# Patient Record
Sex: MALE | Race: WHITE | NOT HISPANIC OR LATINO | Employment: OTHER | URBAN - METROPOLITAN AREA
[De-identification: names, ages, dates, MRNs, and addresses within clinical notes are randomized per-mention and may not be internally consistent; named-entity substitution may affect disease eponyms.]

---

## 2018-03-14 RX ORDER — PANTOPRAZOLE SODIUM 40 MG/1
40 TABLET, DELAYED RELEASE ORAL DAILY
Qty: 90 TABLET | Refills: 3 | Status: SHIPPED | OUTPATIENT
Start: 2018-03-14 | End: 2019-05-22 | Stop reason: SDUPTHER

## 2018-03-14 RX ORDER — PANTOPRAZOLE SODIUM 40 MG/1
1 TABLET, DELAYED RELEASE ORAL DAILY
COMMUNITY
Start: 2017-12-08 | End: 2018-03-14 | Stop reason: SDUPTHER

## 2018-04-09 ENCOUNTER — TELEPHONE (OUTPATIENT)
Dept: INTERNAL MEDICINE | Facility: CLINIC | Age: 73
End: 2018-04-09

## 2018-04-09 NOTE — TELEPHONE ENCOUNTER
Pt called asked for a call back from Dr. PETERSON is having flu like symptoms also experiencing some dizziness lives pretty far out in [place] and would like some medical assistance Over the phone specially from Dr. PETERSON

## 2018-04-10 NOTE — TELEPHONE ENCOUNTER
Estela I left a detailed message with him at home and asked him to call us back.  I alternatively asked him to call me on my cell [employer].  If he calls in the morning try to get a better understanding of his allergies, and symptoms and maybe we can just prescribe him but if he has worsening symptoms he probably should see someone locally.  Thanks a lot for your help.  Raj Arreola

## 2018-04-11 NOTE — TELEPHONE ENCOUNTER
Matt called in this am. States symptoms started with cough and cold symptoms about 1 week ago Monday. He was treating symptoms with robitussin cold and flu. Experienced diarrhea Friday,saturday, and Sunday, states he may have been dehydrated. As of Monday he woke up out of a sound sleep with vertigo, moving head side to side is horrible, cannot move around to well without vertigo worsening. Went to Urgent care in [place], they gave him fluids via IV , negative flu swab, negative CXR.  prescribed zofran and meclizine for vertigo, did not prescribe cough medication or abx. Patient is still experiencing some tightness in his chest due to congestion and states the he took one dose of the meclizine last night (2 tablets) which did not help. Denies fever, body aches, and states chills only occur when the vertigo gets really bad. Patient would like to speak with you to discuss this further as he already went to Urgent care for eval and they're tx is not helping at this time.    # [phone]

## 2018-04-12 NOTE — TELEPHONE ENCOUNTER
I spoke with him  rina garcia  Getting stress test  Will follow up in office next week  Calling for an appointment  ed

## 2018-04-17 ENCOUNTER — TRANSCRIBE ORDERS (OUTPATIENT)
Dept: SCHEDULING | Age: 73
End: 2018-04-17

## 2018-04-17 DIAGNOSIS — I25.10 DISEASE OF CARDIOVASCULAR SYSTEM: Primary | ICD-10-CM

## 2018-04-23 ENCOUNTER — TELEPHONE (OUTPATIENT)
Dept: INTERNAL MEDICINE | Facility: CLINIC | Age: 73
End: 2018-04-23

## 2018-04-23 NOTE — TELEPHONE ENCOUNTER
Patient was prescribed Meclizine 12.5 mg at an urgent care in [place], [place]. Dr. Arreola is aware.  Patient wants Dr. arreola to tall in this prescription since he's out of it now.  Has a FU scheduled for April 30th.

## 2018-04-24 RX ORDER — MECLIZINE HCL 25MG 25 MG/1
25 TABLET, CHEWABLE ORAL 3 TIMES DAILY PRN
Qty: 30 TABLET | Refills: 0 | Status: SHIPPED | OUTPATIENT
Start: 2018-04-24 | End: 2018-04-24 | Stop reason: ALTCHOICE

## 2018-04-24 RX ORDER — MECLIZINE HCL 12.5 MG 12.5 MG/1
12.5 TABLET ORAL 3 TIMES DAILY PRN
Qty: 30 TABLET | Refills: 0 | Status: SHIPPED | OUTPATIENT
Start: 2018-04-24 | End: 2019-02-06

## 2018-04-24 NOTE — TELEPHONE ENCOUNTER
Dr. Arreola please see previous message. Pt would like Meclizine ordered. Can you please place an order for the medication and send it electronically to the pt's preferred pharm on file.

## 2018-05-02 ENCOUNTER — HOSPITAL ENCOUNTER (OUTPATIENT)
Dept: CARDIOLOGY | Facility: HOSPITAL | Age: 73
Setting detail: NUCLEAR MEDICINE
Discharge: HOME | End: 2018-05-02
Attending: INTERNAL MEDICINE
Payer: MEDICARE

## 2018-05-02 ENCOUNTER — OFFICE VISIT (OUTPATIENT)
Dept: INTERNAL MEDICINE | Facility: CLINIC | Age: 73
End: 2018-05-02
Payer: MEDICARE

## 2018-05-02 ENCOUNTER — HOSPITAL ENCOUNTER (OUTPATIENT)
Dept: RADIOLOGY | Facility: HOSPITAL | Age: 73
Setting detail: NUCLEAR MEDICINE
Discharge: HOME | End: 2018-05-02
Attending: INTERNAL MEDICINE
Payer: MEDICARE

## 2018-05-02 VITALS
BODY MASS INDEX: 30.22 KG/M2 | DIASTOLIC BLOOD PRESSURE: 70 MMHG | RESPIRATION RATE: 16 BRPM | HEART RATE: 97 BPM | SYSTOLIC BLOOD PRESSURE: 120 MMHG | HEIGHT: 66 IN | OXYGEN SATURATION: 97 % | TEMPERATURE: 98.9 F | WEIGHT: 188 LBS

## 2018-05-02 VITALS
SYSTOLIC BLOOD PRESSURE: 174 MMHG | DIASTOLIC BLOOD PRESSURE: 89 MMHG | HEART RATE: 96 BPM | BODY MASS INDEX: 28.93 KG/M2 | OXYGEN SATURATION: 100 % | HEIGHT: 66 IN | WEIGHT: 180 LBS

## 2018-05-02 DIAGNOSIS — E11.9 TYPE 2 DIABETES MELLITUS WITHOUT COMPLICATION, UNSPECIFIED LONG TERM INSULIN USE STATUS: Primary | ICD-10-CM

## 2018-05-02 DIAGNOSIS — N40.0 BENIGN PROSTATIC HYPERPLASIA WITHOUT LOWER URINARY TRACT SYMPTOMS: ICD-10-CM

## 2018-05-02 DIAGNOSIS — I25.10 DISEASE OF CARDIOVASCULAR SYSTEM: ICD-10-CM

## 2018-05-02 DIAGNOSIS — R42 VERTIGO: ICD-10-CM

## 2018-05-02 DIAGNOSIS — E78.2 MIXED HYPERLIPIDEMIA: ICD-10-CM

## 2018-05-02 DIAGNOSIS — C73 MALIGNANT NEOPLASM OF THYROID GLAND (CMS/HCC): ICD-10-CM

## 2018-05-02 PROBLEM — E78.5 DYSLIPIDEMIA: Status: ACTIVE | Noted: 2018-05-02

## 2018-05-02 PROBLEM — Z87.891 FORMER TOBACCO USE: Status: ACTIVE | Noted: 2018-05-02

## 2018-05-02 LAB
ALBUMIN SERPL-MCNC: 4.3 G/DL (ref 3.4–5)
ALP SERPL-CCNC: 51 IU/L (ref 35–126)
ALT SERPL-CCNC: 27 IU/L (ref 16–63)
ANION GAP SERPL CALC-SCNC: 12 MEQ/L (ref 3–15)
AST SERPL-CCNC: 24 IU/L (ref 15–41)
BASOPHILS # BLD: 0.05 K/UL (ref 0.01–0.1)
BASOPHILS NFR BLD: 0.5 %
BILIRUB SERPL-MCNC: 0.5 MG/DL (ref 0.3–1.2)
BUN SERPL-MCNC: 14 MG/DL (ref 8–20)
CALCIUM SERPL-MCNC: 9.6 MG/DL (ref 8.9–10.3)
CHLORIDE SERPL-SCNC: 98 MMOL/L (ref 98–109)
CHOLEST SERPL-MCNC: 149 MG/DL
CO2 SERPL-SCNC: 27 MMOL/L (ref 22–32)
CREAT SERPL-MCNC: 1.2 MG/DL (ref 0.8–1.3)
DIFFERENTIAL METHOD BLD: NORMAL
EOSINOPHIL # BLD: 0.19 K/UL (ref 0.04–0.54)
EOSINOPHIL NFR BLD: 1.9 %
ERYTHROCYTE [DISTWIDTH] IN BLOOD BY AUTOMATED COUNT: 13.3 % (ref 11.6–14.4)
EST. AVERAGE GLUCOSE BLD GHB EST-MCNC: 197 MG/DL
GFR SERPL CREATININE-BSD FRML MDRD: 59.2 ML/MIN/1.73M*2
GLUCOSE SERPL-MCNC: 225 MG/DL (ref 70–99)
HBA1C MFR BLD HPLC: 8.5 %
HCT VFR BLDCO AUTO: 42.7 % (ref 40–51)
HDLC SERPL-MCNC: 46 MG/DL
HDLC SERPL: 3.2 {RATIO}
HGB BLD-MCNC: 13.9 G/DL (ref 13.7–17.5)
IMM GRANULOCYTES # BLD AUTO: 0.04 K/UL (ref 0–0.08)
IMM GRANULOCYTES NFR BLD AUTO: 0.4 %
LDLC SERPL CALC-MCNC: 77 MG/DL
LYMPHOCYTES # BLD: 2.28 K/UL (ref 1.2–3.5)
LYMPHOCYTES NFR BLD: 23 %
MCH RBC QN AUTO: 28.2 PG (ref 28–33.2)
MCHC RBC AUTO-ENTMCNC: 32.6 G/DL (ref 32.2–36.5)
MCV RBC AUTO: 86.6 FL (ref 83–98)
MONOCYTES # BLD: 0.94 K/UL (ref 0.3–1)
MONOCYTES NFR BLD: 9.5 %
NEUTROPHILS # BLD: 6.4 K/UL (ref 1.7–7)
NEUTS SEG NFR BLD: 64.7 %
NONHDLC SERPL-MCNC: 103 MG/DL
NRBC BLD-RTO: 0 %
PDW BLD AUTO: 10.3 FL (ref 9.4–12.4)
PLATELET # BLD AUTO: 257 K/UL (ref 150–350)
POTASSIUM SERPL-SCNC: 4.6 MMOL/L (ref 3.6–5.1)
PROT SERPL-MCNC: 6.7 G/DL (ref 6–8.2)
RBC # BLD AUTO: 4.93 M/UL (ref 4.5–5.8)
SODIUM SERPL-SCNC: 137 MMOL/L (ref 136–144)
STRESS BASELINE BP: NORMAL MMHG
STRESS BASELINE HR: 79 BPM
STRESS O2 SAT REST: 100 %
STRESS PERCENT HR: 84 %
STRESS POST O2 SAT PEAK: 100 %
STRESS POST PEAK BP: NORMAL MMHG
STRESS POST PEAK HR: 122 BPM
STRESS TARGET HR: 124 BPM
STRESS TARGET HR: 124 BPM
T4 FREE SERPL-MCNC: 1.36 NG/DL (ref 0.58–1.64)
THYROGLOB SERPL-MCNC: <0.1 NG/ML (ref 1.15–130.77)
TRIGL SERPL-MCNC: 128 MG/DL (ref 30–149)
TSH SERPL DL<=0.05 MIU/L-ACNC: 0.64 MIU/ML (ref 0.34–5.6)
WBC # BLD AUTO: 9.9 K/UL (ref 3.8–10.5)

## 2018-05-02 PROCEDURE — 63600000 HC DRUGS/DETAIL CODE: Performed by: INTERNAL MEDICINE

## 2018-05-02 PROCEDURE — 84432 ASSAY OF THYROGLOBULIN: CPT | Performed by: INTERNAL MEDICINE

## 2018-05-02 PROCEDURE — A9500 TC99M SESTAMIBI: HCPCS | Performed by: INTERNAL MEDICINE

## 2018-05-02 PROCEDURE — 93018 CV STRESS TEST I&R ONLY: CPT | Performed by: INTERNAL MEDICINE

## 2018-05-02 PROCEDURE — 93017 CV STRESS TEST TRACING ONLY: CPT

## 2018-05-02 PROCEDURE — 84443 ASSAY THYROID STIM HORMONE: CPT | Performed by: INTERNAL MEDICINE

## 2018-05-02 PROCEDURE — 85025 COMPLETE CBC W/AUTO DIFF WBC: CPT | Performed by: INTERNAL MEDICINE

## 2018-05-02 PROCEDURE — 83036 HEMOGLOBIN GLYCOSYLATED A1C: CPT | Performed by: INTERNAL MEDICINE

## 2018-05-02 PROCEDURE — 93016 CV STRESS TEST SUPVJ ONLY: CPT | Performed by: NURSE PRACTITIONER

## 2018-05-02 PROCEDURE — 78452 HT MUSCLE IMAGE SPECT MULT: CPT

## 2018-05-02 PROCEDURE — 84439 ASSAY OF FREE THYROXINE: CPT | Performed by: INTERNAL MEDICINE

## 2018-05-02 PROCEDURE — 80061 LIPID PANEL: CPT | Performed by: INTERNAL MEDICINE

## 2018-05-02 PROCEDURE — 99214 OFFICE O/P EST MOD 30 MIN: CPT | Performed by: INTERNAL MEDICINE

## 2018-05-02 PROCEDURE — 80053 COMPREHEN METABOLIC PANEL: CPT | Performed by: INTERNAL MEDICINE

## 2018-05-02 RX ORDER — AMINOPHYLLINE 25 MG/ML
75 INJECTION, SOLUTION INTRAVENOUS ONCE
Status: COMPLETED | OUTPATIENT
Start: 2018-05-02 | End: 2018-05-02

## 2018-05-02 RX ORDER — REGADENOSON 0.08 MG/ML
0.4 INJECTION, SOLUTION INTRAVENOUS ONCE
Status: COMPLETED | OUTPATIENT
Start: 2018-05-02 | End: 2018-05-02

## 2018-05-02 RX ADMIN — KIT FOR THE PREPARATION OF TECHNETIUM TC99M SESTAMIBI 32.2 MILLICURIE: 1 INJECTION, POWDER, LYOPHILIZED, FOR SOLUTION PARENTERAL at 17:00

## 2018-05-02 RX ADMIN — REGADENOSON 0.4 MG: 0.08 INJECTION, SOLUTION INTRAVENOUS at 11:24

## 2018-05-02 RX ADMIN — AMINOPHYLLINE 75 MG: 25 INJECTION, SOLUTION INTRAVENOUS at 11:26

## 2018-05-02 RX ADMIN — KIT FOR THE PREPARATION OF TECHNETIUM TC99M SESTAMIBI 10.7 MILLICURIE: 1 INJECTION, POWDER, LYOPHILIZED, FOR SOLUTION PARENTERAL at 10:00

## 2018-05-03 DIAGNOSIS — E11.65 UNCONTROLLED TYPE 2 DIABETES MELLITUS WITH HYPERGLYCEMIA, WITHOUT LONG-TERM CURRENT USE OF INSULIN (CMS/HCC): Primary | ICD-10-CM

## 2018-05-03 LAB
EXTRA TUBES HOLD SPECIMEN: NORMAL
EXTRA TUBES HOLD SPECIMEN: NORMAL

## 2018-05-06 ENCOUNTER — TELEPHONE (OUTPATIENT)
Dept: INTERNAL MEDICINE | Facility: CLINIC | Age: 73
End: 2018-05-06

## 2018-05-06 ASSESSMENT — ENCOUNTER SYMPTOMS
CONSTITUTIONAL NEGATIVE: 1
EYES NEGATIVE: 1
NEUROLOGICAL NEGATIVE: 1
CARDIOVASCULAR NEGATIVE: 1
ENDOCRINE NEGATIVE: 1
RESPIRATORY NEGATIVE: 1
PSYCHIATRIC NEGATIVE: 1
GASTROINTESTINAL NEGATIVE: 1
MUSCULOSKELETAL NEGATIVE: 1
ALLERGIC/IMMUNOLOGIC NEGATIVE: 1

## 2018-05-06 NOTE — PROGRESS NOTES
Patient ID: Matt Williamson is a 72 y.o. male.      Problem List Items Addressed This Visit        Other    BPH (benign prostatic hyperplasia)     Follow pmg for BPH  Doing urine test for risk stratification  Follow closely with him         Relevant Orders    Extra Tubes (Completed)    Extra Tube Lav (Completed)    Extra Tube Gold (Completed)    Mixed hyperlipidemia     Patient with continue with the same plan of care at this time.         Relevant Orders    Lipid panel (Completed)    Extra Tubes (Completed)    Extra Tube Lav (Completed)    Extra Tube Gold (Completed)    Thyroid cancer (CMS/HCC) (HCC)     checl us and labs  See pati olivarez    Call me once the ultrasound is done to review it         Type 2 diabetes mellitus without complication (CMS/HCC) (HCC) - Primary     Patient with continue with the same plan of care at this time.         Relevant Orders    Hemoglobin A1c (Completed)    Extra Tubes (Completed)    Extra Tube Lav (Completed)    Extra Tube Gold (Completed)    Vertigo     ? Viral  Check studies  MRI         Relevant Orders    MRI BRAIN WITH AND WITHOUT CONTRAST    Extra Tubes (Completed)    Extra Tube Lav (Completed)    Extra Tube Gold (Completed)          Patient Active Problem List   Diagnosis   • BPH (benign prostatic hyperplasia)   • Atherosclerosis of coronary artery   • Cataract   • Diabetes (CMS/HCC) (HCC)   • Diabetes mellitus (CMS/HCC) (HCC)   • Dyslipidemia   • Epigastric abdominal pain   • Essential hypertension   • Former tobacco use   • Mixed hyperlipidemia   • Thyroid cancer (CMS/HCC) (HCC)   • Type 2 diabetes mellitus without complication (CMS/HCC) (HCC)   • Vertigo       Patient's Medications   New Prescriptions    No medications on file   Previous Medications    ASPIRIN 81 MG ENTERIC COATED TABLET    Take 81 mg by mouth daily.    LEVOTHYROXINE (SYNTHROID) 150 MCG TABLET    Take 150 mcg by mouth daily.    MECLIZINE (ANTIVERT) 12.5 MG TABLET    Take 1 tablet (12.5 mg total) by mouth 3  "(three) times a day as needed for dizziness.    METFORMIN (GLUCOPHAGE) 500 MG TABLET    Take 500 mg by mouth 3 (three) times a day with meals.    METOPROLOL SUCCINATE XL (TOPROL-XL) 50 MG 24 HR TABLET    Take 50 mg by mouth daily.    PANTOPRAZOLE (PROTONIX) 40 MG EC TABLET    Take 1 tablet (40 mg total) by mouth daily.    PRAVASTATIN (PRAVACHOL) 20 MG TABLET    Take 40 mg by mouth daily.   Modified Medications    No medications on file   Discontinued Medications    No medications on file     New Prescriptions    No medications on file       Allergies   Allergen Reactions   • Penicillins Other (see comments)     Childhood allergy       Social History   Substance Use Topics   • Smoking status: Not on file   • Smokeless tobacco: Not on file   • Alcohol use Not on file       No family history on file.    Subjective   The patient presents the office today for follow-up regarding his medical issues.    72-year-old here for follow-up for vertigo and follow-up after his stress test.  He is still symptomatic with vertigo, but no stroke symptoms, chest pain, pressure, fever or chills.    HPI  I reviewed his  medications, recent labs, and correspondence.     Review of Systems   Constitutional: Negative.    HENT: Negative.    Eyes: Negative.    Respiratory: Negative.    Cardiovascular: Negative.    Gastrointestinal: Negative.    Endocrine: Negative.    Genitourinary: Negative.    Musculoskeletal: Negative.    Allergic/Immunologic: Negative.    Neurological: Negative.    Psychiatric/Behavioral: Negative.        /70 (BP Location: Left upper arm, Patient Position: Sitting)   Pulse 97   Temp 37.2 °C (98.9 °F)   Resp 16   Ht 1.676 m (5' 6\")   Wt 85.3 kg (188 lb)   SpO2 97%   BMI 30.34 kg/m²       Physical Exam   Constitutional: He is oriented to person, place, and time. He appears well-developed and well-nourished.   HENT:   Head: Normocephalic and atraumatic.   Right Ear: External ear normal.   Left Ear: External ear " normal.   Nose: Nose normal.   Mouth/Throat: Oropharynx is clear and moist.   Eyes: Conjunctivae and EOM are normal. Pupils are equal, round, and reactive to light.   Neck: Normal range of motion. No thyromegaly present.   Cardiovascular: Normal rate, regular rhythm, normal heart sounds and intact distal pulses.    Pulmonary/Chest: Effort normal and breath sounds normal.   Abdominal: Soft. Bowel sounds are normal.   Musculoskeletal: Normal range of motion.   Lymphadenopathy:     He has no cervical adenopathy.   Neurological: He is alert and oriented to person, place, and time.   Skin: Skin is warm and dry.   Psychiatric: He has a normal mood and affect.

## 2018-05-06 NOTE — TELEPHONE ENCOUNTER
Notes Recorded by Raj Arreola DO on 5/3/2018 at 12:15 AM EDT  Please let him know that his thyroid cancer level or thyroglobulin was normal.    His blood sugars were not controlled.  Have him take the Metformin 500 mg 2 in the morning and also 2 at night.  He needs to start walking again, and eating better.  He should check his sugars at least a couple times a week and if they are not coming down I would add Stella    I put him in for repeat A1c in about 3 months  Let him know to call me after he has his ultrasound  No change in plan    3 month follow-up

## 2018-05-07 NOTE — TELEPHONE ENCOUNTER
----- Message from Raj Arreola DO sent at 5/3/2018 12:15 AM EDT -----  Please let him know that his thyroid cancer level or thyroglobulin was normal.    His blood sugars were not controlled.  Have him take the Metformin 500 mg 2 in the morning and also 2 at night.  He needs to start walking again, and eating better.  He should check his sugars at least a couple times a week and if they are not coming down I would add Stella    I put him in for repeat A1c in about 3 months  Let him know to call me after he has his ultrasound  No change in plan    3 month follow-up

## 2018-05-07 NOTE — TELEPHONE ENCOUNTER
shantel pt wanted me to let you know he was not fasting for the lab results that you just gave him today , does that make a difference in the results you gave him & would want to have new fasting labs done .

## 2018-06-08 RX ORDER — PRAVASTATIN SODIUM 40 MG/1
TABLET ORAL
Qty: 90 TABLET | Refills: 0 | Status: SHIPPED | OUTPATIENT
Start: 2018-06-08 | End: 2018-09-07 | Stop reason: SDUPTHER

## 2018-07-05 ENCOUNTER — TELEPHONE (OUTPATIENT)
Dept: INTERNAL MEDICINE | Facility: CLINIC | Age: 73
End: 2018-07-05

## 2018-09-10 RX ORDER — PRAVASTATIN SODIUM 40 MG/1
TABLET ORAL
Qty: 90 TABLET | Refills: 0 | Status: SHIPPED | OUTPATIENT
Start: 2018-09-10 | End: 2018-09-19 | Stop reason: ALTCHOICE

## 2018-09-17 ENCOUNTER — TELEPHONE (OUTPATIENT)
Dept: INTERNAL MEDICINE | Facility: CLINIC | Age: 73
End: 2018-09-17

## 2018-09-17 DIAGNOSIS — R35.0 URINE FREQUENCY: ICD-10-CM

## 2018-09-17 DIAGNOSIS — T83.518A PURPLE URINE BAG SYNDROME (CMS/HCC)  (CMS/HCC): Primary | ICD-10-CM

## 2018-09-17 DIAGNOSIS — N39.0 PURPLE URINE BAG SYNDROME (CMS/HCC)  (CMS/HCC): Primary | ICD-10-CM

## 2018-09-17 NOTE — TELEPHONE ENCOUNTER
Incoming call from Matt. He has been experiencing lower back pain x2-3 weeks, sciatica, issues with lifting his leg into the car. Now experiencing a fever 101-102 which he is able to control with aspirin[place] go to urgent care or hospital. Perfers to have Dr. Arreola order labs and urine testing and an appt was made for Wednesday with Dr. Arreola for evaluation. He would like the lab slip faxed to [facility] in [place],  Fax # [phone].

## 2018-09-17 NOTE — TELEPHONE ENCOUNTER
Please help to schedule patient for Wednesday at 12 pm with Dr. Arreola. He is currently in [place] and unable to come any other day this week. Patient is aware of time and location.

## 2018-09-19 ENCOUNTER — HOSPITAL ENCOUNTER (OUTPATIENT)
Dept: RADIOLOGY | Facility: HOSPITAL | Age: 73
Discharge: HOME | End: 2018-09-19
Attending: INTERNAL MEDICINE
Payer: MEDICARE

## 2018-09-19 ENCOUNTER — OFFICE VISIT (OUTPATIENT)
Dept: INTERNAL MEDICINE | Facility: CLINIC | Age: 73
End: 2018-09-19
Payer: MEDICARE

## 2018-09-19 ENCOUNTER — TELEPHONE (OUTPATIENT)
Dept: INTERNAL MEDICINE | Facility: CLINIC | Age: 73
End: 2018-09-19

## 2018-09-19 VITALS
DIASTOLIC BLOOD PRESSURE: 70 MMHG | HEIGHT: 66 IN | RESPIRATION RATE: 18 BRPM | TEMPERATURE: 98.5 F | SYSTOLIC BLOOD PRESSURE: 120 MMHG | WEIGHT: 183 LBS | HEART RATE: 104 BPM | BODY MASS INDEX: 29.41 KG/M2 | OXYGEN SATURATION: 97 %

## 2018-09-19 DIAGNOSIS — R10.9 ABDOMINAL PAIN, UNSPECIFIED ABDOMINAL LOCATION: Primary | ICD-10-CM

## 2018-09-19 DIAGNOSIS — C73 THYROID CANCER (CMS/HCC): ICD-10-CM

## 2018-09-19 DIAGNOSIS — E11.9 TYPE 2 DIABETES MELLITUS WITHOUT COMPLICATION, UNSPECIFIED LONG TERM INSULIN USE STATUS: ICD-10-CM

## 2018-09-19 DIAGNOSIS — R10.9 ABDOMINAL PAIN, UNSPECIFIED ABDOMINAL LOCATION: ICD-10-CM

## 2018-09-19 PROCEDURE — 99214 OFFICE O/P EST MOD 30 MIN: CPT | Performed by: INTERNAL MEDICINE

## 2018-09-19 PROCEDURE — 74176 CT ABD & PELVIS W/O CONTRAST: CPT

## 2018-09-19 RX ORDER — CIPROFLOXACIN 500 MG/1
500 TABLET ORAL 2 TIMES DAILY
Qty: 6 TABLET | Refills: 0 | Status: SHIPPED | OUTPATIENT
Start: 2018-09-19 | End: 2018-09-22

## 2018-09-19 RX ORDER — PANTOPRAZOLE SODIUM 40 MG/1
40 TABLET, DELAYED RELEASE ORAL DAILY
COMMUNITY
End: 2019-09-09 | Stop reason: SDUPTHER

## 2018-09-19 ASSESSMENT — ENCOUNTER SYMPTOMS
CONSTITUTIONAL NEGATIVE: 1
ALLERGIC/IMMUNOLOGIC NEGATIVE: 1
RESPIRATORY NEGATIVE: 1
ENDOCRINE NEGATIVE: 1
CARDIOVASCULAR NEGATIVE: 1
PSYCHIATRIC NEGATIVE: 1
GASTROINTESTINAL NEGATIVE: 1
EYES NEGATIVE: 1
NEUROLOGICAL NEGATIVE: 1
MUSCULOSKELETAL NEGATIVE: 1

## 2018-09-19 NOTE — PROGRESS NOTES
Patient ID: Matt Williamson is a 73 y.o. male.      Problem List Items Addressed This Visit        Other    Thyroid cancer (CMS/HCC) (HCC)     Needs to see Dr. Bruno Lerma he needs to get his ultrasound as before.         Type 2 diabetes mellitus without complication (CMS/HCC) (HCC)     Patient with continue with the same plan of care at this time.           Other Visit Diagnoses     Abdominal pain, unspecified abdominal location    -  Primary    Relevant Orders    Ambulatory referral to Urology    CT ABDOMEN PELVIS WITHOUT IV CONTRAST (Completed)    CBC and Differential    Lipase    Hepatic function panel    CBC    Diff Count          Patient Active Problem List   Diagnosis   • BPH (benign prostatic hyperplasia)   • Atherosclerosis of coronary artery   • Cataract   • Diabetes (CMS/HCC) (HCC)   • Diabetes mellitus (CMS/HCC) (HCC)   • Dyslipidemia   • Epigastric abdominal pain   • Essential hypertension   • Former tobacco use   • Mixed hyperlipidemia   • Thyroid cancer (CMS/HCC) (HCC)   • Type 2 diabetes mellitus without complication (CMS/HCC) (HCC)   • Vertigo   • Cyst of thyroid   • Esophageal reflux       Patient's Medications   New Prescriptions    No medications on file   Previous Medications    ASPIRIN 81 MG ENTERIC COATED TABLET    Take 81 mg by mouth daily.    LEVOTHYROXINE (SYNTHROID) 150 MCG TABLET    Take 150 mcg by mouth daily.    METFORMIN (GLUCOPHAGE) 500 MG TABLET    Take 500 mg by mouth 3 (three) times a day with meals.    METOPROLOL SUCCINATE XL (TOPROL-XL) 50 MG 24 HR TABLET    Take 50 mg by mouth daily.    PANTOPRAZOLE (PROTONIX) 40 MG EC TABLET    Take 40 mg by mouth daily.    PRAVASTATIN (PRAVACHOL) 20 MG TABLET    Take 40 mg by mouth daily.   Modified Medications    No medications on file   Discontinued Medications    PRAVASTATIN (PRAVACHOL) 40 MG TABLET    TAKE 1 TABLET BY MOUTH EVERY DAY     New Prescriptions    No medications on file       Allergies   Allergen Reactions   • Penicillins Other  "(see comments)     Childhood allergy       Social History   Substance Use Topics   • Smoking status: Former Smoker   • Smokeless tobacco: Never Used   • Alcohol use Not on file       No family history on file.    Subjective   The patient presents the office today for follow-up regarding his medical issues.    He was seen because he is been having leg pain, fevers and pelvic pain.  He has low-grade temperatures.    The patient needs to follow-up with Dr. Pop Lerma regarding his thyroid disease.    He has no recent vertigo.    Blood sugars have been under better control.  He needs to see Dr. Pride for his  issues.  I am concerned that he has a UTI or prostatitis.  He has a history of a firm prostate.    He had a colonoscopy in 2016 and had diverticulosis.  He sees Dr. DARSHAN Agrawal needs a follow-up in 2026 or sooner if he has any problems      HPI  I reviewed his  medications, recent labs, and correspondence.     Review of Systems   Constitutional: Negative.    HENT: Negative.    Eyes: Negative.    Respiratory: Negative.    Cardiovascular: Negative.    Gastrointestinal: Negative.    Endocrine: Negative.    Genitourinary: Negative.    Musculoskeletal: Negative.    Allergic/Immunologic: Negative.    Neurological: Negative.    Psychiatric/Behavioral: Negative.        /70 (BP Location: Right upper arm, Patient Position: Sitting)   Pulse (!) 104   Temp 36.9 °C (98.5 °F)   Resp 18   Ht 1.676 m (5' 6\")   Wt 83 kg (183 lb)   SpO2 97%   BMI 29.54 kg/m²       Physical Exam   Constitutional: He is oriented to person, place, and time. He appears well-developed and well-nourished.   HENT:   Head: Normocephalic and atraumatic.   Right Ear: External ear normal.   Left Ear: External ear normal.   Nose: Nose normal.   Mouth/Throat: Oropharynx is clear and moist.   Eyes: Conjunctivae and EOM are normal. Pupils are equal, round, and reactive to light.   Neck: Normal range of motion. No thyromegaly present.   Cardiovascular: " Normal rate, regular rhythm, normal heart sounds and intact distal pulses.    Pulmonary/Chest: Effort normal and breath sounds normal.   Abdominal: Soft. Bowel sounds are normal.   Musculoskeletal: Normal range of motion.   Lymphadenopathy:     He has no cervical adenopathy.   Neurological: He is alert and oriented to person, place, and time.   Skin: Skin is warm and dry.   Psychiatric: He has a normal mood and affect.

## 2018-09-19 NOTE — TELEPHONE ENCOUNTER
Moraima from radiology called in regarding findings on the CT.     Any questions  has he can contact radiology at P:[phone]    Printed report to review with  verbally and tasked for completeness.

## 2018-09-23 ENCOUNTER — TELEPHONE (OUTPATIENT)
Dept: INTERNAL MEDICINE | Facility: CLINIC | Age: 73
End: 2018-09-23

## 2018-09-23 NOTE — TELEPHONE ENCOUNTER
Ladies someone was going to scan his labs.  I looked in the computer and cannot find them.  Please scan them as soon as possible.  If he cannot find and then will have to get them again and scanned them as soon as she could.  Raj Arreola

## 2018-09-27 NOTE — PROGRESS NOTES
I spoke with him again this week and he will be sure to see Dr. Pride as soon as possible.    Please make sure his CT scan, and labs are sent to Dr. Pride.    Thank you for your help.  Raj Arreola

## 2018-10-07 ENCOUNTER — TELEPHONE (OUTPATIENT)
Dept: INTERNAL MEDICINE | Facility: CLINIC | Age: 73
End: 2018-10-07

## 2018-10-07 DIAGNOSIS — N41.0 ACUTE PROSTATITIS: Primary | ICD-10-CM

## 2018-10-07 DIAGNOSIS — N42.9 DISORDER OF PROSTATE: ICD-10-CM

## 2018-10-07 NOTE — TELEPHONE ENCOUNTER
Please send a copy of his most recent thyroid testing.  It was done in May include the TSH, free T4 and thyroglobulin.  Please send it to Dr. Bruno Lerma who is an endocrinologist over to [facility].      I also spoke with him he has a biopsy pending with Dr. Pride    Thank you for your help.  Raj Arreola

## 2018-10-10 ENCOUNTER — TELEPHONE (OUTPATIENT)
Dept: INTERNAL MEDICINE | Facility: CLINIC | Age: 73
End: 2018-10-10

## 2018-10-10 NOTE — TELEPHONE ENCOUNTER
Dr. Arreola generated a lab script for this patient on 10/7/18 for [employer]. Pt [facility] where they stated they did not have the script . I faxed the script over and left it near the MA fax if possible patient may call back asking for a call to be made to the lab.

## 2018-10-11 LAB — PSA SERPL-MCNC: 7.5 NG/ML

## 2018-10-31 ENCOUNTER — OFFICE VISIT (OUTPATIENT)
Dept: INTERNAL MEDICINE | Facility: CLINIC | Age: 73
End: 2018-10-31
Payer: MEDICARE

## 2018-10-31 VITALS
RESPIRATION RATE: 18 BRPM | WEIGHT: 186 LBS | HEIGHT: 66 IN | TEMPERATURE: 98.9 F | SYSTOLIC BLOOD PRESSURE: 122 MMHG | BODY MASS INDEX: 29.89 KG/M2 | HEART RATE: 75 BPM | OXYGEN SATURATION: 98 % | DIASTOLIC BLOOD PRESSURE: 80 MMHG

## 2018-10-31 DIAGNOSIS — C73 THYROID CANCER (CMS/HCC): ICD-10-CM

## 2018-10-31 DIAGNOSIS — E11.9 TYPE 2 DIABETES MELLITUS WITHOUT COMPLICATION, UNSPECIFIED WHETHER LONG TERM INSULIN USE (CMS/HCC): ICD-10-CM

## 2018-10-31 DIAGNOSIS — I10 ESSENTIAL HYPERTENSION: ICD-10-CM

## 2018-10-31 DIAGNOSIS — E04.1 CYST OF THYROID: ICD-10-CM

## 2018-10-31 DIAGNOSIS — R42 VERTIGO: ICD-10-CM

## 2018-10-31 DIAGNOSIS — Z23 NEED FOR IMMUNIZATION AGAINST INFLUENZA: Primary | ICD-10-CM

## 2018-10-31 DIAGNOSIS — R97.20 PSA ELEVATION: ICD-10-CM

## 2018-10-31 DIAGNOSIS — E78.2 MIXED HYPERLIPIDEMIA: ICD-10-CM

## 2018-10-31 DIAGNOSIS — K21.9 GASTROESOPHAGEAL REFLUX DISEASE WITHOUT ESOPHAGITIS: ICD-10-CM

## 2018-10-31 PROCEDURE — G0008 ADMIN INFLUENZA VIRUS VAC: HCPCS | Performed by: INTERNAL MEDICINE

## 2018-10-31 PROCEDURE — 90653 IIV ADJUVANT VACCINE IM: CPT | Performed by: INTERNAL MEDICINE

## 2018-10-31 PROCEDURE — 99214 OFFICE O/P EST MOD 30 MIN: CPT | Mod: 25 | Performed by: INTERNAL MEDICINE

## 2018-10-31 NOTE — PROGRESS NOTES
Patient ID: Matt Williamson is a 73 y.o. male.      Problem List Items Addressed This Visit        Other    Essential hypertension     We discussed the benefits of controlling blood pressure to reduce the risk of strokes and heart attacks.  We discussed lifestyle changes including a healthy low-sodium diet and increasing healthy foods like fresh fruit and vegetables.  The importance of daily exercise for at least 10 minutes 3 times a day 5 days a week.  The importance of avoidance of smoking was discussed.    The patient is stable and would continue with the same plan of care.           Mixed hyperlipidemia     For hyperlipidemia the patient will continue with dietary and lifestyle modifications.  We discussed that medications for statins are important measure for controlling hyperlipidemia when inital measure are inadequate.             Thyroid cancer (CMS/[facility]) ([facility])     Dr. Bruno Lerma  stable         Type 2 diabetes mellitus without complication (CMS/[facility]) ([facility])     The patient is stable and would continue with the same plan of care.    Today we discussed the benefits of controlling diabetes.  Recent studies have clearly shown that good blood sugar control can prevent or delay the complications of diabetes.  The healthy lifestyle that can control diabetes also helps to prevent other physical problems.           Vertigo     The patient is stable and would continue with the same plan of care.    Resolved         Cyst of thyroid     The patient is stable and would continue with the same plan of care.           Esophageal reflux     Patient's GERD is stable with medications, and lifestyle changes.    No worrisome symptoms at this time.           PSA elevation     Keep fu with PMG  Discussed with patient and Dr. Pride  Will be seeing him next week    Was high as 15  Repeat from to 7.5  Component      Latest Ref Rng & Units 12/9/2016 10/10/2018   Psa, Total      < OR = 4.0 ng/mL 3.5 7.5 (H)              Other Visit  Diagnoses     Need for immunization against influenza    -  Primary    Relevant Orders    Influenza vaccine 65 and older IM preservative free (Completed)          Patient Active Problem List   Diagnosis   • BPH (benign prostatic hyperplasia)   • Atherosclerosis of coronary artery   • Cataract   • Diabetes (CMS/HCC) (HCC)   • Diabetes mellitus (CMS/HCC) (HCC)   • Dyslipidemia   • Epigastric abdominal pain   • Essential hypertension   • Former tobacco use   • Mixed hyperlipidemia   • Thyroid cancer (CMS/HCC) (HCC)   • Type 2 diabetes mellitus without complication (CMS/HCC) (HCC)   • Vertigo   • Cyst of thyroid   • Esophageal reflux   • PSA elevation       Patient's Medications   New Prescriptions    No medications on file   Previous Medications    ASPIRIN 81 MG ENTERIC COATED TABLET    Take 81 mg by mouth daily.    LEVOTHYROXINE (SYNTHROID) 150 MCG TABLET    Take 150 mcg by mouth daily.    METFORMIN (GLUCOPHAGE) 500 MG TABLET    Take 500 mg by mouth 4 (four) times a day.      METOPROLOL SUCCINATE XL (TOPROL-XL) 50 MG 24 HR TABLET    Take 50 mg by mouth daily.    PANTOPRAZOLE (PROTONIX) 40 MG EC TABLET    Take 40 mg by mouth daily.    PRAVASTATIN (PRAVACHOL) 20 MG TABLET    Take 40 mg by mouth daily.   Modified Medications    No medications on file   Discontinued Medications    No medications on file     New Prescriptions    No medications on file       Allergies   Allergen Reactions   • Penicillins Other (see comments)     Childhood allergy       Social History   Substance Use Topics   • Smoking status: Former Smoker   • Smokeless tobacco: Never Used   • Alcohol use Not on file       No family history on file.    Subjective   The patient presents the office today for follow-up regarding his medical issues.    Discussed with patient recent studies  Has a fu with PMG for inc prostate and possible bx    BP controlled    Patient was just seen endo for thyroid dx and DM    HPI  I reviewed his  medications, recent labs, and  "correspondence.     Review of Systems   Constitutional: Negative.    HENT: Negative.    Eyes: Negative.    Respiratory: Negative.    Cardiovascular: Negative.    Gastrointestinal: Negative.    Endocrine: Negative.    Genitourinary: Negative.    Musculoskeletal: Negative.    Allergic/Immunologic: Negative.    Neurological: Negative.    Psychiatric/Behavioral: Negative.        /80 (BP Location: Right upper arm, Patient Position: Sitting)   Pulse 75   Temp 37.2 °C (98.9 °F)   Resp 18   Ht 1.676 m (5' 6\")   Wt 84.4 kg (186 lb)   SpO2 98%   BMI 30.02 kg/m²       Physical Exam   Constitutional: He is oriented to person, place, and time. He appears well-developed and well-nourished.   HENT:   Head: Normocephalic and atraumatic.   Right Ear: External ear normal.   Left Ear: External ear normal.   Nose: Nose normal.   Mouth/Throat: Oropharynx is clear and moist.   Eyes: Conjunctivae and EOM are normal. Pupils are equal, round, and reactive to light.   Neck: Normal range of motion. No thyromegaly present.   Cardiovascular: Normal rate, regular rhythm, normal heart sounds and intact distal pulses.    Pulmonary/Chest: Effort normal and breath sounds normal.   Abdominal: Soft. Bowel sounds are normal.   Musculoskeletal: Normal range of motion.   Lymphadenopathy:     He has no cervical adenopathy.   Neurological: He is alert and oriented to person, place, and time.   Skin: Skin is warm and dry.   Psychiatric: He has a normal mood and affect.                        "

## 2018-10-31 NOTE — PATIENT INSTRUCTIONS
"  Influenza (Flu) Vaccine (Inactivated or Recombinant): What You Need to Know  1. Why get vaccinated?  Influenza (\"flu\") is a contagious disease that spreads around the [place] every year, usually between October and May.  Flu is caused by influenza viruses, and is spread mainly by coughing, sneezing, and close contact.  Anyone can get flu. Flu strikes suddenly and can last several days. Symptoms vary by age, but can include:  · fever/chills  · sore throat  · muscle aches  · fatigue  · cough  · headache  · runny or stuffy nose  Flu can also lead to pneumonia and blood infections, and cause diarrhea and seizures in children. If you have a medical condition, such as heart or lung disease, flu can make it worse.  Flu is more dangerous for some people. Infants and young children, people 65 years of age and older, pregnant women, and people with certain health conditions or a weakened immune system are at greatest risk.  Each year thousands of people in the [place] die from flu, and many more are hospitalized.  Flu vaccine can:  · keep you from getting flu,  · make flu less severe if you do get it, and  · keep you from spreading flu to your family and other people.  2. Inactivated and recombinant flu vaccines  A dose of flu vaccine is recommended every flu season. Children 6 months through 8 years of age may need two doses during the same flu season. Everyone else needs only one dose each flu season.  Some inactivated flu vaccines contain a very small amount of a mercury-based preservative called thimerosal. Studies have not shown thimerosal in vaccines to be harmful, but flu vaccines that do not contain thimerosal are available.  There is no live flu virus in flu shots. They cannot cause the flu.  There are many flu viruses, and they are always changing. Each year a new flu vaccine is made to protect against three or four viruses that are likely to cause disease in the upcoming flu season. But even when the " vaccine doesn't exactly match these viruses, it may still provide some protection.  Flu vaccine cannot prevent:  · flu that is caused by a virus not covered by the vaccine, or  · illnesses that look like flu but are not.  It takes about 2 weeks for protection to develop after vaccination, and protection lasts through the flu season.  3. Some people should not get this vaccine  Tell the person who is giving you the vaccine:  · If you have any severe, life-threatening allergies. If you ever had a life-threatening allergic reaction after a dose of flu vaccine, or have a severe allergy to any part of this vaccine, you may be advised not to get vaccinated. Most, but not all, types of flu vaccine contain a small amount of egg protein.  · If you ever had Guillain-Barré Syndrome (also called GBS). Some people with a history of GBS should not get this vaccine. This should be discussed with your doctor.  · If you are not feeling well. It is usually okay to get flu vaccine when you have a mild illness, but you might be asked to come back when you feel better.  4. Risks of a vaccine reaction  With any medicine, including vaccines, there is a chance of reactions. These are usually mild and go away on their own, but serious reactions are also possible.  Most people who get a flu shot do not have any problems with it.  Minor problems following a flu shot include:  · soreness, redness, or swelling where the shot was given  · hoarseness  · sore, red or itchy eyes  · cough  · fever  · aches  · headache  · itching  · fatigue  If these problems occur, they usually begin soon after the shot and last 1 or 2 days.  More serious problems following a flu shot can include the following:  · There may be a small increased risk of Guillain-[place] Syndrome (GBS) after inactivated flu vaccine. This risk has been estimated at 1 or 2 additional cases per million people vaccinated. This is much lower than the risk of severe complications from flu,  which can be prevented by flu vaccine.  · Young children who get the flu shot along with pneumococcal vaccine (PCV13) and/or DTaP vaccine at the same time might be slightly more likely to have a seizure caused by fever. Ask your doctor for more information. Tell your doctor if a child who is getting flu vaccine has ever had a seizure.  Problems that could happen after any injected vaccine:  · People sometimes faint after a medical procedure, including vaccination. Sitting or lying down for about 15 minutes can help prevent fainting, and injuries caused by a fall. Tell your doctor if you feel dizzy, or have vision changes or ringing in the ears.  · Some people get severe pain in the shoulder and have difficulty moving the arm where a shot was given. This happens very rarely.  · Any medication can cause a severe allergic reaction. Such reactions from a vaccine are very rare, estimated at about 1 in a million doses, and would happen within a few minutes to a few hours after the vaccination.  As with any medicine, there is a very remote chance of a vaccine causing a serious injury or death.  The safety of vaccines is always being monitored. For more information, visit: www.cdc.gov/vaccinesafety/  5. What if there is a serious reaction?  What should I look for?  Look for anything that concerns you, such as signs of a severe allergic reaction, very high fever, or unusual behavior.  Signs of a severe allergic reaction can include hives, swelling of the face and throat, difficulty breathing, a fast heartbeat, dizziness, and weakness. These would start a few minutes to a few hours after the vaccination.  What should I do?  · If you think it is a severe allergic reaction or other emergency that can't wait, call 9-1-1 and get the person to the nearest hospital. Otherwise, call your doctor.  · Reactions should be reported to the Vaccine Adverse Event Reporting System (VAERS). Your doctor should file this report, or you can do  it yourself through the VAERS web site at www.vaers.[facility].gov, or by calling 1-858.346.7848.  ¨ VAERS does not give medical advice.  6. The National Vaccine Injury Compensation Program  The National Vaccine Injury Compensation Program (VICP) is a federal program that was created to compensate people who may have been injured by certain vaccines.  Persons who believe they may have been injured by a vaccine can learn about the program and about filing a claim by calling 1-777.924.1269 or visiting the VICP website at www.[facility].gov/vaccinecompensation. There is a time limit to file a claim for compensation.  7. How can I learn more?  · Ask your healthcare provider. He or she can give you the vaccine package insert or suggest other sources of information.  · Call your local or state health department.  · Contact the Centers for Disease Control and Prevention (CDC):  ¨ Call 1-382.791.5712 (0-774-NMW-INFO) or  ¨ Visit CDC's website at www.cdc.gov/flu  Vaccine Information Statement, Inactivated Influenza Vaccine (08/07/2015)  This information is not intended to replace advice given to you by your health care provider. Make sure you discuss any questions you have with your health care provider.  Document Released: 10/12/2007 Document Revised: 09/07/2017 Document Reviewed: 09/07/2017  Elsevier Interactive Patient Education © 2017 [employer].

## 2018-11-04 ENCOUNTER — TELEPHONE (OUTPATIENT)
Dept: INTERNAL MEDICINE | Facility: CLINIC | Age: 73
End: 2018-11-04

## 2018-11-04 PROBLEM — R97.20 PSA ELEVATION: Status: ACTIVE | Noted: 2018-11-04

## 2018-11-04 ASSESSMENT — ENCOUNTER SYMPTOMS
MUSCULOSKELETAL NEGATIVE: 1
ENDOCRINE NEGATIVE: 1
NEUROLOGICAL NEGATIVE: 1
RESPIRATORY NEGATIVE: 1
ALLERGIC/IMMUNOLOGIC NEGATIVE: 1
PSYCHIATRIC NEGATIVE: 1
EYES NEGATIVE: 1
CONSTITUTIONAL NEGATIVE: 1
GASTROINTESTINAL NEGATIVE: 1
CARDIOVASCULAR NEGATIVE: 1

## 2018-11-04 NOTE — ASSESSMENT & PLAN NOTE
Patient's GERD is stable with medications, and lifestyle changes.    No worrisome symptoms at this time.

## 2018-11-04 NOTE — ASSESSMENT & PLAN NOTE
Keep fu with PMG  Discussed with patient and Dr. Pride  Will be seeing him next week    Was high as 15  Repeat from to 7.5  Component      Latest Ref Rng & Units 12/9/2016 10/10/2018   Psa, Total      < OR = 4.0 ng/mL 3.5 7.5 (H)

## 2018-11-04 NOTE — ASSESSMENT & PLAN NOTE
Dr. Bruno Lerma  Stable    The neck ultrasound revealed no mass or lymphadenopathy.  There was no residual thyroid tissue as expected.

## 2018-11-04 NOTE — ASSESSMENT & PLAN NOTE
The patient is stable and would continue with the same plan of care.    Today we discussed the benefits of controlling diabetes.  Recent studies have clearly shown that good blood sugar control can prevent or delay the complications of diabetes.  The healthy lifestyle that can control diabetes also helps to prevent other physical problems.    Component      Latest Ref Rng & Units 5/2/2018   Hemoglobin A1C      <5.7 % 8.5 (H)   Estimated Ave Glucose      mg/dL 197

## 2018-11-04 NOTE — ASSESSMENT & PLAN NOTE
For hyperlipidemia the patient will continue with dietary and lifestyle modifications.  We discussed that medications for statins are important measure for controlling hyperlipidemia when inital measure are inadequate.

## 2018-11-04 NOTE — ASSESSMENT & PLAN NOTE
We discussed the benefits of controlling blood pressure to reduce the risk of strokes and heart attacks.  We discussed lifestyle changes including a healthy low-sodium diet and increasing healthy foods like fresh fruit and vegetables.  The importance of daily exercise for at least 10 minutes 3 times a day 5 days a week.  The importance of avoidance of smoking was discussed.    The patient is stable and would continue with the same plan of care.

## 2018-12-05 RX ORDER — PRAVASTATIN SODIUM 20 MG/1
40 TABLET ORAL DAILY
Qty: 30 TABLET | Refills: 5 | Status: SHIPPED | OUTPATIENT
Start: 2018-12-05 | End: 2018-12-05 | Stop reason: SDUPTHER

## 2018-12-05 RX ORDER — METOPROLOL SUCCINATE 50 MG/1
50 TABLET, EXTENDED RELEASE ORAL DAILY
Qty: 30 TABLET | Refills: 5 | Status: SHIPPED | OUTPATIENT
Start: 2018-12-05 | End: 2019-01-07

## 2018-12-05 RX ORDER — PRAVASTATIN SODIUM 40 MG/1
40 TABLET ORAL DAILY
Qty: 30 TABLET | Refills: 5 | Status: SHIPPED | OUTPATIENT
Start: 2018-12-05 | End: 2018-12-11 | Stop reason: SDUPTHER

## 2018-12-11 RX ORDER — PRAVASTATIN SODIUM 40 MG/1
40 TABLET ORAL DAILY
Qty: 90 TABLET | Refills: 3 | Status: SHIPPED | OUTPATIENT
Start: 2018-12-11 | End: 2019-01-07 | Stop reason: SDUPTHER

## 2019-01-07 ENCOUNTER — OFFICE VISIT (OUTPATIENT)
Dept: CARDIOLOGY | Facility: CLINIC | Age: 74
End: 2019-01-07
Payer: MEDICARE

## 2019-01-07 VITALS
HEIGHT: 66 IN | HEART RATE: 93 BPM | BODY MASS INDEX: 30.7 KG/M2 | WEIGHT: 191 LBS | OXYGEN SATURATION: 99 % | DIASTOLIC BLOOD PRESSURE: 94 MMHG | SYSTOLIC BLOOD PRESSURE: 158 MMHG

## 2019-01-07 DIAGNOSIS — E11.9 TYPE 2 DIABETES MELLITUS WITHOUT COMPLICATION, WITHOUT LONG-TERM CURRENT USE OF INSULIN (CMS/HCC): ICD-10-CM

## 2019-01-07 DIAGNOSIS — I10 ESSENTIAL HYPERTENSION: ICD-10-CM

## 2019-01-07 DIAGNOSIS — I25.10 ATHEROSCLEROSIS OF NATIVE CORONARY ARTERY OF NATIVE HEART WITHOUT ANGINA PECTORIS: ICD-10-CM

## 2019-01-07 DIAGNOSIS — E78.2 MIXED HYPERLIPIDEMIA: Primary | ICD-10-CM

## 2019-01-07 PROCEDURE — 99214 OFFICE O/P EST MOD 30 MIN: CPT | Performed by: INTERNAL MEDICINE

## 2019-01-07 PROCEDURE — 93000 ELECTROCARDIOGRAM COMPLETE: CPT | Performed by: INTERNAL MEDICINE

## 2019-01-07 RX ORDER — PRAVASTATIN SODIUM 40 MG/1
40 TABLET ORAL DAILY
Qty: 90 TABLET | Refills: 3 | Status: SHIPPED | OUTPATIENT
Start: 2019-01-07 | End: 2019-09-09 | Stop reason: SDUPTHER

## 2019-01-07 RX ORDER — METOPROLOL SUCCINATE 50 MG/1
50 TABLET, EXTENDED RELEASE ORAL 2 TIMES DAILY WITH MEALS
Qty: 180 TABLET | Refills: 3 | COMMUNITY
Start: 2019-01-07 | End: 2019-01-22 | Stop reason: SDUPTHER

## 2019-01-07 RX ORDER — EXENATIDE 2 MG/.85ML
INJECTION, SUSPENSION, EXTENDED RELEASE SUBCUTANEOUS
Refills: 10 | COMMUNITY
Start: 2018-12-24 | End: 2019-09-09 | Stop reason: SDUPTHER

## 2019-01-07 ASSESSMENT — ENCOUNTER SYMPTOMS
RESPIRATORY NEGATIVE: 1
NEUROLOGICAL NEGATIVE: 1
CONSTITUTIONAL NEGATIVE: 1
EYES NEGATIVE: 1
GASTROINTESTINAL NEGATIVE: 1
MUSCULOSKELETAL NEGATIVE: 1
CARDIOVASCULAR NEGATIVE: 1

## 2019-01-07 NOTE — ASSESSMENT & PLAN NOTE
I have increased the patient's beta-blocker dose as noted above.  Hopefully, his blood pressure will remain well-controlled.

## 2019-01-07 NOTE — ASSESSMENT & PLAN NOTE
The patient's lipids are adequately controlled on current medication.  The patient is tolerating medication without side effects.  I will continue the current treatment.

## 2019-01-07 NOTE — ASSESSMENT & PLAN NOTE
The patient is now on Bydureon therapy.  His fasting blood sugars have improved significantly on this medication.  Is managed by his family physician peer

## 2019-01-07 NOTE — ASSESSMENT & PLAN NOTE
The patient has a history of stent placement.  He is doing well at this point and is asymptomatic.  He is tolerating his medical regimen without difficulty.  I did increase his beta-blocker dose today because he has noticed that his resting heart rate has tended to be higher recently.  In addition, his blood pressure is somewhat elevated today.  He will let me know if he has any difficulty tolerating his new beta-blocker dosage, which will be Toprol 50 mg twice daily.

## 2019-01-07 NOTE — PROGRESS NOTES
Cardiology  Office Progress Note         Reason for visit:   Chief Complaint   Patient presents with   • CV Follow Up       HPI     Matt Williamson is a 73 y.o. male who presents to the office for cardiovascular follow up and management of PAF,coronary artery disease s/p MARGOT, hypertension and dyslipidemia.      He was last seen in the office on 1/8/18. At that visit, he reported having episodes of chest and mid-scapular discomfort about once per week. He reported that it was not new, he did not have any associating symptoms. He reported an episode of A. fib that brought him to the ER in November 2017. He reported that he was given a prescription for xarelto and a BB. He did not start either. He reported that there had been no recurrence of the A. fib since.     He had a stress test on 4/17/18. It showed normal myocardial perfusion and normal left ventricular systolic function with post-stress ejection fraction of greater than 70%. The EKG portion was nondiagnostic, due to a failure to achieve target heart rate.     No changes were made to his medications at that time.  Since then, the patient has noted that his heart rate at rest has increased to approximately 90 bpm.  The reason for this is uncertain.  In addition, his blood pressure has crept up as well.  He occasionally gets fleeting episodes of atypical chest discomfort but has no other cardiovascular complaints.  He intermittently gets symptoms of vertigo.      Past Medical History:   Diagnosis Date   • Abdominal hernia    • Atherosclerosis of coronary artery 9/14/2012    Overview:    • BPH (benign prostatic hyperplasia) 9/14/2012    Overview:    • Cataract 9/11/2013    Overview:    • Cyst of thyroid 1/15/2007   • Epigastric abdominal pain 2/18/2016    Overview:    • Essential hypertension 6/20/2016    Overview:    • Former tobacco use 5/2/2018    Overview:    • Mixed hyperlipidemia 2/18/2016    Overview:    • Osteomyelitis of foot (CMS/HCC) ([facility]) 2009     Surgery.   • PSA elevation 11/4/2018   • Thyroid cancer (CMS/HCC) ([facility]) 9/14/2012    Overview:    • Type 2 diabetes mellitus without complication (CMS/HCC) ([facility]) 6/20/2016    Overview:    • Vertigo 5/2/2018       Past Surgical History:   Procedure Laterality Date   • ANAL FISSURECTOMY  1986   • CARDIAC CATHETERIZATION  11/21/2005    LM FOD. 70-80% proximal LAD.Mild disease CX, RCA and branches.    • CARDIAC CATHETERIZATION  11/28/2005    LM FOD. Pristine LAD stent. CX FOD. RCA mild disease.   • CORONARY ANGIOPLASTY WITH STENT PLACEMENT  11/22/2005    LM FOD. Moderate, diffuse disease LAD. Irregular CX w/ PLVB distal to PDA 99%. Mild disease RCA. Stent to distal LAD.   • FOOT SURGERY  2009    For osteomyelitis.   • THYROIDECTOMY  2012       Social History   Substance Use Topics   • Smoking status: Former Smoker   • Smokeless tobacco: Never Used   • Alcohol use Not on file       No family history on file.    Allergies:  Penicillins    Current Outpatient Prescriptions   Medication Sig Dispense Refill   • aspirin 81 mg enteric coated tablet Take 81 mg by mouth daily.     • BYDUREON BCISE 2 mg/0.85 mL auto-injector INJECT ONCE A WEEK UTD  10   • levothyroxine (SYNTHROID) 150 mcg tablet Take 150 mcg by mouth daily.     • meclizine (ANTIVERT) 12.5 mg tablet Take 1 tablet (12.5 mg total) by mouth 3 (three) times a day as needed for dizziness. 30 tablet 0   • metFORMIN (GLUCOPHAGE) 500 mg tablet Take 500 mg by mouth 4 (four) times a day.       • metoprolol succinate XL (TOPROL-XL) 50 mg 24 hr tablet Take 1 tablet (50 mg total) by mouth 2 (two) times a day with meals. 180 tablet 3   • pantoprazole (PROTONIX) 40 mg EC tablet Take 40 mg by mouth daily.     • pravastatin (PRAVACHOL) 40 mg tablet Take 1 tablet (40 mg total) by mouth daily. 90 tablet 3     No current facility-administered medications for this visit.        Review of Systems   Constitution: Negative.   HENT: Negative.    Eyes: Negative.    Cardiovascular: Negative.     Respiratory: Negative.    Skin: Negative.    Musculoskeletal: Negative.    Gastrointestinal: Negative.    Neurological: Negative.        Objective     Vitals:    01/07/19 1008   BP: (!) 158/94   Pulse: 93   SpO2: 99%       Wt Readings from Last 3 Encounters:   01/07/19 86.6 kg (191 lb)   10/31/18 84.4 kg (186 lb)   09/19/18 83 kg (183 lb)       Body mass index is 30.83 kg/m².    Physical Exam   Constitutional: He is oriented to person, place, and time. He appears well-developed and well-nourished.   HENT:   Head: Normocephalic and atraumatic.   Eyes: Conjunctivae and EOM are normal.   Neck: Normal range of motion. Neck supple.   Cardiovascular: Normal rate, regular rhythm, normal heart sounds and intact distal pulses.  Exam reveals no gallop and no friction rub.    No murmur heard.  Pulmonary/Chest: Effort normal and breath sounds normal. No respiratory distress. He has no wheezes. He has no rales. He exhibits no tenderness.   Abdominal: Soft. Bowel sounds are normal.   Musculoskeletal: Normal range of motion. He exhibits no edema or deformity.   Neurological: He is alert and oriented to person, place, and time. He has normal reflexes. No cranial nerve deficit. Coordination normal.   Skin: Skin is warm and dry.   Psychiatric: He has a normal mood and affect. His behavior is normal.       ECG; normal sinus rhythm, within normal limits    Hematology  Lab Results   Component Value Date    WBC 9.90 05/02/2018    HGB 13.9 05/02/2018    HCT 42.7 05/02/2018     05/02/2018    INR 1.0 01/14/2016       Chemistries  Lab Results   Component Value Date     05/02/2018    K 4.6 05/02/2018    CL 98 05/02/2018    CREATININE 1.2 05/02/2018    BUN 14 05/02/2018    CO2 27 05/02/2018    CALCIUM 9.6 05/02/2018    MG 2.0 01/14/2016    ALT 27 05/02/2018    AST 24 05/02/2018       Cholesterol  Lab Results   Component Value Date    CHOL 149 05/02/2018    TRIG 128 05/02/2018    HDL 46 05/02/2018    LDLCALC 77 05/02/2018     NONHDLCALC 103 05/02/2018       Endocrine  Lab Results   Component Value Date    TSH 0.64 05/02/2018    FREET4 1.36 05/02/2018    HGBA1C 8.5 (H) 05/02/2018           Assessment/Plan     Atherosclerosis of coronary artery  The patient has a history of stent placement.  He is doing well at this point and is asymptomatic.  He is tolerating his medical regimen without difficulty.  I did increase his beta-blocker dose today because he has noticed that his resting heart rate has tended to be higher recently.  In addition, his blood pressure is somewhat elevated today.  He will let me know if he has any difficulty tolerating his new beta-blocker dosage, which will be Toprol 50 mg twice daily.    Type 2 diabetes mellitus without complication (CMS/HCC) (HCC)  The patient is now on Bydureon therapy.  His fasting blood sugars have improved significantly on this medication.  Is managed by his family physician peer    Mixed hyperlipidemia  The patient's lipids are adequately controlled on current medication.  The patient is tolerating medication without side effects.  I will continue the current treatment.    Essential hypertension  I have increased the patient's beta-blocker dose as noted above.  Hopefully, his blood pressure will remain well-controlled.             Minor Ji MD  1/7/2019

## 2019-01-22 RX ORDER — METOPROLOL SUCCINATE 50 MG/1
50 TABLET, EXTENDED RELEASE ORAL 2 TIMES DAILY WITH MEALS
Qty: 90 TABLET | Refills: 0 | Status: SHIPPED | OUTPATIENT
Start: 2019-01-22 | End: 2019-01-22 | Stop reason: SDUPTHER

## 2019-01-22 RX ORDER — METOPROLOL SUCCINATE 50 MG/1
TABLET, EXTENDED RELEASE ORAL
Qty: 180 TABLET | Refills: 0 | Status: SHIPPED | OUTPATIENT
Start: 2019-01-22 | End: 2019-01-22 | Stop reason: SDUPTHER

## 2019-01-22 RX ORDER — METOPROLOL SUCCINATE 50 MG/1
50 TABLET, EXTENDED RELEASE ORAL 2 TIMES DAILY
Qty: 180 TABLET | Refills: 3 | Status: SHIPPED | OUTPATIENT
Start: 2019-01-22 | End: 2019-09-09 | Stop reason: SDUPTHER

## 2019-01-23 LAB
ALBUMIN SERPL-MCNC: 4.1 G/DL (ref 3.6–5.1)
ALBUMIN/CREAT UR: 4 MCG/MG CREAT
ALBUMIN/GLOB SERPL: 1.6 (CALC) (ref 1–2.5)
ALP SERPL-CCNC: 49 U/L (ref 40–115)
ALT SERPL-CCNC: 15 U/L (ref 9–46)
AST SERPL-CCNC: 12 U/L (ref 10–35)
BILIRUB SERPL-MCNC: 0.4 MG/DL (ref 0.2–1.2)
BUN SERPL-MCNC: 14 MG/DL (ref 7–25)
BUN/CREAT SERPL: ABNORMAL (CALC) (ref 6–22)
CALCIUM SERPL-MCNC: 8.8 MG/DL (ref 8.6–10.3)
CHLORIDE SERPL-SCNC: 100 MMOL/L (ref 98–110)
CHOLEST SERPL-MCNC: 136 MG/DL
CHOLEST/HDLC SERPL: 2.8 (CALC)
CO2 SERPL-SCNC: 27 MMOL/L (ref 20–32)
CREAT SERPL-MCNC: 1.03 MG/DL (ref 0.7–1.18)
CREAT UR-MCNC: 136 MG/DL (ref 20–320)
ERYTHROCYTE [DISTWIDTH] IN BLOOD BY AUTOMATED COUNT: 12.5 % (ref 11–15)
GLOBULIN SER CALC-MCNC: 2.6 G/DL (CALC) (ref 1.9–3.7)
GLUCOSE SERPL-MCNC: 150 MG/DL (ref 65–99)
HBA1C MFR BLD: 8 % OF TOTAL HGB
HCT VFR BLD AUTO: 41.6 % (ref 38.5–50)
HDLC SERPL-MCNC: 48 MG/DL
HGB BLD-MCNC: 13.8 G/DL (ref 13.2–17.1)
LDLC SERPL CALC-MCNC: 69 MG/DL (CALC)
MCH RBC QN AUTO: 28.7 PG (ref 27–33)
MCHC RBC AUTO-ENTMCNC: 33.2 G/DL (ref 32–36)
MCV RBC AUTO: 86.5 FL (ref 80–100)
MICROALBUMIN UR-MCNC: 0.6 MG/DL
NONHDLC SERPL-MCNC: 88 MG/DL (CALC)
PLATELET # BLD AUTO: 269 THOUSAND/UL (ref 140–400)
PMV BLD REES-ECKER: 10.4 FL (ref 7.5–12.5)
POTASSIUM SERPL-SCNC: 4.5 MMOL/L (ref 3.5–5.3)
PROT SERPL-MCNC: 6.7 G/DL (ref 6.1–8.1)
QUEST EGFR NON-AFR. AMERICAN: 71 ML/MIN/1.73M2
RBC # BLD AUTO: 4.81 MILLION/UL (ref 4.2–5.8)
SODIUM SERPL-SCNC: 136 MMOL/L (ref 135–146)
T4 FREE SERPL-MCNC: 1.6 NG/DL (ref 0.8–1.8)
THYROGLOB AB SERPL-ACNC: <1 IU/ML
THYROGLOB SERPL-MCNC: 0.4 NG/ML
TRIGL SERPL-MCNC: 102 MG/DL
TSH SERPL-ACNC: 0.24 MIU/L (ref 0.4–4.5)
WBC # BLD AUTO: 9.3 THOUSAND/UL (ref 3.8–10.8)

## 2019-02-06 ENCOUNTER — OFFICE VISIT (OUTPATIENT)
Dept: INTERNAL MEDICINE | Facility: CLINIC | Age: 74
End: 2019-02-06
Payer: MEDICARE

## 2019-02-06 ENCOUNTER — DOCUMENTATION (OUTPATIENT)
Dept: INTERNAL MEDICINE | Facility: CLINIC | Age: 74
End: 2019-02-06

## 2019-02-06 VITALS
RESPIRATION RATE: 16 BRPM | WEIGHT: 184 LBS | HEART RATE: 94 BPM | DIASTOLIC BLOOD PRESSURE: 80 MMHG | BODY MASS INDEX: 29.57 KG/M2 | HEIGHT: 66 IN | OXYGEN SATURATION: 99 % | SYSTOLIC BLOOD PRESSURE: 134 MMHG

## 2019-02-06 DIAGNOSIS — R97.20 PSA ELEVATION: ICD-10-CM

## 2019-02-06 DIAGNOSIS — I10 ESSENTIAL HYPERTENSION: ICD-10-CM

## 2019-02-06 DIAGNOSIS — C73 THYROID CANCER (CMS/HCC): ICD-10-CM

## 2019-02-06 DIAGNOSIS — E11.9 TYPE 2 DIABETES MELLITUS WITHOUT COMPLICATION, WITHOUT LONG-TERM CURRENT USE OF INSULIN (CMS/HCC): ICD-10-CM

## 2019-02-06 DIAGNOSIS — R06.83 SNORING: Primary | ICD-10-CM

## 2019-02-06 DIAGNOSIS — E04.1 CYST OF THYROID: ICD-10-CM

## 2019-02-06 PROCEDURE — 99214 OFFICE O/P EST MOD 30 MIN: CPT | Performed by: INTERNAL MEDICINE

## 2019-02-06 NOTE — PROGRESS NOTES
Patient was last seen in October 2019.  History of hypertension, hyperlipidemia, thyroid cancer, diabetes, vertigo, thyroid cyst, esophageal reflux.    He follows with Dr. Pride for his elevated PSA in October it was 7.5.  Seen in January and PSA was down to 6.5 and he decided not to do a bx.  He will see him again in April.  No  symptoms, burning, nocturia or frequency.    Diabetes not controlled January 2019 A1c was 8.  His cholesterol was 136 and LDL 69.  Endo switching from Bydoreon to Trulicity.    Last 2 to 3 months sleeping 10 to 11 hours per day and still tired.    Saw CV and bc of increased heart rate increased Toprol  mg day.  No chest pain or pressure    Eye doc eddie bertrand 2018 meño 8 yr

## 2019-02-10 ENCOUNTER — TELEPHONE (OUTPATIENT)
Dept: INTERNAL MEDICINE | Facility: CLINIC | Age: 74
End: 2019-02-10

## 2019-02-10 ASSESSMENT — ENCOUNTER SYMPTOMS
EYES NEGATIVE: 1
GASTROINTESTINAL NEGATIVE: 1
ENDOCRINE NEGATIVE: 1
MUSCULOSKELETAL NEGATIVE: 1
PSYCHIATRIC NEGATIVE: 1
ALLERGIC/IMMUNOLOGIC NEGATIVE: 1
CARDIOVASCULAR NEGATIVE: 1
NEUROLOGICAL NEGATIVE: 1
RESPIRATORY NEGATIVE: 1
CONSTITUTIONAL NEGATIVE: 1

## 2019-02-10 NOTE — PROGRESS NOTES
Patient ID: Matt Williamson is a 73 y.o. male.      Problem List Items Addressed This Visit        Other    Essential hypertension     Blood pressure today is controlled, continue with the same medications.  No symptoms of chest pain, pressure, palpitations or shortness of breath.  The patient will continue with low-salt, with healthy diet and exercise.         Thyroid cancer (CMS/HCC) (HCC)     No reoccurrence    We will call to get the copy of his ultrasound from endocrinology         Type 2 diabetes mellitus without complication (CMS/HCC) ([facility])     Follows with Dr. Bruno Lerma    I discussed with the patient and the initial treatment for diabetes including regular exercise, and weight loss.  It is important to treat diabetes to prevent  complications.  Diabetes could lead to heart disease, stroke, visual problems including blindness and kidney failure.  The patient was encouraged to see ophthalmology and podiatry at least on a yearly basis.             Relevant Medications    empagliflozin (JARDIANCE) 10 mg tablet    Cyst of thyroid     We will get a copy of recent ultrasound         PSA elevation     Keep follow-up with Dr. Pride  We will called to get his notes           Other Visit Diagnoses     Snoring    -  Primary    Relevant Orders    Ambulatory referral to Pulmonology          Patient Active Problem List   Diagnosis   • BPH (benign prostatic hyperplasia)   • Atherosclerosis of coronary artery   • Cataract   • Epigastric abdominal pain   • Essential hypertension   • Former tobacco use   • Mixed hyperlipidemia   • Thyroid cancer (CMS/HCC) (HCC)   • Type 2 diabetes mellitus without complication (CMS/HCC) ([facility])   • Vertigo   • Cyst of thyroid   • Esophageal reflux   • PSA elevation       Patient's Medications   New Prescriptions    No medications on file   Previous Medications    ASPIRIN 81 MG ENTERIC COATED TABLET    Take 81 mg by mouth daily.    BYDUREON BCISE 2 MG/0.85 ML AUTO-INJECTOR    INJECT ONCE A  WEEK UTD    EMPAGLIFLOZIN (JARDIANCE) 10 MG TABLET    Take 10 mg by mouth daily.    LEVOTHYROXINE (SYNTHROID) 150 MCG TABLET    Take 150 mcg by mouth daily.    METFORMIN (GLUCOPHAGE) 500 MG TABLET    Take 500 mg by mouth 4 (four) times a day.      METOPROLOL SUCCINATE XL (TOPROL-XL) 50 MG 24 HR TABLET    Take 1 tablet (50 mg total) by mouth 2 (two) times a day.    PANTOPRAZOLE (PROTONIX) 40 MG EC TABLET    Take 40 mg by mouth daily.    PRAVASTATIN (PRAVACHOL) 40 MG TABLET    Take 1 tablet (40 mg total) by mouth daily.   Modified Medications    No medications on file   Discontinued Medications    No medications on file     New Prescriptions    No medications on file       Allergies   Allergen Reactions   • Penicillins Other (see comments)     Childhood allergy       Social History   Substance Use Topics   • Smoking status: Former Smoker   • Smokeless tobacco: Never Used   • Alcohol use Not on file       History reviewed. No pertinent family history.    Subjective   The patient presents the office today for follow-up regarding his medical issues.    73-year-old with ASCVD, stent placement.  Stable no recurrent chest pain or pressure.  His diabetes has been reasonably controlled.  Lipids are stable and blood pressure controlled with no chest pain or pressure.    He has an elevated PSA was following carefully with Dr. Pride.  He plans on repeating the PSA with him and consideration for a biopsy.    cln 2016 meño 8 yr    HPI  I reviewed his  medications, recent labs, and correspondence.     Review of Systems   Constitutional: Negative.    HENT: Negative.    Eyes: Negative.    Respiratory: Negative.    Cardiovascular: Negative.    Gastrointestinal: Negative.    Endocrine: Negative.    Genitourinary: Negative.    Musculoskeletal: Negative.    Allergic/Immunologic: Negative.    Neurological: Negative.    Psychiatric/Behavioral: Negative.        /80 (BP Location: Left upper arm, Patient Position: Sitting)   Pulse  "94   Resp 16   Ht 1.676 m (5' 6\")   Wt 83.5 kg (184 lb)   SpO2 99%   BMI 29.70 kg/m²        Physical Exam   Constitutional: He is oriented to person, place, and time. He appears well-developed and well-nourished.   HENT:   Head: Normocephalic and atraumatic.   Right Ear: External ear normal.   Left Ear: External ear normal.   Nose: Nose normal.   Mouth/Throat: Oropharynx is clear and moist.   Eyes: Conjunctivae and EOM are normal. Pupils are equal, round, and reactive to light.   Neck: Normal range of motion. No thyromegaly present.   Cardiovascular: Normal rate, regular rhythm, normal heart sounds and intact distal pulses.    Pulmonary/Chest: Effort normal and breath sounds normal.   Abdominal: Soft. Bowel sounds are normal.   Musculoskeletal: Normal range of motion.   Lymphadenopathy:     He has no cervical adenopathy.   Neurological: He is alert and oriented to person, place, and time.   Skin: Skin is warm and dry.   Psychiatric: He has a normal mood and affect.                        "

## 2019-02-10 NOTE — ASSESSMENT & PLAN NOTE
Follows with Dr. Bruno Lerma    I discussed with the patient and the initial treatment for diabetes including regular exercise, and weight loss.  It is important to treat diabetes to prevent  complications.  Diabetes could lead to heart disease, stroke, visual problems including blindness and kidney failure.  The patient was encouraged to see ophthalmology and podiatry at least on a yearly basis.

## 2019-02-10 NOTE — ASSESSMENT & PLAN NOTE
Blood pressure today is controlled, continue with the same medications.  No symptoms of chest pain, pressure, palpitations or shortness of breath.  The patient will continue with low-salt, with healthy diet and exercise.

## 2019-05-23 RX ORDER — PANTOPRAZOLE SODIUM 40 MG/1
TABLET, DELAYED RELEASE ORAL
Qty: 90 TABLET | Refills: 0 | Status: SHIPPED | OUTPATIENT
Start: 2019-05-23 | End: 2019-08-30 | Stop reason: SDUPTHER

## 2019-08-02 ENCOUNTER — TELEPHONE (OUTPATIENT)
Dept: INTERNAL MEDICINE | Facility: CLINIC | Age: 74
End: 2019-08-02

## 2019-08-02 NOTE — TELEPHONE ENCOUNTER
[facility] has authorized our office to send a medical release w/o pt sig and they will release e.r visit information etc from 7/31  [facility] fax # [phone].

## 2019-08-05 ENCOUNTER — TELEPHONE (OUTPATIENT)
Dept: INTERNAL MEDICINE | Facility: CLINIC | Age: 74
End: 2019-08-05

## 2019-08-05 DIAGNOSIS — M54.50 LUMBAR PAIN: Primary | ICD-10-CM

## 2019-08-05 DIAGNOSIS — Z01.812 ENCOUNTER FOR PREPROCEDURAL LABORATORY EXAMINATION: ICD-10-CM

## 2019-08-05 DIAGNOSIS — R35.0 URINARY FREQUENCY: ICD-10-CM

## 2019-08-05 NOTE — TELEPHONE ENCOUNTER
Pt went to [facility] on Wednesday 07/31/19 due to severe back pain.  Pt was given muscle relaxers and Oxycontin for pain.  Pt was told he needed to contact his primary to get an order to have an MRI done.  Records from that encounter are in pt's chart.  Pt is asking for an order to get an MRI as the xrays didn't show anything but pt is still in a lot of pain.  Pt is going to have this done at [facility] near his home.  Pt will get a fax number for them.  Please call pt once the order is complete and he will provide the fax number at that time.

## 2019-08-05 NOTE — TELEPHONE ENCOUNTER
Pt called back with where to send the MRI order.  Please send to [facility] and their fax #[phone].  Please call pt once the order is created.

## 2019-08-06 ENCOUNTER — TELEPHONE (OUTPATIENT)
Dept: INTERNAL MEDICINE | Facility: CLINIC | Age: 74
End: 2019-08-06

## 2019-08-06 NOTE — TELEPHONE ENCOUNTER
Spoke with Matt and he is aware that the MRI was ordered. Script faxed to number provided below. Matt is also requesting that he have a UA and culture completed to r/o UTI. Last office visit with Dr. Pride was 3 months ago and he did a prostate biopsy which was normal.     Orders placed with BMP to complete at [facility] per Patient.     He also wanted to make Dr. Arreola aware that he is still not able to walk.

## 2019-08-06 NOTE — TELEPHONE ENCOUNTER
Estela,    I did review his recent visit in [place].  Just 1 of the ladies to see when the last time he saw Dr. Ravi Pride.  I have no problem ordering an MRI with contrast of his lumbar spine but he should be seen in the office in about a 1 to week time interval.  He will also need a basic metabolic profile before he has his MRI with contrast.  The orders are in the system.    He has not been good in getting into the office and I do need to follow-up with other medical issues as well.    Thank you for your help,  Raj Arreola

## 2019-08-07 NOTE — TELEPHONE ENCOUNTER
Estela sounds great  I will be back on Monday  Call him and let us get him in the office in a week or so    Thank you for your help  Raj Arreola

## 2019-08-07 NOTE — TELEPHONE ENCOUNTER
Please call patient to schedule an appointment to follow up with Dr. Arreola in the next week or 2. Per dr. jamil-- thank you

## 2019-08-08 LAB
APPEARANCE UR: CLEAR
BACTERIA #/AREA URNS HPF: ABNORMAL /HPF
BACTERIA UR CULT: ABNORMAL
BILIRUB UR QL STRIP: NEGATIVE
BUN SERPL-MCNC: 18 MG/DL (ref 7–25)
BUN/CREAT SERPL: 13 (CALC) (ref 6–22)
CALCIUM SERPL-MCNC: 9.5 MG/DL (ref 8.6–10.3)
CHLORIDE SERPL-SCNC: 101 MMOL/L (ref 98–110)
CO2 SERPL-SCNC: 28 MMOL/L (ref 20–32)
COLOR UR: YELLOW
CREAT SERPL-MCNC: 1.38 MG/DL (ref 0.7–1.18)
GLUCOSE SERPL-MCNC: 149 MG/DL (ref 65–139)
GLUCOSE UR QL STRIP: ABNORMAL
HGB UR QL STRIP: NEGATIVE
HYALINE CASTS #/AREA URNS LPF: ABNORMAL /LPF
KETONES UR QL STRIP: NEGATIVE
LEUKOCYTE ESTERASE UR QL STRIP: NEGATIVE
NITRITE UR QL STRIP: NEGATIVE
PH UR STRIP: ABNORMAL [PH] (ref 5–8)
POTASSIUM SERPL-SCNC: 4.8 MMOL/L (ref 3.5–5.3)
PROT UR QL STRIP: NEGATIVE
QUEST EGFR NON-AFR. AMERICAN: 49 ML/MIN/1.73M2
RBC #/AREA URNS HPF: ABNORMAL /HPF
SODIUM SERPL-SCNC: 139 MMOL/L (ref 135–146)
SP GR UR STRIP: 1.04 (ref 1–1.03)
SQUAMOUS #/AREA URNS HPF: ABNORMAL /HPF
WBC #/AREA URNS HPF: ABNORMAL /HPF

## 2019-08-08 NOTE — TELEPHONE ENCOUNTER
I reviewed your recent testing:      Matt I have looked at your labs  Your creatinine kidney function is mildly abnormal but your glomerular filtration rate is adequate  I think you should still have the study done with contrast  I think he should drink plenty of liquids before and after  I will talk to you about the results once they are finished    I would ask that the patient to call if any questions or concerns and keep office follow up.    Raj Arreola

## 2019-08-09 ENCOUNTER — TELEPHONE (OUTPATIENT)
Dept: INTERNAL MEDICINE | Facility: CLINIC | Age: 74
End: 2019-08-09

## 2019-08-09 NOTE — TELEPHONE ENCOUNTER
Pt has an MRI scheduled on Sunday but in the meantime pt is in a lot of pain.  Pt is asking for a pain medication to hold him over until the results come back.  Pt uses [facility] [place].

## 2019-08-12 ENCOUNTER — TELEPHONE (OUTPATIENT)
Dept: INTERNAL MEDICINE | Facility: CLINIC | Age: 74
End: 2019-08-12

## 2019-08-12 RX ORDER — OXYCODONE AND ACETAMINOPHEN 5; 325 MG/1; MG/1
1 TABLET ORAL EVERY 4 HOURS PRN
Qty: 20 TABLET | Refills: 0 | Status: SHIPPED | OUTPATIENT
Start: 2019-08-12 | End: 2019-09-09 | Stop reason: SDUPTHER

## 2019-08-12 NOTE — TELEPHONE ENCOUNTER
Please let him know that I called in his pain medication to the pharmacy.  I look forward to seeing him soon.  Raj Arreola

## 2019-08-12 NOTE — TELEPHONE ENCOUNTER
Please keep a look out pt just had MRI lumbar spine order by Dr. Arreola test will be coming in form [facility]

## 2019-08-15 ENCOUNTER — TELEPHONE (OUTPATIENT)
Dept: INTERNAL MEDICINE | Facility: CLINIC | Age: 74
End: 2019-08-15

## 2019-08-15 NOTE — TELEPHONE ENCOUNTER
Pt was asking if we received the results of the MRI Lumbar Spine he had at the [facility] in [facility].  I called the center at [phone] and asked if they would please send the results as soon as possible.

## 2019-08-18 ENCOUNTER — TELEPHONE (OUTPATIENT)
Dept: INTERNAL MEDICINE | Facility: CLINIC | Age: 74
End: 2019-08-18

## 2019-08-18 DIAGNOSIS — M89.9 LESION OF LUMBAR SPINE: Primary | ICD-10-CM

## 2019-08-18 NOTE — TELEPHONE ENCOUNTER
Estela please take care of this on Monday.    I reviewed his lab work, recent imaging  He saw physician in [place]:    He is retiring shortly so we need to help him with the study.          The bottom line he is been having back pain in his study showed:      Please call him and help him get the bone scan done  He could be done in [place]  Call her friends and neurosurgery and let us get him seen as soon as possible  He could see:      Thanks for your help  Raj Arreola

## 2019-08-19 ENCOUNTER — TELEPHONE (OUTPATIENT)
Dept: NEUROSURGERY | Facility: CLINIC | Age: 74
End: 2019-08-19

## 2019-08-19 NOTE — TELEPHONE ENCOUNTER
Spoke with Matt, he states due to his schedule he will call to schedule an appointment for his bone scan.     I will update him with appt for Dr. Heller.

## 2019-08-19 NOTE — TELEPHONE ENCOUNTER
Outgoing call to Dr. Heller's office:    Records need to be reviewed by the physicians prior to appointment. They are able to pull patient up in the system and will review records. Advised this appointment is needed ASAP and if PCP needs to speak with one of the physicians than that could be arranged. Our office will be contacted with updates.

## 2019-08-21 ENCOUNTER — TELEPHONE (OUTPATIENT)
Dept: INTERNAL MEDICINE | Facility: CLINIC | Age: 74
End: 2019-08-21

## 2019-08-22 NOTE — TELEPHONE ENCOUNTER
Pt contacted to notify apt made with  at [facility]  Appt. Details are reserved for 8/28/19 at 11:30am.  Medical office [address]. [facility] [address]  Pt was adv to arrive 15 mins early to complete NPV forms.  Also, bring MRI disc for completeness.    Pt was unavailable. LM asking for a call back to review pertinent details regarding apt with

## 2019-08-22 NOTE — TELEPHONE ENCOUNTER
Spoke to Serenity at Dr Arreola's office, gave her the appointment info and advised her to make sure the patient needs to bring his MRI disc to appointment. Serenity is going to call the patient and relay the information. Advised to come early to fill out paperwork. Bone scan is scanned in media by PCP office

## 2019-08-22 NOTE — TELEPHONE ENCOUNTER
Pt returned our call.  Information re: appointment with Dr. Heller relayed to pt.  Pt will bring MRI disc and arrive 15 minutes early for NPV paperwork.

## 2019-08-28 ENCOUNTER — OFFICE VISIT (OUTPATIENT)
Dept: NEUROSURGERY | Facility: CLINIC | Age: 74
End: 2019-08-28
Payer: MEDICARE

## 2019-08-28 VITALS
DIASTOLIC BLOOD PRESSURE: 83 MMHG | HEART RATE: 80 BPM | WEIGHT: 165 LBS | SYSTOLIC BLOOD PRESSURE: 138 MMHG | BODY MASS INDEX: 26.52 KG/M2 | OXYGEN SATURATION: 98 % | HEIGHT: 66 IN

## 2019-08-28 DIAGNOSIS — D49.2 LUMBAR SPINE TUMOR: ICD-10-CM

## 2019-08-28 DIAGNOSIS — M54.16 LUMBAR RADICULOPATHY: Primary | ICD-10-CM

## 2019-08-28 PROCEDURE — 99205 OFFICE O/P NEW HI 60 MIN: CPT | Performed by: NEUROLOGICAL SURGERY

## 2019-08-28 RX ORDER — METHYLPREDNISOLONE 4 MG/1
TABLET ORAL
Qty: 21 TABLET | Refills: 0 | Status: SHIPPED | OUTPATIENT
Start: 2019-08-28 | End: 2019-09-04

## 2019-08-28 NOTE — PROGRESS NOTES
[facility]  Medical Office Building [address], [address]  [place] [address]  Phone: [phone]  Fax: [phone]      Patient ID: Matt Williamson                              : 1945    Visit Date: 2019    Referring Provider: Dr. Raj Arreola    History of Present Illness:   Matt Williamson is a pleasant 74 y.o. male seen today by the neurosurgery service as a consult for right leg pain. Patient began having pain about 1 month ago without injury or inciting event. The pain begins in his right buttock, radiating down the lateral side of his right thigh, past his knee, and into his right foot. The pain is at its worst in the middle of the night, and wakes him from his sleep. Throughout the day, the pain improves. During the night, pain is 10/10 in severity. He occasionally takes Oxycodone for when the pain is unbearable. He did see an orthopedist in [place] for a steroid injection, which did not provide him any relief. He denies weakness, saddle anesthesia, paresthesias, and bowel/bladder changes.       Allergies:  Allergies   Allergen Reactions   • Penicillins Rash     Childhood allergy       Medications:     Current Outpatient Prescriptions:   •  aspirin 81 mg enteric coated tablet, Take 81 mg by mouth daily., Disp: , Rfl:   •  empagliflozin (JARDIANCE) 10 mg tablet, Take 10 mg by mouth daily., Disp: , Rfl:   •  levothyroxine (SYNTHROID) 150 mcg tablet, Take 150 mcg by mouth daily., Disp: , Rfl:   •  metFORMIN (GLUCOPHAGE) 500 mg tablet, Take 500 mg by mouth 4 (four) times a day.  , Disp: , Rfl:   •  metoprolol succinate XL (TOPROL-XL) 50 mg 24 hr tablet, Take 1 tablet (50 mg total) by mouth 2 (two) times a day., Disp: 180 tablet, Rfl: 3  •  pantoprazole (PROTONIX) 40 mg EC tablet, Take 40 mg by mouth daily., Disp: , Rfl:   •  pravastatin (PRAVACHOL) 40 mg tablet, Take 1 tablet (40 mg total) by mouth daily., Disp: 90 tablet, Rfl: 3  •  BYDUREON BCISE 2 mg/0.85 mL  auto-injector, INJECT ONCE A WEEK UTD, Disp: , Rfl: 10  •  pantoprazole (PROTONIX) 40 mg EC tablet, TAKE 1 TABLET(40 MG) BY MOUTH DAILY, Disp: 90 tablet, Rfl: 0     Past Medical History:   Past Medical History:   Diagnosis Date   • Abdominal hernia    • Atherosclerosis of coronary artery 9/14/2012    Overview:    • BPH (benign prostatic hyperplasia) 9/14/2012    Overview:    • Cataract 9/11/2013    Overview:    • Cyst of thyroid 1/15/2007   • Epigastric abdominal pain 2/18/2016    Overview:    • Essential hypertension 6/20/2016    Overview:    • Former tobacco use 5/2/2018    Overview:    • Mixed hyperlipidemia 2/18/2016    Overview:    • Osteomyelitis of foot (CMS/HCC) ([facility]) 2009    Surgery.   • PSA elevation 11/4/2018   • Thyroid cancer (CMS/HCC) ([facility]) 9/14/2012    Overview:    • Type 2 diabetes mellitus without complication (CMS/HCC) ([facility]) 6/20/2016    Overview:    • Vertigo 5/2/2018       Past Surgical History:   Past Surgical History:   Procedure Laterality Date   • ANAL FISSURECTOMY  1986   • CARDIAC CATHETERIZATION  11/21/2005    LM FOD. 70-80% proximal LAD.Mild disease CX, RCA and branches.    • CARDIAC CATHETERIZATION  11/28/2005    LM FOD. Pristine LAD stent. CX FOD. RCA mild disease.   • CORONARY ANGIOPLASTY WITH STENT PLACEMENT  11/22/2005    LM FOD. Moderate, diffuse disease LAD. Irregular CX w/ PLVB distal to PDA 99%. Mild disease RCA. Stent to distal LAD.   • FOOT SURGERY  2009    For osteomyelitis.   • THYROIDECTOMY  2012       Family History:  Family History   Problem Relation Age of Onset   • Cancer Mother    • Emphysema Father        Social History:  Social History     Social History   • Marital status:      Spouse name: N/A   • Number of children: N/A   • Years of education: N/A     Occupational History   • Not on file.     Social History Main Topics   • Smoking status: Former Smoker     Quit date: 1980   • Smokeless tobacco: Never Used   • Alcohol use Yes   • Drug use: Unknown   • Sexual  activity: Not on file     Other Topics Concern   • Not on file     Social History Narrative   • No narrative on file       Vitals:   Vitals:    08/28/19 1135   BP: 138/83   Pulse: 80   SpO2: 98%       Review of Systems:  A complete 14 point review of systems was conducted and was deemed negative except what was documented in the HPI.    Physical Examination:  Physical Exam   Constitutional: Oriented to person, place, and time. Appears well-developed and well-nourished.   Head: Normocephalic and atraumatic.   Right Ear: External ear normal.   Left Ear: External ear normal.   Nose: Nose normal.   Mouth/Throat: Oropharynx is clear and moist.   Eyes: Conjunctivae and EOM are normal. Pupils are equal, round, and reactive to light. No scleral icterus.   Neck: Normal range of motion. Neck supple. No JVD present. No tracheal deviation present.   Cardiovascular: Normal rate and regular rhythm.    Pulmonary/Chest: Effort normal and breath sounds normal. No stridor. No respiratory distress. No wheezes. No tenderness.   Abdominal: Soft. No distension. There is no tenderness. There is no rebound and no guarding.   Musculoskeletal: Normal range of motion. No edema or tenderness.   Neurological: Oriented to person, place, and time.   Skin: Skin is warm and dry. No rash noted. No erythema.   Psychiatric: Normal mood and affect. Speech is normal and behavior is normal. Thought content normal.   Vitals reviewed.      Neurological Examination:  Neurologic Exam     Mental Status   Oriented to person, place, and time.   Attention: normal.   Speech: speech is normal   Level of consciousness: alert    Cranial Nerves     CN II   Visual acuity: normal    CN III, IV, VI   Pupils are equal, round, and reactive to light.  Extraocular motions are normal.   Right pupil: Size: 3 mm. Shape: regular. Reactivity: brisk.   Left pupil: Size: 3 mm. Shape: regular. Reactivity: brisk.   CN III: no CN III palsy  CN VI: no CN VI palsy  Nystagmus: none      CN V   Facial sensation intact.     CN VIII   CN VIII normal.   Hearing: intact    CN IX, X   CN IX normal.   Palate: symmetric    CN XI   CN XI normal.   Right sternocleidomastoid strength: normal  Left sternocleidomastoid strength: normal    CN XII   CN XII normal.   Tongue: not atrophic    Sensation ( /2)    Right Left  Right Left   C5 2 2 L2 2 2   C6 2 2 L3 2 2   C7 2 2 L4 2 2   C8 2 2 L5 2 2   T1 2 2 S1 2 2     Motor:     Deltoid Biceps Triceps Wrist ext Finger ext Hand Intrinsics Hip flexion Knee ext Dorsi-  flexion EHL Plantar Flexion   R 5 5 5 5 5 5 5 5 5 5 5   L 5 5 5 5 5 5 5 5 5 5 5     There is no pronator drift. Muscle bulk and tone are normal.     Gait, Coordination, and Reflexes     Reflexes   Reflexes 2+ except as noted.   Right plantar: normal  Left plantar: normal  Right Maharaj: absent  Left Maharaj: absent  Right ankle clonus: absent  Left ankle clonus: absent      Data Review:    Lab Results:   Lab Results   Component Value Date    WBC 9.3 01/22/2019    WBC 9.90 05/02/2018    HGB 13.8 01/22/2019    HGB 13.9 05/02/2018    HCT 41.6 01/22/2019    HCT 42.7 05/02/2018    MCV 86.5 01/22/2019    MCV 86.6 05/02/2018     01/22/2019     05/02/2018    RDW 12.5 01/22/2019    RDW 13.3 05/02/2018      Lab Results   Component Value Date    GLUCOSE 149 (H) 08/07/2019    GLUCOSE 3+ (A) 08/07/2019    GLUCOSE 225 (H) 05/02/2018    BUN 18 08/07/2019    BUN 14 05/02/2018     08/07/2019     05/02/2018    K 4.8 08/07/2019    K 4.6 05/02/2018     08/07/2019    CL 98 05/02/2018    CO2 28 08/07/2019    CO2 27 05/02/2018    ANIONGAP 12 05/02/2018        Imaging:   Independent review of all imaging was done by myself as well as review of the radiologists readings and comparison to prior films.     MRI LUMBAR SPINE 8/12/19  Solitary vertebral lesion in L3 of uncertain histology. The lack of fat makes benign hemangioma less likely but still possible as are more concerning lesions including  myeloma and metastasis.   A rightward protrusion of the L4-5 disc has both an intraspinal and intraforaminal component causing both thecal sac and exiting right L4 nerve root compression.     Assessment / Plan: In summary, Matt Williamson is a 74 year old male who presents with a right L4 radiculopathy x 1 month. On exam, he is neurologically intact without weakness or hyperreflexia. Imaging does reveal an L4-5 disc herniation causing right L4 nerve root compression. Patient has not yet exhausted all conservative management options for his radiculopathy. We have referred him to physical therapy, as well as Dr. Feliz for a right L4 transforaminal epidural steroid injection.  MRI also shows a lesion in the vertebral body of L3, with uncertain histology. Bone scan was negative for bone metastatic disease, with no uptake in the L3 vertebral body. Given the benign nature of the lesion, patient will have a repeat MRI of his lumbar spine with and without contrast in 3 months to monitor for any changes of this lesion. He will follow up in 3 months with repeat lumbar spine, or sooner should his symptoms not improve.              Patient seen earlier today. Signature timestamp does not reflect patient encounter time.   More than 50% of the time is spent counseling the patient and family and coordinating care.

## 2019-09-03 RX ORDER — PANTOPRAZOLE SODIUM 40 MG/1
TABLET, DELAYED RELEASE ORAL
Qty: 90 TABLET | Refills: 0 | Status: SHIPPED | OUTPATIENT
Start: 2019-09-03 | End: 2020-01-27

## 2019-09-03 NOTE — TELEPHONE ENCOUNTER
Medication refill request of Pantoprazole 40 mg tablets Q # 90 electronically sent to [facility]

## 2019-09-06 NOTE — TELEPHONE ENCOUNTER
Patient is c/o right leg pain. Oxycodone 5-325 #20 was prescribed on 8/12/19. He is okay during the day but pain seems to flare at night and he is unable to sleep.    He is scheduled for L4 steroid injection on 9/13/19. Requests refill of oxycodone 5-325 mg to hold him over until that date. He has #2 tablets left from last prescription.     Patient also needs refills on the following medications:    bydureon 2 mg  empagliflozin 10 mg  Levothyroxine 150 mcg  metformin 500 mg  metoprolol succinate 50 mg  Pantoprazole 40 mg  Pravastatin 40 mg    Please send to Kayla in [place], [place].

## 2019-09-09 RX ORDER — PRAVASTATIN SODIUM 40 MG/1
40 TABLET ORAL DAILY
Qty: 90 TABLET | Refills: 3 | Status: SHIPPED | OUTPATIENT
Start: 2019-09-09 | End: 2020-10-26

## 2019-09-09 RX ORDER — METOPROLOL SUCCINATE 50 MG/1
50 TABLET, EXTENDED RELEASE ORAL 2 TIMES DAILY
Qty: 180 TABLET | Refills: 3 | Status: SHIPPED | OUTPATIENT
Start: 2019-09-09 | End: 2019-11-04

## 2019-09-09 RX ORDER — OXYCODONE AND ACETAMINOPHEN 5; 325 MG/1; MG/1
1 TABLET ORAL EVERY 4 HOURS PRN
Qty: 20 TABLET | Refills: 0 | Status: SHIPPED | OUTPATIENT
Start: 2019-09-09 | End: 2019-09-19

## 2019-09-09 RX ORDER — LEVOTHYROXINE SODIUM 150 UG/1
150 TABLET ORAL
Qty: 90 TABLET | Refills: 0 | Status: SHIPPED | OUTPATIENT
Start: 2019-09-09 | End: 2020-07-31

## 2019-09-09 RX ORDER — EXENATIDE 2 MG/.85ML
INJECTION, SUSPENSION, EXTENDED RELEASE SUBCUTANEOUS
Qty: 10 SYRINGE | Refills: 0 | Status: SHIPPED | OUTPATIENT
Start: 2019-09-09 | End: 2020-01-27 | Stop reason: ALTCHOICE

## 2019-09-09 RX ORDER — PANTOPRAZOLE SODIUM 40 MG/1
40 TABLET, DELAYED RELEASE ORAL DAILY
Qty: 90 TABLET | Refills: 0 | Status: SHIPPED | OUTPATIENT
Start: 2019-09-09 | End: 2020-02-24

## 2019-09-09 RX ORDER — METFORMIN HYDROCHLORIDE 500 MG/1
500 TABLET ORAL 4 TIMES DAILY
Qty: 360 TABLET | Refills: 0 | Status: SHIPPED | OUTPATIENT
Start: 2019-09-09 | End: 2020-03-09

## 2019-09-13 ENCOUNTER — TELEPHONE (OUTPATIENT)
Dept: NEUROSURGERY | Facility: CLINIC | Age: 74
End: 2019-09-13

## 2019-09-13 NOTE — TELEPHONE ENCOUNTER
CD disc dated 8/11/19, MRI Lumbar Spine, [facility], & CD disc dated 8/23/19, bone scan, [facility], mailed back to patient today.

## 2019-09-21 ENCOUNTER — TELEPHONE (OUTPATIENT)
Dept: INTERNAL MEDICINE | Facility: CLINIC | Age: 74
End: 2019-09-21

## 2019-09-21 NOTE — TELEPHONE ENCOUNTER
Please call him to schedule a follow-up in the next several months.  Thank you for your assistance.  Raj Arreola

## 2019-10-22 ENCOUNTER — CLINICAL SUPPORT (OUTPATIENT)
Dept: INTERNAL MEDICINE | Facility: CLINIC | Age: 74
End: 2019-10-22
Payer: MEDICARE

## 2019-10-22 DIAGNOSIS — Z23 NEED FOR IMMUNIZATION AGAINST INFLUENZA: Primary | ICD-10-CM

## 2019-10-22 PROCEDURE — 90653 IIV ADJUVANT VACCINE IM: CPT | Performed by: INTERNAL MEDICINE

## 2019-10-22 PROCEDURE — G0008 ADMIN INFLUENZA VIRUS VAC: HCPCS | Performed by: INTERNAL MEDICINE

## 2019-10-22 NOTE — PATIENT INSTRUCTIONS
VACCINE INFORMATION STATEMENT     Influenza (Flu) Vaccine (Inactivated or Recombinant): What you need to know     Many Vaccine Information Statements are available in [place] and other languages. See  www.immunize.org/vis    Hojas de información sobre vacunas están disponibles en español y en muchos otrosidiomas. Visite www.immunize.org/vis     1. Why Get Vaccinated?  Influenza vaccine can prevent influenza (flu).  Flu is a contagious disease that spreads around the [place] every year, usually between October and May. Anyone can get the flu, but it is more dangerous for some people. Infants and young children, people 65 years of age and older, pregnant women, and people with certain health conditions or a weakened immune system are at greatest risk of flu complications.  Pneumonia, bronchitis, sinus infections and ear infections are examples of flu-related complications. If you have a medical condition, such as heart disease, cancer or diabetes, flu can make it worse.  Flu can cause fever and chills, sore throat, muscle aches, fatigue, cough, headache, and runny or stuffy nose. Some people may have vomiting and diarrhea, though this is more common in children than adults.  Each year thousands of people in the [place] die from flu, and many more are hospitalized. Flu vaccine prevents millions of illnesses and flu-related visits to the doctor each year.    2. Influenza vaccine  CDC recommends everyone 6 months of age and older get vaccinated every flu season. Children  6 months through 8 years of age may need 2 doses during a single flu season. Everyone else needs only 1 dose each flu season.  It takes about 2 weeks for protection to develop after vaccination.  There are many flu viruses, and they are always changing. Each year a new flu vaccine is made to protect against three or four viruses that are likely to cause disease in the upcoming flu season. Even when the vaccine doesn't exactly match  these viruses, it may still provide some protection.  Influenza vaccine does not cause flu.  Influenza vaccine may be given at the same time as other vaccines.    3. Talk with your health care provider   Tell your vaccine provider if the person getting the vaccine:  Has had an allergic reaction after a previous dose of influenza vaccine, or has any severe, life- threatening allergies.  Has ever had Guillain-Barré Syndrome (also called GBS).  In some cases, your health care provider may decide to postpone influenza vaccination to a future visit.  People with minor illnesses, such as a cold, may be vaccinated. People who are moderately or severely ill should usually wait until they recover before getting influenza vaccine.  Your health care provider can give you more information.    4. Risks of vaccine reaction  Soreness, redness, and swelling where shot is given, fever, muscle aches, and headache can happen after influenza vaccine.  There may be a very small increased risk of Guillain-Barré Syndrome (GBS) after inactivated influenza vaccine (the flu shot).      Young children who get the flu shot along with pneumococcal vaccine (PCV13), and/or DTaP vaccine at the same time might be slightly more likely to have a seizure caused by fever. Tell your health care provider if a child who is getting flu vaccine has ever had a seizure.  People sometimes faint after medical procedures, including vaccination. Tell your provider if you feel dizzy or have vision changes or ringing in the ears.  As with any medicine, there is a very remote chance of a vaccine causing a severe allergic reaction, other serious injury, or death.     5. What if there is a serious problem?  An allergic reaction could occur after the vaccinated person leaves the clinic. If you see signs of a  severe allergic reaction (hives, swelling of the face and throat, difficulty breathing, a fast heartbeat, dizziness, or weakness), call 9-1-1 and get the person  to the nearest hospital.  For other signs that concern you, call your health care provider.  Adverse reactions should be reported to the Vaccine Adverse Event Reporting System (VAERS). Your health care provider will usually file this report, or you can do it yourself. Visit the VAERS website at www.vaers.[facility].gov or call 1-186.547.1157. VAERS is only for reporting reactions, and VAERS staff do not give medical advice.    6. The National Vaccine Injury Compensation Program  The National Vaccine Injury Compensation Program (VICP) is a federal program that was created to compensate people who may have been injured by certain vaccines. Visit the VICP website at www.[facility].gov/vaccinecompensation or call 1-750.608.3060 to learn about the program and about filing a claim. There is a time limit to file a claim for compensation.    7. How can I learn more?  Ask your healthcare provider.  Call your local or state health department.  Contact the Centers for Disease Control and Prevention (CDC):  - Call 1-878.183.5478 (4-675-BXL-INFO) or  - Visit CDC's www.cdc.gov/flu       U.S. Department of  Health and Human Services  Centers for Disease Control and Prevention  Vaccine Information Statement (Interim)  Inactivated Influenza Vaccine  8/15/2019  42 U.S.C. § 300aa-26

## 2019-10-28 ENCOUNTER — TELEPHONE (OUTPATIENT)
Dept: INTERNAL MEDICINE | Facility: CLINIC | Age: 74
End: 2019-10-28

## 2019-10-28 NOTE — TELEPHONE ENCOUNTER
Patient called to state that he was admitted to [facility] in [facility].  He was stabilized and prescribed Zarelto, 20 mg. two times per day. They also raised his Metropolol dosage to 100 mg.  He would like to speak with Dr. Arreola as follow up.

## 2019-11-03 ENCOUNTER — TELEPHONE (OUTPATIENT)
Dept: INTERNAL MEDICINE | Facility: CLINIC | Age: 74
End: 2019-11-03

## 2019-11-03 NOTE — TELEPHONE ENCOUNTER
He updated me regarding his atrial fibrillation.  He seen the cardiologist and neurosurgeon.    Let us try to get him in the office around December when he is around.  I have not seen him for a while.    Raj Arreola

## 2019-11-04 ENCOUNTER — OFFICE VISIT (OUTPATIENT)
Dept: CARDIOLOGY | Facility: CLINIC | Age: 74
End: 2019-11-04
Payer: MEDICARE

## 2019-11-04 VITALS
BODY MASS INDEX: 26.84 KG/M2 | SYSTOLIC BLOOD PRESSURE: 122 MMHG | DIASTOLIC BLOOD PRESSURE: 72 MMHG | OXYGEN SATURATION: 99 % | HEIGHT: 66 IN | HEART RATE: 83 BPM | WEIGHT: 167 LBS

## 2019-11-04 DIAGNOSIS — E78.2 MIXED HYPERLIPIDEMIA: ICD-10-CM

## 2019-11-04 DIAGNOSIS — E11.9 TYPE 2 DIABETES MELLITUS WITHOUT COMPLICATION, WITHOUT LONG-TERM CURRENT USE OF INSULIN (CMS/HCC): ICD-10-CM

## 2019-11-04 DIAGNOSIS — I25.10 ATHEROSCLEROSIS OF NATIVE CORONARY ARTERY OF NATIVE HEART WITHOUT ANGINA PECTORIS: ICD-10-CM

## 2019-11-04 DIAGNOSIS — I10 ESSENTIAL HYPERTENSION: Primary | ICD-10-CM

## 2019-11-04 DIAGNOSIS — I48.91 ATRIAL FIBRILLATION WITH RAPID VENTRICULAR RESPONSE (CMS/HCC): ICD-10-CM

## 2019-11-04 PROCEDURE — 93000 ELECTROCARDIOGRAM COMPLETE: CPT | Performed by: INTERNAL MEDICINE

## 2019-11-04 PROCEDURE — 99214 OFFICE O/P EST MOD 30 MIN: CPT | Performed by: INTERNAL MEDICINE

## 2019-11-04 RX ORDER — METOPROLOL SUCCINATE 100 MG/1
100 TABLET, EXTENDED RELEASE ORAL 2 TIMES DAILY
Qty: 180 TABLET | Refills: 3 | Status: SHIPPED | OUTPATIENT
Start: 2019-11-04 | End: 2020-11-10

## 2019-11-04 RX ORDER — PROPAFENONE HYDROCHLORIDE 150 MG/1
150 TABLET, COATED ORAL
Qty: 45 TABLET | Refills: 0 | Status: SHIPPED | OUTPATIENT
Start: 2019-11-04 | End: 2019-11-04 | Stop reason: SDUPTHER

## 2019-11-04 RX ORDER — PROPAFENONE HYDROCHLORIDE 150 MG/1
150 TABLET, COATED ORAL
Qty: 45 TABLET | Refills: 0 | Status: SHIPPED | OUTPATIENT
Start: 2019-11-04 | End: 2019-11-05 | Stop reason: SDUPTHER

## 2019-11-04 RX ORDER — METOPROLOL TARTRATE 100 MG/1
100 TABLET ORAL 2 TIMES DAILY
COMMUNITY
End: 2019-11-04 | Stop reason: ALTCHOICE

## 2019-11-04 ASSESSMENT — ENCOUNTER SYMPTOMS
BACK PAIN: 1
HEMATURIA: 0
MYALGIAS: 0
PALPITATIONS: 0
ORTHOPNEA: 0
IRREGULAR HEARTBEAT: 0
HEMATOCHEZIA: 0
DIAPHORESIS: 0
NEAR-SYNCOPE: 0
HEMATEMESIS: 0
ALTERED MENTAL STATUS: 0
DYSPNEA ON EXERTION: 0
BRUISES/BLEEDS EASILY: 0
DIZZINESS: 0
SYNCOPE: 0
PND: 0
SHORTNESS OF BREATH: 0
LIGHT-HEADEDNESS: 0

## 2019-11-04 NOTE — ASSESSMENT & PLAN NOTE
The patient will continue risk factor modification along with an increased beta-blocker dose which occurred at the time of his presentation to the hospital in [facility].  I have given him a prescription for metoprolol succinate 100 mg to be taken twice daily to replace the tartrate that he is currently taking it when his prescription runs out.  The patient had an echocardiogram performed during his recent hospitalization.  It showed normal left ventricular function with an ejection fraction measured at 62%, and normal pulmonary pressures.

## 2019-11-04 NOTE — PROGRESS NOTES
Cardiology  Office Progress Note         Reason for visit:   Chief Complaint   Patient presents with   • Follow-up       HPI     Matt Williamson is a 74 y.o. male who presents to the office for cardiovascular follow up and management of  PAF, coronary artery disease s/p MARGOT, hypertension and dyslipidemia    He was last seen in the office on 1/7/19. At that time, he reported that his heart rate had increased to the 90s at rest in recent times.  I did increase his beta-blocker dose to 50mg BID.  In addition, his blood pressure is somewhat elevated. He agreed to inform our office with any difficulty tolerating his medications.     On 10/25/19, he presented to [facility] with complaints of waking with palpitations. He denied associated symptoms. After evidence on afib on telemetry, HR controlled. After consult with cardiololgy there,  he was started on Xarelto. He is taking 100 mg of Metoprolol Tartrate BID.     Today he reports that he converted to SR prior to discharge, but he felt that his heart beat didn't feel quite normal until later. His EKG at this visit showed sinus rhythm. He denies any adverse effects or bleeding on his medication.  He'd like to not have to take Xarelto, because he likes to work with power tools and is afraid to cut himself. He plans to have an epidural injection for his lumbar spine, as advised by  Dr. Colt Heller, his neurologist, on Thursday 11/7/19.           Past Medical History:   Diagnosis Date   • Abdominal hernia    • Atherosclerosis of coronary artery 9/14/2012    Overview:    • Atrial fibrillation with rapid ventricular response (CMS/HCC) 10/25/2019    Hospitalized on 10/25/19 at [facility] (formerly [facility]    • BPH (benign prostatic hyperplasia) 9/14/2012    Overview:    • Cataract 9/11/2013    Overview:    • Cyst of thyroid 1/15/2007   • Epigastric abdominal pain 2/18/2016    Overview:    • Essential hypertension 6/20/2016    Overview:    • Former  tobacco use 2018    Overview:    • Mixed hyperlipidemia 2016    Overview:    • Osteomyelitis of foot (CMS/[facility]) 2009    Surgery.   • PSA elevation 2018   • Thyroid cancer (CMS/[facility]) 2012    Overview:    • Type 2 diabetes mellitus without complication (CMS/[facility]) 2016    Overview:    • Vertigo 2018       Past Surgical History:   Procedure Laterality Date   • ANAL FISSURECTOMY     • CARDIAC CATHETERIZATION  2005    LM FOD. 70-80% proximal LAD.Mild disease CX, RCA and branches.    • CARDIAC CATHETERIZATION  2005    LM FOD. Pristine LAD stent. CX FOD. RCA mild disease.   • CORONARY ANGIOPLASTY WITH STENT PLACEMENT  2005    LM FOD. Moderate, diffuse disease LAD. Irregular CX w/ PLVB distal to PDA 99%. Mild disease RCA. Stent to distal LAD.   • FOOT SURGERY      For osteomyelitis.   • THYROIDECTOMY         Social History     Tobacco Use   • Smoking status: Former Smoker     Last attempt to quit: 1980     Years since quittin.8   • Smokeless tobacco: Never Used   Substance Use Topics   • Alcohol use: Yes   • Drug use: Not on file       Family History   Problem Relation Age of Onset   • Cancer Biological Mother    • Emphysema Biological Father        Allergies:  Penicillins    Current Outpatient Medications   Medication Sig Dispense Refill   • aspirin 81 mg enteric coated tablet Take 81 mg by mouth daily.     • dulaglutide 0.75 mg/0.5 mL pen injector Inject 0.75 mg under the skin once a week.     • empagliflozin (JARDIANCE) 10 mg tablet Take 1 tablet (10 mg total) by mouth daily. 90 tablet 0   • levothyroxine (SYNTHROID) 150 mcg tablet Take 1 tablet (150 mcg total) by mouth daily. 90 tablet 0   • metFORMIN (GLUCOPHAGE) 500 mg tablet Take 1 tablet (500 mg total) by mouth 4 (four) times a day. 360 tablet 0   • pantoprazole (PROTONIX) 40 mg EC tablet Take 1 tablet (40 mg total) by mouth daily. 90 tablet 0   • pravastatin (PRAVACHOL) 40 mg tablet Take 1 tablet (40 mg  total) by mouth daily. 90 tablet 3   • rivaroxaban (XARELTO) 20 mg tablet Take 20 mg by mouth daily.     • BYDUREON BCISE 2 mg/0.85 mL auto-injector INJECT ONCE A WEEK UTD (Patient not taking: Reported on 11/4/2019 ) 10 Syringe 0   • metoprolol succinate XL (TOPROL-XL) 100 mg 24 hr tablet Take 1 tablet (100 mg total) by mouth 2 (two) times a day. 180 tablet 3   • pantoprazole (PROTONIX) 40 mg EC tablet TAKE 1 TABLET(40 MG) BY MOUTH DAILY (Patient not taking: TAKE 1 TABLET(40 MG) BY MOUTH DAILY) 90 tablet 0   • propafenone (RYTHMOL) 150 mg tablet Take 1 tablet (150 mg total) by mouth every 8 (eight) hours for 15 days. 45 tablet 0     No current facility-administered medications for this visit.        Review of Systems   Constitution: Negative for diaphoresis.   HENT: Negative for hearing loss.    Eyes: Negative for visual disturbance.   Cardiovascular: Negative for chest pain, dyspnea on exertion, irregular heartbeat, leg swelling, near-syncope, orthopnea, palpitations, paroxysmal nocturnal dyspnea and syncope.   Respiratory: Negative for shortness of breath.    Hematologic/Lymphatic: Does not bruise/bleed easily.   Skin: Negative for rash.   Musculoskeletal: Positive for back pain. Negative for myalgias.        RLE sciatica   Gastrointestinal: Negative for hematemesis, hematochezia and melena.   Genitourinary: Negative for hematuria.   Neurological: Negative for dizziness and light-headedness.   Psychiatric/Behavioral: Negative for altered mental status.       Objective     Vitals:    11/04/19 1337   BP: 122/72   Pulse: 83   SpO2: 99%       Wt Readings from Last 3 Encounters:   11/04/19 75.8 kg (167 lb)   08/28/19 74.8 kg (165 lb)   02/06/19 83.5 kg (184 lb)       Body mass index is 26.95 kg/m².    Physical Exam   Constitutional: He is oriented to person, place, and time. He appears well-developed and well-nourished.   HENT:   Head: Normocephalic and atraumatic.   Eyes: Conjunctivae and EOM are normal.   Neck: Neck  supple. No JVD present.   Cardiovascular: Normal rate, regular rhythm, normal heart sounds and intact distal pulses.   No murmur heard.  Pulses:       Dorsalis pedis pulses are 1+ on the right side, and 1+ on the left side.        Posterior tibial pulses are 1+ on the right side, and 1+ on the left side.   Pulmonary/Chest: Effort normal and breath sounds normal. No respiratory distress. He has no wheezes. He has no rales. He exhibits no tenderness.   Abdominal: Soft. Bowel sounds are normal. He exhibits no distension and no mass. There is no tenderness. There is no rebound and no guarding.   Musculoskeletal: He exhibits no edema.   Neurological: He is alert and oriented to person, place, and time.   Skin: Skin is warm and dry.   Psychiatric: He has a normal mood and affect. His behavior is normal. Judgment and thought content normal.   Vitals reviewed.       ECG: Normal sinus rhythm, within normal limits    Hematology  Lab Results   Component Value Date    WBC 9.3 01/22/2019    HGB 13.8 01/22/2019    HCT 41.6 01/22/2019     01/22/2019    INR 1.0 01/14/2016       Chemistries  Lab Results   Component Value Date     08/07/2019    K 4.8 08/07/2019     08/07/2019    CREATININE 1.38 (H) 08/07/2019    BUN 18 08/07/2019    CO2 28 08/07/2019    GLUCOSE 149 (H) 08/07/2019    GLUCOSE 3+ (A) 08/07/2019    CALCIUM 9.5 08/07/2019    MG 2.0 01/14/2016    ALT 15 01/22/2019    AST 12 01/22/2019       Cholesterol  Lab Results   Component Value Date    CHOL 136 01/22/2019    TRIG 102 01/22/2019    HDL 48 01/22/2019    LDLCALC 69 01/22/2019    NONHDLCALC 103 05/02/2018       Endocrine  Lab Results   Component Value Date    TSH 0.24 (L) 01/22/2019    FREET4 1.36 05/02/2018    HGBA1C 8.0 (H) 01/22/2019       Cardiac Imaging   ECHOCARDIOGRAM - EXTERNAL RESULT        Assessment/Plan     Atrial fibrillation with rapid ventricular response (CMS/HCC)  The patient is maintaining sinus rhythm.  He does develop symptomatology  with his atrial fibrillation and, by that measure, his atrial fibrillation only occurs once or twice a year at this time.  I have recommended that he continue his anticoagulant for now.  I have given him a prescription for pro-path unknown 150 mg to be taken as needed for recurrent atrial fibrillation and he will let me know if he takes a pro-path unknown and how he responds to it.  We may at some point decide that he does not require long-term anticoagulation, however, he will continue that medication for the time being.    Type 2 diabetes mellitus without complication (CMS/[facility])  The patient is being managed by his primary physician.     Atherosclerosis of coronary artery  The patient will continue risk factor modification along with an increased beta-blocker dose which occurred at the time of his presentation to the hospital in [facility].  I have given him a prescription for metoprolol succinate 100 mg to be taken twice daily to replace the tartrate that he is currently taking it when his prescription runs out.  The patient had an echocardiogram performed during his recent hospitalization.  It showed normal left ventricular function with an ejection fraction measured at 62%, and normal pulmonary pressures.    Mixed hyperlipidemia  The patient's lipids are adequately controlled on current medication.  The patient is tolerating medication without side effects.  I will continue the current treatment.           I, Josehpine Acevedo, am scribing for, and in the presence of, Minor Ji MD.    I, Minor Ji MD, personally performed the services described in this documentation as scribed by Josephine Acevedo in my presence, and it is both accurate and complete.       Minor Ji MD  11/4/2019

## 2019-11-04 NOTE — ASSESSMENT & PLAN NOTE
The patient is maintaining sinus rhythm.  He does develop symptomatology with his atrial fibrillation and, by that measure, his atrial fibrillation only occurs once or twice a year at this time.  I have recommended that he continue his anticoagulant for now.  I have given him a prescription for pro-path unknown 150 mg to be taken as needed for recurrent atrial fibrillation and he will let me know if he takes a pro-path unknown and how he responds to it.  We may at some point decide that he does not require long-term anticoagulation, however, he will continue that medication for the time being.

## 2019-11-05 RX ORDER — PROPAFENONE HYDROCHLORIDE 150 MG/1
150 TABLET, COATED ORAL
Qty: 270 TABLET | Refills: 3 | Status: ON HOLD | OUTPATIENT
Start: 2019-11-05 | End: 2020-01-17

## 2019-11-18 ENCOUNTER — TELEPHONE (OUTPATIENT)
Dept: CARDIOLOGY | Facility: CLINIC | Age: 74
End: 2019-11-18

## 2019-11-18 NOTE — TELEPHONE ENCOUNTER
Dr. Minor Ji pt:    Pt started Xarelto for about a month.  He wants to report that he notices a red tint when he blows his nose.  Never a full-blown bleed    Is thi a big problem?  Should he continue with the medication?    Pts # [phone]

## 2019-12-06 RX ORDER — EMPAGLIFLOZIN 10 MG/1
TABLET, FILM COATED ORAL
Qty: 90 TABLET | Refills: 0 | Status: SHIPPED | OUTPATIENT
Start: 2019-12-06 | End: 2020-03-09

## 2019-12-06 NOTE — TELEPHONE ENCOUNTER
Medication refill request of Jardiance 10 mg tablets Q # 90 electronically sent to [facility] pharmacy

## 2020-01-06 ENCOUNTER — OFFICE VISIT (OUTPATIENT)
Dept: NEUROSURGERY | Facility: CLINIC | Age: 75
End: 2020-01-06
Payer: MEDICARE

## 2020-01-06 VITALS
WEIGHT: 165 LBS | DIASTOLIC BLOOD PRESSURE: 80 MMHG | HEIGHT: 66 IN | HEART RATE: 84 BPM | SYSTOLIC BLOOD PRESSURE: 123 MMHG | BODY MASS INDEX: 26.52 KG/M2 | OXYGEN SATURATION: 98 %

## 2020-01-06 DIAGNOSIS — C90.00 MULTIPLE MYELOMA NOT HAVING ACHIEVED REMISSION (CMS/HCC): Primary | ICD-10-CM

## 2020-01-06 PROCEDURE — 99214 OFFICE O/P EST MOD 30 MIN: CPT | Performed by: NEUROLOGICAL SURGERY

## 2020-01-06 NOTE — LETTER
2020     Raj Arreola, DO  [address] [address]    Patient: Matt Williamson  YOB: 1945  Date of Visit: 2020      Dear Dr. Arreola:    Thank you for referring Matt Williamson to me for evaluation. Below are my notes for this consultation.    If you have questions, please do not hesitate to call me. I look forward to following your patient along with you.         Sincerely,        Colt Heller MD        CC: MD Pina Carbajal, Colt EVANS MD  2020 10:15 AM  Signed      [facility]  Medical Office [address], [address]  [place] [address]  Phone: [phone]  Fax: [phone]      Patient ID: Matt Williamson                              : 1945    Visit Date: 2020    Referring Provider: Dr. Raj Arreola    Chief Complaint:    He complains of back pain.      History of Present Illness:   Matt Williamson is a pleasant 74 y.o. male seen today by the neurosurgery service for follow up.  The patient initially presented for right leg pain which began without any injury or inciting event.  He underwent imaging which demonstrated an L4-5 disc herniation causing right L4 nerve root compression as well as a lesion in the vertebral body of L3.  Since last being seen the patient reports that the pain in his right leg has resolved.  He was seen by Dr. Feliz, pain management, and underwent 2 epidural injections.  Currently he notes that he continues with lower back pain which is constant in nature.  He describes it as a dull aching pain.  The pain is currently a 4 out of 10.  The pain is exacerbated with walking, lifting, and certain movements.  The pain is improved with rest.  He denies any weakness, numbness, bowel or bladder dysfunction.  Of note he also reports he has had some pain in his upper torso and chest which he felt was skeletal in nature.  This has improved.  He recently underwent an MRI of  his lumbar spine and presents today to discuss these findings.      Allergies:  Allergies   Allergen Reactions   • Penicillins Rash     Childhood allergy       Medications:     Current Outpatient Medications:   •  aspirin 81 mg enteric coated tablet, Take 81 mg by mouth daily., Disp: , Rfl:   •  BYDUREON BCISE 2 mg/0.85 mL auto-injector, INJECT ONCE A WEEK UTD (Patient not taking: Reported on 11/4/2019 ), Disp: 10 Syringe, Rfl: 0  •  dulaglutide 0.75 mg/0.5 mL pen injector, Inject 0.75 mg under the skin once a week., Disp: , Rfl:   •  JARDIANCE 10 mg tablet, TAKE 1 TABLET(10 MG) BY MOUTH DAILY, Disp: 90 tablet, Rfl: 0  •  levothyroxine (SYNTHROID) 150 mcg tablet, Take 1 tablet (150 mcg total) by mouth daily., Disp: 90 tablet, Rfl: 0  •  metFORMIN (GLUCOPHAGE) 500 mg tablet, Take 1 tablet (500 mg total) by mouth 4 (four) times a day., Disp: 360 tablet, Rfl: 0  •  metoprolol succinate XL (TOPROL-XL) 100 mg 24 hr tablet, Take 1 tablet (100 mg total) by mouth 2 (two) times a day., Disp: 180 tablet, Rfl: 3  •  pantoprazole (PROTONIX) 40 mg EC tablet, TAKE 1 TABLET(40 MG) BY MOUTH DAILY (Patient not taking: TAKE 1 TABLET(40 MG) BY MOUTH DAILY), Disp: 90 tablet, Rfl: 0  •  pantoprazole (PROTONIX) 40 mg EC tablet, Take 1 tablet (40 mg total) by mouth daily., Disp: 90 tablet, Rfl: 0  •  pravastatin (PRAVACHOL) 40 mg tablet, Take 1 tablet (40 mg total) by mouth daily., Disp: 90 tablet, Rfl: 3  •  propafenone (RYTHMOL) 150 mg tablet, Take 1 tablet (150 mg total) by mouth every 8 (eight) hours for 15 days., Disp: 270 tablet, Rfl: 3  •  rivaroxaban (XARELTO) 20 mg tablet, Take 1 tablet (20 mg total) by mouth daily., Disp: 90 tablet, Rfl: 1     Past Medical History:   Past Medical History:   Diagnosis Date   • Abdominal hernia    • Atherosclerosis of coronary artery 9/14/2012    Overview:    • Atrial fibrillation with rapid ventricular response (CMS/HCC) 10/25/2019    Hospitalized on 10/25/19 at [facility].    •  BPH (benign prostatic hyperplasia) 2012    Overview:    • Cataract 2013    Overview:    • Cyst of thyroid 1/15/2007   • Epigastric abdominal pain 2016    Overview:    • Essential hypertension 2016    Overview:    • Former tobacco use 2018    Overview:    • Mixed hyperlipidemia 2016    Overview:    • Osteomyelitis of foot (CMS/HCC) 2009    Surgery.   • PSA elevation 2018   • Thyroid cancer (CMS/HCC) 2012    Overview:    • Type 2 diabetes mellitus without complication (CMS/HCC) 2016    Overview:    • Vertigo 2018       Past Surgical History:   Past Surgical History:   Procedure Laterality Date   • ANAL FISSURECTOMY     • CARDIAC CATHETERIZATION  2005    LM FOD. 70-80% proximal LAD.Mild disease CX, RCA and branches.    • CARDIAC CATHETERIZATION  2005    LM FOD. Pristine LAD stent. CX FOD. RCA mild disease.   • CORONARY ANGIOPLASTY WITH STENT PLACEMENT  2005    LM FOD. Moderate, diffuse disease LAD. Irregular CX w/ PLVB distal to PDA 99%. Mild disease RCA. Stent to distal LAD.   • FOOT SURGERY      For osteomyelitis.   • THYROIDECTOMY         Family History:  Family History   Problem Relation Age of Onset   • Cancer Biological Mother    • Emphysema Biological Father        Social History:  Social History     Socioeconomic History   • Marital status:      Spouse name: Not on file   • Number of children: Not on file   • Years of education: Not on file   • Highest education level: Not on file   Occupational History   • Not on file   Social Needs   • Financial resource strain: Not on file   • Food insecurity:     Worry: Not on file     Inability: Not on file   • Transportation needs:     Medical: Not on file     Non-medical: Not on file   Tobacco Use   • Smoking status: Former Smoker     Last attempt to quit: 1980     Years since quittin.0   • Smokeless tobacco: Never Used   Substance and Sexual Activity   • Alcohol use: Yes   • Drug  use: Not on file   • Sexual activity: Not on file   Lifestyle   • Physical activity:     Days per week: Not on file     Minutes per session: Not on file   • Stress: Not on file   Relationships   • Social connections:     Talks on phone: Not on file     Gets together: Not on file     Attends Rastafarian service: Not on file     Active member of club or organization: Not on file     Attends meetings of clubs or organizations: Not on file     Relationship status: Not on file   • Intimate partner violence:     Fear of current or ex partner: Not on file     Emotionally abused: Not on file     Physically abused: Not on file     Forced sexual activity: Not on file   Other Topics Concern   • Not on file   Social History Narrative   • Not on file       Vitals:   Vitals:    01/06/20 1257   BP: 123/80   Pulse: 84   SpO2: 98%       Review of Systems:  A complete 14 point review of systems was conducted and was deemed negative except what was documented in the HPI.    Physical Examination:  Physical Exam   Constitutional: Oriented to person, place, and time. Appears well-developed and well-nourished.   Head: Normocephalic and atraumatic.   Right Ear: External ear normal.   Left Ear: External ear normal.   Nose: Nose normal.   Mouth/Throat: Oropharynx is clear and moist.   Eyes: Conjunctivae and EOM are normal. Pupils are equal, round, and reactive to light. No scleral icterus.   Neck: Normal range of motion. Neck supple. No JVD present. No tracheal deviation present.   Cardiovascular: Normal rate and regular rhythm.    Pulmonary/Chest: Effort normal and breath sounds normal. No stridor. No respiratory distress. No wheezes. No tenderness.   Abdominal: Soft. No distension. There is no tenderness. There is no rebound and no guarding.   Musculoskeletal: Normal range of motion. No edema or tenderness.   Neurological: Oriented to person, place, and time.   Skin: Skin is warm and dry. No rash noted. No erythema.   Psychiatric: Normal  mood and affect. Speech is normal and behavior is normal. Thought content normal.   Vitals reviewed.      Neurological Examination:  Neurologic Exam     Mental Status   Oriented to person, place, and time.   Attention: normal.   Speech: speech is normal   Level of consciousness: alert    Cranial Nerves     CN II   Visual acuity: normal    CN III, IV, VI   Pupils are equal, round, and reactive to light.  Extraocular motions are normal.   Right pupil: Size: 3 mm. Shape: regular. Reactivity: brisk.   Left pupil: Size: 3 mm. Shape: regular. Reactivity: brisk.   CN III: no CN III palsy  CN VI: no CN VI palsy  Nystagmus: none     CN V   Facial sensation intact.     CN VIII   CN VIII normal.   Hearing: intact    CN IX, X   CN IX normal.   Palate: symmetric    CN XI   CN XI normal.   Right sternocleidomastoid strength: normal  Left sternocleidomastoid strength: normal    CN XII   CN XII normal.   Tongue: not atrophic    Sensation ( /2)    Right Left  Right Left   C5 2 2 L2 2 2   C6 2 2 L3 2 2   C7 2 2 L4 2 2   C8 2 2 L5 2 2   T1 2 2 S1 2 2     Motor:     Deltoid Biceps Triceps Wrist ext Finger ext Hand Intrinsics Hip flexion Knee ext Dorsi-  flexion EHL Plantar Flexion   R 5 5 5 5 5 5 5 5 5 5 5   L 5 5 5 5 5 5 5 5 5 5 5     There is no pronator drift. Muscle bulk and tone are normal.     Gait, Coordination, and Reflexes     Reflexes   Reflexes 2+ except as noted.   Right plantar: normal  Left plantar: normal  Right Maharaj: absent  Left Maharaj: absent  Right ankle clonus: absent  Left ankle clonus: absent      Data Review:    Lab Results:   Lab Results   Component Value Date    WBC 9.3 01/22/2019    WBC 9.90 05/02/2018    HGB 13.8 01/22/2019    HGB 13.9 05/02/2018    HCT 41.6 01/22/2019    HCT 42.7 05/02/2018    MCV 86.5 01/22/2019    MCV 86.6 05/02/2018     01/22/2019     05/02/2018    RDW 12.5 01/22/2019    RDW 13.3 05/02/2018      Lab Results   Component Value Date    GLUCOSE 149 (H) 08/07/2019    GLUCOSE  3+ (A) 08/07/2019    GLUCOSE 225 (H) 05/02/2018    BUN 18 08/07/2019    BUN 14 05/02/2018     08/07/2019     05/02/2018    K 4.8 08/07/2019    K 4.6 05/02/2018     08/07/2019    CL 98 05/02/2018    CO2 28 08/07/2019    CO2 27 05/02/2018    ANIONGAP 12 05/02/2018        Imaging:   Independent review of all imaging was done by myself as well as review of the radiologists readings and comparison to prior films.      MRI lumbar spine with and without contrast 1/3/2020  Impression:  Increasing size of the patient's known L3 vertebral body lesion.  This is described above in detail.  Given its appearance increase in size this represents malignancy until proven otherwise.  Differential considerations include an osseous metastasis and myeloma.  Tissue correlation may be necessary.    Assessment / Plan: In summary, Matt Williamson is a 74 year old male who has a lesion in the vertebral body of L3 on MRI imaging which has enlarged since his last scan from August 2019.  This lesion my represent a plasmacytoma or multiple myeloma.  He has been referred to interventional radiology to obtain a biopsy.   Bone scan was negative for bone metastatic disease, with no uptake in the L3 vertebral body, which can be characteristic of myeloma or plasmacytoma.  I have also provided him with prescriptions for a repeat CT of the chest abdomen pelvis with IV contrast, laboratory work including UPEP, SPEP, CBC and BMP.  I spoke with , oncology, who will see him next week.  He will hold is ASA for 7 days until the biopsy is completed.          Colt Heller MD       25 minutes were spent with the patient evaluating films and coordinating follow up care. Patient seen earlier today. Signature timestamp does not reflect patient encounter time.   More than 50% of the time is spent counseling the patient and family and coordinating care.

## 2020-01-06 NOTE — PROGRESS NOTES
[facility]  Medical Office Building [address], [address]  [place] [address]  Phone: [phone]  Fax: [phone]      Patient ID: Matt Williamson                              : 1945    Visit Date: 2020    Referring Provider: Dr. Raj Arreola    Chief Complaint:    He complains of back pain.      History of Present Illness:   Matt Williamson is a pleasant 74 y.o. male seen today by the neurosurgery service for follow up.  The patient initially presented for right leg pain which began without any injury or inciting event.  He underwent imaging which demonstrated an L4-5 disc herniation causing right L4 nerve root compression as well as a lesion in the vertebral body of L3.  Since last being seen the patient reports that the pain in his right leg has resolved.  He was seen by Dr. Feliz, pain management, and underwent 2 epidural injections.  Currently he notes that he continues with lower back pain which is constant in nature.  He describes it as a dull aching pain.  The pain is currently a 4 out of 10.  The pain is exacerbated with walking, lifting, and certain movements.  The pain is improved with rest.  He denies any weakness, numbness, bowel or bladder dysfunction.  Of note he also reports he has had some pain in his upper torso and chest which he felt was skeletal in nature.  This has improved.  He recently underwent an MRI of his lumbar spine and presents today to discuss these findings.      Allergies:  Allergies   Allergen Reactions   • Penicillins Rash     Childhood allergy       Medications:     Current Outpatient Medications:   •  aspirin 81 mg enteric coated tablet, Take 81 mg by mouth daily., Disp: , Rfl:   •  BYDUREON BCISE 2 mg/0.85 mL auto-injector, INJECT ONCE A WEEK UTD (Patient not taking: Reported on 2019 ), Disp: 10 Syringe, Rfl: 0  •  dulaglutide 0.75 mg/0.5 mL pen injector, Inject 0.75 mg under the skin once a week., Disp: , Rfl:   •   JARDIANCE 10 mg tablet, TAKE 1 TABLET(10 MG) BY MOUTH DAILY, Disp: 90 tablet, Rfl: 0  •  levothyroxine (SYNTHROID) 150 mcg tablet, Take 1 tablet (150 mcg total) by mouth daily., Disp: 90 tablet, Rfl: 0  •  metFORMIN (GLUCOPHAGE) 500 mg tablet, Take 1 tablet (500 mg total) by mouth 4 (four) times a day., Disp: 360 tablet, Rfl: 0  •  metoprolol succinate XL (TOPROL-XL) 100 mg 24 hr tablet, Take 1 tablet (100 mg total) by mouth 2 (two) times a day., Disp: 180 tablet, Rfl: 3  •  pantoprazole (PROTONIX) 40 mg EC tablet, TAKE 1 TABLET(40 MG) BY MOUTH DAILY (Patient not taking: TAKE 1 TABLET(40 MG) BY MOUTH DAILY), Disp: 90 tablet, Rfl: 0  •  pantoprazole (PROTONIX) 40 mg EC tablet, Take 1 tablet (40 mg total) by mouth daily., Disp: 90 tablet, Rfl: 0  •  pravastatin (PRAVACHOL) 40 mg tablet, Take 1 tablet (40 mg total) by mouth daily., Disp: 90 tablet, Rfl: 3  •  propafenone (RYTHMOL) 150 mg tablet, Take 1 tablet (150 mg total) by mouth every 8 (eight) hours for 15 days., Disp: 270 tablet, Rfl: 3  •  rivaroxaban (XARELTO) 20 mg tablet, Take 1 tablet (20 mg total) by mouth daily., Disp: 90 tablet, Rfl: 1     Past Medical History:   Past Medical History:   Diagnosis Date   • Abdominal hernia    • Atherosclerosis of coronary artery 9/14/2012    Overview:    • Atrial fibrillation with rapid ventricular response (CMS/HCC) 10/25/2019    Hospitalized on 10/25/19 at [facility].    • BPH (benign prostatic hyperplasia) 9/14/2012    Overview:    • Cataract 9/11/2013    Overview:    • Cyst of thyroid 1/15/2007   • Epigastric abdominal pain 2/18/2016    Overview:    • Essential hypertension 6/20/2016    Overview:    • Former tobacco use 5/2/2018    Overview:    • Mixed hyperlipidemia 2/18/2016    Overview:    • Osteomyelitis of foot (CMS/HCC) 2009    Surgery.   • PSA elevation 11/4/2018   • Thyroid cancer (CMS/HCC) 9/14/2012    Overview:    • Type 2 diabetes mellitus without complication (CMS/HCC) 6/20/2016     Overview:    • Vertigo 2018       Past Surgical History:   Past Surgical History:   Procedure Laterality Date   • ANAL FISSURECTOMY     • CARDIAC CATHETERIZATION  2005    LM FOD. 70-80% proximal LAD.Mild disease CX, RCA and branches.    • CARDIAC CATHETERIZATION  2005    LM FOD. Pristine LAD stent. CX FOD. RCA mild disease.   • CORONARY ANGIOPLASTY WITH STENT PLACEMENT  2005    LM FOD. Moderate, diffuse disease LAD. Irregular CX w/ PLVB distal to PDA 99%. Mild disease RCA. Stent to distal LAD.   • FOOT SURGERY      For osteomyelitis.   • THYROIDECTOMY         Family History:  Family History   Problem Relation Age of Onset   • Cancer Biological Mother    • Emphysema Biological Father        Social History:  Social History     Socioeconomic History   • Marital status:      Spouse name: Not on file   • Number of children: Not on file   • Years of education: Not on file   • Highest education level: Not on file   Occupational History   • Not on file   Social Needs   • Financial resource strain: Not on file   • Food insecurity:     Worry: Not on file     Inability: Not on file   • Transportation needs:     Medical: Not on file     Non-medical: Not on file   Tobacco Use   • Smoking status: Former Smoker     Last attempt to quit: 1980     Years since quittin.0   • Smokeless tobacco: Never Used   Substance and Sexual Activity   • Alcohol use: Yes   • Drug use: Not on file   • Sexual activity: Not on file   Lifestyle   • Physical activity:     Days per week: Not on file     Minutes per session: Not on file   • Stress: Not on file   Relationships   • Social connections:     Talks on phone: Not on file     Gets together: Not on file     Attends [employer] service: Not on file     Active member of club or organization: Not on file     Attends meetings of clubs or organizations: Not on file     Relationship status: Not on file   • Intimate partner violence:     Fear of current or ex  partner: Not on file     Emotionally abused: Not on file     Physically abused: Not on file     Forced sexual activity: Not on file   Other Topics Concern   • Not on file   Social History Narrative   • Not on file       Vitals:   Vitals:    01/06/20 1257   BP: 123/80   Pulse: 84   SpO2: 98%       Review of Systems:  A complete 14 point review of systems was conducted and was deemed negative except what was documented in the HPI.    Physical Examination:  Physical Exam   Constitutional: Oriented to person, place, and time. Appears well-developed and well-nourished.   Head: Normocephalic and atraumatic.   Right Ear: External ear normal.   Left Ear: External ear normal.   Nose: Nose normal.   Mouth/Throat: Oropharynx is clear and moist.   Eyes: Conjunctivae and EOM are normal. Pupils are equal, round, and reactive to light. No scleral icterus.   Neck: Normal range of motion. Neck supple. No JVD present. No tracheal deviation present.   Cardiovascular: Normal rate and regular rhythm.    Pulmonary/Chest: Effort normal and breath sounds normal. No stridor. No respiratory distress. No wheezes. No tenderness.   Abdominal: Soft. No distension. There is no tenderness. There is no rebound and no guarding.   Musculoskeletal: Normal range of motion. No edema or tenderness.   Neurological: Oriented to person, place, and time.   Skin: Skin is warm and dry. No rash noted. No erythema.   Psychiatric: Normal mood and affect. Speech is normal and behavior is normal. Thought content normal.   Vitals reviewed.      Neurological Examination:  Neurologic Exam     Mental Status   Oriented to person, place, and time.   Attention: normal.   Speech: speech is normal   Level of consciousness: alert    Cranial Nerves     CN II   Visual acuity: normal    CN III, IV, VI   Pupils are equal, round, and reactive to light.  Extraocular motions are normal.   Right pupil: Size: 3 mm. Shape: regular. Reactivity: brisk.   Left pupil: Size: 3 mm. Shape:  regular. Reactivity: brisk.   CN III: no CN III palsy  CN VI: no CN VI palsy  Nystagmus: none     CN V   Facial sensation intact.     CN VIII   CN VIII normal.   Hearing: intact    CN IX, X   CN IX normal.   Palate: symmetric    CN XI   CN XI normal.   Right sternocleidomastoid strength: normal  Left sternocleidomastoid strength: normal    CN XII   CN XII normal.   Tongue: not atrophic    Sensation ( /2)    Right Left  Right Left   C5 2 2 L2 2 2   C6 2 2 L3 2 2   C7 2 2 L4 2 2   C8 2 2 L5 2 2   T1 2 2 S1 2 2     Motor:     Deltoid Biceps Triceps Wrist ext Finger ext Hand Intrinsics Hip flexion Knee ext Dorsi-  flexion EHL Plantar Flexion   R 5 5 5 5 5 5 5 5 5 5 5   L 5 5 5 5 5 5 5 5 5 5 5     There is no pronator drift. Muscle bulk and tone are normal.     Gait, Coordination, and Reflexes     Reflexes   Reflexes 2+ except as noted.   Right plantar: normal  Left plantar: normal  Right Maharaj: absent  Left Maharaj: absent  Right ankle clonus: absent  Left ankle clonus: absent      Data Review:    Lab Results:   Lab Results   Component Value Date    WBC 9.3 01/22/2019    WBC 9.90 05/02/2018    HGB 13.8 01/22/2019    HGB 13.9 05/02/2018    HCT 41.6 01/22/2019    HCT 42.7 05/02/2018    MCV 86.5 01/22/2019    MCV 86.6 05/02/2018     01/22/2019     05/02/2018    RDW 12.5 01/22/2019    RDW 13.3 05/02/2018      Lab Results   Component Value Date    GLUCOSE 149 (H) 08/07/2019    GLUCOSE 3+ (A) 08/07/2019    GLUCOSE 225 (H) 05/02/2018    BUN 18 08/07/2019    BUN 14 05/02/2018     08/07/2019     05/02/2018    K 4.8 08/07/2019    K 4.6 05/02/2018     08/07/2019    CL 98 05/02/2018    CO2 28 08/07/2019    CO2 27 05/02/2018    ANIONGAP 12 05/02/2018        Imaging:   Independent review of all imaging was done by myself as well as review of the radiologists readings and comparison to prior films.      MRI lumbar spine with and without contrast 1/3/2020  Impression:  Increasing size of the patient's  known L3 vertebral body lesion.  This is described above in detail.  Given its appearance increase in size this represents malignancy until proven otherwise.  Differential considerations include an osseous metastasis and myeloma.  Tissue correlation may be necessary.    Assessment / Plan: In summary, Matt Williamson is a 74 year old male who has a lesion in the vertebral body of L3 on MRI imaging which has enlarged since his last scan from August 2019.  This lesion my represent a plasmacytoma or multiple myeloma.  He has been referred to interventional radiology to obtain a biopsy.   Bone scan was negative for bone metastatic disease, with no uptake in the L3 vertebral body, which can be characteristic of myeloma or plasmacytoma.  I have also provided him with prescriptions for a repeat CT of the chest abdomen pelvis with IV contrast, laboratory work including UPEP, SPEP, CBC and BMP.  I spoke with , oncology, who will see him next week.  He will hold is ASA for 7 days until the biopsy is completed.          Colt Heller MD       25 minutes were spent with the patient evaluating films and coordinating follow up care. Patient seen earlier today. Signature timestamp does not reflect patient encounter time.   More than 50% of the time is spent counseling the patient and family and coordinating care.     No pertinent past medical history

## 2020-01-06 NOTE — H&P (VIEW-ONLY)
[facility]  Medical Office Building [address], [address]  [place] [address]  Phone: [phone]  Fax: [phone]      Patient ID: Matt Williamson                              : 1945    Visit Date: 2020    Referring Provider: Dr. Raj Arreola    Chief Complaint:    He complains of back pain.      History of Present Illness:   Matt Williamson is a pleasant 74 y.o. male seen today by the neurosurgery service for follow up.  The patient initially presented for right leg pain which began without any injury or inciting event.  He underwent imaging which demonstrated an L4-5 disc herniation causing right L4 nerve root compression as well as a lesion in the vertebral body of L3.  Since last being seen the patient reports that the pain in his right leg has resolved.  He was seen by Dr. Feliz, pain management, and underwent 2 epidural injections.  Currently he notes that he continues with lower back pain which is constant in nature.  He describes it as a dull aching pain.  The pain is currently a 4 out of 10.  The pain is exacerbated with walking, lifting, and certain movements.  The pain is improved with rest.  He denies any weakness, numbness, bowel or bladder dysfunction.  Of note he also reports he has had some pain in his upper torso and chest which he felt was skeletal in nature.  This has improved.  He recently underwent an MRI of his lumbar spine and presents today to discuss these findings.      Allergies:  Allergies   Allergen Reactions   • Penicillins Rash     Childhood allergy       Medications:     Current Outpatient Medications:   •  aspirin 81 mg enteric coated tablet, Take 81 mg by mouth daily., Disp: , Rfl:   •  BYDUREON BCISE 2 mg/0.85 mL auto-injector, INJECT ONCE A WEEK UTD (Patient not taking: Reported on 2019 ), Disp: 10 Syringe, Rfl: 0  •  dulaglutide 0.75 mg/0.5 mL pen injector, Inject 0.75 mg under the skin once a week., Disp: , Rfl:   •   JARDIANCE 10 mg tablet, TAKE 1 TABLET(10 MG) BY MOUTH DAILY, Disp: 90 tablet, Rfl: 0  •  levothyroxine (SYNTHROID) 150 mcg tablet, Take 1 tablet (150 mcg total) by mouth daily., Disp: 90 tablet, Rfl: 0  •  metFORMIN (GLUCOPHAGE) 500 mg tablet, Take 1 tablet (500 mg total) by mouth 4 (four) times a day., Disp: 360 tablet, Rfl: 0  •  metoprolol succinate XL (TOPROL-XL) 100 mg 24 hr tablet, Take 1 tablet (100 mg total) by mouth 2 (two) times a day., Disp: 180 tablet, Rfl: 3  •  pantoprazole (PROTONIX) 40 mg EC tablet, TAKE 1 TABLET(40 MG) BY MOUTH DAILY (Patient not taking: TAKE 1 TABLET(40 MG) BY MOUTH DAILY), Disp: 90 tablet, Rfl: 0  •  pantoprazole (PROTONIX) 40 mg EC tablet, Take 1 tablet (40 mg total) by mouth daily., Disp: 90 tablet, Rfl: 0  •  pravastatin (PRAVACHOL) 40 mg tablet, Take 1 tablet (40 mg total) by mouth daily., Disp: 90 tablet, Rfl: 3  •  propafenone (RYTHMOL) 150 mg tablet, Take 1 tablet (150 mg total) by mouth every 8 (eight) hours for 15 days., Disp: 270 tablet, Rfl: 3  •  rivaroxaban (XARELTO) 20 mg tablet, Take 1 tablet (20 mg total) by mouth daily., Disp: 90 tablet, Rfl: 1     Past Medical History:   Past Medical History:   Diagnosis Date   • Abdominal hernia    • Atherosclerosis of coronary artery 9/14/2012    Overview:    • Atrial fibrillation with rapid ventricular response (CMS/HCC) 10/25/2019    Hospitalized on 10/25/19 at [facility].    • BPH (benign prostatic hyperplasia) 9/14/2012    Overview:    • Cataract 9/11/2013    Overview:    • Cyst of thyroid 1/15/2007   • Epigastric abdominal pain 2/18/2016    Overview:    • Essential hypertension 6/20/2016    Overview:    • Former tobacco use 5/2/2018    Overview:    • Mixed hyperlipidemia 2/18/2016    Overview:    • Osteomyelitis of foot (CMS/HCC) 2009    Surgery.   • PSA elevation 11/4/2018   • Thyroid cancer (CMS/HCC) 9/14/2012    Overview:    • Type 2 diabetes mellitus without complication (CMS/HCC) 6/20/2016     Overview:    • Vertigo 2018       Past Surgical History:   Past Surgical History:   Procedure Laterality Date   • ANAL FISSURECTOMY     • CARDIAC CATHETERIZATION  2005    LM FOD. 70-80% proximal LAD.Mild disease CX, RCA and branches.    • CARDIAC CATHETERIZATION  2005    LM FOD. Pristine LAD stent. CX FOD. RCA mild disease.   • CORONARY ANGIOPLASTY WITH STENT PLACEMENT  2005    LM FOD. Moderate, diffuse disease LAD. Irregular CX w/ PLVB distal to PDA 99%. Mild disease RCA. Stent to distal LAD.   • FOOT SURGERY      For osteomyelitis.   • THYROIDECTOMY         Family History:  Family History   Problem Relation Age of Onset   • Cancer Biological Mother    • Emphysema Biological Father        Social History:  Social History     Socioeconomic History   • Marital status:      Spouse name: Not on file   • Number of children: Not on file   • Years of education: Not on file   • Highest education level: Not on file   Occupational History   • Not on file   Social Needs   • Financial resource strain: Not on file   • Food insecurity:     Worry: Not on file     Inability: Not on file   • Transportation needs:     Medical: Not on file     Non-medical: Not on file   Tobacco Use   • Smoking status: Former Smoker     Last attempt to quit: 1980     Years since quittin.0   • Smokeless tobacco: Never Used   Substance and Sexual Activity   • Alcohol use: Yes   • Drug use: Not on file   • Sexual activity: Not on file   Lifestyle   • Physical activity:     Days per week: Not on file     Minutes per session: Not on file   • Stress: Not on file   Relationships   • Social connections:     Talks on phone: Not on file     Gets together: Not on file     Attends [employer] service: Not on file     Active member of club or organization: Not on file     Attends meetings of clubs or organizations: Not on file     Relationship status: Not on file   • Intimate partner violence:     Fear of current or ex  partner: Not on file     Emotionally abused: Not on file     Physically abused: Not on file     Forced sexual activity: Not on file   Other Topics Concern   • Not on file   Social History Narrative   • Not on file       Vitals:   Vitals:    01/06/20 1257   BP: 123/80   Pulse: 84   SpO2: 98%       Review of Systems:  A complete 14 point review of systems was conducted and was deemed negative except what was documented in the HPI.    Physical Examination:  Physical Exam   Constitutional: Oriented to person, place, and time. Appears well-developed and well-nourished.   Head: Normocephalic and atraumatic.   Right Ear: External ear normal.   Left Ear: External ear normal.   Nose: Nose normal.   Mouth/Throat: Oropharynx is clear and moist.   Eyes: Conjunctivae and EOM are normal. Pupils are equal, round, and reactive to light. No scleral icterus.   Neck: Normal range of motion. Neck supple. No JVD present. No tracheal deviation present.   Cardiovascular: Normal rate and regular rhythm.    Pulmonary/Chest: Effort normal and breath sounds normal. No stridor. No respiratory distress. No wheezes. No tenderness.   Abdominal: Soft. No distension. There is no tenderness. There is no rebound and no guarding.   Musculoskeletal: Normal range of motion. No edema or tenderness.   Neurological: Oriented to person, place, and time.   Skin: Skin is warm and dry. No rash noted. No erythema.   Psychiatric: Normal mood and affect. Speech is normal and behavior is normal. Thought content normal.   Vitals reviewed.      Neurological Examination:  Neurologic Exam     Mental Status   Oriented to person, place, and time.   Attention: normal.   Speech: speech is normal   Level of consciousness: alert    Cranial Nerves     CN II   Visual acuity: normal    CN III, IV, VI   Pupils are equal, round, and reactive to light.  Extraocular motions are normal.   Right pupil: Size: 3 mm. Shape: regular. Reactivity: brisk.   Left pupil: Size: 3 mm. Shape:  regular. Reactivity: brisk.   CN III: no CN III palsy  CN VI: no CN VI palsy  Nystagmus: none     CN V   Facial sensation intact.     CN VIII   CN VIII normal.   Hearing: intact    CN IX, X   CN IX normal.   Palate: symmetric    CN XI   CN XI normal.   Right sternocleidomastoid strength: normal  Left sternocleidomastoid strength: normal    CN XII   CN XII normal.   Tongue: not atrophic    Sensation ( /2)    Right Left  Right Left   C5 2 2 L2 2 2   C6 2 2 L3 2 2   C7 2 2 L4 2 2   C8 2 2 L5 2 2   T1 2 2 S1 2 2     Motor:     Deltoid Biceps Triceps Wrist ext Finger ext Hand Intrinsics Hip flexion Knee ext Dorsi-  flexion EHL Plantar Flexion   R 5 5 5 5 5 5 5 5 5 5 5   L 5 5 5 5 5 5 5 5 5 5 5     There is no pronator drift. Muscle bulk and tone are normal.     Gait, Coordination, and Reflexes     Reflexes   Reflexes 2+ except as noted.   Right plantar: normal  Left plantar: normal  Right Maharaj: absent  Left Maharaj: absent  Right ankle clonus: absent  Left ankle clonus: absent      Data Review:    Lab Results:   Lab Results   Component Value Date    WBC 9.3 01/22/2019    WBC 9.90 05/02/2018    HGB 13.8 01/22/2019    HGB 13.9 05/02/2018    HCT 41.6 01/22/2019    HCT 42.7 05/02/2018    MCV 86.5 01/22/2019    MCV 86.6 05/02/2018     01/22/2019     05/02/2018    RDW 12.5 01/22/2019    RDW 13.3 05/02/2018      Lab Results   Component Value Date    GLUCOSE 149 (H) 08/07/2019    GLUCOSE 3+ (A) 08/07/2019    GLUCOSE 225 (H) 05/02/2018    BUN 18 08/07/2019    BUN 14 05/02/2018     08/07/2019     05/02/2018    K 4.8 08/07/2019    K 4.6 05/02/2018     08/07/2019    CL 98 05/02/2018    CO2 28 08/07/2019    CO2 27 05/02/2018    ANIONGAP 12 05/02/2018        Imaging:   Independent review of all imaging was done by myself as well as review of the radiologists readings and comparison to prior films.      MRI lumbar spine with and without contrast 1/3/2020  Impression:  Increasing size of the patient's  known L3 vertebral body lesion.  This is described above in detail.  Given its appearance increase in size this represents malignancy until proven otherwise.  Differential considerations include an osseous metastasis and myeloma.  Tissue correlation may be necessary.    Assessment / Plan: In summary, Matt Williamson is a 74 year old male who has a lesion in the vertebral body of L3 on MRI imaging which has enlarged since his last scan from August 2019.  This lesion my represent a plasmacytoma or multiple myeloma.  He has been referred to interventional radiology to obtain a biopsy.   Bone scan was negative for bone metastatic disease, with no uptake in the L3 vertebral body, which can be characteristic of myeloma or plasmacytoma.  I have also provided him with prescriptions for a repeat CT of the chest abdomen pelvis with IV contrast, laboratory work including UPEP, SPEP, CBC and BMP.  I spoke with , oncology, who will see him next week.  He will hold is ASA for 7 days until the biopsy is completed.          Colt Heller MD       25 minutes were spent with the patient evaluating films and coordinating follow up care. Patient seen earlier today. Signature timestamp does not reflect patient encounter time.   More than 50% of the time is spent counseling the patient and family and coordinating care.

## 2020-01-08 RX ORDER — SODIUM CHLORIDE 9 MG/ML
40 INJECTION, SOLUTION INTRAVENOUS CONTINUOUS
Status: CANCELLED | OUTPATIENT
Start: 2020-01-17 | End: 2020-01-24

## 2020-01-10 LAB
ALBUMIN ?TM MFR UR ELPH: 100 %
ALBUMIN SERPL ELPH-MCNC: 3.5 G/DL (ref 3.8–4.8)
ALPHA1 GLOB ?TM MFR UR ELPH: 0 %
ALPHA1 GLOB SERPL ELPH-MCNC: 0.3 G/DL (ref 0.2–0.3)
ALPHA2 GLOB ?TM MFR UR ELPH: 0 %
ALPHA2 GLOB SERPL ELPH-MCNC: 1 G/DL (ref 0.5–0.9)
B-GLOBULIN ?TM MFR UR ELPH: 0 %
BASOPHILS # BLD AUTO: 64 CELLS/UL (ref 0–200)
BASOPHILS NFR BLD AUTO: 0.7 %
BETA1 GLOB SERPL ELPH-MCNC: 0.4 G/DL (ref 0.4–0.6)
BETA2 GLOB SERPL ELPH-MCNC: 0.5 G/DL (ref 0.2–0.5)
BUN SERPL-MCNC: 22 MG/DL (ref 7–25)
BUN/CREAT SERPL: 18 (CALC) (ref 6–22)
CALCIUM SERPL-MCNC: 9.3 MG/DL (ref 8.6–10.3)
CHLORIDE SERPL-SCNC: 100 MMOL/L (ref 98–110)
CO2 SERPL-SCNC: 29 MMOL/L (ref 20–32)
CREAT SERPL-MCNC: 1.19 MG/DL (ref 0.7–1.18)
CREAT UR-MCNC: 80 MG/DL (ref 20–320)
EOSINOPHIL # BLD AUTO: 64 CELLS/UL (ref 15–500)
EOSINOPHIL NFR BLD AUTO: 0.7 %
ERYTHROCYTE [DISTWIDTH] IN BLOOD BY AUTOMATED COUNT: 13.1 % (ref 11–15)
GAMMA GLOB ?TM MFR UR ELPH: 0 %
GAMMA GLOB SERPL ELPH-MCNC: 0.6 G/DL (ref 0.8–1.7)
GLUCOSE SERPL-MCNC: 136 MG/DL (ref 65–139)
HCT VFR BLD AUTO: 42.3 % (ref 38.5–50)
HGB BLD-MCNC: 13.6 G/DL (ref 13.2–17.1)
LYMPHOCYTES # BLD AUTO: 1601 CELLS/UL (ref 850–3900)
LYMPHOCYTES NFR BLD AUTO: 17.4 %
MCH RBC QN AUTO: 28.9 PG (ref 27–33)
MCHC RBC AUTO-ENTMCNC: 32.2 G/DL (ref 32–36)
MCV RBC AUTO: 90 FL (ref 80–100)
MONOCYTES # BLD AUTO: 782 CELLS/UL (ref 200–950)
MONOCYTES NFR BLD AUTO: 8.5 %
NEUTROPHILS # BLD AUTO: 6688 CELLS/UL (ref 1500–7800)
NEUTROPHILS NFR BLD AUTO: 72.7 %
PLATELET # BLD AUTO: 304 THOUSAND/UL (ref 140–400)
PMV BLD REES-ECKER: 9.8 FL (ref 7.5–12.5)
POTASSIUM SERPL-SCNC: 4.6 MMOL/L (ref 3.5–5.3)
PROT PATTERN SERPL ELPH-IMP: ABNORMAL
PROT PATTERN UR ELPH-IMP: ABNORMAL
PROT SERPL-MCNC: 6.3 G/DL (ref 6.1–8.1)
PROT UR-MCNC: 11 MG/DL (ref 5–25)
PROT/CREAT UR: 0.14 MG/MG CREAT (ref 0.02–0.13)
PROT/CREAT UR: 138 MG/G CREAT (ref 22–128)
QUEST EGFR NON-AFR. AMERICAN: 59 ML/MIN/1.73M2
RBC # BLD AUTO: 4.7 MILLION/UL (ref 4.2–5.8)
SODIUM SERPL-SCNC: 136 MMOL/L (ref 135–146)
WBC # BLD AUTO: 9.2 THOUSAND/UL (ref 3.8–10.8)

## 2020-01-15 ENCOUNTER — TELEPHONE (OUTPATIENT)
Dept: RADIOLOGY | Facility: HOSPITAL | Age: 75
End: 2020-01-15

## 2020-01-17 ENCOUNTER — HOSPITAL ENCOUNTER (OUTPATIENT)
Dept: RADIOLOGY | Facility: HOSPITAL | Age: 75
Discharge: HOME | End: 2020-01-17
Attending: PHYSICIAN ASSISTANT | Admitting: RADIOLOGY
Payer: MEDICARE

## 2020-01-17 ENCOUNTER — APPOINTMENT (OUTPATIENT)
Dept: RADIOLOGY | Facility: HOSPITAL | Age: 75
End: 2020-01-17
Attending: PHYSICIAN ASSISTANT
Payer: MEDICARE

## 2020-01-17 VITALS
TEMPERATURE: 97.8 F | BODY MASS INDEX: 26.52 KG/M2 | RESPIRATION RATE: 20 BRPM | HEART RATE: 82 BPM | DIASTOLIC BLOOD PRESSURE: 74 MMHG | OXYGEN SATURATION: 97 % | WEIGHT: 165 LBS | HEIGHT: 66 IN | SYSTOLIC BLOOD PRESSURE: 138 MMHG

## 2020-01-17 DIAGNOSIS — E85.9 MYELOMA ASSOCIATED AMYLOIDOSIS (CMS/HCC): ICD-10-CM

## 2020-01-17 DIAGNOSIS — C90.00 MYELOMA ASSOCIATED AMYLOIDOSIS (CMS/HCC): ICD-10-CM

## 2020-01-17 LAB
BASOPHILS # BLD: 0.07 K/UL (ref 0.01–0.1)
BASOPHILS NFR BLD: 0.7 %
DIFFERENTIAL METHOD BLD: ABNORMAL
EOSINOPHIL # BLD: 0.12 K/UL (ref 0.04–0.54)
EOSINOPHIL NFR BLD: 1.2 %
ERYTHROCYTE [DISTWIDTH] IN BLOOD BY AUTOMATED COUNT: 13.7 % (ref 11.6–14.4)
HCT VFR BLDCO AUTO: 43 % (ref 40.1–51)
HGB BLD-MCNC: 14.1 G/DL
IMM GRANULOCYTES # BLD AUTO: 0.08 K/UL (ref 0–0.08)
IMM GRANULOCYTES NFR BLD AUTO: 0.8 %
INR PPP: 1 INR
LYMPHOCYTES # BLD: 1.84 K/UL (ref 1.2–3.5)
LYMPHOCYTES NFR BLD: 18.2 %
MCH RBC QN AUTO: 29 PG (ref 28–33.2)
MCHC RBC AUTO-ENTMCNC: 32.8 G/DL (ref 32.2–36.5)
MCV RBC AUTO: 88.5 FL (ref 83–98)
MONOCYTES # BLD: 1.03 K/UL (ref 0.3–1)
MONOCYTES NFR BLD: 10.2 %
NEUTROPHILS # BLD: 6.99 K/UL (ref 1.7–7)
NEUTS SEG NFR BLD: 68.9 %
NRBC BLD-RTO: 0 %
PDW BLD AUTO: 9 FL (ref 9.4–12.4)
PLATELET # BLD AUTO: 279 K/UL
PROTHROMBIN TIME: 12.8 SEC (ref 12.2–14.5)
RBC # BLD AUTO: 4.86 M/UL (ref 4.5–5.8)
WBC # BLD AUTO: 10.13 K/UL

## 2020-01-17 PROCEDURE — 27200000 HC STERILE SUPPLY

## 2020-01-17 PROCEDURE — 36100360 IR TECHNICAL ASSISTANCE

## 2020-01-17 PROCEDURE — 85025 COMPLETE CBC W/AUTO DIFF WBC: CPT | Performed by: NURSE PRACTITIONER

## 2020-01-17 PROCEDURE — 85610 PROTHROMBIN TIME: CPT | Performed by: NURSE PRACTITIONER

## 2020-01-17 PROCEDURE — 63600000 HC DRUGS/DETAIL CODE: Performed by: RADIOLOGY

## 2020-01-17 PROCEDURE — 0QB03ZX EXCISION OF LUMBAR VERTEBRA, PERCUTANEOUS APPROACH, DIAGNOSTIC: ICD-10-PCS | Performed by: RADIOLOGY

## 2020-01-17 PROCEDURE — 25800000 HC PHARMACY IV SOLUTIONS: Performed by: NURSE PRACTITIONER

## 2020-01-17 PROCEDURE — 87205 SMEAR GRAM STAIN: CPT | Performed by: PHYSICIAN ASSISTANT

## 2020-01-17 PROCEDURE — 88342 IMHCHEM/IMCYTCHM 1ST ANTB: CPT | Mod: 59 | Performed by: PHYSICIAN ASSISTANT

## 2020-01-17 PROCEDURE — 77003 FLUOROGUIDE FOR SPINE INJECT: CPT

## 2020-01-17 PROCEDURE — 36415 COLL VENOUS BLD VENIPUNCTURE: CPT | Performed by: NURSE PRACTITIONER

## 2020-01-17 RX ORDER — IBUPROFEN 200 MG
16-32 TABLET ORAL AS NEEDED
Status: DISCONTINUED | OUTPATIENT
Start: 2020-01-17 | End: 2020-01-17 | Stop reason: HOSPADM

## 2020-01-17 RX ORDER — SODIUM CHLORIDE 9 MG/ML
40 INJECTION, SOLUTION INTRAVENOUS CONTINUOUS
Status: DISCONTINUED | OUTPATIENT
Start: 2020-01-17 | End: 2020-01-17 | Stop reason: HOSPADM

## 2020-01-17 RX ORDER — DEXTROSE 50 % IN WATER (D50W) INTRAVENOUS SYRINGE
25 AS NEEDED
Status: DISCONTINUED | OUTPATIENT
Start: 2020-01-17 | End: 2020-01-17 | Stop reason: HOSPADM

## 2020-01-17 RX ORDER — MIDAZOLAM HYDROCHLORIDE 2 MG/2ML
INJECTION, SOLUTION INTRAMUSCULAR; INTRAVENOUS
Status: COMPLETED | OUTPATIENT
Start: 2020-01-17 | End: 2020-01-17

## 2020-01-17 RX ORDER — FENTANYL CITRATE 50 UG/ML
INJECTION, SOLUTION INTRAMUSCULAR; INTRAVENOUS
Status: COMPLETED | OUTPATIENT
Start: 2020-01-17 | End: 2020-01-17

## 2020-01-17 RX ORDER — DEXTROSE 40 %
15-30 GEL (GRAM) ORAL AS NEEDED
Status: DISCONTINUED | OUTPATIENT
Start: 2020-01-17 | End: 2020-01-17 | Stop reason: HOSPADM

## 2020-01-17 RX ADMIN — MIDAZOLAM HYDROCHLORIDE 1 MG: 1 INJECTION, SOLUTION INTRAMUSCULAR; INTRAVENOUS at 08:31

## 2020-01-17 RX ADMIN — MIDAZOLAM HYDROCHLORIDE 1 MG: 1 INJECTION, SOLUTION INTRAMUSCULAR; INTRAVENOUS at 08:22

## 2020-01-17 RX ADMIN — MIDAZOLAM HYDROCHLORIDE 1 MG: 1 INJECTION, SOLUTION INTRAMUSCULAR; INTRAVENOUS at 08:36

## 2020-01-17 RX ADMIN — FENTANYL CITRATE 50 MCG: 50 INJECTION INTRAMUSCULAR; INTRAVENOUS at 08:31

## 2020-01-17 RX ADMIN — FENTANYL CITRATE 50 MCG: 50 INJECTION INTRAMUSCULAR; INTRAVENOUS at 08:36

## 2020-01-17 RX ADMIN — FENTANYL CITRATE 50 MCG: 50 INJECTION INTRAMUSCULAR; INTRAVENOUS at 08:22

## 2020-01-17 RX ADMIN — SODIUM CHLORIDE 40 ML/HR: 9 INJECTION, SOLUTION INTRAVENOUS at 07:47

## 2020-01-17 NOTE — INTERVAL H&P NOTE
H&P reviewed. The patient was examined and there are no changes to the H&P.  Pt with large eccentric osseous lesion arising from the right aspect of the L3 vertebral body.  Plan for fluoro guided biopsy today.

## 2020-01-17 NOTE — POST-PROCEDURE NOTE
Interventional Radiology Brief Postprocedure Note    Matt Williamson     Attending: Travis Will DO    Assistant: stone    Diagnosis: L3 vertebral body lesion    Description of procedure: Fluoro guided biopsy L3 vertebral body    Contrast: none     Anesthesia:  Local, Conscious Sedation    Medications: 1% lidocaine; 3 mg versed; 150 mcg fentanyl     Complications: None      Estimated Blood Loss: Estimated Blood Loss: 0-10 ml    Anticoagulation: Can resume as needed.    Specimens: 5 x 18 G cores L 3 vertebral body lesion in RPMI, formalin and saline for culture.     Findings: Fluoro guided L3 vertebral body biopsy via R unipedicular approach.   18 G core specimens obtained using a spring assisted biopsy device. Samples in formalin, RPMI and saline for culture.     1/17/2020 9:00 AM

## 2020-01-17 NOTE — DISCHARGE INSTRUCTIONS
Monitored Anesthesia Care, Care After  These instructions provide you with information about caring for yourself after your procedure. Your health care provider may also give you more specific instructions. Your treatment has been planned according to current medical practices, but problems sometimes occur. Call your health care provider if you have any problems or questions after your procedure.  What can I expect after the procedure?  After your procedure, you may:  · Feel sleepy for several hours.  · Feel clumsy and have poor balance for several hours.  · Feel forgetful about what happened after the procedure.  · Have poor judgment for several hours.  · Feel nauseous or vomit.  · Have a sore throat if you had a breathing tube during the procedure.  Follow these instructions at home:  For at least 24 hours after the procedure:    · Have a responsible adult stay with you. It is important to have someone help care for you until you are awake and alert.  · Rest as needed.  · Do not:  ? Participate in activities in which you could fall or become injured.  ? Drive.  ? Use heavy machinery.  ? Drink alcohol.  ? Take sleeping pills or medicines that cause drowsiness.  ? Make important decisions or sign legal documents.  ? Take care of children on your own.  Eating and drinking  · Follow the diet that is recommended by your health care provider.  · If you vomit, drink water, juice, or soup when you can drink without vomiting.  · Make sure you have little or no nausea before eating solid foods.  General instructions  · Take over-the-counter and prescription medicines only as told by your health care provider.  · If you have sleep apnea, surgery and certain medicines can increase your risk for breathing problems. Follow instructions from your health care provider about wearing your sleep device:  ? Anytime you are sleeping, including during daytime naps.  ? While taking prescription pain medicines, sleeping medicines, or  medicines that make you drowsy.  · If you smoke, do not smoke without supervision.  · Keep all follow-up visits as told by your health care provider. This is important.  Contact a health care provider if:  · You keep feeling nauseous or you keep vomiting.  · You feel light-headed.  · You develop a rash.  · You have a fever.  Get help right away if:  · You have trouble breathing.  Summary  · For several hours after your procedure, you may feel sleepy and have poor judgment.  · Have a responsible adult stay with you for at least 24 hours or until you are awake and alert.  This information is not intended to replace advice given to you by your health care provider. Make sure you discuss any questions you have with your health care provider.  Document Released: 04/09/2017 Document Revised: 08/03/2018 Document Reviewed: 04/09/2017  [email or web address] Interactive Patient Education © 2019 [email or web address]  Vertebral body biopsy    DISCHARGE INSTRUCTIONS  You have had a biopsy of your L3 vertebral body today.   You may resume your normal diet.  You may resume your normal medications.   Do not drive a car, operate any heavy machinery, or make any important decisions for 24 hours after the procedure.  Do not drink any alcoholic beverages for the next 24 hours.  Do not engage in any excessive physical activity for 24 hours after the procedure. You may then gradually return to normal activities as tolerated.  Notify your primary physician and/or Interventional Radiologist IMMEDIATELY if any of the following occur:  · Fever of 101° F (38 ° C) or chills  · Worsening back pain  · Difficulty walking/numbness or tingling in legs/feet  · Bladder or bowel changes  Physician Contact Information  If you have a problem that you believe requires immediate attention, you should go to the Emergency Room, either at [facility] or the closest hospital. If you believe that your problem can safely wait until you speak to a physician, you should contact  your doctor or Interventional Radiologist.   It is usually easier to contact your own physician by calling the office, or after hours through the answering service. If you do not know the number, the hospital  can connect you by calling [phone].   If you have concerns that need to be answered by the Interventional Radiologist, you can reach him/ her as follows:   During regular weekday hours (8AM to 5PM), call [phone] and ask the technologist to connect you with the Interventional Radiologist.   During off hours for emergencies call [phone] and ask the hospital  to contact the Interventional Radiologist on-call.  o     [facility] strongly recommends that you visit a Primary Care Provider (PCP) regularly. Your PCP can help you implement the recommendations we gave you today, coordinate care among your specialists, as well as make sure you are up to date with wellness exams, immunizations and preventive screenings. Your PCP can also help when you are feeling sick, potentially avoiding the need for urgent care or emergency department visits. For these reasons, it is important that you follow up with your PCP at least annually or more often based upon your medical conditions. If you do not have a PCP, please call [phone] ([phone]) or go to Find a Doctor for help with finding one.

## 2020-01-21 ENCOUNTER — TELEPHONE (OUTPATIENT)
Dept: INTERNAL MEDICINE | Facility: CLINIC | Age: 75
End: 2020-01-21

## 2020-01-22 LAB
GRAM STN SPEC: NORMAL
GRAM STN SPEC: NORMAL
MICROORGANISM SPEC CULT: NORMAL

## 2020-01-22 NOTE — TELEPHONE ENCOUNTER
I spoke to Marco A gutiérrez and he will see Dr. Casey and Dr. Ramos.    Estela will call to help him with his follow up in the am      Ed Maxwell

## 2020-01-22 NOTE — TELEPHONE ENCOUNTER
Estela I need your help with Marco A was recently diagnosed with recurrent thyroid cancer with a bony focus.  He will be seeing an oncologist Dr. Stacie Casey.  I was wondering if you could set him up for an appointment soon to see Luis Antonio Maldonado radiation oncologist.    I need his notes from Dr. Ravi Pride.    Try to coordinate a visit with me in the office.    Raj Arreola

## 2020-01-27 ENCOUNTER — OFFICE VISIT (OUTPATIENT)
Dept: HEMATOLOGY/ONCOLOGY | Facility: CLINIC | Age: 75
End: 2020-01-27
Attending: INTERNAL MEDICINE
Payer: MEDICARE

## 2020-01-27 VITALS
SYSTOLIC BLOOD PRESSURE: 155 MMHG | TEMPERATURE: 97.9 F | HEART RATE: 86 BPM | WEIGHT: 163.5 LBS | DIASTOLIC BLOOD PRESSURE: 81 MMHG | OXYGEN SATURATION: 98 % | BODY MASS INDEX: 27.24 KG/M2 | HEIGHT: 65 IN

## 2020-01-27 DIAGNOSIS — C79.51 BONE METASTASES: ICD-10-CM

## 2020-01-27 DIAGNOSIS — C73 THYROID CANCER (CMS/HCC): Primary | ICD-10-CM

## 2020-01-27 PROCEDURE — 99204 OFFICE O/P NEW MOD 45 MIN: CPT | Performed by: INTERNAL MEDICINE

## 2020-01-27 ASSESSMENT — ENCOUNTER SYMPTOMS
NECK STIFFNESS: 1
DYSURIA: 0
FATIGUE: 0
DEPRESSION: 0
MYALGIAS: 0
PALPITATIONS: 1
FEVER: 0
EYES NEGATIVE: 1
DIAPHORESIS: 1
NEUROLOGICAL NEGATIVE: 1
DIZZINESS: 0
CHILLS: 0
SLEEP DISTURBANCE: 1
FLANK PAIN: 0
VOMITING: 0
NECK PAIN: 0
LIGHT-HEADEDNESS: 0
BACK PAIN: 1
FREQUENCY: 1
UNEXPECTED WEIGHT CHANGE: 0
ARTHRALGIAS: 0
SORE THROAT: 0
NAUSEA: 1
RESPIRATORY NEGATIVE: 1
LEG SWELLING: 0
NERVOUS/ANXIOUS: 1
NUMBNESS: 0
HEMATOLOGIC/LYMPHATIC NEGATIVE: 1
APPETITE CHANGE: 0
HEMATURIA: 0
TROUBLE SWALLOWING: 0
DIFFICULTY URINATING: 1

## 2020-01-27 ASSESSMENT — PAIN SCALES - GENERAL: PAINLEVEL: 0-NO PAIN

## 2020-01-27 NOTE — ASSESSMENT & PLAN NOTE
I had a long discussion with the patient and his wife today.  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  I discussed recommendations for additional work-up.  I would recommend a whole-body PET scan at this time.  If he has significant uptake on PET scan this may not be amenable to I-131 treatment.  PET scan should also help further clarify the small lesion in the left femoral head.  I have also suggested additional blood work at that time including a TSH, thyroglobulin and antithyroglobulin antibodies.  We will make arrangements for the patient's to see Dr. Ochsner in consultation to discuss options for radiation.  We discussed that depending on the PET scan results recommendations may be for either I-131 treatment or for external beam radiation treatment.  Many questions regarding the diagnosis, prognosis and treatment options were reviewed with the patient and his wife.

## 2020-01-27 NOTE — LETTER
January 27, 2020    Patient: Matt Williamson   YOB: 1945   Date of Visit: 1/27/2020       Dear Dr. Arreola:    The patient is seen back at the [facility] [place] today in follow up with regard to his   1. Thyroid cancer (CMS/HCC)    2. Bone metastases (CMS/HCC)    . Attached is my assessment and plan of care.         Sincerely,        Stacie Casey MD    CC: Geoffrey P Tremblay, MD Ajit S Jada, MD Gregory J Ochsner, MD Matthew S Kane, MD Douglas L Fraker, MD

## 2020-01-27 NOTE — PROGRESS NOTES
Matt Williamson is a 74 y.o. male,   :  1945  REFERRING PHYSICIAN:   Colt Heller  [address]  MOB [address], [facility] [address]  [place] [address]  PRIMARY CARE PROVIDER:  Raj Arreola, DO    Encounter Diagnoses   Name Primary?   • Thyroid cancer (CMS/HCC) Yes   • Bone metastases (CMS/HCC)      Patient Active Problem List   Diagnosis   • BPH (benign prostatic hyperplasia)   • Atherosclerosis of coronary artery   • Cataract   • Epigastric abdominal pain   • Essential hypertension   • Former tobacco use   • Mixed hyperlipidemia   • Thyroid cancer (CMS/HCC)   • Type 2 diabetes mellitus without complication (CMS/HCC)   • Vertigo   • Cyst of thyroid   • Esophageal reflux   • PSA elevation   • Atrial fibrillation with rapid ventricular response (CMS/HCC)   • Bone metastases (CMS/HCC)     Cancer Staging  Thyroid cancer (CMS/HCC)  Staging form: Thyroid - Differentiated, AJCC 8th Edition  - Pathologic stage from 2012: Stage II (pT3a, pN0a, cM0, Age at diagnosis: >= 55 years) - Signed by Stacie Casey MD on 2020    Treatment Plans     No treatment plans exist           Thyroid cancer (CMS/HCC)    2012 Initial Diagnosis     Thyroid cancer (CMS/HCC)  Papillary thyroid carcinoma, follicular variant      2012 Cancer Staged     Staging form: Thyroid - Differentiated, AJCC 8th Edition  - Pathologic stage from 2012: Stage II (pT3a, pN0a, cM0, Age at diagnosis: >= 55 years)      2012 Surgery     Total thyroidectomy and lymph node biopsy performed by Dr. Lester at [facility]      3/28/2012 -  Radiation Therapy     Patient received postoperative I-131 treatment.  Treatment details are not available currently.      2020 Recurrence Distant     Biopsy of an enlarging L3 lesion showed metastatic carcinoma consistent with a thyroid primary.         History of Present Illness  HPI  Matt Williamson is seen today at the request of Colt Heller  [address]  MOB [address],  [place] [id number] for   Encounter Diagnoses   Name Primary?   • Thyroid cancer (CMS/HCC) Yes   • Bone metastases (CMS/HCC)      Matt Williamson is a 74-year-old gentleman with a history of thyroid cancer first diagnosed in January 2012 after biopsy of a thyroid nodule.  The patient in February 2012 underwent a total thyroidectomy and lymph node biopsy.  Final pathology showed papillary thyroid carcinoma, follicular variant 6.4 cm,  Confined to the thyroid with lymphovascular invasion.  The patient reports that he received postoperative I-131 treatment but is unsure of the details of this.  Patient has been followed by his endocrinologist Dr. Lerma.  He continues on levothyroxine at a current dose of 150 mcg daily.  Patient reports he was doing well until the summer when he developed low back pain with radiation down his legs.  Initial MRI was performed on August 15 that showed disc herniation at the L4 level which was felt to be the cause of the patient's pain.  In addition a lesion was seen in the L3 vertebrae.  Follow-up bone scan was performed which did not show any increased uptake.  Patient received injections for his radicular pain with resolution of the symptoms.  In January of this year he had a repeat MRI performed to follow-up on the L3 lesion which showed that the lesion had enlarged.  CT of the chest abdomen pelvis were performed on January 9 which showed enlarging L3 lesion and a 0.5 cm osteolytic lesion in the left femoral head.  There was some small pulmonary nodules which have been stable since at least 2016.  The patient underwent biopsy of the L3 lesion on January 17.  Final pathology revealed metastatic carcinoma consistent with a thyroid primary.  Reports that other than some occasional mild back pain he has been feeling well.  His last TSH level was from June 2019 was 0.07.      Review of Systems - Oncology  Review of Systems:  Nursing assessment reviewed. Pertinent positive and negative  "symptoms noted in HPI, all others negative.    Temp:  [36.6 °C (97.9 °F)] 36.6 °C (97.9 °F)  Heart Rate:  [86] 86  BP: (155)/(81) 155/81  Visit Vitals  BP (!) 155/81   Pulse 86   Temp 36.6 °C (97.9 °F) (Oral)   Ht 1.651 m (5' 5\")   Wt 74.2 kg (163 lb 8 oz)   SpO2 98% Comment: RA   BMI 27.21 kg/m²     Physical Exam   Constitutional: He is oriented to person, place, and time. He appears well-developed. No distress.   HENT:   Head: Normocephalic.   Mouth/Throat: Oropharynx is clear and moist. No oropharyngeal exudate.   Eyes: Pupils are equal, round, and reactive to light. No scleral icterus.   Neck: Neck supple.   Cardiovascular: Normal rate and regular rhythm.   Pulmonary/Chest: Effort normal and breath sounds normal.   Abdominal: Soft. There is no tenderness.   Musculoskeletal: He exhibits no edema or tenderness.   Lymphadenopathy:     He has no cervical adenopathy.   Neurological: He is alert and oriented to person, place, and time.   Skin: No rash noted.   Psychiatric: He has a normal mood and affect.       Past Medical History:   Diagnosis Date   • Abdominal hernia    • Abnormal ECG 10/19   • Atherosclerosis of coronary artery 9/14/2012    Overview:    • Atrial fibrillation (CMS/HCC)    • Atrial fibrillation with rapid ventricular response (CMS/HCC) 10/25/2019    Hospitalized on 10/25/19 at [facility].    • BPH (benign prostatic hyperplasia) 9/14/2012    Overview:    • Cancer (CMS/HCC) Thyroid   • Cataract 9/11/2013    Overview:    • Cyst of thyroid 1/15/2007   • Epigastric abdominal pain 2/18/2016    Overview:    • Essential hypertension 6/20/2016    Overview:    • Former tobacco use 5/2/2018    Overview:    • Mixed hyperlipidemia 2/18/2016    Overview:    • Osteomyelitis of foot (CMS/HCC) 2009    Surgery.   • PSA elevation 11/4/2018   • Thyroid cancer (CMS/HCC) 9/14/2012    Overview:    • Type 2 diabetes mellitus without complication (CMS/HCC) 6/20/2016    Overview:    • Vertigo 5/2/2018 "       Past Surgical History:   Procedure Laterality Date   • ANAL FISSURECTOMY     • CARDIAC CATHETERIZATION  2005    LM FOD. 70-80% proximal LAD.Mild disease CX, RCA and branches.    • CARDIAC CATHETERIZATION  2005    LM FOD. Pristine LAD stent. CX FOD. RCA mild disease.   • CARDIAC SURGERY  Stent in lad   • CORONARY ANGIOPLASTY WITH STENT PLACEMENT  2005    LM FOD. Moderate, diffuse disease LAD. Irregular CX w/ PLVB distal to PDA 99%. Mild disease RCA. Stent to distal LAD.   • EYE SURGERY  Cataract   • FOOT SURGERY      For osteomyelitis.   • GASTROCUTANEOUS FISTULA CLOSURE     • THYROIDECTOMY     • TONSILLECTOMY  No       Social History     Tobacco Use   • Smoking status: Former Smoker     Packs/day: 2.00     Years: 20.00     Pack years: 40.00     Types: Cigarettes     Last attempt to quit: 1980     Years since quittin.0   • Smokeless tobacco: Never Used   Substance Use Topics   • Alcohol use: Yes     Comment: Social   • Drug use: Never       Family History   Problem Relation Age of Onset   • Cancer Biological Mother    • Breast cancer Biological Mother    • Emphysema Biological Father    • Lung cancer Biological Father          Allergies  Penicillins    Medications  Current Outpatient Medications   Medication Sig Dispense Refill   • aspirin 81 mg enteric coated tablet Take 81 mg by mouth daily.     • dulaglutide 0.75 mg/0.5 mL pen injector Inject 0.75 mg under the skin once a week.     • JARDIANCE 10 mg tablet TAKE 1 TABLET(10 MG) BY MOUTH DAILY 90 tablet 0   • metoprolol succinate XL (TOPROL-XL) 100 mg 24 hr tablet Take 1 tablet (100 mg total) by mouth 2 (two) times a day. 180 tablet 3   • pravastatin (PRAVACHOL) 40 mg tablet Take 1 tablet (40 mg total) by mouth daily. 90 tablet 3   • rivaroxaban (XARELTO) 20 mg tablet Take 1 tablet (20 mg total) by mouth daily. 90 tablet 1   • levothyroxine (SYNTHROID) 150 mcg tablet Take 1 tablet (150 mcg total) by mouth daily. 90 tablet 0    • metFORMIN (GLUCOPHAGE) 500 mg tablet Take 1 tablet (500 mg total) by mouth 4 (four) times a day. 360 tablet 0   • pantoprazole (PROTONIX) 40 mg EC tablet Take 1 tablet (40 mg total) by mouth daily. 90 tablet 0     No current facility-administered medications for this visit.           Laboratory    Recent Results (from the past 672 hour(s))   Protein Elect, Urine w/Interp    Collection Time: 01/07/20 12:57 PM   Result Value Ref Range    Creatinine Rnd Ur 80 20 - 320 mg/dL    Protein/Creatinine Ratio 138 (H) 22 - 128 mg/g creat    Protein/Creatinine Ratio 0.138 (H) 0.022 - 0.128 mg/mg creat    Protein Total, Random Urine 11 5 - 25 mg/dL    Albumin 100 %    Alpha-1-Globulins 0 %    Alpha-2-Globulins 0 %    Beta Globulins 0 %    Gamma Globulins 0 %    Interpretation SEE COMMENT    Protein Elect, Serum w/Interp    Collection Time: 01/07/20 12:57 PM   Result Value Ref Range    Protein, Total 6.3 6.1 - 8.1 g/dL    Albumin 3.5 (L) 3.8 - 4.8 g/dL    Alpha 1 Globulin 0.3 0.2 - 0.3 g/dL    Alpha 2 Globulin 1.0 (H) 0.5 - 0.9 g/dL    Beta 1 Globulin 0.4 0.4 - 0.6 g/dL    Beta 2 Globulin 0.5 0.2 - 0.5 g/dL    Gamma Globulin 0.6 (L) 0.8 - 1.7 g/dL    Interpretation SEE COMMENT    Basic metabolic panel    Collection Time: 01/07/20 12:57 PM   Result Value Ref Range    Glucose 136 65 - 139 mg/dL    BUN 22 7 - 25 mg/dL    Creatinine 1.19 (H) 0.70 - 1.18 mg/dL    Egfr Non-Afr. American 59 (L) > OR = 60 mL/min/1.73m2    Egfr  68 > OR = 60 mL/min/1.73m2    Bun/Creatinine Ratio 18 6 - 22 (calc)    Sodium 136 135 - 146 mmol/L    Potassium 4.6 3.5 - 5.3 mmol/L    Chloride 100 98 - 110 mmol/L    Carbon Dioxide 29 20 - 32 mmol/L    Calcium 9.3 8.6 - 10.3 mg/dL   CBC and Differential    Collection Time: 01/07/20 12:57 PM   Result Value Ref Range    White Blood Cell Count 9.2 3.8 - 10.8 Thousand/uL    Red Blood Cell Count 4.70 4.20 - 5.80 Million/uL    Hemoglobin 13.6 13.2 - 17.1 g/dL    HEMATOCRIT 42.3 38.5 - 50.0 %    Mcv  90.0 80.0 - 100.0 fL    Mch 28.9 27.0 - 33.0 pg    Mchc 32.2 32.0 - 36.0 g/dL    Rdw 13.1 11.0 - 15.0 %    Platelet Count 304 140 - 400 Thousand/uL    Mpv 9.8 7.5 - 12.5 fL    Absolute Neutrophils 6,688 1,500 - 7,800 cells/uL    Absolute Lymphocytes 1,601 850 - 3,900 cells/uL    Absolute Monocytes 782 200 - 950 cells/uL    Absolute Eosinophils 64 15 - 500 cells/uL    Absolute Basophils 64 0 - 200 cells/uL    Neutrophils 72.7 %    Lymphocytes 17.4 %    Monocytes 8.5 %    Eosinophils 0.7 %    Basophils 0.7 %   CBC and differential    Collection Time: 01/17/20  7:51 AM   Result Value Ref Range    WBC 10.13 3.80 - 10.50 K/uL    RBC 4.86 4.50 - 5.80 M/uL    Hemoglobin 14.1 13.7 - 17.5 g/dL    Hematocrit 43.0 40.1 - 51.0 %    MCV 88.5 83.0 - 98.0 fL    MCH 29.0 28.0 - 33.2 pg    MCHC 32.8 32.2 - 36.5 g/dL    RDW 13.7 11.6 - 14.4 %    Platelets 279 150 - 350 K/uL    MPV 9.0 (L) 9.4 - 12.4 fL    Differential Type Auto     nRBC 0.0 <=0.0 %    Immature Granulocytes 0.8 %    Neutrophils 68.9 %    Lymphocytes 18.2 %    Monocytes 10.2 %    Eosinophils 1.2 %    Basophils 0.7 %    Immature Granulocytes, Absolute 0.08 0.00 - 0.08 K/uL    Neutrophils, Absolute 6.99 1.70 - 7.00 K/uL    Lymphocytes, Absolute 1.84 1.20 - 3.50 K/uL    Monocytes, Absolute 1.03 (H) 0.30 - 1.00 K/uL    Eosinophils, Absolute 0.12 0.04 - 0.54 K/uL    Basophils, Absolute 0.07 0.01 - 0.10 K/uL   Protime-INR    Collection Time: 01/17/20  7:52 AM   Result Value Ref Range    PT 12.8 12.2 - 14.5 sec    INR 1.0   INR   Pathology Tissue Exam    Collection Time: 01/17/20  8:51 AM   Result Value Ref Range    Case Report       Surgical Pathology                                Case: [id number]                                  Authorizing Provider:  Mary Shin PA C       Collected:           01/17/2020 0851              Ordering Location:     [facility]             Received:            01/17/2020 0919                                     Interventional Radiology   "                                                   Pathologist:           Briana Colvin MD                                                               Specimen:    Other (add Src to Desc/Comment), L3 Lesion                                                 Final Diagnosis       A.  L3 Lesion, biopsy:  Metastatic carcinoma, consistent with thyroid primary.  See comment.           Clinical Information L3 Lesion     Comment       Immunohistochemical stains were performed with adequate positive and negative controls on block A1. The tumor cells are positive for CK7, PAX8, TTF-1, cam5.2, Adalberto EP4, and negative for CK20, thyroglobulin, EMA, S100 and GFAP.     Findings are consistent with above diagnosis.  Patient's history of papillary thyroid carcinoma is noted.    Dr. Hellre was notified on 1/21/2020.      Immunohistochemistry       See comment.     Some immunohistochemical tests used in this lab were developed and their performance characteristics determined by [employer].  They have not been cleared or approved by the U.S. Food and Drug Administration.  The FDA has determined that such clearance or approval is not necessary.  These tests are used for clinical purposes.  Control slide(s) are reviewed and are adequate.  Immunostains are performed in the Immunohistochemistry Lab at [facility], [address], [place] [address], Corey Romero MD, Medical Director.      Gross Description       The specimen is received in formalin labeled with the patient's name and \"L3 lesion\".  It consists of a 0.2 cc aggregate of pale-tan to dark red filamentous soft tissues.  The specimen is stained with hematoxylin and submitted in toto in cassettes A1 and A2.  Also received is a small amount of similar appearing tissue in RPMI, which is sent entirely as received for flow cytometry.      GRAZYNA [place] (ASCP)cm      Anaerobic Culture / Smear (includes Aerobic Culture) Other (add Src to Desc/Comment) "    Collection Time: 01/17/20  8:51 AM   Result Value Ref Range    Culture No growth at 120 hours     Gram Stain Result No WBC Seen     Gram Stain Result No organisms seen          Pathology  Pathology Results     ** No results found for the last 720 hours. **          Radiology  I independently reviewed the images, tracings or specimen. Significant abnormals are lesion in L3.  Ir Technical Assistance, Ir Fluoro Guided Needle Loc    Result Date: 1/17/2020  Narrative: PROCEDURE: Image-guided bone biopsy Procedural Personnel Attending physician(s): Travis Will D.O. Pre-procedure diagnosis: L3 vertebral body lesion Post-procedure diagnosis: Same Indication: Histopathologic diagnosis and molecular characterization Previous biopsy of same target (QCDR): No Additional clinical history: None Complications: No immediate complications.     Impression: IMPRESSION: Image-guided bone biopsy of the right L3 vertebral body lesion. Plan: Specimen(s) sent for evaluation. _______________________________________________________________ PROCEDURE SUMMARY: - Percutaneous fluoro-guided core needle biopsy - Additional procedure(s): None PROCEDURE DETAILS: Pre-procedure Reference imaging for biopsy target: Outside CT abdomen and pelvis and MRI of the lumbar spine Consent: Informed consent for the procedure including risks, benefits and alternatives was obtained and time-out was performed prior to the procedure. Preparation: The patient was placed prone on the angiography table. A fluoroscopic examination of the lumbar spine was performed to identify the target bone and define the planned skin entry site and route for needle passage during the biopsy.  The L3 vertebral body was marked radiographically and confirmed in orthogonal planes by Dr. Travis Will and Dr. Zoey eRed.  The skin overlying the lumbar spine was then prepared and draped using maximal sterile barrier technique including cutaneous antisepsis. Anesthesia/sedation Level of  anesthesia/sedation: Moderate sedation (conscious sedation) Anesthesia/sedation administered by: Independent trained observer under attending supervision with continuous monitoring of the patients level of consciousness and physiologic status Total intra-service sedation time (minutes): 35 Medications: Versed 3 milligrams; Fentanyl 150 micrograms Biopsy Local anesthesia was administered. Under imaging guidance as stated in the procedure summary, the Cerulean kyphoplasty needle was advanced into the posterior aspect of the L3 vertebral body via a right interpedicular approach under careful fluoroscopic control.  Once the kyphoplasty guiding needle was positioned within the posterior vertebral body, the inner stylette was removed and a 18-gauge Splendid Labno coaxial biopsy needle was advanced under fluoroscopic control and several specimens were obtained. Coaxial needle: 10 gauge Core needle biopsy device: 10 mm Core needle size: 18 Number of core specimens: Five Fixative: 1 specimen in RPMI, 1 in sterile saline for culture, 3 in formalin. Fine needle aspiration device: N/A Fine needle size: N/A Number of FNA specimens: N/A On-site biopsy touch preparation: No Additional sampling recommendations: None Preliminary assessment of sample adequacy: Adequacy not confirmed Needle removal The biopsy needle was removed and a sterile dressing was applied. The patient tolerated the procedure well without apparent complication. Tract embolization: None Contrast Contrast agent: None Contrast volume (mL): 0 Radiation Dose CT dose length product (mGy-cm): N/A Fluoroscopy time (minutes): 1.6 Reference air kerma (mGy): 42 Kerma area product (Gy-cm2): Not provided Additional Details Additional description of procedure: None Equipment details: None Specimens removed: Biopsy samples as detailed above Estimated blood loss (mL): Less than 10 Standardized report: [email or web address]       Assessment and Plan  Thyroid cancer (CMS/HCC) (HCC)  I had a long  discussion with the patient and his wife today.  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  I discussed recommendations for additional work-up.  I would recommend a whole-body PET scan at this time.  If he has significant uptake on PET scan this may not be amenable to I-131 treatment.  PET scan should also help further clarify the small lesion in the left femoral head.  I have also suggested additional blood work at that time including a TSH, thyroglobulin and antithyroglobulin antibodies.  We will make arrangements for the patient's to see Dr. O[facility]ner in consultation to discuss options for radiation.  We discussed that depending on the PET scan results recommendations may be for either I-131 treatment or for external beam radiation treatment.  Many questions regarding the diagnosis, prognosis and treatment options were reviewed with the patient and his wife.      Stacie Casey MD

## 2020-01-27 NOTE — PROGRESS NOTES
Review of Systems   Constitutional: Positive for diaphoresis (night sweats, intermitent chronic). Negative for appetite change, chills, fatigue, fever and unexpected weight change.        Started on trulicity approx 4 months ago with loss of apprx 10lbs over the past year   HENT:   Negative for mouth sores, nosebleeds, sore throat and trouble swallowing.         Taste changes   Eyes: Negative.    Respiratory: Negative.    Cardiovascular: Positive for palpitations (intermittent, h/o AF now on xarelto). Negative for chest pain and leg swelling.   Gastrointestinal: Positive for nausea. Negative for vomiting.   Genitourinary: Positive for difficulty urinating (h/o BPH, benign prostate biopsy approx 4 months ago ) and frequency. Negative for bladder incontinence, discharge, dysuria, hematuria and pelvic pain.    Musculoskeletal: Positive for back pain (worse in AM, resolves with lying/sitting, constant dull with intermittent increase in pain pending activity level ) and neck stiffness. Negative for arthralgias, flank pain, gait problem, myalgias and neck pain.   Skin: Negative.    Neurological: Negative.  Negative for dizziness, gait problem, light-headedness and numbness.   Hematological: Negative.    Psychiatric/Behavioral: Positive for sleep disturbance (r/t BPH). Negative for depression. The patient is nervous/anxious (r/t diagnosis/tx planning ).       Patient reports following with Dr Bruno Lerma, endocrinology, since approx 2005. He reports receiving f/u and biopsy for thyroid cyst and ultimately positive for papillary thyroid cancer on biopsy in 2012. Thyroidectomy was done 2/2012 with Dr Lester at [place] and patient reports receiving I-131 near [place], uncertain of provider.     He saw Dr Heller in 8/2019 with increasing lower back pain and right leg pain. He had MRI lumbar spine done locally near [place] on 8/11/19 showing solitary vertebral lesion to L3, possible hemangioma, myeloma or met with recommended  follow up with Bone Scan. Bone Scan done on 8/21/19 was negative for metastasis/update in L3. He received 2 steroid epidural injections with complete resolution to right leg pain. He continued to have persistent low back dull, constant pain that relieves with lying and increases with movement. He reviewed sxs with Dr Heller and repeat MRI lumbar spine was done showing enlarging L3 lesion, characteristic of plasmacytoma/myeloma. Dr Heller ordered labs including SPEP/UPEP, CT C/A/P with IV contrast, and referred to IR for biopsy and medical oncology for evaluation.     CT C/A/P done 1/9/20 showed enlarging 3.6cm expansile ostolytic lesion to right aspect L3 vertebral body and small 0.5cm osteolytic lesion left humeral head with few stable pulmonary nodules since 12/2016.    On 1/17/20 an IR biopsy of L3 vertebral body lesion was done at [place] with final pathology showing metastatic carcinoma, c/w thyroid primary    Most recent thyroid labs done approx 6 months ago with Dr Lerma, outreach made to office with request for labs/office notes/pathology.

## 2020-01-28 ENCOUNTER — TELEPHONE (OUTPATIENT)
Dept: INTERNAL MEDICINE | Facility: CLINIC | Age: 75
End: 2020-01-28

## 2020-01-28 NOTE — TELEPHONE ENCOUNTER
Left message with patient to see how his appointment with Dr. Casey went and if he needed anything else at this time. Needs to schedule a follow up with Dr. Arreola as well.    Called for records unable to speak to dr. bansal's staff, phone kept ringing. Samara can you try to obtain his last office note? Thank you.

## 2020-01-28 NOTE — TELEPHONE ENCOUNTER
Send a refill request of Dulaglutide 0.75 mg to pharmacy. Pharmacy has to get script and send back to us pt needs a prior auth before it can be done. Prior auth usually can take a few days as well

## 2020-01-28 NOTE — TELEPHONE ENCOUNTER
Pt takes Dulaglutide 0.75mg and is due to take the medication today.  Pt needs a prior auth for this today if possible.  Pt cannot take the preferred medication Bidorian as he is allergic to this medication.  Please also send a new prescription to [facility]

## 2020-01-29 ENCOUNTER — DOCUMENTATION (OUTPATIENT)
Dept: RADIATION ONCOLOGY | Facility: HOSPITAL | Age: 75
End: 2020-01-29

## 2020-01-29 NOTE — PROGRESS NOTES
Pt of Dr. Lerma since 2005.    Dx with Papillary thyroid Ca in 2012 - Dr. Lester at [place] performed Total thyroidectomy 2/2012.  Rec'd I-131 on 5/10/12:    NUCLEAR MEDICINE             THYROID THERAPY I131   05/18/2012          COMMENTS:  History: Thyroid carcinoma I-131 ablation therapy.        The individual received at 50.2 mCi of I-131 by mouth  May 10, 2012.        Total body imagining and imaging of the neck was performed 8 days post     therapy. There is fairly intense activity in the thyroid bed area.     Activity is also identified in the bladder.        IMPRESSION:  : I-131 ablation therapy for thyroid carcinoma.        CPT CODE: 29151        Dictated By:  ARGELIA RYAN Electronically Signed: ARGELIA RYAN     at: May 18 2012 10:58AM    Transcribed By:  MAME    D:  May 18 2012 10:58AM     T:  May 18 2012 10:58AM    NUCLEAR MEDICINE          [id number]               NM THYROID THERAPY WITH ORAL ADMINISTRATION (05/18/2012 9:29 AM)   Procedure Note   [facility], Radiology Conversion - 09/18/2016 8:12 AM EDT    FINAL REPORT      NUCLEAR MEDICINE   THYROID THERAPY I131 05/18/2012     COMMENTS: History: Thyroid carcinoma I-131 ablation therapy.    The individual received at 50.2 mCi of I-131 by mouth May 10, 2012.    Total body imagining and imaging of the neck was performed 8 days post   therapy. There is fairly intense activity in the thyroid bed area.   Activity is also identified in the bladder.    IMPRESSION: : I-131 ablation therapy for thyroid carcinoma.    CPT CODE: [id number]    Dictated By: ARGELIA RYAN Electronically Signed: ARGELIA RYAN   at: May 18 2012 10:58AM  Transcribed By: MAME D: May 18 2012 10:58AM T: May 18 2012 10:58AM  NUCLEAR MEDICINE [id number]     Dr. Heller 8/2019:  Back and rt leg pain  MRI 8/2019 ([place]) = L3 lesion.  Bone scan 8/21/19 negative  CT C/A/P 1/9/20 3.6 cm osteolytic lesion to Rt aspect L 3 and 0.5 cm lesion to Lt humeral head.  IR bx 1/17/20 = Mets Ca-Thyroid primary    [facility] 1/27/20 - Rx given for PET/CT and thyroid panel.

## 2020-01-30 ENCOUNTER — HOSPITAL ENCOUNTER (OUTPATIENT)
Dept: RADIATION ONCOLOGY | Facility: HOSPITAL | Age: 75
Setting detail: RADIATION/ONCOLOGY SERIES
Discharge: HOME | End: 2020-01-30
Attending: RADIOLOGY
Payer: MEDICARE

## 2020-01-30 VITALS
WEIGHT: 163.8 LBS | SYSTOLIC BLOOD PRESSURE: 154 MMHG | HEART RATE: 83 BPM | BODY MASS INDEX: 27.26 KG/M2 | DIASTOLIC BLOOD PRESSURE: 86 MMHG

## 2020-01-30 DIAGNOSIS — C79.51 BONE METASTASES: ICD-10-CM

## 2020-01-30 DIAGNOSIS — C73 THYROID CANCER (CMS/HCC): Primary | ICD-10-CM

## 2020-01-30 LAB
ALBUMIN SERPL-MCNC: 3.6 G/DL (ref 3.6–5.1)
ALBUMIN/CREAT UR: 2 MCG/MG CREAT
ALBUMIN/GLOB SERPL: 1.6 (CALC) (ref 1–2.5)
ALP SERPL-CCNC: 49 U/L (ref 40–115)
ALT SERPL-CCNC: 18 U/L (ref 9–46)
AST SERPL-CCNC: 14 U/L (ref 10–35)
BILIRUB SERPL-MCNC: 0.4 MG/DL (ref 0.2–1.2)
BUN SERPL-MCNC: 20 MG/DL (ref 7–25)
BUN/CREAT SERPL: ABNORMAL (CALC) (ref 6–22)
CALCIUM SERPL-MCNC: 8.6 MG/DL (ref 8.6–10.3)
CHLORIDE SERPL-SCNC: 100 MMOL/L (ref 98–110)
CHOLEST SERPL-MCNC: 129 MG/DL
CHOLEST/HDLC SERPL: 2.7 (CALC)
CO2 SERPL-SCNC: 27 MMOL/L (ref 20–32)
CREAT SERPL-MCNC: 1.15 MG/DL (ref 0.7–1.18)
CREAT UR-MCNC: 89 MG/DL (ref 20–320)
GLOBULIN SER CALC-MCNC: 2.2 G/DL (CALC) (ref 1.9–3.7)
GLUCOSE SERPL-MCNC: 121 MG/DL (ref 65–99)
HBA1C MFR BLD: 7.1 % OF TOTAL HGB
HDLC SERPL-MCNC: 47 MG/DL
LDLC SERPL CALC-MCNC: 67 MG/DL (CALC)
MICROALBUMIN UR-MCNC: 0.2 MG/DL
NONHDLC SERPL-MCNC: 82 MG/DL (CALC)
POTASSIUM SERPL-SCNC: 4.6 MMOL/L (ref 3.5–5.3)
PROT SERPL-MCNC: 5.8 G/DL (ref 6.1–8.1)
QUEST EGFR NON-AFR. AMERICAN: 61 ML/MIN/1.73M2
SODIUM SERPL-SCNC: 136 MMOL/L (ref 135–146)
T4 FREE SERPL-MCNC: 1.9 NG/DL (ref 0.8–1.8)
THYROGLOB AB SERPL-ACNC: <1 IU/ML
THYROGLOB SERPL-MCNC: 1.8 NG/ML
TRIGL SERPL-MCNC: 69 MG/DL
TSH SERPL-ACNC: 0.32 MIU/L (ref 0.4–4.5)

## 2020-01-30 ASSESSMENT — ENCOUNTER SYMPTOMS
NEUROLOGICAL NEGATIVE: 1
FREQUENCY: 1
BRUISES/BLEEDS EASILY: 1
CHEST TIGHTNESS: 1
ENDOCRINE NEGATIVE: 1
PSYCHIATRIC NEGATIVE: 1
FATIGUE: 1
EYES NEGATIVE: 1
UNEXPECTED WEIGHT CHANGE: 1
DIARRHEA: 1
PALPITATIONS: 1
BACK PAIN: 1

## 2020-01-30 ASSESSMENT — PAIN SCALES - GENERAL: PAINLEVEL: 4

## 2020-01-30 NOTE — PROGRESS NOTES
Review of Systems   Constitutional: Positive for fatigue and unexpected weight change (pt lost aprox.  15 or more lbs after starting trulicity).   HENT:   Negative for nosebleeds (runny nose).    Eyes: Negative.    Respiratory: Positive for chest tightness.    Cardiovascular: Positive for palpitations (Afib on eliquis/ Dr. Alvarez/ no pacemaker/ pt does have an LAD stent). Negative for chest pain.   Gastrointestinal: Positive for diarrhea.   Endocrine: Negative.    Genitourinary: Positive for frequency (bx 3 months ago negative/ dx BPH) and nocturia.    Musculoskeletal: Positive for back pain (pt has hx of disc pressing on sciatic nerve).   Skin: Negative.    Neurological: Negative.    Hematological: Bruises/bleeds easily (on eliquis).   Psychiatric/Behavioral: Negative.    Pt of Dr. Lerma since 2005.    Dx with Papillary thyroid Ca in 2012 - Dr. Lester at [place] performed Total thyroidectomy 2/2012.  Rec'd I-131 on 5/10/12:    NUCLEAR MEDICINE             THYROID THERAPY I131   05/18/2012          COMMENTS:  History: Thyroid carcinoma I-131 ablation therapy.        The individual received at 50.2 mCi of I-131 by mouth  May 10, 2012.        Total body imagining and imaging of the neck was performed 8 days post     therapy. There is fairly intense activity in the thyroid bed area.     Activity is also identified in the bladder.        IMPRESSION:  : I-131 ablation therapy for thyroid carcinoma.        CPT CODE: 96118        Dictated By:  ARGELIA RYAN Electronically Signed: ARGELIA RYAN     at: May 18 2012 10:58AM    Transcribed By:  MAME    D:  May 18 2012 10:58AM     T:  May 18 2012 10:58AM    NUCLEAR MEDICINE          [id number]                NM THYROID THERAPY WITH ORAL ADMINISTRATION (05/18/2012 9:29 AM)   Procedure Note   [facility], Radiology Conversion - 09/18/2016 8:12 AM EDT    FINAL REPORT      NUCLEAR MEDICINE   THYROID THERAPY I131 05/18/2012     COMMENTS: History: Thyroid carcinoma I-131 ablation  therapy.    The individual received at 50.2 mCi of I-131 by mouth May 10, 2012.    Total body imagining and imaging of the neck was performed 8 days post   therapy. There is fairly intense activity in the thyroid bed area.   Activity is also identified in the bladder.    IMPRESSION: : I-131 ablation therapy for thyroid carcinoma.    CPT CODE: [id number]    Dictated By: ARGELIA RYAN Electronically Signed: ARGELIA RYAN   at: May 18 2012 10:58AM  Transcribed By: MAME D: May 18 2012 10:58AM T: May 18 2012 10:58AM  NUCLEAR MEDICINE 7108      Dr. Heller 8/2019:  Back and rt leg pain  MRI 8/2019 ([place]) = L3 lesion.  Bone scan 8/21/19 negative  CT C/A/P 1/9/20 3.6 cm osteolytic lesion to Rt aspect L 3 and 0.5 cm lesion to Lt humeral head.  IR bx 1/17/20 = Mets Ca-Thyroid primary  Dr. Casey 1/27/20 - Rx given for PET/CT - Pt to have in [place] tomorrow and thyroid panel.  Pt lives in [facility].

## 2020-01-30 NOTE — PROGRESS NOTES
Patient ID:  Matt Williamson is a 74 y.o. male.    Referring Physician:   No referring provider defined for this encounter.    Primary Care Provider:  Raj Arreola DO     Cancer Staging Information:  Cancer Staging  Thyroid cancer (CMS/[facility])  Staging form: Thyroid - Differentiated, AJCC 8th Edition  - Pathologic stage from 2/28/2012: Stage II (pT3a, pN0a, cM0, Age at diagnosis: >= 55 years) - Signed by Stacie Casey MD on 1/27/2020      Problem List:  Patient Active Problem List    Diagnosis Date Noted   • Bone metastases (CMS/HCC) 01/27/2020   • Atrial fibrillation with rapid ventricular response (CMS/[facility]) 10/25/2019   • PSA elevation 11/04/2018   • Former tobacco use 05/02/2018   • Vertigo 05/02/2018   • Essential hypertension 06/20/2016   • Type 2 diabetes mellitus without complication (CMS/[facility]) 06/20/2016   • Epigastric abdominal pain 02/18/2016   • Mixed hyperlipidemia 02/18/2016   • Cataract 09/11/2013   • BPH (benign prostatic hyperplasia) 09/14/2012   • Atherosclerosis of coronary artery 09/14/2012   • Thyroid cancer (CMS/HCC) 09/14/2012   • Cyst of thyroid 01/15/2007   • Esophageal reflux 01/15/2007       Chief Complaint  Chief Complaint   Patient presents with   • Other     Mets thyroid       Subjective      History of Present Illness:  The following portions of the patient's history were reviewed and updated as appropriate: allergies, current medications, past family history, past medical history, past social history and past surgical history.   HPI patient is a 74-year-old white male in 2012 underwent total thyroidectomy by Dr. Lester at [facility].  Final pathology revealed a 6.4 cm follicular variant papillary thyroid carcinoma with minimal extracapsular extension.  Patient received 50 mCi I-131 for treatment purposes following resection.  Whole-body scan following showed uptake in the neck otherwise was negative.  Patient had a repeat low-dose 5 mCi scan approximately a year  later which showed no activity seen.  Patient did well until several months ago when he developed low back pain.  An outside MRI revealed a suspicious lesion at L3 region and a repeat MRI done more recently showed increased size of this lesion now measuring 3.5 cm.  CAT scan of the abdomen pelvis also showed a 0.5 cm osteolytic lesion in the left femoral head region.  Patient was seen by Dr. Heller of neurosurgery who performed biopsy of L3 lesion and pathology is consistent with metastatic carcinoma most consistent with thyroid primary.  Patient presents complaining of lumbosacral pain on the left side with associated sciatica.    Review of Systems:  Review of Systems   Constitutional: Positive for fatigue and unexpected weight change.   Respiratory: Positive for chest tightness.    Cardiovascular: Positive for palpitations.   Gastrointestinal: Positive for diarrhea.   Genitourinary: Positive for frequency and nocturia.    Musculoskeletal: Positive for back pain.   Hematological: Bruises/bleeds easily.        Past Medical/Surgical History  Past Medical History:   Diagnosis Date   • Abdominal hernia    • Abnormal ECG 10/19   • Atherosclerosis of coronary artery 9/14/2012    Overview:    • Atrial fibrillation (CMS/HCC)    • Atrial fibrillation with rapid ventricular response (CMS/HCC) 10/25/2019    Hospitalized on 10/25/19 at [facility].    • BPH (benign prostatic hyperplasia) 9/14/2012    Overview:    • Cancer (CMS/HCC) Thyroid   • Cataract 9/11/2013    Overview:    • Cyst of thyroid 1/15/2007   • Epigastric abdominal pain 2/18/2016    Overview:    • Essential hypertension 6/20/2016    Overview:    • Former tobacco use 5/2/2018    Overview:    • Mixed hyperlipidemia 2/18/2016    Overview:    • Osteomyelitis of foot (CMS/HCC) 2009    Surgery.   • PSA elevation 11/4/2018   • Thyroid cancer (CMS/HCC) 9/14/2012    Overview:    • Type 2 diabetes mellitus without complication (CMS/HCC) 6/20/2016     Overview:    • Vertigo 5/2/2018     Past Surgical History:   Procedure Laterality Date   • ANAL FISSURECTOMY  1986   • CARDIAC CATHETERIZATION  11/21/2005    LM FOD. 70-80% proximal LAD.Mild disease CX, RCA and branches.    • CARDIAC CATHETERIZATION  11/28/2005    LM FOD. Pristine LAD stent. CX FOD. RCA mild disease.   • CARDIAC SURGERY  Stent in lad   • CORONARY ANGIOPLASTY WITH STENT PLACEMENT  11/22/2005    LM FOD. Moderate, diffuse disease LAD. Irregular CX w/ PLVB distal to PDA 99%. Mild disease RCA. Stent to distal LAD.   • EYE SURGERY  Cataract   • FOOT SURGERY  2009    For osteomyelitis.   • GASTROCUTANEOUS FISTULA CLOSURE     • THYROIDECTOMY  2012   • TONSILLECTOMY  No        Current Medications  Current Outpatient Medications   Medication Sig Dispense Refill   • aspirin 81 mg enteric coated tablet Take 81 mg by mouth daily.     • dulaglutide 0.75 mg/0.5 mL pen injector Inject 0.5 mL (0.75 mg total) under the skin once a week. 4 pen 0   • JARDIANCE 10 mg tablet TAKE 1 TABLET(10 MG) BY MOUTH DAILY 90 tablet 0   • levothyroxine (SYNTHROID) 150 mcg tablet Take 1 tablet (150 mcg total) by mouth daily. 90 tablet 0   • metFORMIN (GLUCOPHAGE) 500 mg tablet Take 1 tablet (500 mg total) by mouth 4 (four) times a day. 360 tablet 0   • metoprolol succinate XL (TOPROL-XL) 100 mg 24 hr tablet Take 1 tablet (100 mg total) by mouth 2 (two) times a day. 180 tablet 3   • pantoprazole (PROTONIX) 40 mg EC tablet Take 1 tablet (40 mg total) by mouth daily. 90 tablet 0   • pravastatin (PRAVACHOL) 40 mg tablet Take 1 tablet (40 mg total) by mouth daily. 90 tablet 3   • rivaroxaban (XARELTO) 20 mg tablet Take 1 tablet (20 mg total) by mouth daily. 90 tablet 1     No current facility-administered medications for this encounter.        Allergies  Allergies   Allergen Reactions   • Penicillins Rash     Childhood allergy       Social History  Social History     Socioeconomic History   • Marital status:      Spouse name: None    • Number of children: None   • Years of education: None   • Highest education level: None   Occupational History   • Occupation: Retired   Social Needs   • Financial resource strain: None   • Food insecurity:     Worry: None     Inability: None   • Transportation needs:     Medical: None     Non-medical: None   Tobacco Use   • Smoking status: Former Smoker     Packs/day: 2.00     Years: 20.00     Pack years: 40.00     Types: Cigarettes     Last attempt to quit: 1980     Years since quittin.1   • Smokeless tobacco: Never Used   Substance and Sexual Activity   • Alcohol use: Yes     Comment: Social   • Drug use: Never   • Sexual activity: Not Currently     Partners: Female   Lifestyle   • Physical activity:     Days per week: None     Minutes per session: None   • Stress: None   Relationships   • Social connections:     Talks on phone: None     Gets together: None     Attends Hoahaoism service: None     Active member of club or organization: None     Attends meetings of clubs or organizations: None     Relationship status: None   • Intimate partner violence:     Fear of current or ex partner: None     Emotionally abused: None     Physically abused: None     Forced sexual activity: None   Other Topics Concern   • None   Social History Narrative   • None       Family History  Family History   Problem Relation Age of Onset   • Cancer Biological Mother    • Breast cancer Biological Mother    • Emphysema Biological Father    • Lung cancer Biological Father       Family Status   Relation Name Status   • Bio Mother Shikha    • Bio Father Ronnie    • Bio Brother  Alive               Objective      Physical Exam:  Vital Signs for this encounter:  BSA: 1.85 meters squared  Visit Vitals  BP (!) 154/86 (Patient Position: Sitting)   Pulse 83   Wt 74.3 kg (163 lb 12.8 oz)   BMI 27.26 kg/m²         Physical Exam healthy-appearing white male.  Healed low anterior neck incision no neck adenopathy or suspicious  lesions palpated.  Lungs clear.  Pain to palpation lumbosacral region left SI region.  Neurologically grossly intact.  Performance Status:80     PAIN ASSESSMENT:  Score Four    Location BACK    Pain Intervention      LABS:  Recent Results (from the past 4368 hour(s))   Basic metabolic panel    Collection Time: 08/07/19  1:31 PM   Result Value Ref Range Status    Glucose 149 (H) 65 - 139 mg/dL Final    BUN 18 7 - 25 mg/dL Final    Creatinine 1.38 (H) 0.70 - 1.18 mg/dL Final    Egfr Non-Afr. American 49 (L) > OR = 60 mL/min/1.73m2 Final    Egfr  57 (L) > OR = 60 mL/min/1.73m2 Final    Bun/Creatinine Ratio 13 6 - 22 (calc) Final    Sodium 139 135 - 146 mmol/L Final    Potassium 4.8 3.5 - 5.3 mmol/L Final    Chloride 101 98 - 110 mmol/L Final    Carbon Dioxide 28 20 - 32 mmol/L Final    Calcium 9.5 8.6 - 10.3 mg/dL Final   UA Reflex to Culture (Macroscopic)    Collection Time: 08/07/19  1:31 PM   Result Value Ref Range Status    Color (UA) YELLOW YELLOW Final    Appearance (UA) CLEAR CLEAR Final    Specific Gravity (UA) 1.038 (H) 1.001 - 1.035 Final    pH (UA) < OR = 5.0 5.0 - 8.0 Final    Glucose (UA) 3+ (A) NEGATIVE Final    Bilirubin (UA) NEGATIVE NEGATIVE Final    Ketones (UA) NEGATIVE NEGATIVE Final    Occult Blood (UA) NEGATIVE NEGATIVE Final    Protein (UA) NEGATIVE NEGATIVE Final    Nitrite NEGATIVE NEGATIVE Final    Leukocyte Esterase NEGATIVE NEGATIVE Final    WBC (UA) NONE SEEN < OR = 5 /HPF Final    RBC (UA) NONE SEEN < OR = 2 /HPF Final    SQUAMOUS EPITHELIAL CELLS (UA) NONE SEEN < OR = 5 /HPF Final    Bacteria (UA) NONE SEEN NONE SEEN /HPF Final    HYALINE CAST (UA) NONE SEEN NONE SEEN /LPF Final    Reflexive Urine Culture NO CULTURE INDICATED No results found Final   Protein Elect, Urine w/Interp    Collection Time: 01/07/20 12:57 PM   Result Value Ref Range Status    Creatinine Rnd Ur 80 20 - 320 mg/dL Final    Protein/Creatinine Ratio 138 (H) 22 - 128 mg/g creat Final     Protein/Creatinine Ratio 0.138 (H) 0.022 - 0.128 mg/mg creat Final    Protein Total, Random Urine 11 5 - 25 mg/dL Final    Albumin 100 % Final    Alpha-1-Globulins 0 % Final    Alpha-2-Globulins 0 % Final    Beta Globulins 0 % Final    Gamma Globulins 0 % Final    Interpretation SEE COMMENT No results found Final   Protein Elect, Serum w/Interp    Collection Time: 01/07/20 12:57 PM   Result Value Ref Range Status    Protein, Total 6.3 6.1 - 8.1 g/dL Final    Albumin 3.5 (L) 3.8 - 4.8 g/dL Final    Alpha 1 Globulin 0.3 0.2 - 0.3 g/dL Final    Alpha 2 Globulin 1.0 (H) 0.5 - 0.9 g/dL Final    Beta 1 Globulin 0.4 0.4 - 0.6 g/dL Final    Beta 2 Globulin 0.5 0.2 - 0.5 g/dL Final    Gamma Globulin 0.6 (L) 0.8 - 1.7 g/dL Final    Interpretation SEE COMMENT No results found Final   Basic metabolic panel    Collection Time: 01/07/20 12:57 PM   Result Value Ref Range Status    Glucose 136 65 - 139 mg/dL Final    BUN 22 7 - 25 mg/dL Final    Creatinine 1.19 (H) 0.70 - 1.18 mg/dL Final    Egfr Non-Afr. American 59 (L) > OR = 60 mL/min/1.73m2 Final    Egfr  68 > OR = 60 mL/min/1.73m2 Final    Bun/Creatinine Ratio 18 6 - 22 (calc) Final    Sodium 136 135 - 146 mmol/L Final    Potassium 4.6 3.5 - 5.3 mmol/L Final    Chloride 100 98 - 110 mmol/L Final    Carbon Dioxide 29 20 - 32 mmol/L Final    Calcium 9.3 8.6 - 10.3 mg/dL Final   CBC and Differential    Collection Time: 01/07/20 12:57 PM   Result Value Ref Range Status    White Blood Cell Count 9.2 3.8 - 10.8 Thousand/uL Final    Red Blood Cell Count 4.70 4.20 - 5.80 Million/uL Final    Hemoglobin 13.6 13.2 - 17.1 g/dL Final    HEMATOCRIT 42.3 38.5 - 50.0 % Final    Mcv 90.0 80.0 - 100.0 fL Final    Mch 28.9 27.0 - 33.0 pg Final    Mchc 32.2 32.0 - 36.0 g/dL Final    Rdw 13.1 11.0 - 15.0 % Final    Platelet Count 304 140 - 400 Thousand/uL Final    Mpv 9.8 7.5 - 12.5 fL Final    Absolute Neutrophils 6,688 1,500 - 7,800 cells/uL Final    Absolute Lymphocytes 1,601  850 - 3,900 cells/uL Final    Absolute Monocytes 782 200 - 950 cells/uL Final    Absolute Eosinophils 64 15 - 500 cells/uL Final    Absolute Basophils 64 0 - 200 cells/uL Final    Neutrophils 72.7 % Final    Lymphocytes 17.4 % Final    Monocytes 8.5 % Final    Eosinophils 0.7 % Final    Basophils 0.7 % Final   CBC and differential    Collection Time: 01/17/20  7:51 AM   Result Value Ref Range Status    WBC 10.13 3.80 - 10.50 K/uL Final    RBC 4.86 4.50 - 5.80 M/uL Final    Hemoglobin 14.1 13.7 - 17.5 g/dL Final    Hematocrit 43.0 40.1 - 51.0 % Final    MCV 88.5 83.0 - 98.0 fL Final    MCH 29.0 28.0 - 33.2 pg Final    MCHC 32.8 32.2 - 36.5 g/dL Final    RDW 13.7 11.6 - 14.4 % Final    Platelets 279 150 - 350 K/uL Final    MPV 9.0 (L) 9.4 - 12.4 fL Final    Differential Type Auto No results found Final    nRBC 0.0 <=0.0 % Final    Immature Granulocytes 0.8 % Final    Neutrophils 68.9 % Final    Lymphocytes 18.2 % Final    Monocytes 10.2 % Final    Eosinophils 1.2 % Final    Basophils 0.7 % Final    Immature Granulocytes, Absolute 0.08 0.00 - 0.08 K/uL Final    Neutrophils, Absolute 6.99 1.70 - 7.00 K/uL Final    Lymphocytes, Absolute 1.84 1.20 - 3.50 K/uL Final    Monocytes, Absolute 1.03 (H) 0.30 - 1.00 K/uL Final    Eosinophils, Absolute 0.12 0.04 - 0.54 K/uL Final    Basophils, Absolute 0.07 0.01 - 0.10 K/uL Final   Protime-INR    Collection Time: 01/17/20  7:52 AM   Result Value Ref Range Status    PT 12.8 12.2 - 14.5 sec Final    INR 1.0   INR Final   Pathology Tissue Exam    Collection Time: 01/17/20  8:51 AM   Result Value Ref Range Status    Case Report  No results found Final     Surgical Pathology                                Case: [id number]                                  Authorizing Provider:  Mary Shin PA C       Collected:           01/17/2020 0851              Ordering Location:     [facility]             Received:            01/17/2020 0919                                      "Interventional Radiology                                                     Pathologist:           Briana Colvin MD                                                               Specimen:    Other (add Src to Desc/Comment), L3 Lesion                                                 Final Diagnosis  No results found Final     A.  L3 Lesion, biopsy:  Metastatic carcinoma, consistent with thyroid primary.  See comment.           Clinical Information L3 Lesion No results found Final    Comment  No results found Final     Immunohistochemical stains were performed with adequate positive and negative controls on block A1. The tumor cells are positive for CK7, PAX8, TTF-1, cam5.2, Adalberto EP4, and negative for CK20, thyroglobulin, EMA, S100 and GFAP.     Findings are consistent with above diagnosis.  Patient's history of papillary thyroid carcinoma is noted.    Dr. Heller was notified on 1/21/2020.      Immunohistochemistry  No results found Final     See comment.     Some immunohistochemical tests used in this lab were developed and their performance characteristics determined by [employer].  They have not been cleared or approved by the U.S. Food and Drug Administration.  The FDA has determined that such clearance or approval is not necessary.  These tests are used for clinical purposes.  Control slide(s) are reviewed and are adequate.  Immunostains are performed in the Immunohistochemistry Lab at , [address], [place] [address], Corey Romero MD, Medical Director.      Gross Description  No results found Final     The specimen is received in formalin labeled with the patient's name and \"L3 lesion\".  It consists of a 0.2 cc aggregate of pale-tan to dark red filamentous soft tissues.  The specimen is stained with hematoxylin and submitted in toto in cassettes A1 and A2.  Also received is a small amount of similar appearing tissue in RPMI, which is sent entirely as received for " flow cytometry.      GRAZYNA Bangura (ASCP)cm      Anaerobic Culture / Smear (includes Aerobic Culture) Other (add Src to Desc/Comment)    Collection Time: 01/17/20  8:51 AM   Result Value Ref Range Status    Culture No growth at 120 hours No results found Final    Gram Stain Result No WBC Seen No results found Final    Gram Stain Result No organisms seen No results found Final   TSH    Collection Time: 01/29/20 10:34 AM   Result Value Ref Range Status    TSH 0.32 (L) 0.40 - 4.50 mIU/L Final   Thyroglobulin    Collection Time: 01/29/20 10:34 AM   Result Value Ref Range Status    Thyroglobulin Antibodies SEE COMMENT No results found Incomplete    Thyroglobulin SEE COMMENT No results found Incomplete   Lipid panel    Collection Time: 01/29/20 10:34 AM   Result Value Ref Range Status    Cholesterol, Total 129 <200 mg/dL Final    Hdl Cholesterol 47 >40 mg/dL Final    Triglycerides 69 <150 mg/dL Final    Ldl-Cholesterol 67 mg/dL (calc) Final    Chol/Hdlc Ratio 2.7 <5.0 (calc) Final    Non Hdl Cholesterol 82 <130 mg/dL (calc) Final   Microalbumin/Creatinine Ur Random    Collection Time: 01/29/20 10:34 AM   Result Value Ref Range Status    Creatinine Rnd Ur 89 20 - 320 mg/dL Final    Microalbumin 0.2 See Note: mg/dL Final    Microalbumin/Creatinine Ratio, Random Urine 2 <30 mcg/mg creat Final   Comprehensive metabolic panel    Collection Time: 01/29/20 10:34 AM   Result Value Ref Range Status    Glucose 121 (H) 65 - 99 mg/dL Final    BUN 20 7 - 25 mg/dL Final    Creatinine 1.15 0.70 - 1.18 mg/dL Final    Egfr Non-Afr. American 61 > OR = 60 mL/min/1.73m2 Final    Egfr  71 > OR = 60 mL/min/1.73m2 Final    Bun/Creatinine Ratio NOT APPLICABLE 6 - 22 (calc) Final    Sodium 136 135 - 146 mmol/L Final    Potassium 4.6 3.5 - 5.3 mmol/L Final    Chloride 100 98 - 110 mmol/L Final    Carbon Dioxide 27 20 - 32 mmol/L Final    Calcium 8.6 8.6 - 10.3 mg/dL Final    Protein, Total 5.8 (L) 6.1 - 8.1 g/dL Final     Albumin 3.6 3.6 - 5.1 g/dL Final    Globulin 2.2 1.9 - 3.7 g/dL (calc) Final    Albumin/Globulin Ratio 1.6 1.0 - 2.5 (calc) Final    Bilirubin, Total 0.4 0.2 - 1.2 mg/dL Final    Alkaline Phosphatase 49 40 - 115 U/L Final    Ast 14 10 - 35 U/L Final    Alt 18 9 - 46 U/L Final   T4, free    Collection Time: 01/29/20 10:34 AM   Result Value Ref Range Status    T4, FREE 1.9 (H) 0.8 - 1.8 ng/dL Final   Hemoglobin A1c    Collection Time: 01/29/20 10:34 AM   Result Value Ref Range Status    Hemoglobin A1C 7.1 (H) <5.7 % of total Hgb Final          Assessment/Plan Patient with history of pathologic T3a follicular variant papillary thyroid cancer now with evidence of metastatic disease involving L3 vertebral body and possibly left femoral head.  Discussed case with  of medical oncology and we both felt that a PET CT scan would help with the evaluation.  Pending this I told patient I would likely treat with high-dose I-131 likely levering 150 mCi for treatment and diagnostic purposes.  I may also consider use of external radiation therapy for consolidation to bony sites.  Patient to return to clinic next week with PET scan CD so we can review and make further plans.    Diagnoses and Orders Associated With This Visit:  Thyroid cancer (CMS/HCC) (Primary)    Bone metastases (CMS/HCC)      TREATMENT PLAN SUMMARY:         IV CHEMOTHERAPY:  [No treatment plan]

## 2020-02-05 ENCOUNTER — DOCUMENTATION (OUTPATIENT)
Dept: RADIATION ONCOLOGY | Facility: HOSPITAL | Age: 75
End: 2020-02-05

## 2020-02-06 ENCOUNTER — HOSPITAL ENCOUNTER (OUTPATIENT)
Dept: RADIATION ONCOLOGY | Facility: HOSPITAL | Age: 75
Setting detail: RADIATION/ONCOLOGY SERIES
Discharge: HOME | End: 2020-02-06
Attending: RADIOLOGY
Payer: MEDICARE

## 2020-02-06 VITALS
HEART RATE: 81 BPM | WEIGHT: 164 LBS | DIASTOLIC BLOOD PRESSURE: 80 MMHG | TEMPERATURE: 97.4 F | BODY MASS INDEX: 27.29 KG/M2 | SYSTOLIC BLOOD PRESSURE: 122 MMHG | OXYGEN SATURATION: 98 %

## 2020-02-06 DIAGNOSIS — C73 THYROID CANCER (CMS/HCC): Primary | ICD-10-CM

## 2020-02-06 ASSESSMENT — ENCOUNTER SYMPTOMS
HEMATOLOGIC/LYMPHATIC NEGATIVE: 1
NEUROLOGICAL NEGATIVE: 1
RESPIRATORY NEGATIVE: 1
ALLERGIC/IMMUNOLOGIC NEGATIVE: 1
PSYCHIATRIC NEGATIVE: 1
CONSTITUTIONAL NEGATIVE: 1
MUSCULOSKELETAL NEGATIVE: 1
GASTROINTESTINAL NEGATIVE: 1
ENDOCRINE NEGATIVE: 1
EYES NEGATIVE: 1
CARDIOVASCULAR NEGATIVE: 1

## 2020-02-06 ASSESSMENT — PAIN SCALES - GENERAL: PAINLEVEL: 0-NO PAIN

## 2020-02-06 NOTE — PROGRESS NOTES
Patient ID:  Matt Williamson is a 74 y.o. male.  Referring Physician:   No referring provider defined for this encounter.    Primary Care Provider:  Raj Arreola DO    Problem List:  Patient Active Problem List    Diagnosis Date Noted   • Bone metastases (CMS/HCC) 01/27/2020   • Atrial fibrillation with rapid ventricular response (CMS/HCC) 10/25/2019   • PSA elevation 11/04/2018   • Former tobacco use 05/02/2018   • Vertigo 05/02/2018   • Essential hypertension 06/20/2016   • Type 2 diabetes mellitus without complication (CMS/HCC) 06/20/2016   • Epigastric abdominal pain 02/18/2016   • Mixed hyperlipidemia 02/18/2016   • Cataract 09/11/2013   • BPH (benign prostatic hyperplasia) 09/14/2012   • Atherosclerosis of coronary artery 09/14/2012   • Thyroid cancer (CMS/HCC) 09/14/2012   • Cyst of thyroid 01/15/2007   • Esophageal reflux 01/15/2007        Cancer Staging Information:  Cancer Staging  Thyroid cancer (CMS/HCC)  Staging form: Thyroid - Differentiated, AJCC 8th Edition  - Pathologic stage from 2/28/2012: Stage II (pT3a, pN0a, cM0, Age at diagnosis: >= 55 years) - Signed by Stacie Casey MD on 1/27/2020      Subjective      Radiation Delivered       History of Present Illness:  Patient presents complaining of continued low back pain now radiating to the right instead of left.  Patient had a recent PET CT scan which shows lytic lesion with hypermetabolic tumor at L3 region.  In addition there is subtle lytic changes in the scapula on the inferior margin worrisome for possible metastasis.  No mention of uptake in the proximal hip region.  Patient denies shoulder or scapular pain.    Physical Exam:  Vital Signs for this encounter:  BP: 122/80  Temp: 36.3 °C (97.4 °F)  Heart Rate: 81  SpO2: 98 %  Weight: 74.4 kg (164 lb) (02/06 1100)  Patient nontender palpation bilateral scapular regions.  Tenderness palpation L3 region.  Neurologically grossly intact.  Performance Status:70       PAIN ASSESSMENT:  Score  Zero    Location    Pain Intervention      LABS:  Recent Results (from the past 336 hour(s))   TSH    Collection Time: 01/29/20 10:34 AM   Result Value Ref Range Status    TSH 0.32 (L) 0.40 - 4.50 mIU/L Final   Thyroglobulin    Collection Time: 01/29/20 10:34 AM   Result Value Ref Range Status    Thyroglobulin Antibodies <1 < or = 1 IU/mL Final    Thyroglobulin 1.8 (L) ng/mL Final   Lipid panel    Collection Time: 01/29/20 10:34 AM   Result Value Ref Range Status    Cholesterol, Total 129 <200 mg/dL Final    Hdl Cholesterol 47 >40 mg/dL Final    Triglycerides 69 <150 mg/dL Final    Ldl-Cholesterol 67 mg/dL (calc) Final    Chol/Hdlc Ratio 2.7 <5.0 (calc) Final    Non Hdl Cholesterol 82 <130 mg/dL (calc) Final   Microalbumin/Creatinine Ur Random    Collection Time: 01/29/20 10:34 AM   Result Value Ref Range Status    Creatinine Rnd Ur 89 20 - 320 mg/dL Final    Microalbumin 0.2 See Note: mg/dL Final    Microalbumin/Creatinine Ratio, Random Urine 2 <30 mcg/mg creat Final   Comprehensive metabolic panel    Collection Time: 01/29/20 10:34 AM   Result Value Ref Range Status    Glucose 121 (H) 65 - 99 mg/dL Final    BUN 20 7 - 25 mg/dL Final    Creatinine 1.15 0.70 - 1.18 mg/dL Final    Egfr Non-Afr. American 61 > OR = 60 mL/min/1.73m2 Final    Egfr  71 > OR = 60 mL/min/1.73m2 Final    Bun/Creatinine Ratio NOT APPLICABLE 6 - 22 (calc) Final    Sodium 136 135 - 146 mmol/L Final    Potassium 4.6 3.5 - 5.3 mmol/L Final    Chloride 100 98 - 110 mmol/L Final    Carbon Dioxide 27 20 - 32 mmol/L Final    Calcium 8.6 8.6 - 10.3 mg/dL Final    Protein, Total 5.8 (L) 6.1 - 8.1 g/dL Final    Albumin 3.6 3.6 - 5.1 g/dL Final    Globulin 2.2 1.9 - 3.7 g/dL (calc) Final    Albumin/Globulin Ratio 1.6 1.0 - 2.5 (calc) Final    Bilirubin, Total 0.4 0.2 - 1.2 mg/dL Final    Alkaline Phosphatase 49 40 - 115 U/L Final    Ast 14 10 - 35 U/L Final    Alt 18 9 - 46 U/L Final   T4, free    Collection Time: 01/29/20 10:34 AM    Result Value Ref Range Status    T4, FREE 1.9 (H) 0.8 - 1.8 ng/dL Final   Hemoglobin A1c    Collection Time: 01/29/20 10:34 AM   Result Value Ref Range Status    Hemoglobin A1C 7.1 (H) <5.7 % of total Hgb Final          Assessment/Plan Patient has history of papillary thyroid carcinoma now with biopsy-proven metastasis to L3 region.  I am arranging patient to have high dose I-131 treatment with 150 mCi.  Patient to contact Dr. Bruno Lerma regarding Thyrogen injections at the earliest at the end of this month.  Patient had a contrast CAT scan approximately a month ago.  Patient was given radiation precautions and low iodine diet knows to start diet 10 days prior to treatment.  Patient to contact me with dates of Thyrogen injection so I can arrange treatment dates.  Following treatment patient will have a whole-body scan a week following and likely receive additional external beam radiation therapy treatment to L3 region.    Diagnoses and Orders Associated With This Visit:  Thyroid cancer (CMS/HCC) (Primary)        TREATMENT PLAN SUMMARY:  [No treatment plan]

## 2020-02-06 NOTE — PROGRESS NOTES
Subjective      Patient ID: Matt Williamson is a 74 y.o. male.  1945      HPI    The following have been reviewed and updated as appropriate in this visit:       Review of Systems   Constitutional: Negative.    HENT: Negative.    Eyes: Negative.    Respiratory: Negative.    Cardiovascular: Negative.    Gastrointestinal: Negative.    Endocrine: Negative.    Genitourinary: Negative.    Musculoskeletal: Negative.    Skin: Negative.    Allergic/Immunologic: Negative.    Neurological: Negative.    Hematological: Negative.    Psychiatric/Behavioral: Negative.        Objective     Vitals:    02/06/20 1100   BP: 122/80   BP Location: Left upper arm   Patient Position: Sitting   Pulse: 81   Temp: 36.3 °C (97.4 °F)   SpO2: 98%   Weight: 74.4 kg (164 lb)     Body mass index is 27.29 kg/m².    Physical Exam    Assessment/Plan

## 2020-02-07 ENCOUNTER — TELEPHONE (OUTPATIENT)
Dept: INTERNAL MEDICINE | Facility: CLINIC | Age: 75
End: 2020-02-07

## 2020-02-07 NOTE — TELEPHONE ENCOUNTER
Incoming call from Marco A, states biopsy confirmed thyroid cancer metastasized to his spine. He has questions regarding his thyroglobulin level and wants to understand this better.     Please call at your convenience.    Thank you.

## 2020-02-11 ENCOUNTER — TELEPHONE (OUTPATIENT)
Dept: INTERNAL MEDICINE | Facility: CLINIC | Age: 75
End: 2020-02-11

## 2020-02-11 NOTE — TELEPHONE ENCOUNTER
Spoke with him today, about his metastatic thyroid cancer.  I referred him back to Bruno Thorpe his endocrinologist regarding the Trulicity.  He denies any anginal type symptoms, has a follow-up with Dr. Ravi Pride.  I asked him to follow-up with me regarding his medical issues and calcifications of his coronaries.  He has follow-up for definitive treatment for metastatic thyroid cancer.  Raj Arreola

## 2020-02-24 RX ORDER — PANTOPRAZOLE SODIUM 40 MG/1
TABLET, DELAYED RELEASE ORAL
Qty: 90 TABLET | Refills: 0 | Status: SHIPPED | OUTPATIENT
Start: 2020-02-24 | End: 2020-06-11

## 2020-03-02 ENCOUNTER — TELEPHONE (OUTPATIENT)
Dept: RADIATION ONCOLOGY | Facility: HOSPITAL | Age: 75
End: 2020-03-02

## 2020-03-02 NOTE — TELEPHONE ENCOUNTER
Shi from [facility] left a vm in regards with pt's treatment. Shi states  spoke with pt about his treatment plan and will like to speak with Dr.[facility] about what they have come up with and when. Shi call back number is [phone] when calling press option 6 to speak directly to the nurse.

## 2020-03-03 ENCOUNTER — DOCUMENTATION (OUTPATIENT)
Dept: RADIATION ONCOLOGY | Facility: HOSPITAL | Age: 75
End: 2020-03-03

## 2020-03-03 DIAGNOSIS — C73 THYROID CA (CMS/HCC): Primary | ICD-10-CM

## 2020-03-03 RX ORDER — ONDANSETRON HYDROCHLORIDE 8 MG/1
8 TABLET, FILM COATED ORAL EVERY 8 HOURS PRN
Qty: 20 TABLET | Refills: 1 | Status: SHIPPED | OUTPATIENT
Start: 2020-03-03 | End: 2020-03-31 | Stop reason: ALTCHOICE

## 2020-03-03 NOTE — PROGRESS NOTES
MA called and spoke with Adwoa from [facility] about scheduling pt for his I 1:31 treatment. Pt is schedule on Thursday March 26 for 9am. All require paper work was faxed over to [facility] on 3/3/20.       MA spoke with pt to confirm his appt for I 1:31. Pt will be in on March 26 for his 9 am appt.

## 2020-03-03 NOTE — PROGRESS NOTES
We were contacted by Dr. Lerma's office informing us that they were unable to obtain Thyrogen for Mr. Williamson and therefore they are assisting him with getting off his thyroid medication and getting blood work to assure that patient is ready for treatment.  A tentative date has been arranged of March 26 Thursday for treatment.  Patient knows to start low iodine diet 10 days prior and was given a prescription for Zofran in case he develops nausea and vomiting from treatment.  Given his metastatic disease I plan on delivering 150 mCi I-131 for treatment purposes.

## 2020-03-04 ENCOUNTER — TRANSCRIBE ORDERS (OUTPATIENT)
Dept: SCHEDULING | Age: 75
End: 2020-03-04

## 2020-03-04 DIAGNOSIS — C73 MALIGNANT NEOPLASM OF THYROID GLAND (CMS/HCC): Primary | ICD-10-CM

## 2020-03-06 ENCOUNTER — TELEPHONE (OUTPATIENT)
Dept: OPERATING ROOM | Facility: HOSPITAL | Age: 75
End: 2020-03-06

## 2020-03-06 NOTE — TELEPHONE ENCOUNTER
Mailed back to patient the CD of the MRI of the lumbar spine with and without contrast performed on 1/3/2020 at [facility].

## 2020-03-09 RX ORDER — METFORMIN HYDROCHLORIDE 500 MG/1
TABLET ORAL
Qty: 360 TABLET | Refills: 0 | Status: SHIPPED | OUTPATIENT
Start: 2020-03-09 | End: 2021-08-10 | Stop reason: SDUPTHER

## 2020-03-09 RX ORDER — EMPAGLIFLOZIN 10 MG/1
TABLET, FILM COATED ORAL
Qty: 90 TABLET | Refills: 0 | Status: SHIPPED | OUTPATIENT
Start: 2020-03-09 | End: 2020-04-15

## 2020-03-09 NOTE — TELEPHONE ENCOUNTER
Medication refill request of Metformin 500 mg tablets Q # 360 and Jardiance 10 mg tablets Q # 90 - [facility]

## 2020-03-18 ENCOUNTER — TELEPHONE (OUTPATIENT)
Dept: RADIATION ONCOLOGY | Facility: HOSPITAL | Age: 75
End: 2020-03-18

## 2020-03-18 NOTE — TELEPHONE ENCOUNTER
Pt called concerned that his I-131 dosing would be cancelled.  Called [facility] (#[phone]) and Dr. [facility] regarding.  As for now pt is to stay on his diet as recommended.  Pt states he is NOT receiving thyrogen.  Nuc Med to call patient if any changes.  Pt will have his labs done 3/23/20.

## 2020-03-24 ENCOUNTER — TELEPHONE (OUTPATIENT)
Dept: RADIATION ONCOLOGY | Facility: HOSPITAL | Age: 75
End: 2020-03-24

## 2020-03-24 NOTE — TELEPHONE ENCOUNTER
Spoke with Pt about his I 131 treatment on Thursday March 26 for 1:00. Pt understands to come in an half hour early to see Dr. [facility] and also to register.  Screening questions were done. All were negative. Pt was notified by MA to call if anything changes or if any new symptoms and fever to not come in and call. Pt will be in on Thursday for 1:00 I 131 treatment.

## 2020-03-26 ENCOUNTER — DOCUMENTATION (OUTPATIENT)
Dept: RADIATION ONCOLOGY | Facility: HOSPITAL | Age: 75
End: 2020-03-26

## 2020-03-26 ENCOUNTER — HOSPITAL ENCOUNTER (OUTPATIENT)
Dept: RADIOLOGY | Facility: HOSPITAL | Age: 75
Setting detail: NUCLEAR MEDICINE
Discharge: HOME | End: 2020-03-26
Attending: RADIOLOGY
Payer: MEDICARE

## 2020-03-26 ENCOUNTER — HOSPITAL ENCOUNTER (OUTPATIENT)
Dept: RADIATION ONCOLOGY | Facility: HOSPITAL | Age: 75
Setting detail: RADIATION/ONCOLOGY SERIES
Discharge: HOME | End: 2020-03-26
Attending: RADIOLOGY
Payer: MEDICARE

## 2020-03-26 DIAGNOSIS — C73 THYROID CA (CMS/HCC): ICD-10-CM

## 2020-03-26 DIAGNOSIS — C79.51 BONE METASTASES: Primary | ICD-10-CM

## 2020-03-26 PROCEDURE — 79005 NUCLEAR RX ORAL ADMIN: CPT

## 2020-03-26 PROCEDURE — A9517 I131 IODIDE CAP, RX: HCPCS | Performed by: RADIOLOGY

## 2020-03-26 RX ADMIN — Medication 152.3 MILLICURIE: at 13:51

## 2020-03-26 NOTE — PROGRESS NOTES
"Patient presents for consent for I-131 treatment.  Patient is off his Synthroid and on low iodine diet.  I was contacted by Dr. Lerma's office informing me that patient's TSH level was above 50 and he is ready for treatment.  Patient reports a stimulated thyroglobulin level above 30 with prior antibodies to thyroglobulin was undetectable.  Patient notes mild fatigue some twitchiness associated  with \"being off his Synthroid\".  I sat down with the patient and discussed with him regarding risks and benefits of I-131 treatment.  Patient has been previously treated 8 years ago at outside institution.  Patient continues to complain of back pain and recent PET scan shows uptake at L3 as well as scapula on my review showed right scapula.  Biopsy of L3 was consistent with metastatic thyroid cancer.  Patient understands possible side effects of I-131 planning 150 mCi.  Patient specifically understands possible development of nausea and vomiting for which he has medication as well as dry mouth and neck and mouth swelling and pain.  Patient understands he can take over-the-counter medications but knows to drink lots of fluids.  Discussed with patient regarding radiation precautions including isolation for 4 days.  Patient is in agreement.  Patient knows to stay on his low iodine diet for the next 4 days and since stay off Synthroid until next week.  Patient to have whole-body scan in a week and return to my clinic in 2 weeks for reassessment.  I plan on mapping out L3 tumor and possibly right scapular tumor for palliative course of radiation therapy following I-131 treatment.  Tentatively plan to deliver 2000 cGy in 5 fractions [place]  On visit patient was sent to nuclear medicine after seeing registration and is scheduled to receive 150 mCi I-131 for treatment purposes.  "

## 2020-03-26 NOTE — PROGRESS NOTES
Pt received his I 131 treatment today. All sign paper work from both Dr.[facility] and pt was faxed over to nuclear [employer] on 3/26/20 with complete confirmation.

## 2020-03-30 ENCOUNTER — DOCUMENTATION (OUTPATIENT)
Dept: RADIATION ONCOLOGY | Facility: HOSPITAL | Age: 75
End: 2020-03-30

## 2020-03-30 RX ORDER — DEXAMETHASONE 4 MG/1
4 TABLET ORAL 2 TIMES DAILY WITH MEALS
Qty: 30 TABLET | Refills: 0 | Status: SHIPPED | OUTPATIENT
Start: 2020-03-30 | End: 2020-04-16

## 2020-03-30 NOTE — PROGRESS NOTES
"Patient stated he had back pain this weekend \"It was so bad, I felt my back was going to collapse\".  This nurse assessed pt via phone to r/o cord compression - as pt has a lesion to L3.  Pt denies feeling paralysis to lower extremities and denies incontinence of bowel and bladder.  Marco A noted he had no side effects post I-131 - until 4 days later - He could feel the pressure across his hips and pelvis and was unable to walk due to pain.  He has been in bed ever since.  Pt is on his last day of isolation and pending Body scan on Friday.  Pt kept stating it was sciatic nerve pain from his back and he was trying to contact Dr. Chapa (neurology) regarding.  Marco A took toradol and tylenol without effect, then tried 5 mg of Oxycodone with some relief, the additional dose prior to bed was effective - \"I feel better today\".  Requested pt attempt to stand up - pt was able to do so and walked to the bathroom to urinate while this nurse was on the phone.  Concerned for patient - a call was made to Dr. [facility] on his day off.  Doctor is concerned about inflammation post I-131 to L3 lesion.  Pt lives in [place], [place] - so Dr. [facility] requested this nurse to call an RX in for Decadron 4 mg BID - take 2 doses today.  Pt is to stop his low iodine diet and begin synthroid as directed.  Also, pt can be off isolation as / Dr. [facility].  Education provided on side effects of Decadron - he states taking Protonix, and verbalizes understanding .  Called [facility] in [place] ([phone]) and spoke with pharmacist regarding.  Pt states he has someone to  the medication.  Reinforced with patient that he does have a lesion on his spine and educated on cord compression.  Pt stated, \"I only want to come to [place]\".  Stressed to patient if he cannot walk/weight bear, and is incontinent he needs to call 911 - Pt comprehends directions well.  Dr. [facility] to follow upon his return tomorrow.  Pt is aware to call this nurse with any further " problems.

## 2020-03-31 ENCOUNTER — TELEMEDICINE (OUTPATIENT)
Dept: INTERNAL MEDICINE | Facility: CLINIC | Age: 75
End: 2020-03-31
Payer: MEDICARE

## 2020-03-31 ENCOUNTER — TELEPHONE (OUTPATIENT)
Dept: INTERNAL MEDICINE | Facility: CLINIC | Age: 75
End: 2020-03-31

## 2020-03-31 DIAGNOSIS — C73 METASTASIS FROM THYROID CANCER (CMS/HCC): ICD-10-CM

## 2020-03-31 DIAGNOSIS — E11.9 TYPE 2 DIABETES MELLITUS WITHOUT COMPLICATION, WITHOUT LONG-TERM CURRENT USE OF INSULIN (CMS/HCC): Primary | ICD-10-CM

## 2020-03-31 DIAGNOSIS — C79.9 METASTASIS FROM THYROID CANCER (CMS/HCC): ICD-10-CM

## 2020-03-31 DIAGNOSIS — N40.0 BENIGN PROSTATIC HYPERPLASIA WITHOUT LOWER URINARY TRACT SYMPTOMS: ICD-10-CM

## 2020-03-31 DIAGNOSIS — I10 ESSENTIAL HYPERTENSION: ICD-10-CM

## 2020-03-31 PROCEDURE — G2012 BRIEF CHECK IN BY MD/QHP: HCPCS | Performed by: INTERNAL MEDICINE

## 2020-03-31 RX ORDER — LIOTHYRONINE SODIUM 25 UG/1
25 TABLET ORAL DAILY
COMMUNITY
Start: 2020-02-21 | End: 2020-04-17

## 2020-03-31 NOTE — ASSESSMENT & PLAN NOTE
Follows with Dr. Ravi Pride  Status post recent biopsy, without cancer  We will follow-up with him for elevated PSA

## 2020-03-31 NOTE — ASSESSMENT & PLAN NOTE
I discussed with the patient and the initial treatment for diabetes including regular exercise, and weight loss.  It is important to treat diabetes to prevent  complications.  Diabetes could lead to heart disease, stroke, visual problems including blindness and kidney failure.  The patient was encouraged to see ophthalmology and podiatry at least on a yearly basis.

## 2020-03-31 NOTE — PROGRESS NOTES
Request for Consent:  You and I are about to have a telemedicine check-in or visit. This is allowed because you are already my patient, and you have requested it.  This telemedicine visit will be billed to your health insurance or you, if you are self-insured.  You understand you will be responsible for any copayments or coinsurances that apply to your telemedicine visit.  Before starting our telemedicine visit, I am required to get your consent for this virtual check-in or visit by telemedicine. Do you consent?      Patient Response to Request for Consent: Yes    The following have been reviewed and updated as appropriate in this visit:  Tobacco  Allergies  Meds  Med Hx  Surg Hx  Fam Hx  Soc Hx        Visit Documentation:    74-year-old, with BPH, metastatic thyroid cancer, diabetes and hyperlipidemia.  He is receiving I 131, and had pain afterwards and was given steroids which helped a great deal.  No focal neurologic symptoms.  We reviewed her recent blood sugars, and compliance with her medications.  I discussed the goals of treatment.  The patient should have annual retina screening, and podiatry care.  Treatment of diabetes would prevent complications like heart disease, peripheral arterial disease and diabetic complications.  Hyperlipidemia reviewed today and patient is compliant with healthy diet and exercise.  Discussed goals of treatment for hyperlipidemia to prevent complications.  The patient has no muscle pain, abdominal pain or other intolerances to medication.    Type 2 diabetes mellitus without complication (CMS/HCC)  I discussed with the patient and the initial treatment for diabetes including regular exercise, and weight loss.  It is important to treat diabetes to prevent  complications.  Diabetes could lead to heart disease, stroke, visual problems including blindness and kidney failure.  The patient was encouraged to see ophthalmology and podiatry at least on a yearly  basis.            Metastasis from thyroid cancer (CMS/HCC)  Keep follow-up with oncology and also neurosurgery  Received iodine 131    Essential hypertension  Blood pressure today is controlled, continue with the same medications.  No symptoms of chest pain, pressure, palpitations or shortness of breath.  The patient will continue with low-salt, with healthy diet and exercise.    BPH (benign prostatic hyperplasia)  Follows with Dr. Ravi Pride  Status post recent biopsy, without cancer  We will follow-up with him for elevated PSA            Time Spent in Medical Discussion During This Encounter:  15 minutes     Needs a follow-up in 6 months please call him

## 2020-03-31 NOTE — TELEPHONE ENCOUNTER
PT calling to give an update on his health.  Pt did not want to speak with me.     Please check portal and/or call pt.

## 2020-03-31 NOTE — TELEPHONE ENCOUNTER
Spoke with Marco A states he went through with the I31 treatment. He states he had a large dose and there were no side effects x 4 days. 4th day he developed severe pain like a sledge hammer was being slammed into her side. Pain lasted about 10 hours, outgoing call to Dr. Ramos's office and spoke to his RN. Rachel rx'd dexamethasone which patient has taken 2 tablets yesterday and 1 tablet today.     He would like to discuss with PCP further and has additional questions.     Upcoming appointment with Dr. Heller and Dr. Ramos this week.     Patient verbalized understanding and consent [facility] telemedicine, refuses facetime at this time.

## 2020-04-01 ENCOUNTER — TELEPHONE (OUTPATIENT)
Dept: RADIATION ONCOLOGY | Facility: HOSPITAL | Age: 75
End: 2020-04-01

## 2020-04-01 NOTE — TELEPHONE ENCOUNTER
Pt has several questions that he needs to discuss with Dr. [facility].  Will have Dr. [facility] notify him tomorrow upon his return.

## 2020-04-02 NOTE — TELEPHONE ENCOUNTER
Insert patient's questions.  Patient scheduled for I-131 whole-body scan tomorrow.  Plan telemedicine visit next week as per patient's wishes to discuss results and plan.

## 2020-04-03 ENCOUNTER — TELEPHONE (OUTPATIENT)
Dept: RADIATION ONCOLOGY | Facility: HOSPITAL | Age: 75
End: 2020-04-03

## 2020-04-03 ENCOUNTER — HOSPITAL ENCOUNTER (OUTPATIENT)
Dept: RADIOLOGY | Facility: HOSPITAL | Age: 75
Setting detail: NUCLEAR MEDICINE
Discharge: HOME | End: 2020-04-03
Attending: RADIOLOGY
Payer: MEDICARE

## 2020-04-03 DIAGNOSIS — C73 MALIGNANT NEOPLASM OF THYROID GLAND (CMS/HCC): ICD-10-CM

## 2020-04-03 PROCEDURE — 78018 THYROID MET IMAGING BODY: CPT

## 2020-04-03 NOTE — TELEPHONE ENCOUNTER
Patient called in stating that his total body scan was unable to be completed this morning as they told him there was uptake in his stomach. Patient reports confusion as to why. Upon discussion, patient took laxative and then had meal in evening night prior to test.     RN called Nuc Med and was told that patient had uptake in the stomach due to food.     RN called patient back and discussed that he may eat in early evening with small meal THEN take laxative and no more PO foods prior to test which has been rescheduled for 4/6/20 at 9:00 a.m. Patient verbalized understanding and states he knows it is important to get test done and done at facility that Dr. 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 RN assured that we will cross that bridge later if needed, follow recommendations and test should be able to be completed as scheduled.

## 2020-04-07 ENCOUNTER — DOCUMENTATION (OUTPATIENT)
Dept: RADIATION ONCOLOGY | Facility: HOSPITAL | Age: 75
End: 2020-04-07

## 2020-04-07 ENCOUNTER — HOSPITAL ENCOUNTER (OUTPATIENT)
Dept: RADIATION ONCOLOGY | Facility: HOSPITAL | Age: 75
Setting detail: RADIATION/ONCOLOGY SERIES
Discharge: HOME | End: 2020-04-07
Attending: RADIOLOGY
Payer: MEDICARE

## 2020-04-07 DIAGNOSIS — C79.51 BONE METASTASES: Primary | ICD-10-CM

## 2020-04-07 NOTE — PROGRESS NOTES
Request for Consent:  You and I are about to have a telemedicine check-in or visit. This is allowed because you are already my patient, and you have requested it.  This telemedicine visit will be billed to your health insurance or you, if you are self-insured.  You understand you will be responsible for any copayments or coinsurances that apply to your telemedicine visit.  Before starting our telemedicine visit, I am required to get your consent for this virtual check-in or visit by telemedicine. Do you consent?      Patient Response to Request for Consent: Yes    The following have been reviewed and updated as appropriate in this visit:         Visit Documentation:  Contacted the patient regarding tele-medicine visit.  Patient consented.  Patient claims back pain has improved.  Patient feeling well.  Body I-131 scan following treatment of 150 mCi shows minimal uptake in the thyroid bed otherwise no evidence of metastatic disease.  In reviewing the scan with radiology we saw some vague uptake in the area of the lumbar spine possibly representing known thyroid metastasis at L3 region.  No obvious shoulder uptake was noted.  Discussed also with patient.  At this point I am unsure whether patient's disease is amenable to I-131.  Certainly after treatment with I-131 150 mCi recently patient had severe back pain indicating likely effectiveness however scan results are lackluster.  Regardless I feel patient would benefit from external beam radiation therapy to symptomatic L3 metastases.  Patient return to clinic in a few weeks after COVID-19 slows down for CT simulation planning.  I plan on delivering external beam radiation therapy possibly using stereotactic body radiation therapy to L3 symptomatic metastasis.        Time Spent in Medical Discussion During This Encounter:  15 minutes

## 2020-04-08 ENCOUNTER — TELEMEDICINE (OUTPATIENT)
Dept: NEUROSURGERY | Facility: CLINIC | Age: 75
End: 2020-04-08
Payer: MEDICARE

## 2020-04-08 DIAGNOSIS — M54.16 LUMBAR RADICULOPATHY: Primary | ICD-10-CM

## 2020-04-08 PROCEDURE — 99999 PR OFFICE/OUTPT VISIT,PROCEDURE ONLY: CPT | Mod: 95 | Performed by: PHYSICIAN ASSISTANT

## 2020-04-08 NOTE — PROGRESS NOTES
Request for Consent:  You and I are about to have a telemedicine check-in or visit. This is allowed because you are already my patient, and you have requested it.  This telemedicine visit will be billed to your health insurance or you, if you are self-insured.  You understand you will be responsible for any copayments or coinsurances that apply to your telemedicine visit.  Before starting our telemedicine visit, I am required to get your consent for this virtual check-in or visit by telemedicine. Do you consent?      Patient Response to Request for Consent: Yes    The following have been reviewed and updated as appropriate in this visit:  Tobacco  Allergies  Meds  Med Hx  Surg Hx  Fam Hx  Soc Hx        Visit Documentation:  Matt Williamson is a pleasant 74 y.o. male called today by the neurosurgery service for follow up.  The patient initially presented for right leg pain which began without any injury or inciting event.  He underwent imaging which demonstrated an L4-5 disc herniation causing right L4 nerve root compression as well as a lesion in the vertebral body of L3. Testing revealed the lesion was thyroid cancer, and the patient has been undergoing radiation treatment. He is being followed by Dr. [facility] of radiation oncology. He reports that his right L4 radiculopathy has improved with injections. It only bothers him when he has an overactive day. He denies lower extremity weakness, paresthesias, gait instability, and bowel/bladder changes.    Mr. Williamson is managing his right L4 radiculopathy conservatively with injections, rest, and physical therapy. At this time the pain has improved and he is feeling well. He will continue with his thyroid cancer treatments, and call our office should his radiculopathy worsen or if he has new symptoms.    Time Spent in Medical Discussion During This Encounter:  10 minutes

## 2020-04-15 RX ORDER — EMPAGLIFLOZIN 10 MG/1
TABLET, FILM COATED ORAL
Qty: 90 TABLET | Refills: 0 | Status: SHIPPED | OUTPATIENT
Start: 2020-04-15 | End: 2020-09-09

## 2020-04-16 ENCOUNTER — TELEPHONE (OUTPATIENT)
Dept: RADIATION ONCOLOGY | Facility: HOSPITAL | Age: 75
End: 2020-04-16

## 2020-04-16 RX ORDER — DEXAMETHASONE 4 MG/1
4 TABLET ORAL 2 TIMES DAILY WITH MEALS
Qty: 60 TABLET | Refills: 1 | Status: SHIPPED | OUTPATIENT
Start: 2020-04-16 | End: 2020-06-08 | Stop reason: ALTCHOICE

## 2020-04-16 NOTE — PROGRESS NOTES
Patient called to say he is having increased pain and some weakness in his right leg.  Patient denies numbness or problems with urination.  Patient has a known metastasis to L3 status post I-131 treatment.  Was planning external beam radiation therapy in near future after COVID-19 worries less than for patient.  Patient said he recently ran out of Decadron a day or 2 ago and since has noted the symptoms.  Patient is currently [facility].  I emphasized to the patient he needs to restart Decadron which was reordered 4 mg twice a day and if no improvement or worsening he immediately had to go to the hospital.  I told patient I was worried about permanent paralysis problems if symptoms do not resolve.  I told patient we cannot wait for external beam radiation therapy and therefore have arranged CT simulation planning to take place shortly.  I emphasized to the patient the need to go the hospital however if no improvement in the next day.  I did offer him the opportunity of going to the hospital at this time but I told him I felt it was possible to delay until tomorrow pending response to steroids especially given the location of the tumor at L3 which should be below the spinal cord.  Previously I reviewed the whole-body I-131 scan with radiologist and there did appear to be some vague uptake at L3 region although not impressive.  Therefore I am afraid that I-131 treatment is not a very effective tool for this patient.  Patient has agreed to come tomorrow for CT simulation planning with Dr. Diggs.

## 2020-04-16 NOTE — TELEPHONE ENCOUNTER
"Pt called stating he is having right sided hip down the leg pain.  Marco A explained \"It may have been from a heavy fence that fell down\".  Asked pt if he was still on decadron - Marco A responded he completed yesterday.  Pt was not tapered down.  Last Decadron dose 4mg BID.  Will make Dr. [facility] aware to call as soon as possible.  "

## 2020-04-16 NOTE — TELEPHONE ENCOUNTER
Called pt back as per Dr. [facility].  Doctor would prefer doing treatment planning to L3 .  Pt given day/ time options.  He would prefer to come in tomorrow with Dr. Diggs covering - at 10:15 am.  Dr. [facility] and team made aware.  Pt verbalizes understanding of decadron use , and need to start today.  Dr. [facility] will communicate with Dr. Diggs regarding.

## 2020-04-17 ENCOUNTER — HOSPITAL ENCOUNTER (OUTPATIENT)
Dept: RADIATION ONCOLOGY | Facility: HOSPITAL | Age: 75
Setting detail: RADIATION/ONCOLOGY SERIES
Discharge: HOME | End: 2020-04-17
Attending: RADIOLOGY
Payer: MEDICARE

## 2020-04-17 ENCOUNTER — APPOINTMENT (OUTPATIENT)
Dept: LAB | Facility: HOSPITAL | Age: 75
End: 2020-04-17
Attending: RADIOLOGY
Payer: MEDICARE

## 2020-04-17 VITALS
SYSTOLIC BLOOD PRESSURE: 137 MMHG | TEMPERATURE: 98.4 F | BODY MASS INDEX: 25.49 KG/M2 | DIASTOLIC BLOOD PRESSURE: 91 MMHG | WEIGHT: 153.2 LBS | HEART RATE: 88 BPM

## 2020-04-17 DIAGNOSIS — C73 METASTASIS FROM THYROID CANCER (CMS/HCC): Primary | ICD-10-CM

## 2020-04-17 DIAGNOSIS — C80.1 CANCER (CMS/HCC): ICD-10-CM

## 2020-04-17 DIAGNOSIS — C79.9 METASTASIS FROM THYROID CANCER (CMS/HCC): Primary | ICD-10-CM

## 2020-04-17 LAB
BUN SERPL-MCNC: 24 MG/DL (ref 8–20)
CREAT SERPL-MCNC: 1.1 MG/DL (ref 0.8–1.3)
GFR SERPL CREATININE-BSD FRML MDRD: >60 ML/MIN/1.73M*2

## 2020-04-17 PROCEDURE — 82565 ASSAY OF CREATININE: CPT | Performed by: RADIOLOGY

## 2020-04-17 PROCEDURE — 36415 COLL VENOUS BLD VENIPUNCTURE: CPT | Performed by: RADIOLOGY

## 2020-04-17 PROCEDURE — 84520 ASSAY OF UREA NITROGEN: CPT | Performed by: RADIOLOGY

## 2020-04-17 PROCEDURE — 77332 RADIATION TREATMENT AID(S): CPT | Performed by: RADIOLOGY

## 2020-04-17 RX ORDER — RIVAROXABAN 20 MG/1
TABLET, FILM COATED ORAL
COMMUNITY
Start: 2020-04-15 | End: 2020-06-08 | Stop reason: ALTCHOICE

## 2020-04-17 SDOH — ECONOMIC STABILITY: FOOD INSECURITY: WITHIN THE PAST 12 MONTHS, THE FOOD YOU BOUGHT JUST DIDN'T LAST AND YOU DIDN'T HAVE MONEY TO GET MORE.: PATIENT DECLINED

## 2020-04-17 SDOH — ECONOMIC STABILITY: FOOD INSECURITY
WITHIN THE PAST 12 MONTHS, YOU WORRIED THAT YOUR FOOD WOULD RUN OUT BEFORE YOU GOT THE MONEY TO BUY MORE.: PATIENT DECLINED

## 2020-04-17 SDOH — ECONOMIC STABILITY: TRANSPORTATION INSECURITY
IN THE PAST 12 MONTHS, HAS LACK OF TRANSPORTATION KEPT YOU FROM MEDICAL APPOINTMENTS OR FROM GETTING MEDICATIONS?: PATIENT DECLINED

## 2020-04-17 ASSESSMENT — PAIN SCALES - GENERAL: PAINLEVEL: 0-NO PAIN

## 2020-04-17 ASSESSMENT — ENCOUNTER SYMPTOMS
FEVER: 0
DIFFICULTY URINATING: 1
BACK PAIN: 1
MYALGIAS: 1
UNEXPECTED WEIGHT CHANGE: 1
ARTHRALGIAS: 1
GASTROINTESTINAL NEGATIVE: 1
EYES NEGATIVE: 1
NEUROLOGICAL NEGATIVE: 1
FATIGUE: 0
RESPIRATORY NEGATIVE: 1
APPETITE CHANGE: 0
PSYCHIATRIC NEGATIVE: 1
CARDIOVASCULAR NEGATIVE: 1

## 2020-04-17 ASSESSMENT — ACTIVITIES OF DAILY LIVING (ADL): LACK_OF_TRANSPORTATION: PATIENT DECLINED

## 2020-04-17 NOTE — PROGRESS NOTES
"Review of Systems   Constitutional: Positive for unexpected weight change. Negative for appetite change, fatigue and fever.   HENT:   Positive for nosebleeds (on xarelto and asa).    Eyes: Negative.    Respiratory: Negative.    Cardiovascular: Negative.  Palpitations: cardiac stent/ afib.   Gastrointestinal: Negative.    Genitourinary: Positive for difficulty urinating (Pt states \" I was barely going, but now I am\").    Musculoskeletal: Positive for arthralgias ( right LE/  states Left LE but from compressed disc), back pain ( pt states from compressed disc) and myalgias.   Skin: Negative.    Neurological: Negative.    Psychiatric/Behavioral: Negative.    Pt will continue on Decadron.  He is aware not to stop dose without Doctor's approval.  Await MRI results on Tuesday.  "

## 2020-04-17 NOTE — PROGRESS NOTES
Patient ID:  Matt Williamson is a 74 y.o. male.  1945    Referring Physician:   No referring provider defined for this encounter.    Primary Care Provider:  Raj Arreola DO    Problem List:  Patient Active Problem List    Diagnosis Date Noted   • Bone metastases (CMS/HCC) 01/27/2020   • Atrial fibrillation with rapid ventricular response (CMS/HCC) 10/25/2019   • PSA elevation 11/04/2018   • Former tobacco use 05/02/2018   • Vertigo 05/02/2018   • Essential hypertension 06/20/2016   • Type 2 diabetes mellitus without complication (CMS/HCC) 06/20/2016   • Epigastric abdominal pain 02/18/2016   • Mixed hyperlipidemia 02/18/2016   • Cataract 09/11/2013   • BPH (benign prostatic hyperplasia) 09/14/2012   • Atherosclerosis of coronary artery 09/14/2012   • Metastasis from thyroid cancer (CMS/HCC) 09/14/2012   • Cyst of thyroid 01/15/2007   • Esophageal reflux 01/15/2007        Cancer Staging  Metastasis from thyroid cancer (CMS/[facility])  Staging form: Thyroid - Differentiated, AJCC 8th Edition  - Pathologic stage from 2/28/2012: Stage II (pT3a, pN0a, cM0, Age at diagnosis: >= 55 years) - Signed by Stacie Casey MD on 1/27/2020      Subjective    History of Present Illness:    I was asked to evaluate this patient by my partner Dr. [facility].   He is diagnosed with metastatic thyroid carcinoma with involvement of L3 (biopsy proven).  3 weeks ago, he received 150 mCi of I-131 and tolerated it well without complications.  He was on Decadron 4 mg BID x 20 days and ran out 4 days ago.  He immediately felt weakness in his legs.  During a telemedicine visit yesterday with Dr. [facility], he reported weakness in his legs.  Patient was instructed to come in to office today to rule out acute neurologic deficit.  In the meantime, he was given another prescription of steroids and resumed taking them last night.  He felt immediate resolution of leg weakness.  He denies any low back pain.     Review of Systems - Oncology       "Constitutional: Positive for unexpected weight change. Negative for appetite change, fatigue and fever.   HENT:   Positive for nosebleeds (on xarelto and asa).    Eyes: Negative.    Respiratory: Negative.    Cardiovascular: Negative.  Palpitations: cardiac stent/ afib.   Gastrointestinal: Negative.    Genitourinary: Positive for difficulty urinating (Pt states \" I was barely going, but now I am\").    Musculoskeletal: Positive for arthralgias ( right LE/  states Left LE but from compressed disc), back pain ( pt states from compressed disc) and myalgias.   Skin: Negative.    Neurological: Negative.    Psychiatric/Behavioral: Negative.     Objective      Physical Exam:  Vital Signs for this encounter:  BP: 137/91  Temp: 36.9 °C (98.4 °F)  Heart Rate: 88  Weight: 69.5 kg (153 lb 3.2 oz) (04/17 1142)    Physical Exam   Constitutional: He is oriented to person, place, and time. He appears well-developed and well-nourished.   Neck: Normal range of motion. Neck supple.   Cardiovascular: Normal rate, regular rhythm and normal heart sounds.   Pulmonary/Chest: Effort normal and breath sounds normal.   Abdominal: Soft. Bowel sounds are normal.   Neurological: He is alert and oriented to person, place, and time. No cranial nerve deficit or sensory deficit. He exhibits normal muscle tone.   Was able to do 2 body weight squats without difficulty.     Performance Status: KPS 90     PAIN ASSESSMENT:  Score Zero    Location    Pain Intervention      Assessment/Plan   Metastatic thyroid cancer with biopsy proven L3 lesion s/p 150 mCi I-131  1) As instructed by Dr. [facility], proceed with CT simulation of lumbar spine in anticipation of palliative RT.  2) MRI lumbar spine to assess extent of disease at L3.  Will use MRI for target delineation.  3) benefits and risks reviewed with patient and written informed consent obtained.  4) Dr. [facility] to follow up on MRI and RT planning.  Diagnoses and Orders Associated With This " Visit:  Metastasis from thyroid cancer (CMS/HCC) (Primary)  -     MRI LUMBAR SPINE WITH AND WITHOUT CONTRAST  -     BUN  -     Creatinine, serum    Cancer (CMS/HCC)  -     Rad Onc Treatment Planning CT Simulation

## 2020-04-17 NOTE — LETTER
February 26, 2021                                                          Patient: Matt Williamson   YOB: 1945   Date of Visit: 4/17/2020       Dear Dr. Arreola:    The patient is seen back at the [facility] today for a follow up with regard to his No diagnosis found.. Attached is my assessment and plan of care.       Sincerely,        Gregory J. O[facility]ner, MD      CC: No Recipients

## 2020-04-20 ENCOUNTER — TELEPHONE (OUTPATIENT)
Dept: CARDIOLOGY | Facility: CLINIC | Age: 75
End: 2020-04-20

## 2020-04-20 NOTE — TELEPHONE ENCOUNTER
8 years after being diagnosed with thyroid cancer, they found a lesion on his L5. They say its nothing but they have been giving him iodine treatments and they will start radiation this week or next.     They recently but him back on synthroid 150 mcg. All other meds in chart are correct. Also recently put on Decadron due to flare up which effected his walking.     Reason he called - He has been having nose bleeds since last week. Seems to be after he eats, but at first it was red and now its brown. Seems to dried up blood but its very bothersome. Wants to know if he has to continue on Xarelto or if we can lower the dose?  Please advise.

## 2020-04-20 NOTE — TELEPHONE ENCOUNTER
Spoke with Mr. Williamson. He verbalzied understanding. Said he has lost a lot of weight (went from 175 - 152lbs) He really feels good. He will give us a call on Thursday

## 2020-04-20 NOTE — TELEPHONE ENCOUNTER
Instruct the patient to hold his Xarelto for 3 days and to call the office at that point.  We will likely restart Xarelto if his nosebleeds stop.

## 2020-04-21 ENCOUNTER — HOSPITAL ENCOUNTER (OUTPATIENT)
Dept: RADIOLOGY | Facility: HOSPITAL | Age: 75
Discharge: HOME | End: 2020-04-21
Attending: RADIOLOGY
Payer: MEDICARE

## 2020-04-21 DIAGNOSIS — C73 METASTASIS FROM THYROID CANCER (CMS/HCC): ICD-10-CM

## 2020-04-21 DIAGNOSIS — C79.9 METASTASIS FROM THYROID CANCER (CMS/HCC): ICD-10-CM

## 2020-04-21 RX ORDER — GADOBUTROL 604.72 MG/ML
7 INJECTION INTRAVENOUS ONCE
Status: COMPLETED | OUTPATIENT
Start: 2020-04-21 | End: 2020-04-21

## 2020-04-21 RX ADMIN — GADOBUTROL 7 ML: 604.72 INJECTION INTRAVENOUS at 11:30

## 2020-04-22 PROCEDURE — 77300 RADIATION THERAPY DOSE PLAN: CPT | Performed by: RADIOLOGY

## 2020-04-22 PROCEDURE — 77338 DESIGN MLC DEVICE FOR IMRT: CPT | Performed by: RADIOLOGY

## 2020-04-22 PROCEDURE — 77301 RADIOTHERAPY DOSE PLAN IMRT: CPT | Performed by: RADIOLOGY

## 2020-04-23 ENCOUNTER — HOSPITAL ENCOUNTER (OUTPATIENT)
Dept: RADIATION ONCOLOGY | Facility: HOSPITAL | Age: 75
Setting detail: RADIATION/ONCOLOGY SERIES
Discharge: HOME | End: 2020-04-23
Attending: RADIOLOGY
Payer: MEDICARE

## 2020-04-23 DIAGNOSIS — C79.51 BONE METASTASES: Primary | ICD-10-CM

## 2020-04-23 LAB
ARIA ZRC COURSE ID: NORMAL
ARIA ZRC COURSE INTENT: NORMAL
ARIA ZRC COURSE START DATE: NORMAL
ARIA ZRC TREATMENT ELAPSED DAYS: NORMAL
ARIA ZRP FRACTIONS TREATED TO DATE: NORMAL
ARIA ZRP PLAN ID: NORMAL
ARIA ZRP PRESCRIBED DOSE CGY: 3500
ARIA ZRP PRESCRIBED DOSE PER FRACTION: 7
ARIA ZRR REFERENCE POINT ID: NORMAL
ARIA ZRR REFERENCE POINT ID: NORMAL
MLH ARIA ZRC TREATMENT DATES: NORMAL

## 2020-04-23 PROCEDURE — 77373 STRTCTC BDY RAD THER TX DLVR: CPT | Performed by: RADIOLOGY

## 2020-04-23 NOTE — PROGRESS NOTES
Patient presents for his first of 5 stereotactic body radiation therapy treatments to his symptomatic growing metastasis at L3 region.  I presented the patient to today's tumor conference and there is general agreement with treatment plan.  They did recommend tissue get sent for metabolic testing and therefore pathology will discuss with Dr. Casey.  Patient was set up in treatment position using foot block.  Using an onboard imager initially using KV bony match followed by cone beam and port films.  Cone beam was done for localization purposes.  Patient then received his first treatment of 700 centigrade without incident.  Patient scheduled received total dose of 3500 cGy in 5 fractions over 2-1/2 weeks to symptomatic L3 tumor.  During review of case mild uptake in the area of the right scapula was noted on PET scan but CT slices were not available.  Patient again does not have pain to palpation of bilateral scapulas.  No other evidence of disease is noted.

## 2020-04-24 NOTE — TELEPHONE ENCOUNTER
Spoke with  Clay. He said he stop the Xarelto Tuesday Weds and Thursday. He said he feels himself drying up a little bit but knows if he started taking the Xarelto full strength, he is going to start bleeding again.     He asked if he can stay off of it or just take a half a dose if he really needs to?    Also wanted me to mention his Bps have been perfect. This morning it was 123/69     Please advise.

## 2020-04-28 ENCOUNTER — HOSPITAL ENCOUNTER (OUTPATIENT)
Dept: RADIATION ONCOLOGY | Facility: HOSPITAL | Age: 75
Setting detail: RADIATION/ONCOLOGY SERIES
Discharge: HOME | End: 2020-04-28
Attending: RADIOLOGY
Payer: MEDICARE

## 2020-04-28 ENCOUNTER — RAD ONC OTV (OUTPATIENT)
Dept: RADIATION ONCOLOGY | Facility: HOSPITAL | Age: 75
End: 2020-04-28

## 2020-04-28 VITALS
WEIGHT: 150 LBS | DIASTOLIC BLOOD PRESSURE: 84 MMHG | HEART RATE: 74 BPM | BODY MASS INDEX: 24.21 KG/M2 | SYSTOLIC BLOOD PRESSURE: 125 MMHG | OXYGEN SATURATION: 98 % | TEMPERATURE: 98.3 F

## 2020-04-28 PROCEDURE — 77373 STRTCTC BDY RAD THER TX DLVR: CPT | Performed by: RADIOLOGY

## 2020-04-28 ASSESSMENT — PAIN SCALES - GENERAL: PAINLEVEL: 0-NO PAIN

## 2020-04-28 ASSESSMENT — ENCOUNTER SYMPTOMS
RESPIRATORY NEGATIVE: 1
PSYCHIATRIC NEGATIVE: 1
GASTROINTESTINAL NEGATIVE: 1
FATIGUE: 1
EYES NEGATIVE: 1
CARDIOVASCULAR NEGATIVE: 1
ALLERGIC/IMMUNOLOGIC NEGATIVE: 1
ENDOCRINE NEGATIVE: 1
WEAKNESS: 1
HEMATOLOGIC/LYMPHATIC NEGATIVE: 1

## 2020-04-28 NOTE — PROGRESS NOTES
Subjective      Patient ID: Matt Williamson is a 74 y.o. male.  1945      HPI patient presents noting some proximal leg weakness which is new bilaterally but is otherwise without complaints.  Patient says his back pain is controlled with right-sided back pain lumbar region not really bothering him and sciatic pain on left side unchanged.  Patient equates 2 separate pains sciatic pain came first and I told patient it may be from the same cause.  Patient denies any side effects from treatment thus far.    The following have been reviewed and updated as appropriate in this visit:       Review of Systems   Constitutional: Positive for fatigue.   HENT: Negative.    Eyes: Negative.    Respiratory: Negative.    Cardiovascular: Negative.    Gastrointestinal: Negative.    Endocrine: Negative.    Genitourinary: Negative.    Musculoskeletal:        Leg weakness    Skin: Negative.    Allergic/Immunologic: Negative.    Neurological: Positive for weakness.   Hematological: Negative.    Psychiatric/Behavioral: Negative.        Objective     Vitals:    04/28/20 1128   BP: 125/84   BP Location: Left upper arm   Patient Position: Sitting   Pulse: 98   Temp: 36.8 °C (98.3 °F)   SpO2: (!) 74%   Weight: 68 kg (150 lb)     Body mass index is 24.21 kg/m².    Physical Exam vital signs stable.  Exam limited secondary to infectious precautions.  Motor and sensory grossly intact.  Leg strength 4/5 no focal deficits noted.    Assessment/Plan Patient was set up in treatment position using foot block.  Using an onboard imager initially using KV bony match followed by cone beam for localization purposes.  Port films were done for confirmation purposes.  Patient was then treated using 6 fff photon beam using rapid arc technique.  Patient received another 700 centigrade without incident.  Thus far he has received 1400 cGy of his planned 3500 cGy course.  Treatment was without incident.  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  I told patient I am hoping both his right-sided low back pain as well as sciatica on the left side improve after treatments.  Patient's case was discussed in tumor conference there was general approval with plan treatment.

## 2020-04-30 ENCOUNTER — HOSPITAL ENCOUNTER (OUTPATIENT)
Dept: RADIATION ONCOLOGY | Facility: HOSPITAL | Age: 75
Setting detail: RADIATION/ONCOLOGY SERIES
Discharge: HOME | End: 2020-04-30
Attending: RADIOLOGY
Payer: MEDICARE

## 2020-04-30 DIAGNOSIS — C73 METASTASIS FROM THYROID CANCER (CMS/HCC): ICD-10-CM

## 2020-04-30 DIAGNOSIS — C79.51 BONE METASTASES: Primary | ICD-10-CM

## 2020-04-30 DIAGNOSIS — C79.9 METASTASIS FROM THYROID CANCER (CMS/HCC): ICD-10-CM

## 2020-04-30 PROCEDURE — 77373 STRTCTC BDY RAD THER TX DLVR: CPT | Performed by: RADIOLOGY

## 2020-04-30 NOTE — PROGRESS NOTES
Patient presents for his third of planned 5 stereotactic body radiation therapy treatments directed to his L3 region.  Patient was set up in treatment position using foot block.  Using an onboard imager initially using KV bony match followed by cone beam for localization.  Following this port film was taken for confirmation purposes and patient received his third treatment of 700 cGy using 6FFF photon beam using rapid arc technique.  Treatment without incident.  Thus far the patient has received 2100 cGy of his planned 3500 cGy course.  Patient to return next week for his final 2 treatments.  Addendum: Patient incidentally complains of increasing right shoulder pain.  Prior PET CT scan done outside indicated sclerotic lesion right scapula however recent nuclear scans failed to show uptake.  I have gone ahead and ordered an MRI of the right shoulder without gadolinium for further assessment purposes.

## 2020-05-01 ENCOUNTER — HOSPITAL ENCOUNTER (OUTPATIENT)
Dept: RADIATION ONCOLOGY | Facility: HOSPITAL | Age: 75
Setting detail: RADIATION/ONCOLOGY SERIES
Discharge: HOME | End: 2020-05-01
Attending: RADIOLOGY
Payer: MEDICARE

## 2020-05-01 PROCEDURE — 77336 RADIATION PHYSICS CONSULT: CPT | Performed by: RADIOLOGY

## 2020-05-05 ENCOUNTER — RAD ONC OTV (OUTPATIENT)
Dept: RADIATION ONCOLOGY | Facility: HOSPITAL | Age: 75
End: 2020-05-05

## 2020-05-05 ENCOUNTER — HOSPITAL ENCOUNTER (OUTPATIENT)
Dept: RADIATION ONCOLOGY | Facility: HOSPITAL | Age: 75
Setting detail: RADIATION/ONCOLOGY SERIES
Discharge: HOME | End: 2020-05-05
Attending: RADIOLOGY
Payer: MEDICARE

## 2020-05-05 ENCOUNTER — HOSPITAL ENCOUNTER (OUTPATIENT)
Dept: RADIOLOGY | Facility: HOSPITAL | Age: 75
Discharge: HOME | End: 2020-05-05
Attending: RADIOLOGY
Payer: MEDICARE

## 2020-05-05 VITALS
SYSTOLIC BLOOD PRESSURE: 139 MMHG | WEIGHT: 146 LBS | TEMPERATURE: 97.5 F | BODY MASS INDEX: 23.57 KG/M2 | HEART RATE: 74 BPM | DIASTOLIC BLOOD PRESSURE: 80 MMHG

## 2020-05-05 DIAGNOSIS — C73 METASTASIS FROM THYROID CANCER (CMS/HCC): Primary | ICD-10-CM

## 2020-05-05 DIAGNOSIS — C79.9 METASTASIS FROM THYROID CANCER (CMS/HCC): Primary | ICD-10-CM

## 2020-05-05 DIAGNOSIS — C80.1 CANCER (CMS/HCC): ICD-10-CM

## 2020-05-05 DIAGNOSIS — C79.51 BONE METASTASES: ICD-10-CM

## 2020-05-05 PROCEDURE — 77373 STRTCTC BDY RAD THER TX DLVR: CPT | Performed by: RADIOLOGY

## 2020-05-05 ASSESSMENT — ENCOUNTER SYMPTOMS
LIGHT-HEADEDNESS: 1
BACK PAIN: 1
ACTIVITY CHANGE: 1
OCCASIONAL FEELINGS OF UNSTEADINESS: 1
WEAKNESS: 1
DIFFICULTY URINATING: 0
APPETITE CHANGE: 1
DIZZINESS: 1
HEADACHES: 0
PSYCHIATRIC NEGATIVE: 1
FATIGUE: 1
FREQUENCY: 1
NUMBNESS: 0
UNEXPECTED WEIGHT CHANGE: 1
FEVER: 0

## 2020-05-05 ASSESSMENT — PAIN SCALES - GENERAL: PAINLEVEL: 5

## 2020-05-05 NOTE — PROGRESS NOTES
Patient was set up in treatment position using foot block.  Patient lying position.  Using an onboard imager initially using KV bony match followed by cone beam for localization purposes.  Following this port films were taken for confirmation purposes.  Cone beam showed no evidence of progression of tumor size actually showed slight decrease to my eye.  Patient was then treated using 6FFF time beam using rapid arc technique.  Patient received another 700 centigrade without incident.  Thus far he has received 2800 centigrade of his planned 3500 cGy course.  Patient return Thursday for final treatment.  Will reassess patient at that time.  The note from today.

## 2020-05-05 NOTE — PROGRESS NOTES
"Subjective      Patient ID: Matt Williamson is a 74 y.o. male.  1945      HPI patient complains of increasing proximal leg weakness with some balance issues.  Slight visual disturbance and urine frequency.  Right-sided back pains gone continues to have left-sided sciatica which is longstanding.  Patient denies numbness.    The following have been reviewed and updated as appropriate in this visit:       Review of Systems   Constitutional: Positive for activity change (balance has changed), appetite change (\"eating alot\"), fatigue and unexpected weight change ( 4 lbs). Negative for fever.   Eyes: Positive for visual disturbance (blurry at times).   Genitourinary: Positive for frequency (every one hour  with incontinence at times). Negative for difficulty urinating.   Musculoskeletal: Positive for back pain (left lower 5/10/  Right 0/10 pain) and gait problem.   Skin: Negative.    Neurological: Positive for dizziness, weakness (LE/ especially going up steps) and light-headedness. Negative for numbness and headaches.   Psychiatric/Behavioral: Negative.    Pt had MRI today with to Right shoulder.  Remains on decadron 4 mg BID, however lower ext weakness and difficulty with balance.  Pt states his eyes have been blurry. Please note pt is a diabetic.    Objective     Vitals:    05/05/20 1318   BP: 139/80   Patient Position: Sitting   Pulse: 74   Temp: 36.4 °C (97.5 °F)   Weight: 66.2 kg (146 lb)     Body mass index is 23.57 kg/m².    Physical Exam proximal leg weakness 3/5 bilateral.  Otherwise no focal deficits noted.  Cranial nerves grossly intact.  Exam limited secondary infectious precautions.    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  Patient also with other symptoms possibly secondary to aggravation or worsening of his diabetes from steroids.  Patient had MRI right shoulder done today results pending.  Patient continues to complain of right shoulder pain and exam " reveals pain at the humeral head region.  Nontender to palpation of the scapula.  Patient instructed initiation of steroid taper today and discussed with patient regarding using isometrics and being very careful and watching his diet.  Patient is down about 4 pounds from last week but he says he is eating a lot.  Continue radiation therapy to L3 region to total dose 3500 cGy we reevaluate on Thursday.

## 2020-05-05 NOTE — LETTER
February 26, 2021                                                          Patient: Matt Williamson   YOB: 1945   Date of Visit: 5/5/2020       Dear Dr. Arreola:    The patient is seen back at the [facility] today for a follow up with regard to his No diagnosis found.. Attached is my assessment and plan of care.       Sincerely,        Gregory J. [facility]ner, MD      CC: No Recipients

## 2020-05-06 NOTE — PROGRESS NOTES
Cardiology  TeleMedicine/Virtual Office Visit   Progress Note         The patient verbally confirmed their full name and date of birth.  In addition, they confirmed that there were no additional people present in the room with them during this virtual office visit.    You and I are about to have an audio telemedicine, or virtual office visit, encounter. This is allowed because you have requested it. This telemedicine visit will be billed to your health insurance or you, if you are self-insured. You understand you will be responsible for any copayments or coinsurances that apply to your telemedicine visit. Before starting our telemedicine visit, I am required to get your consent for this virtual visit by telemedicine. Do you consent?     Patient's response: Yes    Reason for visit:   Chief Complaint   Patient presents with   • Follow-up       HPI     Matt Williamson is a 74 y.o. male who has been contacted for a telemedicine, or virtual office visit, encounter for cardiovascular follow up and management of PAF, coronary artery disease s/p MARGOT, hypertension and dyslipidemia.    He was last seen in the office on 11/4/19. At that visit, we discussed his recent hospitalization at [facility] related to Afib. He converted to NSR prior to discharge but stated that his HR didn’t feel back to his baseline until later. His echocardiogram during that visit showed normal left ventricular function with an EF = 62% and normal pulmonary pressures.     He was maintaining sinus rhythm during his visit. 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 I recommended that he continue his anticoagulation and gave him a prescription for pro-path unknown 150 mg to be taken as needed for recurrent atrial fibrillation. I made no additional changes to his current regimen.     He called the office on 4/20 reporting persistent nose bleeds. He was instructed to hold his  Xarelto over the weekend and resume his usual dose on Monday 4/27/20.     Today he reports feeling well from a cardiac standpoint. His nose bleeds have continued to occur intermittently. He has been taking his Xarelto on a regular basis. He has been tolerating his additional medications without any adverse reactions. He denies any chest pain, shortness of breath, palpitations, dizziness or syncopal events.     He is currently receiving radiation therapy related to his thyroid cancer which has metastasized to his spine. He is also taking Decadron 4 mg BID related to this issue and says that his exercise capacity has been pretty limited since starting treatments. His blood sugars have been considerably high since starting Decadron.       Past Medical History:   Diagnosis Date   • Abdominal hernia    • Abnormal ECG 10/19   • Atherosclerosis of coronary artery 9/14/2012    Overview:    • Atrial fibrillation (CMS/HCC)    • Atrial fibrillation with rapid ventricular response (CMS/HCC) 10/25/2019    Hospitalized on 10/25/19 at [facility].    • BPH (benign prostatic hyperplasia) 9/14/2012    Overview:    • Cancer (CMS/HCC) Thyroid   • Cataract 9/11/2013    Overview:    • Cyst of thyroid 1/15/2007   • Epigastric abdominal pain 2/18/2016    Overview:    • Essential hypertension 6/20/2016    Overview:    • Former tobacco use 5/2/2018    Overview:    • History of radiation therapy     I-131   • Mixed hyperlipidemia 2/18/2016    Overview:    • Osteomyelitis of foot (CMS/HCC) 2009    Surgery.   • PSA elevation 11/4/2018   • Thyroid cancer (CMS/HCC) 9/14/2012    Overview:    • Type 2 diabetes mellitus without complication (CMS/HCC) 6/20/2016    Overview:    • Vertigo 5/2/2018       Past Surgical History:   Procedure Laterality Date   • ANAL FISSURECTOMY  1986   • CARDIAC CATHETERIZATION  11/21/2005    LM FOD. 70-80% proximal LAD.Mild disease CX, RCA and branches.    • CARDIAC CATHETERIZATION  11/28/2005    LM  FOD. Pristine LAD stent. CX FOD. RCA mild disease.   • CARDIAC SURGERY  Stent in lad   • CORONARY ANGIOPLASTY WITH STENT PLACEMENT  2005    LM FOD. Moderate, diffuse disease LAD. Irregular CX w/ PLVB distal to PDA 99%. Mild disease RCA. Stent to distal LAD.   • EYE SURGERY  Cataract   • FOOT SURGERY      For osteomyelitis.   • GASTROCUTANEOUS FISTULA CLOSURE     • THYROIDECTOMY     • TONSILLECTOMY  No       Social History     Tobacco Use   • Smoking status: Former Smoker     Packs/day: 2.00     Years: 20.00     Pack years: 40.00     Types: Cigarettes     Last attempt to quit: 1980     Years since quittin.3   • Smokeless tobacco: Never Used   Substance Use Topics   • Alcohol use: Yes     Comment: Social   • Drug use: Never       Family History   Problem Relation Age of Onset   • Cancer Biological Mother    • Breast cancer Biological Mother    • Emphysema Biological Father    • Lung cancer Biological Father        Allergies:  Penicillins    Current Outpatient Medications   Medication Sig Dispense Refill   • aspirin 81 mg enteric coated tablet Take 81 mg by mouth daily.     • dexAMETHasone (DECADRON) 4 mg tablet Take 1 tablet (4 mg total) by mouth 2 (two) times a day with meals. (Patient taking differently: Take 4 mg by mouth daily with breakfast.  ) 60 tablet 1   • dulaglutide 0.75 mg/0.5 mL pen injector Inject 0.5 mL (0.75 mg total) under the skin once a week. 4 pen 0   • JARDIANCE 10 mg tablet TAKE 1 TABLET(10 MG) BY MOUTH DAILY 90 tablet 0   • metFORMIN (GLUCOPHAGE) 500 mg tablet TAKE 1 TABLET(500 MG) BY MOUTH FOUR TIMES DAILY 360 tablet 0   • metoprolol succinate XL (TOPROL-XL) 100 mg 24 hr tablet Take 1 tablet (100 mg total) by mouth 2 (two) times a day. 180 tablet 3   • pantoprazole (PROTONIX) 40 mg EC tablet TAKE 1 TABLET(40 MG) BY MOUTH DAILY 90 tablet 0   • pravastatin (PRAVACHOL) 40 mg tablet Take 1 tablet (40 mg total) by mouth daily. 90 tablet 3   • XARELTO 20 mg tablet      •  levothyroxine (SYNTHROID) 150 mcg tablet Take 1 tablet (150 mcg total) by mouth daily. 90 tablet 0     No current facility-administered medications for this visit.        Review of Systems   Constitution: Positive for weight loss. Negative for decreased appetite, diaphoresis, fever and weight gain.        Pt reports 20 lb weight loss since 2/20   HENT: Positive for nosebleeds.    Eyes: Negative for visual disturbance.   Cardiovascular: Negative for chest pain, dyspnea on exertion, irregular heartbeat, leg swelling, near-syncope, orthopnea, palpitations, paroxysmal nocturnal dyspnea and syncope.   Respiratory: Negative for cough, shortness of breath and snoring.    Hematologic/Lymphatic: Bruises/bleeds easily.   Skin: Negative for rash.   Musculoskeletal: Positive for back pain, muscle weakness and stiffness. Negative for myalgias.        RLE sciatica   Gastrointestinal: Negative for heartburn, hematemesis, hematochezia and melena.   Genitourinary: Negative for hematuria and nocturia.   Neurological: Negative for dizziness and light-headedness.   Psychiatric/Behavioral: Negative for altered mental status. The patient does not have insomnia.        Hematology  Lab Results   Component Value Date    WBC 10.13 01/17/2020    HGB 14.1 01/17/2020    HCT 43.0 01/17/2020     01/17/2020    INR 1.0 01/17/2020       Chemistries  Lab Results   Component Value Date     01/29/2020    K 4.6 01/29/2020     01/29/2020    CREATININE 1.1 04/17/2020    BUN 24 (H) 04/17/2020    CO2 27 01/29/2020    GLUCOSE 121 (H) 01/29/2020    CALCIUM 8.6 01/29/2020    MG 2.0 01/14/2016    ALT 18 01/29/2020    AST 14 01/29/2020       Cholesterol  Lab Results   Component Value Date    CHOL 129 01/29/2020    TRIG 69 01/29/2020    HDL 47 01/29/2020    LDLCALC 67 01/29/2020    NONHDLCALC 103 05/02/2018       Endocrine  Lab Results   Component Value Date    TSH 0.32 (L) 01/29/2020    FREET4 1.36 05/02/2018    HGBA1C 7.1 (H) 01/29/2020        Cardiac Imaging   ECHOCARDIOGRAM - EXTERNAL RESULT        Assessment/Plan     Atrial fibrillation with rapid ventricular response (CMS/HCC)  - No recent palps  - Recurrent nosebleeds  - Plan is to hold Xarelto and to have Pt wear a monitor for 2 weeks. Can decide about restarting  Xarelto then    Atherosclerosis of coronary artery  - No CP  - same Tx for now    Essential hypertension  - Tolerating meds  - Same Tx for now    Mixed hyperlipidemia  - No change in Tx today    Metastasis from thyroid cancer (CMS/HCC)  - Undergoing Tx  - Feels weak from Tx    Type 2 diabetes mellitus without complication (CMS/HCC)  - Per PCP      No orders of the defined types were placed in this encounter.      There are no discontinued medications.    Orders Placed This Encounter   Procedures   • Mobile Cardiovascular Monitor     Order Specific Question:   The face to face evaluation was performed on     Answer:   5/7/2020            Total duration of this telemedicine encounter: 22 minutes    ICesar, am scribing for, and in the presence of, Minor Ji MD.    Minor BELTRE MD, personally performed the services described in this documentation as scribed by Cesar Gee in my presence, and it is both accurate and complete.       Minor Ji MD  5/7/2020   None

## 2020-05-07 ENCOUNTER — RAD ONC OTV (OUTPATIENT)
Dept: RADIATION ONCOLOGY | Facility: HOSPITAL | Age: 75
End: 2020-05-07

## 2020-05-07 ENCOUNTER — HOSPITAL ENCOUNTER (OUTPATIENT)
Dept: RADIATION ONCOLOGY | Facility: HOSPITAL | Age: 75
Setting detail: RADIATION/ONCOLOGY SERIES
Discharge: HOME | End: 2020-05-07
Attending: RADIOLOGY
Payer: MEDICARE

## 2020-05-07 ENCOUNTER — TELEMEDICINE (OUTPATIENT)
Dept: CARDIOLOGY | Facility: CLINIC | Age: 75
End: 2020-05-07
Payer: MEDICARE

## 2020-05-07 VITALS
HEART RATE: 80 BPM | TEMPERATURE: 97.8 F | DIASTOLIC BLOOD PRESSURE: 79 MMHG | BODY MASS INDEX: 23.53 KG/M2 | WEIGHT: 145.8 LBS | SYSTOLIC BLOOD PRESSURE: 137 MMHG

## 2020-05-07 DIAGNOSIS — C73 METASTASIS FROM THYROID CANCER (CMS/HCC): Primary | ICD-10-CM

## 2020-05-07 DIAGNOSIS — I10 ESSENTIAL HYPERTENSION: ICD-10-CM

## 2020-05-07 DIAGNOSIS — C79.9 METASTASIS FROM THYROID CANCER (CMS/HCC): Primary | ICD-10-CM

## 2020-05-07 DIAGNOSIS — I48.0 PAROXYSMAL ATRIAL FIBRILLATION (CMS/HCC): Primary | ICD-10-CM

## 2020-05-07 DIAGNOSIS — E78.2 MIXED HYPERLIPIDEMIA: ICD-10-CM

## 2020-05-07 PROCEDURE — 77373 STRTCTC BDY RAD THER TX DLVR: CPT | Performed by: RADIOLOGY

## 2020-05-07 PROCEDURE — 99214 OFFICE O/P EST MOD 30 MIN: CPT | Mod: 95 | Performed by: INTERNAL MEDICINE

## 2020-05-07 ASSESSMENT — ENCOUNTER SYMPTOMS
PND: 0
BACK PAIN: 1
COUGH: 0
HEMATEMESIS: 0
IRREGULAR HEARTBEAT: 0
UNEXPECTED WEIGHT CHANGE: 1
WEIGHT GAIN: 0
COUGH: 0
SYNCOPE: 0
CARDIOVASCULAR NEGATIVE: 1
COLOR CHANGE: 0
FATIGUE: 1
FEVER: 0
DECREASED APPETITE: 0
APPETITE CHANGE: 1
BRUISES/BLEEDS EASILY: 1
NEAR-SYNCOPE: 0
FEVER: 0
ALTERED MENTAL STATUS: 0
MYALGIAS: 0
HEARTBURN: 0
SHORTNESS OF BREATH: 0
DIAPHORESIS: 0
STIFFNESS: 1
BACK PAIN: 1
LIGHT-HEADEDNESS: 0
HEMATOCHEZIA: 0
INSOMNIA: 0
WEIGHT LOSS: 1
PALPITATIONS: 0
DYSPNEA ON EXERTION: 0
DIZZINESS: 0
HEMATURIA: 0
SHORTNESS OF BREATH: 0
SNORING: 0
ORTHOPNEA: 0

## 2020-05-07 ASSESSMENT — PAIN SCALES - GENERAL: PAINLEVEL: 3

## 2020-05-07 NOTE — RAD ONC COMPLETION NOTE
[facility] RADIATION THERAPY  [address] [place] [address]  Dept: [phone]  Loc: [phone]    Objective   Matt Williamson, 1945, has completed a course of radiation.  Matt Williamson was seen and treated in Radiation Oncology at [facility] RADIATION THERAPY.     Matt Williamson, MRN: [id number]   Patient Active Problem List   Diagnosis   • BPH (benign prostatic hyperplasia)   • Atherosclerosis of coronary artery   • Cataract   • Epigastric abdominal pain   • Essential hypertension   • Former tobacco use   • Mixed hyperlipidemia   • Metastasis from thyroid cancer (CMS/HCC)   • Type 2 diabetes mellitus without complication (CMS/HCC)   • Vertigo   • Cyst of thyroid   • Esophageal reflux   • PSA elevation   • Atrial fibrillation with rapid ventricular response (CMS/HCC)   • Bone metastases (CMS/HCC)       Below you will find the details of the course of radiation therapy.  He tolerated the treatment well.      Treatment Intent: primary and curative.  Cancer Staging  Metastasis from thyroid cancer (CMS/HCC)  Staging form: Thyroid - Differentiated, AJCC 8th Edition  - Pathologic stage from 2/28/2012: Stage II (pT3a, pN0a, cM0, Age at diagnosis: >= 55 years) - Signed by Stacie Casey MD on 1/27/2020       Performance Status at the End of Treatment: 80, Normal activity with effort; some signs or symptoms of disease..    LABS:  CMP  Lab Results   Component Value Date    ALBUMIN 3.6 01/29/2020    CO2 27 01/29/2020    BUN 24 (H) 04/17/2020    CREATININE 1.1 04/17/2020    GLUCOSE 121 (H) 01/29/2020    AST 14 01/29/2020    ALT 18 01/29/2020    EGFR >60.0 04/17/2020    EGFR 61 01/29/2020    EGFR 71 01/29/2020    EGFR 59 (L) 01/07/2020    EGFR 68 01/07/2020    EGFR 49 (L) 08/07/2019    EGFR 57 (L) 08/07/2019     CBC  Lab Results   Component Value Date    WBC 10.13 01/17/2020    HGB 14.1 01/17/2020    HCT 43.0 01/17/2020    MCV 88.5 01/17/2020     01/17/2020      01/07/2020     01/22/2019     05/02/2018     12/09/2016     06/20/2016     Tumor Marker  Lab Results   Component Value Date    PSA 7.5 (H) 10/10/2018        Treatment Plan Summary  Radiation Therapy  Radiation Treatments     Active   Plans   1_L3 SBRT   Most recent treatment: Dose planned: 700 cGy (fraction 5 of 5 on 5/7/2020)   Total: Dose planned: 3,500 cGy   Elapsed Course Treatment Days: 14 days as of 202005071115      Reference Points   1_L3 SBRT   Most recent treatment: Dose given: - (on 5/7/2020)   Total: Dose given: -   Elapsed Course Treatment Days: 14 days as of [id number]      CP L3 SBRT   Most recent treatment: Dose given: - (on 5/7/2020)   Total: Dose given: -   Elapsed Course Treatment Days: 14 days as of [id number]         Historical   No historical radiation treatments to show.                  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  Patient is being tapered off steroids and follow-up in 2 weeks.  MRI of shoulder showed tear and damage but no malignant process.    Plan for Follow-up: We plan to see the patient back in 2 weeks to monitor immediate post-therapy progress, which will be further documented.  The patient is also informed of the need for continuing follow-up through your office.         5/7/2020 11:27 AM

## 2020-05-07 NOTE — PROGRESS NOTES
Subjective      Patient ID: Matt Williamson is a 74 y.o. male.  1945      HPI The patient presentsfor 5th and final SBRT tx to L3 tumor.  Cont to c/o L sided back pain and LE weakness.  Pt has increased blood glucose and not taking all his Metformin secondary to increased urination.  MRI R shoulder shows tendon problem and tear but no tumor.  Cont. to note chronic R shoulder pain.    The following have been reviewed and updated as appropriate in this visit:       Review of Systems   Constitutional: Positive for appetite change (eating good), fatigue and unexpected weight change (pt states stable weight at 145, however he has lost 20 lbs since Feb.). Negative for fever.   Respiratory: Negative for cough and shortness of breath.    Cardiovascular: Negative.    Musculoskeletal: Positive for back pain (left side).   Skin: Negative for color change.   Blood sugars at 270 - pt in on Trulicity injections weekly, Metformin QID and Jardiance.  He is also on Decadron - taper from 4 mg BID to 2 mg BID this past Tuesday.  Pt has a call pending from Dr. Bruno Lerma - endocronologist.    Objective     Vitals:    05/07/20 1124   BP: 137/79   Patient Position: Sitting   Pulse: 80   Temp: 36.6 °C (97.8 °F)   Weight: 66.1 kg (145 lb 12.8 oz)     Body mass index is 23.53 kg/m².    Physical Exam  Wt stable from earlier this week.  Neuro unchanged with no focal deficit.    Assessment/Plan Pt received last SBRT tx without issue.  He was set up with foot block and an onboard imager was used to confirm position and localization with port films.  Conebeam showed slight decrease in tumor size.   He then received last SBRT tx of 700cGy using 6FFF photon beam and rapid arc tech. For a total dose of 3500cGy without incident.  Pt instructed to cont. Steroid taper and follow up with endocrin re diabetes and to resume his full dose Metformin.  Pt to sched f/u with me next week using telemedicine.

## 2020-05-07 NOTE — ASSESSMENT & PLAN NOTE
- No recent palps  - Recurrent nosebleeds  - Plan is to hold Xarelto and to have Pt wear a monitor for 2 weeks. Can decide about restarting  Xarelto then

## 2020-05-14 ENCOUNTER — TELEPHONE (OUTPATIENT)
Dept: RADIATION ONCOLOGY | Facility: HOSPITAL | Age: 75
End: 2020-05-14

## 2020-05-14 NOTE — TELEPHONE ENCOUNTER
Pt left a vm wanting to touch base with Dr.[facility] about his progress. Pt states at this time he is still experiencing weakness in his  lower extremities. Pt can be reach on [phone].

## 2020-05-19 DIAGNOSIS — C79.51 BONE METASTASES: Primary | ICD-10-CM

## 2020-05-19 NOTE — PROGRESS NOTES
Contacted patient about continued lower extremity with leg weakness.  Patient tapered off steroids last Thursday but continues to complain of some proximal leg weakness making it difficult to climb stairs.  Patient denies any leg or foot numbness and does note urine urgency but denies problems urinating.  Patient is concerned regarding continued proximal leg weakness.  Patient continues to have left-sided back pain right-sided back pain has resolved.  Patient recently completed a palliative course of stereotactic body radiation therapy to L3 bony metastatic disease from primary thyroid cancer.  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  I told patient that it is weakness worsens or if he cannot walk or stand or he gets numbness in his legs or feet or difficulty urinating to go immediately to the emergency room for evaluation.  I told patient because thyroid cancer is typically a slow growing tumor it is also slowly shrinking tumor.  I also explained that recovery from steroid myopathy can take weeks after discontinuation of steroids.  I initially suggested patient come this Thursday for reevaluation and scans however he is complaining of low-grade fever sore throat and headache and although he is quarantined given COVID-19 we elected to delay evaluation until next week pending above.  Therefore I have ordered a repeat MRI of the lumbar spine and patient to see me follow-up afterwards.  Patient is still living at the [facility].  Patient knows to contact his primary doctor if he develops shortness of breath or pulmonary symptoms consistent with COVID-19.

## 2020-05-19 NOTE — TELEPHONE ENCOUNTER
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  However if symptoms progress patient knows to go to emergency room immediately.

## 2020-05-20 ENCOUNTER — TELEPHONE (OUTPATIENT)
Dept: INTERNAL MEDICINE | Facility: CLINIC | Age: 75
End: 2020-05-20

## 2020-05-20 ENCOUNTER — TELEMEDICINE (OUTPATIENT)
Dept: INTERNAL MEDICINE | Facility: CLINIC | Age: 75
End: 2020-05-20
Payer: MEDICARE

## 2020-05-20 DIAGNOSIS — I10 ESSENTIAL HYPERTENSION: ICD-10-CM

## 2020-05-20 DIAGNOSIS — E78.2 MIXED HYPERLIPIDEMIA: ICD-10-CM

## 2020-05-20 DIAGNOSIS — C73 METASTASIS FROM THYROID CANCER (CMS/HCC): ICD-10-CM

## 2020-05-20 DIAGNOSIS — E11.9 TYPE 2 DIABETES MELLITUS WITHOUT COMPLICATION, WITHOUT LONG-TERM CURRENT USE OF INSULIN (CMS/HCC): ICD-10-CM

## 2020-05-20 DIAGNOSIS — R50.9 FEVER, UNSPECIFIED FEVER CAUSE: Primary | ICD-10-CM

## 2020-05-20 DIAGNOSIS — C79.9 METASTASIS FROM THYROID CANCER (CMS/HCC): ICD-10-CM

## 2020-05-20 DIAGNOSIS — R50.9 FEVER, UNSPECIFIED: ICD-10-CM

## 2020-05-20 PROCEDURE — 99214 OFFICE O/P EST MOD 30 MIN: CPT | Mod: 95 | Performed by: INTERNAL MEDICINE

## 2020-05-24 ENCOUNTER — TELEPHONE (OUTPATIENT)
Dept: INTERNAL MEDICINE | Facility: CLINIC | Age: 75
End: 2020-05-24

## 2020-05-24 DIAGNOSIS — M89.9 LESION OF LUMBAR SPINE: Primary | ICD-10-CM

## 2020-05-24 DIAGNOSIS — M54.50 LUMBAR PAIN: ICD-10-CM

## 2020-05-25 NOTE — TELEPHONE ENCOUNTER
Patients son called  on call concerning that father c/o weakness, fatigue, sever LBP.   Father has appt with PCP next week.   I recommended PT, back icing, tylenol prn ortho consult.  PT ordered.  H/o thyroid cancer  MRI l/s 4/21/2020: 1.  Interval increase in size of the metastasis at L3 compared to 1/3/2020.  New  mild edema involving the right psoas and paraspinal muscles.  2.  Multilevel degenerative changes of the lumbar spine as described, fairly  similar compared to 1/3/2020.

## 2020-05-27 ENCOUNTER — TELEPHONE (OUTPATIENT)
Dept: RADIATION ONCOLOGY | Facility: HOSPITAL | Age: 75
End: 2020-05-27

## 2020-05-27 ENCOUNTER — HOSPITAL ENCOUNTER (OUTPATIENT)
Dept: RADIATION ONCOLOGY | Facility: HOSPITAL | Age: 75
Setting detail: RADIATION/ONCOLOGY SERIES
End: 2020-05-27
Attending: RADIOLOGY
Payer: MEDICARE

## 2020-05-27 NOTE — TELEPHONE ENCOUNTER
Patient called upset that no one has scheduled him an MRI or follow up. Pt had been informed by Dr [facility] that he would be scheduled  Pt called and scheduled mri for tomorrow and wanted an appt to discuss right after.  Patient was put on the schedule at 12:45.

## 2020-05-28 ENCOUNTER — HOSPITAL ENCOUNTER (OUTPATIENT)
Dept: RADIOLOGY | Facility: HOSPITAL | Age: 75
Discharge: HOME | End: 2020-05-28
Attending: RADIOLOGY
Payer: MEDICARE

## 2020-05-28 ENCOUNTER — TELEPHONE (OUTPATIENT)
Dept: INTERNAL MEDICINE | Facility: CLINIC | Age: 75
End: 2020-05-28

## 2020-05-28 ENCOUNTER — HOSPITAL ENCOUNTER (OUTPATIENT)
Dept: RADIATION ONCOLOGY | Facility: HOSPITAL | Age: 75
Setting detail: RADIATION/ONCOLOGY SERIES
Discharge: HOME | End: 2020-05-28
Attending: RADIOLOGY
Payer: MEDICARE

## 2020-05-28 VITALS
TEMPERATURE: 97.3 F | HEART RATE: 99 BPM | SYSTOLIC BLOOD PRESSURE: 114 MMHG | DIASTOLIC BLOOD PRESSURE: 67 MMHG | BODY MASS INDEX: 25.34 KG/M2 | WEIGHT: 157 LBS

## 2020-05-28 DIAGNOSIS — C79.9 METASTASIS FROM THYROID CANCER (CMS/HCC): Primary | ICD-10-CM

## 2020-05-28 DIAGNOSIS — C79.51 BONE METASTASES: ICD-10-CM

## 2020-05-28 DIAGNOSIS — C73 METASTASIS FROM THYROID CANCER (CMS/HCC): Primary | ICD-10-CM

## 2020-05-28 PROCEDURE — 93228 REMOTE 30 DAY ECG REV/REPORT: CPT | Performed by: INTERNAL MEDICINE

## 2020-05-28 RX ORDER — GADOBUTROL 604.72 MG/ML
7 INJECTION INTRAVENOUS ONCE
Status: COMPLETED | OUTPATIENT
Start: 2020-05-28 | End: 2020-05-28

## 2020-05-28 RX ADMIN — GADOBUTROL 7 ML: 604.72 INJECTION INTRAVENOUS at 12:00

## 2020-05-28 ASSESSMENT — ENCOUNTER SYMPTOMS
GASTROINTESTINAL NEGATIVE: 1
LIGHT-HEADEDNESS: 1
SLEEP DISTURBANCE: 1
RESPIRATORY NEGATIVE: 1
FEVER: 1
CARDIOVASCULAR NEGATIVE: 1
ALLERGIC/IMMUNOLOGIC NEGATIVE: 1
NUMBNESS: 1
ENDOCRINE NEGATIVE: 1
WEAKNESS: 1

## 2020-05-28 ASSESSMENT — PAIN SCALES - GENERAL: PAINLEVEL: 2

## 2020-05-28 NOTE — PROGRESS NOTES
Subjective      Patient ID: Matt Williamson is a 74 y.o. male.  1945      HPI    The following have been reviewed and updated as appropriate in this visit:       Review of Systems   Constitutional: Positive for fever (intermittant 100.4 was the highest ).   HENT: Negative.    Eyes: Positive for visual disturbance (slight visual change not as sharp).   Respiratory: Negative.    Cardiovascular: Negative.    Gastrointestinal: Negative.    Endocrine: Negative.    Genitourinary:        Pt recently was treated for uti with antibiotic. Patient went to an urgent care where a urine culture and covid testing were performed    Musculoskeletal: Back pain: discomfort  lumbar  2/10 on pain scale    Allergic/Immunologic: Negative.    Neurological: Positive for weakness, light-headedness (patient feels off balance ) and numbness (right side fingers  ).   Psychiatric/Behavioral: Positive for sleep disturbance (related to nocturia patient up 3-4 times a night).   pt tested for covid and was negative     Objective     There were no vitals filed for this visit.  There is no height or weight on file to calculate BMI.    Physical Exam    Assessment/Plan

## 2020-05-28 NOTE — PROGRESS NOTES
Patient ID:  Matt Williamson is a 74 y.o. male.  Referring Physician:   No referring provider defined for this encounter.    Primary Care Provider:  Raj Arreola DO    Problem List:  Patient Active Problem List    Diagnosis Date Noted   • Bone metastases (CMS/HCC) 01/27/2020   • Atrial fibrillation with rapid ventricular response (CMS/HCC) 10/25/2019   • PSA elevation 11/04/2018   • Former tobacco use 05/02/2018   • Vertigo 05/02/2018   • Essential hypertension 06/20/2016   • Type 2 diabetes mellitus without complication (CMS/HCC) 06/20/2016   • Epigastric abdominal pain 02/18/2016   • Mixed hyperlipidemia 02/18/2016   • Cataract 09/11/2013   • BPH (benign prostatic hyperplasia) 09/14/2012   • Atherosclerosis of coronary artery 09/14/2012   • Metastasis from thyroid cancer (CMS/HCC) 09/14/2012   • Cyst of thyroid 01/15/2007   • Esophageal reflux 01/15/2007        Cancer Staging Information:  Cancer Staging  Metastasis from thyroid cancer (CMS/HCC)  Staging form: Thyroid - Differentiated, AJCC 8th Edition  - Pathologic stage from 2/28/2012: Stage II (pT3a, pN0a, cM0, Age at diagnosis: >= 55 years) - Signed by Stacie Casey MD on 1/27/2020      Subjective      Radiation Delivered  Radiation Therapy   Component Value Date    COURSEID C1 05/08/2020    PLANID 1_L3 SBRT 05/08/2020    PRESCRIBEDDO 7 05/08/2020    FRACTIONSTRE 5 of 5 05/08/2020       History of Present Illness:  Since last seen patient has tapered off steroids and now presents for reevaluation.  Patient continues to complain of the right proximal leg weakness although he claims is recently gotten a little better.  He has noted this since prior to his radiation therapy.  Denies left sciatica at this time.  Patient denies numbness in his legs or problems with urination.  Patient was recently diagnosed having urinary tract infection and treated with antibiotics with resolution of dysuria.  Patient also said he had a cough and fever and COVID-19 was  "ruled out.  Denies fever currently.  Patient also denies lower extremity numbness or problems with urination currently.    Physical Exam:  Vital Signs for this encounter:  BP: 114/67  Temp: 36.3 °C (97.3 °F)  Heart Rate: 99  Height: 167.6 cm (5' 6\")  Weight: 71.2 kg (157 lb) (05/28 1118-05/28 1248)  Mild proximal right leg weakness 4/5 otherwise no focal deficits noted.  Performance Status:70       PAIN ASSESSMENT:  Score Two    Location    Pain Intervention      LABS:  No results found for this or any previous visit (from the past 336 hour(s)).       Assessment/Plan Patient with recurrent thyroid cancer with oligo metastatic disease involving L3 vertebral body status post stereotactic body radiation therapy completed approximately 3 weeks ago.  Patient now with continued right leg weakness despite tapering off steroids but claims it is gradually improving.  A repeat MRI of the lumbar spine was done today for reassessment purposes final report pending.  My review shows possible slight decrease in size although no significant change from my perspective.  The tumor is right-sided however.  I suggested that patient meet with neurosurgery and discuss options with them.  He had previously seen Dr. Heller I provided patient with phone number and will reach out to him to discuss case.  My current feeling is is lungs the patient feels he is improving I would just hold off on additional treatments including neurosurgery.  I told the patient however symptoms worsen or improvement stops neurosurgical approach may make sense.  Patient to make appointment and discuss options.  Otherwise patient to make telemedicine appointment with me in 4 to 6 weeks to discuss and then will likely repeat scans and follow-up in 3 months.    Diagnoses and Orders Associated With This Visit:  Metastasis from thyroid cancer (CMS/HCC) (Primary)        TREATMENT PLAN SUMMARY:  [No treatment plan]    "

## 2020-06-02 ENCOUNTER — TELEPHONE (OUTPATIENT)
Dept: INTERNAL MEDICINE | Facility: CLINIC | Age: 75
End: 2020-06-02

## 2020-06-02 NOTE — TELEPHONE ENCOUNTER
Spoke with Marco A and he is wondering if physical therapy would help with the current left sided back pain he has been experiencing.    Neuro consult needed as well, dr. Sierra @ [place]?

## 2020-06-02 NOTE — TELEPHONE ENCOUNTER
Per PCP referral given to patient for Dr. Tammy Holloway at [place], oncology. [facility]    (T) [phone]  (F) [phone]  [address] [facility]      [address]    [place], [place]

## 2020-06-04 ENCOUNTER — TELEPHONE (OUTPATIENT)
Dept: INTERNAL MEDICINE | Facility: CLINIC | Age: 75
End: 2020-06-04

## 2020-06-04 DIAGNOSIS — M89.9 LESION OF LUMBAR SPINE: Primary | ICD-10-CM

## 2020-06-04 DIAGNOSIS — M54.50 LUMBAR PAIN: ICD-10-CM

## 2020-06-04 NOTE — TELEPHONE ENCOUNTER
----- Message from Matt Williamson sent at 6/4/2020 10:31 AM EDT -----  Regarding: RE:consult, PT  Contact: [phone]  Patel Palmer, Has Dr Arreola reached out to Dr Sierra to familiarize him with my case or am I just calling him cold for an appointment. As for the PT script, just send it to me. I’m not sure which therapist i’ll be using.   Daniel Strickland  ----- Message -----  From: Nurse Estela PAIGE  Sent: 6/4/2020  6:40 AM EDT  To: Matt Williamson  Subject: consult, PT  Good morning Marco A,    I spoke with Dr. Arreola, he thinks physical therapy would be a great idea to see if it helps relieve some pain and discomfort. Dr. Arreola would like for you to schedule an appointment with Dr. Sierra at [facility]. His office number is [phone].     As for the physical therapy script did you want me to mail it to you or fax it to a specific location you had in mind for physical therapy?    I will be reaching out to you today if I see that you did not access this information through [email or web address] (formerly [employer] I just wanted to make sure you got the information you needed. Sorry for messaging so early!       Thank you!    Estela

## 2020-06-04 NOTE — TELEPHONE ENCOUNTER
Please fax 2 most recent MRI of lumbar spine to Dr. Sierra at [place] [facility].     (f) [phone]    Dr. Sierra will need to review and will contact the patient to schedule. Intake call needed for information from patient.

## 2020-06-08 ENCOUNTER — TELEMEDICINE (OUTPATIENT)
Dept: INTERNAL MEDICINE | Facility: CLINIC | Age: 75
End: 2020-06-08
Payer: MEDICARE

## 2020-06-08 DIAGNOSIS — C79.9 METASTASIS FROM THYROID CANCER (CMS/HCC): ICD-10-CM

## 2020-06-08 DIAGNOSIS — R97.20 PSA ELEVATION: ICD-10-CM

## 2020-06-08 DIAGNOSIS — E11.9 TYPE 2 DIABETES MELLITUS WITHOUT COMPLICATION, WITHOUT LONG-TERM CURRENT USE OF INSULIN (CMS/HCC): ICD-10-CM

## 2020-06-08 DIAGNOSIS — C73 METASTASIS FROM THYROID CANCER (CMS/HCC): ICD-10-CM

## 2020-06-08 DIAGNOSIS — R35.0 URINE FREQUENCY: Primary | ICD-10-CM

## 2020-06-08 DIAGNOSIS — K21.9 GASTROESOPHAGEAL REFLUX DISEASE WITHOUT ESOPHAGITIS: ICD-10-CM

## 2020-06-08 DIAGNOSIS — E04.1 CYST OF THYROID: ICD-10-CM

## 2020-06-08 DIAGNOSIS — R42 VERTIGO: ICD-10-CM

## 2020-06-08 DIAGNOSIS — C79.51 BONE METASTASES: ICD-10-CM

## 2020-06-08 DIAGNOSIS — I48.91 ATRIAL FIBRILLATION WITH RAPID VENTRICULAR RESPONSE (CMS/HCC): ICD-10-CM

## 2020-06-08 PROCEDURE — 99443 PR PHYS/QHP TELEPHONE EVALUATION 21-30 MIN: CPT | Mod: 95 | Performed by: INTERNAL MEDICINE

## 2020-06-08 RX ORDER — GABAPENTIN 300 MG/1
300 CAPSULE ORAL NIGHTLY
Qty: 90 CAPSULE | Refills: 3 | Status: SHIPPED | OUTPATIENT
Start: 2020-06-08 | End: 2020-06-15 | Stop reason: SDUPTHER

## 2020-06-08 RX ORDER — OXYCODONE AND ACETAMINOPHEN 5; 325 MG/1; MG/1
1 TABLET ORAL EVERY 4 HOURS PRN
Qty: 20 TABLET | Refills: 0 | Status: SHIPPED | OUTPATIENT
Start: 2020-06-08 | End: 2020-06-18

## 2020-06-08 NOTE — TELEPHONE ENCOUNTER
----- Message from Raj Arreola DO sent at 6/8/2020 12:14 PM EDT -----  Needs telemed in 1 mo, send urine slip

## 2020-06-08 NOTE — PROGRESS NOTES
Verification of Patient Location:  The patient affirms they are currently located in the following state:[place]     Request for Consent:  You and I are about to have a telemedicine check-in or visit. This is allowed because you are already my patient, and you have requested it.  This telemedicine visit will be billed to your health insurance or you, if you are self-insured.  You understand you will be responsible for any copayments or coinsurances that apply to your telemedicine visit.  Before starting our telemedicine visit, I am required to get your consent for this virtual check-in or visit by telemedicine. Do you consent?      Patient Response to Request for Consent: Yes    The following have been reviewed and updated as appropriate in this visit:         Visit Documentation:    Continues with back and right leg pain, scheduled to see Dr. Guallpa for second opinion was seeing Dr. Kim    Blood pressure currently controlled with metoprolol.  Lipids are stable on Pravachol.    Hyperlipidemia reviewed today and patient is compliant with healthy diet and exercise.  Discussed goals of treatment for hyperlipidemia to prevent complications.  The patient has no muscle pain, abdominal pain or other intolerances to medication.    The patient will continue on treatment for hypertension including medications, limiting salt, healthy diet and exercise.  The patient denied any shortness of breath, chest pain or palpitations.    Atrial fibrillation, controlled with no bleeding, focal neurologic deficit or shortness of breath.  The patient will continue on the same medications to control rate.  The patient has no bleeding complications.    He has bony metastasis from his thyroid cancer and has been receiving radiation.  He was seen by Dr. Merritt Lugo, suggested conservative treatment    Lab Results   Component Value Date    WBC 10.13 01/17/2020    HGB 14.1 01/17/2020    HCT 43.0 01/17/2020     01/17/2020     CHOL 129 01/29/2020    TRIG 69 01/29/2020    HDL 47 01/29/2020    ALT 18 01/29/2020    AST 14 01/29/2020     01/29/2020    K 4.6 01/29/2020     01/29/2020    CREATININE 1.1 04/17/2020    BUN 24 (H) 04/17/2020    CO2 27 01/29/2020    TSH 0.32 (L) 01/29/2020    PSA 7.5 (H) 10/10/2018    INR 1.0 01/17/2020    HGBA1C 7.1 (H) 01/29/2020    MICROALBUR 0.2 01/29/2020     New Medications Ordered This Visit   Medications   • oxyCODONE-acetaminophen (PERCOCET) 5-325 mg per tablet     Sig: Take 1 tablet by mouth every 4 (four) hours as needed for moderate pain for up to 10 days.     Dispense:  20 tablet     Refill:  0     Current Outpatient Medications on File Prior to Visit   Medication Sig Dispense Refill   • aspirin 81 mg enteric coated tablet Take 81 mg by mouth daily.     • dulaglutide 0.75 mg/0.5 mL pen injector Inject 0.5 mL (0.75 mg total) under the skin once a week. (Patient not taking: Reported on 5/28/2020 ) 4 pen 0   • JARDIANCE 10 mg tablet TAKE 1 TABLET(10 MG) BY MOUTH DAILY 90 tablet 0   • levothyroxine (SYNTHROID) 150 mcg tablet Take 1 tablet (150 mcg total) by mouth daily. 90 tablet 0   • metFORMIN (GLUCOPHAGE) 500 mg tablet TAKE 1 TABLET(500 MG) BY MOUTH FOUR TIMES DAILY 360 tablet 0   • metoprolol succinate XL (TOPROL-XL) 100 mg 24 hr tablet Take 1 tablet (100 mg total) by mouth 2 (two) times a day. 180 tablet 3   • pravastatin (PRAVACHOL) 40 mg tablet Take 1 tablet (40 mg total) by mouth daily. 90 tablet 3     No current facility-administered medications on file prior to visit.      PSA elevation  He sees Dr. Ravi Pride, last PSA was down to 6.4, benign prostate on biopsy continue with 6-month follow-up    Bone metastases (CMS/HCC)  Keep follow-up with his cancer team  Appreciate notes from Dr. Guallpa  Getting radiation and improving    Atrial fibrillation with rapid ventricular response (CMS/HCC)  Patient's atrial fibrillation is currently controlled, and patient has no symptoms of shortness  of breath or palpitations.  I would continue with the same medications at this time.    Esophageal reflux  The patient symptoms are controlled and denies any worrisome symptoms like trouble swallowing or bleeding. We discussed avoidance of spicy or hot foods or foods that make the reflux worse.  The patient would avoid alcohol and would continue with the same plan of care at this time.      Cyst of thyroid  History of thyroid cancer, follows with Dr. Lerma  Has metastasis  Currently getting radiation of his thyroid    Type 2 diabetes mellitus without complication (CMS/[facility])  I discussed with the patient and the initial treatment for diabetes including regular exercise, and weight loss.  It is important to treat diabetes to prevent  complications.  Diabetes could lead to heart disease, stroke, visual problems including blindness and kidney failure.  The patient was encouraged to see ophthalmology and podiatry at least on a yearly basis.               Orders Placed This Encounter   Procedures   • Urinalysis with Reflex Culture (ED and Outpatient only)     Standing Status:   Future     Number of Occurrences:   1     Standing Expiration Date:   6/8/2021     New Medications Ordered This Visit   Medications   • oxyCODONE-acetaminophen (PERCOCET) 5-325 mg per tablet     Sig: Take 1 tablet by mouth every 4 (four) hours as needed for moderate pain for up to 10 days.     Dispense:  20 tablet     Refill:  0                     Time Spent in Medical Discussion During This Encounter:  22 minutes

## 2020-06-11 RX ORDER — PANTOPRAZOLE SODIUM 40 MG/1
TABLET, DELAYED RELEASE ORAL
Qty: 90 TABLET | Refills: 0 | Status: SHIPPED | OUTPATIENT
Start: 2020-06-11 | End: 2020-09-08

## 2020-06-12 ENCOUNTER — TELEPHONE (OUTPATIENT)
Dept: INTERNAL MEDICINE | Facility: CLINIC | Age: 75
End: 2020-06-12

## 2020-06-12 NOTE — TELEPHONE ENCOUNTER
Everything has been sent to Dr. Sierra he needs to review prior to scheduling patient. I can see in Marco A's chart they are working on this currently. I will notify Dr. Arreola as well to see if there is anything else we can do to move this forward.    Please let patient know.

## 2020-06-12 NOTE — TELEPHONE ENCOUNTER
Estela, pt was advised by  last week to call you @ the end of this week (6/12/20) if patient  did not hear from 's office .   Per pt no call all week from 's practice .

## 2020-06-12 NOTE — TELEPHONE ENCOUNTER
Disregard message, I spoke with Marco A. Patient will need to contact us if not contacted next week.

## 2020-06-15 ENCOUNTER — OFFICE VISIT (OUTPATIENT)
Dept: NEUROSURGERY | Facility: CLINIC | Age: 75
End: 2020-06-15
Payer: MEDICARE

## 2020-06-15 VITALS
DIASTOLIC BLOOD PRESSURE: 82 MMHG | BODY MASS INDEX: 25.39 KG/M2 | TEMPERATURE: 97.9 F | WEIGHT: 158 LBS | HEIGHT: 66 IN | OXYGEN SATURATION: 96 % | HEART RATE: 77 BPM | SYSTOLIC BLOOD PRESSURE: 128 MMHG

## 2020-06-15 DIAGNOSIS — M54.16 LUMBAR RADICULAR PAIN: Primary | ICD-10-CM

## 2020-06-15 PROCEDURE — 99214 OFFICE O/P EST MOD 30 MIN: CPT | Performed by: NEUROLOGICAL SURGERY

## 2020-06-15 RX ORDER — GABAPENTIN 300 MG/1
300 CAPSULE ORAL 3 TIMES DAILY
Qty: 270 CAPSULE | Refills: 3 | Status: SHIPPED | OUTPATIENT
Start: 2020-06-15 | End: 2021-09-22

## 2020-06-15 RX ORDER — DULAGLUTIDE 0.75 MG/.5ML
INJECTION, SOLUTION SUBCUTANEOUS WEEKLY
COMMUNITY
Start: 2020-06-01 | End: 2022-10-11 | Stop reason: ALTCHOICE

## 2020-06-15 NOTE — LETTER
2020     Raj Arreola DO  [address] [address]    Patient: Matt Williamson  YOB: 1945  Date of Visit: 6/15/2020      Dear Dr. Arreola:    Thank you for referring Matt Williamson to me for evaluation. Below are my notes for this consultation.    If you have questions, please do not hesitate to call me. I look forward to following your patient along with you.         Sincerely,        Colt Heller MD        CC: Gregory J Ochsner, MD Joseph M Richards, DO Jada, Ajit S, MD  2020  9:53 AM  Signed      [facility]  Medical Office [address], [address]  [place] [address]  Phone: [phone]  Fax: [phone]      Patient ID: Matt Williamson                              : 1945    Visit Date: 6/15/2020    Referring Provider: No ref. provider found        History of Present Illness:   Matt Williamson is a pleasant 74 y.o. male with thyroid cancer metastatic to L3 vertebral body seen today by the neurosurgery service for follow-up to right L4 radicular pain. In the interm he has been complaining of more pain on the R side of his low back radiating down the outside of the leg to to his ankle. Pain is 2/10 but can be 10/10 at night which is keeping him up. He has been using half of a percocet for relief. He has not undergone any physical therapy.     The patient initially presented for right leg pain which began without any injury or inciting event.  He underwent imaging which demonstrated an L4-5 disc herniation causing right L4 nerve root compression as well as a lesion in the vertebral body of L3. Testing revealed the lesion was thyroid cancer. He is being followed by Dr. Ochsner of radiation oncology and recently finished radiation therapy. He reports that his right L4 radiculopathy has initially improved with injections. It only bothers him when he has an overactive day. He denies lower extremity weakness,  paresthesias, gait instability, and bowel/bladder changes        Allergies:  Allergies   Allergen Reactions   • Penicillins Rash     Childhood allergy       Medications:     Current Outpatient Medications:   •  aspirin 81 mg enteric coated tablet, Take 81 mg by mouth daily., Disp: , Rfl:   •  dulaglutide 0.75 mg/0.5 mL pen injector, Inject 0.5 mL (0.75 mg total) under the skin once a week. (Patient not taking: Reported on 5/28/2020 ), Disp: 4 pen, Rfl: 0  •  gabapentin (NEURONTIN) 300 mg capsule, Take 1 capsule (300 mg total) by mouth nightly., Disp: 90 capsule, Rfl: 3  •  JARDIANCE 10 mg tablet, TAKE 1 TABLET(10 MG) BY MOUTH DAILY, Disp: 90 tablet, Rfl: 0  •  levothyroxine (SYNTHROID) 150 mcg tablet, Take 1 tablet (150 mcg total) by mouth daily., Disp: 90 tablet, Rfl: 0  •  metFORMIN (GLUCOPHAGE) 500 mg tablet, TAKE 1 TABLET(500 MG) BY MOUTH FOUR TIMES DAILY, Disp: 360 tablet, Rfl: 0  •  metoprolol succinate XL (TOPROL-XL) 100 mg 24 hr tablet, Take 1 tablet (100 mg total) by mouth 2 (two) times a day., Disp: 180 tablet, Rfl: 3  •  oxyCODONE-acetaminophen (PERCOCET) 5-325 mg per tablet, Take 1 tablet by mouth every 4 (four) hours as needed for moderate pain for up to 10 days., Disp: 20 tablet, Rfl: 0  •  pantoprazole (PROTONIX) 40 mg EC tablet, TAKE 1 TABLET(40 MG) BY MOUTH DAILY, Disp: 90 tablet, Rfl: 0  •  pravastatin (PRAVACHOL) 40 mg tablet, Take 1 tablet (40 mg total) by mouth daily., Disp: 90 tablet, Rfl: 3     Past Medical History:   Past Medical History:   Diagnosis Date   • Abdominal hernia    • Abnormal ECG 10/19   • Atherosclerosis of coronary artery 9/14/2012    Overview:    • Atrial fibrillation (CMS/HCC)    • Atrial fibrillation with rapid ventricular response (CMS/HCC) 10/25/2019    Hospitalized on 10/25/19 at [facility].    • BPH (benign prostatic hyperplasia) 9/14/2012    Overview:    • Cancer (CMS/HCC) Thyroid   • Cataract 9/11/2013    Overview:    • Cyst of thyroid 1/15/2007   •  Epigastric abdominal pain 2/18/2016    Overview:    • Essential hypertension 6/20/2016    Overview:    • Former tobacco use 5/2/2018    Overview:    • History of radiation therapy     [id number]   • Mixed hyperlipidemia 2/18/2016    Overview:    • Osteomyelitis of foot (CMS/HCC) 2009    Surgery.   • PSA elevation 11/4/2018   • Thyroid cancer (CMS/HCC) 9/14/2012    Overview:    • Type 2 diabetes mellitus without complication (CMS/HCC) 6/20/2016    Overview:    • Vertigo 5/2/2018       Past Surgical History:   Past Surgical History:   Procedure Laterality Date   • ANAL FISSURECTOMY  1986   • CARDIAC CATHETERIZATION  11/21/2005    LM FOD. 70-80% proximal LAD.Mild disease CX, RCA and branches.    • CARDIAC CATHETERIZATION  11/28/2005    LM FOD. Pristine LAD stent. CX FOD. RCA mild disease.   • CARDIAC SURGERY  Stent in lad   • CORONARY ANGIOPLASTY WITH STENT PLACEMENT  11/22/2005    LM FOD. Moderate, diffuse disease LAD. Irregular CX w/ PLVB distal to PDA 99%. Mild disease RCA. Stent to distal LAD.   • EYE SURGERY  Cataract   • FOOT SURGERY  2009    For osteomyelitis.   • GASTROCUTANEOUS FISTULA CLOSURE     • THYROIDECTOMY  2012   • TONSILLECTOMY  No       Family History:  Family History   Problem Relation Age of Onset   • Cancer Biological Mother    • Breast cancer Biological Mother    • Emphysema Biological Father    • Lung cancer Biological Father        Social History:  Social History     Socioeconomic History   • Marital status:      Spouse name: Not on file   • Number of children: Not on file   • Years of education: Not on file   • Highest education level: Not on file   Occupational History   • Occupation: Retired   Social Needs   • Financial resource strain: Not on file   • Food insecurity:     Worry: Patient refused     Inability: Patient refused   • Transportation needs:     Medical: Patient refused     Non-medical: Patient refused   Tobacco Use   • Smoking status: Former Smoker     Packs/day: 2.00     Years:  20.00     Pack years: 40.00     Types: Cigarettes     Last attempt to quit:      Years since quittin.4   • Smokeless tobacco: Never Used   Substance and Sexual Activity   • Alcohol use: Yes     Comment: Social   • Drug use: Never   • Sexual activity: Not Currently     Partners: Female   Lifestyle   • Physical activity:     Days per week: Not on file     Minutes per session: Not on file   • Stress: Not on file   Relationships   • Social connections:     Talks on phone: Not on file     Gets together: Not on file     Attends [employer] service: Not on file     Active member of club or organization: Not on file     Attends meetings of clubs or organizations: Not on file     Relationship status: Not on file   • Intimate partner violence:     Fear of current or ex partner: Not on file     Emotionally abused: Not on file     Physically abused: Not on file     Forced sexual activity: Not on file   Other Topics Concern   • Not on file   Social History Narrative   • Not on file       Vitals: There were no vitals filed for this visit.    Review of Systems:  A complete 14 point review of systems was conducted and was deemed negative except what was documented in the HPI.    Physical Examination:  Physical Exam   Constitutional: Oriented to person, place, and time. Appears well-developed and well-nourished.   Head: Normocephalic and atraumatic.   Right Ear: External ear normal.   Left Ear: External ear normal.   Nose: Nose normal.   Mouth/Throat: Oropharynx is clear and moist.   Eyes: Conjunctivae and EOM are normal. Pupils are equal, round, and reactive to light. No scleral icterus.   Neck: Normal range of motion. Neck supple. No JVD present. No tracheal deviation present.   Cardiovascular: Normal rate and regular rhythm.    Pulmonary/Chest: Effort normal and breath sounds normal. No stridor. No respiratory distress. No wheezes. No tenderness.   Abdominal: Soft. No distension. There is no tenderness. There is no rebound  and no guarding.   Musculoskeletal: Normal range of motion. No edema or tenderness.   Neurological: Oriented to person, place, and time.   Skin: Skin is warm and dry. No rash noted. No erythema.   Psychiatric: Normal mood and affect. Speech is normal and behavior is normal. Thought content normal.   Vitals reviewed.      Neurological Examination:  Neurologic Exam     Mental Status   Oriented to person, place, and time.   Attention: normal.   Speech: speech is normal   Level of consciousness: alert    Cranial Nerves     CN II   Visual acuity: normal    CN III, IV, VI   Pupils are equal, round, and reactive to light.  Extraocular motions are normal.   Right pupil: Size: 3 mm. Shape: regular. Reactivity: brisk.   Left pupil: Size: 3 mm. Shape: regular. Reactivity: brisk.   CN III: no CN III palsy  CN VI: no CN VI palsy  Nystagmus: none     CN V   Facial sensation intact.     CN VIII   CN VIII normal.   Hearing: intact    CN IX, X   CN IX normal.   Palate: symmetric    CN XI   CN XI normal.   Right sternocleidomastoid strength: normal  Left sternocleidomastoid strength: normal    CN XII   CN XII normal.   Tongue: not atrophic    Sensation ( /2)    Right Left  Right Left   C5 2 2 L2 2 2   C6 2 2 L3 2 2   C7 2 2 L4 2 2   C8 2 2 L5 2 2   T1 2 2 S1 2 2     Motor:     Deltoid Biceps Triceps Wrist ext Finger ext Hand Intrinsics Hip flexion Knee ext Dorsi-  flexion EHL Plantar Flexion   R 5 5 5 5 5 5 4 5 5 5 5   L 5 5 5 5 5 5 5 5 5 5 5     There is no pronator drift. Muscle bulk and tone are normal.     Gait, Coordination, and Reflexes     Reflexes   Reflexes 2+ except as noted.   Right plantar: normal  Left plantar: normal  Right Maharaj: absent  Left Maharaj: absent  Right ankle clonus: absent  Left ankle clonus: absent      Data Review:    Lab Results:   Lab Results   Component Value Date    WBC 10.13 01/17/2020    HGB 14.1 01/17/2020    HCT 43.0 01/17/2020    MCV 88.5 01/17/2020     01/17/2020    RDW 13.7 01/17/2020       Lab Results   Component Value Date    GLUCOSE 121 (H) 01/29/2020    GLUCOSE 225 (H) 05/02/2018    BUN 24 (H) 04/17/2020     01/29/2020     05/02/2018    K 4.6 01/29/2020    K 4.6 05/02/2018     01/29/2020    CL 98 05/02/2018    CO2 27 01/29/2020    CO2 27 05/02/2018    ANIONGAP 12 05/02/2018        Imaging:   Independent review of all imaging was done by myself as well as review of the radiologists readings and comparison to prior films.     MRI lumbar spine 5/28/2020  1.  Metastatic disease involving L3 vertebral body mildly improving.  2.  No new metastatic disease        Assessment / Plan:     In summary, Matt Williamson is a pleasant 74 y.o. male with thyroid cancer metastatic to L3 vertebral body seen today by the neurosurgery service for follow-up to right L4 radicular pain. It seems his pain has progressed in the interval.  Pain is 2/10 but can be 10/10 at night which is keeping him up. On exam, he has some chronic R hip flexion weakness but otherwise full strength throughout. He has completed his radiation therapy with regression of the L3 vertebral body tumor.    His MRI reveals that he has a foraminal disc herniation at L4-5 on the right, which I believe is causing his symptoms.  At this time, he can return to PT and he has a script already. We will also refer him back to Dr. Feliz for Right L4 TFESI. In speaking with Dr. Patel (radiation oncology), the concern for him being immunocompromised is low and it is reasonable to proceed with the KIKO. He is not on chemotherapy. We are also going to increase his gabapentin to 300mg TID. We are limiting PO steroid use given his diabetes.         MD PATT Desir, Bruno Tierney PA-C, am scribing for, and in the presence of, Dr. Colt Heller.         25 minutes were spent with the patient, examination, reviewing films, and coordinating care.  Patient seen earlier today. Signature timestamp does not reflect patient encounter time.    More than 50% of the time is spent counseling the patient and family and coordinating care.

## 2020-06-15 NOTE — PROGRESS NOTES
[facility]  Medical Office [address], [address]  [place] [address]  Phone: [phone]  Fax: [phone]      Patient ID: Matt Williamson                              : 1945    Visit Date: 6/15/2020    Referring Provider: No ref. provider found        History of Present Illness:   Matt Williamson is a pleasant 74 y.o. male with thyroid cancer metastatic to L3 vertebral body seen today by the neurosurgery service for follow-up to right L4 radicular pain. In the interm he has been complaining of more pain on the R side of his low back radiating down the outside of the leg to to his ankle. Pain is 2/10 but can be 10/10 at night which is keeping him up. He has been using half of a percocet for relief. He has not undergone any physical therapy.     The patient initially presented for right leg pain which began without any injury or inciting event.  He underwent imaging which demonstrated an L4-5 disc herniation causing right L4 nerve root compression as well as a lesion in the vertebral body of L3. Testing revealed the lesion was thyroid cancer. He is being followed by Dr. [facility] of radiation oncology and recently finished radiation therapy. He reports that his right L4 radiculopathy has initially improved with injections. It only bothers him when he has an overactive day. He denies lower extremity weakness, paresthesias, gait instability, and bowel/bladder changes        Allergies:  Allergies   Allergen Reactions   • Penicillins Rash     Childhood allergy       Medications:     Current Outpatient Medications:   •  aspirin 81 mg enteric coated tablet, Take 81 mg by mouth daily., Disp: , Rfl:   •  dulaglutide 0.75 mg/0.5 mL pen injector, Inject 0.5 mL (0.75 mg total) under the skin once a week. (Patient not taking: Reported on 2020 ), Disp: 4 pen, Rfl: 0  •  gabapentin (NEURONTIN) 300 mg capsule, Take 1 capsule (300 mg total) by mouth nightly., Disp: 90  capsule, Rfl: 3  •  JARDIANCE 10 mg tablet, TAKE 1 TABLET(10 MG) BY MOUTH DAILY, Disp: 90 tablet, Rfl: 0  •  levothyroxine (SYNTHROID) 150 mcg tablet, Take 1 tablet (150 mcg total) by mouth daily., Disp: 90 tablet, Rfl: 0  •  metFORMIN (GLUCOPHAGE) 500 mg tablet, TAKE 1 TABLET(500 MG) BY MOUTH FOUR TIMES DAILY, Disp: 360 tablet, Rfl: 0  •  metoprolol succinate XL (TOPROL-XL) 100 mg 24 hr tablet, Take 1 tablet (100 mg total) by mouth 2 (two) times a day., Disp: 180 tablet, Rfl: 3  •  oxyCODONE-acetaminophen (PERCOCET) 5-325 mg per tablet, Take 1 tablet by mouth every 4 (four) hours as needed for moderate pain for up to 10 days., Disp: 20 tablet, Rfl: 0  •  pantoprazole (PROTONIX) 40 mg EC tablet, TAKE 1 TABLET(40 MG) BY MOUTH DAILY, Disp: 90 tablet, Rfl: 0  •  pravastatin (PRAVACHOL) 40 mg tablet, Take 1 tablet (40 mg total) by mouth daily., Disp: 90 tablet, Rfl: 3     Past Medical History:   Past Medical History:   Diagnosis Date   • Abdominal hernia    • Abnormal ECG 10/19   • Atherosclerosis of coronary artery 9/14/2012    Overview:    • Atrial fibrillation (CMS/HCC)    • Atrial fibrillation with rapid ventricular response (CMS/HCC) 10/25/2019    Hospitalized on 10/25/19 at [facility]    • BPH (benign prostatic hyperplasia) 9/14/2012    Overview:    • Cancer (CMS/HCC) Thyroid   • Cataract 9/11/2013    Overview:    • Cyst of thyroid 1/15/2007   • Epigastric abdominal pain 2/18/2016    Overview:    • Essential hypertension 6/20/2016    Overview:    • Former tobacco use 5/2/2018    Overview:    • History of radiation therapy     I-131   • Mixed hyperlipidemia 2/18/2016    Overview:    • Osteomyelitis of foot (CMS/HCC) 2009    Surgery.   • PSA elevation 11/4/2018   • Thyroid cancer (CMS/HCC) 9/14/2012    Overview:    • Type 2 diabetes mellitus without complication (CMS/HCC) 6/20/2016    Overview:    • Vertigo 5/2/2018       Past Surgical History:   Past Surgical History:   Procedure Laterality Date    • ANAL FISSURECTOMY     • CARDIAC CATHETERIZATION  2005    LM FOD. 70-80% proximal LAD.Mild disease CX, RCA and branches.    • CARDIAC CATHETERIZATION  2005    LM FOD. Pristine LAD stent. CX FOD. RCA mild disease.   • CARDIAC SURGERY  Stent in lad   • CORONARY ANGIOPLASTY WITH STENT PLACEMENT  2005    LM FOD. Moderate, diffuse disease LAD. Irregular CX w/ PLVB distal to PDA 99%. Mild disease RCA. Stent to distal LAD.   • EYE SURGERY  Cataract   • FOOT SURGERY      For osteomyelitis.   • GASTROCUTANEOUS FISTULA CLOSURE     • THYROIDECTOMY     • TONSILLECTOMY  No       Family History:  Family History   Problem Relation Age of Onset   • Cancer Biological Mother    • Breast cancer Biological Mother    • Emphysema Biological Father    • Lung cancer Biological Father        Social History:  Social History     Socioeconomic History   • Marital status:      Spouse name: Not on file   • Number of children: Not on file   • Years of education: Not on file   • Highest education level: Not on file   Occupational History   • Occupation: Retired   Social Needs   • Financial resource strain: Not on file   • Food insecurity:     Worry: Patient refused     Inability: Patient refused   • Transportation needs:     Medical: Patient refused     Non-medical: Patient refused   Tobacco Use   • Smoking status: Former Smoker     Packs/day: 2.00     Years: 20.00     Pack years: 40.00     Types: Cigarettes     Last attempt to quit: 1980     Years since quittin.4   • Smokeless tobacco: Never Used   Substance and Sexual Activity   • Alcohol use: Yes     Comment: Social   • Drug use: Never   • Sexual activity: Not Currently     Partners: Female   Lifestyle   • Physical activity:     Days per week: Not on file     Minutes per session: Not on file   • Stress: Not on file   Relationships   • Social connections:     Talks on phone: Not on file     Gets together: Not on file     Attends Yazidism service: Not  on file     Active member of club or organization: Not on file     Attends meetings of clubs or organizations: Not on file     Relationship status: Not on file   • Intimate partner violence:     Fear of current or ex partner: Not on file     Emotionally abused: Not on file     Physically abused: Not on file     Forced sexual activity: Not on file   Other Topics Concern   • Not on file   Social History Narrative   • Not on file       Vitals: There were no vitals filed for this visit.    Review of Systems:  A complete 14 point review of systems was conducted and was deemed negative except what was documented in the HPI.    Physical Examination:  Physical Exam   Constitutional: Oriented to person, place, and time. Appears well-developed and well-nourished.   Head: Normocephalic and atraumatic.   Right Ear: External ear normal.   Left Ear: External ear normal.   Nose: Nose normal.   Mouth/Throat: Oropharynx is clear and moist.   Eyes: Conjunctivae and EOM are normal. Pupils are equal, round, and reactive to light. No scleral icterus.   Neck: Normal range of motion. Neck supple. No JVD present. No tracheal deviation present.   Cardiovascular: Normal rate and regular rhythm.    Pulmonary/Chest: Effort normal and breath sounds normal. No stridor. No respiratory distress. No wheezes. No tenderness.   Abdominal: Soft. No distension. There is no tenderness. There is no rebound and no guarding.   Musculoskeletal: Normal range of motion. No edema or tenderness.   Neurological: Oriented to person, place, and time.   Skin: Skin is warm and dry. No rash noted. No erythema.   Psychiatric: Normal mood and affect. Speech is normal and behavior is normal. Thought content normal.   Vitals reviewed.      Neurological Examination:  Neurologic Exam     Mental Status   Oriented to person, place, and time.   Attention: normal.   Speech: speech is normal   Level of consciousness: alert    Cranial Nerves     CN II   Visual acuity:  normal    CN III, IV, VI   Pupils are equal, round, and reactive to light.  Extraocular motions are normal.   Right pupil: Size: 3 mm. Shape: regular. Reactivity: brisk.   Left pupil: Size: 3 mm. Shape: regular. Reactivity: brisk.   CN III: no CN III palsy  CN VI: no CN VI palsy  Nystagmus: none     CN V   Facial sensation intact.     CN VIII   CN VIII normal.   Hearing: intact    CN IX, X   CN IX normal.   Palate: symmetric    CN XI   CN XI normal.   Right sternocleidomastoid strength: normal  Left sternocleidomastoid strength: normal    CN XII   CN XII normal.   Tongue: not atrophic    Sensation ( /2)    Right Left  Right Left   C5 2 2 L2 2 2   C6 2 2 L3 2 2   C7 2 2 L4 2 2   C8 2 2 L5 2 2   T1 2 2 S1 2 2     Motor:     Deltoid Biceps Triceps Wrist ext Finger ext Hand Intrinsics Hip flexion Knee ext Dorsi-  flexion EHL Plantar Flexion   R 5 5 5 5 5 5 4 5 5 5 5   L 5 5 5 5 5 5 5 5 5 5 5     There is no pronator drift. Muscle bulk and tone are normal.     Gait, Coordination, and Reflexes     Reflexes   Reflexes 2+ except as noted.   Right plantar: normal  Left plantar: normal  Right Maharaj: absent  Left Maharaj: absent  Right ankle clonus: absent  Left ankle clonus: absent      Data Review:    Lab Results:   Lab Results   Component Value Date    WBC 10.13 01/17/2020    HGB 14.1 01/17/2020    HCT 43.0 01/17/2020    MCV 88.5 01/17/2020     01/17/2020    RDW 13.7 01/17/2020      Lab Results   Component Value Date    GLUCOSE 121 (H) 01/29/2020    GLUCOSE 225 (H) 05/02/2018    BUN 24 (H) 04/17/2020     01/29/2020     05/02/2018    K 4.6 01/29/2020    K 4.6 05/02/2018     01/29/2020    CL 98 05/02/2018    CO2 27 01/29/2020    CO2 27 05/02/2018    ANIONGAP 12 05/02/2018        Imaging:   Independent review of all imaging was done by myself as well as review of the radiologists readings and comparison to prior films.     MRI lumbar spine 5/28/2020  1.  Metastatic disease involving L3 vertebral body  mildly improving.  2.  No new metastatic disease        Assessment / Plan:     In summary, Matt Williamson is a pleasant 74 y.o. male with thyroid cancer metastatic to L3 vertebral body seen today by the neurosurgery service for follow-up to right L4 radicular pain. It seems his pain has progressed in the interval.  Pain is 2/10 but can be 10/10 at night which is keeping him up. On exam, he has some chronic R hip flexion weakness but otherwise full strength throughout. He has completed his radiation therapy with regression of the L3 vertebral body tumor.    His MRI reveals that he has a foraminal disc herniation at L4-5 on the right, which I believe is causing his symptoms.  At this time, he can return to PT and he has a script already. We will also refer him back to Dr. Feliz for Right L4 TFESI. In speaking with Dr. Patel (radiation oncology), the concern for him being immunocompromised is low and it is reasonable to proceed with the KIKO. He is not on chemotherapy. We are also going to increase his gabapentin to 300mg TID. We are limiting PO steroid use given his diabetes.         Colt Heller MD    I, Bruno Tierney PA-C, am scribing for, and in the presence of, Dr. Colt Heller.         25 minutes were spent with the patient, examination, reviewing films, and coordinating care.  Patient seen earlier today. Signature timestamp does not reflect patient encounter time.   More than 50% of the time is spent counseling the patient and family and coordinating care.

## 2020-06-26 PROBLEM — M54.50 LOW BACK PAIN: Status: ACTIVE | Noted: 2019-10-24

## 2020-06-26 RX ORDER — GLIPIZIDE 5 MG/1
1 TABLET ORAL AS NEEDED
COMMUNITY
Start: 2020-05-07 | End: 2022-02-08 | Stop reason: HOSPADM

## 2020-06-28 PROBLEM — R42 VERTIGO: Status: RESOLVED | Noted: 2018-05-02 | Resolved: 2020-06-28

## 2020-06-28 NOTE — ASSESSMENT & PLAN NOTE
Patient's atrial fibrillation is currently controlled, and patient has no symptoms of shortness of breath or palpitations.  I would continue with the same medications at this time.

## 2020-06-28 NOTE — ASSESSMENT & PLAN NOTE
He sees Dr. Ravi Pride, last PSA was down to 6.4, benign prostate on biopsy continue with 6-month follow-up

## 2020-06-28 NOTE — ASSESSMENT & PLAN NOTE
History of thyroid cancer, follows with Dr. Lerma  Has metastasis  Currently getting radiation of his thyroid

## 2020-06-28 NOTE — ASSESSMENT & PLAN NOTE
Keep follow-up with his cancer team  Appreciate notes from Dr. Guallpa  Getting radiation and improving

## 2020-06-28 NOTE — ASSESSMENT & PLAN NOTE
The patient symptoms are controlled and denies any worrisome symptoms like trouble swallowing or bleeding. We discussed avoidance of spicy or hot foods or foods that make the reflux worse.  The patient would avoid alcohol and would continue with the same plan of care at this time.

## 2020-06-29 PROBLEM — R50.9 FEVER, UNSPECIFIED: Status: ACTIVE | Noted: 2020-06-29

## 2020-06-29 ASSESSMENT — ENCOUNTER SYMPTOMS
RESPIRATORY NEGATIVE: 1
CONSTITUTIONAL NEGATIVE: 1
EYES NEGATIVE: 1
GASTROINTESTINAL NEGATIVE: 1
CARDIOVASCULAR NEGATIVE: 1
ALLERGIC/IMMUNOLOGIC NEGATIVE: 1
MUSCULOSKELETAL NEGATIVE: 1
NEUROLOGICAL NEGATIVE: 1
ENDOCRINE NEGATIVE: 1
PSYCHIATRIC NEGATIVE: 1

## 2020-06-29 NOTE — ASSESSMENT & PLAN NOTE
The patient's hyperlipidemia is currently controlled and no changes were made in terms of the plan.  Would continue with healthy diet and exercise and medications as prescribed.  The patient denies any muscle pain, and understands the importance of compliance.

## 2020-06-29 NOTE — PROGRESS NOTES
Verification of Patient Location:  The patient affirms they are currently located in the following state: [place]    Request for Consent:   Video Encounter   Hello, my name is Raj Arreola DO.  Before we proceed, can you please verify your identification by telling me your full name and date of birth?  Can you tell me who is in the room with you?    You and I are about to have a telemedicine check-in or visit because you have requested it.  This is a live video-conference.  I am a real person, speaking to you in real time.  There is no one else with me on the video-conference.  However, when we use ([employer], [employer], etc) it is important for you to know that the video-conference may not be secure or private.  I am not recording this conversation and I am asking you not to record it.  This telemedicine visit will be billed to your health insurance or you, if you are self-insured.  You understand you will be responsible for any copayments or coinsurances that apply to your telemedicine visit.  Before starting our telemedicine visit, I am required to get your consent for this virtual check-in or visit by telemedicine. Do you consent?    Patient Response to Request for Consent:  Yes      Visit Documentation:  Subjective     Patient ID: Matt Williamson is a 74 y.o. male.  1945      HPI    The following have been reviewed and updated as appropriate in this visit:    Patient with thyroid cancer, having back pain with metastatic disease.  Continue with radiation and close follow-up with his team    We reviewed her recent blood sugars, and compliance with her medications.  I discussed the goals of treatment.  The patient should have annual retina screening, and podiatry care.  Treatment of diabetes would prevent complications like heart disease, peripheral arterial disease and diabetic complications.    Hyperlipidemia reviewed today and patient is compliant with healthy diet and exercise.  Discussed  goals of treatment for hyperlipidemia to prevent complications.  The patient has no muscle pain, abdominal pain or other intolerances to medication.        Review of Systems   Constitutional: Negative.    HENT: Negative.    Eyes: Negative.    Respiratory: Negative.    Cardiovascular: Negative.    Gastrointestinal: Negative.    Endocrine: Negative.    Genitourinary: Negative.    Musculoskeletal: Negative.    Allergic/Immunologic: Negative.    Neurological: Negative.    Psychiatric/Behavioral: Negative.          Assessment/Plan   Diagnoses and all orders for this visit:    Fever, unspecified fever cause (Primary)    Metastasis from thyroid cancer (CMS/[facility])  Assessment & Plan:  Keep follow-up with radiation oncology, hematology      Type 2 diabetes mellitus without complication, without long-term current use of insulin (CMS/[facility])  Assessment & Plan:  I discussed with the patient and the initial treatment for diabetes including regular exercise, and weight loss.  It is important to treat diabetes to prevent  complications.  Diabetes could lead to heart disease, stroke, visual problems including blindness and kidney failure.  The patient was encouraged to see ophthalmology and podiatry at least on a yearly basis.              Mixed hyperlipidemia  Assessment & Plan:  The patient's hyperlipidemia is currently controlled and no changes were made in terms of the plan.  Would continue with healthy diet and exercise and medications as prescribed.  The patient denies any muscle pain, and understands the importance of compliance.      Essential hypertension  Assessment & Plan:  Blood pressure today is controlled, continue with the same medications.  No symptoms of chest pain, pressure, palpitations or shortness of breath.  The patient will continue with low-salt, with healthy diet and exercise.      Fever, unspecified  Assessment & Plan:  Patient directed to urgent care for further evaluation  No localizing  symptoms          Time Spent in Medical Discussion During This Encounter:      23 minutes

## 2020-07-09 ENCOUNTER — TELEMEDICINE (OUTPATIENT)
Dept: INTERNAL MEDICINE | Facility: CLINIC | Age: 75
End: 2020-07-09
Payer: MEDICARE

## 2020-07-09 DIAGNOSIS — N40.0 BENIGN PROSTATIC HYPERPLASIA WITHOUT LOWER URINARY TRACT SYMPTOMS: ICD-10-CM

## 2020-07-09 DIAGNOSIS — R97.20 PSA ELEVATION: Primary | ICD-10-CM

## 2020-07-09 DIAGNOSIS — E11.9 TYPE 2 DIABETES MELLITUS WITHOUT COMPLICATION, WITHOUT LONG-TERM CURRENT USE OF INSULIN (CMS/HCC): ICD-10-CM

## 2020-07-09 DIAGNOSIS — C79.9 METASTASIS FROM THYROID CANCER (CMS/HCC): ICD-10-CM

## 2020-07-09 DIAGNOSIS — E78.2 MIXED HYPERLIPIDEMIA: ICD-10-CM

## 2020-07-09 DIAGNOSIS — C73 METASTASIS FROM THYROID CANCER (CMS/HCC): ICD-10-CM

## 2020-07-09 DIAGNOSIS — I10 ESSENTIAL HYPERTENSION: ICD-10-CM

## 2020-07-09 PROCEDURE — 99214 OFFICE O/P EST MOD 30 MIN: CPT | Mod: 95 | Performed by: INTERNAL MEDICINE

## 2020-07-09 NOTE — PROGRESS NOTES
Patient ID: Matt Williamson is a 75 y.o. male.      Problem List Items Addressed This Visit     BPH (benign prostatic hyperplasia)     Keep follow-up with urology PSA improved         Essential hypertension     Blood pressure today is controlled, continue with the same medications.  No symptoms of chest pain, pressure, palpitations or shortness of breath.  The patient will continue with low-salt, with healthy diet and exercise.         Metastasis from thyroid cancer (CMS/HCC)     Patient had radiation, slowly improving.  Continue with his follow-up with the specialist and with neurosurgery at [facility]         Mixed hyperlipidemia     The patient's hyperlipidemia is currently controlled and no changes were made in terms of the plan.  Would continue with healthy diet and exercise and medications as prescribed.  The patient denies any muscle pain, and understands the importance of compliance.         PSA elevation - Primary     Urologic follow-up with Dr. Ravi Pride's group  Lab Results   Component Value Date    PSA 3.8 07/27/2020    PSA 7.5 (H) 10/10/2018    PSA 3.5 12/09/2016     Continue with follow-up every 6-month biopsy was negative         Relevant Orders    PSA (Completed)    Type 2 diabetes mellitus without complication (CMS/HCC)     Lab Results   Component Value Date    HGBA1C 7.1 (H) 01/29/2020     I discussed with the patient and the initial treatment for diabetes including regular exercise, and weight loss.  It is important to treat diabetes to prevent  complications.  Diabetes could lead to heart disease, stroke, visual problems including blindness and kidney failure.  The patient was encouraged to see ophthalmology and podiatry at least on a yearly basis.                       Patient Active Problem List   Diagnosis   • BPH (benign prostatic hyperplasia)   • Atherosclerosis of coronary artery   • Cataract   • Epigastric abdominal pain   • Essential hypertension   • Former tobacco use   • Mixed  hyperlipidemia   • Metastasis from thyroid cancer (CMS/HCC)   • Type 2 diabetes mellitus without complication (CMS/HCC)   • Cyst of thyroid   • Esophageal reflux   • PSA elevation   • Atrial fibrillation with rapid ventricular response (CMS/HCC)   • Bone metastases (CMS/HCC)   • Low back pain   • Fever, unspecified       Patient's Medications   New Prescriptions    No medications on file   Previous Medications    ASPIRIN 81 MG ENTERIC COATED TABLET    Take 81 mg by mouth daily.    DULAGLUTIDE 0.75 MG/0.5 ML PEN INJECTOR    Inject 0.5 mL (0.75 mg total) under the skin once a week.    GABAPENTIN (NEURONTIN) 300 MG CAPSULE    Take 1 capsule (300 mg total) by mouth 3 (three) times a day.    GLIPIZIDE (GLUCOTROL) 5 MG TABLET    Take 1 tablet by mouth daily.    METFORMIN (GLUCOPHAGE) 500 MG TABLET    TAKE 1 TABLET(500 MG) BY MOUTH FOUR TIMES DAILY    METOPROLOL SUCCINATE XL (TOPROL-XL) 100 MG 24 HR TABLET    Take 1 tablet (100 mg total) by mouth 2 (two) times a day.    PRAVASTATIN (PRAVACHOL) 40 MG TABLET    Take 1 tablet (40 mg total) by mouth daily.    TRULICITY 0.75 MG/0.5 ML PEN INJECTOR       Modified Medications    Modified Medication Previous Medication    JARDIANCE 10 MG TABLET JARDIANCE 10 mg tablet       TAKE 1 TABLET(10 MG) BY MOUTH DAILY    TAKE 1 TABLET(10 MG) BY MOUTH DAILY    PANTOPRAZOLE (PROTONIX) 40 MG EC TABLET pantoprazole (PROTONIX) 40 mg EC tablet       TAKE 1 TABLET(40 MG) BY MOUTH DAILY    TAKE 1 TABLET(40 MG) BY MOUTH DAILY    SYNTHROID 150 MCG TABLET levothyroxine (SYNTHROID) 150 mcg tablet       TAKE 1 TABLET BY MOUTH EVERY DAY    Take 1 tablet (150 mcg total) by mouth daily.   Discontinued Medications    No medications on file     New Prescriptions    No medications on file       Allergies   Allergen Reactions   • Penicillins Rash     Childhood allergy       Social History     Tobacco Use   • Smoking status: Former Smoker     Packs/day: 2.00     Years: 20.00     Pack years: 40.00     Types:  Cigarettes     Quit date:      Years since quittin.7   • Smokeless tobacco: Never Used   Substance Use Topics   • Alcohol use: Yes     Comment: Social       Family History   Problem Relation Age of Onset   • Cancer Biological Mother    • Breast cancer Biological Mother    • Emphysema Biological Father    • Lung cancer Biological Father        Subjective   The patient presents the office today for follow-up regarding his medical issues.    75-year-old, history of coronary disease, atrial fibrillation, hypertension, metastatic thyroid cancer, hyperlipidemia, increased PSA and diabetes.  Patient is currently receiving radiation for his spine metastasis and still having some weakness and trouble getting around.  He was evaluated by his cancer team and felt that related to radiation and should continue with the same plan of care.    He continues with dulaglutide and Jardiance for his diabetes.  We will also continue with metformin.  We reviewed her recent blood sugars, and compliance with her medications.  I discussed the goals of treatment.  The patient should have annual retina screening, and podiatry care.  Treatment of diabetes would prevent complications like heart disease, peripheral arterial disease and diabetic complications.    Hyperlipidemia reviewed today and patient is compliant with healthy diet and exercise.  Discussed goals of treatment for hyperlipidemia to prevent complications.  The patient has no muscle pain, abdominal pain or other intolerances to medication.    Recent PSA was down to 3.8.    MRI 2020 L3 metastatic disease but no new lesions.    Follows with Dr. Haro for his coronary disease with the prior stent.            HPI  I reviewed his  medications, recent labs, and correspondence.     Review of Systems   Constitutional: Negative.    HENT: Negative.    Eyes: Negative.    Respiratory: Negative.    Cardiovascular: Negative.    Gastrointestinal: Negative.    Endocrine: Negative.     Genitourinary: Negative.    Musculoskeletal: Negative.    Allergic/Immunologic: Negative.    Neurological: Negative.    Psychiatric/Behavioral: Negative.        There were no vitals taken for this visit.      Physical Exam  Constitutional:       Appearance: He is well-developed.   HENT:      Head: Normocephalic and atraumatic.      Right Ear: External ear normal.      Left Ear: External ear normal.      Nose: Nose normal.   Eyes:      Conjunctiva/sclera: Conjunctivae normal.      Pupils: Pupils are equal, round, and reactive to light.   Neck:      Musculoskeletal: Normal range of motion.      Thyroid: No thyromegaly.   Cardiovascular:      Rate and Rhythm: Normal rate and regular rhythm.      Heart sounds: Normal heart sounds.   Pulmonary:      Effort: Pulmonary effort is normal.      Breath sounds: Normal breath sounds.   Abdominal:      General: Bowel sounds are normal.      Palpations: Abdomen is soft.   Musculoskeletal: Normal range of motion.   Lymphadenopathy:      Cervical: No cervical adenopathy.   Skin:     General: Skin is warm and dry.   Neurological:      Mental Status: He is alert and oriented to person, place, and time.

## 2020-07-28 LAB — PSA SERPL-MCNC: 3.8 NG/ML

## 2020-08-03 RX ORDER — LEVOTHYROXINE SODIUM 150 UG/1
TABLET ORAL
Qty: 90 TABLET | Refills: 1 | Status: SHIPPED | OUTPATIENT
Start: 2020-08-03 | End: 2021-09-07

## 2020-08-06 LAB
APPEARANCE UR: CLEAR
BILIRUB UR QL STRIP: NEGATIVE
COLOR UR: YELLOW
GLUCOSE UR QL STRIP: ABNORMAL
HGB UR QL STRIP: NEGATIVE
KETONES UR QL STRIP: NEGATIVE
LEUKOCYTE ESTERASE UR QL STRIP: NEGATIVE
NITRITE UR QL STRIP: NEGATIVE
PH UR STRIP: 7.5 [PH] (ref 5–8)
PROT UR QL STRIP: NEGATIVE
SP GR UR STRIP: 1.02 (ref 1–1.03)

## 2020-09-08 RX ORDER — PANTOPRAZOLE SODIUM 40 MG/1
TABLET, DELAYED RELEASE ORAL
Qty: 90 TABLET | Refills: 0 | Status: SHIPPED | OUTPATIENT
Start: 2020-09-08 | End: 2020-11-30

## 2020-09-09 RX ORDER — EMPAGLIFLOZIN 10 MG/1
TABLET, FILM COATED ORAL
Qty: 90 TABLET | Refills: 0 | Status: SHIPPED | OUTPATIENT
Start: 2020-09-09 | End: 2020-10-12

## 2020-09-21 ENCOUNTER — TELEMEDICINE (OUTPATIENT)
Dept: NEUROSURGERY | Facility: CLINIC | Age: 75
End: 2020-09-21
Payer: MEDICARE

## 2020-09-21 DIAGNOSIS — C80.1 MALIGNANT NEOPLASM METASTATIC TO LUMBAR SPINE WITH UNKNOWN PRIMARY SITE (CMS/HCC): Primary | ICD-10-CM

## 2020-09-21 DIAGNOSIS — C79.51 MALIGNANT NEOPLASM METASTATIC TO LUMBAR SPINE WITH UNKNOWN PRIMARY SITE (CMS/HCC): Primary | ICD-10-CM

## 2020-09-21 PROCEDURE — 99999 PR OFFICE/OUTPT VISIT,PROCEDURE ONLY: CPT | Mod: 95 | Performed by: NEUROLOGICAL SURGERY

## 2020-09-21 NOTE — PROGRESS NOTES
Verification of Patient Location:  The patient affirms they are currently located in the following state:[place]     Request for Consent:  You and I are about to have a telemedicine check-in or visit. This is allowed because you are already my patient, and you have requested it.  This telemedicine visit will be billed to your health insurance or you, if you are self-insured.  You understand you will be responsible for any copayments or coinsurances that apply to your telemedicine visit.  Before starting our telemedicine visit, I am required to get your consent for this virtual check-in or visit by telemedicine. Do you consent?      Patient Response to Request for Consent: Yes    The following have been reviewed and updated as appropriate in this visit:  Allergies  Meds  Problems         Visit Documentation:    Matt Williamson is a pleasant 74 y.o. male with thyroid cancer metastatic to L3 vertebral body for right L4 radicular pain. At his last visit on 6/22/20, his pain had progressed. Pain was 2/10 but he stated could be 10/10 at night which was keeping him up. On exam at that time he had chronic R hip flexion weakness but otherwise was full strength throughout. He has completed his radiation therapy with regression of the L3 vertebral body tumor.       Since last being seen, Mr. Williamson underwent a right L4 TFESI with Dr. Feliz.  He now states that his pain has nearly resolved.  He also states that his weakness has resolved as well and he is able to walk up stairs without issue.  I have ordered a repeat MRI lumbar spine to evaluate the L3 metastatic tumor.  He is now 6 months out from completion of his radiation therapy.  We would like Mr. Williamson to continue PT as well.  I am pleased with his progress to date.      Time Spent in Medical Discussion During This Encounter:  10 minutes

## 2020-09-25 ENCOUNTER — TELEPHONE (OUTPATIENT)
Dept: INTERNAL MEDICINE | Facility: CLINIC | Age: 75
End: 2020-09-25

## 2020-09-25 NOTE — TELEPHONE ENCOUNTER
Pt is scheduled for an OV with Dr. Arreola on 10/02/20 and is asking if it's necessary for him to come in the office for this appointment.  Pt will come in if Dr. Arreola wants to see him.  Pt last seen in office on 02/06/2019.

## 2020-10-02 ENCOUNTER — TELEMEDICINE (OUTPATIENT)
Dept: INTERNAL MEDICINE | Facility: CLINIC | Age: 75
End: 2020-10-02
Payer: MEDICARE

## 2020-10-02 DIAGNOSIS — I48.0 PAROXYSMAL ATRIAL FIBRILLATION (CMS/HCC): Primary | ICD-10-CM

## 2020-10-02 DIAGNOSIS — C73 METASTASIS FROM THYROID CANCER (CMS/HCC): ICD-10-CM

## 2020-10-02 DIAGNOSIS — E78.2 MIXED HYPERLIPIDEMIA: ICD-10-CM

## 2020-10-02 DIAGNOSIS — C79.9 METASTASIS FROM THYROID CANCER (CMS/HCC): ICD-10-CM

## 2020-10-02 DIAGNOSIS — I10 ESSENTIAL HYPERTENSION: ICD-10-CM

## 2020-10-02 DIAGNOSIS — E11.9 TYPE 2 DIABETES MELLITUS WITHOUT COMPLICATION, WITHOUT LONG-TERM CURRENT USE OF INSULIN (CMS/HCC): ICD-10-CM

## 2020-10-02 PROCEDURE — 99442 PR PHYS/QHP TELEPHONE EVALUATION 11-20 MIN: CPT | Mod: 95 | Performed by: INTERNAL MEDICINE

## 2020-10-02 NOTE — PROGRESS NOTES
Verification of Patient Location:  The patient affirms they are currently located in the following state:[place]     Request for Consent:  You and I are about to have a telemedicine check-in or visit. This is allowed because you are already my patient, and you have requested it.  This telemedicine visit will be billed to your health insurance or you, if you are self-insured.  You understand you will be responsible for any copayments or coinsurances that apply to your telemedicine visit.  Before starting our telemedicine visit, I am required to get your consent for this virtual check-in or visit by telemedicine. Do you consent?      Patient Response to Request for Consent: Yes    The following have been reviewed and updated as appropriate in this visit:         Visit Documentation:    BPH  He follows with [facility]   Keep follow-up with them  Last PSA 3.8    Metastatic thyroid cancer  Levothyroxine  Last MRI October, metastatic lesion at L3 diminished and DJD      Diabetes  Glucotrol, Jardiance, Trulicity  We reviewed her recent blood sugars, and compliance with her medications.  I discussed the goals of treatment.  The patient should have annual retina screening, and podiatry care.  Treatment of diabetes would prevent complications like heart disease, peripheral arterial disease and diabetic complications.    Hyperlipidemia  Pravachol  Hyperlipidemia reviewed today and patient is compliant with healthy diet and exercise.  Discussed goals of treatment for hyperlipidemia to prevent complications.  The patient has no muscle pain, abdominal pain or other intolerances to medication.        Atrial fibrillation  Atrial fibrillation, controlled with no bleeding, focal neurologic deficit or shortness of breath.  The patient will continue on the same medications to control rate.  The patient has no bleeding complications.      Paroxysmal atrial fibrillation (CMS/HCC)  Clinical sinus rhythm  Follows with   [facility]    Type 2 diabetes mellitus without complication (CMS/HCC)  I discussed with the patient and the initial treatment for diabetes including regular exercise, and weight loss.  It is important to treat diabetes to prevent  complications.  Diabetes could lead to heart disease, stroke, visual problems including blindness and kidney failure.  The patient was encouraged to see ophthalmology and podiatry at least on a yearly basis.            Metastasis from thyroid cancer (CMS/HCC)  Keep follow-up with oncology, radiation  Back pain improved  Keep follow-up with neurosurgery    Mixed hyperlipidemia  The patient's hyperlipidemia is currently controlled and no changes were made in terms of the plan.  Would continue with healthy diet and exercise and medications as prescribed.  The patient denies any muscle pain, and understands the importance of compliance.    Essential hypertension  Blood pressure today is controlled, continue with the same medications.  No symptoms of chest pain, pressure, palpitations or shortness of breath.  The patient will continue with low-salt, with healthy diet and exercise.              Time Spent in Medical Discussion During This Encounter:  15 minutes

## 2020-10-06 ASSESSMENT — ENCOUNTER SYMPTOMS
GASTROINTESTINAL NEGATIVE: 1
PSYCHIATRIC NEGATIVE: 1
ALLERGIC/IMMUNOLOGIC NEGATIVE: 1
NEUROLOGICAL NEGATIVE: 1
ENDOCRINE NEGATIVE: 1
CONSTITUTIONAL NEGATIVE: 1
CARDIOVASCULAR NEGATIVE: 1
MUSCULOSKELETAL NEGATIVE: 1
EYES NEGATIVE: 1
RESPIRATORY NEGATIVE: 1

## 2020-10-06 NOTE — ASSESSMENT & PLAN NOTE
Patient had radiation, slowly improving.  Continue with his follow-up with the specialist and with neurosurgery at [facility]   Problem: Nutritional:  Goal: Achieve adequate nutritional intake  Patient will consume 50% of meals   Outcome: NOT MET  Patient NPO with TF.  Goal: Nutrition support tolerated and meeting greater than 85% of estimated needs  Outcome: PROGRESSING AS EXPECTED  Advance fibersource HN to goal rate of 55 ml/hr

## 2020-10-06 NOTE — ASSESSMENT & PLAN NOTE
Urologic follow-up with Dr. Ravi Pride's [facility]  Lab Results   Component Value Date    PSA 3.8 07/27/2020    PSA 7.5 (H) 10/10/2018    PSA 3.5 12/09/2016     Continue with follow-up every 6-month biopsy was negative

## 2020-10-06 NOTE — ASSESSMENT & PLAN NOTE
Lab Results   Component Value Date    HGBA1C 7.1 (H) 01/29/2020     I discussed with the patient and the initial treatment for diabetes including regular exercise, and weight loss.  It is important to treat diabetes to prevent  complications.  Diabetes could lead to heart disease, stroke, visual problems including blindness and kidney failure.  The patient was encouraged to see ophthalmology and podiatry at least on a yearly basis.

## 2020-10-12 ENCOUNTER — CLINICAL SUPPORT (OUTPATIENT)
Dept: INTERNAL MEDICINE | Facility: CLINIC | Age: 75
End: 2020-10-12
Payer: MEDICARE

## 2020-10-12 ENCOUNTER — HOSPITAL ENCOUNTER (OUTPATIENT)
Dept: RADIOLOGY | Facility: HOSPITAL | Age: 75
Discharge: HOME | End: 2020-10-12
Attending: NEUROLOGICAL SURGERY
Payer: MEDICARE

## 2020-10-12 VITALS — TEMPERATURE: 98.8 F

## 2020-10-12 DIAGNOSIS — Z23 NEED FOR IMMUNIZATION AGAINST INFLUENZA: Primary | ICD-10-CM

## 2020-10-12 DIAGNOSIS — C79.51 MALIGNANT NEOPLASM METASTATIC TO LUMBAR SPINE WITH UNKNOWN PRIMARY SITE (CMS/HCC): ICD-10-CM

## 2020-10-12 DIAGNOSIS — C80.1 MALIGNANT NEOPLASM METASTATIC TO LUMBAR SPINE WITH UNKNOWN PRIMARY SITE (CMS/HCC): ICD-10-CM

## 2020-10-12 PROCEDURE — 90694 VACC AIIV4 NO PRSRV 0.5ML IM: CPT | Performed by: INTERNAL MEDICINE

## 2020-10-12 PROCEDURE — G0008 ADMIN INFLUENZA VIRUS VAC: HCPCS | Performed by: INTERNAL MEDICINE

## 2020-10-12 PROCEDURE — 96372 THER/PROPH/DIAG INJ SC/IM: CPT | Performed by: INTERNAL MEDICINE

## 2020-10-12 RX ORDER — GADOBUTROL 604.72 MG/ML
7.4 INJECTION INTRAVENOUS ONCE
Status: COMPLETED | OUTPATIENT
Start: 2020-10-12 | End: 2020-10-12

## 2020-10-12 RX ORDER — EMPAGLIFLOZIN 10 MG/1
TABLET, FILM COATED ORAL
Qty: 90 TABLET | Refills: 0 | Status: SHIPPED | OUTPATIENT
Start: 2020-10-12 | End: 2021-03-15

## 2020-10-12 RX ADMIN — GADOBUTROL 7.4 ML: 604.72 INJECTION INTRAVENOUS at 12:26

## 2020-10-12 NOTE — PATIENT INSTRUCTIONS
VACCINE INFORMATION STATEMENT     Influenza (Flu) Vaccine (Inactivated or Recombinant): What you need to know     Many Vaccine Information Statements are available in Maltese and other languages. See  www.immunize.org/vis    Hojas de información sobre vacunas están disponibles en español y en muchos otrosidiomas. Visite www.immunize.org/vis     1. Why Get Vaccinated?  Influenza vaccine can prevent influenza (flu).  Flu is a contagious disease that spreads around the [place] every year, usually between October and May. Anyone can get the flu, but it is more dangerous for some people. Infants and young children, people 65 years of age and older, pregnant women, and people with certain health conditions or a weakened immune system are at greatest risk of flu complications.  Pneumonia, bronchitis, sinus infections and ear infections are examples of flu-related complications. If you have a medical condition, such as heart disease, cancer or diabetes, flu can make it worse.  Flu can cause fever and chills, sore throat, muscle aches, fatigue, cough, headache, and runny or stuffy nose. Some people may have vomiting and diarrhea, though this is more common in children than adults.  Each year thousands of people in the [place] die from flu, and many more are hospitalized. Flu vaccine prevents millions of illnesses and flu-related visits to the doctor each year.    2. Influenza vaccine  CDC recommends everyone 6 months of age and older get vaccinated every flu season. Children  6 months through 8 years of age may need 2 doses during a single flu season. Everyone else needs only 1 dose each flu season.  It takes about 2 weeks for protection to develop after vaccination.  There are many flu viruses, and they are always changing. Each year a new flu vaccine is made to protect against three or four viruses that are likely to cause disease in the upcoming flu season. Even when the vaccine doesn't exactly match  these viruses, it may still provide some protection.  Influenza vaccine does not cause flu.  Influenza vaccine may be given at the same time as other vaccines.    3. Talk with your health care provider   Tell your vaccine provider if the person getting the vaccine:  Has had an allergic reaction after a previous dose of influenza vaccine, or has any severe, life- threatening allergies.  Has ever had Guillain-Barré Syndrome (also called GBS).  In some cases, your health care provider may decide to postpone influenza vaccination to a future visit.  People with minor illnesses, such as a cold, may be vaccinated. People who are moderately or severely ill should usually wait until they recover before getting influenza vaccine.  Your health care provider can give you more information.    4. Risks of vaccine reaction  Soreness, redness, and swelling where shot is given, fever, muscle aches, and headache can happen after influenza vaccine.  There may be a very small increased risk of Guillain-Barré Syndrome (GBS) after inactivated influenza vaccine (the flu shot).      Young children who get the flu shot along with pneumococcal vaccine (PCV13), and/or DTaP vaccine at the same time might be slightly more likely to have a seizure caused by fever. Tell your health care provider if a child who is getting flu vaccine has ever had a seizure.  People sometimes faint after medical procedures, including vaccination. Tell your provider if you feel dizzy or have vision changes or ringing in the ears.  As with any medicine, there is a very remote chance of a vaccine causing a severe allergic reaction, other serious injury, or death.     5. What if there is a serious problem?  An allergic reaction could occur after the vaccinated person leaves the clinic. If you see signs of a  severe allergic reaction (hives, swelling of the face and throat, difficulty breathing, a fast heartbeat, dizziness, or weakness), call 9-1-1 and get the person  to the nearest hospital.  For other signs that concern you, call your health care provider.  Adverse reactions should be reported to the Vaccine Adverse Event Reporting System (VAERS). Your health care provider will usually file this report, or you can do it yourself. Visit the VAERS website at www.vaers.[facility].gov or call 1-562.518.7665. VAERS is only for reporting reactions, and VAERS staff do not give medical advice.    6. The National Vaccine Injury Compensation Program  The National Vaccine Injury Compensation Program (VICP) is a federal program that was created to compensate people who may have been injured by certain vaccines. Visit the VICP website at www.[facility].gov/vaccinecompensation or call 1-795.320.8720 to learn about the program and about filing a claim. There is a time limit to file a claim for compensation.    7. How can I learn more?  Ask your healthcare provider.  Call your local or state health department.  Contact the Centers for Disease Control and Prevention (CDC):  - Call 1-762.107.6409 (4-115-CSE-INFO) or  - Visit CDC's www.cdc.gov/flu       U.S. Department of  Health and Human Services  Centers for Disease Control and Prevention  Vaccine Information Statement (Interim)  Inactivated Influenza Vaccine  8/15/2019  42 U.S.C. § 300aa-26

## 2020-10-20 DIAGNOSIS — C79.51 SECONDARY MALIGNANT NEOPLASM OF BONE AND BONE MARROW (CMS/HCC): Primary | ICD-10-CM

## 2020-10-20 DIAGNOSIS — C79.52 SECONDARY MALIGNANT NEOPLASM OF BONE AND BONE MARROW (CMS/HCC): Primary | ICD-10-CM

## 2020-10-20 PROBLEM — R97.20 PSA ELEVATION: Status: RESOLVED | Noted: 2018-11-04 | Resolved: 2020-10-20

## 2020-10-20 PROBLEM — I48.0 PAROXYSMAL ATRIAL FIBRILLATION (CMS/HCC): Status: ACTIVE | Noted: 2019-10-25

## 2020-10-20 PROBLEM — R50.9 FEVER, UNSPECIFIED: Status: RESOLVED | Noted: 2020-06-29 | Resolved: 2020-10-20

## 2020-10-20 PROBLEM — M54.50 LOW BACK PAIN: Status: RESOLVED | Noted: 2019-10-24 | Resolved: 2020-10-20

## 2020-10-26 RX ORDER — PRAVASTATIN SODIUM 40 MG/1
TABLET ORAL
Qty: 90 TABLET | Refills: 3 | Status: SHIPPED | OUTPATIENT
Start: 2020-10-26 | End: 2021-09-22

## 2020-11-10 RX ORDER — METOPROLOL SUCCINATE 100 MG/1
TABLET, EXTENDED RELEASE ORAL
Qty: 180 TABLET | Refills: 3 | Status: SHIPPED | OUTPATIENT
Start: 2020-11-10 | End: 2021-09-22

## 2020-11-10 NOTE — TELEPHONE ENCOUNTER
Medicine Refill Request    Last Office Visit: 11/4/2019  Last Telemedicine Visit: 5/7/2020 Minor Ji MD    Next Office Visit: Visit date not found  Next Telemedicine Visit: Visit date not found         Current Outpatient Medications:   •  aspirin 81 mg enteric coated tablet, Take 81 mg by mouth daily., Disp: , Rfl:   •  dulaglutide 0.75 mg/0.5 mL pen injector, Inject 0.5 mL (0.75 mg total) under the skin once a week. (Patient not taking: Reported on 5/28/2020 ), Disp: 4 pen, Rfl: 0  •  gabapentin (NEURONTIN) 300 mg capsule, Take 1 capsule (300 mg total) by mouth 3 (three) times a day. (Patient taking differently: Take 300 mg by mouth 2 (two) times a day.  ), Disp: 270 capsule, Rfl: 3  •  glipiZIDE (GLUCOTROL) 5 mg tablet, Take 1 tablet by mouth as needed. , Disp: , Rfl:   •  JARDIANCE 10 mg tablet, TAKE 1 TABLET(10 MG) BY MOUTH DAILY, Disp: 90 tablet, Rfl: 0  •  metFORMIN (GLUCOPHAGE) 500 mg tablet, TAKE 1 TABLET(500 MG) BY MOUTH FOUR TIMES DAILY, Disp: 360 tablet, Rfl: 0  •  metoprolol succinate XL (TOPROL-XL) 100 mg 24 hr tablet, Take 1 tablet (100 mg total) by mouth 2 (two) times a day., Disp: 180 tablet, Rfl: 3  •  pantoprazole (PROTONIX) 40 mg EC tablet, TAKE 1 TABLET(40 MG) BY MOUTH DAILY, Disp: 90 tablet, Rfl: 0  •  pravastatin (PRAVACHOL) 40 mg tablet, TAKE 1 TABLET(40 MG) BY MOUTH DAILY, Disp: 90 tablet, Rfl: 3  •  SYNTHROID 150 mcg tablet, TAKE 1 TABLET BY MOUTH EVERY DAY, Disp: 90 tablet, Rfl: 1  •  TRULICITY 0.75 mg/0.5 mL pen injector, , Disp: , Rfl:       BP Readings from Last 3 Encounters:   06/15/20 128/82   05/28/20 114/67   05/07/20 137/79       Recent Lab results:  Lab Results   Component Value Date    CHOL 129 01/29/2020   ,   Lab Results   Component Value Date    HDL 47 01/29/2020   ,   Lab Results   Component Value Date    LDLCALC 67 01/29/2020   ,   Lab Results   Component Value Date    TRIG 69 01/29/2020        Lab Results   Component Value Date    GLUCOSE 3+ (A) 08/05/2020   ,    Lab Results   Component Value Date    HGBA1C 7.1 (H) 01/29/2020         Lab Results   Component Value Date    CREATININE 1.1 04/17/2020       Lab Results   Component Value Date    TSH 0.32 (L) 01/29/2020

## 2020-11-20 ENCOUNTER — CONSULT (OUTPATIENT)
Dept: INTERNAL MEDICINE | Facility: CLINIC | Age: 75
End: 2020-11-20
Payer: MEDICARE

## 2020-11-20 VITALS
HEART RATE: 80 BPM | OXYGEN SATURATION: 99 % | TEMPERATURE: 97.5 F | SYSTOLIC BLOOD PRESSURE: 120 MMHG | RESPIRATION RATE: 18 BRPM | WEIGHT: 170.2 LBS | DIASTOLIC BLOOD PRESSURE: 80 MMHG | BODY MASS INDEX: 27.35 KG/M2 | HEIGHT: 66 IN

## 2020-11-20 DIAGNOSIS — C73 METASTASIS FROM THYROID CANCER (CMS/HCC): ICD-10-CM

## 2020-11-20 DIAGNOSIS — I25.10 ATHEROSCLEROSIS OF NATIVE CORONARY ARTERY OF NATIVE HEART WITHOUT ANGINA PECTORIS: ICD-10-CM

## 2020-11-20 DIAGNOSIS — E11.9 TYPE 2 DIABETES MELLITUS WITHOUT COMPLICATION, WITHOUT LONG-TERM CURRENT USE OF INSULIN (CMS/HCC): ICD-10-CM

## 2020-11-20 DIAGNOSIS — H25.9 SENILE CATARACT OF RIGHT EYE, UNSPECIFIED AGE-RELATED CATARACT TYPE: ICD-10-CM

## 2020-11-20 DIAGNOSIS — Z01.818 PRE-OP EVALUATION: Primary | ICD-10-CM

## 2020-11-20 DIAGNOSIS — I10 ESSENTIAL HYPERTENSION: ICD-10-CM

## 2020-11-20 DIAGNOSIS — C79.51 BONE METASTASES: ICD-10-CM

## 2020-11-20 DIAGNOSIS — K21.9 GASTROESOPHAGEAL REFLUX DISEASE WITHOUT ESOPHAGITIS: ICD-10-CM

## 2020-11-20 DIAGNOSIS — E78.2 MIXED HYPERLIPIDEMIA: ICD-10-CM

## 2020-11-20 DIAGNOSIS — C79.9 METASTASIS FROM THYROID CANCER (CMS/HCC): ICD-10-CM

## 2020-11-20 DIAGNOSIS — I48.0 PAROXYSMAL ATRIAL FIBRILLATION (CMS/HCC): ICD-10-CM

## 2020-11-20 DIAGNOSIS — N40.0 BENIGN PROSTATIC HYPERPLASIA WITHOUT LOWER URINARY TRACT SYMPTOMS: ICD-10-CM

## 2020-11-20 PROCEDURE — 93000 ELECTROCARDIOGRAM COMPLETE: CPT | Performed by: NURSE PRACTITIONER

## 2020-11-20 PROCEDURE — 36415 COLL VENOUS BLD VENIPUNCTURE: CPT | Performed by: NURSE PRACTITIONER

## 2020-11-20 PROCEDURE — 99214 OFFICE O/P EST MOD 30 MIN: CPT | Performed by: NURSE PRACTITIONER

## 2020-11-20 RX ORDER — GABAPENTIN 300 MG/1
CAPSULE ORAL
COMMUNITY
End: 2020-11-20 | Stop reason: SDUPTHER

## 2020-11-20 RX ORDER — GLIPIZIDE AND METFORMIN HCL 5; 500 MG/1; MG/1
TABLET, FILM COATED ORAL
COMMUNITY
End: 2020-11-20 | Stop reason: ALTCHOICE

## 2020-11-20 ASSESSMENT — ENCOUNTER SYMPTOMS
ABDOMINAL PAIN: 0
FATIGUE: 0
EYE PAIN: 0
NAUSEA: 0
TROUBLE SWALLOWING: 0
FEVER: 0
CHEST TIGHTNESS: 0
DIFFICULTY URINATING: 0
APPETITE CHANGE: 0
SPEECH DIFFICULTY: 0
WEAKNESS: 0
JOINT SWELLING: 0
BACK PAIN: 1
PALPITATIONS: 1
PHOTOPHOBIA: 0
VOMITING: 0
NERVOUS/ANXIOUS: 0
SHORTNESS OF BREATH: 0
HALLUCINATIONS: 0
COUGH: 0
SINUS PAIN: 0
SINUS PRESSURE: 0
SLEEP DISTURBANCE: 0
CHILLS: 0
WHEEZING: 0
SORE THROAT: 0

## 2020-11-20 NOTE — ASSESSMENT & PLAN NOTE
Plan for right cataract surgery on 12/8 with Dr. Hale.   EKG today sinus rhythm.  Lab result stable.  Acceptable risk. Pt is medically stable to proceed with this procedure.

## 2020-11-20 NOTE — ASSESSMENT & PLAN NOTE
Stable. Due for f/u with urologist    Lab Results   Component Value Date    PSA 3.8 07/27/2020    PSA 7.5 (H) 10/10/2018    PSA 3.5 12/09/2016

## 2020-11-20 NOTE — PROGRESS NOTES
Subjective      Patient ID: Matt Williamson is a 75 y.o. male.  1945      Patient is here for pre-op evaluation. Plan for right cataract surgery on 12/8 with Dr. Hale.   Denies significant hx of problem with anaesthesia or bleeding. No chest pain or shortness of breath. Intermittent palpitations. On metoprolol. Hx of afib. Pt declined anticoagulant. No dizziness. No change with appetite. No change with urinary or bowel function.       The following have been reviewed and updated as appropriate in this visit:  Tobacco  Allergies  Meds  Problems  Med Hx  Surg Hx  Fam Hx        Past Medical History:   Diagnosis Date   • Abdominal hernia    • Abnormal ECG 10/19   • Atherosclerosis of coronary artery 9/14/2012    Overview:    • Atrial fibrillation (CMS/HCC)    • Atrial fibrillation with rapid ventricular response (CMS/HCC) 10/25/2019    Hospitalized on 10/25/19 at [facility].    • BPH (benign prostatic hyperplasia) 9/14/2012    Overview:    • Cancer (CMS/HCC) Thyroid   • Cataract 9/11/2013    Overview:    • Cyst of thyroid 1/15/2007   • Epigastric abdominal pain 2/18/2016    Overview:    • Essential hypertension 6/20/2016    Overview:    • Former tobacco use 5/2/2018    Overview:    • History of radiation therapy     I-131   • Mixed hyperlipidemia 2/18/2016    Overview:    • Osteomyelitis of foot (CMS/HCC) 2009    Surgery.   • PSA elevation 11/4/2018   • Thyroid cancer (CMS/HCC) 9/14/2012    Overview:    • Type 2 diabetes mellitus without complication (CMS/HCC) 6/20/2016    Overview:    • Vertigo 5/2/2018   • Vertigo 5/2/2018       Past Surgical History:   Procedure Laterality Date   • ANAL FISSURECTOMY  1986   • CARDIAC CATHETERIZATION  11/21/2005    LM FOD. 70-80% proximal LAD.Mild disease CX, RCA and branches.    • CARDIAC CATHETERIZATION  11/28/2005    LM FOD. Pristine LAD stent. CX FOD. RCA mild disease.   • CARDIAC SURGERY  Stent in lad   • CORONARY ANGIOPLASTY WITH STENT  PLACEMENT  2005    LM FOD. Moderate, diffuse disease LAD. Irregular CX w/ PLVB distal to PDA 99%. Mild disease RCA. Stent to distal LAD.   • EYE SURGERY  Cataract   • FOOT SURGERY      For osteomyelitis.   • GASTROCUTANEOUS FISTULA CLOSURE     • THYROIDECTOMY     • TONSILLECTOMY  No       Family History   Problem Relation Age of Onset   • Cancer Biological Mother    • Breast cancer Biological Mother    • Emphysema Biological Father    • Lung cancer Biological Father        Social History     Socioeconomic History   • Marital status:      Spouse name: Not on file   • Number of children: Not on file   • Years of education: Not on file   • Highest education level: Not on file   Occupational History   • Occupation: Retired   Social Needs   • Financial resource strain: Not on file   • Food insecurity     Worry: Patient refused     Inability: Patient refused   • Transportation needs     Medical: Patient refused     Non-medical: Patient refused   Tobacco Use   • Smoking status: Former Smoker     Packs/day: 2.00     Years: 20.00     Pack years: 40.00     Types: Cigarettes     Quit date:      Years since quittin.9   • Smokeless tobacco: Never Used   Substance and Sexual Activity   • Alcohol use: Yes     Comment: Social   • Drug use: Never   • Sexual activity: Not Currently     Partners: Female   Lifestyle   • Physical activity     Days per week: Not on file     Minutes per session: Not on file   • Stress: Not on file   Relationships   • Social connections     Talks on phone: Not on file     Gets together: Not on file     Attends Hoahaoism service: Not on file     Active member of club or organization: Not on file     Attends meetings of clubs or organizations: Not on file     Relationship status: Not on file   • Intimate partner violence     Fear of current or ex partner: Not on file     Emotionally abused: Not on file     Physically abused: Not on file     Forced sexual activity: Not on file    Other Topics Concern   • Not on file   Social History Narrative   • Not on file       Patient's Medications   New Prescriptions    No medications on file   Previous Medications    ASPIRIN 81 MG ENTERIC COATED TABLET    Take 81 mg by mouth daily.    GABAPENTIN (NEURONTIN) 300 MG CAPSULE    Take 1 capsule (300 mg total) by mouth 3 (three) times a day.    GLIPIZIDE (GLUCOTROL) 5 MG TABLET    Take 1 tablet by mouth as needed.     JARDIANCE 10 MG TABLET    TAKE 1 TABLET(10 MG) BY MOUTH DAILY    METFORMIN (GLUCOPHAGE) 500 MG TABLET    TAKE 1 TABLET(500 MG) BY MOUTH FOUR TIMES DAILY    METOPROLOL SUCCINATE XL (TOPROL-XL) 100 MG 24 HR TABLET    TAKE 1 TABLET BY MOUTH TWICE DAILY    PANTOPRAZOLE (PROTONIX) 40 MG EC TABLET    TAKE 1 TABLET(40 MG) BY MOUTH DAILY    PRAVASTATIN (PRAVACHOL) 40 MG TABLET    TAKE 1 TABLET(40 MG) BY MOUTH DAILY    SYNTHROID 150 MCG TABLET    TAKE 1 TABLET BY MOUTH EVERY DAY    TRULICITY 0.75 MG/0.5 ML PEN INJECTOR       Modified Medications    No medications on file   Discontinued Medications    DULAGLUTIDE 0.75 MG/0.5 ML PEN INJECTOR    Inject 0.5 mL (0.75 mg total) under the skin once a week.    GABAPENTIN (NEURONTIN) 300 MG CAPSULE    gabapentin  300 mg caps    GLIPIZIDE-METFORMIN (METAGLIP) 5-500 MG PER TABLET    glipizide  5 mg tabs     New Prescriptions    No medications on file       Allergies   Allergen Reactions   • Penicillins Rash     Childhood allergy          Review of Systems   Constitutional: Negative for appetite change, chills, fatigue and fever.   HENT: Negative for congestion, ear pain, sinus pressure, sinus pain, sore throat and trouble swallowing.    Eyes: Positive for visual disturbance (right). Negative for photophobia and pain.   Respiratory: Negative for cough, chest tightness, shortness of breath and wheezing.    Cardiovascular: Positive for palpitations (intermittent). Negative for chest pain and leg swelling.   Gastrointestinal: Negative for abdominal pain, nausea and  "vomiting.   Genitourinary: Negative for difficulty urinating.   Musculoskeletal: Positive for back pain (chronic). Negative for gait problem and joint swelling.   Skin: Negative for rash.   Neurological: Negative for speech difficulty and weakness.   Psychiatric/Behavioral: Negative for behavioral problems, hallucinations, sleep disturbance and suicidal ideas. The patient is not nervous/anxious.        Objective     Vitals:    11/20/20 1257   BP: 120/80   Pulse: 80   Resp: 18   Temp: 36.4 °C (97.5 °F)   SpO2: 99%   Weight: 77.2 kg (170 lb 3.2 oz)   Height: 1.676 m (5' 6\")     Body mass index is 27.47 kg/m².    Physical Exam  Constitutional:       Appearance: He is well-developed.   HENT:      Head: Normocephalic and atraumatic.   Eyes:      Conjunctiva/sclera: Conjunctivae normal.      Pupils: Pupils are equal, round, and reactive to light.   Neck:      Musculoskeletal: Normal range of motion and neck supple.      Thyroid: No thyromegaly.   Cardiovascular:      Rate and Rhythm: Normal rate and regular rhythm.      Heart sounds: Normal heart sounds.   Pulmonary:      Effort: Pulmonary effort is normal. No respiratory distress.      Breath sounds: Normal breath sounds. No wheezing or rales.   Chest:      Chest wall: No tenderness.   Abdominal:      General: Bowel sounds are normal. There is no distension.      Palpations: Abdomen is soft. There is no mass.      Tenderness: There is no abdominal tenderness. There is no guarding or rebound.   Musculoskeletal: Normal range of motion.         General: No tenderness.   Skin:     General: Skin is warm and dry.   Neurological:      Mental Status: He is alert and oriented to person, place, and time.      Deep Tendon Reflexes: Reflexes are normal and symmetric.   Psychiatric:         Behavior: Behavior normal.         Thought Content: Thought content normal.         Judgment: Judgment normal.         Assessment/Plan   Diagnoses and all orders for this visit:    Pre-op " evaluation (Primary)  Assessment & Plan:  Plan for right cataract surgery on 12/8 with Dr. Hale.   EKG today sinus rhythm.  Lab result stable.  Acceptable risk. Pt is medically stable to proceed with this procedure.     Orders:  -     ECG 12 LEAD OFFICE PERFORMED    Senile cataract of right eye, unspecified age-related cataract type  Assessment & Plan:  Plan for right cataract surgery on 12/8 with Dr. Hale.       Paroxysmal atrial fibrillation (CMS/HCC)  Assessment & Plan:  On metoprolol.   EKG today sinus rhythm  Continue to f/u with Dr. Ji      Essential hypertension  Assessment & Plan:  BP stable. Will continue to monitor    Orders:  -     CBC and Differential  -     Comprehensive metabolic panel    Atherosclerosis of native coronary artery of native heart without angina pectoris  Assessment & Plan:  Stable. Continue the same meds. Continue to f/u with cardiologist.       Gastroesophageal reflux disease without esophagitis  Assessment & Plan:  Stable. Continue protonix      Benign prostatic hyperplasia without lower urinary tract symptoms  Assessment & Plan:  Stable. Due for f/u with urologist    Lab Results   Component Value Date    PSA 3.8 07/27/2020    PSA 7.5 (H) 10/10/2018    PSA 3.5 12/09/2016           Metastasis from thyroid cancer (CMS/HCC)  Assessment & Plan:  Had radiation. Back pain improved. Continue to f/u with oncologist.     Orders:  -     TSH w reflex FT4    Bone metastases (CMS/HCC)  Assessment & Plan:  Got radiation. Continue to f/u with oncologist.       Mixed hyperlipidemia  Assessment & Plan:  Continue pravastatin    Component      Latest Ref Rng & Units 1/29/2020   HDL      >40 mg/dL 47   Cholesterol      <200 mg/dL 129   Non-HDL, Calculated      <130 mg/dL (calc) 82   Triglycerides      <150 mg/dL 69   LDL Calculated      mg/dL (calc) 67   Chol/Hdlc Ratio      <5.0 (calc) 2.7         Type 2 diabetes mellitus without complication, without long-term current use of insulin  (CMS/[facility])  Assessment & Plan:  Due for A1C  Continue the same meds  Diabetic diet    Lab Results   Component Value Date    HGBA1C 7.1 (H) 01/29/2020         Orders:  -     Hemoglobin A1c

## 2020-11-20 NOTE — ASSESSMENT & PLAN NOTE
Continue pravastatin    Component      Latest Ref Rng & Units 1/29/2020   HDL      >40 mg/dL 47   Cholesterol      <200 mg/dL 129   Non-HDL, Calculated      <130 mg/dL (calc) 82   Triglycerides      <150 mg/dL 69   LDL Calculated      mg/dL (calc) 67   Chol/Hdlc Ratio      <5.0 (calc) 2.7

## 2020-11-20 NOTE — H&P (VIEW-ONLY)
Subjective      Patient ID: Matt Williamson is a 75 y.o. male.  1945      Patient is here for pre-op evaluation. Plan for right cataract surgery on 12/8 with Dr. Hale.   Denies significant hx of problem with anaesthesia or bleeding. No chest pain or shortness of breath. Intermittent palpitations. On metoprolol. Hx of afib. Pt declined anticoagulant. No dizziness. No change with appetite. No change with urinary or bowel function.       The following have been reviewed and updated as appropriate in this visit:  Tobacco  Allergies  Meds  Problems  Med Hx  Surg Hx  Fam Hx        Past Medical History:   Diagnosis Date   • Abdominal hernia    • Abnormal ECG 10/19   • Atherosclerosis of coronary artery 9/14/2012    Overview:    • Atrial fibrillation (CMS/HCC)    • Atrial fibrillation with rapid ventricular response (CMS/HCC) 10/25/2019    Hospitalized on 10/25/19 at [facility].    • BPH (benign prostatic hyperplasia) 9/14/2012    Overview:    • Cancer (CMS/HCC) Thyroid   • Cataract 9/11/2013    Overview:    • Cyst of thyroid 1/15/2007   • Epigastric abdominal pain 2/18/2016    Overview:    • Essential hypertension 6/20/2016    Overview:    • Former tobacco use 5/2/2018    Overview:    • History of radiation therapy     I-131   • Mixed hyperlipidemia 2/18/2016    Overview:    • Osteomyelitis of foot (CMS/HCC) 2009    Surgery.   • PSA elevation 11/4/2018   • Thyroid cancer (CMS/HCC) 9/14/2012    Overview:    • Type 2 diabetes mellitus without complication (CMS/HCC) 6/20/2016    Overview:    • Vertigo 5/2/2018   • Vertigo 5/2/2018       Past Surgical History:   Procedure Laterality Date   • ANAL FISSURECTOMY  1986   • CARDIAC CATHETERIZATION  11/21/2005    LM FOD. 70-80% proximal LAD.Mild disease CX, RCA and branches.    • CARDIAC CATHETERIZATION  11/28/2005    LM FOD. Pristine LAD stent. CX FOD. RCA mild disease.   • CARDIAC SURGERY  Stent in lad   • CORONARY ANGIOPLASTY WITH STENT  PLACEMENT  2005    LM FOD. Moderate, diffuse disease LAD. Irregular CX w/ PLVB distal to PDA 99%. Mild disease RCA. Stent to distal LAD.   • EYE SURGERY  Cataract   • FOOT SURGERY      For osteomyelitis.   • GASTROCUTANEOUS FISTULA CLOSURE     • THYROIDECTOMY     • TONSILLECTOMY  No       Family History   Problem Relation Age of Onset   • Cancer Biological Mother    • Breast cancer Biological Mother    • Emphysema Biological Father    • Lung cancer Biological Father        Social History     Socioeconomic History   • Marital status:      Spouse name: Not on file   • Number of children: Not on file   • Years of education: Not on file   • Highest education level: Not on file   Occupational History   • Occupation: Retired   Social Needs   • Financial resource strain: Not on file   • Food insecurity     Worry: Patient refused     Inability: Patient refused   • Transportation needs     Medical: Patient refused     Non-medical: Patient refused   Tobacco Use   • Smoking status: Former Smoker     Packs/day: 2.00     Years: 20.00     Pack years: 40.00     Types: Cigarettes     Quit date:      Years since quittin.9   • Smokeless tobacco: Never Used   Substance and Sexual Activity   • Alcohol use: Yes     Comment: Social   • Drug use: Never   • Sexual activity: Not Currently     Partners: Female   Lifestyle   • Physical activity     Days per week: Not on file     Minutes per session: Not on file   • Stress: Not on file   Relationships   • Social connections     Talks on phone: Not on file     Gets together: Not on file     Attends Denominational service: Not on file     Active member of club or organization: Not on file     Attends meetings of clubs or organizations: Not on file     Relationship status: Not on file   • Intimate partner violence     Fear of current or ex partner: Not on file     Emotionally abused: Not on file     Physically abused: Not on file     Forced sexual activity: Not on file    Other Topics Concern   • Not on file   Social History Narrative   • Not on file       Patient's Medications   New Prescriptions    No medications on file   Previous Medications    ASPIRIN 81 MG ENTERIC COATED TABLET    Take 81 mg by mouth daily.    GABAPENTIN (NEURONTIN) 300 MG CAPSULE    Take 1 capsule (300 mg total) by mouth 3 (three) times a day.    GLIPIZIDE (GLUCOTROL) 5 MG TABLET    Take 1 tablet by mouth as needed.     JARDIANCE 10 MG TABLET    TAKE 1 TABLET(10 MG) BY MOUTH DAILY    METFORMIN (GLUCOPHAGE) 500 MG TABLET    TAKE 1 TABLET(500 MG) BY MOUTH FOUR TIMES DAILY    METOPROLOL SUCCINATE XL (TOPROL-XL) 100 MG 24 HR TABLET    TAKE 1 TABLET BY MOUTH TWICE DAILY    PANTOPRAZOLE (PROTONIX) 40 MG EC TABLET    TAKE 1 TABLET(40 MG) BY MOUTH DAILY    PRAVASTATIN (PRAVACHOL) 40 MG TABLET    TAKE 1 TABLET(40 MG) BY MOUTH DAILY    SYNTHROID 150 MCG TABLET    TAKE 1 TABLET BY MOUTH EVERY DAY    TRULICITY 0.75 MG/0.5 ML PEN INJECTOR       Modified Medications    No medications on file   Discontinued Medications    DULAGLUTIDE 0.75 MG/0.5 ML PEN INJECTOR    Inject 0.5 mL (0.75 mg total) under the skin once a week.    GABAPENTIN (NEURONTIN) 300 MG CAPSULE    gabapentin  300 mg caps    GLIPIZIDE-METFORMIN (METAGLIP) 5-500 MG PER TABLET    glipizide  5 mg tabs     New Prescriptions    No medications on file       Allergies   Allergen Reactions   • Penicillins Rash     Childhood allergy          Review of Systems   Constitutional: Negative for appetite change, chills, fatigue and fever.   HENT: Negative for congestion, ear pain, sinus pressure, sinus pain, sore throat and trouble swallowing.    Eyes: Positive for visual disturbance (right). Negative for photophobia and pain.   Respiratory: Negative for cough, chest tightness, shortness of breath and wheezing.    Cardiovascular: Positive for palpitations (intermittent). Negative for chest pain and leg swelling.   Gastrointestinal: Negative for abdominal pain, nausea and  "vomiting.   Genitourinary: Negative for difficulty urinating.   Musculoskeletal: Positive for back pain (chronic). Negative for gait problem and joint swelling.   Skin: Negative for rash.   Neurological: Negative for speech difficulty and weakness.   Psychiatric/Behavioral: Negative for behavioral problems, hallucinations, sleep disturbance and suicidal ideas. The patient is not nervous/anxious.        Objective     Vitals:    11/20/20 1257   BP: 120/80   Pulse: 80   Resp: 18   Temp: 36.4 °C (97.5 °F)   SpO2: 99%   Weight: 77.2 kg (170 lb 3.2 oz)   Height: 1.676 m (5' 6\")     Body mass index is 27.47 kg/m².    Physical Exam  Constitutional:       Appearance: He is well-developed.   HENT:      Head: Normocephalic and atraumatic.   Eyes:      Conjunctiva/sclera: Conjunctivae normal.      Pupils: Pupils are equal, round, and reactive to light.   Neck:      Musculoskeletal: Normal range of motion and neck supple.      Thyroid: No thyromegaly.   Cardiovascular:      Rate and Rhythm: Normal rate and regular rhythm.      Heart sounds: Normal heart sounds.   Pulmonary:      Effort: Pulmonary effort is normal. No respiratory distress.      Breath sounds: Normal breath sounds. No wheezing or rales.   Chest:      Chest wall: No tenderness.   Abdominal:      General: Bowel sounds are normal. There is no distension.      Palpations: Abdomen is soft. There is no mass.      Tenderness: There is no abdominal tenderness. There is no guarding or rebound.   Musculoskeletal: Normal range of motion.         General: No tenderness.   Skin:     General: Skin is warm and dry.   Neurological:      Mental Status: He is alert and oriented to person, place, and time.      Deep Tendon Reflexes: Reflexes are normal and symmetric.   Psychiatric:         Behavior: Behavior normal.         Thought Content: Thought content normal.         Judgment: Judgment normal.         Assessment/Plan   Diagnoses and all orders for this visit:    Pre-op " evaluation (Primary)  Assessment & Plan:  Plan for right cataract surgery on 12/8 with Dr. Hale.   EKG today sinus rhythm.  Lab result stable.  Acceptable risk. Pt is medically stable to proceed with this procedure.     Orders:  -     ECG 12 LEAD OFFICE PERFORMED    Senile cataract of right eye, unspecified age-related cataract type  Assessment & Plan:  Plan for right cataract surgery on 12/8 with Dr. Hale.       Paroxysmal atrial fibrillation (CMS/HCC)  Assessment & Plan:  On metoprolol.   EKG today sinus rhythm  Continue to f/u with Dr. Ji      Essential hypertension  Assessment & Plan:  BP stable. Will continue to monitor    Orders:  -     CBC and Differential  -     Comprehensive metabolic panel    Atherosclerosis of native coronary artery of native heart without angina pectoris  Assessment & Plan:  Stable. Continue the same meds. Continue to f/u with cardiologist.       Gastroesophageal reflux disease without esophagitis  Assessment & Plan:  Stable. Continue protonix      Benign prostatic hyperplasia without lower urinary tract symptoms  Assessment & Plan:  Stable. Due for f/u with urologist    Lab Results   Component Value Date    PSA 3.8 07/27/2020    PSA 7.5 (H) 10/10/2018    PSA 3.5 12/09/2016           Metastasis from thyroid cancer (CMS/HCC)  Assessment & Plan:  Had radiation. Back pain improved. Continue to f/u with oncologist.     Orders:  -     TSH w reflex FT4    Bone metastases (CMS/HCC)  Assessment & Plan:  Got radiation. Continue to f/u with oncologist.       Mixed hyperlipidemia  Assessment & Plan:  Continue pravastatin    Component      Latest Ref Rng & Units 1/29/2020   HDL      >40 mg/dL 47   Cholesterol      <200 mg/dL 129   Non-HDL, Calculated      <130 mg/dL (calc) 82   Triglycerides      <150 mg/dL 69   LDL Calculated      mg/dL (calc) 67   Chol/Hdlc Ratio      <5.0 (calc) 2.7         Type 2 diabetes mellitus without complication, without long-term current use of insulin  (CMS/[facility])  Assessment & Plan:  Due for A1C  Continue the same meds  Diabetic diet    Lab Results   Component Value Date    HGBA1C 7.1 (H) 01/29/2020         Orders:  -     Hemoglobin A1c

## 2020-11-21 LAB
ALBUMIN SERPL-MCNC: 4 G/DL (ref 3.6–5.1)
ALBUMIN/GLOB SERPL: 1.9 (CALC) (ref 1–2.5)
ALP SERPL-CCNC: 40 U/L (ref 35–144)
ALT SERPL-CCNC: 18 U/L (ref 9–46)
AST SERPL-CCNC: 18 U/L (ref 10–35)
BASOPHILS # BLD AUTO: 49 CELLS/UL (ref 0–200)
BASOPHILS NFR BLD AUTO: 0.7 %
BILIRUB SERPL-MCNC: 0.6 MG/DL (ref 0.2–1.2)
BUN SERPL-MCNC: 18 MG/DL (ref 7–25)
BUN/CREAT SERPL: NORMAL (CALC) (ref 6–22)
CALCIUM SERPL-MCNC: 9.1 MG/DL (ref 8.6–10.3)
CHLORIDE SERPL-SCNC: 103 MMOL/L (ref 98–110)
CO2 SERPL-SCNC: 27 MMOL/L (ref 20–32)
CREAT SERPL-MCNC: 1.08 MG/DL (ref 0.7–1.18)
EOSINOPHIL # BLD AUTO: 147 CELLS/UL (ref 15–500)
EOSINOPHIL NFR BLD AUTO: 2.1 %
ERYTHROCYTE [DISTWIDTH] IN BLOOD BY AUTOMATED COUNT: 13.3 % (ref 11–15)
GLOBULIN SER CALC-MCNC: 2.1 G/DL (CALC) (ref 1.9–3.7)
GLUCOSE SERPL-MCNC: 98 MG/DL (ref 65–99)
HBA1C MFR BLD: 6.8 % OF TOTAL HGB
HCT VFR BLD AUTO: 40 % (ref 38.5–50)
HGB BLD-MCNC: 12.7 G/DL (ref 13.2–17.1)
LYMPHOCYTES # BLD AUTO: 1435 CELLS/UL (ref 850–3900)
LYMPHOCYTES NFR BLD AUTO: 20.5 %
MCH RBC QN AUTO: 28.2 PG (ref 27–33)
MCHC RBC AUTO-ENTMCNC: 31.8 G/DL (ref 32–36)
MCV RBC AUTO: 88.9 FL (ref 80–100)
MONOCYTES # BLD AUTO: 749 CELLS/UL (ref 200–950)
MONOCYTES NFR BLD AUTO: 10.7 %
NEUTROPHILS # BLD AUTO: 4620 CELLS/UL (ref 1500–7800)
NEUTROPHILS NFR BLD AUTO: 66 %
PLATELET # BLD AUTO: 241 THOUSAND/UL (ref 140–400)
PMV BLD REES-ECKER: 9.7 FL (ref 7.5–12.5)
POTASSIUM SERPL-SCNC: 4.5 MMOL/L (ref 3.5–5.3)
PROT SERPL-MCNC: 6.1 G/DL (ref 6.1–8.1)
QUEST EGFR NON-AFR. AMERICAN: 66 ML/MIN/1.73M2
RBC # BLD AUTO: 4.5 MILLION/UL (ref 4.2–5.8)
SODIUM SERPL-SCNC: 140 MMOL/L (ref 135–146)
TSH SERPL-ACNC: 0.76 MIU/L (ref 0.4–4.5)
WBC # BLD AUTO: 7 THOUSAND/UL (ref 3.8–10.8)

## 2020-11-23 ENCOUNTER — TELEPHONE (OUTPATIENT)
Dept: INTERNAL MEDICINE | Facility: CLINIC | Age: 75
End: 2020-11-23

## 2020-11-23 DIAGNOSIS — D53.9 NUTRITIONAL ANEMIA, UNSPECIFIED: ICD-10-CM

## 2020-11-23 DIAGNOSIS — D64.9 ANEMIA, UNSPECIFIED TYPE: Primary | ICD-10-CM

## 2020-11-23 NOTE — TELEPHONE ENCOUNTER
Spoke with pt about lab result hemoglobin 12.7. no signs of bleeding. Will recheck lab in 2-3 weeks.

## 2020-11-30 RX ORDER — PANTOPRAZOLE SODIUM 40 MG/1
TABLET, DELAYED RELEASE ORAL
Qty: 90 TABLET | Refills: 0 | Status: SHIPPED | OUTPATIENT
Start: 2020-11-30 | End: 2021-03-01

## 2020-12-03 ENCOUNTER — APPOINTMENT (OUTPATIENT)
Dept: PREADMISSION TESTING | Facility: HOSPITAL | Age: 75
End: 2020-12-03
Attending: OPHTHALMOLOGY
Payer: MEDICARE

## 2020-12-03 ENCOUNTER — TRANSCRIBE ORDERS (OUTPATIENT)
Dept: REGISTRATION | Facility: HOSPITAL | Age: 75
End: 2020-12-03

## 2020-12-03 DIAGNOSIS — R05.9 COUGH: ICD-10-CM

## 2020-12-03 DIAGNOSIS — Z11.59 ENCOUNTER FOR SCREENING FOR OTHER VIRAL DISEASES: ICD-10-CM

## 2020-12-03 DIAGNOSIS — Z11.59 ENCOUNTER FOR SCREENING FOR OTHER VIRAL DISEASES: Primary | ICD-10-CM

## 2020-12-03 PROCEDURE — U0003 INFECTIOUS AGENT DETECTION BY NUCLEIC ACID (DNA OR RNA); SEVERE ACUTE RESPIRATORY SYNDROME CORONAVIRUS 2 (SARS-COV-2) (CORONAVIRUS DISEASE [COVID-19]), AMPLIFIED PROBE TECHNIQUE, MAKING USE OF HIGH THROUGHPUT TECHNOLOGIES AS DESCRIBED BY CMS-2020-01-R: HCPCS

## 2020-12-04 ENCOUNTER — ANESTHESIA EVENT (OUTPATIENT)
Dept: OPERATING ROOM | Facility: HOSPITAL | Age: 75
Setting detail: HOSPITAL OUTPATIENT SURGERY
End: 2020-12-04
Payer: MEDICARE

## 2020-12-05 LAB — SARS-COV-2 RNA RESP QL NAA+PROBE: NOT DETECTED

## 2020-12-07 RX ORDER — ACETAMINOPHEN 325 MG/1
650 TABLET ORAL EVERY 4 HOURS PRN
Status: CANCELLED | OUTPATIENT
Start: 2020-12-08 | End: 2020-12-09

## 2020-12-07 RX ORDER — KETOROLAC TROMETHAMINE 5 MG/ML
1 SOLUTION OPHTHALMIC EVERY 10 MIN PRN
Status: CANCELLED | OUTPATIENT
Start: 2020-12-08 | End: 2020-12-09

## 2020-12-08 ENCOUNTER — ANESTHESIA (OUTPATIENT)
Dept: OPERATING ROOM | Facility: HOSPITAL | Age: 75
Setting detail: HOSPITAL OUTPATIENT SURGERY
End: 2020-12-08
Payer: MEDICARE

## 2020-12-08 ENCOUNTER — HOSPITAL ENCOUNTER (OUTPATIENT)
Facility: HOSPITAL | Age: 75
Setting detail: HOSPITAL OUTPATIENT SURGERY
Discharge: HOME | End: 2020-12-08
Attending: OPHTHALMOLOGY | Admitting: OPHTHALMOLOGY
Payer: MEDICARE

## 2020-12-08 VITALS
OXYGEN SATURATION: 98 % | HEART RATE: 81 BPM | RESPIRATION RATE: 16 BRPM | DIASTOLIC BLOOD PRESSURE: 76 MMHG | HEIGHT: 66 IN | SYSTOLIC BLOOD PRESSURE: 151 MMHG | BODY MASS INDEX: 26.52 KG/M2 | WEIGHT: 165 LBS | TEMPERATURE: 98 F

## 2020-12-08 LAB
GLUCOSE BLD-MCNC: 116 MG/DL (ref 70–99)
POCT TEST: ABNORMAL

## 2020-12-08 PROCEDURE — 08RJ3JZ REPLACEMENT OF RIGHT LENS WITH SYNTHETIC SUBSTITUTE, PERCUTANEOUS APPROACH: ICD-10-PCS | Performed by: OPHTHALMOLOGY

## 2020-12-08 PROCEDURE — 27200000 HC STERILE SUPPLY: Performed by: OPHTHALMOLOGY

## 2020-12-08 PROCEDURE — V2632 POST CHMBR INTRAOCULAR LENS: HCPCS | Performed by: OPHTHALMOLOGY

## 2020-12-08 PROCEDURE — 63700000 HC SELF-ADMINISTRABLE DRUG: Performed by: OPHTHALMOLOGY

## 2020-12-08 PROCEDURE — 63600000 HC DRUGS/DETAIL CODE: Performed by: NURSE ANESTHETIST, CERTIFIED REGISTERED

## 2020-12-08 PROCEDURE — 25000000 HC PHARMACY GENERAL: Performed by: OPHTHALMOLOGY

## 2020-12-08 PROCEDURE — 71000001 HC PACU PHASE 1 INITIAL 30MIN: Performed by: OPHTHALMOLOGY

## 2020-12-08 PROCEDURE — 63600000 HC DRUGS/DETAIL CODE: Performed by: OPHTHALMOLOGY

## 2020-12-08 PROCEDURE — 36000003 HC OR LEVEL 3 INITIAL 30MIN: Performed by: OPHTHALMOLOGY

## 2020-12-08 PROCEDURE — 71000002 HC PACU PHASE 2 INITIAL 30MIN: Performed by: OPHTHALMOLOGY

## 2020-12-08 PROCEDURE — 37000002 HC ANESTHESIA MAC: Performed by: OPHTHALMOLOGY

## 2020-12-08 DEVICE — LENS PRE-LOADED PCB00/20.5 DIOPTER: Type: IMPLANTABLE DEVICE | Site: EYE | Status: FUNCTIONAL

## 2020-12-08 RX ORDER — TETRACAINE HYDROCHLORIDE 5 MG/ML
SOLUTION OPHTHALMIC AS NEEDED
Status: DISCONTINUED | OUTPATIENT
Start: 2020-12-08 | End: 2020-12-08 | Stop reason: HOSPADM

## 2020-12-08 RX ORDER — GATIFLOXACIN 5 MG/ML
SOLUTION/ DROPS OPHTHALMIC AS NEEDED
Status: DISCONTINUED | OUTPATIENT
Start: 2020-12-08 | End: 2020-12-08 | Stop reason: HOSPADM

## 2020-12-08 RX ORDER — FENTANYL CITRATE 50 UG/ML
INJECTION, SOLUTION INTRAMUSCULAR; INTRAVENOUS AS NEEDED
Status: DISCONTINUED | OUTPATIENT
Start: 2020-12-08 | End: 2020-12-08 | Stop reason: SURG

## 2020-12-08 RX ORDER — MIDAZOLAM HYDROCHLORIDE 2 MG/2ML
INJECTION, SOLUTION INTRAMUSCULAR; INTRAVENOUS AS NEEDED
Status: DISCONTINUED | OUTPATIENT
Start: 2020-12-08 | End: 2020-12-08 | Stop reason: SURG

## 2020-12-08 RX ORDER — ONDANSETRON HYDROCHLORIDE 2 MG/ML
INJECTION, SOLUTION INTRAVENOUS AS NEEDED
Status: DISCONTINUED | OUTPATIENT
Start: 2020-12-08 | End: 2020-12-08 | Stop reason: SURG

## 2020-12-08 RX ORDER — KETOROLAC TROMETHAMINE 5 MG/ML
SOLUTION OPHTHALMIC AS NEEDED
Status: DISCONTINUED | OUTPATIENT
Start: 2020-12-08 | End: 2020-12-08 | Stop reason: HOSPADM

## 2020-12-08 RX ORDER — EPINEPHRINE 1 MG/ML
INJECTION, SOLUTION INTRAMUSCULAR; SUBCUTANEOUS AS NEEDED
Status: DISCONTINUED | OUTPATIENT
Start: 2020-12-08 | End: 2020-12-08 | Stop reason: HOSPADM

## 2020-12-08 RX ORDER — PROPARACAINE HYDROCHLORIDE 5 MG/ML
1 SOLUTION/ DROPS OPHTHALMIC ONCE
Status: COMPLETED | OUTPATIENT
Start: 2020-12-08 | End: 2020-12-08

## 2020-12-08 RX ORDER — SODIUM CHLORIDE, SODIUM LACTATE, POTASSIUM CHLORIDE, CALCIUM CHLORIDE 600; 310; 30; 20 MG/100ML; MG/100ML; MG/100ML; MG/100ML
40 INJECTION, SOLUTION INTRAVENOUS CONTINUOUS
Status: DISCONTINUED | OUTPATIENT
Start: 2020-12-08 | End: 2020-12-08 | Stop reason: HOSPADM

## 2020-12-08 RX ORDER — CYCLOPENTOLAT/TROPIC/PHENYLEPH 1%-1%-2.5%
1 DROPS (EA) OPHTHALMIC (EYE)
Status: DISPENSED | OUTPATIENT
Start: 2020-12-08 | End: 2020-12-08

## 2020-12-08 RX ORDER — PROPARACAINE HYDROCHLORIDE 5 MG/ML
SOLUTION/ DROPS OPHTHALMIC AS NEEDED
Status: DISCONTINUED | OUTPATIENT
Start: 2020-12-08 | End: 2020-12-08 | Stop reason: HOSPADM

## 2020-12-08 RX ORDER — SODIUM CHLORIDE, SODIUM LACTATE, POTASSIUM CHLORIDE, CALCIUM CHLORIDE 600; 310; 30; 20 MG/100ML; MG/100ML; MG/100ML; MG/100ML
INJECTION, SOLUTION INTRAVENOUS CONTINUOUS
Status: CANCELLED | OUTPATIENT
Start: 2020-12-08 | End: 2020-12-08

## 2020-12-08 RX ADMIN — PROPARACAINE HYDROCHLORIDE 1 DROP: 5 SOLUTION/ DROPS OPHTHALMIC at 07:11

## 2020-12-08 RX ADMIN — Medication 1 DROP: at 07:10

## 2020-12-08 RX ADMIN — MIDAZOLAM HYDROCHLORIDE 2 MG: 1 INJECTION, SOLUTION INTRAMUSCULAR; INTRAVENOUS at 07:36

## 2020-12-08 RX ADMIN — SODIUM CHLORIDE, POTASSIUM CHLORIDE, SODIUM LACTATE AND CALCIUM CHLORIDE 40 ML/HR: 600; 310; 30; 20 INJECTION, SOLUTION INTRAVENOUS at 07:11

## 2020-12-08 RX ADMIN — FENTANYL CITRATE 25 MCG: 50 INJECTION, SOLUTION INTRAMUSCULAR; INTRAVENOUS at 07:49

## 2020-12-08 RX ADMIN — ONDANSETRON HYDROCHLORIDE 4 MG: 2 INJECTION, SOLUTION INTRAMUSCULAR; INTRAVENOUS at 07:49

## 2020-12-08 NOTE — OR SURGEON
Pre-Procedure patient identification:  I am the primary operating surgeon/proceduralist and I have identified the patient on 12/08/20 at 7:08 AM Oscar Hale MD  Phone Number: [phone]

## 2020-12-08 NOTE — ANESTHESIA PREPROCEDURE EVALUATION
Relevant Problems   CARDIOVASCULAR   (+) Atherosclerosis of coronary artery   (+) Essential hypertension   (+) Paroxysmal atrial fibrillation (CMS/HCC)      GASTROINTESTINAL   (+) Esophageal reflux      URINARY SYSTEM   (+) BPH (benign prostatic hyperplasia)      Other   (+) Type 2 diabetes mellitus without complication (CMS/HCC)       ROS/Med Hx     Past Surgical History:   Procedure Laterality Date   • ANAL FISSURECTOMY  1986   • CARDIAC CATHETERIZATION  11/21/2005    LM FOD. 70-80% proximal LAD.Mild disease CX, RCA and branches.    • CARDIAC CATHETERIZATION  11/28/2005    LM FOD. Pristine LAD stent. CX FOD. RCA mild disease.   • CARDIAC SURGERY  Stent in lad   • CORONARY ANGIOPLASTY WITH STENT PLACEMENT  11/22/2005    LM FOD. Moderate, diffuse disease LAD. Irregular CX w/ PLVB distal to PDA 99%. Mild disease RCA. Stent to distal LAD.   • EYE SURGERY Left Cataract   • FOOT SURGERY  2009    For osteomyelitis.   • GASTROCUTANEOUS FISTULA CLOSURE     • THYROIDECTOMY  2012   • TONSILLECTOMY  No    denies   • UPPER GASTROINTESTINAL ENDOSCOPY         Physical Exam    Airway   Mallampati: II   TM distance: >3 FB   Neck ROM: full  Cardiovascular - normal   Rhythm: regular   Rate: normalPulmonary - normal   clear to auscultation  Dental - normal        Anesthesia Plan    Plan: MAC    Technique: MAC     Lines and Monitors: PIV     Airway: natural airway / supplemental oxygen   ASA 3  Anesthetic plan and risks discussed with: patient  Induction:    intravenous   Postop Plan:   Patient Disposition: phase II then home   Pain Management: IV analgesics

## 2020-12-08 NOTE — ANESTHESIA POSTPROCEDURE EVALUATION
Patient: Matt Williamson    Procedure Summary     Date: 12/08/20 Room / Location:  OR 8 /  OR    Anesthesia Start: 0738 Anesthesia Stop: 0811    Procedure: cataract surgery right eye (Right ) Diagnosis:       Cataract of right eye, unspecified cataract type      (H25.811  cataract)    Surgeon: Oscar Hale MD Responsible Provider: Deneen Varela DO    Anesthesia Type: MAC ASA Status: 3          Anesthesia Type: MAC  PACU Vitals  12/8/2020 0806 - 12/8/2020 0906      12/8/2020  0813             Temp:  36.7 °C (98 °F)    Pulse:  81    Resp:  16    SpO2:  98 %            Anesthesia Post Evaluation    Pain management: adequate  Patient location during evaluation: PACU  Patient participation: complete - patient participated  Level of consciousness: awake and alert  Cardiovascular status: acceptable  Airway Patency: adequate  Respiratory status: acceptable  Hydration status: acceptable  Anesthetic complications: no

## 2020-12-08 NOTE — ANESTHESIOLOGIST PRE-PROCEDURE ATTESTATION
Pre-Procedure Patient Identification:  I am the Primary Anesthesiologist and have identified the patient on 12/08/20 at 7:18 AM.   I have confirmed the following procedure(s) cataract surgery right eye (R) will be performed by the following surgeon/proceduralist Ocsar Hale MD.

## 2020-12-08 NOTE — OP NOTE
Matt Williamson     Preop diagnosis: Cataract of the right eye    Post-op Diagnosis     * Cataract of right eye, unspecified cataract type [H26.9]    Procedure: Phacoemulsification cataract extraction with posterior chamber intraocular lens of the right eye    [facility]    Oscar Hale MD      The patient was brought to the operating room and prepped and draped in the standard ophthalmic manner.  Lid speculum was placed to open the eyelids.  A superior conjunctival peritomy was performed and bleeding vessels were coagulated using wet-field cautery.  A scleral tunnel incision was performed at 12:00 followed by a small stab incision at 3:00.  Viscoelastic material was injected into the anterior chamber.  A circular capsulorrhexis was performed.  The lens nucleus was hydrodissected and then phacoemulsified and removed from the eye.  Residual cortical material was removed using the irrigation aspiration handpiece.  The posterior capsule was polished.  Viscoelastic material was injected into the anterior chamber and into the capsular bag.  Posterior chamber intraocular lens was inserted into the capsular bag and correctly positioned using a Sinskey hook.  The viscoelastic material was removed from the anterior chamber and from the capsular bag using the irrigation-aspiration handpiece.  The wound was checked and found to be secure.  The patient was given antibiotic drops and nonsteroidal anti-inflammatory inflammatory drops. A shield was placed on the patient's eye. The patient left the operating room in good condition.

## 2021-01-29 ENCOUNTER — TELEPHONE (OUTPATIENT)
Dept: INTERNAL MEDICINE | Facility: CLINIC | Age: 76
End: 2021-01-29

## 2021-01-31 ENCOUNTER — IMMUNIZATION (OUTPATIENT)
Dept: IMMUNIZATION | Facility: CLINIC | Age: 76
End: 2021-01-31

## 2021-03-01 RX ORDER — PANTOPRAZOLE SODIUM 40 MG/1
TABLET, DELAYED RELEASE ORAL
Qty: 90 TABLET | Refills: 0 | Status: SHIPPED | OUTPATIENT
Start: 2021-03-01 | End: 2021-05-21

## 2021-03-07 ENCOUNTER — IMMUNIZATION (OUTPATIENT)
Dept: IMMUNIZATION | Facility: CLINIC | Age: 76
End: 2021-03-07
Attending: FAMILY MEDICINE

## 2021-03-15 RX ORDER — EMPAGLIFLOZIN 10 MG/1
TABLET, FILM COATED ORAL
Qty: 90 TABLET | Refills: 0 | Status: SHIPPED | OUTPATIENT
Start: 2021-03-15 | End: 2021-08-18

## 2021-03-22 ENCOUNTER — OFFICE VISIT (OUTPATIENT)
Dept: CARDIOLOGY | Facility: CLINIC | Age: 76
End: 2021-03-22
Payer: MEDICARE

## 2021-03-22 VITALS
WEIGHT: 174 LBS | DIASTOLIC BLOOD PRESSURE: 80 MMHG | HEIGHT: 66 IN | SYSTOLIC BLOOD PRESSURE: 130 MMHG | OXYGEN SATURATION: 99 % | HEART RATE: 81 BPM | BODY MASS INDEX: 27.97 KG/M2

## 2021-03-22 DIAGNOSIS — E11.9 TYPE 2 DIABETES MELLITUS WITHOUT COMPLICATION, WITHOUT LONG-TERM CURRENT USE OF INSULIN (CMS/HCC): ICD-10-CM

## 2021-03-22 DIAGNOSIS — C73 METASTASIS FROM THYROID CANCER (CMS/HCC): ICD-10-CM

## 2021-03-22 DIAGNOSIS — I10 ESSENTIAL HYPERTENSION: Primary | ICD-10-CM

## 2021-03-22 DIAGNOSIS — I48.0 PAROXYSMAL ATRIAL FIBRILLATION (CMS/HCC): ICD-10-CM

## 2021-03-22 DIAGNOSIS — C79.9 METASTASIS FROM THYROID CANCER (CMS/HCC): ICD-10-CM

## 2021-03-22 DIAGNOSIS — E78.2 MIXED HYPERLIPIDEMIA: ICD-10-CM

## 2021-03-22 DIAGNOSIS — I25.10 ATHEROSCLEROSIS OF NATIVE CORONARY ARTERY OF NATIVE HEART WITHOUT ANGINA PECTORIS: ICD-10-CM

## 2021-03-22 PROCEDURE — G8754 DIAS BP LESS 90: HCPCS | Performed by: INTERNAL MEDICINE

## 2021-03-22 PROCEDURE — 93000 ELECTROCARDIOGRAM COMPLETE: CPT | Performed by: INTERNAL MEDICINE

## 2021-03-22 PROCEDURE — G8752 SYS BP LESS 140: HCPCS | Performed by: INTERNAL MEDICINE

## 2021-03-22 PROCEDURE — 99214 OFFICE O/P EST MOD 30 MIN: CPT | Performed by: INTERNAL MEDICINE

## 2021-03-22 RX ORDER — PROPAFENONE HYDROCHLORIDE 150 MG/1
1 TABLET, COATED ORAL AS NEEDED
COMMUNITY
End: 2022-02-08 | Stop reason: HOSPADM

## 2021-03-22 ASSESSMENT — ENCOUNTER SYMPTOMS
HEMATEMESIS: 0
SYNCOPE: 0
DIAPHORESIS: 0
LIGHT-HEADEDNESS: 0
PND: 0
IRREGULAR HEARTBEAT: 0
FEVER: 0
BRUISES/BLEEDS EASILY: 0
BACK PAIN: 1
HEMATOCHEZIA: 0
ALTERED MENTAL STATUS: 0
DYSPNEA ON EXERTION: 0
MYALGIAS: 0
INSOMNIA: 0
WEIGHT GAIN: 1
PALPITATIONS: 1
ORTHOPNEA: 0
HEMATURIA: 0
HEARTBURN: 0
STIFFNESS: 0
NEAR-SYNCOPE: 0
DECREASED APPETITE: 0
COUGH: 0
SNORING: 0
DIZZINESS: 0
SHORTNESS OF BREATH: 0

## 2021-03-22 NOTE — ASSESSMENT & PLAN NOTE
The patient follows with his primary care physician along with endocrinology for this problem.  He is adequately managed.

## 2021-03-22 NOTE — PROGRESS NOTES
Cardiology  Office Progress Note         Reason for visit:   Chief Complaint   Patient presents with   • Follow-up       HPI     Matt Williamson is a 75 y.o. male who presents to the office for cardiovascular follow up and management of PAF, coronary artery disease s/p MARGOT, hypertension and dyslipidemia.    He last had a telemedicine encounter 5/7/20. At that time he reported recurrent nosebleeds. The plan was to hold his Xarelto and have him wear a monitor for 2 weeks. He denied any recent palpitations or chest pain. He was tolerating his other medications. He was to return in three months for follow up.     MCOT 5/14/20 - 2 weeks. Predominantly sinus rhythm with occasional supraventricular beats up to 4 consecutive, no significant arrhythmias.    Today he states that occasionally he will feel that his heart rate is faster than normal. His heart rate ranges 80-98. He states that recently his blood pressure has been higher than usual. It can range 140-150/80 over the past year. He hasn't changed his diet much, but he is up about 10 pounds. He denies chest pain, shortness of breath, lightheadedness or dizziness.    Past Medical History:   Diagnosis Date   • Abdominal hernia    • Abnormal ECG 10/19    atrial fib   • Atherosclerosis of coronary artery 9/14/2012    Overview:    • Atrial fibrillation (CMS/HCC)    • Atrial fibrillation with rapid ventricular response (CMS/HCC) 10/25/2019    Hospitalized on 10/25/19 at [facility].    • BPH (benign prostatic hyperplasia) 9/14/2012    Overview:    • Cancer (CMS/HCC) Thyroid   • Cataract 9/11/2013    Overview:    • Cyst of thyroid 1/15/2007   • Epigastric abdominal pain 2/18/2016    Overview:    • Essential hypertension 6/20/2016    Overview:    • Former tobacco use 5/2/2018    Overview:    • History of radiation therapy     I-131   • Mixed hyperlipidemia 2/18/2016    Overview:    • Osteomyelitis of foot (CMS/HCC) 2009    Surgery.   • PSA elevation  2018   • Thyroid cancer (CMS/HCC) 2012    Overview:    • Type 2 diabetes mellitus without complication (CMS/[facility]) 2016    Overview:    • Vertigo 2018   • Vertigo 2018       Past Surgical History:   Procedure Laterality Date   • ANAL FISSURECTOMY     • CARDIAC CATHETERIZATION  2005    LM FOD. 70-80% proximal LAD.Mild disease CX, RCA and branches.    • CARDIAC CATHETERIZATION  2005    LM FOD. Pristine LAD stent. CX FOD. RCA mild disease.   • CARDIAC SURGERY  Stent in lad   • CORONARY ANGIOPLASTY WITH STENT PLACEMENT  2005    LM FOD. Moderate, diffuse disease LAD. Irregular CX w/ PLVB distal to PDA 99%. Mild disease RCA. Stent to distal LAD.   • EYE SURGERY Left Cataract   • FOOT SURGERY      For osteomyelitis.   • GASTROCUTANEOUS FISTULA CLOSURE     • THYROIDECTOMY     • TONSILLECTOMY  No    denies   • UPPER GASTROINTESTINAL ENDOSCOPY         Social History     Tobacco Use   • Smoking status: Former Smoker     Packs/day: 2.00     Years: 20.00     Pack years: 40.00     Types: Cigarettes     Quit date:      Years since quittin.2   • Smokeless tobacco: Never Used   Substance Use Topics   • Alcohol use: Yes     Frequency: Monthly or less     Drinks per session: 1 or 2     Comment: Social   • Drug use: Never       Family History   Problem Relation Age of Onset   • Cancer Biological Mother    • Breast cancer Biological Mother    • Emphysema Biological Father    • Lung cancer Biological Father        Allergies:  Penicillins    Current Outpatient Medications   Medication Sig Dispense Refill   • aspirin 81 mg enteric coated tablet Take 81 mg by mouth daily.     • gabapentin (NEURONTIN) 300 mg capsule Take 1 capsule (300 mg total) by mouth 3 (three) times a day. (Patient taking differently: Take 300 mg by mouth 2 (two) times a day.  ) 270 capsule 3   • JARDIANCE 10 mg tablet TAKE 1 TABLET(10 MG) BY MOUTH DAILY 90 tablet 0   • metFORMIN (GLUCOPHAGE) 500 mg tablet TAKE  1 TABLET(500 MG) BY MOUTH FOUR TIMES DAILY (Patient taking differently: 500 mg 4 (four) times a day (before meals and nightly).  ) 360 tablet 0   • metoprolol succinate XL (TOPROL-XL) 100 mg 24 hr tablet TAKE 1 TABLET BY MOUTH TWICE DAILY (Patient taking differently: 100 mg 2 (two) times a day.  ) 180 tablet 3   • pantoprazole (PROTONIX) 40 mg EC tablet TAKE 1 TABLET(40 MG) BY MOUTH DAILY 90 tablet 0   • pravastatin (PRAVACHOL) 40 mg tablet TAKE 1 TABLET(40 MG) BY MOUTH DAILY (Patient taking differently: 40 mg daily.  ) 90 tablet 3   • propafenone (RYTHMOL) 150 mg tablet Take 1 tablet by mouth as needed (Palpitations).       • SYNTHROID 150 mcg tablet TAKE 1 TABLET BY MOUTH EVERY DAY (Patient taking differently: 150 mcg daily.  ) 90 tablet 1   • TRULICITY 0.75 mg/0.5 mL pen injector once a week. Every Tuesday      • glipiZIDE (GLUCOTROL) 5 mg tablet Take 1 tablet by mouth as needed.        No current facility-administered medications for this visit.        Review of Systems   Constitution: Positive for weight gain. Negative for decreased appetite, diaphoresis and fever.        Pt reports 20 lb weight loss since 2/20   HENT: Negative for nosebleeds.    Eyes: Negative for visual disturbance.   Cardiovascular: Positive for palpitations. Negative for chest pain, dyspnea on exertion, irregular heartbeat, leg swelling, near-syncope, orthopnea, paroxysmal nocturnal dyspnea and syncope.   Respiratory: Negative for cough, shortness of breath and snoring.    Hematologic/Lymphatic: Does not bruise/bleed easily.   Skin: Negative for rash.   Musculoskeletal: Positive for back pain (sciatica). Negative for muscle weakness, myalgias and stiffness.        Achy bones   Gastrointestinal: Negative for heartburn, hematemesis, hematochezia and melena.   Genitourinary: Negative for hematuria and nocturia.   Neurological: Negative for dizziness and light-headedness.   Psychiatric/Behavioral: Negative for altered mental status. The patient  does not have insomnia.        Objective     Vitals:    03/22/21 1440   BP: 130/80   Pulse: 81   SpO2: 99%       Wt Readings from Last 3 Encounters:   03/22/21 78.9 kg (174 lb)   12/08/20 74.8 kg (165 lb)   11/30/20 74.8 kg (165 lb)       Body mass index is 28.08 kg/m².    Physical Exam   Constitutional: He is oriented to person, place, and time. He appears well-developed and well-nourished.   HENT:   Head: Normocephalic and atraumatic.   Eyes: Conjunctivae and EOM are normal.   Neck: Neck supple. No JVD present.   Cardiovascular: Normal rate, regular rhythm, normal heart sounds and intact distal pulses.   No murmur heard.  Pulses:       Dorsalis pedis pulses are 1+ on the right side and 1+ on the left side.        Posterior tibial pulses are 1+ on the right side and 1+ on the left side.   Pulmonary/Chest: Effort normal and breath sounds normal. No respiratory distress. He has no wheezes. He has no rales. He exhibits no tenderness.   Abdominal: Soft. Bowel sounds are normal. He exhibits no distension and no mass. There is no abdominal tenderness. There is no rebound and no guarding.   Musculoskeletal:         General: No edema.   Neurological: He is alert and oriented to person, place, and time.   Skin: Skin is warm and dry.   Psychiatric: He has a normal mood and affect. His behavior is normal. Judgment and thought content normal.   Vitals reviewed.       ECG: Sinus rhythm, slightly delayed R wave transition, otherwise normal    Hematology  Lab Results   Component Value Date    WBC 7.0 11/20/2020    HGB 12.7 (L) 11/20/2020    HCT 40.0 11/20/2020     11/20/2020    INR 1.0 01/17/2020       Chemistries  Lab Results   Component Value Date     11/20/2020    K 4.5 11/20/2020     11/20/2020    CREATININE 1.08 11/20/2020    BUN 18 11/20/2020    CO2 27 11/20/2020    GLUCOSE 98 11/20/2020    CALCIUM 9.1 11/20/2020    MG 2.0 01/14/2016    ALT 18 11/20/2020    AST 18 11/20/2020       Cholesterol  Lab Results    Component Value Date    CHOL 129 01/29/2020    TRIG 69 01/29/2020    HDL 47 01/29/2020    LDLCALC 67 01/29/2020    NONHDLCALC 103 05/02/2018       Endocrine  Lab Results   Component Value Date    TSH 0.76 11/20/2020    FREET4 1.36 05/02/2018    HGBA1C 6.8 (H) 11/20/2020       Cardiac Imaging   ECHOCARDIOGRAM - EXTERNAL RESULT        Assessment/Plan     Paroxysmal atrial fibrillation (CMS/[facility])  The patient has not had atrial fibrillation to his knowledge since his last visit with us.  He is currently off of Xarelto because of recurrent nosebleeds.  The Mcot that was performed last spring showed no evidence of atrial fibrillation.  He is tolerating his current medical regimen.  In the past, I had given him propafenone to use on a as needed basis if he felt palpitations.  He does use it on occasion but uses it for periods when he sees that his heart rate is above 90 beats a minute and not because of palpitations specifically.  Fortunately, he only rarely uses it.  He continues to tolerate high-dose beta-blocker.  For now, I have elected to make no changes in his regimen.  He will follow up again every 6 to 12 months and will contact us in the meantime with problems or questions.    Atherosclerosis of coronary artery  The patient has not had exertional chest pain or dyspnea.  His most recent stress test was in 2018.  We will continue to follow him clinically and will periodically reexamine his coronary circulation with testing.  I made no changes to his medical regimen today.    Type 2 diabetes mellitus without complication (CMS/[facility])  The patient follows with his primary care physician along with endocrinology for this problem.  He is adequately managed.    Mixed hyperlipidemia  The patient's lipids are adequately controlled on current medication.  The patient is tolerating medication without side effects.  I will continue the current treatment.    Essential hypertension  The patient's blood pressure is well  controlled on the current medical regimen.  There are no apparent side effects from medications.  We will continue the current therapy without change.    Metastasis from thyroid cancer (CMS/HCC)  The patient follows with oncology for this problem.        There are no discontinued medications.    Orders Placed This Encounter   Procedures   • ECG 12 lead     Scheduling Instructions:      PLEASE USE THIS ORDER FOR ECG'S PERFORMED IN PHYSICIAN OFFICES     Order Specific Question:   Release to patient     Answer:   Immediate            Keyana BELTRE, am scribing for, and in the presence of, Minor Ji MD.    Minor BELTRE MD, personally performed the services described in this documentation as scribed by Keyana Burns in my presence, and it is both accurate and complete.       Minor Ji MD  3/22/2021

## 2021-03-22 NOTE — ASSESSMENT & PLAN NOTE
The patient has not had atrial fibrillation to his knowledge since his last visit with us.  He is currently off of Xarelto because of recurrent nosebleeds.  The Mcot that was performed last spring showed no evidence of atrial fibrillation.  He is tolerating his current medical regimen.  In the past, I had given him propafenone to use on a as needed basis if he felt palpitations.  He does use it on occasion but uses it for periods when he sees that his heart rate is above 90 beats a minute and not because of palpitations specifically.  Fortunately, he only rarely uses it.  He continues to tolerate high-dose beta-blocker.  For now, I have elected to make no changes in his regimen.  He will follow up again every 6 to 12 months and will contact us in the meantime with problems or questions.

## 2021-03-22 NOTE — ASSESSMENT & PLAN NOTE
The patient has not had exertional chest pain or dyspnea.  His most recent stress test was in 2018.  We will continue to follow him clinically and will periodically reexamine his coronary circulation with testing.  I made no changes to his medical regimen today.

## 2021-04-06 DIAGNOSIS — C79.51 BONE METASTASES: Primary | ICD-10-CM

## 2021-04-21 ENCOUNTER — HOSPITAL ENCOUNTER (OUTPATIENT)
Dept: RADIOLOGY | Facility: HOSPITAL | Age: 76
Discharge: HOME | End: 2021-04-21
Attending: RADIOLOGY
Payer: MEDICARE

## 2021-04-21 DIAGNOSIS — C79.51 BONE METASTASES: ICD-10-CM

## 2021-04-21 RX ORDER — GADOBUTROL 604.72 MG/ML
7.9 INJECTION INTRAVENOUS ONCE
Status: COMPLETED | OUTPATIENT
Start: 2021-04-21 | End: 2021-04-21

## 2021-04-21 RX ADMIN — GADOBUTROL 7.9 ML: 604.72 INJECTION INTRAVENOUS at 12:48

## 2021-04-22 NOTE — PROGRESS NOTES
Patient had MRI of lumbar spine reviewed and discussed results with patient.  Patient continues to have low back pain has an appoint with Dr. Pina mon.  Asked patient to make another appointment with me in the next couple weeks to review.  In the meantime suggested over-the-counter lidocaine patch for pain control.

## 2021-05-05 ENCOUNTER — DOCUMENTATION (OUTPATIENT)
Dept: RADIATION ONCOLOGY | Facility: HOSPITAL | Age: 76
End: 2021-05-05

## 2021-05-05 NOTE — PROGRESS NOTES
BONE FOLLOW UP  LOV 5/28/20  EOT 5/7/20  157 LBS 5/28/20  MRI LUMBAR 4/21/21  DR. ROY APPT IS SCHEDULE FOR WEDNESDAY 5/19  DR. OSORIO MARTINEZ 3/23/21- NOTE UNDER MEDIA   MED ONC: DR. SHOEMAKER

## 2021-05-06 ENCOUNTER — APPOINTMENT (OUTPATIENT)
Dept: LAB | Facility: HOSPITAL | Age: 76
End: 2021-05-06
Attending: RADIOLOGY
Payer: MEDICARE

## 2021-05-06 ENCOUNTER — HOSPITAL ENCOUNTER (OUTPATIENT)
Dept: RADIATION ONCOLOGY | Facility: HOSPITAL | Age: 76
Setting detail: RADIATION/ONCOLOGY SERIES
Discharge: HOME | End: 2021-05-06
Attending: RADIOLOGY
Payer: MEDICARE

## 2021-05-06 VITALS
WEIGHT: 177 LBS | BODY MASS INDEX: 28.57 KG/M2 | HEART RATE: 80 BPM | SYSTOLIC BLOOD PRESSURE: 138 MMHG | TEMPERATURE: 97.5 F | DIASTOLIC BLOOD PRESSURE: 83 MMHG | OXYGEN SATURATION: 100 %

## 2021-05-06 DIAGNOSIS — C73 METASTASIS FROM THYROID CANCER (CMS/HCC): Primary | ICD-10-CM

## 2021-05-06 DIAGNOSIS — C79.9 METASTASIS FROM THYROID CANCER (CMS/HCC): Primary | ICD-10-CM

## 2021-05-06 DIAGNOSIS — C73 METASTASIS FROM THYROID CANCER (CMS/HCC): ICD-10-CM

## 2021-05-06 DIAGNOSIS — C79.9 METASTASIS FROM THYROID CANCER (CMS/HCC): ICD-10-CM

## 2021-05-06 LAB — THYROGLOB SERPL-MCNC: 0.27 NG/ML (ref 1.15–130.77)

## 2021-05-06 PROCEDURE — 36415 COLL VENOUS BLD VENIPUNCTURE: CPT

## 2021-05-06 PROCEDURE — 84432 ASSAY OF THYROGLOBULIN: CPT

## 2021-05-06 ASSESSMENT — ENCOUNTER SYMPTOMS
BACK PAIN: 1
FATIGUE: 1
SLEEP DISTURBANCE: 1
HEMATOLOGIC/LYMPHATIC NEGATIVE: 1
RESPIRATORY NEGATIVE: 1
NEUROLOGICAL NEGATIVE: 1
GASTROINTESTINAL NEGATIVE: 1
CARDIOVASCULAR NEGATIVE: 1
ALLERGIC/IMMUNOLOGIC NEGATIVE: 1
ENDOCRINE NEGATIVE: 1
EYES NEGATIVE: 1

## 2021-05-06 ASSESSMENT — PAIN SCALES - GENERAL: PAINLEVEL: 0-NO PAIN

## 2021-05-06 NOTE — LETTER
May 6, 2021                                                          Patient: Matt Williamson   YOB: 1945   Date of Visit: 5/6/2021       Dear Dr. Arreola:    The patient is seen back at the [facility] today for a follow up with regard to his No diagnosis found.. Attached is my assessment and plan of care.       Sincerely,        Gregory J. Ochsner, MD      CC: No Recipients

## 2021-05-06 NOTE — PROGRESS NOTES
Subjective      Patient ID: Matt Williamson is a 75 y.o. male.  1945    Diagnosis:  No diagnosis found.    HPI    The following have been reviewed and updated as appropriate in this visit:       Review of Systems   Constitutional: Positive for fatigue.   HENT: Negative.    Eyes: Negative.    Respiratory: Negative.    Cardiovascular: Negative.    Gastrointestinal: Negative.    Endocrine: Negative.    Genitourinary: Negative.    Musculoskeletal: Positive for back pain.   Skin: Negative.    Allergic/Immunologic: Negative.    Neurological: Negative.    Hematological: Negative.    Psychiatric/Behavioral: Positive for sleep disturbance.       Objective     Vitals:    05/06/21 1110   BP: 138/83   BP Location: Left upper arm   Patient Position: Sitting   Pulse: 80   Temp: 36.4 °C (97.5 °F)   SpO2: 100%   Weight: 80.3 kg (177 lb)     Body mass index is 28.57 kg/m².    Physical Exam    Assessment/Plan   Diagnoses and Orders Associated With This Visit:  There are no diagnoses linked to this encounter.

## 2021-05-06 NOTE — PROGRESS NOTES
Patient ID:  Matt Williamson is a 75 y.o. male.  Referring Physician:   No referring provider defined for this encounter.    Primary Care Provider:  Raj Arreola DO    Problem List:  Patient Active Problem List    Diagnosis Date Noted   • Pre-op evaluation 11/20/2020   • Bone metastases (CMS/HCC) 01/27/2020   • Paroxysmal atrial fibrillation (CMS/HCC) 10/25/2019   • Former tobacco use 05/02/2018   • Essential hypertension 06/20/2016   • Type 2 diabetes mellitus without complication (CMS/HCC) 06/20/2016   • Mixed hyperlipidemia 02/18/2016   • Cataract 09/11/2013   • BPH (benign prostatic hyperplasia) 09/14/2012   • Atherosclerosis of coronary artery 09/14/2012   • Metastasis from thyroid cancer (CMS/HCC) 09/14/2012   • Cyst of thyroid 01/15/2007   • Esophageal reflux 01/15/2007        Cancer Staging Information:  Cancer Staging  Metastasis from thyroid cancer (CMS/HCC)  Staging form: Thyroid - Differentiated, AJCC 8th Edition  - Pathologic stage from 2/28/2012: Stage II (pT3a, pN0a, cM0, Age at diagnosis: >= 55 years) - Signed by Stacie Casey MD on 1/27/2020      Subjective      Radiation Delivered  Radiation Therapy   Component Value Date    COURSEID C1 05/08/2020    PLANID 1_L3 SBRT 05/08/2020    PRESCRIBEDDO 7 05/08/2020    FRACTIONSTRE 5 of 5 05/08/2020       History of Present Illness:  Patient presents for follow-up for his 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  Treatment was completed approximately 1 year ago.  Patient recently contacted me complaining of continued low back pain and I ordered an MRI scan.  MRI showed stable lesion at L3 without epidural extension and improvement in stenosis at L4-L5 on the right.  Patient presented today from [facility] and told me that his pain is certainly improved from last year after his treatments.  Patient notes some mild numbness right thigh but neuropathic pain is much better.  Patient says the pain  does not bother him at night or when he is standing but when he stands for long time or climbs a ladder it aggravates his back.  Patient has had prior injections by Dr. Feliz which also apparently helped in the past.  Patient says he is more active the last 3 months and notes a little more episodes of pain but pain at worst is 6/10.  Patient declines pain medications.  Previously patient's tumor did not show avidity to I-131.  In addition his thyroglobulin level has been low.  PET CT scan done last year at outside facility showed mild uptake at L3 and scapula which I believe is right scapula although not in report.  Patient denies any scapular pain.    Physical Exam:  Vital Signs for this encounter:  BP: 138/83  Temp: 36.4 °C (97.5 °F)  Heart Rate: 80  SpO2: 100 %  Weight: 80.3 kg (177 lb) (05/06 1110)  Healthy-appearing white male in no apparent distress.  Weight is up 20 pounds.  Vital signs stable.  Shoulders nontender to palpation special attention to bilateral scapula.  Mild tenderness lumbar spine.  Neurologically grossly intact.  Performance Status:80       PAIN ASSESSMENT:  Score Zero    Location    Pain Intervention      LABS:  No results found for this or any previous visit (from the past 336 hour(s)).       Assessment/Plan Patient with history of metastatic low-grade thyroid cancer status post stereotactic body radiation therapy to L3 metastasis completed a year ago.  Metastasis was biopsy-proven.  Patient now with continued discomfort with stable MRI scan.  Patient was given a prescription for thyroglobulin level to have checked today and also given a prescription for repeat PET CT scan for reassessment purposes.  Patient to call for results and see me in 6 months.  Patient to follow-up with Dr. Heller of neurosurgery.    Diagnoses and Orders Associated With This Visit:  Metastasis from thyroid cancer (CMS/HCC) (Primary)  -     Thyroglobulin  -     CT PET SKULL BASE TO MID THIGH        TREATMENT PLAN  SUMMARY:  [No treatment plan]

## 2021-05-14 NOTE — ASSESSMENT & PLAN NOTE
Due for A1C  Continue the same meds  Diabetic diet    Lab Results   Component Value Date    HGBA1C 7.1 (H) 01/29/2020        Form sent back to Med [facility] via interoffice mail.

## 2021-05-19 ENCOUNTER — OFFICE VISIT (OUTPATIENT)
Dept: NEUROSURGERY | Facility: CLINIC | Age: 76
End: 2021-05-19
Payer: MEDICARE

## 2021-05-19 ENCOUNTER — HOSPITAL ENCOUNTER (OUTPATIENT)
Dept: RADIOLOGY | Facility: CLINIC | Age: 76
Discharge: HOME | End: 2021-05-19
Attending: RADIOLOGY
Payer: MEDICARE

## 2021-05-19 VITALS
SYSTOLIC BLOOD PRESSURE: 136 MMHG | WEIGHT: 172 LBS | RESPIRATION RATE: 20 BRPM | DIASTOLIC BLOOD PRESSURE: 78 MMHG | HEIGHT: 66 IN | HEART RATE: 79 BPM | OXYGEN SATURATION: 98 % | BODY MASS INDEX: 27.64 KG/M2 | TEMPERATURE: 97.6 F

## 2021-05-19 VITALS — BODY MASS INDEX: 27.48 KG/M2 | HEIGHT: 66 IN | WEIGHT: 171 LBS

## 2021-05-19 DIAGNOSIS — M54.50 CHRONIC MIDLINE LOW BACK PAIN WITHOUT SCIATICA: Primary | ICD-10-CM

## 2021-05-19 DIAGNOSIS — C73 METASTASIS FROM THYROID CANCER (CMS/HCC): ICD-10-CM

## 2021-05-19 DIAGNOSIS — G89.29 CHRONIC MIDLINE LOW BACK PAIN WITHOUT SCIATICA: Primary | ICD-10-CM

## 2021-05-19 DIAGNOSIS — C79.9 METASTASIS FROM THYROID CANCER (CMS/HCC): ICD-10-CM

## 2021-05-19 PROCEDURE — 99213 OFFICE O/P EST LOW 20 MIN: CPT | Performed by: NEUROLOGICAL SURGERY

## 2021-05-19 PROCEDURE — G8752 SYS BP LESS 140: HCPCS | Performed by: NEUROLOGICAL SURGERY

## 2021-05-19 PROCEDURE — G8754 DIAS BP LESS 90: HCPCS | Performed by: NEUROLOGICAL SURGERY

## 2021-05-19 RX ORDER — FLUDEOXYGLUCOSE F18 300 MCI/ML
9.04 INJECTION INTRAVENOUS ONCE
Status: COMPLETED | OUTPATIENT
Start: 2021-05-19 | End: 2021-05-19

## 2021-05-19 RX ADMIN — FLUDEOXYGLUCOSE F18 9.04 MILLICURIE: 300 INJECTION INTRAVENOUS at 10:38

## 2021-05-19 NOTE — PROGRESS NOTES
[facility]  Medical Office Building [address], [address]  [place] [address]  Phone: [phone]  Fax: [phone]      Patient ID: Matt Williamson                              : 1945    Visit Date: 2021    Referring Provider: Dr. Arreola    History of Present Illness:   Matt Williamson is a pleasant 75 y.o. male with thyroid cancer metastatic to L3 vertebral body seen today by the neurosurgery service for follow-up to right L4 radicular pain. We last saw him in the office 2020 for worsening pain. He underwent Right L4 TFESI with Dr. Feliz about 10 months ago and had near resolution of his radicular pain. He is living in [place], [place].      He has overall been doing well in the interval in regards to his radicular pain. He complains of a low back ache with increased activity and described as soreness. He will have occasional sharp pain in his right thigh but these are rare. He denies any saddle anesthesia, bowel/bladder incontinence.    He continues to follow with hematology/oncology for his thyroid cancer. He states there are not any new lesions on most recent imaging.     To recall, the patient initially presented for right leg pain which began without any injury or inciting event. He underwent imaging which demonstrated an L4-5 disc herniation causing right L4 nerve root compression as well as a lesion in the vertebral body of L3.      Allergies:  Allergies   Allergen Reactions   • Penicillins Rash     Childhood allergy       Medications:     Current Outpatient Medications:   •  aspirin 81 mg enteric coated tablet, Take 81 mg by mouth daily., Disp: , Rfl:   •  gabapentin (NEURONTIN) 300 mg capsule, Take 1 capsule (300 mg total) by mouth 3 (three) times a day. (Patient taking differently: Take 300 mg by mouth 2 (two) times a day.  ), Disp: 270 capsule, Rfl: 3  •  glipiZIDE (GLUCOTROL) 5 mg tablet, Take 1 tablet by mouth as needed. , Disp: , Rfl:   •   JARDIANCE 10 mg tablet, TAKE 1 TABLET(10 MG) BY MOUTH DAILY, Disp: 90 tablet, Rfl: 0  •  metFORMIN (GLUCOPHAGE) 500 mg tablet, TAKE 1 TABLET(500 MG) BY MOUTH FOUR TIMES DAILY (Patient taking differently: 500 mg 4 (four) times a day (before meals and nightly).  ), Disp: 360 tablet, Rfl: 0  •  metoprolol succinate XL (TOPROL-XL) 100 mg 24 hr tablet, TAKE 1 TABLET BY MOUTH TWICE DAILY (Patient taking differently: 100 mg 2 (two) times a day.  ), Disp: 180 tablet, Rfl: 3  •  pantoprazole (PROTONIX) 40 mg EC tablet, TAKE 1 TABLET(40 MG) BY MOUTH DAILY, Disp: 90 tablet, Rfl: 0  •  pravastatin (PRAVACHOL) 40 mg tablet, TAKE 1 TABLET(40 MG) BY MOUTH DAILY (Patient taking differently: 40 mg daily.  ), Disp: 90 tablet, Rfl: 3  •  propafenone (RYTHMOL) 150 mg tablet, Take 1 tablet by mouth as needed (Palpitations).  , Disp: , Rfl:   •  SYNTHROID 150 mcg tablet, TAKE 1 TABLET BY MOUTH EVERY DAY (Patient taking differently: 150 mcg daily.  ), Disp: 90 tablet, Rfl: 1  •  TRULICITY 0.75 mg/0.5 mL pen injector, once a week. Every Tuesday , Disp: , Rfl:      Past Medical History:   Past Medical History:   Diagnosis Date   • Abdominal hernia    • Abnormal ECG 10/19    atrial fib   • Atherosclerosis of coronary artery 9/14/2012    Overview:    • Atrial fibrillation (CMS/HCC)    • Atrial fibrillation with rapid ventricular response (CMS/HCC) 10/25/2019    Hospitalized on 10/25/19 at [facility] (formerly [facility]    • BPH (benign prostatic hyperplasia) 9/14/2012    Overview:    • Cancer (CMS/HCC) Thyroid   • Cataract 9/11/2013    Overview:    • Cyst of thyroid 1/15/2007   • Epigastric abdominal pain 2/18/2016    Overview:    • Essential hypertension 6/20/2016    Overview:    • Former tobacco use 5/2/2018    Overview:    • History of radiation therapy     I-131   • Mixed hyperlipidemia 2/18/2016    Overview:    • Osteomyelitis of foot (CMS/HCC) 2009    Surgery.   • PSA elevation 11/4/2018   • Thyroid cancer (CMS/HCC) 9/14/2012     Overview:    • Type 2 diabetes mellitus without complication (CMS/[facility]) 2016    Overview:    • Vertigo 2018   • Vertigo 2018       Past Surgical History:   Past Surgical History:   Procedure Laterality Date   • ANAL FISSURECTOMY     • CARDIAC CATHETERIZATION  2005    LM FOD. 70-80% proximal LAD.Mild disease CX, RCA and branches.    • CARDIAC CATHETERIZATION  2005    LM FOD. Pristine LAD stent. CX FOD. RCA mild disease.   • CARDIAC SURGERY  Stent in lad   • CORONARY ANGIOPLASTY WITH STENT PLACEMENT  2005    LM FOD. Moderate, diffuse disease LAD. Irregular CX w/ PLVB distal to PDA 99%. Mild disease RCA. Stent to distal LAD.   • EYE SURGERY Left Cataract   • FOOT SURGERY      For osteomyelitis.   • GASTROCUTANEOUS FISTULA CLOSURE     • THYROIDECTOMY     • TONSILLECTOMY  No    denies   • UPPER GASTROINTESTINAL ENDOSCOPY         Family History:  Family History   Problem Relation Age of Onset   • Cancer Biological Mother    • Breast cancer Biological Mother    • Emphysema Biological Father    • Lung cancer Biological Father        Social History:  Social History     Socioeconomic History   • Marital status:      Spouse name: Not on file   • Number of children: Not on file   • Years of education: Not on file   • Highest education level: Not on file   Occupational History   • Occupation: Retired   Tobacco Use   • Smoking status: Former Smoker     Packs/day: 2.00     Years: 20.00     Pack years: 40.00     Types: Cigarettes     Quit date:      Years since quittin.4   • Smokeless tobacco: Never Used   Substance and Sexual Activity   • Alcohol use: Yes     Comment: Social   • Drug use: Never   • Sexual activity: Not Currently     Partners: Female   Other Topics Concern   • Not on file   Social History Narrative   • Not on file     Social Determinants of Health     Financial Resource Strain:    • Difficulty of Paying Living Expenses:    Food Insecurity:    • Worried About  Running Out of Food in the Last Year:    • Ran Out of Food in the Last Year:    Transportation Needs:    • Lack of Transportation (Medical):    • Lack of Transportation (Non-Medical):    Physical Activity:    • Days of Exercise per Week:    • Minutes of Exercise per Session:    Stress:    • Feeling of Stress :    Social Connections:    • Frequency of Communication with Friends and Family:    • Frequency of Social Gatherings with Friends and Family:    • Attends Baptism Services:    • Active Member of Clubs or Organizations:    • Attends Club or Organization Meetings:    • Marital Status:    Intimate Partner Violence:    • Fear of Current or Ex-Partner:    • Emotionally Abused:    • Physically Abused:    • Sexually Abused:        Vitals:   Vitals:    05/19/21 1242   BP: 136/78   Pulse: 79   Resp: 20   Temp: 36.4 °C (97.6 °F)   SpO2: 98%       Review of Systems:  A complete 14 point review of systems was conducted and was deemed negative except what was documented in the HPI.    Physical Examination:  Physical Exam   Constitutional: Oriented to person, place, and time. Appears well-developed and well-nourished.   Head: Normocephalic and atraumatic.   Right Ear: External ear normal.   Left Ear: External ear normal.   Nose: Nose normal.   Mouth/Throat: Oropharynx is clear and moist.   Eyes: Conjunctivae and EOM are normal. Pupils are equal, round, and reactive to light. No scleral icterus.   Neck: Normal range of motion. Neck supple.   Cardiovascular: Normal rate and regular rhythm.    Pulmonary/Chest: Effort normal   Musculoskeletal: No deformity. No pain with palpation of the midline lumbar spine, facets or soft tissue..   Skin: Skin is warm and dry. No rash noted. No erythema.   Psychiatric: Normal mood and affect. Speech is normal and behavior is normal. Thought content normal.   Vitals reviewed.      Neurological Examination:  Neurologic Exam     Mental Status   Oriented to person, place, and time.   Attention:  normal.   Speech: speech is normal   Level of consciousness: alert    Cranial Nerves     CN II   Visual acuity: normal    CN III, IV, VI   Pupils are equal, round, and reactive to light.  Extraocular motions are normal.   Right pupil: Size: 3 mm. Shape: regular. Reactivity: brisk.   Left pupil: Size: 3 mm. Shape: regular. Reactivity: brisk.   CN III: no CN III palsy  CN VI: no CN VI palsy  Nystagmus: none     CN V   Facial sensation intact.     CN VIII   CN VIII normal.   Hearing: intact    CN IX, X   CN IX normal.   Palate: symmetric    CN XI   CN XI normal.   Right sternocleidomastoid strength: normal  Left sternocleidomastoid strength: normal    CN XII   CN XII normal.   Tongue: not atrophic    Sensation ( /2)    Right Left  Right Left   C5 2 2 L2 2 2   C6 2 2 L3 2 2   C7 2 2 L4 2 2   C8 2 2 L5 2 2   T1 2 2 S1 2 2     Motor:     Deltoid Biceps Triceps Wrist ext Finger ext Hand Intrinsics Hip flexion Knee ext Dorsi-  flexion EHL Plantar Flexion   R 5 5 5 5 5 5 4+ 5 5 5 5   L 5 5 5 5 5 5 5 5 5 5 5     There is no pronator drift. Muscle bulk and tone are normal.     Gait, Coordination, and Reflexes     Reflexes   Reflexes 2+ except as noted.   Right plantar: normal  Left plantar: normal  Right Maharaj: absent  Left Maharaj: absent  Right ankle clonus: absent  Left ankle clonus: absent      Data Review:    Lab Results:   Lab Results   Component Value Date    WBC 7.0 11/20/2020    WBC 10.13 01/17/2020    HGB 12.7 (L) 11/20/2020    HGB 14.1 01/17/2020    HCT 40.0 11/20/2020    HCT 43.0 01/17/2020    MCV 88.9 11/20/2020    MCV 88.5 01/17/2020     11/20/2020     01/17/2020    RDW 13.3 11/20/2020    RDW 13.7 01/17/2020      Lab Results   Component Value Date    GLUCOSE 98 11/20/2020    GLUCOSE 225 (H) 05/02/2018    BUN 18 11/20/2020    BUN 24 (H) 04/17/2020     11/20/2020     05/02/2018    K 4.5 11/20/2020    K 4.6 05/02/2018     11/20/2020    CL 98 05/02/2018    CO2 27 11/20/2020    CO2 27  05/02/2018    [facility] 12 05/02/2018        Imaging:   Independent review of all imaging was done by myself as well as review of the radiologists readings and comparison to prior films.     MRI lumbar spine 4/21/2021  Stable metastatic lesion within L3. Improvement in stenosis at L4-L5 right  neural foramen.    Assessment / Plan:     In summary, Matt Williamson is a pleasant 75 y.o. male with thyroid cancer metastatic to L3 vertebral body seen today by the neurosurgery service for follow-up to right L4 radicular pain. The radicular pain has resolved in the interval. He has low back pain with increased activity which sound musculoskeletal in nature. On exam, he has some chronic R hip flexion weakness but otherwise full strength throughout.     His most recent lumbar MRI reveals a foraminal disc herniation at L4-5 on the right which has slightly improved compared to previous MRI. At this time, he should restart PT and specifically traction therapy. He does not have any radicular or facet pain that would respond to injections.     Overall, he is doing well. We will plan to see him back in 1 year or sooner if needed. He will call in the interval with any questions or concerns. He agrees with the plan as stated.         MD PATT Desir, Bruno Tierney PA-C, am scribing for, and in the presence of, Dr. Colt Heller.   I personally saw and examined the patient.  Agree with note     25 minutes were spent with the patient, examination, reviewing films, and coordinating care.  Patient seen earlier today. Signature timestamp does not reflect patient encounter time.   More than 50% of the time is spent counseling the patient and family and coordinating care.

## 2021-05-19 NOTE — LETTER
May 25, 2021     Raj Arreola, DO  [address] [address]    Patient: Matt Williamson  YOB: 1945  Date of Visit: 2021      Dear Dr. Arreola:    Thank you for referring Matt Williamson to me for evaluation. Below are my notes for this consultation.    If you have questions, please do not hesitate to call me. I look forward to following your patient along with you.         Sincerely,        Colt Heller MD        CC: No Recipients  Colt Heller MD  2021  2:58 PM  Signed      [facility]  Medical Office Building [address], [address]  [place] [address]  Phone: [phone]  Fax: [phone]      Patient ID: Matt Williamson                              : 1945    Visit Date: 2021    Referring Provider: Dr. Arreola    History of Present Illness:   Matt Williamson is a pleasant 75 y.o. male with thyroid cancer metastatic to L3 vertebral body seen today by the neurosurgery service for follow-up to right L4 radicular pain. We last saw him in the office 2020 for worsening pain. He underwent Right L4 TFESI with Dr. Feliz about 10 months ago and had near resolution of his radicular pain. He is living in [place], [place].      He has overall been doing well in the interval in regards to his radicular pain. He complains of a low back ache with increased activity and described as soreness. He will have occasional sharp pain in his right thigh but these are rare. He denies any saddle anesthesia, bowel/bladder incontinence.    He continues to follow with hematology/oncology for his thyroid cancer. He states there are not any new lesions on most recent imaging.     To recall, the patient initially presented for right leg pain which began without any injury or inciting event. He underwent imaging which demonstrated an L4-5 disc herniation causing right L4 nerve root compression as well as a lesion in the vertebral body of  L3.      Allergies:  Allergies   Allergen Reactions   • Penicillins Rash     Childhood allergy       Medications:     Current Outpatient Medications:   •  aspirin 81 mg enteric coated tablet, Take 81 mg by mouth daily., Disp: , Rfl:   •  gabapentin (NEURONTIN) 300 mg capsule, Take 1 capsule (300 mg total) by mouth 3 (three) times a day. (Patient taking differently: Take 300 mg by mouth 2 (two) times a day.  ), Disp: 270 capsule, Rfl: 3  •  glipiZIDE (GLUCOTROL) 5 mg tablet, Take 1 tablet by mouth as needed. , Disp: , Rfl:   •  JARDIANCE 10 mg tablet, TAKE 1 TABLET(10 MG) BY MOUTH DAILY, Disp: 90 tablet, Rfl: 0  •  metFORMIN (GLUCOPHAGE) 500 mg tablet, TAKE 1 TABLET(500 MG) BY MOUTH FOUR TIMES DAILY (Patient taking differently: 500 mg 4 (four) times a day (before meals and nightly).  ), Disp: 360 tablet, Rfl: 0  •  metoprolol succinate XL (TOPROL-XL) 100 mg 24 hr tablet, TAKE 1 TABLET BY MOUTH TWICE DAILY (Patient taking differently: 100 mg 2 (two) times a day.  ), Disp: 180 tablet, Rfl: 3  •  pantoprazole (PROTONIX) 40 mg EC tablet, TAKE 1 TABLET(40 MG) BY MOUTH DAILY, Disp: 90 tablet, Rfl: 0  •  pravastatin (PRAVACHOL) 40 mg tablet, TAKE 1 TABLET(40 MG) BY MOUTH DAILY (Patient taking differently: 40 mg daily.  ), Disp: 90 tablet, Rfl: 3  •  propafenone (RYTHMOL) 150 mg tablet, Take 1 tablet by mouth as needed (Palpitations).  , Disp: , Rfl:   •  SYNTHROID 150 mcg tablet, TAKE 1 TABLET BY MOUTH EVERY DAY (Patient taking differently: 150 mcg daily.  ), Disp: 90 tablet, Rfl: 1  •  TRULICITY 0.75 mg/0.5 mL pen injector, once a week. Every Tuesday , Disp: , Rfl:      Past Medical History:   Past Medical History:   Diagnosis Date   • Abdominal hernia    • Abnormal ECG 10/19    atrial fib   • Atherosclerosis of coronary artery 9/14/2012    Overview:    • Atrial fibrillation (CMS/HCC)    • Atrial fibrillation with rapid ventricular response (CMS/HCC) 10/25/2019    Hospitalized on 10/25/19 at [facility].     • BPH (benign prostatic hyperplasia) 9/14/2012    Overview:    • Cancer (CMS/HCC) Thyroid   • Cataract 9/11/2013    Overview:    • Cyst of thyroid 1/15/2007   • Epigastric abdominal pain 2/18/2016    Overview:    • Essential hypertension 6/20/2016    Overview:    • Former tobacco use 5/2/2018    Overview:    • History of radiation therapy     I-131   • Mixed hyperlipidemia 2/18/2016    Overview:    • Osteomyelitis of foot (CMS/HCC) 2009    Surgery.   • PSA elevation 11/4/2018   • Thyroid cancer (CMS/HCC) 9/14/2012    Overview:    • Type 2 diabetes mellitus without complication (CMS/HCC) 6/20/2016    Overview:    • Vertigo 5/2/2018   • Vertigo 5/2/2018       Past Surgical History:   Past Surgical History:   Procedure Laterality Date   • ANAL FISSURECTOMY  1986   • CARDIAC CATHETERIZATION  11/21/2005    LM FOD. 70-80% proximal LAD.Mild disease CX, RCA and branches.    • CARDIAC CATHETERIZATION  11/28/2005    LM FOD. Pristine LAD stent. CX FOD. RCA mild disease.   • CARDIAC SURGERY  Stent in lad   • CORONARY ANGIOPLASTY WITH STENT PLACEMENT  11/22/2005    LM FOD. Moderate, diffuse disease LAD. Irregular CX w/ PLVB distal to PDA 99%. Mild disease RCA. Stent to distal LAD.   • EYE SURGERY Left Cataract   • FOOT SURGERY  2009    For osteomyelitis.   • GASTROCUTANEOUS FISTULA CLOSURE     • THYROIDECTOMY  2012   • TONSILLECTOMY  No    denies   • UPPER GASTROINTESTINAL ENDOSCOPY         Family History:  Family History   Problem Relation Age of Onset   • Cancer Biological Mother    • Breast cancer Biological Mother    • Emphysema Biological Father    • Lung cancer Biological Father        Social History:  Social History     Socioeconomic History   • Marital status:      Spouse name: Not on file   • Number of children: Not on file   • Years of education: Not on file   • Highest education level: Not on file   Occupational History   • Occupation: Retired   Tobacco Use   • Smoking status: Former Smoker     Packs/day:  2.00     Years: 20.00     Pack years: 40.00     Types: Cigarettes     Quit date:      Years since quittin.4   • Smokeless tobacco: Never Used   Substance and Sexual Activity   • Alcohol use: Yes     Comment: Social   • Drug use: Never   • Sexual activity: Not Currently     Partners: Female   Other Topics Concern   • Not on file   Social History Narrative   • Not on file     Social Determinants of Health     Financial Resource Strain:    • Difficulty of Paying Living Expenses:    Food Insecurity:    • Worried About Running Out of Food in the Last Year:    • Ran Out of Food in the Last Year:    Transportation Needs:    • Lack of Transportation (Medical):    • Lack of Transportation (Non-Medical):    Physical Activity:    • Days of Exercise per Week:    • Minutes of Exercise per Session:    Stress:    • Feeling of Stress :    Social Connections:    • Frequency of Communication with Friends and Family:    • Frequency of Social Gatherings with Friends and Family:    • Attends Baptism Services:    • Active Member of Clubs or Organizations:    • Attends Club or Organization Meetings:    • Marital Status:    Intimate Partner Violence:    • Fear of Current or Ex-Partner:    • Emotionally Abused:    • Physically Abused:    • Sexually Abused:        Vitals:   Vitals:    21 1242   BP: 136/78   Pulse: 79   Resp: 20   Temp: 36.4 °C (97.6 °F)   SpO2: 98%       Review of Systems:  A complete 14 point review of systems was conducted and was deemed negative except what was documented in the HPI.    Physical Examination:  Physical Exam   Constitutional: Oriented to person, place, and time. Appears well-developed and well-nourished.   Head: Normocephalic and atraumatic.   Right Ear: External ear normal.   Left Ear: External ear normal.   Nose: Nose normal.   Mouth/Throat: Oropharynx is clear and moist.   Eyes: Conjunctivae and EOM are normal. Pupils are equal, round, and reactive to light. No scleral icterus.   Neck:  Normal range of motion. Neck supple.   Cardiovascular: Normal rate and regular rhythm.    Pulmonary/Chest: Effort normal   Musculoskeletal: No deformity. No pain with palpation of the midline lumbar spine, facets or soft tissue..   Skin: Skin is warm and dry. No rash noted. No erythema.   Psychiatric: Normal mood and affect. Speech is normal and behavior is normal. Thought content normal.   Vitals reviewed.      Neurological Examination:  Neurologic Exam     Mental Status   Oriented to person, place, and time.   Attention: normal.   Speech: speech is normal   Level of consciousness: alert    Cranial Nerves     CN II   Visual acuity: normal    CN III, IV, VI   Pupils are equal, round, and reactive to light.  Extraocular motions are normal.   Right pupil: Size: 3 mm. Shape: regular. Reactivity: brisk.   Left pupil: Size: 3 mm. Shape: regular. Reactivity: brisk.   CN III: no CN III palsy  CN VI: no CN VI palsy  Nystagmus: none     CN V   Facial sensation intact.     CN VIII   CN VIII normal.   Hearing: intact    CN IX, X   CN IX normal.   Palate: symmetric    CN XI   CN XI normal.   Right sternocleidomastoid strength: normal  Left sternocleidomastoid strength: normal    CN XII   CN XII normal.   Tongue: not atrophic    Sensation ( /2)    Right Left  Right Left   C5 2 2 L2 2 2   C6 2 2 L3 2 2   C7 2 2 L4 2 2   C8 2 2 L5 2 2   T1 2 2 S1 2 2     Motor:     Deltoid Biceps Triceps Wrist ext Finger ext Hand Intrinsics Hip flexion Knee ext Dorsi-  flexion EHL Plantar Flexion   R 5 5 5 5 5 5 4+ 5 5 5 5   L 5 5 5 5 5 5 5 5 5 5 5     There is no pronator drift. Muscle bulk and tone are normal.     Gait, Coordination, and Reflexes     Reflexes   Reflexes 2+ except as noted.   Right plantar: normal  Left plantar: normal  Right Maharaj: absent  Left Maharaj: absent  Right ankle clonus: absent  Left ankle clonus: absent      Data Review:    Lab Results:   Lab Results   Component Value Date    WBC 7.0 11/20/2020    WBC 10.13  01/17/2020    HGB 12.7 (L) 11/20/2020    HGB 14.1 01/17/2020    HCT 40.0 11/20/2020    HCT 43.0 01/17/2020    MCV 88.9 11/20/2020    MCV 88.5 01/17/2020     11/20/2020     01/17/2020    RDW 13.3 11/20/2020    RDW 13.7 01/17/2020      Lab Results   Component Value Date    GLUCOSE 98 11/20/2020    GLUCOSE 225 (H) 05/02/2018    BUN 18 11/20/2020    BUN 24 (H) 04/17/2020     11/20/2020     05/02/2018    K 4.5 11/20/2020    K 4.6 05/02/2018     11/20/2020    CL 98 05/02/2018    CO2 27 11/20/2020    CO2 27 05/02/2018    ANIONGAP 12 05/02/2018        Imaging:   Independent review of all imaging was done by myself as well as review of the radiologists readings and comparison to prior films.     MRI lumbar spine 4/21/2021  Stable metastatic lesion within L3. Improvement in stenosis at L4-L5 right  neural foramen.    Assessment / Plan:     In summary, Matt Williamson is a pleasant 75 y.o. male with thyroid cancer metastatic to L3 vertebral body seen today by the neurosurgery service for follow-up to right L4 radicular pain. The radicular pain has resolved in the interval. He has low back pain with increased activity which sound musculoskeletal in nature. On exam, he has some chronic R hip flexion weakness but otherwise full strength throughout.     His most recent lumbar MRI reveals a foraminal disc herniation at L4-5 on the right which has slightly improved compared to previous MRI. At this time, he should restart PT and specifically traction therapy. He does not have any radicular or facet pain that would respond to injections.     Overall, he is doing well. We will plan to see him back in 1 year or sooner if needed. He will call in the interval with any questions or concerns. He agrees with the plan as stated.         MD PATT Desir, Bruno Tierney PA-C, am scribing for, and in the presence of, Dr. Colt Heller.   I personally saw and examined the patient.  Agree with note     25 minutes  were spent with the patient, examination, reviewing films, and coordinating care.  Patient seen earlier today. Signature timestamp does not reflect patient encounter time.   More than 50% of the time is spent counseling the patient and family and coordinating care.

## 2021-05-20 ENCOUNTER — DOCUMENTATION (OUTPATIENT)
Dept: RADIATION ONCOLOGY | Facility: HOSPITAL | Age: 76
End: 2021-05-20

## 2021-05-20 ENCOUNTER — TELEPHONE (OUTPATIENT)
Dept: INTERNAL MEDICINE | Facility: CLINIC | Age: 76
End: 2021-05-20

## 2021-05-20 NOTE — TELEPHONE ENCOUNTER
Patient had PET scan done on 5/19/21. He said they a little something. His last colonoscopy was 2/22/16/. He wants your thoughts if he could get another one?

## 2021-05-20 NOTE — PROGRESS NOTES
Contacted patient regarding recent PET CT scan results.  There was no concerning metabolic activity at L3 vertebral body and mild persistent hypermetabolic in the caudal aspect of right scapular wing likely posttreatment.  Patient denies right shoulder discomfort.  Patient had prior I-131 treatment and tactic body radiation therapy to L3 region.  Currently no active apparent metastatic thyroid cancer.  Scan does however show some uptake in the area of the rectum discussed this with the patient.  Patient has not had a recent colonoscopy but denies blood per rectum and denies family history of colon cancer.  Regardless he understands the importance of following up with his GI physician in [facility].  He will contact him now.  Follow-up in 6 months with me.

## 2021-05-21 RX ORDER — PANTOPRAZOLE SODIUM 40 MG/1
TABLET, DELAYED RELEASE ORAL
Qty: 90 TABLET | Refills: 0 | Status: SHIPPED | OUTPATIENT
Start: 2021-05-21 | End: 2021-08-30

## 2021-05-21 NOTE — TELEPHONE ENCOUNTER
Yes I agree he should get another colonoscopy.  The area needs to be investigated further.  Dr Agrawal ret;lashawn and he should see Dr Reyes.      Set up some sort of visit preferably OV in next 1 to 3 months    If any other questions let me know    ed

## 2021-05-21 NOTE — TELEPHONE ENCOUNTER
Spoke with patient regarding message from Dr. Arreola. PET sent to Dr. Angel with note stating patient needs colonoscopy/evaluation prior to 10 year screening judd due to PET scan results.     Patient will call shan's office to schedule, advised to call sooner rather than later due to them booking out pretty far at this time.     Patient will call the office back to schedule a follow up in 1-3 months.

## 2021-06-15 ENCOUNTER — TELEPHONE (OUTPATIENT)
Dept: CARDIOLOGY | Facility: CLINIC | Age: 76
End: 2021-06-15

## 2021-06-17 ENCOUNTER — TELEPHONE (OUTPATIENT)
Dept: INTERNAL MEDICINE | Facility: CLINIC | Age: 76
End: 2021-06-17

## 2021-06-17 NOTE — TELEPHONE ENCOUNTER
Please send the letter I did today regarding this patient and  any requested prescriptions for studies or labs.  Thank you.  Raj Arreola

## 2021-06-21 PROBLEM — Z01.810 PREOP CARDIOVASCULAR EXAM: Status: ACTIVE | Noted: 2021-06-21

## 2021-06-21 NOTE — PROGRESS NOTES
Pre-Operative   Consult Note         Reason for visit:   Chief Complaint   Patient presents with   • Pre-op Exam       HPI     Matt Williamson comes to the office today for preoperative cardiac evaluation prior to a colonoscopy with Dr. Tessa Gonzalez on 6/28/21.    He was last seen in the office on 3/22/21 for management of coronary artery disease s/p MARGOT, PAF, hypertension and dyslipidemia.  At that time I asked him to continue the same medications and follow up in 6 months.     He denies any chest pain, shortness of breath, edema, palpitations, near syncope or syncope.    He reports he feels well and offers no new complaints today.    His home BPs usually run around 120-130s/80s.      Outside records reviewed.    Past Medical History:   Diagnosis Date   • Abdominal hernia    • Atherosclerosis of coronary artery    • Atrial fibrillation with rapid ventricular response (CMS/HCC) 10/25/2019    Hospitalized on 10/25/19 at [facility].    • BPH (benign prostatic hyperplasia)    • Cataract    • Cyst of thyroid    • Epigastric abdominal pain    • Essential hypertension    • Former tobacco use    • History of radiation therapy    • Mixed hyperlipidemia    • Osteomyelitis of foot (CMS/HCC) 2009   • PSA elevation    • Thyroid cancer (CMS/HCC) 9/14/2012   • Type 2 diabetes mellitus without complication (CMS/HCC)    • Vertigo        Past Surgical History:   Procedure Laterality Date   • ANAL FISSURECTOMY  1986   • CARDIAC CATHETERIZATION  11/21/2005    LM FOD. 70-80% proximal LAD.Mild disease CX, RCA and branches.    • CARDIAC CATHETERIZATION  11/28/2005    LM FOD. Pristine LAD stent. CX FOD. RCA mild disease.   • CARDIAC SURGERY  Stent in lad   • CORONARY ANGIOPLASTY WITH STENT PLACEMENT  11/22/2005    LM FOD. Moderate, diffuse disease LAD. Irregular CX w/ PLVB distal to PDA 99%. Mild disease RCA. Stent to distal LAD.   • EYE SURGERY Left Cataract   • FOOT SURGERY  2009    For osteomyelitis.   •  GASTROCUTANEOUS FISTULA CLOSURE     • THYROIDECTOMY     • TONSILLECTOMY  No    denies   • UPPER GASTROINTESTINAL ENDOSCOPY         Social History     Tobacco Use   Smoking Status Former Smoker   • Packs/day: 2.00   • Years: 20.00   • Pack years: 40.00   • Types: Cigarettes   • Quit date:    • Years since quittin.5     Social History     Tobacco Use   • Smoking status: Former Smoker     Packs/day: 2.00     Years: 20.00     Pack years: 40.00     Types: Cigarettes     Quit date:      Years since quittin.5   • Smokeless tobacco: Never Used   Substance Use Topics   • Alcohol use: Yes     Comment: Social   • Drug use: Never       Family History   Problem Relation Age of Onset   • Cancer Biological Mother    • Breast cancer Biological Mother    • Emphysema Biological Father    • Lung cancer Biological Father        Allergies:  Penicillins    Current Outpatient Medications   Medication Sig Dispense Refill   • aspirin 81 mg enteric coated tablet Take 81 mg by mouth daily.     • gabapentin (NEURONTIN) 300 mg capsule Take 1 capsule (300 mg total) by mouth 3 (three) times a day. (Patient taking differently: Take 300 mg by mouth 2 (two) times a day.  ) 270 capsule 3   • glipiZIDE (GLUCOTROL) 5 mg tablet Take 1 tablet by mouth as needed.      • JARDIANCE 10 mg tablet TAKE 1 TABLET(10 MG) BY MOUTH DAILY 90 tablet 0   • metFORMIN (GLUCOPHAGE) 500 mg tablet TAKE 1 TABLET(500 MG) BY MOUTH FOUR TIMES DAILY (Patient taking differently: 500 mg 4 (four) times a day (before meals and nightly).  ) 360 tablet 0   • metoprolol succinate XL (TOPROL-XL) 100 mg 24 hr tablet TAKE 1 TABLET BY MOUTH TWICE DAILY (Patient taking differently: 100 mg 2 (two) times a day.  ) 180 tablet 3   • pantoprazole (PROTONIX) 40 mg EC tablet TAKE 1 TABLET(40 MG) BY MOUTH DAILY 90 tablet 0   • pravastatin (PRAVACHOL) 40 mg tablet TAKE 1 TABLET(40 MG) BY MOUTH DAILY (Patient taking differently: 40 mg daily.  ) 90 tablet 3   • propafenone  (RYTHMOL) 150 mg tablet Take 1 tablet by mouth as needed (Palpitations).       • SYNTHROID 150 mcg tablet TAKE 1 TABLET BY MOUTH EVERY DAY (Patient taking differently: 150 mcg daily.  ) 90 tablet 1   • TRULICITY 0.75 mg/0.5 mL pen injector once a week. Every Tuesday        No current facility-administered medications for this visit.       Review of Systems   Constitutional: Positive for malaise/fatigue.   Cardiovascular: Negative for chest pain, dyspnea on exertion, irregular heartbeat, leg swelling, near-syncope, orthopnea, palpitations and syncope.   Hematologic/Lymphatic: Does not bruise/bleed easily.   Neurological: Negative for dizziness and light-headedness.       Objective     Vitals:    06/22/21 1228   BP: (!) 140/92   Pulse: 73   SpO2: 99%       Wt Readings from Last 1 Encounters:   06/22/21 79.4 kg (175 lb)       Physical Exam  Vitals reviewed.   Constitutional:       General: He is not in acute distress.     Appearance: Normal appearance. He is well-developed.   HENT:      Head: Normocephalic and atraumatic.   Eyes:      Conjunctiva/sclera: Conjunctivae normal.   Neck:      Vascular: No JVD.   Cardiovascular:      Rate and Rhythm: Normal rate and regular rhythm.      Pulses: Intact distal pulses.           Dorsalis pedis pulses are 1+ on the right side and 1+ on the left side.        Posterior tibial pulses are 1+ on the right side and 1+ on the left side.      Heart sounds: Normal heart sounds. No murmur heard.     Pulmonary:      Effort: Pulmonary effort is normal. No respiratory distress.      Breath sounds: Normal breath sounds. No wheezing or rales.   Chest:      Chest wall: No tenderness.   Abdominal:      General: Bowel sounds are normal. There is no distension.      Palpations: Abdomen is soft.      Tenderness: There is no abdominal tenderness.   Musculoskeletal:         General: Normal range of motion.      Cervical back: Normal range of motion and neck supple.      Right lower leg: No edema.       Left lower leg: No edema.   Skin:     General: Skin is warm and dry.   Neurological:      General: No focal deficit present.      Mental Status: He is alert and oriented to person, place, and time.   Psychiatric:         Mood and Affect: Mood normal.         Behavior: Behavior normal.         Thought Content: Thought content normal.         Judgment: Judgment normal.          ECG: Sinus rhythm with borderline first-degree AV block, otherwise unremarkable    Labs   Lab Results   Component Value Date    WBC 7.0 11/20/2020    HGB 12.7 (L) 11/20/2020    HCT 40.0 11/20/2020     11/20/2020    ALT 18 11/20/2020    AST 18 11/20/2020     11/20/2020    K 4.5 11/20/2020     11/20/2020    CREATININE 1.08 11/20/2020    BUN 18 11/20/2020    CO2 27 11/20/2020    PT 12.8 01/17/2020    GLUCOSE 98 11/20/2020    HGBA1C 6.8 (H) 11/20/2020         Surgical Risk Assessment  The proposed procedure is low risk, corresponding to a < 1% 30-day risk of a major adverse cardiac event.    The patient has the following risk factors from the Revised Cardiac Risk Index (RCRI): history of ischemic heart disease.  With one risk factor, this corresponds to an intermediate risk (1.0%) of major adverse cardiac events.    The patient has a Duke Activity Status Index score of 52.95, corresponding to an expected exertional capacity of 9.25 METS.          Assessment/Plan     Preop cardiovascular exam  The patient is scheduled to undergo colonoscopy on June 28, 2021.  There have been no recent episodes of angina, CHF, or clinical/symptomatic arrhythmias. The overall risk is intermediate but acceptable. Further testing or interventions are unlikely to improve the patient's risk. Routine perioperative care.      Atherosclerosis of coronary artery  The patient is asymptomatic and his ECG is stable.  He had a nuclear stress test performed in the spring 2018 which was normal.  He is tolerating his medical regimen without difficulty.  He will  continue the current medical regimen and we will periodically reassess his coronary circulation.    Type 2 diabetes mellitus without complication (CMS/HCC)  The patient's blood sugars have been reasonably well controlled.  He will continue to follow-up with his usual physicians in this regard.    Mixed hyperlipidemia  The patient's lipids are adequately controlled on current medication.  The patient is tolerating medication without side effects.  I will continue the current treatment.    Essential hypertension  The patient's blood pressure is well controlled on the current medical regimen.  There are no apparent side effects from medications.  We will continue the current therapy without change.    Metastasis from thyroid cancer (CMS/HCC)  The patient follows with oncology.           IAmanda, am scribing for, and in the presence of, Minor Ji MD.    IMinor MD, personally performed the services described in this documentation as scribed by Amanda Mckeon in my presence, and it is both accurate and complete.       Minor Ji MD  6/22/2021

## 2021-06-22 ENCOUNTER — OFFICE VISIT (OUTPATIENT)
Dept: CARDIOLOGY | Facility: CLINIC | Age: 76
End: 2021-06-22
Payer: MEDICARE

## 2021-06-22 VITALS
BODY MASS INDEX: 28.12 KG/M2 | HEIGHT: 66 IN | OXYGEN SATURATION: 99 % | SYSTOLIC BLOOD PRESSURE: 140 MMHG | HEART RATE: 73 BPM | WEIGHT: 175 LBS | DIASTOLIC BLOOD PRESSURE: 92 MMHG

## 2021-06-22 DIAGNOSIS — E11.9 TYPE 2 DIABETES MELLITUS WITHOUT COMPLICATION, WITHOUT LONG-TERM CURRENT USE OF INSULIN (CMS/HCC): ICD-10-CM

## 2021-06-22 DIAGNOSIS — I10 ESSENTIAL HYPERTENSION: ICD-10-CM

## 2021-06-22 DIAGNOSIS — I48.0 PAROXYSMAL ATRIAL FIBRILLATION (CMS/HCC): ICD-10-CM

## 2021-06-22 DIAGNOSIS — I25.10 ATHEROSCLEROSIS OF NATIVE CORONARY ARTERY OF NATIVE HEART WITHOUT ANGINA PECTORIS: ICD-10-CM

## 2021-06-22 DIAGNOSIS — C73 METASTASIS FROM THYROID CANCER (CMS/HCC): ICD-10-CM

## 2021-06-22 DIAGNOSIS — C79.9 METASTASIS FROM THYROID CANCER (CMS/HCC): ICD-10-CM

## 2021-06-22 DIAGNOSIS — Z01.810 PREOP CARDIOVASCULAR EXAM: Primary | ICD-10-CM

## 2021-06-22 DIAGNOSIS — E78.2 MIXED HYPERLIPIDEMIA: ICD-10-CM

## 2021-06-22 PROCEDURE — 93000 ELECTROCARDIOGRAM COMPLETE: CPT | Performed by: INTERNAL MEDICINE

## 2021-06-22 PROCEDURE — 99214 OFFICE O/P EST MOD 30 MIN: CPT | Performed by: INTERNAL MEDICINE

## 2021-06-22 PROCEDURE — G8755 DIAS BP > OR = 90: HCPCS | Performed by: INTERNAL MEDICINE

## 2021-06-22 PROCEDURE — G8753 SYS BP > OR = 140: HCPCS | Performed by: INTERNAL MEDICINE

## 2021-06-22 ASSESSMENT — ENCOUNTER SYMPTOMS
LIGHT-HEADEDNESS: 0
SYNCOPE: 0
DYSPNEA ON EXERTION: 0
BRUISES/BLEEDS EASILY: 0
IRREGULAR HEARTBEAT: 0
DIZZINESS: 0
ORTHOPNEA: 0
NEAR-SYNCOPE: 0
PALPITATIONS: 0

## 2021-06-22 NOTE — ASSESSMENT & PLAN NOTE
The patient is asymptomatic and his ECG is stable.  He had a nuclear stress test performed in the spring 2018 which was normal.  He is tolerating his medical regimen without difficulty.  He will continue the current medical regimen and we will periodically reassess his coronary circulation.

## 2021-06-22 NOTE — ASSESSMENT & PLAN NOTE
The patient's blood sugars have been reasonably well controlled.  He will continue to follow-up with his usual physicians in this regard.

## 2021-06-22 NOTE — ASSESSMENT & PLAN NOTE
The patient is scheduled to undergo colonoscopy on June 28, 2021.  There have been no recent episodes of angina, CHF, or clinical/symptomatic arrhythmias. The overall risk is intermediate but acceptable. Further testing or interventions are unlikely to improve the patient's risk. Routine perioperative care.

## 2021-08-10 NOTE — TELEPHONE ENCOUNTER
Pt requesting refill of metformin be sent to [facility] in [place], [place].    Pt can be reached at : [phone].   Thanks.

## 2021-08-12 ENCOUNTER — TELEPHONE (OUTPATIENT)
Dept: INTERNAL MEDICINE | Facility: CLINIC | Age: 76
End: 2021-08-12

## 2021-08-12 RX ORDER — METFORMIN HYDROCHLORIDE 500 MG/1
TABLET ORAL
Qty: 120 TABLET | Refills: 0 | Status: SHIPPED | OUTPATIENT
Start: 2021-08-12 | End: 2021-08-16

## 2021-08-16 RX ORDER — METFORMIN HYDROCHLORIDE 500 MG/1
TABLET ORAL
Qty: 360 TABLET | Refills: 0 | Status: SHIPPED | OUTPATIENT
Start: 2021-08-16 | End: 2021-09-07

## 2021-08-18 RX ORDER — EMPAGLIFLOZIN 10 MG/1
TABLET, FILM COATED ORAL
Qty: 90 TABLET | Refills: 0 | Status: SHIPPED | OUTPATIENT
Start: 2021-08-18 | End: 2022-04-12

## 2021-08-30 RX ORDER — PANTOPRAZOLE SODIUM 40 MG/1
TABLET, DELAYED RELEASE ORAL
Qty: 90 TABLET | Refills: 0 | Status: SHIPPED | OUTPATIENT
Start: 2021-08-30 | End: 2021-11-29

## 2021-09-07 RX ORDER — METFORMIN HYDROCHLORIDE 500 MG/1
TABLET ORAL
Qty: 120 TABLET | Refills: 1 | Status: SHIPPED | OUTPATIENT
Start: 2021-09-07 | End: 2021-09-13 | Stop reason: SDUPTHER

## 2021-09-07 NOTE — TELEPHONE ENCOUNTER
Pt wants 30 day supple of med sent to Pharmacy: [facility] [place]

## 2021-09-09 RX ORDER — LEVOTHYROXINE SODIUM 150 UG/1
150 TABLET ORAL
Qty: 30 TABLET | Refills: 1 | Status: ON HOLD | OUTPATIENT
Start: 2021-09-09 | End: 2022-02-07

## 2021-09-17 RX ORDER — METFORMIN HYDROCHLORIDE 500 MG/1
TABLET ORAL
Qty: 120 TABLET | Refills: 1 | Status: SHIPPED | OUTPATIENT
Start: 2021-09-17 | End: 2022-01-19

## 2021-09-21 NOTE — ADDENDUM NOTE
Encounter addended by: [facility], Gregory J, MD on: 4/30/2020 11:25 AM   Actions taken: Visit diagnoses modified, Order list changed, Diagnosis association updated, Sign clinical note
Never

## 2021-09-22 ENCOUNTER — OFFICE VISIT (OUTPATIENT)
Dept: INTERNAL MEDICINE | Facility: CLINIC | Age: 76
End: 2021-09-22
Payer: MEDICARE

## 2021-09-22 VITALS
DIASTOLIC BLOOD PRESSURE: 74 MMHG | HEART RATE: 80 BPM | WEIGHT: 173.4 LBS | SYSTOLIC BLOOD PRESSURE: 124 MMHG | RESPIRATION RATE: 16 BRPM | BODY MASS INDEX: 27.87 KG/M2 | OXYGEN SATURATION: 98 % | HEIGHT: 66 IN | TEMPERATURE: 98.5 F

## 2021-09-22 DIAGNOSIS — G89.29 CHRONIC LOW BACK PAIN WITHOUT SCIATICA, UNSPECIFIED BACK PAIN LATERALITY: ICD-10-CM

## 2021-09-22 DIAGNOSIS — I10 ESSENTIAL HYPERTENSION: ICD-10-CM

## 2021-09-22 DIAGNOSIS — M54.50 CHRONIC LOW BACK PAIN WITHOUT SCIATICA, UNSPECIFIED BACK PAIN LATERALITY: ICD-10-CM

## 2021-09-22 DIAGNOSIS — E78.2 MIXED HYPERLIPIDEMIA: ICD-10-CM

## 2021-09-22 DIAGNOSIS — I48.0 PAROXYSMAL ATRIAL FIBRILLATION (CMS/HCC): ICD-10-CM

## 2021-09-22 DIAGNOSIS — E11.9 TYPE 2 DIABETES MELLITUS WITHOUT COMPLICATION, WITHOUT LONG-TERM CURRENT USE OF INSULIN (CMS/HCC): ICD-10-CM

## 2021-09-22 DIAGNOSIS — C73 METASTASIS FROM THYROID CANCER (CMS/HCC): ICD-10-CM

## 2021-09-22 DIAGNOSIS — N40.0 BENIGN PROSTATIC HYPERPLASIA WITHOUT LOWER URINARY TRACT SYMPTOMS: ICD-10-CM

## 2021-09-22 DIAGNOSIS — Z23 NEED FOR IMMUNIZATION AGAINST INFLUENZA: ICD-10-CM

## 2021-09-22 DIAGNOSIS — C79.9 METASTASIS FROM THYROID CANCER (CMS/HCC): ICD-10-CM

## 2021-09-22 DIAGNOSIS — C79.51 BONE METASTASES: Primary | ICD-10-CM

## 2021-09-22 PROCEDURE — 99214 OFFICE O/P EST MOD 30 MIN: CPT | Mod: 25 | Performed by: INTERNAL MEDICINE

## 2021-09-22 PROCEDURE — G8752 SYS BP LESS 140: HCPCS | Performed by: INTERNAL MEDICINE

## 2021-09-22 PROCEDURE — 90694 VACC AIIV4 NO PRSRV 0.5ML IM: CPT | Performed by: INTERNAL MEDICINE

## 2021-09-22 PROCEDURE — G8754 DIAS BP LESS 90: HCPCS | Performed by: INTERNAL MEDICINE

## 2021-09-22 PROCEDURE — G0008 ADMIN INFLUENZA VIRUS VAC: HCPCS | Performed by: INTERNAL MEDICINE

## 2021-09-22 RX ORDER — METOPROLOL SUCCINATE 100 MG/1
100 TABLET, EXTENDED RELEASE ORAL 2 TIMES DAILY
Start: 2021-09-22 | End: 2021-09-23 | Stop reason: SDUPTHER

## 2021-09-22 RX ORDER — VENLAFAXINE HYDROCHLORIDE 37.5 MG/1
37.5 CAPSULE, EXTENDED RELEASE ORAL DAILY
Qty: 90 CAPSULE | Refills: 3 | Status: SHIPPED | OUTPATIENT
Start: 2021-09-22 | End: 2022-01-13

## 2021-09-22 RX ORDER — KETOROLAC TROMETHAMINE 5 MG/ML
1 SOLUTION OPHTHALMIC DAILY
COMMUNITY
Start: 2021-06-30 | End: 2022-01-13

## 2021-09-22 RX ORDER — GABAPENTIN 300 MG/1
300 CAPSULE ORAL 2 TIMES DAILY
Qty: 180 CAPSULE | Refills: 3
Start: 2021-09-22 | End: 2021-09-23 | Stop reason: SDUPTHER

## 2021-09-22 RX ORDER — PREDNISOLONE ACETATE 10 MG/ML
SUSPENSION/ DROPS OPHTHALMIC
COMMUNITY
Start: 2021-06-30 | End: 2022-01-13

## 2021-09-22 RX ORDER — PRAVASTATIN SODIUM 40 MG/1
40 TABLET ORAL DAILY
Start: 2021-09-22 | End: 2021-09-23 | Stop reason: SDUPTHER

## 2021-09-22 ASSESSMENT — ENCOUNTER SYMPTOMS
PSYCHIATRIC NEGATIVE: 1
RESPIRATORY NEGATIVE: 1
NEUROLOGICAL NEGATIVE: 1
EYES NEGATIVE: 1
CARDIOVASCULAR NEGATIVE: 1
ENDOCRINE NEGATIVE: 1
MUSCULOSKELETAL NEGATIVE: 1
CONSTITUTIONAL NEGATIVE: 1
ALLERGIC/IMMUNOLOGIC NEGATIVE: 1
GASTROINTESTINAL NEGATIVE: 1

## 2021-09-22 ASSESSMENT — PATIENT HEALTH QUESTIONNAIRE - PHQ9: SUM OF ALL RESPONSES TO PHQ9 QUESTIONS 1 & 2: 0

## 2021-09-22 NOTE — PROGRESS NOTES
Patient ID: Matt Williamson is a 76 y.o. male.      Problem List Items Addressed This Visit     BPH (benign prostatic hyperplasia)     He follows with academic urology, last Dr. Ravi Pride, he had an elevated PSA.  He had a recent prostate biopsy 2019 which was negative.  He will continue to follow with academic urology.         Essential hypertension     Blood pressure today is controlled, continue with the same medications.  No symptoms of chest pain, pressure, palpitations or shortness of breath.  The patient will continue with low-salt, with healthy diet and exercise.         Mixed hyperlipidemia     The patient's hyperlipidemia is currently controlled and no changes were made in terms of the plan.  Would continue with healthy diet and exercise and medications as prescribed.  The patient denies any muscle pain, and understands the importance of compliance.         Relevant Orders    CBC and Differential    Comprehensive metabolic panel    Lipid panel    Metastasis from thyroid cancer (CMS/HCC)      Patient with a history of thyroid cancer, with metastasis to his spine.  He had radiation treatment and continues to follow-up with oncology and radiation therapy.  He had a CT PET scan done in May 2021 no concerning metabolic activity in the spine but questionable thickening in the rectum.  He had a colonoscopy with Dr. Gonzalez, diverticuli, ascending colon polyp, 2 transverse colon polyps, 2 splenic flexure polyps.  He should keep his follow-up with Dr. Tessa Gonzalez.             Relevant Orders    TSH 3rd Generation    T4, free    Type 2 diabetes mellitus without complication (CMS/HCC)     I discussed with the patient and the initial treatment for diabetes including regular exercise, and weight loss.  It is important to treat diabetes to prevent  complications.  Diabetes could lead to heart disease, stroke, visual problems including blindness and kidney failure.  The patient was encouraged to see ophthalmology and  podiatry at least on a yearly basis.         Relevant Orders    Urinalysis with Reflex Culture (ED and Outpatient only)    Hemoglobin A1c    Microalbumin/Creatinine Ur Random    Paroxysmal atrial fibrillation (CMS/HCC)     The patient's PAF is currently stable with no change in medications.  There is no increasing shortness of breath, chest pain, palpitations or bleeding.  The patient will be maintained on current medications at this time.           Bone metastases (CMS/HCC) - Primary     Patient with a history of thyroid cancer, with metastasis to his spine.  He had radiation treatment and continues to follow-up with oncology and radiation therapy.  He had a CT PET scan done in May 2021 no concerning metabolic activity in the spine but questionable thickening in the rectum.  He had a colonoscopy with Dr. Gonzalez, diverticuli, ascending colon polyp, 2 transverse colon polyps, 2 splenic flexure polyps.  He should keep his follow-up with Dr. Tessa Gonzalez.               Other Visit Diagnoses     Need for immunization against influenza        Chronic low back pain without sciatica, unspecified back pain laterality        Relevant Medications    venlafaxine XR (EFFEXOR XR) 37.5 mg 24 hr capsule          Patient Active Problem List   Diagnosis   • BPH (benign prostatic hyperplasia)   • Atherosclerosis of coronary artery   • Cataract   • Essential hypertension   • Former tobacco use   • Mixed hyperlipidemia   • Metastasis from thyroid cancer (CMS/HCC)   • Type 2 diabetes mellitus without complication (CMS/HCC)   • Cyst of thyroid   • Esophageal reflux   • Paroxysmal atrial fibrillation (CMS/HCC)   • Bone metastases (CMS/HCC)   • Pre-op evaluation   • Preop cardiovascular exam       Patient's Medications   New Prescriptions    VENLAFAXINE XR (EFFEXOR XR) 37.5 MG 24 HR CAPSULE    Take 1 capsule (37.5 mg total) by mouth daily.   Previous Medications    ASPIRIN 81 MG ENTERIC COATED TABLET    Take 81 mg by mouth daily.    GLIPIZIDE  (GLUCOTROL) 5 MG TABLET    Take 1 tablet by mouth as needed.     JARDIANCE 10 MG TABLET    TAKE 1 TABLET(10 MG) BY MOUTH DAILY    KETOROLAC (ACULAR) 0.5 % OPHTHALMIC SOLUTION    Administer 1 drop into the right eye daily.      METFORMIN (GLUCOPHAGE) 500 MG TABLET    Take one qid    PANTOPRAZOLE (PROTONIX) 40 MG EC TABLET    TAKE 1 TABLET(40 MG) BY MOUTH DAILY    PREDNISOLONE ACETATE (PRED FORTE) 1 % OPHTHALMIC SUSPENSION    SHAKE LIQUID AND INSTILL 1 DROP IN RIGHT EYE FOUR TIMES DAILY AS DIRECTED    PROPAFENONE (RYTHMOL) 150 MG TABLET    Take 1 tablet by mouth as needed (Palpitations).      SYNTHROID 150 MCG TABLET    Take 1 tablet (150 mcg total) by mouth once daily.    TRULICITY 0.75 MG/0.5 ML PEN INJECTOR    once a week. Every Tuesday    Modified Medications    Modified Medication Previous Medication    GABAPENTIN (NEURONTIN) 300 MG CAPSULE gabapentin (NEURONTIN) 300 mg capsule       Take 1 capsule (300 mg total) by mouth 2 (two) times a day.    Take 1 capsule (300 mg total) by mouth 3 (three) times a day.    METOPROLOL SUCCINATE XL (TOPROL-XL) 100 MG 24 HR TABLET metoprolol succinate XL (TOPROL-XL) 100 mg 24 hr tablet       Take 1 tablet (100 mg total) by mouth 2 (two) times a day.    TAKE 1 TABLET BY MOUTH TWICE DAILY    PRAVASTATIN (PRAVACHOL) 40 MG TABLET pravastatin (PRAVACHOL) 40 mg tablet       Take 1 tablet (40 mg total) by mouth daily.    TAKE 1 TABLET(40 MG) BY MOUTH DAILY   Discontinued Medications    GABAPENTIN (NEURONTIN) 300 MG CAPSULE    Take 1 capsule (300 mg total) by mouth 2 (two) times a day.     New Prescriptions    VENLAFAXINE XR (EFFEXOR XR) 37.5 MG 24 HR CAPSULE    Take 1 capsule (37.5 mg total) by mouth daily.       Allergies   Allergen Reactions   • Penicillins Rash     Childhood allergy       Social History     Tobacco Use   • Smoking status: Former Smoker     Packs/day: 2.00     Years: 20.00     Pack years: 40.00     Types: Cigarettes     Quit date:      Years since quittin.7    • Smokeless tobacco: Never Used   Substance Use Topics   • Alcohol use: Yes     Comment: Social       Family History   Problem Relation Age of Onset   • Cancer Biological Mother    • Breast cancer Biological Mother    • Emphysema Biological Father    • Lung cancer Biological Father        Subjective   The patient presents the office today for follow-up regarding his medical issues.    Patient with a history of thyroid cancer, with metastasis to his spine.  He had radiation treatment and continues to follow-up with oncology and radiation therapy.  He had a CT PET scan done in May 2021 no concerning metabolic activity in the spine but questionable thickening in the rectum.  He had a colonoscopy with Dr. Gonzalez, diverticuli, ascending colon polyp, 2 transverse colon polyps, 2 splenic flexure polyps.  He should keep his follow-up with Dr. Tessa Gonzalez.    He has a history of benign prostatic hypertrophy and denies any new symptoms.  He denies any bleeding, frequency, fever, urinary tract symptoms.    The patient will continue on treatment for hypertension including medications, limiting salt, healthy diet and exercise.  The patient denied any shortness of breath, chest pain or palpitations.    Hyperlipidemia reviewed today and patient is compliant with healthy diet and exercise.  Discussed goals of treatment for hyperlipidemia to prevent complications.  The patient has no muscle pain, abdominal pain or other intolerances to medication.  Lab Results   Component Value Date    WBC 7.0 11/20/2020    HGB 12.7 (L) 11/20/2020    HCT 40.0 11/20/2020     11/20/2020    CHOL 129 01/29/2020    TRIG 69 01/29/2020    HDL 47 01/29/2020    ALT 18 11/20/2020    AST 18 11/20/2020     11/20/2020    K 4.5 11/20/2020     11/20/2020    CREATININE 1.08 11/20/2020    BUN 18 11/20/2020    CO2 27 11/20/2020    TSH 0.76 11/20/2020    PSA 3.8 07/27/2020    INR 1.0 01/17/2020    HGBA1C 6.8 (H) 11/20/2020    MICROALBUR 0.2  "01/29/2020     Lab Results   Component Value Date    PSA 3.8 07/27/2020    PSA 7.5 (H) 10/10/2018    PSA 3.5 12/09/2016     CBC Results       11/20/20 01/17/20 01/07/20     1334 0751 1257    WBC 7.0 10.13 9.2    RBC 4.50 4.86 4.70    HGB 12.7 14.1 13.6    HCT 40.0 43.0 42.3    MCV 88.9 88.5 90.0    MCH 28.2 29.0 28.9    MCHC 31.8 32.8 32.2     279 304        Patient follow up is  3/7/2022        HPI  I reviewed his  medications, recent labs, and correspondence.     Review of Systems   Constitutional: Negative.    HENT: Negative.    Eyes: Negative.    Respiratory: Negative.    Cardiovascular: Negative.    Gastrointestinal: Negative.    Endocrine: Negative.    Genitourinary: Negative.    Musculoskeletal: Negative.    Allergic/Immunologic: Negative.    Neurological: Negative.    Psychiatric/Behavioral: Negative.        Visit Vitals  /74 (BP Location: Left upper arm, Patient Position: Sitting)   Pulse 80   Temp 36.9 °C (98.5 °F) (Oral)   Resp 16   Ht 1.676 m (5' 6\")   Wt 78.7 kg (173 lb 6.4 oz)   SpO2 98%   BMI 27.99 kg/m²         Physical Exam  Constitutional:       Appearance: He is well-developed.   HENT:      Head: Normocephalic and atraumatic.      Right Ear: External ear normal.      Left Ear: External ear normal.      Nose: Nose normal.   Eyes:      Conjunctiva/sclera: Conjunctivae normal.      Pupils: Pupils are equal, round, and reactive to light.   Neck:      Thyroid: No thyromegaly.   Cardiovascular:      Rate and Rhythm: Normal rate and regular rhythm.      Heart sounds: Normal heart sounds.   Pulmonary:      Effort: Pulmonary effort is normal.      Breath sounds: Normal breath sounds.   Abdominal:      General: Bowel sounds are normal.      Palpations: Abdomen is soft.   Musculoskeletal:         General: Normal range of motion.      Cervical back: Normal range of motion.   Lymphadenopathy:      Cervical: No cervical adenopathy.   Skin:     General: Skin is warm and dry.   Neurological:      " Mental Status: He is alert and oriented to person, place, and time.

## 2021-09-23 RX ORDER — GABAPENTIN 300 MG/1
300 CAPSULE ORAL 2 TIMES DAILY
Qty: 180 CAPSULE | Refills: 3 | Status: SHIPPED | OUTPATIENT
Start: 2021-09-23 | End: 2021-12-10

## 2021-09-23 RX ORDER — PRAVASTATIN SODIUM 40 MG/1
40 TABLET ORAL DAILY
Qty: 90 TABLET | Refills: 3 | Status: SHIPPED | OUTPATIENT
Start: 2021-09-23 | End: 2021-11-01 | Stop reason: SDUPTHER

## 2021-09-23 RX ORDER — METOPROLOL SUCCINATE 100 MG/1
100 TABLET, EXTENDED RELEASE ORAL 2 TIMES DAILY
Qty: 180 TABLET | Refills: 3 | Status: SHIPPED | OUTPATIENT
Start: 2021-09-23 | End: 2022-02-08 | Stop reason: HOSPADM

## 2021-09-23 NOTE — TELEPHONE ENCOUNTER
A prescription for Gabapentin was supposed to be sent to the pharmacy yesterday but the status is set to print not send.  Please resend to Kayla Berger.  Pt is out of medication.

## 2021-10-05 NOTE — ASSESSMENT & PLAN NOTE
Patient with a history of thyroid cancer, with metastasis to his spine.  He had radiation treatment and continues to follow-up with oncology and radiation therapy.  He had a CT PET scan done in May 2021 no concerning metabolic activity in the spine but questionable thickening in the rectum.  He had a colonoscopy with Dr. Gonzalez, diverticuli, ascending colon polyp, 2 transverse colon polyps, 2 splenic flexure polyps.  He should keep his follow-up with Dr. Tessa Gonzalez.

## 2021-10-05 NOTE — ASSESSMENT & PLAN NOTE
The patient's PAF is currently stable with no change in medications.  There is no increasing shortness of breath, chest pain, palpitations or bleeding.  The patient will be maintained on current medications at this time.

## 2021-10-05 NOTE — ASSESSMENT & PLAN NOTE
He follows with academic urology, last Dr. Ravi Pride, he had an elevated PSA.  He had a recent prostate biopsy 2019 which was negative.  He will continue to follow with academic urology.

## 2021-11-01 RX ORDER — PRAVASTATIN SODIUM 40 MG/1
40 TABLET ORAL DAILY
Qty: 90 TABLET | Refills: 3 | Status: SHIPPED | OUTPATIENT
Start: 2021-11-01 | End: 2022-02-08 | Stop reason: HOSPADM

## 2021-11-04 ENCOUNTER — TELEPHONE (OUTPATIENT)
Dept: NEUROSURGERY | Facility: CLINIC | Age: 76
End: 2021-11-04

## 2021-11-04 DIAGNOSIS — M54.40 CHRONIC MIDLINE LOW BACK PAIN WITH SCIATICA, SCIATICA LATERALITY UNSPECIFIED: Primary | ICD-10-CM

## 2021-11-04 DIAGNOSIS — G89.29 CHRONIC MIDLINE LOW BACK PAIN WITH SCIATICA, SCIATICA LATERALITY UNSPECIFIED: Primary | ICD-10-CM

## 2021-11-04 NOTE — TELEPHONE ENCOUNTER
Patient called saying that for the last month he has been having back pain with transitioning from a seated to standing position or lying to a standing position.  He notes that the pain will be localized to the back and shoot down both legs to the hip  and thigh but not below the knees.  Denies any saddle anesthesia, bowel bladder incontinence, fever, chills, nausea or vomiting, weakness.    He is following with oncology in regards to his metastatic thyroid cancer.  He is a metastatic lesion to the L3 vertebral body that was stable in the last lumbar MRI imaging 4/21/2021.  The lesion has caused erosion of the endplates however his alignment has been grossly maintained.  Recommend getting scoliosis films to evaluate his overall alignment.  We could also get an MRI of the lumbar spine to evaluate stability of his lesion.    We will call the patient with results of studies once they are completed.  I recommend an MRI lumbar spine with and without contrast however the patient would prefer to have a noncontrast study.  I will discuss with Dr. Heller and order the appropriate study.     Patient will call in additional questions or concerns.  He agrees with the plan stated.

## 2021-11-10 DIAGNOSIS — C79.51 OSTEOLYTIC LESION DUE TO METASTASIS (CMS/HCC): Primary | ICD-10-CM

## 2021-11-22 ENCOUNTER — HOSPITAL ENCOUNTER (OUTPATIENT)
Dept: RADIOLOGY | Facility: HOSPITAL | Age: 76
Discharge: HOME | End: 2021-11-22
Attending: PHYSICIAN ASSISTANT
Payer: MEDICARE

## 2021-11-22 ENCOUNTER — TELEPHONE (OUTPATIENT)
Dept: NEUROSURGERY | Facility: CLINIC | Age: 76
End: 2021-11-22

## 2021-11-22 DIAGNOSIS — C79.51 OSTEOLYTIC LESION DUE TO METASTASIS (CMS/HCC): ICD-10-CM

## 2021-11-22 RX ORDER — GADOBUTROL 604.72 MG/ML
7.7 INJECTION INTRAVENOUS ONCE
Status: COMPLETED | OUTPATIENT
Start: 2021-11-22 | End: 2021-11-22

## 2021-11-22 RX ADMIN — GADOBUTROL 7.7 ML: 604.72 INJECTION INTRAVENOUS at 12:56

## 2021-11-22 NOTE — TELEPHONE ENCOUNTER
I called patient regarding his MRI results from today.  There is redemonstration of the metastatic lesion within the L3 vertebral body with some new extension the posterior elements.  There is a new oblique fracture to the L3 vertebral body with less than 50% loss of vertebral body height.  There is no severe central stenosis or compression of the cauda equina.  There also appears to be some extension into the superior aspect of the L4 vertebral body.  His alignment appears to remain stable.  He has scoliosis films pending.    I called patient to review results however he did not answer.  I left a voicemail for call back.  Will forward results to radiation oncology as well.  Will recommend bracing for the patient and potentially kyphoplasty.

## 2021-11-23 ENCOUNTER — TELEPHONE (OUTPATIENT)
Dept: INTERNAL MEDICINE | Facility: CLINIC | Age: 76
End: 2021-11-23

## 2021-11-23 NOTE — TELEPHONE ENCOUNTER
Pt had an MRI Lumbar Spine yesterday and wants Dr. Arreola to be aware the results are in his chart.  Pt cannot get in touch with the doctor who ordered the test and is asking to please speak to Dr. Arreola about the results.

## 2021-11-23 NOTE — TELEPHONE ENCOUNTER
Per chart notes Last night Physician assistant Bruno Tierney called patient to review results and patient was unable to be reached. Looks like he is going to continue to reach out.     Please review his note from yesterday as well as MRI.     Thank you

## 2021-11-23 NOTE — TELEPHONE ENCOUNTER
Patient called m returning your call. I called the patient back to let him know you would reach out to him tomorrow.

## 2021-11-26 ENCOUNTER — TELEPHONE (OUTPATIENT)
Dept: NEUROSURGERY | Facility: CLINIC | Age: 76
End: 2021-11-26

## 2021-11-26 NOTE — TELEPHONE ENCOUNTER
I talked with Mr. Williamson today regarding his MRI results.    It appears there is been some extension of the previous L3 vertebral body metastatic lesion into the posterior elements as well as into the paravertebral soft tissues and psoas muscle bilaterally.  There is resulting in new oblique fracture of the L3 vertebral body with minimal displacement.  There is no significant central canal stenosis.  There is some new right foraminal stenosis at L3-L4 that is mild to severe nature.  There is also some involvement of the superior endplate of the L4 vertebral body on the left.    11/22/2021 L3-L4      4/21/2021 L3-L4      SINS score of about 8 indicating a potentially unstable lesion with further radiation.     Mr. Williamson notes over the last 3 months has been having back pain primarily with changing position in the morning that is an 8/10 but slowly improves throughout the day to 2/10.  At times the pain will radiate to the back of the knee.  He describes it as sharp in nature.  It has been relatively consistent for the last 3 months.  He denies any saddle anesthesia, bowel bladder incontinence.  No fever or chills.     He spoke with his radiation oncologist and a possible repeat biopsy was suggested as well as follow-up with neurosurgery. Given that is pain has been stable for the last 3 months, overall tolerable throughout the day without debility and not mechinical, he is a candidate for kyphoplasty which will help alleviate some pain without surgical intervention. May also consider biopsy at the same time.  Patient previously saw Dr. Casey (oncology) about 2 years ago and we recommended that he follow-up.    We will plan to touch base with the patient early next week with final plan.  Patient's questions were answered.  Agreeable to plan as stated.

## 2021-11-29 ENCOUNTER — TELEPHONE (OUTPATIENT)
Dept: INTERNAL MEDICINE | Facility: CLINIC | Age: 76
End: 2021-11-29

## 2021-11-29 DIAGNOSIS — C79.51 OSTEOLYTIC LESION DUE TO METASTASIS (CMS/HCC): Primary | ICD-10-CM

## 2021-11-29 RX ORDER — PANTOPRAZOLE SODIUM 40 MG/1
TABLET, DELAYED RELEASE ORAL
Qty: 90 TABLET | Refills: 0 | Status: SHIPPED | OUTPATIENT
Start: 2021-11-29 | End: 2022-02-08 | Stop reason: HOSPADM

## 2021-11-29 NOTE — PROGRESS NOTES
CT lumbar spine and lumbar flexion/extension films ordered. Patient will be scheduled to come to the office in the next week.

## 2021-11-29 NOTE — TELEPHONE ENCOUNTER
Pt would like to know if  thinks the MRI Lumbar spine should be sent to Dr. Guallpa at [place] and/or should pt be making an appointment to see him?  Pt wants to know if she should see Dr. Guallpa for a second opinion.

## 2021-12-03 ENCOUNTER — HOSPITAL ENCOUNTER (OUTPATIENT)
Dept: RADIOLOGY | Age: 76
Discharge: HOME | End: 2021-12-03
Attending: PHYSICIAN ASSISTANT
Payer: MEDICARE

## 2021-12-03 ENCOUNTER — OFFICE VISIT (OUTPATIENT)
Dept: NEUROSURGERY | Facility: CLINIC | Age: 76
End: 2021-12-03
Payer: MEDICARE

## 2021-12-03 VITALS
HEIGHT: 66 IN | HEART RATE: 88 BPM | RESPIRATION RATE: 16 BRPM | WEIGHT: 170 LBS | OXYGEN SATURATION: 99 % | DIASTOLIC BLOOD PRESSURE: 80 MMHG | TEMPERATURE: 97.6 F | BODY MASS INDEX: 27.32 KG/M2 | SYSTOLIC BLOOD PRESSURE: 130 MMHG

## 2021-12-03 DIAGNOSIS — C79.51 OSTEOLYTIC LESION DUE TO METASTASIS (CMS/HCC): ICD-10-CM

## 2021-12-03 DIAGNOSIS — C79.51 BONE METASTASES: Primary | ICD-10-CM

## 2021-12-03 DIAGNOSIS — G89.29 CHRONIC MIDLINE LOW BACK PAIN WITH SCIATICA, SCIATICA LATERALITY UNSPECIFIED: ICD-10-CM

## 2021-12-03 DIAGNOSIS — M54.40 CHRONIC MIDLINE LOW BACK PAIN WITH SCIATICA, SCIATICA LATERALITY UNSPECIFIED: ICD-10-CM

## 2021-12-03 PROCEDURE — 72131 CT LUMBAR SPINE W/O DYE: CPT | Mod: MG

## 2021-12-03 PROCEDURE — G8752 SYS BP LESS 140: HCPCS | Performed by: NEUROLOGICAL SURGERY

## 2021-12-03 PROCEDURE — 72100 X-RAY EXAM L-S SPINE 2/3 VWS: CPT

## 2021-12-03 PROCEDURE — G8754 DIAS BP LESS 90: HCPCS | Performed by: NEUROLOGICAL SURGERY

## 2021-12-03 PROCEDURE — G1004 CDSM NDSC: HCPCS

## 2021-12-03 PROCEDURE — 99214 OFFICE O/P EST MOD 30 MIN: CPT | Performed by: NEUROLOGICAL SURGERY

## 2021-12-03 NOTE — PROGRESS NOTES
[facility]  Medical Office [address], [address]  [place] [address]  Phone: [phone]  Fax: [phone]      Patient ID: Matt Williamson                              : 1945    Visit Date: 12/3/2021    Referring Provider: Dr. Arreola    History of Present Illness:   Matt Williamson is a pleasant 76 y.o. male with thyroid cancer metastatic to L3 vertebral body seen today by the neurosurgery service for follow-up to right L4 radicular pain. We last saw him in the office 21 for worsening pain. He underwent Right L4 TFESI with Dr. Feliz about 10 months ago and had near resolution of his radicular pain. He is living in [place], [place].      Unfortunately over the past 3 months, patient has been having severe lower back pain radiating into the anterior aspect of his right leg, stopping at the knee. The pain is mechanical in nature, being most severe when he gets out of bed. Pain is also severe when ambulating long distances. He denies numbness, tingling, weakness, saddle anesthesia, and bowel/bladder changes.    Patient has follow up 12/10 with Dr. Casey of oncology. He has not been seen by her in almost a year. He was last seen by Dr. Ochsner of radiation oncology 6 months ago, after completing radiation to the L3 vertebral body 2020.     To recall, the patient initially presented for right leg pain which began without any injury or inciting event. He underwent imaging which demonstrated an L4-5 disc herniation causing right L4 nerve root compression as well as a lesion in the vertebral body of L3, with history of thyroid cancer.     Allergies:  Allergies   Allergen Reactions   • Penicillins Rash     Childhood allergy       Medications:     Current Outpatient Medications:   •  aspirin 81 mg enteric coated tablet, Take 81 mg by mouth daily., Disp: , Rfl:   •  gabapentin (NEURONTIN) 300 mg capsule, Take 1 capsule (300 mg total) by mouth 2 (two) times a  day., Disp: 180 capsule, Rfl: 3  •  glipiZIDE (GLUCOTROL) 5 mg tablet, Take 1 tablet by mouth as needed. , Disp: , Rfl:   •  JARDIANCE 10 mg tablet, TAKE 1 TABLET(10 MG) BY MOUTH DAILY, Disp: 90 tablet, Rfl: 0  •  metFORMIN (GLUCOPHAGE) 500 mg tablet, Take one qid, Disp: 120 tablet, Rfl: 1  •  metoprolol succinate XL (TOPROL-XL) 100 mg 24 hr tablet, Take 1 tablet (100 mg total) by mouth 2 (two) times a day., Disp: 180 tablet, Rfl: 3  •  pantoprazole 40 mg EC tablet, TAKE 1 TABLET(40 MG) BY MOUTH DAILY, Disp: 90 tablet, Rfl: 0  •  pravastatin 40 mg tablet, Take 1 tablet (40 mg total) by mouth daily., Disp: 90 tablet, Rfl: 3  •  SYNTHROID 150 mcg tablet, Take 1 tablet (150 mcg total) by mouth once daily., Disp: 30 tablet, Rfl: 1  •  TRULICITY 0.75 mg/0.5 mL pen injector, once a week. Every Tuesday , Disp: , Rfl:   •  venlafaxine XR (EFFEXOR XR) 37.5 mg 24 hr capsule, Take 1 capsule (37.5 mg total) by mouth daily., Disp: 90 capsule, Rfl: 3  •  ketorolac (ACULAR) 0.5 % ophthalmic solution, Administer 1 drop into the right eye daily.  , Disp: , Rfl:   •  prednisoLONE acetate (PRED FORTE) 1 % ophthalmic suspension, SHAKE LIQUID AND INSTILL 1 DROP IN RIGHT EYE FOUR TIMES DAILY AS DIRECTED, Disp: , Rfl:   •  propafenone (RYTHMOL) 150 mg tablet, Take 1 tablet by mouth as needed (Palpitations).  , Disp: , Rfl:      Past Medical History:   Past Medical History:   Diagnosis Date   • Abdominal hernia    • Atherosclerosis of coronary artery    • Atrial fibrillation with rapid ventricular response (CMS/HCC) 10/25/2019    Hospitalized on 10/25/19 at [facility].    • BPH (benign prostatic hyperplasia)    • Cataract    • Cyst of thyroid    • Epigastric abdominal pain    • Essential hypertension    • Former tobacco use    • History of radiation therapy    • Mixed hyperlipidemia    • Osteomyelitis of foot (CMS/HCC) 2009   • PSA elevation    • Thyroid cancer (CMS/HCC) 9/14/2012   • Type 2 diabetes mellitus without  complication (CMS/HCC)    • Vertigo        Past Surgical History:   Past Surgical History:   Procedure Laterality Date   • ANAL FISSURECTOMY     • CARDIAC CATHETERIZATION  2005    LM FOD. 70-80% proximal LAD.Mild disease CX, RCA and branches.    • CARDIAC CATHETERIZATION  2005    LM FOD. Pristine LAD stent. CX FOD. RCA mild disease.   • CARDIAC SURGERY  Stent in lad   • CORONARY ANGIOPLASTY WITH STENT PLACEMENT  2005    LM FOD. Moderate, diffuse disease LAD. Irregular CX w/ PLVB distal to PDA 99%. Mild disease RCA. Stent to distal LAD.   • EYE SURGERY Left Cataract   • FOOT SURGERY      For osteomyelitis.   • GASTROCUTANEOUS FISTULA CLOSURE     • THYROIDECTOMY     • TONSILLECTOMY  No    denies   • UPPER GASTROINTESTINAL ENDOSCOPY         Family History:  Family History   Problem Relation Age of Onset   • Cancer Biological Mother    • Breast cancer Biological Mother    • Emphysema Biological Father    • Lung cancer Biological Father        Social History:  Social History     Socioeconomic History   • Marital status:      Spouse name: Not on file   • Number of children: Not on file   • Years of education: Not on file   • Highest education level: Not on file   Occupational History   • Occupation: Retired   Tobacco Use   • Smoking status: Former Smoker     Packs/day: 2.00     Years: 20.00     Pack years: 40.00     Types: Cigarettes     Quit date:      Years since quittin.9   • Smokeless tobacco: Never Used   Substance and Sexual Activity   • Alcohol use: Yes     Comment: Social   • Drug use: Never   • Sexual activity: Not Currently     Partners: Female   Other Topics Concern   • Not on file   Social History Narrative   • Not on file     Social Determinants of Health     Financial Resource Strain:    • Difficulty of Paying Living Expenses: Not on file   Food Insecurity:    • Worried About Running Out of Food in the Last Year: Not on file   • Ran Out of Food in the Last Year:  Not on file   Transportation Needs:    • Lack of Transportation (Medical): Not on file   • Lack of Transportation (Non-Medical): Not on file   Physical Activity:    • Days of Exercise per Week: Not on file   • Minutes of Exercise per Session: Not on file   Stress:    • Feeling of Stress : Not on file   Social Connections:    • Frequency of Communication with Friends and Family: Not on file   • Frequency of Social Gatherings with Friends and Family: Not on file   • Attends [employer] Services: Not on file   • Active Member of Clubs or Organizations: Not on file   • Attends Club or Organization Meetings: Not on file   • Marital Status: Not on file   Intimate Partner Violence:    • Fear of Current or Ex-Partner: Not on file   • Emotionally Abused: Not on file   • Physically Abused: Not on file   • Sexually Abused: Not on file   Housing Stability:    • Unable to Pay for Housing in the Last Year: Not on file   • Number of Places Lived in the Last Year: Not on file   • Unstable Housing in the Last Year: Not on file       Vitals:   Vitals:    12/03/21 1335   BP: 130/80   Pulse: 88   Resp: 16   Temp: 36.4 °C (97.6 °F)   SpO2: 99%       Review of Systems:  A complete 14 point review of systems was conducted and was deemed negative except what was documented in the HPI.    Physical Examination:  Physical Exam   Constitutional: Oriented to person, place, and time. Appears well-developed and well-nourished.   Head: Normocephalic and atraumatic.   Right Ear: External ear normal.   Left Ear: External ear normal.   Nose: Nose normal.   Mouth/Throat: Oropharynx is clear and moist.   Eyes: Conjunctivae and EOM are normal. Pupils are equal, round, and reactive to light. No scleral icterus.   Neck: Normal range of motion. Neck supple.   Cardiovascular: Normal rate and regular rhythm.    Pulmonary/Chest: Effort normal   Musculoskeletal: No deformity. No pain with palpation of the midline lumbar spine, facets or soft tissue..   Skin:  Skin is warm and dry. No rash noted. No erythema.   Psychiatric: Normal mood and affect. Speech is normal and behavior is normal. Thought content normal.   Vitals reviewed.      Neurological Examination:  Neurologic Exam     Mental Status   Oriented to person, place, and time.   Attention: normal.   Speech: speech is normal   Level of consciousness: alert    Cranial Nerves     CN II   Visual acuity: normal    CN III, IV, VI   Pupils are equal, round, and reactive to light.  Extraocular motions are normal.   Right pupil: Size: 3 mm. Shape: regular. Reactivity: brisk.   Left pupil: Size: 3 mm. Shape: regular. Reactivity: brisk.   CN III: no CN III palsy  CN VI: no CN VI palsy  Nystagmus: none     CN V   Facial sensation intact.     CN VIII   CN VIII normal.   Hearing: intact    CN IX, X   CN IX normal.   Palate: symmetric    CN XI   CN XI normal.   Right sternocleidomastoid strength: normal  Left sternocleidomastoid strength: normal    CN XII   CN XII normal.   Tongue: not atrophic    Sensation ( /2)    Right Left  Right Left   C5 2 2 L2 2 2   C6 2 2 L3 2 2   C7 2 2 L4 2 2   C8 2 2 L5 2 2   T1 2 2 S1 2 2     Motor:     Deltoid Biceps Triceps Wrist ext Finger ext Hand Intrinsics Hip flexion Knee ext Dorsi-  flexion EHL Plantar Flexion   R 5 5 5 5 5 5 4+ 5 5 5 5   L 5 5 5 5 5 5 5 5 5 5 5     There is no pronator drift. Muscle bulk and tone are normal.     Gait, Coordination, and Reflexes     Reflexes   Reflexes 2+ except as noted.   Right plantar: normal  Left plantar: normal  Right Maharaj: absent  Left Maharaj: absent  Right ankle clonus: absent  Left ankle clonus: absent      Data Review:    Lab Results:   Lab Results   Component Value Date    WBC 7.0 11/20/2020    WBC 10.13 01/17/2020    HGB 12.7 (L) 11/20/2020    HGB 14.1 01/17/2020    HCT 40.0 11/20/2020    HCT 43.0 01/17/2020    MCV 88.9 11/20/2020    MCV 88.5 01/17/2020     11/20/2020     01/17/2020    RDW 13.3 11/20/2020    RDW 13.7 01/17/2020       Lab Results   Component Value Date    GLUCOSE 98 11/20/2020    GLUCOSE 225 (H) 05/02/2018    BUN 18 11/20/2020    BUN 24 (H) 04/17/2020     11/20/2020     05/02/2018    K 4.5 11/20/2020    K 4.6 05/02/2018     11/20/2020    CL 98 05/02/2018    CO2 27 11/20/2020    CO2 27 05/02/2018    ANIONGAP 12 05/02/2018        Imaging:   Independent review of all imaging was done by myself as well as review of the radiologists readings and comparison to prior films.     MRI lumbar spine 11/22/2021  There is redemonstration of a metastatic lesion essentially replacing the L3  vertebral body with new extension into the posterior elements bilaterally. The  lesion demonstrates rim enhancement and central necrosis. The enhancement  extends into the paravertebral soft tissues and into the medial aspect of the  psoas muscles bilaterally. There is a new oblique fracture line through the L3  vertebral body with associated mild loss of height. There is progression of  lesion extension in the right side of the L4 vertebral body superiorly extending  into the posterior elements. There is also progression of lesion extension into  the left side of the superior aspect of the L4 vertebral body. There is edema  and enhancement in the superior endplate of L4 with mild loss of height of the  L4 vertebral body. The remainder of the lumbar vertebral bodies are maintained  in height. A trace retrolisthesis of L1 on L2 and of L3 on L4 are present which  appear similar. The alignment is otherwise preserved. Schmorl's nodes are noted  in the inferior endplates of T11, T12 and L1 which were present previously.     The conus medullaris terminates at L1-L2.     At T11-T12 and at T12-L1, there is no focal disc herniation, significant central  canal or neural foraminal narrowing. Mild facet hypertrophy is present.  At L1-L2, there is a disc osseous complex and mild facet hypertrophy. The thecal  sac and neural foramina are mildly  effaced. This appears stable.  At L2-L3, there is a disc osseous complex and facet and ligamentous hypertrophy.  The thecal sac is mildly effaced. Mild bilateral neural foraminal narrowing is  present. This appears stable.  At L3-L4, there is new mild retropulsion of the inferior endplate of L3 into the  spinal canal. A disc osseous complex and diffuse disc bulge as well as facet and  ligamentous hypertrophy are present. The central canal is mild to moderately  narrowed. Moderate to severe right and moderate left neural foraminal narrowing  is present. Note is made of enhancement in the neural foramina bilaterally.  At L4-L5, there is a diffuse disc bulge, eccentric to the right, and facet and  ligamentous hypertrophy. In addition, a superimposed right foraminal protrusion  is present. The central canal is mildly narrowed tricompartmentally. Moderate  right and mild to moderate left neural foraminal narrowing is present. The  protrusion impinges upon the right L4 dorsal root ganglion and postganglionic  nerve root. This appears similar.  At L5-S1, there is mild disc bulging and mild facet hypertrophy with mild  bilateral neural foraminal narrowing. This appears stable.    Assessment / Plan:     In summary, Matt Williamson is a pleasant 75 y.o. male with thyroid cancer metastatic to L3 vertebral body seen today by the neurosurgery service for severe mechanical low back pain with right L3 radiculopathy x 3 months. MRI of the lumbar spine shows metastatic lesion within the L3 vertebral body with new extension into the posterior elements and redemonstration of extension into the paravertebral soft tissues as well as into the medial aspect of the psoas muscles bilaterally. There is a new oblique fracture through the L3 vertebral body with associated mild loss of height. New mild to moderate central canal narrowing and moderate to severe right neural foraminal narrowing at L3-L4 with enhancement in the neural foramina  bilaterally.     Mr. Williamson is full strength on exam without pathologic reflexes.  The case was discussed with Dr. Casey, who recommends a PET/CT to assess for further disease. He is scheduled for follow up with Dr. Casey on 12/10. Should the PET/CT be negative for further metastatic disease, patient would require an L3 corpectomy with stabilizing fusion. I will speak to Dr. Casey on 12/10 to discuss the surgical options and future treatment plan.            MD PATT Desir, Abena Krause PA-C, am scribing for, and in the presence of, Dr. Colt Heller.   I personally saw and examined the patient.  Agree with note     30 minutes were spent with the patient, examination, reviewing films, and coordinating care.  Patient seen earlier today. Signature timestamp does not reflect patient encounter time.   More than 50% of the time is spent counseling the patient and family and coordinating care.

## 2021-12-03 NOTE — LETTER
2021     Raj Arreola, DO  [address] [address]    Patient: Matt Williamson  YOB: 1945  Date of Visit: 12/3/2021      Dear Dr. Arreola:    Thank you for referring Matt Williamson to me for evaluation. Below are my notes for this consultation.    If you have questions, please do not hesitate to call me. I look forward to following your patient along with you.         Sincerely,        Colt Heller MD        CC: MD Pina Cormier Ajit S, MD  2021  2:29 PM  Signed      [facility]  Medical Office Building [address], [address]  [place] [address]  Phone: [phone]  Fax: [phone]      Patient ID: Matt Williamson                              : 1945    Visit Date: 12/3/2021    Referring Provider: Dr. Arreola    History of Present Illness:   Matt Williamson is a pleasant 76 y.o. male with thyroid cancer metastatic to L3 vertebral body seen today by the neurosurgery service for follow-up to right L4 radicular pain. We last saw him in the office 21 for worsening pain. He underwent Right L4 TFESI with Dr. Feliz about 10 months ago and had near resolution of his radicular pain. He is living in [place], [place].      Unfortunately over the past 3 months, patient has been having severe lower back pain radiating into the anterior aspect of his right leg, stopping at the knee. The pain is mechanical in nature, being most severe when he gets out of bed. Pain is also severe when ambulating long distances. He denies numbness, tingling, weakness, saddle anesthesia, and bowel/bladder changes.    Patient has follow up 12/10 with Dr. Casey of oncology. He has not been seen by her in almost a year. He was last seen by Dr. [facility] of radiation oncology 6 months ago, after completing radiation to the L3 vertebral body 2020.     To recall, the patient initially presented for right leg pain which began without any  injury or inciting event. He underwent imaging which demonstrated an L4-5 disc herniation causing right L4 nerve root compression as well as a lesion in the vertebral body of L3, with history of thyroid cancer.     Allergies:  Allergies   Allergen Reactions   • Penicillins Rash     Childhood allergy       Medications:     Current Outpatient Medications:   •  aspirin 81 mg enteric coated tablet, Take 81 mg by mouth daily., Disp: , Rfl:   •  gabapentin (NEURONTIN) 300 mg capsule, Take 1 capsule (300 mg total) by mouth 2 (two) times a day., Disp: 180 capsule, Rfl: 3  •  glipiZIDE (GLUCOTROL) 5 mg tablet, Take 1 tablet by mouth as needed. , Disp: , Rfl:   •  JARDIANCE 10 mg tablet, TAKE 1 TABLET(10 MG) BY MOUTH DAILY, Disp: 90 tablet, Rfl: 0  •  metFORMIN (GLUCOPHAGE) 500 mg tablet, Take one qid, Disp: 120 tablet, Rfl: 1  •  metoprolol succinate XL (TOPROL-XL) 100 mg 24 hr tablet, Take 1 tablet (100 mg total) by mouth 2 (two) times a day., Disp: 180 tablet, Rfl: 3  •  pantoprazole 40 mg EC tablet, TAKE 1 TABLET(40 MG) BY MOUTH DAILY, Disp: 90 tablet, Rfl: 0  •  pravastatin 40 mg tablet, Take 1 tablet (40 mg total) by mouth daily., Disp: 90 tablet, Rfl: 3  •  SYNTHROID 150 mcg tablet, Take 1 tablet (150 mcg total) by mouth once daily., Disp: 30 tablet, Rfl: 1  •  TRULICITY 0.75 mg/0.5 mL pen injector, once a week. Every Tuesday , Disp: , Rfl:   •  venlafaxine XR (EFFEXOR XR) 37.5 mg 24 hr capsule, Take 1 capsule (37.5 mg total) by mouth daily., Disp: 90 capsule, Rfl: 3  •  ketorolac (ACULAR) 0.5 % ophthalmic solution, Administer 1 drop into the right eye daily.  , Disp: , Rfl:   •  prednisoLONE acetate (PRED FORTE) 1 % ophthalmic suspension, SHAKE LIQUID AND INSTILL 1 DROP IN RIGHT EYE FOUR TIMES DAILY AS DIRECTED, Disp: , Rfl:   •  propafenone (RYTHMOL) 150 mg tablet, Take 1 tablet by mouth as needed (Palpitations).  , Disp: , Rfl:      Past Medical History:   Past Medical History:   Diagnosis Date   • Abdominal hernia     • Atherosclerosis of coronary artery    • Atrial fibrillation with rapid ventricular response (CMS/HCC) 10/25/2019    Hospitalized on 10/25/19 at [facility].    • BPH (benign prostatic hyperplasia)    • Cataract    • Cyst of thyroid    • Epigastric abdominal pain    • Essential hypertension    • Former tobacco use    • History of radiation therapy    • Mixed hyperlipidemia    • Osteomyelitis of foot (CMS/HCC) 2009   • PSA elevation    • Thyroid cancer (CMS/HCC) 9/14/2012   • Type 2 diabetes mellitus without complication (CMS/[facility])    • Vertigo        Past Surgical History:   Past Surgical History:   Procedure Laterality Date   • ANAL FISSURECTOMY  1986   • CARDIAC CATHETERIZATION  11/21/2005    LM FOD. 70-80% proximal LAD.Mild disease CX, RCA and branches.    • CARDIAC CATHETERIZATION  11/28/2005    LM FOD. Pristine LAD stent. CX FOD. RCA mild disease.   • CARDIAC SURGERY  Stent in lad   • CORONARY ANGIOPLASTY WITH STENT PLACEMENT  11/22/2005    LM FOD. Moderate, diffuse disease LAD. Irregular CX w/ PLVB distal to PDA 99%. Mild disease RCA. Stent to distal LAD.   • EYE SURGERY Left Cataract   • FOOT SURGERY  2009    For osteomyelitis.   • GASTROCUTANEOUS FISTULA CLOSURE     • THYROIDECTOMY  2012   • TONSILLECTOMY  No    denies   • UPPER GASTROINTESTINAL ENDOSCOPY         Family History:  Family History   Problem Relation Age of Onset   • Cancer Biological Mother    • Breast cancer Biological Mother    • Emphysema Biological Father    • Lung cancer Biological Father        Social History:  Social History     Socioeconomic History   • Marital status:      Spouse name: Not on file   • Number of children: Not on file   • Years of education: Not on file   • Highest education level: Not on file   Occupational History   • Occupation: Retired   Tobacco Use   • Smoking status: Former Smoker     Packs/day: 2.00     Years: 20.00     Pack years: 40.00     Types: Cigarettes     Quit date: 1980     Years  since quittin.9   • Smokeless tobacco: Never Used   Substance and Sexual Activity   • Alcohol use: Yes     Comment: Social   • Drug use: Never   • Sexual activity: Not Currently     Partners: Female   Other Topics Concern   • Not on file   Social History Narrative   • Not on file     Social Determinants of Health     Financial Resource Strain:    • Difficulty of Paying Living Expenses: Not on file   Food Insecurity:    • Worried About Running Out of Food in the Last Year: Not on file   • Ran Out of Food in the Last Year: Not on file   Transportation Needs:    • Lack of Transportation (Medical): Not on file   • Lack of Transportation (Non-Medical): Not on file   Physical Activity:    • Days of Exercise per Week: Not on file   • Minutes of Exercise per Session: Not on file   Stress:    • Feeling of Stress : Not on file   Social Connections:    • Frequency of Communication with Friends and Family: Not on file   • Frequency of Social Gatherings with Friends and Family: Not on file   • Attends [employer] Services: Not on file   • Active Member of Clubs or Organizations: Not on file   • Attends Club or Organization Meetings: Not on file   • Marital Status: Not on file   Intimate Partner Violence:    • Fear of Current or Ex-Partner: Not on file   • Emotionally Abused: Not on file   • Physically Abused: Not on file   • Sexually Abused: Not on file   Housing Stability:    • Unable to Pay for Housing in the Last Year: Not on file   • Number of Places Lived in the Last Year: Not on file   • Unstable Housing in the Last Year: Not on file       Vitals:   Vitals:    21 1335   BP: 130/80   Pulse: 88   Resp: 16   Temp: 36.4 °C (97.6 °F)   SpO2: 99%       Review of Systems:  A complete 14 point review of systems was conducted and was deemed negative except what was documented in the HPI.    Physical Examination:  Physical Exam   Constitutional: Oriented to person, place, and time. Appears well-developed and well-nourished.    Head: Normocephalic and atraumatic.   Right Ear: External ear normal.   Left Ear: External ear normal.   Nose: Nose normal.   Mouth/Throat: Oropharynx is clear and moist.   Eyes: Conjunctivae and EOM are normal. Pupils are equal, round, and reactive to light. No scleral icterus.   Neck: Normal range of motion. Neck supple.   Cardiovascular: Normal rate and regular rhythm.    Pulmonary/Chest: Effort normal   Musculoskeletal: No deformity. No pain with palpation of the midline lumbar spine, facets or soft tissue..   Skin: Skin is warm and dry. No rash noted. No erythema.   Psychiatric: Normal mood and affect. Speech is normal and behavior is normal. Thought content normal.   Vitals reviewed.      Neurological Examination:  Neurologic Exam     Mental Status   Oriented to person, place, and time.   Attention: normal.   Speech: speech is normal   Level of consciousness: alert    Cranial Nerves     CN II   Visual acuity: normal    CN III, IV, VI   Pupils are equal, round, and reactive to light.  Extraocular motions are normal.   Right pupil: Size: 3 mm. Shape: regular. Reactivity: brisk.   Left pupil: Size: 3 mm. Shape: regular. Reactivity: brisk.   CN III: no CN III palsy  CN VI: no CN VI palsy  Nystagmus: none     CN V   Facial sensation intact.     CN VIII   CN VIII normal.   Hearing: intact    CN IX, X   CN IX normal.   Palate: symmetric    CN XI   CN XI normal.   Right sternocleidomastoid strength: normal  Left sternocleidomastoid strength: normal    CN XII   CN XII normal.   Tongue: not atrophic    Sensation ( /2)    Right Left  Right Left   C5 2 2 L2 2 2   C6 2 2 L3 2 2   C7 2 2 L4 2 2   C8 2 2 L5 2 2   T1 2 2 S1 2 2     Motor:     Deltoid Biceps Triceps Wrist ext Finger ext Hand Intrinsics Hip flexion Knee ext Dorsi-  flexion EHL Plantar Flexion   R 5 5 5 5 5 5 4+ 5 5 5 5   L 5 5 5 5 5 5 5 5 5 5 5     There is no pronator drift. Muscle bulk and tone are normal.     Gait, Coordination, and Reflexes     Reflexes    Reflexes 2+ except as noted.   Right plantar: normal  Left plantar: normal  Right Maharaj: absent  Left Maharaj: absent  Right ankle clonus: absent  Left ankle clonus: absent      Data Review:    Lab Results:   Lab Results   Component Value Date    WBC 7.0 11/20/2020    WBC 10.13 01/17/2020    HGB 12.7 (L) 11/20/2020    HGB 14.1 01/17/2020    HCT 40.0 11/20/2020    HCT 43.0 01/17/2020    MCV 88.9 11/20/2020    MCV 88.5 01/17/2020     11/20/2020     01/17/2020    RDW 13.3 11/20/2020    RDW 13.7 01/17/2020      Lab Results   Component Value Date    GLUCOSE 98 11/20/2020    GLUCOSE 225 (H) 05/02/2018    BUN 18 11/20/2020    BUN 24 (H) 04/17/2020     11/20/2020     05/02/2018    K 4.5 11/20/2020    K 4.6 05/02/2018     11/20/2020    CL 98 05/02/2018    CO2 27 11/20/2020    CO2 27 05/02/2018    ANIONGAP 12 05/02/2018        Imaging:   Independent review of all imaging was done by myself as well as review of the radiologists readings and comparison to prior films.     MRI lumbar spine 11/22/2021  There is redemonstration of a metastatic lesion essentially replacing the L3  vertebral body with new extension into the posterior elements bilaterally. The  lesion demonstrates rim enhancement and central necrosis. The enhancement  extends into the paravertebral soft tissues and into the medial aspect of the  psoas muscles bilaterally. There is a new oblique fracture line through the L3  vertebral body with associated mild loss of height. There is progression of  lesion extension in the right side of the L4 vertebral body superiorly extending  into the posterior elements. There is also progression of lesion extension into  the left side of the superior aspect of the L4 vertebral body. There is edema  and enhancement in the superior endplate of L4 with mild loss of height of the  L4 vertebral body. The remainder of the lumbar vertebral bodies are maintained  in height. A trace retrolisthesis of  L1 on L2 and of L3 on L4 are present which  appear similar. The alignment is otherwise preserved. Schmorl's nodes are noted  in the inferior endplates of T11, T12 and L1 which were present previously.     The conus medullaris terminates at L1-L2.     At T11-T12 and at T12-L1, there is no focal disc herniation, significant central  canal or neural foraminal narrowing. Mild facet hypertrophy is present.  At L1-L2, there is a disc osseous complex and mild facet hypertrophy. The thecal  sac and neural foramina are mildly effaced. This appears stable.  At L2-L3, there is a disc osseous complex and facet and ligamentous hypertrophy.  The thecal sac is mildly effaced. Mild bilateral neural foraminal narrowing is  present. This appears stable.  At L3-L4, there is new mild retropulsion of the inferior endplate of L3 into the  spinal canal. A disc osseous complex and diffuse disc bulge as well as facet and  ligamentous hypertrophy are present. The central canal is mild to moderately  narrowed. Moderate to severe right and moderate left neural foraminal narrowing  is present. Note is made of enhancement in the neural foramina bilaterally.  At L4-L5, there is a diffuse disc bulge, eccentric to the right, and facet and  ligamentous hypertrophy. In addition, a superimposed right foraminal protrusion  is present. The central canal is mildly narrowed tricompartmentally. Moderate  right and mild to moderate left neural foraminal narrowing is present. The  protrusion impinges upon the right L4 dorsal root ganglion and postganglionic  nerve root. This appears similar.  At L5-S1, there is mild disc bulging and mild facet hypertrophy with mild  bilateral neural foraminal narrowing. This appears stable.    Assessment / Plan:     In summary, Matt Williamson is a pleasant 75 y.o. male with thyroid cancer metastatic to L3 vertebral body seen today by the neurosurgery service for severe mechanical low back pain with right L3 radiculopathy x 3  months. MRI of the lumbar spine shows metastatic lesion within the L3 vertebral body with new extension into the posterior elements and redemonstration of extension into the paravertebral soft tissues as well as into the medial aspect of the psoas muscles bilaterally. There is a new oblique fracture through the L3 vertebral body with associated mild loss of height. New mild to moderate central canal narrowing and moderate to severe right neural foraminal narrowing at L3-L4 with enhancement in the neural foramina bilaterally.     Mr. Williamson is full strength on exam without pathologic reflexes.  The case was discussed with Dr. Casey, who recommends a PET/CT to assess for further disease. He is scheduled for follow up with Dr. Casey on 12/10. Should the PET/CT be negative for further metastatic disease, patient would require an L3 corpectomy with stabilizing fusion. I will speak to Dr. Casey on 12/10 to discuss the surgical options and future treatment plan.            MD PATT Desir, Abena Krause PA-C, am scribing for, and in the presence of, Dr. Colt Heller.   I personally saw and examined the patient.  Agree with note     30 minutes were spent with the patient, examination, reviewing films, and coordinating care.  Patient seen earlier today. Signature timestamp does not reflect patient encounter time.   More than 50% of the time is spent counseling the patient and family and coordinating care.

## 2021-12-07 ENCOUNTER — HOSPITAL ENCOUNTER (OUTPATIENT)
Dept: RADIOLOGY | Facility: CLINIC | Age: 76
Discharge: HOME | End: 2021-12-07
Attending: PHYSICIAN ASSISTANT
Payer: MEDICARE

## 2021-12-07 ENCOUNTER — APPOINTMENT (OUTPATIENT)
Dept: INTERNAL MEDICINE | Facility: CLINIC | Age: 76
End: 2021-12-07
Payer: MEDICARE

## 2021-12-07 ENCOUNTER — TELEPHONE (OUTPATIENT)
Dept: INTERNAL MEDICINE | Facility: CLINIC | Age: 76
End: 2021-12-07
Payer: MEDICARE

## 2021-12-07 VITALS — BODY MASS INDEX: 27.32 KG/M2 | HEIGHT: 66 IN | WEIGHT: 170 LBS

## 2021-12-07 DIAGNOSIS — C79.51 BONE METASTASES: ICD-10-CM

## 2021-12-07 RX ORDER — FLUDEOXYGLUCOSE F18 300 MCI/ML
8.7 INJECTION INTRAVENOUS ONCE
Status: COMPLETED | OUTPATIENT
Start: 2021-12-07 | End: 2021-12-07

## 2021-12-07 RX ADMIN — FLUDEOXYGLUCOSE F18 8.7 MILLICURIE: 300 INJECTION INTRAVENOUS at 07:19

## 2021-12-08 ENCOUNTER — TELEPHONE (OUTPATIENT)
Dept: NEUROSURGERY | Facility: CLINIC | Age: 76
End: 2021-12-08
Payer: MEDICARE

## 2021-12-10 ENCOUNTER — OFFICE VISIT (OUTPATIENT)
Dept: HEMATOLOGY/ONCOLOGY | Facility: CLINIC | Age: 76
End: 2021-12-10
Attending: INTERNAL MEDICINE
Payer: MEDICARE

## 2021-12-10 VITALS
TEMPERATURE: 98.2 F | DIASTOLIC BLOOD PRESSURE: 72 MMHG | RESPIRATION RATE: 16 BRPM | HEART RATE: 79 BPM | OXYGEN SATURATION: 99 % | SYSTOLIC BLOOD PRESSURE: 109 MMHG | WEIGHT: 171 LBS | HEIGHT: 64 IN | BODY MASS INDEX: 29.19 KG/M2

## 2021-12-10 DIAGNOSIS — C79.9 METASTASIS FROM THYROID CANCER (CMS/HCC): ICD-10-CM

## 2021-12-10 DIAGNOSIS — C73 METASTASIS FROM THYROID CANCER (CMS/HCC): ICD-10-CM

## 2021-12-10 DIAGNOSIS — C79.51 BONE METASTASES: Primary | ICD-10-CM

## 2021-12-10 PROBLEM — G89.3 CANCER ASSOCIATED PAIN: Status: ACTIVE | Noted: 2021-12-10

## 2021-12-10 PROCEDURE — G8754 DIAS BP LESS 90: HCPCS | Performed by: INTERNAL MEDICINE

## 2021-12-10 PROCEDURE — G8752 SYS BP LESS 140: HCPCS | Performed by: INTERNAL MEDICINE

## 2021-12-10 PROCEDURE — 99214 OFFICE O/P EST MOD 30 MIN: CPT | Performed by: INTERNAL MEDICINE

## 2021-12-10 RX ORDER — GABAPENTIN 300 MG/1
600 CAPSULE ORAL 3 TIMES DAILY
Qty: 540 CAPSULE | Refills: 0 | Status: SHIPPED | OUTPATIENT
Start: 2021-12-10 | End: 2022-02-08 | Stop reason: HOSPADM

## 2021-12-10 RX ORDER — GABAPENTIN 300 MG/1
300 CAPSULE ORAL 3 TIMES DAILY
Qty: 180 CAPSULE | Refills: 3 | COMMUNITY
Start: 2021-12-10 | End: 2021-12-10 | Stop reason: SDUPTHER

## 2021-12-10 ASSESSMENT — ENCOUNTER SYMPTOMS
PALPITATIONS: 1
SHORTNESS OF BREATH: 0
ABDOMINAL PAIN: 1
ABDOMINAL DISTENTION: 0
COUGH: 0
ADENOPATHY: 0
SPEECH DIFFICULTY: 0
CONSTIPATION: 0
DIZZINESS: 0
NERVOUS/ANXIOUS: 0
VOMITING: 0
HOT FLASHES: 0
APPETITE CHANGE: 0
NAUSEA: 0
DIFFICULTY URINATING: 1
NECK PAIN: 0
EXTREMITY WEAKNESS: 0
ARTHRALGIAS: 0
EYE PROBLEMS: 0
FREQUENCY: 0
HEMOPTYSIS: 0
HEMATURIA: 0
CHEST TIGHTNESS: 0
DYSURIA: 0
WHEEZING: 0
BACK PAIN: 1
HEADACHES: 0
DIARRHEA: 0
DIAPHORESIS: 0
LEG SWELLING: 0
SEIZURES: 0
FEVER: 0
CONFUSION: 0
VOICE CHANGE: 0
RECTAL PAIN: 0
DEPRESSION: 0
BLOOD IN STOOL: 0
SORE THROAT: 0
SLEEP DISTURBANCE: 1
ROS GI COMMENTS: SOME
FATIGUE: 1
TROUBLE SWALLOWING: 0
BRUISES/BLEEDS EASILY: 0
FLANK PAIN: 1
NECK STIFFNESS: 1
UNEXPECTED WEIGHT CHANGE: 0
CHILLS: 0
DECREASED CONCENTRATION: 0
NUMBNESS: 0
WOUND: 0
MYALGIAS: 0
SCLERAL ICTERUS: 0
LIGHT-HEADEDNESS: 0

## 2021-12-10 NOTE — LETTER
December 10, 2021    Patient: Matt Williamson   YOB: 1945   Date of Visit: 12/10/2021       Dear Dr. Arreola:    The patient is seen back at the [facility] [place] today in follow up with regard to his   1. Bone metastases (CMS/HCC)    2. Metastasis from thyroid cancer (CMS/HCC)    . Attached is my assessment and plan of care.         Sincerely,        Stacie Casey MD    CC: Ajit S Jada, MD Gregory J [facility], MD Matthew S Kane, MD

## 2021-12-10 NOTE — ASSESSMENT & PLAN NOTE
Patient has a history of papillary thyroid cancer with subsequent bone metastases.  We reviewed his recent imaging.  He has isolated progression L3 to the area previously treated with external beam radiation and did not have significant I-131 uptake.  I think this is represents an isolated area of progression, surgery would be in his best interest.  He is having significant pain and gets unlikely that systemic therapy would lead to improvement in his symptoms.  Patient reports Dr. Heller recommended he see someone at [facility].  I will follow-up with Dr. Heller to assist the patient in obtaining surgical consultation.

## 2021-12-10 NOTE — ASSESSMENT & PLAN NOTE
Patient is having increased pain in the lower back with radiation down the right leg.  He is currently taking gabapentin 300 mg 3 times a day.  I recommend he try increasing this.  Goal dosing would be 600 mg 3 times a day.  I recommend he start by increasing his nighttime dose to 600 mg for 3 days then increase his afternoon dose to 600 mg for 3 days and if tolerated increase his morning dose to 300 mg as well.  Prescription was sent to his pharmacy.

## 2021-12-10 NOTE — PROGRESS NOTES
Matt Williamson is a 76 y.o. male,   :  1945    Encounter Diagnoses   Name Primary?   • Bone metastases (CMS/HCC) Yes   • Metastasis from thyroid cancer (CMS/HCC)        Cancer Staging  Metastasis from thyroid cancer (CMS/HCC)  Staging form: Thyroid - Differentiated, AJCC 8th Edition  - Pathologic stage from 2012: Stage II (pT3a, pN0a, cM0, Age at diagnosis: >= 55 years) - Signed by Stacie Casey MD on 2020      Treatment Plans     No treatment plans exist          Oncology History   Metastasis from thyroid cancer (CMS/HCC)   2012 Initial Diagnosis    Thyroid cancer (CMS/HCC)  Papillary thyroid carcinoma, follicular variant     2012 Cancer Staged    Staging form: Thyroid - Differentiated, AJCC 8th Edition  - Pathologic stage from 2012: Stage II (pT3a, pN0a, cM0, Age at diagnosis: >= 55 years)     2012 Surgery    Total thyroidectomy and lymph node biopsy performed by Dr. Lester at the [facility]     3/28/2012 -  Radiation Therapy    Patient received postoperative I-131 treatment.  Treatment details are not available currently.     2020 Recurrence Distant    Biopsy of an enlarging L3 lesion showed metastatic carcinoma consistent with a thyroid primary.     2020 -  Radiation Therapy    Received a course of external beam radiation to L3 metastasis under direction of Dr. [facility].         Patient Active Problem List   Diagnosis   • BPH (benign prostatic hyperplasia)   • Atherosclerosis of coronary artery   • Cataract   • Essential hypertension   • Former tobacco use   • Mixed hyperlipidemia   • Metastasis from thyroid cancer (CMS/HCC)   • Type 2 diabetes mellitus without complication (CMS/HCC)   • Cyst of thyroid   • Esophageal reflux   • Paroxysmal atrial fibrillation (CMS/HCC)   • Bone metastases (CMS/HCC)   • Pre-op evaluation   • Preop cardiovascular exam   • Cancer associated pain       History of Present Illness  HPI  Matt Williamson is seen today in  "follow up.  I last saw him in January 2020.  At that time the patient been found to have recurrent thyroid cancer with an L3 metastasis.  He was evaluated by radiation oncology.  There was no significant I-131 uptake.  Therefore the patient underwent a course of external beam radiation therapy.  Patient reports he was doing well until recently when he began to develop increased low back pain with radiation down the right leg.  Pain is increased when ambulating.  He denies weakness or bowel or bladder changes.  Recent MRI of the lumbar spine showed metastatic lesion in L3 with no extension into the posterior elements and a new fracture.  PET scan was subsequently performed which showed the lesion in L3 and stable lytic lesion in the right scapula without increase in SUV.  There were no other sites of disease.    Review of Systems - Oncology    Review of Systems:  Nursing assessment reviewed. Pertinent positive and negative symptoms noted in HPI, all others negative.    Temp:  [36.8 °C (98.2 °F)] 36.8 °C (98.2 °F)  Heart Rate:  [79] 79  Resp:  [16] 16  BP: (109)/(72) 109/72  Visit Vitals  /72 (BP Location: Right upper arm, Patient Position: Sitting)   Pulse 79   Temp 36.8 °C (98.2 °F) (Oral)   Resp 16   Ht 1.626 m (5' 4\")   Wt 77.6 kg (171 lb)   SpO2 99%   BMI 29.35 kg/m²     Physical Exam  Constitutional:       General: He is not in acute distress.     Appearance: He is well-developed.   HENT:      Head: Normocephalic.      Mouth/Throat:      Pharynx: No oropharyngeal exudate.   Eyes:      General: No scleral icterus.     Pupils: Pupils are equal, round, and reactive to light.   Cardiovascular:      Rate and Rhythm: Normal rate and regular rhythm.   Pulmonary:      Effort: Pulmonary effort is normal.      Breath sounds: Normal breath sounds.   Abdominal:      Palpations: Abdomen is soft.      Tenderness: There is no abdominal tenderness.   Musculoskeletal:         General: No tenderness.      Cervical back: " Neck supple.   Lymphadenopathy:      Cervical: No cervical adenopathy.   Skin:     Findings: No rash.   Neurological:      Mental Status: He is alert and oriented to person, place, and time.         Past Medical History:   Diagnosis Date   • Abdominal hernia    • Atherosclerosis of coronary artery    • Atrial fibrillation with rapid ventricular response (CMS/HCC) 10/25/2019    Hospitalized on 10/25/19 at [facility].    • BPH (benign prostatic hyperplasia)    • Cataract    • Cyst of thyroid    • Epigastric abdominal pain    • Essential hypertension    • Former tobacco use    • History of radiation therapy    • Mixed hyperlipidemia    • Osteomyelitis of foot (CMS/HCC)    • PSA elevation    • Thyroid cancer (CMS/HCC) 2012   • Type 2 diabetes mellitus without complication (CMS/[facility])    • Vertigo        Past Surgical History:   Procedure Laterality Date   • ANAL FISSURECTOMY     • CARDIAC CATHETERIZATION  2005    LM FOD. 70-80% proximal LAD.Mild disease CX, RCA and branches.    • CARDIAC CATHETERIZATION  2005    LM FOD. Pristine LAD stent. CX FOD. RCA mild disease.   • CARDIAC SURGERY  Stent in lad   • CORONARY ANGIOPLASTY WITH STENT PLACEMENT  2005    LM FOD. Moderate, diffuse disease LAD. Irregular CX w/ PLVB distal to PDA 99%. Mild disease RCA. Stent to distal LAD.   • EYE SURGERY Left Cataract   • FOOT SURGERY      For osteomyelitis.   • GASTROCUTANEOUS FISTULA CLOSURE     • THYROIDECTOMY     • TONSILLECTOMY  No    denies   • UPPER GASTROINTESTINAL ENDOSCOPY         Social History     Tobacco Use   • Smoking status: Former Smoker     Packs/day: 2.00     Years: 20.00     Pack years: 40.00     Types: Cigarettes     Quit date:      Years since quittin.9   • Smokeless tobacco: Never Used   Substance Use Topics   • Alcohol use: Yes     Comment: Social   • Drug use: Never       Family History   Problem Relation Age of Onset   • Cancer Biological Mother    •  Breast cancer Biological Mother    • Emphysema Biological Father    • Lung cancer Biological Father          Allergies  Penicillins    Medications  Current Outpatient Medications   Medication Sig Dispense Refill   • aspirin 81 mg enteric coated tablet Take 81 mg by mouth daily.     • gabapentin 300 mg capsule Take 2 capsules (600 mg total) by mouth 3 (three) times a day. 540 capsule 0   • glipiZIDE (GLUCOTROL) 5 mg tablet Take 1 tablet by mouth as needed.      • JARDIANCE 10 mg tablet TAKE 1 TABLET(10 MG) BY MOUTH DAILY 90 tablet 0   • metFORMIN (GLUCOPHAGE) 500 mg tablet Take one qid 120 tablet 1   • metoprolol succinate XL (TOPROL-XL) 100 mg 24 hr tablet Take 1 tablet (100 mg total) by mouth 2 (two) times a day. 180 tablet 3   • pantoprazole 40 mg EC tablet TAKE 1 TABLET(40 MG) BY MOUTH DAILY 90 tablet 0   • pravastatin 40 mg tablet Take 1 tablet (40 mg total) by mouth daily. 90 tablet 3   • SYNTHROID 150 mcg tablet Take 1 tablet (150 mcg total) by mouth once daily. 30 tablet 1   • TRULICITY 0.75 mg/0.5 mL pen injector once a week. Every Tuesday      • venlafaxine XR (EFFEXOR XR) 37.5 mg 24 hr capsule Take 1 capsule (37.5 mg total) by mouth daily. 90 capsule 3   • ketorolac (ACULAR) 0.5 % ophthalmic solution Administer 1 drop into the right eye daily.       • prednisoLONE acetate (PRED FORTE) 1 % ophthalmic suspension SHAKE LIQUID AND INSTILL 1 DROP IN RIGHT EYE FOUR TIMES DAILY AS DIRECTED     • propafenone (RYTHMOL) 150 mg tablet Take 1 tablet by mouth as needed (Palpitations).         No current facility-administered medications for this visit.          Laboratory  No results found for this or any previous visit (from the past 504 hour(s)).      Radiology  I independently reviewed the images, tracings or specimen.   X-RAY LUMBAR SPINE 2 OR 3 VIEWS    Result Date: 12/3/2021  Narrative: CLINICAL HISTORY:  L3 metastatic lesion with pathologic fracture     Impression: IMPRESSION: 1.  Mixed lytic/sclerotic L3 lesion  with mild compression deformity. 2.  Normal alignment in neutral, grade 1 L2-L3 spondylolisthesis in extension, grade 1 L3-L4 spondylolisthesis in flexion. COMMENT:  Lateral views of the lumbar spine were obtained in neutral, flexion and extension. In the neutral position the five lumbar vertebral bodies are normally aligned. L3 mineralization is mixed lytic and sclerotic, with mild compression deformity. Advanced L3-L4 degenerative change.  Mild degenerative change throughout the remainder of the lumbar spine. In extension, there is grade 1 retrolisthesis of L2 on L3. In flexion there is grade 1 retrolisthesis of L3 on L4. Advanced atherosclerotic calcification of the aorta     CT LUMBAR SPINE WITHOUT IV CONTRAST    Result Date: 12/3/2021  Narrative: STUDY: Noncontrast CT examination of the lumbar spine performed following the [facility] standard protocol. Sagittal and coronal reformations rendered from axial source images. Images reviewed in bone and soft tissue windows. CLINICAL HISTORY: Metastatic disease.  Comparison. COMPARISON: Lumbar spine MRI dated 11/22/2021. CT DOSE:  One or more dose reduction techniques (e.g. automated exposure control, adjustment of the mA and/or kV according to patient size, use of iterative reconstruction technique) utilized for this examination.     Impression: IMPRESSION: Pathologic fracture with large metastatic lytic lesion involving the L3 vertebral body, the extent of extraosseous extension is not well evaluated on this study, please refer to recent MRI.  There is also suspicious vague lytic as well as a sclerotic lesion of L4, however better delineated on the recent MRI as well.  Degenerative spondylosis worst at L4-L5. COMMENT: Lumbosacral alignment: Anatomic. Vertebral bodies: Redemonstrated is a large lytic mass with pathologic fracture through the L3 vertebral body, particularly eccentric to the right,, the extent of extra osseous metastatic disease cannot be well  assessed on this particular examination.  In addition, the lytic lesion eccentric to the right at the L4 vertebral body is better demonstrated on the MRI as well.  There is also a sclerotic focus of the left aspect of the L4 vertebral body.  No definite sacral fractures or sacral lytic lesions. Intervertebral discs: Disc osteophyte complex with subchondral sclerosis and vacuum disc at L3-L4 with vacuum disc also noted at L2-L3. Lumbosacral spinal canal: Patent. Axial images: T12-L1: Normal. L1-2: Disc osteophyte complex with broad-based disc bulge contributing to mild right foraminal narrowing. L2-3: Disc osteophyte complex with broad-based disc bulge without consequence. L3-4: Disc osteophyte complex with broad-based disc bulge and facet arthrosis, posterior osseous spurring, contributing to moderate to moderate severe bilateral foraminal narrowing.  Mild canal stenosis noted. L4-5: Broad-based disc bulge, moderate facet hypertrophy and infolding of the ligamenta flava, contributing to moderate to severe right and moderate left foraminal narrowing, mild to moderate canal stenosis. L5-S1: Mild broad-based disc bulge and facet arthrosis, without consequence. OTHER: Marked atherosclerotic disease of abdominal aorta.     MRI LUMBAR SPINE WITH AND WITHOUT CONTRAST    Result Date: 11/22/2021  Narrative: CLINICAL HISTORY: C79.51 COMMENT: Magnetic resonance imaging of the lumbar spine was performed utilizing sagittal T1, T2, STIR and postcontrast T1 weighted sequences as well as axial T1, T2 and postcontrast T1 weighted sequences. A total of 7.7 cc of Gadavist were administered intravenously without reported complication. COMPARISON: 4/21/2021 FINDINGS: There is redemonstration of a metastatic lesion essentially replacing the L3 vertebral body with new extension into the posterior elements bilaterally. The lesion demonstrates rim enhancement and central necrosis. The enhancement extends into the paravertebral soft tissues  and into the medial aspect of the psoas muscles bilaterally. There is a new oblique fracture line through the L3 vertebral body with associated mild loss of height. There is progression of lesion extension in the right side of the L4 vertebral body superiorly extending into the posterior elements. There is also progression of lesion extension into the left side of the superior aspect of the L4 vertebral body. There is edema and enhancement in the superior endplate of L4 with mild loss of height of the L4 vertebral body. The remainder of the lumbar vertebral bodies are maintained in height. A trace retrolisthesis of L1 on L2 and of L3 on L4 are present which appear similar. The alignment is otherwise preserved. Schmorl's nodes are noted in the inferior endplates of T11, T12 and L1 which were present previously. The conus medullaris terminates at L1-L2. At T11-T12 and at T12-L1, there is no focal disc herniation, significant central canal or neural foraminal narrowing. Mild facet hypertrophy is present. At L1-L2, there is a disc osseous complex and mild facet hypertrophy. The thecal sac and neural foramina are mildly effaced. This appears stable. At L2-L3, there is a disc osseous complex and facet and ligamentous hypertrophy. The thecal sac is mildly effaced. Mild bilateral neural foraminal narrowing is present. This appears stable. At L3-L4, there is new mild retropulsion of the inferior endplate of L3 into the spinal canal. A disc osseous complex and diffuse disc bulge as well as facet and ligamentous hypertrophy are present. The central canal is mild to moderately narrowed. Moderate to severe right and moderate left neural foraminal narrowing is present. Note is made of enhancement in the neural foramina bilaterally. At L4-L5, there is a diffuse disc bulge, eccentric to the right, and facet and ligamentous hypertrophy. In addition, a superimposed right foraminal protrusion is present. The central canal is mildly  narrowed tricompartmentally. Moderate right and mild to moderate left neural foraminal narrowing is present. The protrusion impinges upon the right L4 dorsal root ganglion and postganglionic nerve root. This appears similar. At L5-S1, there is mild disc bulging and mild facet hypertrophy with mild bilateral neural foraminal narrowing. This appears stable.     Impression: IMPRESSION: 1. Redemonstration of a metastatic lesion within the L3 vertebral body with new extension into the posterior elements and redemonstration of extension into the paravertebral soft tissues as well as into the medial aspect of the psoas muscles bilaterally. New oblique fracture through the L3 vertebral body with associated mild loss of height. New mild to moderate central canal narrowing and moderate to severe right and moderate left neural foraminal narrowing at L3-L4 with enhancement in the neural foramina bilaterally. 2. Progression of lesion extension into the superior aspect of the L4 vertebral body on the right which extends into the posterior elements and of lesion extension into the superior aspect of L4 on the left. Edema and enhancement in the superior endplate of L4 which has progressed compared to the prior with associated mild loss of height of the L4 vertebral body. 3. Degenerative and discogenic disease of the lumbar spine, as above. In accordance with [place] Act 112,  the patient will receive a letter notifying them to follow up with their physician. These findings were discussed with GRAZYNA Tierney upon completion of the examination (around 1:30 PM on 11/22/2021) and critical read back was performed.    CT PET SKULL BASE TO MID THIGH    Result Date: 12/7/2021  Narrative: CLINICAL HISTORY:    Thyroid carcinoma.  Thyroidectomy and ablation done in 2020.  Metastatic disease with radiation therapy.. COMMENT:  Approximately 60 minutes following the IV administration of  8.7mCi of F-18 Fluorodeoxyglucose, PET/CT imaging was acquired  from the skull base through the mid thighs on a [employer]  [employer] Bi[employer] Vision digital PET/CT system with 64 slice computed tomographic image acquisition. Unenhanced, lower dose CT images are acquired for purposes of  anatomic correlation and attenuation correction.  This does not represent a diagnostic CT exam.  The exam is reviewed utilizing  dedicated [employer] Oasis PET viewing software with and without attenuation correction. Measured Blood Glucose:  133mg/dl Correlation is made with prior  MRI dated 12/3/2021, previous PET/CT dated 5/19/2021. NECK: No definite abnormal uptake appreciated . THORAX: Blood pool activity at the thoracic aorta demonstrates a max SUV of 2.3. No definite abnormal uptake appreciated.  In particular, pulmonary, mediastinal/hilar and chest wall/axillary region uptake is unremarkable.  Again noted is a destructive lesion involving the inferior aspect of the right scapula.  There is minimal activity is seen in this region with a max SUV of 2.6.  On the previous examination this had a max SUV of 2.3. ABDOMEN and PELVIS: Background hepatic activity demonstrates a max SUV of 3.7. No definite abnormal uptake appreciated .  In particular, hepatic, splenic, pancreatic and adrenal region activity is unremarkable.  Expected excreted urinary activity is noted.  Scattered nonspecific bowel activity is noted.  No definite abnormal abdominal/pelvic alen uptake appreciated.  Increased activity seen at the renal cortex possibly from clenching of sphincter.  This has a max SUV of 12.5 but no definite anatomic abnormalities seen on the CT exam. There is new increased metabolic uptake seen at the site of a large lytic lesion involving predominantly the right side of the L3 vertebral body.  This now has a max SUV of five, previously 3.2.  Increased activity seen at the right margin. Recent MRI suggested abnormal signal at the L4 level.  There is no definite significant abnormal activity seen at this  level. LOW DOSE CT IMAGES:  Status post thyroidectomy.  Atherosclerotic plaque in the aorta and coronary arteries.  Dense atherosclerotic plaque in the descending aorta.  Colonic diverticulosis.  Destructive bony lesions as described above.     Impression: IMPRESSION: Increased activity seen at the right margin of a destructive lesion involving the L3 vertebral body with a max SUV of five. Stable lytic lesion involving the inferior aspect of the right scapula with no interval change in max SUV. In accordance with [place] Act 112,  the patient will receive a letter notifying them to follow up with their physician.       Assessment and Plan  Metastasis from thyroid cancer (CMS/HCC)  Patient has a history of papillary thyroid cancer with subsequent bone metastases.  We reviewed his recent imaging.  He has isolated progression L3 to the area previously treated with external beam radiation and did not have significant I-131 uptake.  I think this is represents an isolated area of progression, surgery would be in his best interest.  He is having significant pain and gets unlikely that systemic therapy would lead to improvement in his symptoms.  Patient reports Dr. Heller recommended he see someone at [facility].  I will follow-up with Dr. Heller to assist the patient in obtaining surgical consultation.    Cancer associated pain  Patient is having increased pain in the lower back with radiation down the right leg.  He is currently taking gabapentin 300 mg 3 times a day.  I recommend he try increasing this.  Goal dosing would be 600 mg 3 times a day.  I recommend he start by increasing his nighttime dose to 600 mg for 3 days then increase his afternoon dose to 600 mg for 3 days and if tolerated increase his morning dose to 300 mg as well.  Prescription was sent to his pharmacy.      Stacie Casey MD

## 2021-12-10 NOTE — PROGRESS NOTES
Review of Systems   Constitutional: Positive for fatigue. Negative for appetite change, chills, diaphoresis, fever and unexpected weight change.   HENT:   Negative for hearing loss, lump/mass, mouth sores, nosebleeds, sore throat, tinnitus, trouble swallowing and voice change.    Eyes: Negative for eye problems and icterus.   Respiratory: Negative for chest tightness, cough, hemoptysis, shortness of breath and wheezing.    Cardiovascular: Positive for palpitations. Negative for chest pain and leg swelling.        Occassional   Gastrointestinal: Positive for abdominal pain. Negative for abdominal distention, blood in stool, constipation, diarrhea, nausea, rectal pain and vomiting.        Some   Endocrine: Negative for hot flashes.   Genitourinary: Positive for difficulty urinating and nocturia. Negative for bladder incontinence, dyspareunia, dysuria, frequency, hematuria, pelvic pain and penile discharge.         Decreased force of stream, hesitency   Musculoskeletal: Positive for back pain, flank pain, gait problem and neck stiffness. Negative for arthralgias, myalgias and neck pain.   Skin: Negative for itching, rash and wound.   Neurological: Positive for gait problem. Negative for dizziness, extremity weakness, headaches, light-headedness, numbness, seizures and speech difficulty.        Because of back pain   Hematological: Negative for adenopathy. Does not bruise/bleed easily.   Psychiatric/Behavioral: Positive for sleep disturbance. Negative for confusion, decreased concentration, depression and suicidal ideas. The patient is not nervous/anxious.

## 2021-12-13 ENCOUNTER — TELEPHONE (OUTPATIENT)
Dept: NEUROSURGERY | Facility: CLINIC | Age: 76
End: 2021-12-13
Payer: MEDICARE

## 2022-01-05 ENCOUNTER — TELEPHONE (OUTPATIENT)
Dept: NEUROSURGERY | Facility: CLINIC | Age: 77
End: 2022-01-05
Payer: MEDICARE

## 2022-01-05 NOTE — TELEPHONE ENCOUNTER
"Pt called really frustrated, he would like a call back. He said he feels like he's at a \"stand still\" and not getting anywhere. He was referred to a Dr in [place] but needs to talk to someone from here because he is in a lot of pain.    [facility] asking for a call back [phone]  "

## 2022-01-11 ENCOUNTER — TELEPHONE (OUTPATIENT)
Dept: RADIATION ONCOLOGY | Facility: HOSPITAL | Age: 77
End: 2022-01-11

## 2022-01-11 NOTE — TELEPHONE ENCOUNTER
Received a voicemail form Sarah from [facility] requesting, images and end of treatment summary from Dr. [facility]'s [facility]. Pt's images and notes from radiation was faxed and emailed to Sarah with confirmation. Releases of Medical Records was faxed to us, and was scan into pt's chart under media.

## 2022-01-12 ENCOUNTER — OFFICE VISIT (OUTPATIENT)
Dept: NEUROSURGERY | Facility: CLINIC | Age: 77
End: 2022-01-12
Payer: MEDICARE

## 2022-01-12 ENCOUNTER — PREP FOR CASE (OUTPATIENT)
Dept: NEUROSURGERY | Facility: CLINIC | Age: 77
End: 2022-01-12

## 2022-01-12 VITALS
DIASTOLIC BLOOD PRESSURE: 70 MMHG | HEIGHT: 66 IN | SYSTOLIC BLOOD PRESSURE: 118 MMHG | HEART RATE: 80 BPM | OXYGEN SATURATION: 98 % | WEIGHT: 170 LBS | TEMPERATURE: 96.7 F | BODY MASS INDEX: 27.32 KG/M2 | RESPIRATION RATE: 20 BRPM

## 2022-01-12 DIAGNOSIS — Z11.59 SCREENING FOR VIRAL DISEASE: ICD-10-CM

## 2022-01-12 DIAGNOSIS — E11.9 TYPE 2 DIABETES MELLITUS WITHOUT COMPLICATION, UNSPECIFIED WHETHER LONG TERM INSULIN USE (CMS/HCC): ICD-10-CM

## 2022-01-12 DIAGNOSIS — M54.41 LOW BACK PAIN WITH RIGHT-SIDED SCIATICA, UNSPECIFIED BACK PAIN LATERALITY, UNSPECIFIED CHRONICITY: Primary | ICD-10-CM

## 2022-01-12 DIAGNOSIS — C41.2 MALIGNANT NEOPLASM OF VERTEBRAL COLUMN, EXCLUDING SACRUM AND COCCYX (CMS/HCC): Primary | ICD-10-CM

## 2022-01-12 DIAGNOSIS — C41.2 MALIGNANT NEOPLASM OF VERTEBRAL COLUMN, EXCLUDING SACRUM AND COCCYX (CMS/HCC): ICD-10-CM

## 2022-01-12 DIAGNOSIS — T14.90XA INJURY: ICD-10-CM

## 2022-01-12 PROCEDURE — G8752 SYS BP LESS 140: HCPCS | Performed by: NEUROLOGICAL SURGERY

## 2022-01-12 PROCEDURE — 99215 OFFICE O/P EST HI 40 MIN: CPT | Performed by: NEUROLOGICAL SURGERY

## 2022-01-12 PROCEDURE — G8754 DIAS BP LESS 90: HCPCS | Performed by: NEUROLOGICAL SURGERY

## 2022-01-12 RX ORDER — SODIUM CHLORIDE 9 MG/ML
INJECTION, SOLUTION INTRAVENOUS CONTINUOUS
Status: CANCELLED | OUTPATIENT
Start: 2022-01-12 | End: 2022-01-13

## 2022-01-12 NOTE — PROGRESS NOTES
[facility]  Medical Office Building [address], [address]  [place] [address]  Phone: [phone]  Fax: [phone]      Patient ID: Matt Williamson                              : 1945    Visit Date: 2022    Referring Provider: Dr. Arreola    History of Present Illness:   Matt Williamson is a pleasant 76 y.o. male with thyroid cancer metastatic to L3 vertebral body s/p radiation stereotactic body radiation 2020 seen today by the neurosurgery service for surgical discussion regarding growth of his L3 vertebral body metastatic lesion. He has had progressive worsening midline-line low back pain with radiation to the right knee. An MRI lumbar spine was obtained 2021 showing extension of the L3 mass. He was sent for a PET scan 2021 showing increased activity of the L3 lesion without further metastatic lesions appreciated. He was referred to [facility] however there has been some delay with getting an appointment.     He has had persistent midline low back pain with walking and standing which has progressively worsened. He does not have any weakness or bowel/bladder incontinence. He is following with oncology (Dr. Casey) who does not recommend any chemotherapy at this time but surgical intervention as the primary treatment given the suspected isolated area of progression at the L3 vertebral body.     Allergies:  Allergies   Allergen Reactions   • Penicillins Rash     Childhood allergy       Medications:     Current Outpatient Medications:   •  aspirin 81 mg enteric coated tablet, Take 81 mg by mouth daily., Disp: , Rfl:   •  gabapentin 300 mg capsule, Take 2 capsules (600 mg total) by mouth 3 (three) times a day., Disp: 540 capsule, Rfl: 0  •  glipiZIDE (GLUCOTROL) 5 mg tablet, Take 1 tablet by mouth as needed. , Disp: , Rfl:   •  JARDIANCE 10 mg tablet, TAKE 1 TABLET(10 MG) BY MOUTH DAILY, Disp: 90 tablet, Rfl: 0  •  metFORMIN (GLUCOPHAGE) 500 mg  tablet, Take one qid, Disp: 120 tablet, Rfl: 1  •  metoprolol succinate XL (TOPROL-XL) 100 mg 24 hr tablet, Take 1 tablet (100 mg total) by mouth 2 (two) times a day., Disp: 180 tablet, Rfl: 3  •  pantoprazole 40 mg EC tablet, TAKE 1 TABLET(40 MG) BY MOUTH DAILY, Disp: 90 tablet, Rfl: 0  •  pravastatin 40 mg tablet, Take 1 tablet (40 mg total) by mouth daily., Disp: 90 tablet, Rfl: 3  •  SYNTHROID 150 mcg tablet, Take 1 tablet (150 mcg total) by mouth once daily., Disp: 30 tablet, Rfl: 1  •  TRULICITY 0.75 mg/0.5 mL pen injector, once a week. Every Tuesday , Disp: , Rfl:   •  ketorolac (ACULAR) 0.5 % ophthalmic solution, Administer 1 drop into the right eye daily.  , Disp: , Rfl:   •  prednisoLONE acetate (PRED FORTE) 1 % ophthalmic suspension, SHAKE LIQUID AND INSTILL 1 DROP IN RIGHT EYE FOUR TIMES DAILY AS DIRECTED, Disp: , Rfl:   •  propafenone (RYTHMOL) 150 mg tablet, Take 1 tablet by mouth as needed (Palpitations).  , Disp: , Rfl:   •  venlafaxine XR (EFFEXOR XR) 37.5 mg 24 hr capsule, Take 1 capsule (37.5 mg total) by mouth daily. (Patient not taking: Reported on 1/12/2022 ), Disp: 90 capsule, Rfl: 3     Past Medical History:   Past Medical History:   Diagnosis Date   • Abdominal hernia    • Atherosclerosis of coronary artery    • Atrial fibrillation with rapid ventricular response (CMS/HCC) 10/25/2019    Hospitalized on 10/25/19 at [facility].    • BPH (benign prostatic hyperplasia)    • Cataract    • Cyst of thyroid    • Epigastric abdominal pain    • Essential hypertension    • Former tobacco use    • History of radiation therapy    • Mixed hyperlipidemia    • Osteomyelitis of foot (CMS/HCC) 2009   • PSA elevation    • Thyroid cancer (CMS/HCC) 9/14/2012   • Type 2 diabetes mellitus without complication (CMS/HCC)    • Vertigo        Past Surgical History:   Past Surgical History:   Procedure Laterality Date   • ANAL FISSURECTOMY  1986   • CARDIAC CATHETERIZATION  11/21/2005    LM FOD.  70-80% proximal LAD.Mild disease CX, RCA and branches.    • CARDIAC CATHETERIZATION  2005    LM FOD. Pristine LAD stent. CX FOD. RCA mild disease.   • CARDIAC SURGERY  Stent in lad   • CORONARY ANGIOPLASTY WITH STENT PLACEMENT  2005    LM FOD. Moderate, diffuse disease LAD. Irregular CX w/ PLVB distal to PDA 99%. Mild disease RCA. Stent to distal LAD.   • EYE SURGERY Left Cataract   • FOOT SURGERY      For osteomyelitis.   • GASTROCUTANEOUS FISTULA CLOSURE     • THYROIDECTOMY     • TONSILLECTOMY  No    denies   • UPPER GASTROINTESTINAL ENDOSCOPY         Family History:  Family History   Problem Relation Age of Onset   • Cancer Biological Mother    • Breast cancer Biological Mother    • Emphysema Biological Father    • Lung cancer Biological Father        Social History:  Social History     Socioeconomic History   • Marital status:      Spouse name: Not on file   • Number of children: Not on file   • Years of education: Not on file   • Highest education level: Not on file   Occupational History   • Occupation: Retired   Tobacco Use   • Smoking status: Former Smoker     Packs/day: 2.00     Years: 20.00     Pack years: 40.00     Types: Cigarettes     Quit date:      Years since quittin.0   • Smokeless tobacco: Never Used   Substance and Sexual Activity   • Alcohol use: Yes     Comment: Social   • Drug use: Never   • Sexual activity: Not Currently     Partners: Female   Other Topics Concern   • Not on file   Social History Narrative   • Not on file     Social Determinants of Health     Financial Resource Strain: Not on file   Food Insecurity: Not on file   Transportation Needs: Not on file   Physical Activity: Not on file   Stress: Not on file   Social Connections: Not on file   Intimate Partner Violence: Not on file   Housing Stability: Not on file       Vitals:   Vitals:    22 1325   BP: 118/70   Pulse: 80   Resp: 20   Temp: (!) 35.9 °C (96.7 °F)   SpO2: 98%       Review of  Systems:  A complete 14 point review of systems was conducted and was deemed negative except what was documented in the HPI.    Physical Examination:  Physical Exam   Constitutional: Oriented to person, place, and time. Appears well-developed and well-nourished.   Head: Normocephalic and atraumatic.   Right Ear: External ear normal.   Left Ear: External ear normal.   Nose: Nose normal.   Mouth/Throat: Oropharynx is clear and moist.   Eyes: Conjunctivae and EOM are normal. Pupils are equal, round, and reactive to light. No scleral icterus.   Neck: Normal range of motion. Neck supple.   Cardiovascular: Normal rate and regular rhythm.    Pulmonary/Chest: Effort normal   Musculoskeletal: No deformity. No pain with palpation of the midline lumbar spine, facets or soft tissue..   Skin: Skin is warm and dry. No rash noted. No erythema.   Psychiatric: Normal mood and affect. Speech is normal and behavior is normal. Thought content normal.   Vitals reviewed.      Neurological Examination:  Neurologic Exam     Mental Status   Oriented to person, place, and time.   Attention: normal.   Speech: speech is normal   Level of consciousness: alert    Cranial Nerves     CN II   Visual acuity: normal    CN III, IV, VI   Pupils are equal, round, and reactive to light.  Extraocular motions are normal.   Right pupil: Size: 3 mm. Shape: regular. Reactivity: brisk.   Left pupil: Size: 3 mm. Shape: regular. Reactivity: brisk.   CN III: no CN III palsy  CN VI: no CN VI palsy  Nystagmus: none     CN V   Facial sensation intact.     CN VIII   CN VIII normal.   Hearing: intact    CN IX, X   CN IX normal.   Palate: symmetric    CN XI   CN XI normal.   Right sternocleidomastoid strength: normal  Left sternocleidomastoid strength: normal    CN XII   CN XII normal.   Tongue: not atrophic    Sensation ( /2)    Right Left  Right Left   C5 2 2 L2 2 2   C6 2 2 L3 2 2   C7 2 2 L4 2 2   C8 2 2 L5 2 2   T1 2 2 S1 2 2     Motor:     Deltoid Biceps Triceps  Wrist ext Finger ext Hand Intrinsics Hip flexion Knee ext Dorsi-  flexion EHL Plantar Flexion   R 5 5 5 5 5 5 4+ 5 5 5 5   L 5 5 5 5 5 5 5 5 5 5 5     There is no pronator drift. Muscle bulk and tone are normal.     Gait, Coordination, and Reflexes     Reflexes   Reflexes 2+ except as noted.   Right plantar: normal  Left plantar: normal  Right Maharaj: absent  Left Maharaj: absent  Right ankle clonus: 2 beats, non-sustained  Left ankle clonus: absent      Data Review:    Lab Results:   Lab Results   Component Value Date    WBC 7.0 11/20/2020    WBC 10.13 01/17/2020    HGB 12.7 (L) 11/20/2020    HGB 14.1 01/17/2020    HCT 40.0 11/20/2020    HCT 43.0 01/17/2020    MCV 88.9 11/20/2020    MCV 88.5 01/17/2020     11/20/2020     01/17/2020    RDW 13.3 11/20/2020    RDW 13.7 01/17/2020      Lab Results   Component Value Date    GLUCOSE 98 11/20/2020    GLUCOSE 225 (H) 05/02/2018    BUN 18 11/20/2020    BUN 24 (H) 04/17/2020     11/20/2020     05/02/2018    K 4.5 11/20/2020    K 4.6 05/02/2018     11/20/2020    CL 98 05/02/2018    CO2 27 11/20/2020    CO2 27 05/02/2018    ANIONGAP 12 05/02/2018        Imaging:   Independent review of all imaging was done by myself as well as review of the radiologists readings and comparison to prior films.     MRI lumbar spine 11/22/2021  There is redemonstration of a metastatic lesion essentially replacing the L3  vertebral body with new extension into the posterior elements bilaterally. The  lesion demonstrates rim enhancement and central necrosis. The enhancement  extends into the paravertebral soft tissues and into the medial aspect of the  psoas muscles bilaterally. There is a new oblique fracture line through the L3  vertebral body with associated mild loss of height. There is progression of  lesion extension in the right side of the L4 vertebral body superiorly extending  into the posterior elements. There is also progression of lesion extension  into  the left side of the superior aspect of the L4 vertebral body. There is edema  and enhancement in the superior endplate of L4 with mild loss of height of the  L4 vertebral body. The remainder of the lumbar vertebral bodies are maintained  in height. A trace retrolisthesis of L1 on L2 and of L3 on L4 are present which  appear similar. The alignment is otherwise preserved. Schmorl's nodes are noted  in the inferior endplates of T11, T12 and L1 which were present previously.     The conus medullaris terminates at L1-L2.     At T11-T12 and at T12-L1, there is no focal disc herniation, significant central  canal or neural foraminal narrowing. Mild facet hypertrophy is present.  At L1-L2, there is a disc osseous complex and mild facet hypertrophy. The thecal  sac and neural foramina are mildly effaced. This appears stable.  At L2-L3, there is a disc osseous complex and facet and ligamentous hypertrophy.  The thecal sac is mildly effaced. Mild bilateral neural foraminal narrowing is  present. This appears stable.  At L3-L4, there is new mild retropulsion of the inferior endplate of L3 into the  spinal canal. A disc osseous complex and diffuse disc bulge as well as facet and  ligamentous hypertrophy are present. The central canal is mild to moderately  narrowed. Moderate to severe right and moderate left neural foraminal narrowing  is present. Note is made of enhancement in the neural foramina bilaterally.  At L4-L5, there is a diffuse disc bulge, eccentric to the right, and facet and  ligamentous hypertrophy. In addition, a superimposed right foraminal protrusion  is present. The central canal is mildly narrowed tricompartmentally. Moderate  right and mild to moderate left neural foraminal narrowing is present. The  protrusion impinges upon the right L4 dorsal root ganglion and postganglionic  nerve root. This appears similar.  At L5-S1, there is mild disc bulging and mild facet hypertrophy with mild  bilateral  neural foraminal narrowing. This appears stable.        CT PET scan 12/7/2021  IMPRESSION:  Increased activity seen at the right margin of a destructive lesion involving  the L3 vertebral body with a max SUV of five.  Stable lytic lesion involving the inferior aspect of the right scapula with no  interval change in max SUV.        X-ray Lumbar spine 12/3/2021  IMPRESSION:  1.  Mixed lytic/sclerotic L3 lesion with mild compression deformity.  2.  Normal alignment in neutral, grade 1 L2-L3 spondylolisthesis in extension,  grade 1 L3-L4 spondylolisthesis in flexion.    CT lumbar spine 12/3/2021  IMPRESSION:  Pathologic fracture with large metastatic lytic lesion involving the L3  vertebral body, the extent of extraosseous extension is not well evaluated on  this study, please refer to recent MRI.  There is also suspicious vague lytic as  well as a sclerotic lesion of L4, however better delineated on the recent MRI as  well.  Degenerative spondylosis worst at L4-L5.     COMMENT:     Lumbosacral alignment: Anatomic.  Vertebral bodies: Redemonstrated is a large lytic mass with pathologic  fracture through the L3 vertebral body, particularly eccentric to the right,,  the extent of extra osseous metastatic disease cannot be well assessed on this  particular examination.  In addition, the lytic lesion eccentric to the right at  the L4 vertebral body is better demonstrated on the MRI as well.  There is also  a sclerotic focus of the left aspect of the L4 vertebral body.  No definite  sacral fractures or sacral lytic lesions.  Intervertebral discs: Disc osteophyte complex with subchondral sclerosis and  vacuum disc at L3-L4 with vacuum disc also noted at L2-L3.  Lumbosacral spinal canal: Patent.     Axial images:  T12-L1: Normal.  L1-2: Disc osteophyte complex with broad-based disc bulge contributing to  mild right foraminal narrowing.  L2-3: Disc osteophyte complex with broad-based disc bulge without  consequence.  L3-4: Disc  osteophyte complex with broad-based disc bulge and facet  arthrosis, posterior osseous spurring, contributing to moderate to moderate  severe bilateral foraminal narrowing.  Mild canal stenosis noted.  L4-5: Broad-based disc bulge, moderate facet hypertrophy and infolding of  the ligamenta flava, contributing to moderate to severe right and moderate left  foraminal narrowing, mild to moderate canal stenosis.  L5-S1: Mild broad-based disc bulge and facet arthrosis, without consequence.     OTHER: Marked atherosclerotic disease of abdominal aorta         Assessment / Plan:     In summary, Matt Williamson is a pleasant 75 y.o. male with thyroid cancer metastatic to L3 vertebral body s/p radiation stereotactic body radiation 5/2020 seen today by the neurosurgery service for surgical discussion regarding growth of his L3 vertebral body metastatic lesion. He has had progressive worsening midline-line low back pain with radiation to the right knee. An MRI lumbar spine was obtained 11/22/2021 showing extension of the L3 mass. He was sent for a PET scan 12/7/2021 showing increased activity of the L3 lesion without further metastatic lesions appreciated. He was referred to [facility] however there has been some delay with getting an appointment.     He does not have any weakness or bowel/bladder incontinence. He is following with oncology (Dr. Casey) who does not recommend any chemotherapy at this time but surgical intervention as the primary treatment given the suspected isolated area of progression at the L3 vertebral body. He remains full strength on exam with 2 beats of ankle clonus on the left. His biceps, brachioradialis, patellar and achillis reflexes are normal. His sensation is intact.    Mr. Williamson has had progressive worsening midline back pain with increased activity of the L3 metastatic lesion. His lumbar x-rays show overall maintained alignment with no significant focal kyphosis. His MRI shows  moderate-severe foraminal narrowing on the right secondary to retropulsion of the inferior endplate in the setting of ligament and joint hypertrophy. There is some enhancement of the superior endplate of L4 but no activity appreciated on the PET scan. There is no significant central compression in the lumbar spine. His CT demonstrates significant bony erosion of the L3 vertebral body with a new oblique fracture.     Mr. Williamson was initially referred to [facility] in [place] for spondylectomy given the questionable pedicle involvement. However, PET scan does not show posterior column activity and a L3 corpectomy would be appropriate. We have scheduled him for Stage 1: L2-L4 PSF with instrumentation, ORIF L3 pathologic fracture  Stage 2: L3 lateral corpectomy with expandable cage placement 1/20/2022. He will require cardiac clearance prior to the surgery with his history of CAD with stent. We have ordered A1C as well.      The risks, benefits, and alternatives to the procedure were discussed in detail between Marco A Williamson, his wife and Colt Heller MD.  The risks include but are not limited to: local bleeding (perhaps requiring transfusion), infection, cerebrospinal fluid leakage, failure of fusion (pseudoarthrosis), hardware malplacement, accelerated spinal degeneration, proximal junction kyphosis, new neurologic symptoms of weakness, sensory, bowel, bladder, sexual disturbance, paralysis, cardiac/pulmonary/renal/hepatic event in the perioperative period, blindness from prolonged prone positioning, allergic response to the anesthetic, death. The patient acknowledges all the above and would like to proceed with surgery at this time. The patient understood the risks and benefits and agreed to proceed.    He knows to call in the interval with any questions or concerns. He and his wife agree with the plan as stated.         Colt Heller MD    I, Bruno Tierney PA-C, am scribing for, and in the presence of,   Colt Heller.   I personally saw and examined the patient.  Agree with note     45 minutes were spent with the patient, examination, reviewing films, and coordinating care.  Patient seen earlier today. Signature timestamp does not reflect patient encounter time.   More than 50% of the time is spent counseling the patient and family and coordinating care.

## 2022-01-12 NOTE — LETTER
2022     Raj Arreola, DO  [address] [address]    Patient: Matt Williamson  YOB: 1945  Date of Visit: 2022      Dear Dr. Arreola:    Thank you for referring Matt Williamson to me for evaluation. Below are my notes for this consultation.    If you have questions, please do not hesitate to call me. I look forward to following your patient along with you.         Sincerely,        Colt Heller MD        CC: No Recipients  Colt Heller MD  2022 12:21 PM  Signed      [facility]  Medical Office Building [address], [address]  [place] [address]  Phone: [phone]  Fax: [phone]      Patient ID: Matt Williamson                              : 1945    Visit Date: 2022    Referring Provider: Dr. Arreola    History of Present Illness:   Matt iWlliamson is a pleasant 76 y.o. male with thyroid cancer metastatic to L3 vertebral body s/p radiation stereotactic body radiation 2020 seen today by the neurosurgery service for surgical discussion regarding growth of his L3 vertebral body metastatic lesion. He has had progressive worsening midline-line low back pain with radiation to the right knee. An MRI lumbar spine was obtained 2021 showing extension of the L3 mass. He was sent for a PET scan 2021 showing increased activity of the L3 lesion without further metastatic lesions appreciated. He was referred to [facility] however there has been some delay with getting an appointment.     He has had persistent midline low back pain with walking and standing which has progressively worsened. He does not have any weakness or bowel/bladder incontinence. He is following with oncology (Dr. Casey) who does not recommend any chemotherapy at this time but surgical intervention as the primary treatment given the suspected isolated area of progression at the L3 vertebral body.     Allergies:  Allergies    Allergen Reactions   • Penicillins Rash     Childhood allergy       Medications:     Current Outpatient Medications:   •  aspirin 81 mg enteric coated tablet, Take 81 mg by mouth daily., Disp: , Rfl:   •  gabapentin 300 mg capsule, Take 2 capsules (600 mg total) by mouth 3 (three) times a day., Disp: 540 capsule, Rfl: 0  •  glipiZIDE (GLUCOTROL) 5 mg tablet, Take 1 tablet by mouth as needed. , Disp: , Rfl:   •  JARDIANCE 10 mg tablet, TAKE 1 TABLET(10 MG) BY MOUTH DAILY, Disp: 90 tablet, Rfl: 0  •  metFORMIN (GLUCOPHAGE) 500 mg tablet, Take one qid, Disp: 120 tablet, Rfl: 1  •  metoprolol succinate XL (TOPROL-XL) 100 mg 24 hr tablet, Take 1 tablet (100 mg total) by mouth 2 (two) times a day., Disp: 180 tablet, Rfl: 3  •  pantoprazole 40 mg EC tablet, TAKE 1 TABLET(40 MG) BY MOUTH DAILY, Disp: 90 tablet, Rfl: 0  •  pravastatin 40 mg tablet, Take 1 tablet (40 mg total) by mouth daily., Disp: 90 tablet, Rfl: 3  •  SYNTHROID 150 mcg tablet, Take 1 tablet (150 mcg total) by mouth once daily., Disp: 30 tablet, Rfl: 1  •  TRULICITY 0.75 mg/0.5 mL pen injector, once a week. Every Tuesday , Disp: , Rfl:   •  ketorolac (ACULAR) 0.5 % ophthalmic solution, Administer 1 drop into the right eye daily.  , Disp: , Rfl:   •  prednisoLONE acetate (PRED FORTE) 1 % ophthalmic suspension, SHAKE LIQUID AND INSTILL 1 DROP IN RIGHT EYE FOUR TIMES DAILY AS DIRECTED, Disp: , Rfl:   •  propafenone (RYTHMOL) 150 mg tablet, Take 1 tablet by mouth as needed (Palpitations).  , Disp: , Rfl:   •  venlafaxine XR (EFFEXOR XR) 37.5 mg 24 hr capsule, Take 1 capsule (37.5 mg total) by mouth daily. (Patient not taking: Reported on 1/12/2022 ), Disp: 90 capsule, Rfl: 3     Past Medical History:   Past Medical History:   Diagnosis Date   • Abdominal hernia    • Atherosclerosis of coronary artery    • Atrial fibrillation with rapid ventricular response (CMS/HCC) 10/25/2019    Hospitalized on 10/25/19 at [facility].    • BPH (benign  prostatic hyperplasia)    • Cataract    • Cyst of thyroid    • Epigastric abdominal pain    • Essential hypertension    • Former tobacco use    • History of radiation therapy    • Mixed hyperlipidemia    • Osteomyelitis of foot (CMS/HCC)    • PSA elevation    • Thyroid cancer (CMS/HCC) 2012   • Type 2 diabetes mellitus without complication (CMS/HCC)    • Vertigo        Past Surgical History:   Past Surgical History:   Procedure Laterality Date   • ANAL FISSURECTOMY     • CARDIAC CATHETERIZATION  2005    LM FOD. 70-80% proximal LAD.Mild disease CX, RCA and branches.    • CARDIAC CATHETERIZATION  2005    LM FOD. Pristine LAD stent. CX FOD. RCA mild disease.   • CARDIAC SURGERY  Stent in lad   • CORONARY ANGIOPLASTY WITH STENT PLACEMENT  2005    LM FOD. Moderate, diffuse disease LAD. Irregular CX w/ PLVB distal to PDA 99%. Mild disease RCA. Stent to distal LAD.   • EYE SURGERY Left Cataract   • FOOT SURGERY      For osteomyelitis.   • GASTROCUTANEOUS FISTULA CLOSURE     • THYROIDECTOMY     • TONSILLECTOMY  No    denies   • UPPER GASTROINTESTINAL ENDOSCOPY         Family History:  Family History   Problem Relation Age of Onset   • Cancer Biological Mother    • Breast cancer Biological Mother    • Emphysema Biological Father    • Lung cancer Biological Father        Social History:  Social History     Socioeconomic History   • Marital status:      Spouse name: Not on file   • Number of children: Not on file   • Years of education: Not on file   • Highest education level: Not on file   Occupational History   • Occupation: Retired   Tobacco Use   • Smoking status: Former Smoker     Packs/day: 2.00     Years: 20.00     Pack years: 40.00     Types: Cigarettes     Quit date:      Years since quittin.0   • Smokeless tobacco: Never Used   Substance and Sexual Activity   • Alcohol use: Yes     Comment: Social   • Drug use: Never   • Sexual activity: Not Currently      Partners: Female   Other Topics Concern   • Not on file   Social History Narrative   • Not on file     Social Determinants of Health     Financial Resource Strain: Not on file   Food Insecurity: Not on file   Transportation Needs: Not on file   Physical Activity: Not on file   Stress: Not on file   Social Connections: Not on file   Intimate Partner Violence: Not on file   Housing Stability: Not on file       Vitals:   Vitals:    01/12/22 1325   BP: 118/70   Pulse: 80   Resp: 20   Temp: (!) 35.9 °C (96.7 °F)   SpO2: 98%       Review of Systems:  A complete 14 point review of systems was conducted and was deemed negative except what was documented in the HPI.    Physical Examination:  Physical Exam   Constitutional: Oriented to person, place, and time. Appears well-developed and well-nourished.   Head: Normocephalic and atraumatic.   Right Ear: External ear normal.   Left Ear: External ear normal.   Nose: Nose normal.   Mouth/Throat: Oropharynx is clear and moist.   Eyes: Conjunctivae and EOM are normal. Pupils are equal, round, and reactive to light. No scleral icterus.   Neck: Normal range of motion. Neck supple.   Cardiovascular: Normal rate and regular rhythm.    Pulmonary/Chest: Effort normal   Musculoskeletal: No deformity. No pain with palpation of the midline lumbar spine, facets or soft tissue..   Skin: Skin is warm and dry. No rash noted. No erythema.   Psychiatric: Normal mood and affect. Speech is normal and behavior is normal. Thought content normal.   Vitals reviewed.      Neurological Examination:  Neurologic Exam     Mental Status   Oriented to person, place, and time.   Attention: normal.   Speech: speech is normal   Level of consciousness: alert    Cranial Nerves     CN II   Visual acuity: normal    CN III, IV, VI   Pupils are equal, round, and reactive to light.  Extraocular motions are normal.   Right pupil: Size: 3 mm. Shape: regular. Reactivity: brisk.   Left pupil: Size: 3 mm. Shape: regular.  Reactivity: brisk.   CN III: no CN III palsy  CN VI: no CN VI palsy  Nystagmus: none     CN V   Facial sensation intact.     CN VIII   CN VIII normal.   Hearing: intact    CN IX, X   CN IX normal.   Palate: symmetric    CN XI   CN XI normal.   Right sternocleidomastoid strength: normal  Left sternocleidomastoid strength: normal    CN XII   CN XII normal.   Tongue: not atrophic    Sensation ( /2)    Right Left  Right Left   C5 2 2 L2 2 2   C6 2 2 L3 2 2   C7 2 2 L4 2 2   C8 2 2 L5 2 2   T1 2 2 S1 2 2     Motor:     Deltoid Biceps Triceps Wrist ext Finger ext Hand Intrinsics Hip flexion Knee ext Dorsi-  flexion EHL Plantar Flexion   R 5 5 5 5 5 5 4+ 5 5 5 5   L 5 5 5 5 5 5 5 5 5 5 5     There is no pronator drift. Muscle bulk and tone are normal.     Gait, Coordination, and Reflexes     Reflexes   Reflexes 2+ except as noted.   Right plantar: normal  Left plantar: normal  Right Maharaj: absent  Left Maharaj: absent  Right ankle clonus: 2 beats, non-sustained  Left ankle clonus: absent      Data Review:    Lab Results:   Lab Results   Component Value Date    WBC 7.0 11/20/2020    WBC 10.13 01/17/2020    HGB 12.7 (L) 11/20/2020    HGB 14.1 01/17/2020    HCT 40.0 11/20/2020    HCT 43.0 01/17/2020    MCV 88.9 11/20/2020    MCV 88.5 01/17/2020     11/20/2020     01/17/2020    RDW 13.3 11/20/2020    RDW 13.7 01/17/2020      Lab Results   Component Value Date    GLUCOSE 98 11/20/2020    GLUCOSE 225 (H) 05/02/2018    BUN 18 11/20/2020    BUN 24 (H) 04/17/2020     11/20/2020     05/02/2018    K 4.5 11/20/2020    K 4.6 05/02/2018     11/20/2020    CL 98 05/02/2018    CO2 27 11/20/2020    CO2 27 05/02/2018    ANIONGAP 12 05/02/2018        Imaging:   Independent review of all imaging was done by myself as well as review of the radiologists readings and comparison to prior films.     MRI lumbar spine 11/22/2021  There is redemonstration of a metastatic lesion essentially replacing the L3  vertebral  body with new extension into the posterior elements bilaterally. The  lesion demonstrates rim enhancement and central necrosis. The enhancement  extends into the paravertebral soft tissues and into the medial aspect of the  psoas muscles bilaterally. There is a new oblique fracture line through the L3  vertebral body with associated mild loss of height. There is progression of  lesion extension in the right side of the L4 vertebral body superiorly extending  into the posterior elements. There is also progression of lesion extension into  the left side of the superior aspect of the L4 vertebral body. There is edema  and enhancement in the superior endplate of L4 with mild loss of height of the  L4 vertebral body. The remainder of the lumbar vertebral bodies are maintained  in height. A trace retrolisthesis of L1 on L2 and of L3 on L4 are present which  appear similar. The alignment is otherwise preserved. Schmorl's nodes are noted  in the inferior endplates of T11, T12 and L1 which were present previously.     The conus medullaris terminates at L1-L2.     At T11-T12 and at T12-L1, there is no focal disc herniation, significant central  canal or neural foraminal narrowing. Mild facet hypertrophy is present.  At L1-L2, there is a disc osseous complex and mild facet hypertrophy. The thecal  sac and neural foramina are mildly effaced. This appears stable.  At L2-L3, there is a disc osseous complex and facet and ligamentous hypertrophy.  The thecal sac is mildly effaced. Mild bilateral neural foraminal narrowing is  present. This appears stable.  At L3-L4, there is new mild retropulsion of the inferior endplate of L3 into the  spinal canal. A disc osseous complex and diffuse disc bulge as well as facet and  ligamentous hypertrophy are present. The central canal is mild to moderately  narrowed. Moderate to severe right and moderate left neural foraminal narrowing  is present. Note is made of enhancement in the neural  foramina bilaterally.  At L4-L5, there is a diffuse disc bulge, eccentric to the right, and facet and  ligamentous hypertrophy. In addition, a superimposed right foraminal protrusion  is present. The central canal is mildly narrowed tricompartmentally. Moderate  right and mild to moderate left neural foraminal narrowing is present. The  protrusion impinges upon the right L4 dorsal root ganglion and postganglionic  nerve root. This appears similar.  At L5-S1, there is mild disc bulging and mild facet hypertrophy with mild  bilateral neural foraminal narrowing. This appears stable.        CT PET scan 12/7/2021  IMPRESSION:  Increased activity seen at the right margin of a destructive lesion involving  the L3 vertebral body with a max SUV of five.  Stable lytic lesion involving the inferior aspect of the right scapula with no  interval change in max SUV.        X-ray Lumbar spine 12/3/2021  IMPRESSION:  1.  Mixed lytic/sclerotic L3 lesion with mild compression deformity.  2.  Normal alignment in neutral, grade 1 L2-L3 spondylolisthesis in extension,  grade 1 L3-L4 spondylolisthesis in flexion.    CT lumbar spine 12/3/2021  IMPRESSION:  Pathologic fracture with large metastatic lytic lesion involving the L3  vertebral body, the extent of extraosseous extension is not well evaluated on  this study, please refer to recent MRI.  There is also suspicious vague lytic as  well as a sclerotic lesion of L4, however better delineated on the recent MRI as  well.  Degenerative spondylosis worst at L4-L5.     COMMENT:     Lumbosacral alignment: Anatomic.  Vertebral bodies: Redemonstrated is a large lytic mass with pathologic  fracture through the L3 vertebral body, particularly eccentric to the right,,  the extent of extra osseous metastatic disease cannot be well assessed on this  particular examination.  In addition, the lytic lesion eccentric to the right at  the L4 vertebral body is better demonstrated on the MRI as well.   There is also  a sclerotic focus of the left aspect of the L4 vertebral body.  No definite  sacral fractures or sacral lytic lesions.  Intervertebral discs: Disc osteophyte complex with subchondral sclerosis and  vacuum disc at L3-L4 with vacuum disc also noted at L2-L3.  Lumbosacral spinal canal: Patent.     Axial images:  T12-L1: Normal.  L1-2: Disc osteophyte complex with broad-based disc bulge contributing to  mild right foraminal narrowing.  L2-3: Disc osteophyte complex with broad-based disc bulge without  consequence.  L3-4: Disc osteophyte complex with broad-based disc bulge and facet  arthrosis, posterior osseous spurring, contributing to moderate to moderate  severe bilateral foraminal narrowing.  Mild canal stenosis noted.  L4-5: Broad-based disc bulge, moderate facet hypertrophy and infolding of  the ligamenta flava, contributing to moderate to severe right and moderate left  foraminal narrowing, mild to moderate canal stenosis.  L5-S1: Mild broad-based disc bulge and facet arthrosis, without consequence.     OTHER: Marked atherosclerotic disease of abdominal aorta         Assessment / Plan:     In summary, Matt Williamson is a pleasant 75 y.o. male with thyroid cancer metastatic to L3 vertebral body s/p radiation stereotactic body radiation 5/2020 seen today by the neurosurgery service for surgical discussion regarding growth of his L3 vertebral body metastatic lesion. He has had progressive worsening midline-line low back pain with radiation to the right knee. An MRI lumbar spine was obtained 11/22/2021 showing extension of the L3 mass. He was sent for a PET scan 12/7/2021 showing increased activity of the L3 lesion without further metastatic lesions appreciated. He was referred to [facility] however there has been some delay with getting an appointment.     He does not have any weakness or bowel/bladder incontinence. He is following with oncology (Dr. Casey) who does not recommend any  chemotherapy at this time but surgical intervention as the primary treatment given the suspected isolated area of progression at the L3 vertebral body. He remains full strength on exam with 2 beats of ankle clonus on the left. His biceps, brachioradialis, patellar and achillis reflexes are normal. His sensation is intact.    Mr. Williamson has had progressive worsening midline back pain with increased activity of the L3 metastatic lesion. His lumbar x-rays show overall maintained alignment with no significant focal kyphosis. His MRI shows moderate-severe foraminal narrowing on the right secondary to retropulsion of the inferior endplate in the setting of ligament and joint hypertrophy. There is some enhancement of the superior endplate of L4 but no activity appreciated on the PET scan. There is no significant central compression in the lumbar spine. His CT demonstrates significant bony erosion of the L3 vertebral body with a new oblique fracture.     Mr. Williamson was initially referred to [facility] in [place] for spondylectomy given the questionable pedicle involvement. However, PET scan does not show posterior column activity and a L3 corpectomy would be appropriate. We have scheduled him for Stage 1: L2-L4 PSF with instrumentation, ORIF L3 pathologic fracture  Stage 2: L3 lateral corpectomy with expandable cage placement 1/20/2022. He will require cardiac clearance prior to the surgery with his history of CAD with stent. We have ordered A1C as well.      The risks, benefits, and alternatives to the procedure were discussed in detail between Marco A Williamson, his wife and Colt Heller MD.  The risks include but are not limited to: local bleeding (perhaps requiring transfusion), infection, cerebrospinal fluid leakage, failure of fusion (pseudoarthrosis), hardware malplacement, accelerated spinal degeneration, proximal junction kyphosis, new neurologic symptoms of weakness, sensory, bowel, bladder, sexual disturbance,  paralysis, cardiac/pulmonary/renal/hepatic event in the perioperative period, blindness from prolonged prone positioning, allergic response to the anesthetic, death. The patient acknowledges all the above and would like to proceed with surgery at this time. The patient understood the risks and benefits and agreed to proceed.    He knows to call in the interval with any questions or concerns. He and his wife agree with the plan as stated.         MD PATT Desir, Bruno Tierney PA-C, am scribing for, and in the presence of, Dr. Colt Heller.   I personally saw and examined the patient.  Agree with note     45 minutes were spent with the patient, examination, reviewing films, and coordinating care.  Patient seen earlier today. Signature timestamp does not reflect patient encounter time.   More than 50% of the time is spent counseling the patient and family and coordinating care.

## 2022-01-13 ENCOUNTER — APPOINTMENT (OUTPATIENT)
Dept: NEUROSURGERY | Facility: CLINIC | Age: 77
End: 2022-01-13
Payer: MEDICARE

## 2022-01-13 ENCOUNTER — TELEPHONE (OUTPATIENT)
Dept: CARDIOLOGY | Facility: CLINIC | Age: 77
End: 2022-01-13
Payer: MEDICARE

## 2022-01-13 ENCOUNTER — APPOINTMENT (OUTPATIENT)
Dept: PREADMISSION TESTING | Facility: HOSPITAL | Age: 77
End: 2022-01-13
Payer: MEDICARE

## 2022-01-13 VITALS — BODY MASS INDEX: 27.32 KG/M2 | HEIGHT: 66 IN | WEIGHT: 170 LBS

## 2022-01-13 DIAGNOSIS — C73 MALIGNANT NEOPLASM OF THYROID GLAND: ICD-10-CM

## 2022-01-13 DIAGNOSIS — C79.51 SECONDARY MALIGNANT NEOPLASM OF BONE: ICD-10-CM

## 2022-01-13 PROBLEM — Z00.00 ENCOUNTER FOR PREVENTIVE HEALTH EXAMINATION: Status: ACTIVE | Noted: 2022-01-13

## 2022-01-13 PROCEDURE — 99204 OFFICE O/P NEW MOD 45 MIN: CPT | Mod: 95

## 2022-01-13 RX ORDER — DULAGLUTIDE 0.75 MG/.5ML
0.75 INJECTION, SOLUTION SUBCUTANEOUS
Refills: 0 | Status: ACTIVE | COMMUNITY

## 2022-01-13 RX ORDER — GABAPENTIN 300 MG/1
300 CAPSULE ORAL
Refills: 0 | Status: ACTIVE | COMMUNITY

## 2022-01-13 RX ORDER — EMPAGLIFLOZIN 10 MG/1
10 TABLET, FILM COATED ORAL
Refills: 0 | Status: ACTIVE | COMMUNITY

## 2022-01-13 RX ORDER — METOPROLOL SUCCINATE 100 MG/1
100 TABLET, EXTENDED RELEASE ORAL
Refills: 0 | Status: ACTIVE | COMMUNITY

## 2022-01-13 RX ORDER — LEVOTHYROXINE SODIUM 150 UG/1
150 TABLET ORAL
Refills: 0 | Status: ACTIVE | COMMUNITY

## 2022-01-13 RX ORDER — METFORMIN HYDROCHLORIDE 500 MG/1
500 TABLET, COATED ORAL
Refills: 0 | Status: ACTIVE | COMMUNITY

## 2022-01-13 RX ORDER — PANTOPRAZOLE SODIUM 40 MG/1
40 TABLET, DELAYED RELEASE ORAL
Refills: 0 | Status: ACTIVE | COMMUNITY

## 2022-01-13 NOTE — REASON FOR VISIT
[Home] : at home, [unfilled] , at the time of the visit. [Medical Office: ([facility])___] : at the medical office located in  [Verbal consent obtained from patient] : the patient, [unfilled] [Referred By: _________] : Patient was referred by RITESH

## 2022-01-13 NOTE — ASSESSMENT
[FreeTextEntry1] : 76 years old man with thyroid cancer metastasized to L3.  Last MRI revealed a metastatic lesion within the L3 vertebral body with new extension into the superior aspect of L4. Pt is with constant pain in his back with tingling sensation.  Pt was advised that the best possible treatment of his lesion is L3 corpectomy with fusion. \par \par Dr. Begum spoke with Dr. Isael Hogue after the televisit today and he agrees with the plan

## 2022-01-13 NOTE — TELEPHONE ENCOUNTER
lvm to schedule clearance for surgery with Dr. Heller on 1/20 for Stage 1: L2-L4 PSF with instrumentation, ORIF L3 pathologic fracture  Stage 2: L3 lateral corpectomy with expandable cage placement.

## 2022-01-13 NOTE — PRE-PROCEDURE INSTRUCTIONS
1. We will call you between 2pm and 5pm on January 19, 2022 to determine the arrival time for your procedure.  If you do not receive a call by 5pm please call [phone].      2. Please arrive through the Main Entrance of the [facility] (across from the parking garage) and report to the Surgery Registration Desk on the day of your procedure.    3. Please follow the following fasting guidelines:     No solid food EIGHT HOURS prior to surgery.  Unlimited clear liquids, meaning water or PLAIN black coffee WITHOUT any milk, cream, sugar, or sweetener are permitted up to TWO HOURS prior to arrival at the hospital.    4. Early on the morning of the procedure please take your usual dose of the listed medications with a sip of water:  Gabapentin, toprol XL, synthroid  Hold jardiance 3 days prior to surgery    5. Other Instructions: You may brush your teeth the morning of the procedure. Rinse and spit, do not swallow.  Bring a list of your medications with dosages.  Use surgical wash as directed.  No ASA, NSAIDS, Vitamins, or Supplements one week prior to procedure unless cleared by surgeon.  Tylenol ok.  Consult with cardio regarding aspirin instructions.    6. If you develop a cold, cough, fever, rash, or other symptom prior to the day of the procedure, please report it to your physician immediately.    7. If you need to cancel the procedure for any reason, please contact your physician.    8. Make arrangements to have someone drive you home from the procedure. If you have not arranged for transportation home, your surgery may be cancelled.     9. You may not take public transportation unless accompanied by a responsible person.    10. You may not drive a car or operate complex or potentially dangerous machinery for 24 hours following anesthesia and/or sedation.    11.  If it is medically necessary for you to have a longer stay, you will be informed as soon as the decision is made.    12. Only bring essential items to the  hospital.  Do not wear or bring anything of value to the hospital including jewelry of any kind, money, or wallet. Do not wear make-up or contact lenses.  DO NOT BRING MEDICATIONS FROM HOME unless instructed to do so. DO bring your hearing aids, glasses, and a case    13. No lotion, creams, powders, or oils on skin the morning of procedure     14. Dress in comfortable clothes.    15.  If instructed, please bring a copy of your Advanced Directive (Living Will/Durable Power of ) on the day of your procedure.     16. Patients need to quarantine from the time of PAT COVID test to day of surgery, regardless of COVID vaccine status.      17. Ensuring your safety at all times is a very important part of our [facility] Culture of Safety. After having surgery and sedation, you are at risk for falling and balance issues. Although you may feel awake, the effects of the medication can last up to 24 hours after anesthesia. If you need to use the bathroom during your recovery period, nursing staff will escort you there and stay with you to ensure your safety.    18. Refrain from drinking alcohol and smoking cigarettes for 24 hours prior to surgery.    19. Shower with antibacterial soap (Dial) the night before and morning of your procedure.  If your procedure indicates the need for CHG antiseptic wash (Bactoshield or Hibiclens), please use this instead and follow instructions as discussed at the time of your Pre-Admission Testing phone interview or visit.    Above instructions reviewed with patient and patient acknowledges understanding.    Form explained by: Shikha Baker RN

## 2022-01-13 NOTE — HISTORY OF PRESENT ILLNESS
[de-identified] : The patient with a known thyroid cancer, metastasis in L3 vertebral body; s/p radiation treatment in 1/2020. Recent MRI revealed metastatic lesion replacing majority of the L3 with mild enhancement extends to the paravertebral soft tissue and medial psoas muscles. He has been following up with Dr. Isael Hogue. \par \par He has been having pain in his back with tingling in his legs. He denied leg weakness and urinary difficulty. Pain level on average is 6/10 but can go up to 9/10. Dr. Isael Hogue advised him to have a L3 corpectomy with fusion. He also asvised the pt to seek a second opinion here. \par

## 2022-01-14 ENCOUNTER — ANESTHESIA EVENT (OUTPATIENT)
Dept: OPERATING ROOM | Facility: HOSPITAL | Age: 77
Setting detail: SURGERY ADMIT
DRG: 454 | End: 2022-01-14
Payer: MEDICARE

## 2022-01-14 NOTE — PROGRESS NOTES
Pre-Operative   Consult Note         Reason for visit:   Chief Complaint   Patient presents with   • Cardiac Clearance       HPI     Matt Williamson comes to the office today for preoperative cardiac evaluation prior to a L2-L4 PSF and L3 lateral corpectomy with Dr. Colt Heller on 1/20/22.  He has been experiencing progressive lower back pan which radiates down his right leg.     He was last seen in the office on 6/22/21 for management of coronary artery disease s/p MARGOT, paroxysmal atrial fibrillation, hypertension and dyslipidemia.  At that time I cleared him for a colonoscopy and asked him to return in 6 months.    He denies any chest pain, shortness of breath, edema, palpitations, near syncope or syncope.    His recent home BPs have been 118-120s/70-80.    Outside records reviewed.    Past Medical History:   Diagnosis Date   • Abdominal hernia    • Arthritis    • Atherosclerosis of coronary artery    • Atrial fibrillation with rapid ventricular response (CMS/HCC) 10/25/2019    Hospitalized on 10/25/19 at [facility].    • BPH (benign prostatic hyperplasia)    • Cataract    • Cyst of thyroid    • Epigastric abdominal pain    • Essential hypertension    • Former tobacco use    • History of radiation therapy    • Hypothyroidism    • Mixed hyperlipidemia    • Osteomyelitis of foot (CMS/HCC) 2009   • PSA elevation    • Thyroid cancer (CMS/HCC) 9/14/2012   • Thyroid cancer (CMS/HCC)    • Type 2 diabetes mellitus without complication (CMS/HCC)    • Vertigo        Past Surgical History:   Procedure Laterality Date   • ANAL FISSURECTOMY  1986   • CARDIAC CATHETERIZATION  11/21/2005    LM FOD. 70-80% proximal LAD.Mild disease CX, RCA and branches.    • CARDIAC CATHETERIZATION  11/28/2005    LM FOD. Pristine LAD stent. CX FOD. RCA mild disease.   • CARDIAC SURGERY  Stent in lad   • CORONARY ANGIOPLASTY WITH STENT PLACEMENT  11/22/2005    LM FOD. Moderate, diffuse disease LAD. Irregular CX w/ PLVB distal  to PDA 99%. Mild disease RCA. Stent to distal LAD.   • EYE SURGERY Left Cataract   • FOOT SURGERY      For osteomyelitis.   • GASTROCUTANEOUS FISTULA CLOSURE     • THYROIDECTOMY     • TONSILLECTOMY  No    denies   • UPPER GASTROINTESTINAL ENDOSCOPY         Social History     Tobacco Use   Smoking Status Former Smoker   • Packs/day: 2.00   • Years: 20.00   • Pack years: 40.00   • Types: Cigarettes   • Quit date:    • Years since quittin.0   Smokeless Tobacco Never Used     Social History     Tobacco Use   • Smoking status: Former Smoker     Packs/day: 2.00     Years: 20.00     Pack years: 40.00     Types: Cigarettes     Quit date:      Years since quittin.0   • Smokeless tobacco: Never Used   Vaping Use   • Vaping Use: Never used   Substance Use Topics   • Alcohol use: Yes     Comment: Social   • Drug use: Never       Family History   Problem Relation Age of Onset   • Cancer Biological Mother    • Breast cancer Biological Mother    • Emphysema Biological Father    • Lung cancer Biological Father        Allergies:  Penicillins    Current Outpatient Medications   Medication Sig Dispense Refill   • aspirin 81 mg enteric coated tablet Take 81 mg by mouth daily.     • gabapentin 300 mg capsule Take 2 capsules (600 mg total) by mouth 3 (three) times a day. (Patient taking differently: Take 600 mg by mouth 2 (two) times a day.  ) 540 capsule 0   • glipiZIDE (GLUCOTROL) 5 mg tablet Take 1 tablet by mouth as needed.      • JARDIANCE 10 mg tablet TAKE 1 TABLET(10 MG) BY MOUTH DAILY 90 tablet 0   • metFORMIN (GLUCOPHAGE) 500 mg tablet Take one qid (Patient taking differently: Take 500 mg by mouth 4 (four) times a day. Take one qid ) 120 tablet 1   • metoprolol succinate XL (TOPROL-XL) 100 mg 24 hr tablet Take 1 tablet (100 mg total) by mouth 2 (two) times a day. 180 tablet 3   • pantoprazole 40 mg EC tablet TAKE 1 TABLET(40 MG) BY MOUTH DAILY (Patient taking differently: Take 40 mg by mouth every  evening.  ) 90 tablet 0   • pravastatin 40 mg tablet Take 1 tablet (40 mg total) by mouth daily. 90 tablet 3   • SYNTHROID 150 mcg tablet Take 1 tablet (150 mcg total) by mouth once daily. 30 tablet 1   • TRULICITY 0.75 mg/0.5 mL pen injector once a week. Every Tuesday      • propafenone (RYTHMOL) 150 mg tablet Take 1 tablet by mouth as needed (Palpitations).         No current facility-administered medications for this visit.       Review of Systems   Constitutional: Positive for malaise/fatigue.   Cardiovascular: Negative for chest pain, dyspnea on exertion, irregular heartbeat, leg swelling, near-syncope, orthopnea, palpitations and syncope.   Hematologic/Lymphatic: Does not bruise/bleed easily.   Musculoskeletal: Positive for back pain.   Neurological: Negative for dizziness and light-headedness.   All other systems reviewed and are negative.      Objective     Vitals:    01/18/22 1300   BP: 140/90   Pulse: 84   SpO2: 99%       Wt Readings from Last 1 Encounters:   01/18/22 76.3 kg (168 lb 3.2 oz)       Physical Exam  Vitals reviewed.   Constitutional:       General: He is not in acute distress.     Appearance: Normal appearance. He is well-developed.   HENT:      Head: Normocephalic and atraumatic.   Eyes:      Conjunctiva/sclera: Conjunctivae normal.   Neck:      Vascular: No JVD.   Cardiovascular:      Rate and Rhythm: Normal rate and regular rhythm.      Pulses: Intact distal pulses.           Dorsalis pedis pulses are 1+ on the right side and 1+ on the left side.        Posterior tibial pulses are 1+ on the right side and 1+ on the left side.      Heart sounds: Normal heart sounds. No murmur heard.  Pulmonary:      Effort: Pulmonary effort is normal. No respiratory distress.      Breath sounds: Normal breath sounds. No wheezing or rales.   Chest:      Chest wall: No tenderness.   Abdominal:      General: Bowel sounds are normal. There is no distension.      Palpations: Abdomen is soft.      Tenderness:  There is no abdominal tenderness.   Musculoskeletal:         General: Normal range of motion.      Cervical back: Normal range of motion and neck supple.      Right lower leg: No edema.      Left lower leg: No edema.   Skin:     General: Skin is warm and dry.      Findings: No rash.   Neurological:      General: No focal deficit present.      Mental Status: He is alert and oriented to person, place, and time.   Psychiatric:         Mood and Affect: Mood normal.         Behavior: Behavior normal.         Thought Content: Thought content normal.         Judgment: Judgment normal.          ECG: Normal sinus rhythm, within normal limits    Labs   Lab Results   Component Value Date    WBC 7.0 11/20/2020    HGB 12.7 (L) 11/20/2020    HCT 40.0 11/20/2020     11/20/2020    ALT 18 11/20/2020    AST 18 11/20/2020     11/20/2020    K 4.5 11/20/2020     11/20/2020    CREATININE 1.08 11/20/2020    BUN 18 11/20/2020    CO2 27 11/20/2020    PT 12.8 01/17/2020    GLUCOSE 98 11/20/2020    HGBA1C 6.8 (H) 11/20/2020         Surgical Risk Assessment  The proposed procedure is intermediate risk, corresponding to a 1-5% 30-day risk of a major adverse cardiac event.    The patient has the following risk factors from the Revised Cardiac Risk Index (RCRI): history of ischemic heart disease.  With one risk factor, this corresponds to an intermediate risk (1.0%) of major adverse cardiac events.    The patient has a Duke Activity Status Index score of 39.45, corresponding to an expected exertional capacity of 7.59 METS.          Assessment/Plan     Preop cardiovascular exam  There have been no recent episodes of angina, CHF, or clinical arrhythmias. The overall risk is intermediate but acceptable. Further testing or interventions are unlikely to improve the patient's risk. Routine perioperative care. The patient was instructed to take only beta blocker the morning of surgery. DVT prophylaxis according to the surgical  service's protocol.     Bone metastases (CMS/HCC)  The patient will be having lumbar spine surgery with removal of part of a vertebral body.  This is due to probable metastatic thyroid cancer.  The patient has received radiation to the area.  The patient reports that it is unclear whether or not the area in question represents active tumor or not.    Paroxysmal atrial fibrillation (CMS/HCC)  The patient remains in sinus rhythm.  He has no symptoms to suggest arrhythmia.  We will continue to monitor his heart rhythm on a regular basis.  He is not currently on long-term anticoagulation.    Atherosclerosis of coronary artery  The patient is now 17 years post LAD stenting.  He has good activity tolerance and has no symptoms to suggest ischemic heart disease.  Today's ECG is normal.  He will continue risk factor modification long-term.  We can periodically consider reassessment of his coronary circulation.    Type 2 diabetes mellitus without complication (CMS/HCC)  Recommend general medical or endocrinology consult during hospitalization to manage blood sugars.    Essential hypertension  The patient's blood pressure is well controlled on the current medical regimen.  There are no apparent side effects from medications.  We will continue the current therapy without change.    Mixed hyperlipidemia  The patient's lipids are adequately controlled on current medication.  The patient is tolerating medication without side effects.  I will continue the current treatment.    Malignant neoplasm of vertebral column, excluding sacrum and coccyx (CMS/HCC)  The patient will be having lumbar spine surgery with removal of part of a vertebral body.  This is due to probable metastatic thyroid cancer.  The patient has received radiation to the area.  The patient reports that it is unclear whether or not the area in question represents active tumor or not.             I, Amanda Mckeon, am scribing for, and in the presence of, Minor  MD Margy.    I, Minor Ji MD, personally performed the services described in this documentation as scribed by Amanda Mckeon in my presence, and it is both accurate and complete.       Minor Ji MD  1/18/2022

## 2022-01-18 ENCOUNTER — APPOINTMENT (OUTPATIENT)
Dept: LAB | Age: 77
End: 2022-01-18
Attending: PHYSICIAN ASSISTANT
Payer: MEDICARE

## 2022-01-18 ENCOUNTER — HOSPITAL ENCOUNTER (OUTPATIENT)
Dept: RADIOLOGY | Age: 77
Discharge: HOME | End: 2022-01-18
Attending: PHYSICIAN ASSISTANT
Payer: MEDICARE

## 2022-01-18 ENCOUNTER — OFFICE VISIT (OUTPATIENT)
Dept: CARDIOLOGY | Facility: CLINIC | Age: 77
End: 2022-01-18
Payer: MEDICARE

## 2022-01-18 ENCOUNTER — APPOINTMENT (OUTPATIENT)
Dept: LAB | Age: 77
End: 2022-01-18
Attending: INTERNAL MEDICINE
Payer: MEDICARE

## 2022-01-18 VITALS
SYSTOLIC BLOOD PRESSURE: 140 MMHG | BODY MASS INDEX: 27.03 KG/M2 | WEIGHT: 168.2 LBS | DIASTOLIC BLOOD PRESSURE: 90 MMHG | OXYGEN SATURATION: 99 % | HEIGHT: 66 IN | HEART RATE: 84 BPM

## 2022-01-18 DIAGNOSIS — E11.9 TYPE 2 DIABETES MELLITUS WITHOUT COMPLICATION, UNSPECIFIED WHETHER LONG TERM INSULIN USE (CMS/HCC): ICD-10-CM

## 2022-01-18 DIAGNOSIS — M54.41 LOW BACK PAIN WITH RIGHT-SIDED SCIATICA, UNSPECIFIED BACK PAIN LATERALITY, UNSPECIFIED CHRONICITY: ICD-10-CM

## 2022-01-18 DIAGNOSIS — C41.2 MALIGNANT NEOPLASM OF VERTEBRAL COLUMN, EXCLUDING SACRUM AND COCCYX (CMS/HCC): ICD-10-CM

## 2022-01-18 DIAGNOSIS — C79.51 BONE METASTASES: ICD-10-CM

## 2022-01-18 DIAGNOSIS — E11.9 TYPE 2 DIABETES MELLITUS WITHOUT COMPLICATION, WITHOUT LONG-TERM CURRENT USE OF INSULIN (CMS/HCC): ICD-10-CM

## 2022-01-18 DIAGNOSIS — I48.0 PAROXYSMAL ATRIAL FIBRILLATION (CMS/HCC): ICD-10-CM

## 2022-01-18 DIAGNOSIS — Z01.810 PREOP CARDIOVASCULAR EXAM: Primary | ICD-10-CM

## 2022-01-18 DIAGNOSIS — Z11.59 SCREENING FOR VIRAL DISEASE: ICD-10-CM

## 2022-01-18 DIAGNOSIS — E78.2 MIXED HYPERLIPIDEMIA: ICD-10-CM

## 2022-01-18 DIAGNOSIS — C79.9 METASTASIS FROM THYROID CANCER (CMS/HCC): ICD-10-CM

## 2022-01-18 DIAGNOSIS — I10 ESSENTIAL HYPERTENSION: ICD-10-CM

## 2022-01-18 DIAGNOSIS — T14.90XA INJURY: ICD-10-CM

## 2022-01-18 DIAGNOSIS — C73 METASTASIS FROM THYROID CANCER (CMS/HCC): ICD-10-CM

## 2022-01-18 DIAGNOSIS — I25.10 ATHEROSCLEROSIS OF NATIVE CORONARY ARTERY OF NATIVE HEART WITHOUT ANGINA PECTORIS: ICD-10-CM

## 2022-01-18 LAB
ALBUMIN SERPL-MCNC: 3.8 G/DL (ref 3.4–5)
ALBUMIN/CREAT UR: 4.1 UG/MG
ALP SERPL-CCNC: 62 IU/L (ref 35–126)
ALT SERPL-CCNC: 17 IU/L (ref 16–63)
ANION GAP SERPL CALC-SCNC: 11 MEQ/L (ref 3–15)
APTT PPP: 30 SEC (ref 23–35)
AST SERPL-CCNC: 19 IU/L (ref 15–41)
BASOPHILS # BLD: 0.07 K/UL (ref 0.01–0.1)
BASOPHILS NFR BLD: 0.9 %
BILIRUB SERPL-MCNC: 0.8 MG/DL (ref 0.3–1.2)
BILIRUB UR QL STRIP.AUTO: NEGATIVE MG/DL
BILIRUB UR QL STRIP.AUTO: NEGATIVE MG/DL
BUN SERPL-MCNC: 9 MG/DL (ref 8–20)
CALCIUM SERPL-MCNC: 9.6 MG/DL (ref 8.9–10.3)
CHLORIDE SERPL-SCNC: 102 MEQ/L (ref 98–109)
CHOLEST SERPL-MCNC: 149 MG/DL
CLARITY UR REFRACT.AUTO: CLEAR
CLARITY UR REFRACT.AUTO: CLEAR
CO2 SERPL-SCNC: 27 MEQ/L (ref 22–32)
COLOR UR AUTO: YELLOW
COLOR UR AUTO: YELLOW
CREAT SERPL-MCNC: 1.1 MG/DL (ref 0.8–1.3)
CREAT UR-MCNC: 148.1 MG/DL
DIFFERENTIAL METHOD BLD: ABNORMAL
EOSINOPHIL # BLD: 0.21 K/UL (ref 0.04–0.54)
EOSINOPHIL NFR BLD: 2.7 %
ERYTHROCYTE [DISTWIDTH] IN BLOOD BY AUTOMATED COUNT: 14.5 % (ref 11.6–14.4)
EST. AVERAGE GLUCOSE BLD GHB EST-MCNC: 151 MG/DL
GFR SERPL CREATININE-BSD FRML MDRD: >60 ML/MIN/1.73M*2
GLUCOSE SERPL-MCNC: 108 MG/DL (ref 70–99)
GLUCOSE UR STRIP.AUTO-MCNC: >=1000 MG/DL
GLUCOSE UR STRIP.AUTO-MCNC: >=1000 MG/DL
HBA1C MFR BLD HPLC: 6.9 %
HCT VFR BLDCO AUTO: 45.3 % (ref 40.1–51)
HDLC SERPL-MCNC: 49 MG/DL
HDLC SERPL: 3 {RATIO}
HGB BLD-MCNC: 14.2 G/DL (ref 13.7–17.5)
HGB UR QL STRIP.AUTO: NEGATIVE
HGB UR QL STRIP.AUTO: NEGATIVE
IMM GRANULOCYTES # BLD AUTO: 0.03 K/UL (ref 0–0.08)
IMM GRANULOCYTES NFR BLD AUTO: 0.4 %
INR PPP: 1
KETONES UR STRIP.AUTO-MCNC: ABNORMAL MG/DL
KETONES UR STRIP.AUTO-MCNC: NEGATIVE MG/DL
LDLC SERPL CALC-MCNC: 79 MG/DL
LEUKOCYTE ESTERASE UR QL STRIP.AUTO: NEGATIVE
LEUKOCYTE ESTERASE UR QL STRIP.AUTO: NEGATIVE
LYMPHOCYTES # BLD: 1.63 K/UL (ref 1.2–3.5)
LYMPHOCYTES NFR BLD: 20.6 %
MCH RBC QN AUTO: 26.8 PG (ref 28–33.2)
MCHC RBC AUTO-ENTMCNC: 31.3 G/DL (ref 32.2–36.5)
MCV RBC AUTO: 85.6 FL (ref 83–98)
MICROALBUMIN UR-MCNC: 6 MG/L
MONOCYTES # BLD: 0.74 K/UL (ref 0.3–1)
MONOCYTES NFR BLD: 9.3 %
NEUTROPHILS # BLD: 5.24 K/UL (ref 1.7–7)
NEUTS SEG NFR BLD: 66.1 %
NITRITE UR QL STRIP.AUTO: NEGATIVE
NITRITE UR QL STRIP.AUTO: NEGATIVE
NONHDLC SERPL-MCNC: 100 MG/DL
NRBC BLD-RTO: 0 %
PDW BLD AUTO: 9.2 FL (ref 9.4–12.4)
PH UR STRIP.AUTO: 6 [PH]
PH UR STRIP.AUTO: 6 [PH]
PLATELET # BLD AUTO: 278 K/UL (ref 150–350)
POTASSIUM SERPL-SCNC: 4.4 MEQ/L (ref 3.6–5.1)
PROT SERPL-MCNC: 6.2 G/DL (ref 6–8.2)
PROT UR QL STRIP.AUTO: NEGATIVE
PROT UR QL STRIP.AUTO: NEGATIVE
PROTHROMBIN TIME: 13.2 SEC (ref 12.2–14.5)
RBC # BLD AUTO: 5.29 M/UL (ref 4.5–5.8)
SODIUM SERPL-SCNC: 140 MEQ/L (ref 136–144)
SP GR UR REFRACT.AUTO: >1.035
SP GR UR REFRACT.AUTO: >1.035
T4 FREE SERPL-MCNC: 1.46 NG/DL (ref 0.58–1.64)
TRIGL SERPL-MCNC: 106 MG/DL (ref 30–149)
TSH SERPL DL<=0.05 MIU/L-ACNC: 0.1 MIU/L (ref 0.34–5.6)
UROBILINOGEN UR STRIP-ACNC: 0.2 EU/DL
UROBILINOGEN UR STRIP-ACNC: 0.2 EU/DL
WBC # BLD AUTO: 7.92 K/UL (ref 3.8–10.5)

## 2022-01-18 PROCEDURE — G8755 DIAS BP > OR = 90: HCPCS | Performed by: INTERNAL MEDICINE

## 2022-01-18 PROCEDURE — 85730 THROMBOPLASTIN TIME PARTIAL: CPT

## 2022-01-18 PROCEDURE — 85025 COMPLETE CBC W/AUTO DIFF WBC: CPT

## 2022-01-18 PROCEDURE — 86850 RBC ANTIBODY SCREEN: CPT

## 2022-01-18 PROCEDURE — C9803 HOPD COVID-19 SPEC COLLECT: HCPCS

## 2022-01-18 PROCEDURE — 80061 LIPID PANEL: CPT

## 2022-01-18 PROCEDURE — 84439 ASSAY OF FREE THYROXINE: CPT

## 2022-01-18 PROCEDURE — 85610 PROTHROMBIN TIME: CPT

## 2022-01-18 PROCEDURE — 80053 COMPREHEN METABOLIC PANEL: CPT

## 2022-01-18 PROCEDURE — 81003 URINALYSIS AUTO W/O SCOPE: CPT

## 2022-01-18 PROCEDURE — 36415 COLL VENOUS BLD VENIPUNCTURE: CPT

## 2022-01-18 PROCEDURE — 82043 UR ALBUMIN QUANTITATIVE: CPT

## 2022-01-18 PROCEDURE — 99214 OFFICE O/P EST MOD 30 MIN: CPT | Performed by: INTERNAL MEDICINE

## 2022-01-18 PROCEDURE — G8753 SYS BP > OR = 140: HCPCS | Performed by: INTERNAL MEDICINE

## 2022-01-18 PROCEDURE — 93000 ELECTROCARDIOGRAM COMPLETE: CPT | Performed by: INTERNAL MEDICINE

## 2022-01-18 PROCEDURE — 83036 HEMOGLOBIN GLYCOSYLATED A1C: CPT

## 2022-01-18 PROCEDURE — U0003 INFECTIOUS AGENT DETECTION BY NUCLEIC ACID (DNA OR RNA); SEVERE ACUTE RESPIRATORY SYNDROME CORONAVIRUS 2 (SARS-COV-2) (CORONAVIRUS DISEASE [COVID-19]), AMPLIFIED PROBE TECHNIQUE, MAKING USE OF HIGH THROUGHPUT TECHNOLOGIES AS DESCRIBED BY CMS-2020-01-R: HCPCS

## 2022-01-18 PROCEDURE — 71046 X-RAY EXAM CHEST 2 VIEWS: CPT

## 2022-01-18 PROCEDURE — 84443 ASSAY THYROID STIM HORMONE: CPT

## 2022-01-18 ASSESSMENT — ENCOUNTER SYMPTOMS
NEAR-SYNCOPE: 0
BRUISES/BLEEDS EASILY: 0
BACK PAIN: 1
LIGHT-HEADEDNESS: 0
IRREGULAR HEARTBEAT: 0
DYSPNEA ON EXERTION: 0
DIZZINESS: 0
ORTHOPNEA: 0
SYNCOPE: 0
PALPITATIONS: 0

## 2022-01-18 NOTE — ASSESSMENT & PLAN NOTE
There have been no recent episodes of angina, CHF, or clinical arrhythmias. The overall risk is intermediate but acceptable. Further testing or interventions are unlikely to improve the patient's risk. Routine perioperative care. The patient was instructed to take only beta blocker the morning of surgery. DVT prophylaxis according to the surgical service's protocol.

## 2022-01-18 NOTE — LETTER
January 18, 2022     Raj Arreola, DO  [address]  [place] [address]    Patient: Matt Williamson  YOB: 1945  Date of Visit: 1/18/2022      Dear Dr. Arreola:    Thank you for referring Matt Williamson to me for evaluation. Below are my notes for this consultation.    If you have questions, please do not hesitate to call me. I look forward to following your patient along with you.         Sincerely,        Minor Ji MD        CC: No Recipients  Minor Ji MD  1/18/2022  1:36 PM  Signed       Pre-Operative   Consult Note         Reason for visit:   Chief Complaint   Patient presents with   • Cardiac Clearance       HPI     Matt Williamson comes to the office today for preoperative cardiac evaluation prior to a L2-L4 PSF and L3 lateral corpectomy with Dr. Colt Heller on 1/20/22.  He has been experiencing progressive lower back pan which radiates down his right leg.     He was last seen in the office on 6/22/21 for management of coronary artery disease s/p MARGOT, paroxysmal atrial fibrillation, hypertension and dyslipidemia.  At that time I cleared him for a colonoscopy and asked him to return in 6 months.    He denies any chest pain, shortness of breath, edema, palpitations, near syncope or syncope.    His recent home BPs have been 118-120s/70-80.    Outside records reviewed.    Past Medical History:   Diagnosis Date   • Abdominal hernia    • Arthritis    • Atherosclerosis of coronary artery    • Atrial fibrillation with rapid ventricular response (CMS/HCC) 10/25/2019    Hospitalized on 10/25/19 at [facility].    • BPH (benign prostatic hyperplasia)    • Cataract    • Cyst of thyroid    • Epigastric abdominal pain    • Essential hypertension    • Former tobacco use    • History of radiation therapy    • Hypothyroidism    • Mixed hyperlipidemia    • Osteomyelitis of foot (CMS/HCC) 2009   • PSA elevation    • Thyroid cancer (CMS/HCC) 9/14/2012   •  Thyroid cancer (CMS/HCC)    • Type 2 diabetes mellitus without complication (CMS/HCC)    • Vertigo        Past Surgical History:   Procedure Laterality Date   • ANAL FISSURECTOMY     • CARDIAC CATHETERIZATION  2005    LM FOD. 70-80% proximal LAD.Mild disease CX, RCA and branches.    • CARDIAC CATHETERIZATION  2005    LM FOD. Pristine LAD stent. CX FOD. RCA mild disease.   • CARDIAC SURGERY  Stent in lad   • CORONARY ANGIOPLASTY WITH STENT PLACEMENT  2005    LM FOD. Moderate, diffuse disease LAD. Irregular CX w/ PLVB distal to PDA 99%. Mild disease RCA. Stent to distal LAD.   • EYE SURGERY Left Cataract   • FOOT SURGERY      For osteomyelitis.   • GASTROCUTANEOUS FISTULA CLOSURE     • THYROIDECTOMY     • TONSILLECTOMY  No    denies   • UPPER GASTROINTESTINAL ENDOSCOPY         Social History     Tobacco Use   Smoking Status Former Smoker   • Packs/day: 2.00   • Years: 20.00   • Pack years: 40.00   • Types: Cigarettes   • Quit date:    • Years since quittin.0   Smokeless Tobacco Never Used     Social History     Tobacco Use   • Smoking status: Former Smoker     Packs/day: 2.00     Years: 20.00     Pack years: 40.00     Types: Cigarettes     Quit date:      Years since quittin.0   • Smokeless tobacco: Never Used   Vaping Use   • Vaping Use: Never used   Substance Use Topics   • Alcohol use: Yes     Comment: Social   • Drug use: Never       Family History   Problem Relation Age of Onset   • Cancer Biological Mother    • Breast cancer Biological Mother    • Emphysema Biological Father    • Lung cancer Biological Father        Allergies:  Penicillins    Current Outpatient Medications   Medication Sig Dispense Refill   • aspirin 81 mg enteric coated tablet Take 81 mg by mouth daily.     • gabapentin 300 mg capsule Take 2 capsules (600 mg total) by mouth 3 (three) times a day. (Patient taking differently: Take 600 mg by mouth 2 (two) times a day.  ) 540 capsule 0   • glipiZIDE  (GLUCOTROL) 5 mg tablet Take 1 tablet by mouth as needed.      • JARDIANCE 10 mg tablet TAKE 1 TABLET(10 MG) BY MOUTH DAILY 90 tablet 0   • metFORMIN (GLUCOPHAGE) 500 mg tablet Take one qid (Patient taking differently: Take 500 mg by mouth 4 (four) times a day. Take one qid ) 120 tablet 1   • metoprolol succinate XL (TOPROL-XL) 100 mg 24 hr tablet Take 1 tablet (100 mg total) by mouth 2 (two) times a day. 180 tablet 3   • pantoprazole 40 mg EC tablet TAKE 1 TABLET(40 MG) BY MOUTH DAILY (Patient taking differently: Take 40 mg by mouth every evening.  ) 90 tablet 0   • pravastatin 40 mg tablet Take 1 tablet (40 mg total) by mouth daily. 90 tablet 3   • SYNTHROID 150 mcg tablet Take 1 tablet (150 mcg total) by mouth once daily. 30 tablet 1   • TRULICITY 0.75 mg/0.5 mL pen injector once a week. Every Tuesday      • propafenone (RYTHMOL) 150 mg tablet Take 1 tablet by mouth as needed (Palpitations).         No current facility-administered medications for this visit.       Review of Systems   Constitutional: Positive for malaise/fatigue.   Cardiovascular: Negative for chest pain, dyspnea on exertion, irregular heartbeat, leg swelling, near-syncope, orthopnea, palpitations and syncope.   Hematologic/Lymphatic: Does not bruise/bleed easily.   Musculoskeletal: Positive for back pain.   Neurological: Negative for dizziness and light-headedness.   All other systems reviewed and are negative.      Objective     Vitals:    01/18/22 1300   BP: 140/90   Pulse: 84   SpO2: 99%       Wt Readings from Last 1 Encounters:   01/18/22 76.3 kg (168 lb 3.2 oz)       Physical Exam  Vitals reviewed.   Constitutional:       General: He is not in acute distress.     Appearance: Normal appearance. He is well-developed.   HENT:      Head: Normocephalic and atraumatic.   Eyes:      Conjunctiva/sclera: Conjunctivae normal.   Neck:      Vascular: No JVD.   Cardiovascular:      Rate and Rhythm: Normal rate and regular rhythm.      Pulses: Intact  distal pulses.           Dorsalis pedis pulses are 1+ on the right side and 1+ on the left side.        Posterior tibial pulses are 1+ on the right side and 1+ on the left side.      Heart sounds: Normal heart sounds. No murmur heard.  Pulmonary:      Effort: Pulmonary effort is normal. No respiratory distress.      Breath sounds: Normal breath sounds. No wheezing or rales.   Chest:      Chest wall: No tenderness.   Abdominal:      General: Bowel sounds are normal. There is no distension.      Palpations: Abdomen is soft.      Tenderness: There is no abdominal tenderness.   Musculoskeletal:         General: Normal range of motion.      Cervical back: Normal range of motion and neck supple.      Right lower leg: No edema.      Left lower leg: No edema.   Skin:     General: Skin is warm and dry.      Findings: No rash.   Neurological:      General: No focal deficit present.      Mental Status: He is alert and oriented to person, place, and time.   Psychiatric:         Mood and Affect: Mood normal.         Behavior: Behavior normal.         Thought Content: Thought content normal.         Judgment: Judgment normal.          ECG: Normal sinus rhythm, within normal limits    Labs   Lab Results   Component Value Date    WBC 7.0 11/20/2020    HGB 12.7 (L) 11/20/2020    HCT 40.0 11/20/2020     11/20/2020    ALT 18 11/20/2020    AST 18 11/20/2020     11/20/2020    K 4.5 11/20/2020     11/20/2020    CREATININE 1.08 11/20/2020    BUN 18 11/20/2020    CO2 27 11/20/2020    PT 12.8 01/17/2020    GLUCOSE 98 11/20/2020    HGBA1C 6.8 (H) 11/20/2020         Surgical Risk Assessment  The proposed procedure is intermediate risk, corresponding to a 1-5% 30-day risk of a major adverse cardiac event.    The patient has the following risk factors from the Revised Cardiac Risk Index (RCRI): history of ischemic heart disease.  With one risk factor, this corresponds to an intermediate risk (1.0%) of major adverse cardiac  events.    The patient has a Duke Activity Status Index score of 39.45, corresponding to an expected exertional capacity of 7.59 METS.          Assessment/Plan     Preop cardiovascular exam  There have been no recent episodes of angina, CHF, or clinical arrhythmias. The overall risk is intermediate but acceptable. Further testing or interventions are unlikely to improve the patient's risk. Routine perioperative care. The patient was instructed to take only beta blocker the morning of surgery. DVT prophylaxis according to the surgical service's protocol.     Bone metastases (CMS/HCC)  The patient will be having lumbar spine surgery with removal of part of a vertebral body.  This is due to probable metastatic thyroid cancer.  The patient has received radiation to the area.  The patient reports that it is unclear whether or not the area in question represents active tumor or not.    Paroxysmal atrial fibrillation (CMS/HCC)  The patient remains in sinus rhythm.  He has no symptoms to suggest arrhythmia.  We will continue to monitor his heart rhythm on a regular basis.  He is not currently on long-term anticoagulation.    Atherosclerosis of coronary artery  The patient is now 17 years post LAD stenting.  He has good activity tolerance and has no symptoms to suggest ischemic heart disease.  Today's ECG is normal.  He will continue risk factor modification long-term.  We can periodically consider reassessment of his coronary circulation.    Type 2 diabetes mellitus without complication (CMS/HCC)  Recommend general medical or endocrinology consult during hospitalization to manage blood sugars.    Essential hypertension  The patient's blood pressure is well controlled on the current medical regimen.  There are no apparent side effects from medications.  We will continue the current therapy without change.    Mixed hyperlipidemia  The patient's lipids are adequately controlled on current medication.  The patient is tolerating  medication without side effects.  I will continue the current treatment.    Malignant neoplasm of vertebral column, excluding sacrum and coccyx (CMS/HCC)  The patient will be having lumbar spine surgery with removal of part of a vertebral body.  This is due to probable metastatic thyroid cancer.  The patient has received radiation to the area.  The patient reports that it is unclear whether or not the area in question represents active tumor or not.             I, Amanda Mckeon, am scribing for, and in the presence of, Minor Ji MD.    I, Minor Ji MD, personally performed the services described in this documentation as scribed by Amanda Mckeon in my presence, and it is both accurate and complete.       Minor Ji MD  1/18/2022

## 2022-01-18 NOTE — ASSESSMENT & PLAN NOTE
The patient remains in sinus rhythm.  He has no symptoms to suggest arrhythmia.  We will continue to monitor his heart rhythm on a regular basis.  He is not currently on long-term anticoagulation.

## 2022-01-18 NOTE — ASSESSMENT & PLAN NOTE
The patient will be having lumbar spine surgery with removal of part of a vertebral body.  This is due to probable metastatic thyroid cancer.  The patient has received radiation to the area.  The patient reports that it is unclear whether or not the area in question represents active tumor or not.

## 2022-01-18 NOTE — ASSESSMENT & PLAN NOTE
The patient is now 17 years post LAD stenting.  He has good activity tolerance and has no symptoms to suggest ischemic heart disease.  Today's ECG is normal.  He will continue risk factor modification long-term.  We can periodically consider reassessment of his coronary circulation.

## 2022-01-19 ENCOUNTER — PREP FOR CASE (OUTPATIENT)
Dept: NEUROSURGERY | Facility: CLINIC | Age: 77
End: 2022-01-19
Payer: MEDICARE

## 2022-01-19 LAB
ABO + RH BLD: NORMAL
BLD GP AB SCN SERPL QL: NEGATIVE
CASE RPRT: NORMAL
CLINICAL INFO: NORMAL
D AG BLD QL: POSITIVE
IMMUNE STAIN STUDY: NORMAL
LABORATORY COMMENT REPORT: NORMAL
PATH REPORT.ADDENDUM SPEC: NORMAL
PATH REPORT.FINAL DX SPEC: NORMAL
PATH REPORT.FINAL DX SPEC: NORMAL
PATH REPORT.GROSS SPEC: NORMAL
SARS-COV-2 RNA RESP QL NAA+PROBE: NEGATIVE
SPECIMEN EXP DATE BLD: NORMAL

## 2022-01-19 RX ORDER — METFORMIN HYDROCHLORIDE 500 MG/1
TABLET ORAL
Qty: 120 TABLET | Refills: 1 | Status: SHIPPED | OUTPATIENT
Start: 2022-01-19 | End: 2022-01-19

## 2022-01-19 RX ORDER — METFORMIN HYDROCHLORIDE 500 MG/1
TABLET ORAL
Qty: 360 TABLET | Refills: 3 | Status: SHIPPED | OUTPATIENT
Start: 2022-01-19 | End: 2022-02-08 | Stop reason: HOSPADM

## 2022-01-19 NOTE — H&P (VIEW-ONLY)
Neurosurgery History and Physical     Patient ID: Matt Williamson                              : 1945    CC: Back pain with pathologic L3 fracture     History of Present Illness:   Matt Williamson is a pleasant 76 y.o. male with thyroid cancer metastatic to L3 vertebral body s/p radiation stereotactic body radiation 2020 seen today by the neurosurgery service for surgical discussion regarding growth of his L3 vertebral body metastatic lesion. He has had progressive worsening midline-line low back pain with radiation to the right knee. An MRI lumbar spine was obtained 2021 showing extension of the L3 mass. He was sent for a PET scan 2021 showing increased activity of the L3 lesion without further metastatic lesions appreciated. He was referred to [facility] however there has been some delay with getting an appointment.      He has had persistent midline low back pain with walking and standing which has progressively worsened. He does not have any weakness or bowel/bladder incontinence. He is following with oncology (Dr. Casey) who does not recommend any chemotherapy at this time but surgical intervention as the primary treatment given the suspected isolated area of progression at the L3 vertebral body.      Allergies:        Allergies   Allergen Reactions   • Penicillins Rash       Childhood allergy         Medications:      Current Outpatient Medications:   •  aspirin 81 mg enteric coated tablet, Take 81 mg by mouth daily., Disp: , Rfl:   •  gabapentin 300 mg capsule, Take 2 capsules (600 mg total) by mouth 3 (three) times a day., Disp: 540 capsule, Rfl: 0  •  glipiZIDE (GLUCOTROL) 5 mg tablet, Take 1 tablet by mouth as needed. , Disp: , Rfl:   •  JARDIANCE 10 mg tablet, TAKE 1 TABLET(10 MG) BY MOUTH DAILY, Disp: 90 tablet, Rfl: 0  •  metFORMIN (GLUCOPHAGE) 500 mg tablet, Take one qid, Disp: 120 tablet, Rfl: 1  •  metoprolol succinate XL (TOPROL-XL) 100 mg 24 hr tablet, Take 1 tablet (100  mg total) by mouth 2 (two) times a day., Disp: 180 tablet, Rfl: 3  •  pantoprazole 40 mg EC tablet, TAKE 1 TABLET(40 MG) BY MOUTH DAILY, Disp: 90 tablet, Rfl: 0  •  pravastatin 40 mg tablet, Take 1 tablet (40 mg total) by mouth daily., Disp: 90 tablet, Rfl: 3  •  SYNTHROID 150 mcg tablet, Take 1 tablet (150 mcg total) by mouth once daily., Disp: 30 tablet, Rfl: 1  •  TRULICITY 0.75 mg/0.5 mL pen injector, once a week. Every Tuesday , Disp: , Rfl:   •  ketorolac (ACULAR) 0.5 % ophthalmic solution, Administer 1 drop into the right eye daily.  , Disp: , Rfl:   •  prednisoLONE acetate (PRED FORTE) 1 % ophthalmic suspension, SHAKE LIQUID AND INSTILL 1 DROP IN RIGHT EYE FOUR TIMES DAILY AS DIRECTED, Disp: , Rfl:   •  propafenone (RYTHMOL) 150 mg tablet, Take 1 tablet by mouth as needed (Palpitations).  , Disp: , Rfl:   •  venlafaxine XR (EFFEXOR XR) 37.5 mg 24 hr capsule, Take 1 capsule (37.5 mg total) by mouth daily. (Patient not taking: Reported on 1/12/2022 ), Disp: 90 capsule, Rfl: 3      Past Medical History:   Medical History        Past Medical History:   Diagnosis Date   • Abdominal hernia     • Atherosclerosis of coronary artery     • Atrial fibrillation with rapid ventricular response (CMS/HCC) 10/25/2019     Hospitalized on 10/25/19 at [facility].    • BPH (benign prostatic hyperplasia)     • Cataract     • Cyst of thyroid     • Epigastric abdominal pain     • Essential hypertension     • Former tobacco use     • History of radiation therapy     • Mixed hyperlipidemia     • Osteomyelitis of foot (CMS/HCC) 2009   • PSA elevation     • Thyroid cancer (CMS/HCC) 9/14/2012   • Type 2 diabetes mellitus without complication (CMS/HCC)     • Vertigo              Past Surgical History:   Surgical History         Past Surgical History:   Procedure Laterality Date   • ANAL FISSURECTOMY   1986   • CARDIAC CATHETERIZATION   11/21/2005     LM FOD. 70-80% proximal LAD.Mild disease CX, RCA and branches.    •  CARDIAC CATHETERIZATION   2005     LM FOD. Pristine LAD stent. CX FOD. RCA mild disease.   • CARDIAC SURGERY   Stent in lad   • CORONARY ANGIOPLASTY WITH STENT PLACEMENT   2005     LM FOD. Moderate, diffuse disease LAD. Irregular CX w/ PLVB distal to PDA 99%. Mild disease RCA. Stent to distal LAD.   • EYE SURGERY Left Cataract   • FOOT SURGERY        For osteomyelitis.   • GASTROCUTANEOUS FISTULA CLOSURE       • THYROIDECTOMY      • TONSILLECTOMY   No     denies   • UPPER GASTROINTESTINAL ENDOSCOPY                Family History:        Family History   Problem Relation Age of Onset   • Cancer Biological Mother     • Breast cancer Biological Mother     • Emphysema Biological Father     • Lung cancer Biological Father           Social History:  Social History               Socioeconomic History   • Marital status:        Spouse name: Not on file   • Number of children: Not on file   • Years of education: Not on file   • Highest education level: Not on file   Occupational History   • Occupation: Retired   Tobacco Use   • Smoking status: Former Smoker       Packs/day: 2.00       Years: 20.00       Pack years: 40.00       Types: Cigarettes       Quit date:        Years since quittin.0   • Smokeless tobacco: Never Used   Substance and Sexual Activity   • Alcohol use: Yes       Comment: Social   • Drug use: Never   • Sexual activity: Not Currently       Partners: Female   Other Topics Concern   • Not on file   Social History Narrative   • Not on file      Social Determinants of Health      Financial Resource Strain: Not on file   Food Insecurity: Not on file   Transportation Needs: Not on file   Physical Activity: Not on file   Stress: Not on file   Social Connections: Not on file   Intimate Partner Violence: Not on file   Housing Stability: Not on file            Vitals:       Vitals:     22 1325   BP: 118/70   Pulse: 80   Resp: 20   Temp: (!) 35.9 °C (96.7 °F)   SpO2: 98%          Review of Systems:  A complete 14 point review of systems was conducted and was deemed negative except what was documented in the HPI.     Physical Examination:  Physical Exam   Constitutional: Oriented to person, place, and time. Appears well-developed and well-nourished.   Head: Normocephalic and atraumatic.   Right Ear: External ear normal.   Left Ear: External ear normal.   Nose: Nose normal.   Mouth/Throat: Oropharynx is clear and moist.   Eyes: Conjunctivae and EOM are normal. Pupils are equal, round, and reactive to light. No scleral icterus.   Neck: Normal range of motion. Neck supple.   Cardiovascular: Normal rate and regular rhythm.    Pulmonary/Chest: Effort normal   Musculoskeletal: No deformity. No pain with palpation of the midline lumbar spine, facets or soft tissue..   Skin: Skin is warm and dry. No rash noted. No erythema.   Psychiatric: Normal mood and affect. Speech is normal and behavior is normal. Thought content normal.   Vitals reviewed.        Neurological Examination:  Neurologic Exam      Mental Status   Oriented to person, place, and time.   Attention: normal.   Speech: speech is normal   Level of consciousness: alert     Cranial Nerves      CN II   Visual acuity: normal     CN III, IV, VI   Pupils are equal, round, and reactive to light.  Extraocular motions are normal.   Right pupil: Size: 3 mm. Shape: regular. Reactivity: brisk.   Left pupil: Size: 3 mm. Shape: regular. Reactivity: brisk.   CN III: no CN III palsy  CN VI: no CN VI palsy  Nystagmus: none      CN V   Facial sensation intact.      CN VIII   CN VIII normal.   Hearing: intact     CN IX, X   CN IX normal.   Palate: symmetric     CN XI   CN XI normal.   Right sternocleidomastoid strength: normal  Left sternocleidomastoid strength: normal     CN XII   CN XII normal.   Tongue: not atrophic     Sensation ( /2)     Right Left   Right Left   C5 2 2 L2 2 2   C6 2 2 L3 2 2   C7 2 2 L4 2 2   C8 2 2 L5 2 2   T1 2 2 S1 2 2       Motor:       Deltoid Biceps Triceps Wrist ext Finger ext Hand Intrinsics Hip flexion Knee ext Dorsi-  flexion EHL Plantar Flexion   R 5 5 5 5 5 5 4+ 5 5 5 5   L 5 5 5 5 5 5 5 5 5 5 5      There is no pronator drift. Muscle bulk and tone are normal.      Gait, Coordination, and Reflexes      Reflexes   Reflexes 2+ except as noted.   Right plantar: normal  Left plantar: normal  Right Maharaj: absent  Left Maharaj: absent  Right ankle clonus: 2 beats, non-sustained  Left ankle clonus: absent        Data Review:     Lab Results:         Lab Results   Component Value Date     WBC 7.0 11/20/2020     WBC 10.13 01/17/2020     HGB 12.7 (L) 11/20/2020     HGB 14.1 01/17/2020     HCT 40.0 11/20/2020     HCT 43.0 01/17/2020     MCV 88.9 11/20/2020     MCV 88.5 01/17/2020      11/20/2020      01/17/2020     RDW 13.3 11/20/2020     RDW 13.7 01/17/2020            Lab Results   Component Value Date     GLUCOSE 98 11/20/2020     GLUCOSE 225 (H) 05/02/2018     BUN 18 11/20/2020     BUN 24 (H) 04/17/2020      11/20/2020      05/02/2018     K 4.5 11/20/2020     K 4.6 05/02/2018      11/20/2020     CL 98 05/02/2018     CO2 27 11/20/2020     CO2 27 05/02/2018     ANIONGAP 12 05/02/2018         Imaging:   Independent review of all imaging was done by myself as well as review of the radiologists readings and comparison to prior films.      MRI lumbar spine 11/22/2021  There is redemonstration of a metastatic lesion essentially replacing the L3  vertebral body with new extension into the posterior elements bilaterally. The  lesion demonstrates rim enhancement and central necrosis. The enhancement  extends into the paravertebral soft tissues and into the medial aspect of the  psoas muscles bilaterally. There is a new oblique fracture line through the L3  vertebral body with associated mild loss of height. There is progression of  lesion extension in the right side of the L4 vertebral body superiorly  extending  into the posterior elements. There is also progression of lesion extension into  the left side of the superior aspect of the L4 vertebral body. There is edema  and enhancement in the superior endplate of L4 with mild loss of height of the  L4 vertebral body. The remainder of the lumbar vertebral bodies are maintained  in height. A trace retrolisthesis of L1 on L2 and of L3 on L4 are present which  appear similar. The alignment is otherwise preserved. Schmorl's nodes are noted  in the inferior endplates of T11, T12 and L1 which were present previously.     The conus medullaris terminates at L1-L2.     At T11-T12 and at T12-L1, there is no focal disc herniation, significant central  canal or neural foraminal narrowing. Mild facet hypertrophy is present.  At L1-L2, there is a disc osseous complex and mild facet hypertrophy. The thecal  sac and neural foramina are mildly effaced. This appears stable.  At L2-L3, there is a disc osseous complex and facet and ligamentous hypertrophy.  The thecal sac is mildly effaced. Mild bilateral neural foraminal narrowing is  present. This appears stable.  At L3-L4, there is new mild retropulsion of the inferior endplate of L3 into the  spinal canal. A disc osseous complex and diffuse disc bulge as well as facet and  ligamentous hypertrophy are present. The central canal is mild to moderately  narrowed. Moderate to severe right and moderate left neural foraminal narrowing  is present. Note is made of enhancement in the neural foramina bilaterally.  At L4-L5, there is a diffuse disc bulge, eccentric to the right, and facet and  ligamentous hypertrophy. In addition, a superimposed right foraminal protrusion  is present. The central canal is mildly narrowed tricompartmentally. Moderate  right and mild to moderate left neural foraminal narrowing is present. The  protrusion impinges upon the right L4 dorsal root ganglion and postganglionic  nerve root. This appears similar.  At  L5-S1, there is mild disc bulging and mild facet hypertrophy with mild  bilateral neural foraminal narrowing. This appears stable.          CT PET scan 12/7/2021  IMPRESSION:  Increased activity seen at the right margin of a destructive lesion involving  the L3 vertebral body with a max SUV of five.  Stable lytic lesion involving the inferior aspect of the right scapula with no  interval change in max SUV.          X-ray Lumbar spine 12/3/2021  IMPRESSION:  1.  Mixed lytic/sclerotic L3 lesion with mild compression deformity.  2.  Normal alignment in neutral, grade 1 L2-L3 spondylolisthesis in extension,  grade 1 L3-L4 spondylolisthesis in flexion.     CT lumbar spine 12/3/2021  IMPRESSION:  Pathologic fracture with large metastatic lytic lesion involving the L3  vertebral body, the extent of extraosseous extension is not well evaluated on  this study, please refer to recent MRI.  There is also suspicious vague lytic as  well as a sclerotic lesion of L4, however better delineated on the recent MRI as  well.  Degenerative spondylosis worst at L4-L5.     COMMENT:     Lumbosacral alignment: Anatomic.  Vertebral bodies: Redemonstrated is a large lytic mass with pathologic  fracture through the L3 vertebral body, particularly eccentric to the right,,  the extent of extra osseous metastatic disease cannot be well assessed on this  particular examination.  In addition, the lytic lesion eccentric to the right at  the L4 vertebral body is better demonstrated on the MRI as well.  There is also  a sclerotic focus of the left aspect of the L4 vertebral body.  No definite  sacral fractures or sacral lytic lesions.  Intervertebral discs: Disc osteophyte complex with subchondral sclerosis and  vacuum disc at L3-L4 with vacuum disc also noted at L2-L3.  Lumbosacral spinal canal: Patent.     Axial images:  T12-L1: Normal.  L1-2: Disc osteophyte complex with broad-based disc bulge contributing to  mild right foraminal  narrowing.  L2-3: Disc osteophyte complex with broad-based disc bulge without  consequence.  L3-4: Disc osteophyte complex with broad-based disc bulge and facet  arthrosis, posterior osseous spurring, contributing to moderate to moderate  severe bilateral foraminal narrowing.  Mild canal stenosis noted.  L4-5: Broad-based disc bulge, moderate facet hypertrophy and infolding of  the ligamenta flava, contributing to moderate to severe right and moderate left  foraminal narrowing, mild to moderate canal stenosis.  L5-S1: Mild broad-based disc bulge and facet arthrosis, without consequence.     OTHER: Marked atherosclerotic disease of abdominal aorta          Assessment / Plan:      In summary, Matt Williamson is a pleasant 75 y.o. male with thyroid cancer metastatic to L3 vertebral body s/p radiation stereotactic body radiation 5/2020 seen today by the neurosurgery service for surgical discussion regarding growth of his L3 vertebral body metastatic lesion. He has had progressive worsening midline-line low back pain with radiation to the right knee. An MRI lumbar spine was obtained 11/22/2021 showing extension of the L3 mass. He was sent for a PET scan 12/7/2021 showing increased activity of the L3 lesion without further metastatic lesions appreciated. He was referred to [facility] however there has been some delay with getting an appointment.      He does not have any weakness or bowel/bladder incontinence. He is following with oncology (Dr. Casey) who does not recommend any chemotherapy at this time but surgical intervention as the primary treatment given the suspected isolated area of progression at the L3 vertebral body. He remains full strength on exam with 2 beats of ankle clonus on the left. His biceps, brachioradialis, patellar and achillis reflexes are normal. His sensation is intact.     Mr. Williamson has had progressive worsening midline back pain with increased activity of the L3 metastatic lesion. His  lumbar x-rays show overall maintained alignment with no significant focal kyphosis. His MRI shows moderate-severe foraminal narrowing on the right secondary to retropulsion of the inferior endplate in the setting of ligament and joint hypertrophy. There is some enhancement of the superior endplate of L4 but no activity appreciated on the PET scan. There is no significant central compression in the lumbar spine. His CT demonstrates significant bony erosion of the L3 vertebral body with a new oblique fracture.      Mr. Williamson was initially referred to [facility] in [place] for spondylectomy given the questionable pedicle involvement. However, PET scan does not show posterior column activity and a L3 corpectomy would be appropriate. We have scheduled him for Stage 1: L2-L4 PSF with instrumentation, ORIF L3 pathologic fracture  Stage 2: L3 lateral corpectomy with expandable cage placement 1/20/2022. We have obtained cardiac clearance.     The risks, benefits, and alternatives to the procedure were discussed in detail between Marco A Williamson, his wife and Colt Heller MD.  The risks include but are not limited to: local bleeding (perhaps requiring transfusion), infection, cerebrospinal fluid leakage, failure of fusion (pseudoarthrosis), hardware malplacement, accelerated spinal degeneration, proximal junction kyphosis, new neurologic symptoms of weakness, sensory, bowel, bladder, sexual disturbance, paralysis, cardiac/pulmonary/renal/hepatic event in the perioperative period, blindness from prolonged prone positioning, allergic response to the anesthetic, death. The patient acknowledges all the above and would like to proceed with surgery at this time. The patient understood the risks and benefits and agreed to proceed.            Colt Heller MD

## 2022-01-19 NOTE — TELEPHONE ENCOUNTER
Medicine Refill Request    Last Office: 12/2021  Last Consult Visit: Visit date not found  Last Telemedicine Visit: 7/9/2020 Raj Arreola, DO    Next Appointment: [id number]    Current Outpatient Medications:   •  aspirin 81 mg enteric coated tablet, Take 81 mg by mouth daily., Disp: , Rfl:   •  gabapentin 300 mg capsule, Take 2 capsules (600 mg total) by mouth 3 (three) times a day. (Patient taking differently: Take 600 mg by mouth 2 (two) times a day.  ), Disp: 540 capsule, Rfl: 0  •  glipiZIDE (GLUCOTROL) 5 mg tablet, Take 1 tablet by mouth as needed. , Disp: , Rfl:   •  JARDIANCE 10 mg tablet, TAKE 1 TABLET(10 MG) BY MOUTH DAILY, Disp: 90 tablet, Rfl: 0  •  metFORMIN (GLUCOPHAGE) 500 mg tablet, Take one qid (Patient taking differently: Take 500 mg by mouth 4 (four) times a day. Take one qid ), Disp: 120 tablet, Rfl: 1  •  metoprolol succinate XL (TOPROL-XL) 100 mg 24 hr tablet, Take 1 tablet (100 mg total) by mouth 2 (two) times a day., Disp: 180 tablet, Rfl: 3  •  pantoprazole 40 mg EC tablet, TAKE 1 TABLET(40 MG) BY MOUTH DAILY (Patient taking differently: Take 40 mg by mouth every evening.  ), Disp: 90 tablet, Rfl: 0  •  pravastatin 40 mg tablet, Take 1 tablet (40 mg total) by mouth daily., Disp: 90 tablet, Rfl: 3  •  propafenone (RYTHMOL) 150 mg tablet, Take 1 tablet by mouth as needed (Palpitations).  , Disp: , Rfl:   •  SYNTHROID 150 mcg tablet, Take 1 tablet (150 mcg total) by mouth once daily., Disp: 30 tablet, Rfl: 1  •  TRULICITY 0.75 mg/0.5 mL pen injector, once a week. Every Tuesday , Disp: , Rfl:       BP Readings from Last 3 Encounters:   01/18/22 140/90   01/12/22 118/70   12/10/21 109/72       Recent Lab results:  Lab Results   Component Value Date    CHOL 149 01/18/2022   ,   Lab Results   Component Value Date    HDL 49 01/18/2022   ,   Lab Results   Component Value Date    LDLCALC 79 01/18/2022   ,   Lab Results   Component Value Date    TRIG 106 01/18/2022        Lab Results   Component  Value Date    GLUCOSE 108 (H) 01/18/2022   ,   Lab Results   Component Value Date    HGBA1C 6.9 (H) 01/18/2022         Lab Results   Component Value Date    CREATININE 1.1 01/18/2022       Lab Results   Component Value Date    TSH 0.10 (L) 01/18/2022

## 2022-01-19 NOTE — H&P
Neurosurgery History and Physical     Patient ID: Matt Williamson                              : 1945    CC: Back pain with pathologic L3 fracture     History of Present Illness:   Matt Williamson is a pleasant 76 y.o. male with thyroid cancer metastatic to L3 vertebral body s/p radiation stereotactic body radiation 2020 seen today by the neurosurgery service for surgical discussion regarding growth of his L3 vertebral body metastatic lesion. He has had progressive worsening midline-line low back pain with radiation to the right knee. An MRI lumbar spine was obtained 2021 showing extension of the L3 mass. He was sent for a PET scan 2021 showing increased activity of the L3 lesion without further metastatic lesions appreciated. He was referred to [facility] however there has been some delay with getting an appointment.      He has had persistent midline low back pain with walking and standing which has progressively worsened. He does not have any weakness or bowel/bladder incontinence. He is following with oncology (Dr. Casey) who does not recommend any chemotherapy at this time but surgical intervention as the primary treatment given the suspected isolated area of progression at the L3 vertebral body.      Allergies:        Allergies   Allergen Reactions   • Penicillins Rash       Childhood allergy         Medications:      Current Outpatient Medications:   •  aspirin 81 mg enteric coated tablet, Take 81 mg by mouth daily., Disp: , Rfl:   •  gabapentin 300 mg capsule, Take 2 capsules (600 mg total) by mouth 3 (three) times a day., Disp: 540 capsule, Rfl: 0  •  glipiZIDE (GLUCOTROL) 5 mg tablet, Take 1 tablet by mouth as needed. , Disp: , Rfl:   •  JARDIANCE 10 mg tablet, TAKE 1 TABLET(10 MG) BY MOUTH DAILY, Disp: 90 tablet, Rfl: 0  •  metFORMIN (GLUCOPHAGE) 500 mg tablet, Take one qid, Disp: 120 tablet, Rfl: 1  •  metoprolol succinate XL (TOPROL-XL) 100 mg 24 hr tablet, Take 1 tablet (100  mg total) by mouth 2 (two) times a day., Disp: 180 tablet, Rfl: 3  •  pantoprazole 40 mg EC tablet, TAKE 1 TABLET(40 MG) BY MOUTH DAILY, Disp: 90 tablet, Rfl: 0  •  pravastatin 40 mg tablet, Take 1 tablet (40 mg total) by mouth daily., Disp: 90 tablet, Rfl: 3  •  SYNTHROID 150 mcg tablet, Take 1 tablet (150 mcg total) by mouth once daily., Disp: 30 tablet, Rfl: 1  •  TRULICITY 0.75 mg/0.5 mL pen injector, once a week. Every Tuesday , Disp: , Rfl:   •  ketorolac (ACULAR) 0.5 % ophthalmic solution, Administer 1 drop into the right eye daily.  , Disp: , Rfl:   •  prednisoLONE acetate (PRED FORTE) 1 % ophthalmic suspension, SHAKE LIQUID AND INSTILL 1 DROP IN RIGHT EYE FOUR TIMES DAILY AS DIRECTED, Disp: , Rfl:   •  propafenone (RYTHMOL) 150 mg tablet, Take 1 tablet by mouth as needed (Palpitations).  , Disp: , Rfl:   •  venlafaxine XR (EFFEXOR XR) 37.5 mg 24 hr capsule, Take 1 capsule (37.5 mg total) by mouth daily. (Patient not taking: Reported on 1/12/2022 ), Disp: 90 capsule, Rfl: 3      Past Medical History:   Medical History        Past Medical History:   Diagnosis Date   • Abdominal hernia     • Atherosclerosis of coronary artery     • Atrial fibrillation with rapid ventricular response (CMS/HCC) 10/25/2019     Hospitalized on 10/25/19 at [facility].    • BPH (benign prostatic hyperplasia)     • Cataract     • Cyst of thyroid     • Epigastric abdominal pain     • Essential hypertension     • Former tobacco use     • History of radiation therapy     • Mixed hyperlipidemia     • Osteomyelitis of foot (CMS/HCC) 2009   • PSA elevation     • Thyroid cancer (CMS/HCC) 9/14/2012   • Type 2 diabetes mellitus without complication (CMS/HCC)     • Vertigo              Past Surgical History:   Surgical History         Past Surgical History:   Procedure Laterality Date   • ANAL FISSURECTOMY   1986   • CARDIAC CATHETERIZATION   11/21/2005     LM FOD. 70-80% proximal LAD.Mild disease CX, RCA and branches.    •  CARDIAC CATHETERIZATION   2005     LM FOD. Pristine LAD stent. CX FOD. RCA mild disease.   • CARDIAC SURGERY   Stent in lad   • CORONARY ANGIOPLASTY WITH STENT PLACEMENT   2005     LM FOD. Moderate, diffuse disease LAD. Irregular CX w/ PLVB distal to PDA 99%. Mild disease RCA. Stent to distal LAD.   • EYE SURGERY Left Cataract   • FOOT SURGERY        For osteomyelitis.   • GASTROCUTANEOUS FISTULA CLOSURE       • THYROIDECTOMY      • TONSILLECTOMY   No     denies   • UPPER GASTROINTESTINAL ENDOSCOPY                Family History:        Family History   Problem Relation Age of Onset   • Cancer Biological Mother     • Breast cancer Biological Mother     • Emphysema Biological Father     • Lung cancer Biological Father           Social History:  Social History               Socioeconomic History   • Marital status:        Spouse name: Not on file   • Number of children: Not on file   • Years of education: Not on file   • Highest education level: Not on file   Occupational History   • Occupation: Retired   Tobacco Use   • Smoking status: Former Smoker       Packs/day: 2.00       Years: 20.00       Pack years: 40.00       Types: Cigarettes       Quit date:        Years since quittin.0   • Smokeless tobacco: Never Used   Substance and Sexual Activity   • Alcohol use: Yes       Comment: Social   • Drug use: Never   • Sexual activity: Not Currently       Partners: Female   Other Topics Concern   • Not on file   Social History Narrative   • Not on file      Social Determinants of Health      Financial Resource Strain: Not on file   Food Insecurity: Not on file   Transportation Needs: Not on file   Physical Activity: Not on file   Stress: Not on file   Social Connections: Not on file   Intimate Partner Violence: Not on file   Housing Stability: Not on file            Vitals:       Vitals:     22 1325   BP: 118/70   Pulse: 80   Resp: 20   Temp: (!) 35.9 °C (96.7 °F)   SpO2: 98%          Review of Systems:  A complete 14 point review of systems was conducted and was deemed negative except what was documented in the HPI.     Physical Examination:  Physical Exam   Constitutional: Oriented to person, place, and time. Appears well-developed and well-nourished.   Head: Normocephalic and atraumatic.   Right Ear: External ear normal.   Left Ear: External ear normal.   Nose: Nose normal.   Mouth/Throat: Oropharynx is clear and moist.   Eyes: Conjunctivae and EOM are normal. Pupils are equal, round, and reactive to light. No scleral icterus.   Neck: Normal range of motion. Neck supple.   Cardiovascular: Normal rate and regular rhythm.    Pulmonary/Chest: Effort normal   Musculoskeletal: No deformity. No pain with palpation of the midline lumbar spine, facets or soft tissue..   Skin: Skin is warm and dry. No rash noted. No erythema.   Psychiatric: Normal mood and affect. Speech is normal and behavior is normal. Thought content normal.   Vitals reviewed.        Neurological Examination:  Neurologic Exam      Mental Status   Oriented to person, place, and time.   Attention: normal.   Speech: speech is normal   Level of consciousness: alert     Cranial Nerves      CN II   Visual acuity: normal     CN III, IV, VI   Pupils are equal, round, and reactive to light.  Extraocular motions are normal.   Right pupil: Size: 3 mm. Shape: regular. Reactivity: brisk.   Left pupil: Size: 3 mm. Shape: regular. Reactivity: brisk.   CN III: no CN III palsy  CN VI: no CN VI palsy  Nystagmus: none      CN V   Facial sensation intact.      CN VIII   CN VIII normal.   Hearing: intact     CN IX, X   CN IX normal.   Palate: symmetric     CN XI   CN XI normal.   Right sternocleidomastoid strength: normal  Left sternocleidomastoid strength: normal     CN XII   CN XII normal.   Tongue: not atrophic     Sensation ( /2)     Right Left   Right Left   C5 2 2 L2 2 2   C6 2 2 L3 2 2   C7 2 2 L4 2 2   C8 2 2 L5 2 2   T1 2 2 S1 2 2       Motor:       Deltoid Biceps Triceps Wrist ext Finger ext Hand Intrinsics Hip flexion Knee ext Dorsi-  flexion EHL Plantar Flexion   R 5 5 5 5 5 5 4+ 5 5 5 5   L 5 5 5 5 5 5 5 5 5 5 5      There is no pronator drift. Muscle bulk and tone are normal.      Gait, Coordination, and Reflexes      Reflexes   Reflexes 2+ except as noted.   Right plantar: normal  Left plantar: normal  Right Maharaj: absent  Left Maharaj: absent  Right ankle clonus: 2 beats, non-sustained  Left ankle clonus: absent        Data Review:     Lab Results:         Lab Results   Component Value Date     WBC 7.0 11/20/2020     WBC 10.13 01/17/2020     HGB 12.7 (L) 11/20/2020     HGB 14.1 01/17/2020     HCT 40.0 11/20/2020     HCT 43.0 01/17/2020     MCV 88.9 11/20/2020     MCV 88.5 01/17/2020      11/20/2020      01/17/2020     RDW 13.3 11/20/2020     RDW 13.7 01/17/2020            Lab Results   Component Value Date     GLUCOSE 98 11/20/2020     GLUCOSE 225 (H) 05/02/2018     BUN 18 11/20/2020     BUN 24 (H) 04/17/2020      11/20/2020      05/02/2018     K 4.5 11/20/2020     K 4.6 05/02/2018      11/20/2020     CL 98 05/02/2018     CO2 27 11/20/2020     CO2 27 05/02/2018     ANIONGAP 12 05/02/2018         Imaging:   Independent review of all imaging was done by myself as well as review of the radiologists readings and comparison to prior films.      MRI lumbar spine 11/22/2021  There is redemonstration of a metastatic lesion essentially replacing the L3  vertebral body with new extension into the posterior elements bilaterally. The  lesion demonstrates rim enhancement and central necrosis. The enhancement  extends into the paravertebral soft tissues and into the medial aspect of the  psoas muscles bilaterally. There is a new oblique fracture line through the L3  vertebral body with associated mild loss of height. There is progression of  lesion extension in the right side of the L4 vertebral body superiorly  extending  into the posterior elements. There is also progression of lesion extension into  the left side of the superior aspect of the L4 vertebral body. There is edema  and enhancement in the superior endplate of L4 with mild loss of height of the  L4 vertebral body. The remainder of the lumbar vertebral bodies are maintained  in height. A trace retrolisthesis of L1 on L2 and of L3 on L4 are present which  appear similar. The alignment is otherwise preserved. Schmorl's nodes are noted  in the inferior endplates of T11, T12 and L1 which were present previously.     The conus medullaris terminates at L1-L2.     At T11-T12 and at T12-L1, there is no focal disc herniation, significant central  canal or neural foraminal narrowing. Mild facet hypertrophy is present.  At L1-L2, there is a disc osseous complex and mild facet hypertrophy. The thecal  sac and neural foramina are mildly effaced. This appears stable.  At L2-L3, there is a disc osseous complex and facet and ligamentous hypertrophy.  The thecal sac is mildly effaced. Mild bilateral neural foraminal narrowing is  present. This appears stable.  At L3-L4, there is new mild retropulsion of the inferior endplate of L3 into the  spinal canal. A disc osseous complex and diffuse disc bulge as well as facet and  ligamentous hypertrophy are present. The central canal is mild to moderately  narrowed. Moderate to severe right and moderate left neural foraminal narrowing  is present. Note is made of enhancement in the neural foramina bilaterally.  At L4-L5, there is a diffuse disc bulge, eccentric to the right, and facet and  ligamentous hypertrophy. In addition, a superimposed right foraminal protrusion  is present. The central canal is mildly narrowed tricompartmentally. Moderate  right and mild to moderate left neural foraminal narrowing is present. The  protrusion impinges upon the right L4 dorsal root ganglion and postganglionic  nerve root. This appears similar.  At  L5-S1, there is mild disc bulging and mild facet hypertrophy with mild  bilateral neural foraminal narrowing. This appears stable.          CT PET scan 12/7/2021  IMPRESSION:  Increased activity seen at the right margin of a destructive lesion involving  the L3 vertebral body with a max SUV of five.  Stable lytic lesion involving the inferior aspect of the right scapula with no  interval change in max SUV.          X-ray Lumbar spine 12/3/2021  IMPRESSION:  1.  Mixed lytic/sclerotic L3 lesion with mild compression deformity.  2.  Normal alignment in neutral, grade 1 L2-L3 spondylolisthesis in extension,  grade 1 L3-L4 spondylolisthesis in flexion.     CT lumbar spine 12/3/2021  IMPRESSION:  Pathologic fracture with large metastatic lytic lesion involving the L3  vertebral body, the extent of extraosseous extension is not well evaluated on  this study, please refer to recent MRI.  There is also suspicious vague lytic as  well as a sclerotic lesion of L4, however better delineated on the recent MRI as  well.  Degenerative spondylosis worst at L4-L5.     COMMENT:     Lumbosacral alignment: Anatomic.  Vertebral bodies: Redemonstrated is a large lytic mass with pathologic  fracture through the L3 vertebral body, particularly eccentric to the right,,  the extent of extra osseous metastatic disease cannot be well assessed on this  particular examination.  In addition, the lytic lesion eccentric to the right at  the L4 vertebral body is better demonstrated on the MRI as well.  There is also  a sclerotic focus of the left aspect of the L4 vertebral body.  No definite  sacral fractures or sacral lytic lesions.  Intervertebral discs: Disc osteophyte complex with subchondral sclerosis and  vacuum disc at L3-L4 with vacuum disc also noted at L2-L3.  Lumbosacral spinal canal: Patent.     Axial images:  T12-L1: Normal.  L1-2: Disc osteophyte complex with broad-based disc bulge contributing to  mild right foraminal  narrowing.  L2-3: Disc osteophyte complex with broad-based disc bulge without  consequence.  L3-4: Disc osteophyte complex with broad-based disc bulge and facet  arthrosis, posterior osseous spurring, contributing to moderate to moderate  severe bilateral foraminal narrowing.  Mild canal stenosis noted.  L4-5: Broad-based disc bulge, moderate facet hypertrophy and infolding of  the ligamenta flava, contributing to moderate to severe right and moderate left  foraminal narrowing, mild to moderate canal stenosis.  L5-S1: Mild broad-based disc bulge and facet arthrosis, without consequence.     OTHER: Marked atherosclerotic disease of abdominal aorta          Assessment / Plan:      In summary, Matt Williamson is a pleasant 75 y.o. male with thyroid cancer metastatic to L3 vertebral body s/p radiation stereotactic body radiation 5/2020 seen today by the neurosurgery service for surgical discussion regarding growth of his L3 vertebral body metastatic lesion. He has had progressive worsening midline-line low back pain with radiation to the right knee. An MRI lumbar spine was obtained 11/22/2021 showing extension of the L3 mass. He was sent for a PET scan 12/7/2021 showing increased activity of the L3 lesion without further metastatic lesions appreciated. He was referred to [place] however there has been some delay with getting an appointment.      He does not have any weakness or bowel/bladder incontinence. He is following with oncology (Dr. Casey) who does not recommend any chemotherapy at this time but surgical intervention as the primary treatment given the suspected isolated area of progression at the L3 vertebral body. He remains full strength on exam with 2 beats of ankle clonus on the left. His biceps, brachioradialis, patellar and achillis reflexes are normal. His sensation is intact.     Mr. Williamson has had progressive worsening midline back pain with increased activity of the L3 metastatic lesion. His  lumbar x-rays show overall maintained alignment with no significant focal kyphosis. His MRI shows moderate-severe foraminal narrowing on the right secondary to retropulsion of the inferior endplate in the setting of ligament and joint hypertrophy. There is some enhancement of the superior endplate of L4 but no activity appreciated on the PET scan. There is no significant central compression in the lumbar spine. His CT demonstrates significant bony erosion of the L3 vertebral body with a new oblique fracture.      Mr. Williamson was initially referred to [facility] in [place] for spondylectomy given the questionable pedicle involvement. However, PET scan does not show posterior column activity and a L3 corpectomy would be appropriate. We have scheduled him for Stage 1: L2-L4 PSF with instrumentation, ORIF L3 pathologic fracture  Stage 2: L3 lateral corpectomy with expandable cage placement 1/20/2022. We have obtained cardiac clearance.     The risks, benefits, and alternatives to the procedure were discussed in detail between Marco A Williamson, his wife and Colt Heller MD.  The risks include but are not limited to: local bleeding (perhaps requiring transfusion), infection, cerebrospinal fluid leakage, failure of fusion (pseudoarthrosis), hardware malplacement, accelerated spinal degeneration, proximal junction kyphosis, new neurologic symptoms of weakness, sensory, bowel, bladder, sexual disturbance, paralysis, cardiac/pulmonary/renal/hepatic event in the perioperative period, blindness from prolonged prone positioning, allergic response to the anesthetic, death. The patient acknowledges all the above and would like to proceed with surgery at this time. The patient understood the risks and benefits and agreed to proceed.            Colt Heller MD

## 2022-01-20 ENCOUNTER — APPOINTMENT (INPATIENT)
Dept: RADIOLOGY | Facility: HOSPITAL | Age: 77
DRG: 454 | End: 2022-01-20
Attending: NURSE PRACTITIONER
Payer: MEDICARE

## 2022-01-20 ENCOUNTER — ANESTHESIA (OUTPATIENT)
Dept: OPERATING ROOM | Facility: HOSPITAL | Age: 77
Setting detail: SURGERY ADMIT
DRG: 454 | End: 2022-01-20
Payer: MEDICARE

## 2022-01-20 ENCOUNTER — APPOINTMENT (OUTPATIENT)
Dept: RADIOLOGY | Facility: HOSPITAL | Age: 77
Setting detail: SURGERY ADMIT
DRG: 454 | End: 2022-01-20
Attending: NEUROLOGICAL SURGERY
Payer: MEDICARE

## 2022-01-20 ENCOUNTER — APPOINTMENT (INPATIENT)
Dept: RADIOLOGY | Facility: HOSPITAL | Age: 77
DRG: 454 | End: 2022-01-20
Attending: PHYSICIAN ASSISTANT
Payer: MEDICARE

## 2022-01-20 ENCOUNTER — HOSPITAL ENCOUNTER (INPATIENT)
Facility: HOSPITAL | Age: 77
LOS: 7 days | DRG: 454 | End: 2022-01-27
Attending: NEUROLOGICAL SURGERY | Admitting: NEUROLOGICAL SURGERY
Payer: MEDICARE

## 2022-01-20 DIAGNOSIS — C41.2 MALIGNANT NEOPLASM OF VERTEBRAL COLUMN, EXCLUDING SACRUM AND COCCYX (CMS/HCC): ICD-10-CM

## 2022-01-20 DIAGNOSIS — M54.41 LOW BACK PAIN WITH RIGHT-SIDED SCIATICA, UNSPECIFIED BACK PAIN LATERALITY, UNSPECIFIED CHRONICITY: ICD-10-CM

## 2022-01-20 LAB
ABO + RH BLD: NORMAL
ALBUMIN SERPL-MCNC: 3 G/DL (ref 3.4–5)
ALP SERPL-CCNC: 55 IU/L (ref 35–126)
ALT SERPL-CCNC: 15 IU/L (ref 16–63)
ANION GAP SERPL CALC-SCNC: 19 MEQ/L (ref 3–15)
AST SERPL-CCNC: 21 IU/L (ref 15–41)
BASE EXCESS BLDA CALC-SCNC: -4 MEQ/L
BASE EXCESS BLDA CALC-SCNC: -8.9 MEQ/L
BASE EXCESS BLDA CALC-SCNC: -8.9 MEQ/L
BASOPHILS # BLD: 0.03 K/UL (ref 0.01–0.1)
BASOPHILS NFR BLD: 0.2 %
BILIRUB SERPL-MCNC: 1.1 MG/DL (ref 0.3–1.2)
BLD GP AB SCN SERPL QL: NEGATIVE
BUN SERPL-MCNC: 11 MG/DL (ref 8–20)
CA-I BLD-SCNC: 1.1 MMOL/L (ref 1.15–1.27)
CA-I BLD-SCNC: 1.12 MMOL/L (ref 1.15–1.27)
CA-I BLD-SCNC: 1.13 MMOL/L (ref 1.15–1.27)
CALCIUM SERPL-MCNC: 8.3 MG/DL (ref 8.9–10.3)
CHLORIDE BLDA-SCNC: 104 MEQ/L (ref 98–109)
CHLORIDE BLDA-SCNC: 105 MEQ/L (ref 98–109)
CHLORIDE BLDA-SCNC: 106 MEQ/L (ref 98–109)
CHLORIDE SERPL-SCNC: 104 MEQ/L (ref 98–109)
CO2 BLDA-SCNC: 17 MEQ/L (ref 22–32)
CO2 BLDA-SCNC: 17.9 MEQ/L (ref 22–32)
CO2 BLDA-SCNC: 22 MEQ/L (ref 22–32)
CO2 SERPL-SCNC: 16 MEQ/L (ref 22–32)
COHGB MFR BLD: 1.3 %
COHGB MFR BLD: 1.3 %
CREAT SERPL-MCNC: 1 MG/DL (ref 0.8–1.3)
D AG BLD QL: POSITIVE
DIFFERENTIAL METHOD BLD: ABNORMAL
EOSINOPHIL # BLD: 0.01 K/UL (ref 0.04–0.54)
EOSINOPHIL NFR BLD: 0.1 %
ERYTHROCYTE [DISTWIDTH] IN BLOOD BY AUTOMATED COUNT: 14.1 % (ref 11.6–14.4)
FIO2 ON VENT: 30 %
FIO2 ON VENT: 50 %
GFR SERPL CREATININE-BSD FRML MDRD: >60 ML/MIN/1.73M*2
GLUCOSE BLD-MCNC: 126 MG/DL (ref 70–99)
GLUCOSE BLDA-MCNC: 144 MG/DL (ref 70–99)
GLUCOSE BLDA-MCNC: 168 MG/DL (ref 70–99)
GLUCOSE SERPL-MCNC: 199 MG/DL (ref 70–99)
HCO3 BLDA-SCNC: 18 MEQ/L (ref 21–28)
HCO3 BLDA-SCNC: 18 MEQ/L (ref 21–28)
HCO3 BLDA-SCNC: 21.8 MEQ/L (ref 21–28)
HCT VFR BLDCO AUTO: 39.3 % (ref 40.1–51)
HGB BLD-MCNC: 12.7 G/DL (ref 13.7–17.5)
HGB BLDA OXIMETRY-MCNC: 13 G/DL (ref 14–17.5)
HGB BLDA OXIMETRY-MCNC: 13.4 G/DL (ref 14–17.5)
HGB BLDA-MCNC: 13 G/DL (ref 14–17.5)
IMM GRANULOCYTES # BLD AUTO: 0.12 K/UL (ref 0–0.08)
IMM GRANULOCYTES NFR BLD AUTO: 0.7 %
INHALED O2 CONCENTRATION: ABNORMAL %
INHALED O2 CONCENTRATION: ABNORMAL %
LABORATORY COMMENT REPORT: NORMAL
LACTATE BLDA-SCNC: 0.9 MMOL/L (ref 0.4–1.6)
LACTATE BLDA-SCNC: 1 MMOL/L (ref 0.4–1.6)
LACTATE BLDA-SCNC: 1.2 MMOL/L (ref 0.4–1.6)
LYMPHOCYTES # BLD: 0.66 K/UL (ref 1.2–3.5)
LYMPHOCYTES NFR BLD: 3.9 %
MCH RBC QN AUTO: 27.3 PG (ref 28–33.2)
MCHC RBC AUTO-ENTMCNC: 32.3 G/DL (ref 32.2–36.5)
MCV RBC AUTO: 84.3 FL (ref 83–98)
METHGB BLD-SCNC: 0.7 % (ref 0.4–1.5)
METHGB BLD-SCNC: 1 % (ref 0.4–1.5)
MONOCYTES # BLD: 1.21 K/UL (ref 0.3–1)
MONOCYTES NFR BLD: 7.1 %
NEUTROPHILS # BLD: 14.99 K/UL (ref 1.7–7)
NEUTS SEG NFR BLD: 88 %
NRBC BLD-RTO: 0 %
PCO2 BLDA: 31 MM HG (ref 35–48)
PCO2 BLDA: 35 MM HG (ref 35–48)
PCO2 BLDA: 37 MM HG (ref 35–48)
PDW BLD AUTO: 9.2 FL (ref 9.4–12.4)
PH BLDA: 7.29 [PH] (ref 7.35–7.45)
PH BLDA: 7.32 PH (ref 7.35–7.45)
PH BLDA: 7.36 [PH] (ref 7.35–7.45)
PLATELET # BLD AUTO: 251 K/UL (ref 150–350)
PO2 BLDA: 175 MM HG (ref 83–100)
PO2 BLDA: 322 MM HG (ref 83–100)
PO2 BLDA: 90 MM HG (ref 83–100)
POCT PATIENT TEMPERATURE: 98.6 °F (ref 97–99)
POCT TEST (BLD GAS): ABNORMAL
POCT TEST: ABNORMAL
POTASSIUM BLDA-SCNC: 4.2 MEQ/L (ref 3.4–4.5)
POTASSIUM BLDA-SCNC: 4.2 MEQ/L (ref 3.4–4.5)
POTASSIUM BLDA-SCNC: 4.6 MEQ/L (ref 3.4–4.5)
POTASSIUM SERPL-SCNC: 4.7 MEQ/L (ref 3.6–5.1)
PROT SERPL-MCNC: 5.5 G/DL (ref 6–8.2)
RBC # BLD AUTO: 4.66 M/UL (ref 4.5–5.8)
SAO2 % BLDA: 96 % (ref 93–98)
SAO2 % BLDA: 97 % (ref 93–98)
SAO2 % BLDA: 98 % (ref 93–98)
SODIUM BLDA-SCNC: 133 MEQ/L (ref 136–145)
SODIUM BLDA-SCNC: 136 MEQ/L (ref 136–145)
SODIUM BLDA-SCNC: 136 MEQ/L (ref 136–145)
SODIUM SERPL-SCNC: 139 MEQ/L (ref 136–144)
SPECIMEN EXP DATE BLD: NORMAL
WBC # BLD AUTO: 17.02 K/UL (ref 3.8–10.5)

## 2022-01-20 PROCEDURE — 85025 COMPLETE CBC W/AUTO DIFF WBC: CPT | Performed by: INTERNAL MEDICINE

## 2022-01-20 PROCEDURE — 3E0U0GB INTRODUCTION OF RECOMBINANT BONE MORPHOGENETIC PROTEIN INTO JOINTS, OPEN APPROACH: ICD-10-PCS | Performed by: NEUROLOGICAL SURGERY

## 2022-01-20 PROCEDURE — 63700000 HC SELF-ADMINISTRABLE DRUG: Performed by: NEUROLOGICAL SURGERY

## 2022-01-20 PROCEDURE — 22585 ARTHRD ANT NTRBD MIN DSC EA: CPT | Mod: 80 | Performed by: NEUROLOGICAL SURGERY

## 2022-01-20 PROCEDURE — 22854 INSJ BIOMECHANICAL DEVICE: CPT | Performed by: NEUROLOGICAL SURGERY

## 2022-01-20 PROCEDURE — 72100 X-RAY EXAM L-S SPINE 2/3 VWS: CPT

## 2022-01-20 PROCEDURE — 22614 ARTHRD PST TQ 1NTRSPC EA ADD: CPT | Performed by: NEUROLOGICAL SURGERY

## 2022-01-20 PROCEDURE — 22585 ARTHRD ANT NTRBD MIN DSC EA: CPT | Performed by: NEUROLOGICAL SURGERY

## 2022-01-20 PROCEDURE — 63090 REMOVE VERT BODY DCMPRN LMBR: CPT | Performed by: NEUROLOGICAL SURGERY

## 2022-01-20 PROCEDURE — 82375 ASSAY CARBOXYHB QUANT: CPT | Performed by: NURSE ANESTHETIST, CERTIFIED REGISTERED

## 2022-01-20 PROCEDURE — 22612 ARTHRD PST TQ 1NTRSPC LUMBAR: CPT | Mod: 80 | Performed by: NEUROLOGICAL SURGERY

## 2022-01-20 PROCEDURE — 63600000 HC DRUGS/DETAIL CODE: Performed by: NURSE PRACTITIONER

## 2022-01-20 PROCEDURE — 86901 BLOOD TYPING SEROLOGIC RH(D): CPT

## 2022-01-20 PROCEDURE — 63600000 HC DRUGS/DETAIL CODE: Performed by: PHYSICIAN ASSISTANT

## 2022-01-20 PROCEDURE — 25800000 HC PHARMACY IV SOLUTIONS: Performed by: NEUROLOGICAL SURGERY

## 2022-01-20 PROCEDURE — 80053 COMPREHEN METABOLIC PANEL: CPT | Performed by: INTERNAL MEDICINE

## 2022-01-20 PROCEDURE — 63600000 HC DRUGS/DETAIL CODE: Performed by: NURSE ANESTHETIST, CERTIFIED REGISTERED

## 2022-01-20 PROCEDURE — 25000000 HC PHARMACY GENERAL: Performed by: NURSE ANESTHETIST, CERTIFIED REGISTERED

## 2022-01-20 PROCEDURE — 25000000 HC PHARMACY GENERAL: Performed by: NEUROLOGICAL SURGERY

## 2022-01-20 PROCEDURE — 0SG00K1 FUSION OF LUMBAR VERTEBRAL JOINT WITH NONAUTOLOGOUS TISSUE SUBSTITUTE, POSTERIOR APPROACH, POSTERIOR COLUMN, OPEN APPROACH: ICD-10-PCS | Performed by: NEUROLOGICAL SURGERY

## 2022-01-20 PROCEDURE — 22842 INSERT SPINE FIXATION DEVICE: CPT | Mod: 80 | Performed by: NEUROLOGICAL SURGERY

## 2022-01-20 PROCEDURE — 20930 SP BONE ALGRFT MORSEL ADD-ON: CPT | Performed by: NEUROLOGICAL SURGERY

## 2022-01-20 PROCEDURE — 22558 ARTHRD ANT NTRBD MIN DSC LUM: CPT | Mod: 80 | Performed by: NEUROLOGICAL SURGERY

## 2022-01-20 PROCEDURE — 63090 REMOVE VERT BODY DCMPRN LMBR: CPT | Mod: 80 | Performed by: NEUROLOGICAL SURGERY

## 2022-01-20 PROCEDURE — 36000014 HC OR LEVEL 4 EA ADDL MIN: Performed by: NEUROLOGICAL SURGERY

## 2022-01-20 PROCEDURE — 20936 SP BONE AGRFT LOCAL ADD-ON: CPT | Performed by: NEUROLOGICAL SURGERY

## 2022-01-20 PROCEDURE — 25000000 HC PHARMACY GENERAL: Performed by: PHYSICIAN ASSISTANT

## 2022-01-20 PROCEDURE — 0SG10A0 FUSION OF 2 OR MORE LUMBAR VERTEBRAL JOINTS WITH INTERBODY FUSION DEVICE, ANTERIOR APPROACH, ANTERIOR COLUMN, OPEN APPROACH: ICD-10-PCS | Performed by: NEUROLOGICAL SURGERY

## 2022-01-20 PROCEDURE — 37000001 HC ANESTHESIA GENERAL: Performed by: NEUROLOGICAL SURGERY

## 2022-01-20 PROCEDURE — 22612 ARTHRD PST TQ 1NTRSPC LUMBAR: CPT | Performed by: NEUROLOGICAL SURGERY

## 2022-01-20 PROCEDURE — 88341 IMHCHEM/IMCYTCHM EA ADD ANTB: CPT | Performed by: NEUROLOGICAL SURGERY

## 2022-01-20 PROCEDURE — 0QS004Z REPOSITION LUMBAR VERTEBRA WITH INTERNAL FIXATION DEVICE, OPEN APPROACH: ICD-10-PCS | Performed by: NEUROLOGICAL SURGERY

## 2022-01-20 PROCEDURE — 25800000 HC PHARMACY IV SOLUTIONS: Performed by: NURSE ANESTHETIST, CERTIFIED REGISTERED

## 2022-01-20 PROCEDURE — 84132 ASSAY OF SERUM POTASSIUM: CPT | Performed by: NURSE ANESTHETIST, CERTIFIED REGISTERED

## 2022-01-20 PROCEDURE — 27800000 HC SUPPLY/IMPLANTS: Performed by: NEUROLOGICAL SURGERY

## 2022-01-20 PROCEDURE — 63700000 HC SELF-ADMINISTRABLE DRUG: Performed by: PHYSICIAN ASSISTANT

## 2022-01-20 PROCEDURE — C1713 ANCHOR/SCREW BN/BN,TIS/BN: HCPCS | Performed by: NEUROLOGICAL SURGERY

## 2022-01-20 PROCEDURE — 22854 INSJ BIOMECHANICAL DEVICE: CPT | Mod: 80 | Performed by: NEUROLOGICAL SURGERY

## 2022-01-20 PROCEDURE — G1004 CDSM NDSC: HCPCS

## 2022-01-20 PROCEDURE — 36415 COLL VENOUS BLD VENIPUNCTURE: CPT | Performed by: NEUROLOGICAL SURGERY

## 2022-01-20 PROCEDURE — 22325 TREAT SPINE FRACTURE: CPT | Performed by: NEUROLOGICAL SURGERY

## 2022-01-20 PROCEDURE — 20000000 HC ROOM AND CARE ICU

## 2022-01-20 PROCEDURE — 27200000 HC STERILE SUPPLY: Performed by: NEUROLOGICAL SURGERY

## 2022-01-20 PROCEDURE — 83605 ASSAY OF LACTIC ACID: CPT | Performed by: NURSE ANESTHETIST, CERTIFIED REGISTERED

## 2022-01-20 PROCEDURE — 25800000 HC PHARMACY IV SOLUTIONS: Performed by: PHYSICIAN ASSISTANT

## 2022-01-20 PROCEDURE — 22842 INSERT SPINE FIXATION DEVICE: CPT | Performed by: NEUROLOGICAL SURGERY

## 2022-01-20 PROCEDURE — 0ST20ZZ RESECTION OF LUMBAR VERTEBRAL DISC, OPEN APPROACH: ICD-10-PCS | Performed by: NEUROLOGICAL SURGERY

## 2022-01-20 PROCEDURE — 21600 PARTIAL REMOVAL OF RIB: CPT | Performed by: NEUROLOGICAL SURGERY

## 2022-01-20 PROCEDURE — 21600 PARTIAL REMOVAL OF RIB: CPT | Mod: 80 | Performed by: NEUROLOGICAL SURGERY

## 2022-01-20 PROCEDURE — 63600000 HC DRUGS/DETAIL CODE: Mod: JW | Performed by: NURSE ANESTHETIST, CERTIFIED REGISTERED

## 2022-01-20 PROCEDURE — 22558 ARTHRD ANT NTRBD MIN DSC LUM: CPT | Performed by: NEUROLOGICAL SURGERY

## 2022-01-20 PROCEDURE — 86920 COMPATIBILITY TEST SPIN: CPT

## 2022-01-20 PROCEDURE — 22614 ARTHRD PST TQ 1NTRSPC EA ADD: CPT | Mod: 80 | Performed by: NEUROLOGICAL SURGERY

## 2022-01-20 PROCEDURE — 61783 SCAN PROC SPINAL: CPT | Performed by: NEUROLOGICAL SURGERY

## 2022-01-20 PROCEDURE — 63600000 HC DRUGS/DETAIL CODE: Performed by: NEUROLOGICAL SURGERY

## 2022-01-20 PROCEDURE — 70450 CT HEAD/BRAIN W/O DYE: CPT | Mod: ME

## 2022-01-20 PROCEDURE — 22325 TREAT SPINE FRACTURE: CPT | Mod: 80 | Performed by: NEUROLOGICAL SURGERY

## 2022-01-20 PROCEDURE — 36000004 HC OR LEVEL 4 INITIAL 30MIN: Performed by: NEUROLOGICAL SURGERY

## 2022-01-20 DEVICE — IMPLANTABLE DEVICE: Type: IMPLANTABLE DEVICE | Site: BACK | Status: FUNCTIONAL

## 2022-01-20 DEVICE — SCREW PEDICLE 6.5 X 45MM: Type: IMPLANTABLE DEVICE | Site: BACK | Status: FUNCTIONAL

## 2022-01-20 DEVICE — CAPS LOCKING: Type: IMPLANTABLE DEVICE | Site: BACK | Status: FUNCTIONAL

## 2022-01-20 RX ORDER — METFORMIN HYDROCHLORIDE 500 MG/1
500 TABLET ORAL
Status: DISCONTINUED | OUTPATIENT
Start: 2022-01-20 | End: 2022-01-20

## 2022-01-20 RX ORDER — MIDAZOLAM HYDROCHLORIDE 2 MG/2ML
INJECTION, SOLUTION INTRAMUSCULAR; INTRAVENOUS AS NEEDED
Status: DISCONTINUED | OUTPATIENT
Start: 2022-01-20 | End: 2022-01-20 | Stop reason: SURG

## 2022-01-20 RX ORDER — GLIPIZIDE 10 MG/1
5 TABLET ORAL AS NEEDED
Status: DISCONTINUED | OUTPATIENT
Start: 2022-01-20 | End: 2022-01-20

## 2022-01-20 RX ORDER — DIPHENHYDRAMINE HCL 25 MG
25 CAPSULE ORAL EVERY 6 HOURS PRN
Status: DISCONTINUED | OUTPATIENT
Start: 2022-01-20 | End: 2022-01-27 | Stop reason: HOSPADM

## 2022-01-20 RX ORDER — SODIUM CHLORIDE, SODIUM GLUCONATE, SODIUM ACETATE, POTASSIUM CHLORIDE AND MAGNESIUM CHLORIDE 30; 37; 368; 526; 502 MG/100ML; MG/100ML; MG/100ML; MG/100ML; MG/100ML
INJECTION, SOLUTION INTRAVENOUS CONTINUOUS PRN
Status: DISCONTINUED | OUTPATIENT
Start: 2022-01-20 | End: 2022-01-20 | Stop reason: SURG

## 2022-01-20 RX ORDER — PROPOFOL 10 MG/ML
INJECTION, EMULSION INTRAVENOUS AS NEEDED
Status: DISCONTINUED | OUTPATIENT
Start: 2022-01-20 | End: 2022-01-20 | Stop reason: SURG

## 2022-01-20 RX ORDER — SODIUM CHLORIDE 9 MG/ML
INJECTION, SOLUTION INTRAVENOUS CONTINUOUS
Status: DISCONTINUED | OUTPATIENT
Start: 2022-01-20 | End: 2022-01-20 | Stop reason: HOSPADM

## 2022-01-20 RX ORDER — OXYCODONE HYDROCHLORIDE 5 MG/1
5-10 TABLET ORAL EVERY 4 HOURS PRN
Status: DISCONTINUED | OUTPATIENT
Start: 2022-01-20 | End: 2022-01-27 | Stop reason: HOSPADM

## 2022-01-20 RX ORDER — DEXAMETHASONE SODIUM PHOSPHATE 4 MG/ML
4 INJECTION, SOLUTION INTRA-ARTICULAR; INTRALESIONAL; INTRAMUSCULAR; INTRAVENOUS; SOFT TISSUE EVERY 12 HOURS
Status: COMPLETED | OUTPATIENT
Start: 2022-01-20 | End: 2022-01-22

## 2022-01-20 RX ORDER — ONDANSETRON HYDROCHLORIDE 2 MG/ML
INJECTION, SOLUTION INTRAVENOUS AS NEEDED
Status: DISCONTINUED | OUTPATIENT
Start: 2022-01-20 | End: 2022-01-20 | Stop reason: SURG

## 2022-01-20 RX ORDER — FENTANYL CITRATE 50 UG/ML
INJECTION, SOLUTION INTRAMUSCULAR; INTRAVENOUS AS NEEDED
Status: DISCONTINUED | OUTPATIENT
Start: 2022-01-20 | End: 2022-01-20 | Stop reason: SURG

## 2022-01-20 RX ORDER — ROCURONIUM BROMIDE 50 MG/5 ML
SYRINGE (ML) INTRAVENOUS AS NEEDED
Status: DISCONTINUED | OUTPATIENT
Start: 2022-01-20 | End: 2022-01-20 | Stop reason: SURG

## 2022-01-20 RX ORDER — ONDANSETRON HYDROCHLORIDE 2 MG/ML
4 INJECTION, SOLUTION INTRAVENOUS EVERY 8 HOURS PRN
Status: DISCONTINUED | OUTPATIENT
Start: 2022-01-20 | End: 2022-01-27 | Stop reason: HOSPADM

## 2022-01-20 RX ORDER — PRAVASTATIN SODIUM 20 MG/1
40 TABLET ORAL DAILY
Status: DISCONTINUED | OUTPATIENT
Start: 2022-01-20 | End: 2022-01-27 | Stop reason: HOSPADM

## 2022-01-20 RX ORDER — DEXTROSE 40 %
15-30 GEL (GRAM) ORAL AS NEEDED
Status: DISCONTINUED | OUTPATIENT
Start: 2022-01-20 | End: 2022-01-20

## 2022-01-20 RX ORDER — BISACODYL 10 MG/1
10 SUPPOSITORY RECTAL DAILY PRN
Status: DISCONTINUED | OUTPATIENT
Start: 2022-01-20 | End: 2022-01-25

## 2022-01-20 RX ORDER — LEVOTHYROXINE SODIUM 75 UG/1
150 TABLET ORAL
Status: DISCONTINUED | OUTPATIENT
Start: 2022-01-21 | End: 2022-01-27 | Stop reason: HOSPADM

## 2022-01-20 RX ORDER — VANCOMYCIN HYDROCHLORIDE 1 G/20ML
INJECTION, POWDER, LYOPHILIZED, FOR SOLUTION INTRAVENOUS
Status: DISCONTINUED | OUTPATIENT
Start: 2022-01-20 | End: 2022-01-20 | Stop reason: HOSPADM

## 2022-01-20 RX ORDER — SODIUM CHLORIDE 9 MG/ML
INJECTION, SOLUTION INTRAVENOUS CONTINUOUS
Status: DISCONTINUED | OUTPATIENT
Start: 2022-01-20 | End: 2022-01-21

## 2022-01-20 RX ORDER — DEXTROSE 40 %
15-30 GEL (GRAM) ORAL AS NEEDED
Status: DISCONTINUED | OUTPATIENT
Start: 2022-01-20 | End: 2022-01-27 | Stop reason: HOSPADM

## 2022-01-20 RX ORDER — METOPROLOL SUCCINATE 100 MG/1
100 TABLET, EXTENDED RELEASE ORAL 2 TIMES DAILY
Status: DISCONTINUED | OUTPATIENT
Start: 2022-01-20 | End: 2022-01-27 | Stop reason: HOSPADM

## 2022-01-20 RX ORDER — INSULIN ASPART 100 [IU]/ML
3-5 INJECTION, SOLUTION INTRAVENOUS; SUBCUTANEOUS EVERY 6 HOURS
Status: DISCONTINUED | OUTPATIENT
Start: 2022-01-20 | End: 2022-01-21

## 2022-01-20 RX ORDER — AMOXICILLIN 250 MG
1 CAPSULE ORAL 2 TIMES DAILY
Status: DISCONTINUED | OUTPATIENT
Start: 2022-01-20 | End: 2022-01-27 | Stop reason: HOSPADM

## 2022-01-20 RX ORDER — CEFAZOLIN SODIUM/WATER 2 G/20 ML
2 SYRINGE (ML) INTRAVENOUS
Status: COMPLETED | OUTPATIENT
Start: 2022-01-20 | End: 2022-01-20

## 2022-01-20 RX ORDER — ACETAMINOPHEN 325 MG/1
650 TABLET ORAL EVERY 6 HOURS
Status: DISPENSED | OUTPATIENT
Start: 2022-01-20 | End: 2022-01-22

## 2022-01-20 RX ORDER — ALUMINUM HYDROXIDE, MAGNESIUM HYDROXIDE, AND SIMETHICONE 1200; 120; 1200 MG/30ML; MG/30ML; MG/30ML
30 SUSPENSION ORAL EVERY 4 HOURS PRN
Status: DISCONTINUED | OUTPATIENT
Start: 2022-01-20 | End: 2022-01-27 | Stop reason: HOSPADM

## 2022-01-20 RX ORDER — ONDANSETRON HYDROCHLORIDE 2 MG/ML
4 INJECTION, SOLUTION INTRAVENOUS
Status: DISCONTINUED | OUTPATIENT
Start: 2022-01-20 | End: 2022-01-21

## 2022-01-20 RX ORDER — PHENYLEPHRINE HYDROCHLORIDE 10 MG/ML
INJECTION INTRAVENOUS AS NEEDED
Status: DISCONTINUED | OUTPATIENT
Start: 2022-01-20 | End: 2022-01-20 | Stop reason: SURG

## 2022-01-20 RX ORDER — HEPARIN SODIUM 5000 [USP'U]/ML
5000 INJECTION, SOLUTION INTRAVENOUS; SUBCUTANEOUS EVERY 8 HOURS
Status: DISCONTINUED | OUTPATIENT
Start: 2022-01-21 | End: 2022-01-27 | Stop reason: HOSPADM

## 2022-01-20 RX ORDER — PHENYLEPHRINE HCL IN 0.9% NACL 50MG/250ML
10-200 PLASTIC BAG, INJECTION (ML) INTRAVENOUS
Status: DISCONTINUED | OUTPATIENT
Start: 2022-01-20 | End: 2022-01-20

## 2022-01-20 RX ORDER — SODIUM BICARBONATE 1 MEQ/ML
SYRINGE (ML) INTRAVENOUS AS NEEDED
Status: DISCONTINUED | OUTPATIENT
Start: 2022-01-20 | End: 2022-01-20 | Stop reason: SURG

## 2022-01-20 RX ORDER — DIAZEPAM 5 MG/1
5 TABLET ORAL EVERY 6 HOURS PRN
Status: DISCONTINUED | OUTPATIENT
Start: 2022-01-20 | End: 2022-01-27 | Stop reason: HOSPADM

## 2022-01-20 RX ORDER — HYDROMORPHONE HYDROCHLORIDE 1 MG/ML
0.5 INJECTION, SOLUTION INTRAMUSCULAR; INTRAVENOUS; SUBCUTANEOUS
Status: DISCONTINUED | OUTPATIENT
Start: 2022-01-20 | End: 2022-01-27 | Stop reason: HOSPADM

## 2022-01-20 RX ORDER — POLYETHYLENE GLYCOL 3350 17 G/17G
17 POWDER, FOR SOLUTION ORAL DAILY
Status: DISCONTINUED | OUTPATIENT
Start: 2022-01-20 | End: 2022-01-27 | Stop reason: HOSPADM

## 2022-01-20 RX ORDER — ONDANSETRON 4 MG/1
4 TABLET, ORALLY DISINTEGRATING ORAL EVERY 8 HOURS PRN
Status: DISCONTINUED | OUTPATIENT
Start: 2022-01-20 | End: 2022-01-27 | Stop reason: HOSPADM

## 2022-01-20 RX ORDER — FENTANYL CITRATE 50 UG/ML
50 INJECTION, SOLUTION INTRAMUSCULAR; INTRAVENOUS
Status: DISCONTINUED | OUTPATIENT
Start: 2022-01-20 | End: 2022-01-20

## 2022-01-20 RX ORDER — DEXAMETHASONE SODIUM PHOSPHATE 4 MG/ML
INJECTION, SOLUTION INTRA-ARTICULAR; INTRALESIONAL; INTRAMUSCULAR; INTRAVENOUS; SOFT TISSUE AS NEEDED
Status: DISCONTINUED | OUTPATIENT
Start: 2022-01-20 | End: 2022-01-20 | Stop reason: SURG

## 2022-01-20 RX ORDER — HYDROMORPHONE HYDROCHLORIDE 1 MG/ML
INJECTION, SOLUTION INTRAMUSCULAR; INTRAVENOUS; SUBCUTANEOUS AS NEEDED
Status: DISCONTINUED | OUTPATIENT
Start: 2022-01-20 | End: 2022-01-20 | Stop reason: SURG

## 2022-01-20 RX ORDER — DEXTROSE 50 % IN WATER (D50W) INTRAVENOUS SYRINGE
25 AS NEEDED
Status: DISCONTINUED | OUTPATIENT
Start: 2022-01-20 | End: 2022-01-20

## 2022-01-20 RX ORDER — PANTOPRAZOLE SODIUM 40 MG/10ML
40 INJECTION, POWDER, LYOPHILIZED, FOR SOLUTION INTRAVENOUS EVERY 24 HOURS
Status: DISCONTINUED | OUTPATIENT
Start: 2022-01-20 | End: 2022-01-21

## 2022-01-20 RX ORDER — IBUPROFEN 200 MG
16-32 TABLET ORAL AS NEEDED
Status: DISCONTINUED | OUTPATIENT
Start: 2022-01-20 | End: 2022-01-27 | Stop reason: HOSPADM

## 2022-01-20 RX ORDER — LIDOCAINE HYDROCHLORIDE 10 MG/ML
INJECTION, SOLUTION INFILTRATION; PERINEURAL AS NEEDED
Status: DISCONTINUED | OUTPATIENT
Start: 2022-01-20 | End: 2022-01-20 | Stop reason: SURG

## 2022-01-20 RX ORDER — DIPHENHYDRAMINE HCL 50 MG/ML
25 VIAL (ML) INJECTION EVERY 6 HOURS PRN
Status: DISCONTINUED | OUTPATIENT
Start: 2022-01-20 | End: 2022-01-27 | Stop reason: HOSPADM

## 2022-01-20 RX ORDER — PROPOFOL 10 MG/ML
5-80 INJECTION, EMULSION INTRAVENOUS
Status: DISCONTINUED | OUTPATIENT
Start: 2022-01-20 | End: 2022-01-20

## 2022-01-20 RX ORDER — DEXTROSE 50 % IN WATER (D50W) INTRAVENOUS SYRINGE
25 AS NEEDED
Status: DISCONTINUED | OUTPATIENT
Start: 2022-01-20 | End: 2022-01-27 | Stop reason: HOSPADM

## 2022-01-20 RX ORDER — GABAPENTIN 300 MG/1
600 CAPSULE ORAL 2 TIMES DAILY
Status: DISCONTINUED | OUTPATIENT
Start: 2022-01-20 | End: 2022-01-21

## 2022-01-20 RX ORDER — PANTOPRAZOLE SODIUM 40 MG/1
40 TABLET, DELAYED RELEASE ORAL EVERY EVENING
Status: DISCONTINUED | OUTPATIENT
Start: 2022-01-20 | End: 2022-01-27 | Stop reason: HOSPADM

## 2022-01-20 RX ORDER — EPHEDRINE SULFATE 50 MG/ML
INJECTION, SOLUTION INTRAVENOUS AS NEEDED
Status: DISCONTINUED | OUTPATIENT
Start: 2022-01-20 | End: 2022-01-20

## 2022-01-20 RX ORDER — TRANEXAMIC ACID 10 MG/ML
1000 INJECTION, SOLUTION INTRAVENOUS ONCE
Status: COMPLETED | OUTPATIENT
Start: 2022-01-20 | End: 2022-01-20

## 2022-01-20 RX ORDER — IBUPROFEN 200 MG
16-32 TABLET ORAL AS NEEDED
Status: DISCONTINUED | OUTPATIENT
Start: 2022-01-20 | End: 2022-01-20

## 2022-01-20 RX ORDER — BUPIVACAINE HYDROCHLORIDE AND EPINEPHRINE 5; 5 MG/ML; UG/ML
INJECTION, SOLUTION EPIDURAL; INTRACAUDAL; PERINEURAL
Status: DISCONTINUED | OUTPATIENT
Start: 2022-01-20 | End: 2022-01-20 | Stop reason: HOSPADM

## 2022-01-20 RX ADMIN — PHENYLEPHRINE HYDROCHLORIDE 100 MCG: 10 INJECTION INTRAVENOUS at 08:24

## 2022-01-20 RX ADMIN — OXYCODONE HYDROCHLORIDE 5 MG: 5 TABLET ORAL at 21:06

## 2022-01-20 RX ADMIN — PHENYLEPHRINE HYDROCHLORIDE 50 MCG: 10 INJECTION INTRAVENOUS at 14:46

## 2022-01-20 RX ADMIN — CEFAZOLIN 2 G: 330 INJECTION, POWDER, FOR SOLUTION INTRAMUSCULAR; INTRAVENOUS at 08:00

## 2022-01-20 RX ADMIN — SODIUM CHLORIDE, SODIUM GLUCONATE, SODIUM ACETATE, POTASSIUM CHLORIDE AND MAGNESIUM CHLORIDE: 526; 502; 368; 37; 30 INJECTION, SOLUTION INTRAVENOUS at 07:51

## 2022-01-20 RX ADMIN — SODIUM BICARBONATE 50 MEQ: 84 INJECTION, SOLUTION INTRAVENOUS at 14:32

## 2022-01-20 RX ADMIN — SODIUM CHLORIDE, SODIUM GLUCONATE, SODIUM ACETATE, POTASSIUM CHLORIDE AND MAGNESIUM CHLORIDE: 526; 502; 368; 37; 30 INJECTION, SOLUTION INTRAVENOUS at 13:54

## 2022-01-20 RX ADMIN — MIDAZOLAM HYDROCHLORIDE 2 MG: 1 INJECTION, SOLUTION INTRAMUSCULAR; INTRAVENOUS at 07:35

## 2022-01-20 RX ADMIN — SODIUM CHLORIDE: 9 INJECTION, SOLUTION INTRAVENOUS at 18:43

## 2022-01-20 RX ADMIN — CEFAZOLIN SODIUM 2 G: 10 POWDER, FOR SOLUTION INTRAVENOUS at 23:51

## 2022-01-20 RX ADMIN — PHENYLEPHRINE HYDROCHLORIDE 150 MCG: 10 INJECTION INTRAVENOUS at 15:42

## 2022-01-20 RX ADMIN — PHENYLEPHRINE HYDROCHLORIDE 50 MCG: 10 INJECTION INTRAVENOUS at 08:49

## 2022-01-20 RX ADMIN — DEXAMETHASONE SODIUM PHOSPHATE 10 MG: 4 INJECTION, SOLUTION INTRA-ARTICULAR; INTRALESIONAL; INTRAMUSCULAR; INTRAVENOUS; SOFT TISSUE at 07:59

## 2022-01-20 RX ADMIN — FENTANYL CITRATE 50 MCG: 50 INJECTION INTRAMUSCULAR; INTRAVENOUS at 08:39

## 2022-01-20 RX ADMIN — LIDOCAINE HYDROCHLORIDE 5 ML: 10 INJECTION, SOLUTION INFILTRATION; PERINEURAL at 07:40

## 2022-01-20 RX ADMIN — Medication 20 MG: at 12:33

## 2022-01-20 RX ADMIN — FENTANYL CITRATE 50 MCG: 50 INJECTION INTRAMUSCULAR; INTRAVENOUS at 07:40

## 2022-01-20 RX ADMIN — PHENYLEPHRINE HYDROCHLORIDE 100 MCG: 10 INJECTION INTRAVENOUS at 15:39

## 2022-01-20 RX ADMIN — ONDANSETRON 4 MG: 2 INJECTION INTRAMUSCULAR; INTRAVENOUS at 16:03

## 2022-01-20 RX ADMIN — PHENYLEPHRINE HYDROCHLORIDE 50 MCG: 10 INJECTION INTRAVENOUS at 15:05

## 2022-01-20 RX ADMIN — SUGAMMADEX 200 MG: 100 INJECTION, SOLUTION INTRAVENOUS at 12:41

## 2022-01-20 RX ADMIN — PROPOFOL 50 MG: 10 INJECTION, EMULSION INTRAVENOUS at 07:55

## 2022-01-20 RX ADMIN — SODIUM CHLORIDE: 9 INJECTION, SOLUTION INTRAVENOUS at 07:30

## 2022-01-20 RX ADMIN — PHENYLEPHRINE HYDROCHLORIDE 100 MCG: 10 INJECTION INTRAVENOUS at 15:48

## 2022-01-20 RX ADMIN — SUCCINYLCHOLINE CHLORIDE 140 MG: 20 INJECTION, SOLUTION INTRAMUSCULAR; INTRAVENOUS; PARENTERAL at 07:40

## 2022-01-20 RX ADMIN — PHENYLEPHRINE HYDROCHLORIDE 50 MCG/MIN: 10 INJECTION INTRAVENOUS at 07:49

## 2022-01-20 RX ADMIN — PHENYLEPHRINE HYDROCHLORIDE 200 MCG: 10 INJECTION INTRAVENOUS at 07:48

## 2022-01-20 RX ADMIN — PROPOFOL 150 MG: 10 INJECTION, EMULSION INTRAVENOUS at 07:40

## 2022-01-20 RX ADMIN — CEFAZOLIN 2 G: 330 INJECTION, POWDER, FOR SOLUTION INTRAMUSCULAR; INTRAVENOUS at 15:53

## 2022-01-20 RX ADMIN — TRANEXAMIC ACID 1000 MG: 10 INJECTION, SOLUTION INTRAVENOUS at 09:17

## 2022-01-20 RX ADMIN — INSULIN ASPART 3 UNITS: 100 INJECTION, SOLUTION INTRAVENOUS; SUBCUTANEOUS at 19:43

## 2022-01-20 RX ADMIN — REMIFENTANIL HYDROCHLORIDE 0.1 MCG/KG/MIN: 1 INJECTION, POWDER, LYOPHILIZED, FOR SOLUTION INTRAVENOUS at 07:43

## 2022-01-20 RX ADMIN — HYDROMORPHONE HYDROCHLORIDE 0.5 MG: 1 INJECTION, SOLUTION INTRAMUSCULAR; INTRAVENOUS; SUBCUTANEOUS at 21:46

## 2022-01-20 RX ADMIN — CEFAZOLIN 2 G: 330 INJECTION, POWDER, FOR SOLUTION INTRAMUSCULAR; INTRAVENOUS at 12:00

## 2022-01-20 RX ADMIN — PROPOFOL 100 MCG/KG/MIN: 10 INJECTION, EMULSION INTRAVENOUS at 07:43

## 2022-01-20 RX ADMIN — HYDROMORPHONE HYDROCHLORIDE 0.5 MG: 1 INJECTION, SOLUTION INTRAMUSCULAR; INTRAVENOUS; SUBCUTANEOUS at 14:30

## 2022-01-20 RX ADMIN — Medication 20 MG: at 08:42

## 2022-01-20 RX ADMIN — PHENYLEPHRINE HYDROCHLORIDE 50 MCG: 10 INJECTION INTRAVENOUS at 12:02

## 2022-01-20 RX ADMIN — MIDAZOLAM HYDROCHLORIDE 2 MG: 1 INJECTION, SOLUTION INTRAMUSCULAR; INTRAVENOUS at 07:28

## 2022-01-20 RX ADMIN — DEXAMETHASONE SODIUM PHOSPHATE 4 MG: 4 INJECTION, SOLUTION INTRA-ARTICULAR; INTRALESIONAL; INTRAMUSCULAR; INTRAVENOUS; SOFT TISSUE at 20:55

## 2022-01-20 ASSESSMENT — ENCOUNTER SYMPTOMS: DYSRHYTHMIAS: 1

## 2022-01-20 ASSESSMENT — PAIN SCALES - GENERAL: PAIN_LEVEL: 0

## 2022-01-20 NOTE — ANESTHESIA POSTPROCEDURE EVALUATION
Patient: Matt Williamson    Procedure Summary     Date: 01/20/22 Room / Location:  OR 10 / [facility] OR    Anesthesia Start: 0730 Anesthesia Stop: 1709    Procedure: Stage 1: L2-L4 PSF with instrumentation, ORIF L3 pathologic fracture  Stage 2: L3 lateral corpectomy with expandable cage placement (N/A ) Diagnosis:       Low back pain with right-sided sciatica, unspecified back pain laterality, unspecified chronicity      Malignant neoplasm of vertebral column, excluding sacrum and coccyx (CMS/HCC)      (Low back pain with right-sided sciatica, unspecified back pain laterality, unspecified chronicity [M54.41])      (Malignant neoplasm of vertebral column, excluding sacrum and coccyx (CMS/HCC) [C41.2])    Surgeons: Colt Heller MD Responsible Provider: Hansa Umanzor MD    Anesthesia Type: general ASA Status: 3          Anesthesia Type: general  PACU Vitals    No data found in the last 10 encounters.           Anesthesia Post Evaluation    Pain score: 0  Pain management: satisfactory to patient  Mode of pain management: IV medication  Patient location during evaluation: ICU  Level of consciousness: arousable  Cardiovascular status: hemodynamically stable  Airway Patency: patent  Respiratory status: acceptable  Hydration status: stable  Anesthetic complications: no      Patient dropped off at ICU. Breathing spontaneously, HDS. ABG drawn at bedside pH 7.32, pCO2 31, pO2 90, Lac 1.2. Dr. Heller at bedside, patient following commands.

## 2022-01-20 NOTE — ANESTHESIOLOGIST PRE-PROCEDURE ATTESTATION
Pre-Procedure Patient Identification:  I am the Primary Anesthesiologist and have identified the patient on 01/20/22 at 7:02 AM.   I have confirmed the procedure(s) will be performed by the following surgeon/proceduralist Colt Heller MD.

## 2022-01-20 NOTE — ANESTHESIA PREPROCEDURE EVALUATION
Relevant Problems   CARDIOVASCULAR   (+) Atherosclerosis of coronary artery   (+) Essential hypertension   (+) Paroxysmal atrial fibrillation (CMS/HCC)      GASTROINTESTINAL   (+) Esophageal reflux      NEUROLOGY   (+) Low back pain with right-sided sciatica      URINARY SYSTEM   (+) BPH (benign prostatic hyperplasia)      Other   (+) Type 2 diabetes mellitus without complication (CMS/HCC)       Anesthesia ROS/MED HX      Neuro/Psych    neuropathy  Cardiovascular   CAD   Cardiac stents   hypertension  Dysrhythmias and atrial fibrillation   Cardiac clearance reviewed, Covid19 Test Reviewed and ECG reviewed  Musculoskeletal   Arthritis  Endo/Other   Diabetes   Hypothyroidism  Body Habitus: Normal  ROS/MED HX Comments:    Musculoskeletal: LBP, and right sided sciatica       Past Surgical History:   Procedure Laterality Date   • ANAL FISSURECTOMY  1986   • CARDIAC CATHETERIZATION  11/21/2005    LM FOD. 70-80% proximal LAD.Mild disease CX, RCA and branches.    • CARDIAC CATHETERIZATION  11/28/2005    LM FOD. Pristine LAD stent. CX FOD. RCA mild disease.   • CARDIAC SURGERY  Stent in lad   • CORONARY ANGIOPLASTY WITH STENT PLACEMENT  11/22/2005    LM FOD. Moderate, diffuse disease LAD. Irregular CX w/ PLVB distal to PDA 99%. Mild disease RCA. Stent to distal LAD.   • EYE SURGERY Left Cataract   • FOOT SURGERY  2009    For osteomyelitis.   • GASTROCUTANEOUS FISTULA CLOSURE     • THYROIDECTOMY  2012   • UPPER GASTROINTESTINAL ENDOSCOPY         Physical Exam    Airway   Mallampati: II   TM distance: >3 FB   Neck ROM: full  Cardiovascular - normal   Rhythm: regular   Rate: normalPulmonary - normal   clear to auscultation  Dental - normal        Anesthesia Plan    Plan: general    Technique: general endotracheal and total IV anesthesia     Lines and Monitors: additional IV and arterial line     Airway: oral intubation   ASA 3  Anesthetic plan and risks discussed with: patient  Induction:    intravenous   Postop Plan:   Patient  Disposition: inpatient floor planned admission   Pain Management: IV analgesics

## 2022-01-20 NOTE — CONSULTS
Critical Care Consult Note    HPI  76 y.o. male with thyroid cancer metastatic to L3 vertebral body s/p radiation stereotactic body radiation 5/2020,  admitted by Neurosurgery service on 1/19/2022 for consideration of surgery regarding growth of his L3 vertebral body metastatic lesion. He has had progressive worsening midline-line low back pain with radiation to the right knee. An MRI lumbar spine was obtained 11/22/2021 showing extension of the L3 mass. He was sent for a PET scan 12/7/2021 showing increased activity of the L3 lesion without further metastatic lesions appreciated.     He has had persistent midline low back pain with walking and standing which has progressively worsened. He does not have any weakness or bowel/bladder incontinence. He is following with oncology (Dr. Casey) who does not recommend any chemotherapy at this time but surgical intervention as the primary treatment given the suspected isolated area of progression at the L3 vertebral body.     Presents to ICU 1/20/22 s/p Stage 1: L2-L4 PSF with instrumentation, ORIF L3 pathologic fracture  Stage 2: L3 lateral corpectomy with expandable cage placement      Medical History:   Past Medical History:   Diagnosis Date   • Atrial fibrillation with rapid ventricular response (CMS/HCC) 10/25/2019    Hospitalized on 10/25/19 at [facility].    • BPH (benign prostatic hyperplasia)    • Coronary artery disease     LAD stent 2005   • Hypertension    • Hypothyroidism    • Mixed hyperlipidemia    • Thyroid cancer (CMS/HCC)    • Type 2 diabetes mellitus without complication (CMS/HCC)    • Vertigo        Surgical History:   Past Surgical History:   Procedure Laterality Date   • ANAL FISSURECTOMY  1986   • CARDIAC CATHETERIZATION  11/21/2005    LM FOD. 70-80% proximal LAD.Mild disease CX, RCA and branches.    • CARDIAC CATHETERIZATION  11/28/2005    LM FOD. Pristine LAD stent. CX FOD. RCA mild disease.   • CORONARY ANGIOPLASTY WITH STENT  PLACEMENT  2005    LM FOD. Moderate, diffuse disease LAD. Irregular CX w/ PLVB distal to PDA 99%. Mild disease RCA. Stent to distal LAD.   • EYE SURGERY Left Cataract   • FOOT SURGERY      For osteomyelitis.   • GASTROCUTANEOUS FISTULA CLOSURE     • THYROIDECTOMY     • UPPER GASTROINTESTINAL ENDOSCOPY         Allergies: Penicillins    Current Inpatient Medications   Medication Dose Route Frequency Provider Last Rate Last Admin   • sodium chloride 0.9 % infusion   intravenous Continuous Colt Heller MD   New Bag at 22 0730   • sodium chloride 0.9 % infusion   intravenous Continuous Bruno Tierney PA C            Social History:   Social History     Socioeconomic History   • Marital status:      Spouse name: None   • Number of children: None   • Years of education: None   • Highest education level: None   Occupational History   • Occupation: Retired   Tobacco Use   • Smoking status: Former Smoker     Packs/day: 2.00     Years: 20.00     Pack years: 40.00     Types: Cigarettes     Quit date:      Years since quittin.0   • Smokeless tobacco: Never Used   Vaping Use   • Vaping Use: Never used   Substance and Sexual Activity   • Alcohol use: Yes     Comment: Social   • Drug use: Never   • Sexual activity: Not Currently     Partners: Female   Other Topics Concern   • None   Social History Narrative   • None     Social Determinants of Health     Financial Resource Strain: Not on file   Food Insecurity: Not on file   Transportation Needs: Not on file   Physical Activity: Not on file   Stress: Not on file   Social Connections: Not on file   Intimate Partner Violence: Not on file   Housing Stability: Not on file       Family History:   Family History   Problem Relation Age of Onset   • Cancer Biological Mother    • Breast cancer Biological Mother    • Emphysema Biological Father    • Lung cancer Biological Father        Review of Systems  The patient is presently obtunded, unable to  respond.    Vital signs in last 24 hours:  Temp:  [36.7 °C (98 °F)] 36.7 °C (98 °F)  Heart Rate:  [93] 93  Resp:  [19] 19  BP: (164)/(84) 164/84       Vent Mode: (not recorded)  FiO2 (%) (Set): (not recorded)  Resp Rate (Set): (not recorded)  PIP (Set, cmH2O): (not recorded)  Vt (Set, mL): (not recorded)  PEEP/CPAP (Set, cmH2O): (not recorded)    Objective     Physical Exam  Physical Exam   Constitutional: Appears well-developed and well-nourished.   Head: Normocephalic and atraumatic.   Eyes: Conjunctivae and EOM are normal. Pupils are equal, round, and reactive to light. No scleral icterus.   Neck: Neck supple.   Cardiovascular: Normal rate and regular rhythm.    Pulmonary/Chest: Breath sounds clear  Skin: Skin is warm and dry. No rash noted. No erythema.   Unable to assess neuro status. Patient withdrawals to pain, is protecting his airway, but otherwise does not respond to questions.  Vitals reviewed, vital signs are stable      Labs  CBC Results       01/18/22 11/20/20 01/17/20     1408 1334 0751    WBC 7.92 7.0 10.13    RBC 5.29 4.50 4.86    HGB 14.2 12.7 14.1    HCT 45.3 40.0 43.0    MCV 85.6 88.9 88.5    MCH 26.8 28.2 29.0    MCHC 31.3 31.8 32.8     241 279      BMP Results       01/18/22 11/20/20 08/05/20     1408 1334 1022     140 --    K 4.4 4.5 --    Cl 102 103 --    CO2 27 27 --    Glucose 108 98 3+    BUN 9 18 --    Creatinine 1.1 1.08 --    Calcium 9.6 9.1 --    Anion Gap 11 -- --    EGFR >60.0 66 --      76          Comment for Glucose at 1334 on 11/20/20:               Fasting reference interval         Comment for Creatinine at 1334 on 11/20/20: For patients >49 years of age, the reference limit  for Creatinine is approximately 13% higher for people  identified as -American.           PT/PTT Results       01/18/22 01/17/20 01/14/16     1408 0752 1012    PT 13.2 12.8 12.7    INR 1.0 1.0 1.0    PTT 30 -- 27         Comment for INR at 1408 on 01/18/22: INR has no defined  significance when PT is within Reference Range.    Comment for INR at 0752 on 01/17/20:   INR has no defined significance when PT is within Reference Range.    Comment for INR at 1012 on 01/14/16: INR HAS NO DEFINED SIGNIFICANCE WHEN PT IS WITHIN THE REFERENCE RANGE.      UA Results       01/18/22     1413    Color Yellow     Yellow    Clarity Clear     Clear    Glucose >=1000     >=1000    Bilirubin Negative     Negative    Ketones Negative     Trace    Sp Grav >1.035     >1.035    Blood Negative     Negative    Ph 6.0     6.0    Protein Negative     Negative    Urobilinogen 0.2     0.2    Nitrite Negative     Negative    Leuk Est Negative     Negative         Comment for Ketones at 1413 on 01/18/22: Free sulfhydryl drugs such as Mesna, Capoten, and Acetylcysteine (Mucomyst) may cause false positive ketonuria.    Comment for Blood at 1413 on 01/18/22: The sensitivity of the occult blood test is equivalent to approximately 4 intact RBC/HPF.    Comment for Blood at 1413 on 01/18/22: The sensitivity of the occult blood test is equivalent to approximately 4 intact RBC/HPF.    Comment for Leuk Est at 1413 on 01/18/22: Results can be falsely negative due to high specific gravity, some antibiotics, glucose >3 g/dl, or WBC other than neutrophils.    Comment for Leuk Est at 1413 on 01/18/22: Results can be falsely negative due to high specific gravity, some antibiotics, glucose >3 g/dl, or WBC other than neutrophils.      Lactate Results       01/08/16     2053    Lactate 1.1          Imaging  FL FLUOROSCOPY TECHNICAL ASSISTANCE, X-RAY LUMBAR SPINE 2 OR 3 VIEWS  Narrative: CLINICAL HISTORY: Stage 1: L2-L4 PSF with instrumentation, ORIF L3 pathologic  fracture Stage 2: L3 lateral corpectomy with expandable cage placement   .    COMMENT:    Intraoperative fluoroscopic guidance was provided for Dr. JIMMY ROY.    6 intraoperative fluoroscopic images were submitted. A radiologist was not  present for the time of examination.  Initial images demonstrate a metallic  marker posterior to L3. Final images demonstrate postsurgical changes from L2  through L4 with posterior pedicular screws at these levels. Expandable cage is  seen in the L3 vertebral body.    Fluoroscopy time: 155 seconds  Fluoroscopy images: 6  Reference Air Kerma:  51 mGy  Impression: IMPRESSION:  Intraoperative fluoroscopic guidance as described above.      ECG/Telemetry  I have independently reviewed the ECG. No significant findings.      Assessment   76 y.o. male being consulted for post-operative management following spinal fusion/ORIF/corpectomy         Plan   1. S/p spinal fusion  -admit to ICU  -q 1 hour neuro checks  -NPO until neurological status improved to safely swallow  -maintain normotension  -pain management  -Decadron 4 mg q 12 hours x 4 doses      2. DM  -POCT q 6  -hold home DM regimen    3. HTN/ afib  -resume home doses of Metoprolol , Rhythmol when safe to swallow    4. Hypothyroidism  -continue home dose of Synthroid when safe to swallow pills    5. Neuropathy  -resume home dose of Gabapentin when safe to swallow pills    6. Depression  -resume home dose Effexor when safe to swallow pills    SCD for DVT ppx until 1/21 at 17:00, per Neurosurgery

## 2022-01-20 NOTE — CONSULTS
Pulmonology and Critical Care Consult Note    HISTORY OF PRESENT ILLNESS      Matt Williamson is a 76 y.o. male with a history of metastatic thyroid cancer.    He has had an enlarging L3 vertebral body metastatic mass lesion.  This has resulted in increasing back pain    He was seen by neurosurgery    Today he underwent two stage intervention:  Stage 1: L2-L4 PSF with instrumentation, ORIF L3 pathologic fracture    Stage 2: L3 lateral corpectomy with expandable cage placement    Postoperatively he was extubated and transferred to the intensive care unit    I saw and examined him in the intensive care unit, there he is drowsy but he does arouse, he engages, he verbalizes his name, he is able to move all 4 extremities spontaneously and to command.  He does drift off without stimulation at this time.    Arterial blood gas was obtained on rounds pH 7.32 PaCO2 31 PaO2 90    CBC and complete metabolic profile is pending at this time          Past Medical History:   Diagnosis Date   • Atrial fibrillation with rapid ventricular response (CMS/HCC) 10/25/2019    Hospitalized on 10/25/19 at [facility].    • BPH (benign prostatic hyperplasia)    • Coronary artery disease     LAD stent 2005   • Hypertension    • Hypothyroidism    • Mixed hyperlipidemia    • Thyroid cancer (CMS/HCC)    • Type 2 diabetes mellitus without complication (CMS/HCC)    • Vertigo      Past Surgical History:   Procedure Laterality Date   • ANAL FISSURECTOMY  1986   • CARDIAC CATHETERIZATION  11/21/2005    LM FOD. 70-80% proximal LAD.Mild disease CX, RCA and branches.    • CARDIAC CATHETERIZATION  11/28/2005    LM FOD. Pristine LAD stent. CX FOD. RCA mild disease.   • CORONARY ANGIOPLASTY WITH STENT PLACEMENT  11/22/2005    LM FOD. Moderate, diffuse disease LAD. Irregular CX w/ PLVB distal to PDA 99%. Mild disease RCA. Stent to distal LAD.   • EYE SURGERY Left Cataract   • FOOT SURGERY  2009    For osteomyelitis.   • GASTROCUTANEOUS  FISTULA CLOSURE     • THYROIDECTOMY  2012   • UPPER GASTROINTESTINAL ENDOSCOPY       Social History     Socioeconomic History   • Marital status:      Spouse name: None   • Number of children: None   • Years of education: None   • Highest education level: None   Occupational History   • Occupation: Retired   Tobacco Use   • Smoking status: Former Smoker     Packs/day: 2.00     Years: 20.00     Pack years: 40.00     Types: Cigarettes     Quit date:      Years since quittin.0   • Smokeless tobacco: Never Used   Vaping Use   • Vaping Use: Never used   Substance and Sexual Activity   • Alcohol use: Yes     Comment: Social   • Drug use: Never   • Sexual activity: Not Currently     Partners: Female   Other Topics Concern   • None   Social History Narrative   • None     Social Determinants of Health     Financial Resource Strain: Not on file   Food Insecurity: Not on file   Transportation Needs: Not on file   Physical Activity: Not on file   Stress: Not on file   Social Connections: Not on file   Intimate Partner Violence: Not on file   Housing Stability: Not on file     Family History   Problem Relation Age of Onset   • Cancer Biological Mother    • Breast cancer Biological Mother    • Emphysema Biological Father    • Lung cancer Biological Father        Allergies:   Penicillins    • [Provider Managed Hold] acetaminophen  650 mg oral q6h SALIMA   • ceFAZolin  2 g intravenous q8h INT   • dexamethasone  4 mg intravenous q12h SALIMA   • [Provider Managed Hold] gabapentin  600 mg oral BID   • [START ON 2022] heparin (porcine)  5,000 Units subcutaneous q8h SALIMA   • insulin aspart U-100  3-5 Units subcutaneous q6h SALIMA   • [Provider Managed Hold] levothyroxine  150 mcg oral Daily (6a)   • [Provider Managed Hold] metoprolol succinate XL  100 mg oral BID   • pantoprazole  40 mg intravenous q24h   • [Provider Managed Hold] pantoprazole  40 mg oral q PM   • [Provider Managed Hold] polyethylene glycol  17 g oral Daily    • pravastatin  40 mg oral Daily   • [Provider Managed Hold] sennosides-docusate sodium  1 tablet oral BID        Medication List      ASK your doctor about these medications    aspirin 81 mg enteric coated tablet  Take 81 mg by mouth daily.  Dose: 81 mg     gabapentin 300 mg capsule  Commonly known as: NEURONTIN  Take 2 capsules (600 mg total) by mouth 3 (three) times a day.  Dose: 600 mg     glipiZIDE 5 mg tablet  Commonly known as: GLUCOTROL  Take 1 tablet by mouth as needed.  Dose: 1 tablet     JARDIANCE 10 mg tablet  TAKE 1 TABLET(10 MG) BY MOUTH DAILY  Generic drug: empagliflozin     metFORMIN 500 mg tablet  Commonly known as: GLUCOPHAGE  TAKE 1 TABLET BY MOUTH FOUR TIMES DAILY     metoprolol succinate  mg 24 hr tablet  Commonly known as: TOPROL-XL  Take 1 tablet (100 mg total) by mouth 2 (two) times a day.  Dose: 100 mg     pantoprazole 40 mg EC tablet  Commonly known as: PROTONIX  TAKE 1 TABLET(40 MG) BY MOUTH DAILY     pravastatin 40 mg tablet  Commonly known as: PRAVACHOL  Take 1 tablet (40 mg total) by mouth daily.  Dose: 40 mg     propafenone 150 mg tablet  Commonly known as: RYTHMOL  Take 1 tablet by mouth as needed (Palpitations).  Dose: 1 tablet     SYNTHROID 150 mcg tablet  Take 1 tablet (150 mcg total) by mouth once daily.  Dose: 150 mcg  Generic drug: levothyroxine     TRULICITY 0.75 mg/0.5 mL pen injector  once a week. Every Tuesday  Generic drug: dulaglutide            Review of Systems:  Review of systems not obtained due to patient factors.    Objective     Vital Signs for the last 24 hours:  Temp:  [36.7 °C (98 °F)] 36.7 °C (98 °F)  Heart Rate:  [] 102  Resp:  [17-36] 20  BP: (120-164)/(56-84) 120/56  SpO2:  [99 %-100 %] 100 %    Oxygen:  Oxygen Therapy: Supplemental oxygen  O2 Delivery Method: Nasal cannula     O2 Flow Rate (L/min): 3 L/min     Labs:   Lab Results   Component Value Date    GLUCOSE 108 (H) 01/18/2022    CALCIUM 9.6 01/18/2022     01/18/2022    K 4.4  01/18/2022    CO2 27 01/18/2022     01/18/2022    BUN 9 01/18/2022    CREATININE 1.1 01/18/2022     Lab Results   Component Value Date    WBC 7.92 01/18/2022    HGB 14.2 01/18/2022    HCT 45.3 01/18/2022    MCV 85.6 01/18/2022     01/18/2022     Lab Results   Component Value Date    ALBUMIN 3.8 01/18/2022    BILITOT 0.8 01/18/2022    ALKPHOS 62 01/18/2022    BILIDIR 0.1 12/09/2016    AST 19 01/18/2022    ALT 17 01/18/2022    PROTEIN 6.2 01/18/2022       Micro:   Microbiology Results     Procedure Component Value Units Date/Time    Covid-19 Pre-procedural/PAT [[id number]]  (Normal) Collected: 01/18/22 1407    Specimen: Nasopharyngeal Swab from Nasopharynx Updated: 01/19/22 1040    Narrative:      The following orders were created for panel order Covid-19 Pre-procedural/PAT.  Procedure                               Abnormality         Status                     ---------                               -----------         ------                     SARS-CoV-2 (COVID-19), PCR[[id number]]   Normal              Final result                 Please view results for these tests on the individual orders.    SARS-CoV-2 (COVID-19), PCR [[id number]]  (Normal) Collected: 01/18/22 1407    Specimen: Nasopharyngeal Swab from Nasopharynx Updated: 01/19/22 1040     SARS-CoV-2 (COVID-19) Negative          Imaging:  X-RAY CHEST 2 VIEWS    Result Date: 1/18/2022  IMPRESSION: See comment.    X-RAY LUMBAR SPINE 2 OR 3 VIEWS    Result Date: 1/20/2022  IMPRESSION: Intraoperative fluoroscopic guidance as described above.     FL FLUOROSCOPY TECHNICAL ASSISTANCE    Result Date: 1/20/2022  IMPRESSION: Intraoperative fluoroscopic guidance as described above.       IMAGING PERSONALLY REVIEWED      Physical Exam:  Drowsy man but does arouse to verbal stimulus he engages me he verbalizes appropriately he does move all extremities spontaneously and to command.  He does drift off without stimulation, heart rate is regular lungs are clear  abdomen is soft extremities warm    Assessment   76-year-old man with a history of metastatic thyroid cancer with known enlarging L3 vertebral body metastatic mass lesion holding and increasing back pain now status post two stage intervention:    Stage 1: L2-L4 PSF with instrumentation, ORIF L3 pathologic fracture    Stage 2: L3 lateral corpectomy with expandable cage placement         Plan   At this time the patient will be admitted to the intensive care unit for postoperative neurosurgical care.  Dexamethasone under direction of neurosurgery.  DVT prophylaxis as outlined by neurosurgery.  Hold psychotropic agents as his mental status continues to improve in the postoperative state.  Discussed with neurosurgery    55 minutes time spent reviewing his outpatient medical record, discussing with consultants, discussing with critical care nursing staff, examining the patient document the patient and coordinating care      Travis Verdugo, DO

## 2022-01-20 NOTE — OR SURGEON
Pre-Procedure patient identification:  I am the primary operating surgeon/proceduralist and I have identified the patient on 01/20/22 at 7:08 AM Colt Heller MD  Phone Number: [phone]

## 2022-01-20 NOTE — BRIEF OP NOTE
Stage 1: L2-L4 PSF with instrumentation, ORIF L3 pathologic fracture  Stage 2: L3 lateral corpectomy with expandable cage placement Procedure Note    Procedure:    Stage 1: L2-L4 PSF with instrumentation, ORIF L3 pathologic fracture  Stage 2: L3 lateral corpectomy with expandable cage placement  CPT(R) Code:  69299 - [place] POSTERIOR SEGMENTAL INSTRUMENTATION 13/> VRT SEG      Pre-op Diagnosis     * Low back pain with right-sided sciatica, unspecified back pain laterality, unspecified chronicity [M54.41]     * Malignant neoplasm of vertebral column, excluding sacrum and coccyx (CMS/HCC) [C41.2]       Post-op Diagnosis     * Low back pain with right-sided sciatica, unspecified back pain laterality, unspecified chronicity [M54.41]     * Malignant neoplasm of vertebral column, excluding sacrum and coccyx (CMS/HCC) [C41.2]    Surgeon(s) and Role:     * Colt Heller MD - Primary     * Eb To MD - Assist      * Bruno Tierney PA-C  Assist    Anesthesia: General    Staff:   Circulator: Shikha Oneal RN; Alisia Scott RN; Narda Bailey RN  Radiology IR Technologist: Yifan Perez RTR  Scrub Person: Wendy Sy; Salty Ashley RN    Procedure Details   See above     Estimated Blood Loss: 350 mL    Specimens:                Order Name Source Comment Collection Info Order Time   TYPE AND SCREEN Blood, Venous  Collected By: Anjelica Juarez RN 1/20/2022  7:00 AM     Are you aware of this patient having previously identified antibodies?   NO          Does this Patient have Sickle Cell Disease/Sickle Cell Anemia?   NO          Release to patient   Immediate        BLOOD GAS, ARTERIAL COMPREHENSIVE Blood, Arterial  Collected By: Savanna Emerson CRNA 1/20/2022 10:13 AM     Release to patient   Immediate        BLOOD GAS, ARTERIAL COMPREHENSIVE Blood, Arterial  Collected By: Savanna Emerson CRNA 1/20/2022  2:05 PM     Release to patient   Immediate        PREPARE RBC  Pre-op for Procedure   1/20/2022  7:00 AM     Transfusion indications   Pre-OP major surgery with bleeding risk          Has consent been obtained?   Yes          Are you aware of this patient having previously identified antibodies?   NO          Does this Patient have Sickle Cell Disease/Sickle Cell Anemia?   NO        PATHOLOGY TISSUE EXAM Spine, Lumbar Pre-op diagnosis:  Low back pain with right-sided sciatica, unspecified back pain laterality, unspecified chronicity [M54.41]  Malignant neoplasm of vertebral column, excluding sacrum and coccyx (CMS/HCC) [C41.2] Collected By: Colt Heller MD 1/20/2022  2:20 PM     Release to patient   Immediate              Drains:   Drain 1 Midline Back 10 Fr. (Active)       Drain 1 Midline Back 10 Fr. (Active)       Urethral Catheter Latex;Double-lumen 16 Fr (Active)       Implants:   Implant Name Type Inv. Item Serial No.  Lot No. LRB No. Used Action   XEMPLIFI DBM Plus     [employer] [id number] N/A 1 Implanted   XEMPILIFI DBM Plus    [employer] [id number] N/A 1 Implanted   XEMPLIFI DBM Plus    [employer] [id number] N/A 1 Implanted   XEMPLIFI DBM Plus    [employer] [id number] N/A 1 Implanted   SIGNIFY Bioactive Crunch, 10cc    [employer] [id number] N/A 3 Implanted   CAPS LOCKING - [id number]  CAPS LOCKING  [employer]  N/A 6 Implanted   5.5MM CURVED APRIL-TITANIUM ALLOY-70MM LENGTH - [id number]  5.5MM CURVED APRIL-TITANIUM ALLOY-70MM LENGTH  [employer]  N/A 2 Implanted   SCREW CREO 5.5 X 25MM - [id number]  SCREW CREO 5.5 X 25MM  [employer]  N/A 2 Implanted   SCREW PEDICLE 6.5 X 45MM - [id number] Spinal screw SCREW PEDICLE 6.5 X 45MM  [employer]  N/A 4 Implanted   APRIL 65MM - [id number]  APRIL 65MM  [employer]  N/A 1 Wasted   Mastergraft Matrix EXT    [employer] [id number] N/A 1 Implanted   FORTIFY 31 X 44MM - [id number]  FORTIFY 31 X 44MM  [employer]  N/A 1 Implanted   FORTIFY 22 X 45MM X 4 - [id number]  FORTIFY 22 X 45MM X 4  [employer]  N/A 1  Implanted   Plate    [employer]  N/A 1 Implanted              Complications:  None; patient tolerated the procedure well.           Disposition: PACU - hemodynamically stable.           Condition: stable    Colt Heller MD  Phone Number: [phone]

## 2022-01-20 NOTE — ANESTHESIA PROCEDURE NOTES
Airway  Urgency: elective    Start Time: 1/20/2022 7:42 AM  Airway not difficult    General Information and Staff    Patient location during procedure: OR  Anesthesiologist: Isabella Sandoval MD  Resident/CRNA: Savanna Emerson CRNA  Performed: resident/CRNA     Indications and Patient Condition  Indications for airway management: anesthesia  Sedation level: general  Preoxygenated: yes  Patient position: sniffing  MILS not maintained throughout  Mask difficulty assessment: 0 - not attempted    Final Airway Details  Final airway type: endotracheal airway      Successful airway: ETT  Cuffed: yes   Successful intubation technique: direct laryngoscopy  Facilitating devices/methods: intubating stylet  Endotracheal tube insertion site: oral  Blade: Daniel  Blade size: #3  ETT size (mm): 7.5  Cormack-Lehane Classification: grade IIb - view of arytenoids or posterior of glottis only  Placement verified by: chest auscultation and capnometry   Measured from: lips  ETT to lips (cm): 22  Number of attempts at approach: 1  Number of other approaches attempted: 0  Atraumatic airway insertion

## 2022-01-20 NOTE — ANESTHESIA PROCEDURE NOTES
Arterial Line:    Start time: 1/20/2022 7:45 AM  End time: 1/20/2022 7:48 AM    An arterial line was placed in the OR for the following indication(s): continuous blood pressure monitoring.    A 20 G (size), (length), Arrow (type) catheter was placed,   Seldinger technique used, into the Right radial artery, secured by tape.      Procedure performed using surface landmarks.        Events:  patient tolerated procedure well with no complications.    The supervising anesthesiologist was   Performed By: anesthesiologist  Attending: Isabella Sandoval MD  CRNA:Savanna Emerson CRNA

## 2022-01-21 LAB
ANION GAP SERPL CALC-SCNC: 12 MEQ/L (ref 3–15)
BUN SERPL-MCNC: 13 MG/DL (ref 8–20)
CALCIUM SERPL-MCNC: 8 MG/DL (ref 8.9–10.3)
CHLORIDE SERPL-SCNC: 104 MEQ/L (ref 98–109)
CO2 SERPL-SCNC: 18 MEQ/L (ref 22–32)
CREAT SERPL-MCNC: 0.9 MG/DL (ref 0.8–1.3)
CROSSMATCH: NORMAL
CROSSMATCH: NORMAL
ERYTHROCYTE [DISTWIDTH] IN BLOOD BY AUTOMATED COUNT: 14.2 % (ref 11.6–14.4)
GFR SERPL CREATININE-BSD FRML MDRD: >60 ML/MIN/1.73M*2
GLUCOSE BLD-MCNC: 160 MG/DL (ref 70–99)
GLUCOSE BLD-MCNC: 170 MG/DL (ref 70–99)
GLUCOSE BLD-MCNC: 175 MG/DL (ref 70–99)
GLUCOSE BLD-MCNC: 193 MG/DL (ref 70–99)
GLUCOSE BLD-MCNC: 217 MG/DL (ref 70–99)
GLUCOSE SERPL-MCNC: 184 MG/DL (ref 70–99)
HCT VFR BLDCO AUTO: 36.4 % (ref 40.1–51)
HGB BLD-MCNC: 11.7 G/DL (ref 13.7–17.5)
ISBT CODE: 6200
ISBT CODE: NORMAL
MCH RBC QN AUTO: 27 PG (ref 28–33.2)
MCHC RBC AUTO-ENTMCNC: 32.1 G/DL (ref 32.2–36.5)
MCV RBC AUTO: 83.9 FL (ref 83–98)
PDW BLD AUTO: 9.7 FL (ref 9.4–12.4)
PLATELET # BLD AUTO: 239 K/UL (ref 150–350)
POCT TEST: ABNORMAL
POTASSIUM SERPL-SCNC: 4 MEQ/L (ref 3.6–5.1)
PRODUCT CODE: NORMAL
PRODUCT CODE: NORMAL
PRODUCT STATUS: NORMAL
PRODUCT STATUS: NORMAL
RBC # BLD AUTO: 4.34 M/UL (ref 4.5–5.8)
SODIUM SERPL-SCNC: 134 MEQ/L (ref 136–144)
SPECIMEN EXP DATE BLD: NORMAL
SPECIMEN EXP DATE BLD: NORMAL
UNIT ABO: NORMAL
UNIT ABO: NORMAL
UNIT ID: NORMAL
UNIT ID: NORMAL
UNIT RH: POSITIVE
UNIT RH: POSITIVE
WBC # BLD AUTO: 14.53 K/UL (ref 3.8–10.5)

## 2022-01-21 PROCEDURE — 97166 OT EVAL MOD COMPLEX 45 MIN: CPT | Mod: GO

## 2022-01-21 PROCEDURE — 63700000 HC SELF-ADMINISTRABLE DRUG: Performed by: NURSE PRACTITIONER

## 2022-01-21 PROCEDURE — 36415 COLL VENOUS BLD VENIPUNCTURE: CPT | Performed by: PHYSICIAN ASSISTANT

## 2022-01-21 PROCEDURE — 63700000 HC SELF-ADMINISTRABLE DRUG: Performed by: PHYSICIAN ASSISTANT

## 2022-01-21 PROCEDURE — 85027 COMPLETE CBC AUTOMATED: CPT | Performed by: PHYSICIAN ASSISTANT

## 2022-01-21 PROCEDURE — 99024 POSTOP FOLLOW-UP VISIT: CPT | Performed by: NEUROLOGICAL SURGERY

## 2022-01-21 PROCEDURE — 80048 BASIC METABOLIC PNL TOTAL CA: CPT | Performed by: PHYSICIAN ASSISTANT

## 2022-01-21 PROCEDURE — 63600000 HC DRUGS/DETAIL CODE: Performed by: NEUROLOGICAL SURGERY

## 2022-01-21 PROCEDURE — 63600000 HC DRUGS/DETAIL CODE: Performed by: NURSE PRACTITIONER

## 2022-01-21 PROCEDURE — 97162 PT EVAL MOD COMPLEX 30 MIN: CPT | Mod: GP

## 2022-01-21 PROCEDURE — 25800000 HC PHARMACY IV SOLUTIONS: Performed by: NURSE PRACTITIONER

## 2022-01-21 PROCEDURE — 25000000 HC PHARMACY GENERAL: Performed by: NURSE PRACTITIONER

## 2022-01-21 PROCEDURE — 63600000 HC DRUGS/DETAIL CODE: Performed by: PHYSICIAN ASSISTANT

## 2022-01-21 PROCEDURE — 92610 EVALUATE SWALLOWING FUNCTION: CPT | Mod: GN

## 2022-01-21 PROCEDURE — 20600000 HC ROOM AND CARE INTERMEDIATE/TELEMETRY

## 2022-01-21 RX ORDER — GABAPENTIN 400 MG/1
800 CAPSULE ORAL 3 TIMES DAILY
Status: DISCONTINUED | OUTPATIENT
Start: 2022-01-21 | End: 2022-01-21

## 2022-01-21 RX ORDER — INSULIN ASPART 100 [IU]/ML
3-5 INJECTION, SOLUTION INTRAVENOUS; SUBCUTANEOUS
Status: DISCONTINUED | OUTPATIENT
Start: 2022-01-21 | End: 2022-01-27 | Stop reason: HOSPADM

## 2022-01-21 RX ORDER — CEFAZOLIN SODIUM 2 G/100ML
2 INJECTION, SOLUTION INTRAVENOUS
Status: COMPLETED | OUTPATIENT
Start: 2022-01-21 | End: 2022-01-22

## 2022-01-21 RX ORDER — GABAPENTIN 300 MG/1
600 CAPSULE ORAL EVERY 8 HOURS
Status: DISCONTINUED | OUTPATIENT
Start: 2022-01-21 | End: 2022-01-27 | Stop reason: HOSPADM

## 2022-01-21 RX ADMIN — PANTOPRAZOLE SODIUM 40 MG: 40 INJECTION, POWDER, LYOPHILIZED, FOR SOLUTION INTRAVENOUS at 08:10

## 2022-01-21 RX ADMIN — DEXAMETHASONE SODIUM PHOSPHATE 4 MG: 4 INJECTION, SOLUTION INTRA-ARTICULAR; INTRALESIONAL; INTRAMUSCULAR; INTRAVENOUS; SOFT TISSUE at 08:09

## 2022-01-21 RX ADMIN — SENNOSIDES AND DOCUSATE SODIUM 1 TABLET: 50; 8.6 TABLET ORAL at 20:58

## 2022-01-21 RX ADMIN — OXYCODONE HYDROCHLORIDE 10 MG: 5 TABLET ORAL at 16:56

## 2022-01-21 RX ADMIN — CEFAZOLIN SODIUM 2 G: 10 POWDER, FOR SOLUTION INTRAVENOUS at 08:51

## 2022-01-21 RX ADMIN — CEFAZOLIN SODIUM 2 G: 10 POWDER, FOR SOLUTION INTRAVENOUS at 16:26

## 2022-01-21 RX ADMIN — DEXAMETHASONE SODIUM PHOSPHATE 4 MG: 4 INJECTION, SOLUTION INTRA-ARTICULAR; INTRALESIONAL; INTRAMUSCULAR; INTRAVENOUS; SOFT TISSUE at 20:58

## 2022-01-21 RX ADMIN — ACETAMINOPHEN 650 MG: 325 TABLET, FILM COATED ORAL at 12:11

## 2022-01-21 RX ADMIN — CEFAZOLIN SODIUM 2 G: 10 POWDER, FOR SOLUTION INTRAVENOUS at 23:35

## 2022-01-21 RX ADMIN — OXYCODONE HYDROCHLORIDE 10 MG: 5 TABLET ORAL at 22:25

## 2022-01-21 RX ADMIN — GABAPENTIN 600 MG: 300 CAPSULE ORAL at 14:04

## 2022-01-21 RX ADMIN — OXYCODONE HYDROCHLORIDE 10 MG: 5 TABLET ORAL at 12:11

## 2022-01-21 RX ADMIN — PRAVASTATIN SODIUM 40 MG: 20 TABLET ORAL at 08:09

## 2022-01-21 RX ADMIN — OXYCODONE HYDROCHLORIDE 10 MG: 5 TABLET ORAL at 08:08

## 2022-01-21 RX ADMIN — PANTOPRAZOLE SODIUM 40 MG: 40 TABLET, DELAYED RELEASE ORAL at 17:07

## 2022-01-21 RX ADMIN — ACETAMINOPHEN 650 MG: 325 TABLET, FILM COATED ORAL at 16:55

## 2022-01-21 RX ADMIN — HYDROMORPHONE HYDROCHLORIDE 0.5 MG: 1 INJECTION, SOLUTION INTRAMUSCULAR; INTRAVENOUS; SUBCUTANEOUS at 05:17

## 2022-01-21 RX ADMIN — GABAPENTIN 600 MG: 300 CAPSULE ORAL at 21:01

## 2022-01-21 RX ADMIN — INSULIN ASPART 3 UNITS: 100 INJECTION, SOLUTION INTRAVENOUS; SUBCUTANEOUS at 17:05

## 2022-01-21 RX ADMIN — POLYETHYLENE GLYCOL (3350) 17 G: 17 POWDER, FOR SOLUTION ORAL at 12:11

## 2022-01-21 RX ADMIN — HEPARIN SODIUM 5000 UNITS: 5000 INJECTION INTRAVENOUS; SUBCUTANEOUS at 16:55

## 2022-01-21 RX ADMIN — INSULIN ASPART 5 UNITS: 100 INJECTION, SOLUTION INTRAVENOUS; SUBCUTANEOUS at 11:47

## 2022-01-21 RX ADMIN — ACETAMINOPHEN 650 MG: 325 TABLET, FILM COATED ORAL at 23:33

## 2022-01-21 RX ADMIN — HYDROMORPHONE HYDROCHLORIDE 0.5 MG: 1 INJECTION, SOLUTION INTRAMUSCULAR; INTRAVENOUS; SUBCUTANEOUS at 01:00

## 2022-01-21 ASSESSMENT — COGNITIVE AND FUNCTIONAL STATUS - GENERAL
CLIMB 3 TO 5 STEPS WITH RAILING: 2 - A LOT
EATING MEALS: 4 - NONE
AFFECT: WFL
AFFECT: FLAT/BLUNTED AFFECT;LOW AROUSAL/LETHARGIC
STANDING UP FROM CHAIR USING ARMS: 2 - A LOT
HELP NEEDED FOR PERSONAL GROOMING: 3 - A LITTLE
WALKING IN HOSPITAL ROOM: 3 - A LITTLE
REMEMBERING WHERE THINGS ARE: 4 - NONE
TOILETING: 1 - TOTAL
TAKING CARE OF COMPLICATED TASKS: 4 - NONE
DRESSING REGULAR LOWER BODY CLOTHING: 1 - TOTAL
REMEMBERING TO TAKE MEDICATION: 4 - NONE
MOVING TO AND FROM BED TO CHAIR: 2 - A LOT
DRESSING REGULAR UPPER BODY CLOTHING: 2 - A LOT
REMEMBERING 5 ERRANDS WITH NO LIST: 4 - NONE
HELP NEEDED FOR BATHING: 2 - A LOT
UNDERSTANDING 10 TO 15 MIN SPEECH: 4 - NONE
FOLLOWS FAMILIAR CONVERSATION: 4 - NONE

## 2022-01-21 NOTE — PLAN OF CARE
Problem: Adult Inpatient Plan of Care  Goal: Plan of Care Review  Outcome: Progressing  Flowsheets (Taken 1/21/2022 1307)  Progress: no change  Plan of Care Reviewed With: patient  Outcome Summary: PT eval completed, Max A x 2 bed mobility, Min A x 2 stand/amb to chair.

## 2022-01-21 NOTE — PROGRESS NOTES
PHYSICAL THERAPY ACUTE CARE - INITIAL EVALUATION     Patient:  Matt Williamson  Location:  [facility] Intensive Care Unit 3210  MRN:  [id number]  Today's date:  2022    Marco A is a 76 y.o. male admitted on 2022 with Low back pain with right-sided sciatica, unspecified back pain laterality, unspecified chronicity [M54.41]  Malignant neoplasm of vertebral column, excluding sacrum and coccyx (CMS/HCC) [C41.2]. Principal problem is Low back pain with right-sided sciatica.    Past Medical History  Marco A has a past medical history of Atrial fibrillation with rapid ventricular response (CMS/HCC) (10/25/2019), BPH (benign prostatic hyperplasia), Coronary artery disease, Hypertension, Hypothyroidism, Mixed hyperlipidemia, Thyroid cancer (CMS/HCC), Type 2 diabetes mellitus without complication (CMS/HCC), and Vertigo.    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 Pt seen by the neurosurgery service for surgical discussion regarding growth of his L3 vertebral body metastatic lesion. He has had progressive worsening midline-line low back pain with radiation to the right knee. An MRI lumbar spine was obtained 2021 showing extension of the L3 mass. He was sent for a PET scan 2021 showing increased activity of the L3 lesion without further metastatic lesions appreciated.    On 22, pt underwent: Stage 1: L2-L4 PSF with instrumentation, ORIF L3 pathologic fracture  Stage 2: L3 lateral corpectomy with expandable cage placement      Session details: initial evaluation   physical therapy    Start time:   930   End time:  952  Time ca min    General Observations  Start: Pt received supine in bed, 2 CRYSTAL drains from mid back intact; he was agreeable to therapy and no issues or concerns identified by nurse prior to session   End: Pt reclined in chair, CRYSTAL drains in place, Neurosurgical PA present at end of session; call bell in  reach, posey chair alarm activated, all needs met, pt in NAD and nurse notified of results of session, location of CRYSTAL drains, level of assist.    Precautions:   fall, head of bed elevated 30 degrees and spinal, per neurosurgery PA who was present at end of session, LSO for amb, ok to be OOB w/o brace order being placed      VITALS     PT Vitals    Date/Time Pulse SpO2 Pt Activity O2 Therapy BP BP Location BP Method Pt Position Observations [facility]   01/21/22 0933 98 100 % At rest None (Room air) 126/60 Right upper arm Automatic Lying --    01/21/22 0940 106 -- -- -- 127/64 Right upper arm Automatic Sitting edge of bed    01/21/22 0947 103 99 % At rest None (Room air) 113/57 Right upper arm Automatic Sitting chair KK      PT Pain    Date/Time Pain Type Location Rating: Rest Rating: Activity Interventions [facility]   01/21/22 0933 Pain Assessment back 4 5 position adjusted    01/21/22 0947 Pain Assessment back 4 5 position adjusted KK          PRIOR LEVEL OF FUNCTION AND LIVING ENVIRONMENT     Prior Level of Function      Most Recent Value   Dominant Hand right   Ambulation assistive equipment   Transferring assistive equipment   Toileting independent   Bathing independent   Dressing independent   Eating independent   Prior Level of Function Comment SPC use, drives, retired, active with basketball   Assistive Device Currently Used at Home cane, straight          Prior Living Environment      Most Recent Value   People in Home spouse   Current Living Arrangements home   Living Environment Comment 3 steps to enter with R rail, bedroom and shower stall on first floor, full flight B/L rails to shower stall and bedroom, typically sleeps on second floor          OBJECTIVE     Cognition   Affect/MentalStatus: flat/blunted affect  Orientation: oriented x 4  Follow Commands: follows one step commands 50-74% of the time and with demonstrated delayed response/completion, increased processing time, repetition of directions required  and verbal cues/prompting required  Cognitive Function: safety deficit: minimal deficit (ability to follow commands, awareness of need for assistance, insight into deficits/self-awareness, safety precautions awareness and safety precautions follow-through)    Sensory  Vision (impairment/limitations): WFL  Sensation: sensation intact and LE sensation intact  Hearing: WFL            Lower Extremity   Range of Motion (AROM/PROM) Strength (MMT)   RLE WFL Supine hip flex 2/5,knee flex 3+/5, DF/PF 4/5, seated hip flex 3-/5, knee ext 3+/5   LLE WFL Supine hip flex 3-/5, knee flex 3/5, DF/PF 3+/5. Seated hip flex 3+/5, knee ext 3+/5       Balance   Static Dynamic   Sitting Fair - (maintains balance with UE support or CGA) Fair - (CGA; unable to weight shift without UE support)   Standing Poor + (min A to maintain balance) Poor + (min A to maintain balance or right self; unable to weight shift)   General Comments: side by side assist for standing, assist for post lean                        Bed Mobility   [place] Level DME / AD Cues / Comments   Rolling maximum assist and 2 person assist bed rails To L, inc time, assist for LE/trunk, repeated verbal/tactile cues for technique   Supine to Sit maximum assist and 2 person assist bed rails and head of bed elevated To L, inc time, assist at LE/trunk, repeated cues for technique     Functional Transfers   [place] Level DME / AD Cues / Comments   Sit to Stand minimum assist and 2 person assist none Side by side assist, inc time, cues for technique from EOB   Stand to Sit minimum assist and 2 person assist none To chair, cues for hand placement/technique   Gait minimum assist and 2 person assist none Side by side assist, sidestep 3' to chair, inc time, dec steplength/narrow STACEY.        AM-PAC ™ - Mobility (Current Function)     Turning from your back to your side  2 - A Lot   Moving from lying on your back to sitting 2 - A Lot   Moving to and from a bed to a chair 2 - A  Lot   Standing up from a chair using arms 2 - A Lot   Walk in a hospital room 3 - A Little   Climbing 3-5 steps with a railing  2 - A Lot   AM-PAC ™ Mobility Score 13     EDUCATION     Education Documentation  Activity, taught by Maggie Maciel, PT at 1/21/2022  1:10 PM.  Learner: Patient  Readiness: Acceptance  Method: Explanation  Response: Verbalizes Understanding, Needs Reinforcement, Demonstrated Understanding  Comment: role of PT, spinal precautions, bed mobility,hand placement w/transfers, use of call bell. AP/LAQ          ASSESSMENT AND RECOMMENDATIONS     Progress Summary  PT eval completed . Max Ax2 bed mobility, Min A x 2 sit to stand/amb 3' to chair w/B HHA.Neurosurgery PA to room at end of session, stated pt to have LSO for amb but OK w/OOB to chair w/o brace, stated would be placing order for LSO. pt will benefit from cont skilled therapy to address strength/balance/gait impairments. May benefit from acute rehab to maximize functional recovery upon discharge. [id number]    Therapy Plan  Rehab potential:  good, to achieve stated therapy goals  Therapy frequency:  5 times/wk  Therapy interventions:  balance training,bed mobility training,gait training,transfer training,stair training,patient/family education,strengthening    Discharge Plan  Recommended discharge:  acute rehab/Inpatient Rehab Facility  Anticipated equipment needs:   (tbd at next level of care)         PT Goals      Most Recent Value   Bed Mobility Goal 1    Activity/Assistive Device rolling to left, rolling to right, sit to supine, supine to sit at 01/21/2022 0930   [place] modified independence at 01/21/2022 0930   Time Frame by discharge at 01/21/2022 0930   Progress/Outcome goal ongoing at 01/21/2022 0930   Transfer Goal 1    Activity/Assistive Device sit-to-stand/stand-to-sit, bed-to-chair/chair-to-bed, stand pivot, walker, front-wheeled at 01/21/2022 0930   [place] modified independence at 01/21/2022 0930   Time Frame by  discharge at 01/21/2022 0930   Progress/Outcome goal ongoing at 01/21/2022 0930   Gait Training Goal 1    Activity/Assistive Device gait (walking locomotion), walker, front-wheeled at 01/21/2022 0930   [place] modified independence at 01/21/2022 0930   Distance 150 at 01/21/2022 0930   Time Frame by discharge at 01/21/2022 0930   Progress/Outcome goal ongoing at 01/21/2022 0930   Stairs Goal 1    Activity/Assistive Device ascending stairs, descending stairs, using handrail, left, using handrail, right, step-to-step at 01/21/2022 0930   [place] modified independence at 01/21/2022 0930   Number of Stairs 12 at 01/21/2022 0930   Time Frame by discharge at 01/21/2022 0930   Progress/Outcome goal ongoing at 01/21/2022 0930

## 2022-01-21 NOTE — PATIENT CARE CONFERENCE
Care Progression Rounds Note  Date: 1/21/2022  Time: 12:17 PM     Patient Name: Matt Williamson     Medical Record Number: [id number]   YOB: 1945  Sex: Male      Room/Bed: 3210    Admitting Diagnosis: Low back pain with right-sided sciatica, unspecified back pain laterality, unspecified chronicity [M54.41]  Malignant neoplasm of vertebral column, excluding sacrum and coccyx (CMS/HCC) [C41.2]   Admit Date/Time: 1/20/2022  6:04 AM    Primary Diagnosis: Low back pain with right-sided sciatica  Principal Problem: Low back pain with right-sided sciatica    GMLOS: pending  Anticipated Discharge Date: 1/25/2022    AM-PAC:  Mobility Score:      Discharge Planning:  Current Living Arrangements: home  Anticipated Discharge Disposition: acute rehab/Inpatient Rehab Facility,home with home health,skilled nursing facility    Barriers to Discharge:  Medical issues not resolved    Comments:       Participants:  advanced practice provider,dietitian/nutrition services,nursing,social work/services,pharmacy

## 2022-01-21 NOTE — PLAN OF CARE
Problem: Adult Inpatient Plan of Care  Goal: Readiness for Transition of Care  Intervention: Mutually Develop Transition Plan  Flowsheets  Taken 1/21/2022 1635 by Lilliana Lowe MSW  Anticipated Discharge Disposition:  • home with home health  • home with assistance  • home with outpatient services  • acute rehab/Inpatient Rehab Facility  Equipment Needed After Discharge: (await therapy recommendations) other (see comments)  Anticipated Changes Related to Illness:  • none  • inability to care for self  Outpatient/Agency/Support Group Needs: home care agency  Transportation Concerns: car, none  Current Discharge Risk: physical impairment  Readmission Within the Last 30 Days: no previous admission in last 30 days  Patient/Family Anticipated Services at Transition:  • home health care  • durable medical equipment  Patient/Family Anticipates Transition to:  • home  • home with family  • home with help/services  Transportation Anticipated:  • car, drives self  • family or friend will provide  Concerns to be Addressed:  • adjustment to diagnosis/illness  • care coordination/care conferences  • discharge planning  Patient's Choice of Community Agency(s): await therapy recommendations  Offered/Gave Vendor List: yes  Type of Home Care Services:  • home OT  • home PT  • nursing  Type of Outpatient Services: physical therapy  Taken 1/21/2022 0929 by Radha Choudhury OT  Assistive Device/Animal Currently Used at Home: cane, straight     Sw met with patient & wife at bedside to go over role of care coordination. Nataliya is from [facility] & they live in a 2 story home. Patient stated that there could be a first floor setup at home. Sw confirmed PCP & pharmacy. Nataliya stated that there is 3 steps to enter the home & 13 steps to 2nd floor. Nataliya has never had home care, rehab or skilled nursing placement in the past. Patient is not a . Patient was able to do his own adl's & assists with home management.  Patient is s/p L3 pathologic fracture s/p right lateral L3 corpectomy and placement of an expandable cage, followed by L2-4 posterolateral instrumented fusion. Patient is to wear lso brace with therapy. Sw went over various d/c planning options with patient & wife.  At this time therapy is requesting rehab & sw asked for PMR & will need to follow status in therapy sw provided medicare.gov rehab listing to wife for facilities in area to consider if acute rehab is needed post d/c . Per patietn & wife they would consider [facility] if needed also.  Alessandra cruz, msw [phone]

## 2022-01-21 NOTE — PLAN OF CARE
Problem: Adult Inpatient Plan of Care  Goal: Plan of Care Review  Flowsheets (Taken 1/21/2022 2489)  Plan of Care Reviewed With:   patient   spouse  Outcome Summary: SLP evaluation completed

## 2022-01-21 NOTE — PROGRESS NOTES
Patient:  Matt Williamson  Location:  [facility] Intensive Care Unit 3210  MRN:  [id number]  Today's date:  1/21/2022     SPEECH PATHOLOGY EVALUATION:     SLP Diagnosis: Functional oral and pharyngeal stages of the swallow.    Recommendations:  1. Continue with a regular diet and thin liquids.  2. Maintain standard aspiration and reflux precautions.  3. No further SLP intervention is indicated at this time.    Summary/Impressions:  Daily Outcome Statement: Patient seen for initial evaluation.  He was out of bed to chair, with wife at the bedside.  He is currently on a regular diet with thin liquids.  He denies past or current swallowing difficulties.  At evaluation, he demonstrated no evidence of oral/pharyngeal dysphagia or aspiration.  He did complain of a slight sore throat; suspect this is related to his intubation for surgery.  Recommend continue with a regular diet and thin liquids.  No further SLP intervention is indicated at this time.    No chief complaint on file.    Clinical Course: This 76 y.o. male was admitted 1/20/2022 with Low back pain with right-sided sciatica, unspecified back pain laterality, unspecified chronicity [M54.41]  Malignant neoplasm of vertebral column, excluding sacrum and coccyx (CMS/HCC) [C41.2].     Reason for Consult: Assess swallowing function; determine most appropriate diet level.    Pertinent Radiology Studies:  Reviewed; see Hospital Course below     Past Medical History:   Diagnosis Date   • Atrial fibrillation with rapid ventricular response (CMS/HCC) 10/25/2019    Hospitalized on 10/25/19 at [facility].    • BPH (benign prostatic hyperplasia)    • Coronary artery disease     LAD stent 2005   • Hypertension    • Hypothyroidism    • Mixed hyperlipidemia    • Thyroid cancer (CMS/HCC)    • Type 2 diabetes mellitus without complication (CMS/HCC)    • Vertigo      Past Surgical History:   Procedure Laterality Date   • ANAL FISSURECTOMY  1986   • CARDIAC  CATHETERIZATION  11/21/2005    LM FOD. 70-80% proximal LAD.Mild disease CX, RCA and branches.    • CARDIAC CATHETERIZATION  11/28/2005    LM FOD. Pristine LAD stent. CX FOD. RCA mild disease.   • CORONARY ANGIOPLASTY WITH STENT PLACEMENT  11/22/2005    LM FOD. Moderate, diffuse disease LAD. Irregular CX w/ PLVB distal to PDA 99%. Mild disease RCA. Stent to distal LAD.   • EYE SURGERY Left Cataract   • FOOT SURGERY  2009    For osteomyelitis.   • GASTROCUTANEOUS FISTULA CLOSURE     • THYROIDECTOMY  2012   • UPPER GASTROINTESTINAL ENDOSCOPY         Allergies   Allergen Reactions   • Penicillins Rash     Childhood allergy         Results from last 7 days   Lab Units 01/21/22  0458 01/20/22  1829 01/18/22  1408   WBC K/uL 14.53* 17.02* 7.92   HEMOGLOBIN g/dL 11.7* 12.7* 14.2   HEMATOCRIT % 36.4* 39.3* 45.3   PLATELETS K/uL 239 251 278          Baseline Diet/Method of Nutritional Intake: no diet restrictions  Current Diet: (See below)       Dietary Orders   (From admission, onward)             Start     Ordered    01/21/22 1331  Adult Diet Regular; Consistent Carbohydrate 2000; Cardiac (Low Sodium/Low Fat); RD/LDN may adjust order  Diet effective now        References:    IDDSI Diet reference   Question Answer Comment   Diet Texture Regular    Diabetic Restrictions: Consistent Carbohydrate 2000    Other Restriction(s): Cardiac (Low Sodium/Low Fat)    Delegation of Authority. Diet orders written by PA/CRNPs may not be adjusted by RD/LDNs. RD/LDN may adjust order        01/21/22 1330              RN cleared SLP to assess/work with patient. Following completion of session, pt remained in chair as they were found with call francois in reach.    Marco A is a 76 y.o. male admitted on 1/20/2022 with Low back pain with right-sided sciatica, unspecified back pain laterality, unspecified chronicity [M54.41]  Malignant neoplasm of vertebral column, excluding sacrum and coccyx (CMS/HCC) [C41.2]. Principal problem is Low back pain with  right-sided sciatica.    Past Medical History  Marco A has a past medical history of Atrial fibrillation with rapid ventricular response (CMS/HCC) (10/25/2019), BPH (benign prostatic hyperplasia), Coronary artery disease, Hypertension, Hypothyroidism, Mixed hyperlipidemia, Thyroid cancer (CMS/HCC), Type 2 diabetes mellitus without complication (CMS/HCC), and Vertigo.    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 Pt seen by the neurosurgery service for surgical discussion regarding growth of his L3 vertebral body metastatic lesion. He has had progressive worsening midline-line low back pain with radiation to the right knee. An MRI lumbar spine was obtained 11/22/2021 showing extension of the L3 mass. He was sent for a PET scan 12/7/2021 showing increased activity of the L3 lesion without further metastatic lesions appreciated.    On 1/20/22, pt underwent: Stage 1: L2-L4 PSF with instrumentation, ORIF L3 pathologic fracture  Stage 2: L3 lateral corpectomy with expandable cage placement      SLP Pain    Date/Time Pain Type Rating: Rest Rating: Activity Interventions Who   01/21/22 1217 Pain Assessment 2 - mild pain 4 - moderate pain care clustered;position adjusted ASC          Prior Living Environment      Most Recent Value   People in Home spouse   Current Living Arrangements home   Living Environment Comment 3 steps to enter with R rail, bedroom and shower stall on first floor, full flight B/L rails to shower stall and bedroom, typically sleeps on second floor        Prior Level of Function      Most Recent Value   Dominant Hand right   Ambulation assistive equipment   Transferring assistive equipment   Toileting independent   Bathing independent   Dressing independent   Eating independent   Communication understands/communicates without difficulty   Swallowing swallows foods/liquids without difficulty   Baseline Diet/Method of Nutritional  Intake no diet restrictions   Past History of Dysphagia Patient denies past or current difficulty swallowing.   Prior Level of Function Comment SPC use, drives, retired, active with basketball   Assistive Device Currently Used at Home cane, straight           SLP Evaluation and Treatment - 01/21/22 1217        SLP Time Calculation    Start Time 1217     Stop Time 1228     Time Calculation (min) 11 min        Session Details    Document Type initial evaluation     Mode of Treatment speech language pathology        General Information    Patient Profile Reviewed yes     Onset of Illness/Injury or Date of Surgery 01/20/22     Referring Physician Critical care nurse practitioner     Patient/Family/Caregiver Comments/Observations Spouse at the bedside.     General Observations of Patient Awake and alert; out of bed to chair.     Existing Precautions/Restrictions fall;spinal;head of bed elevated 30 degrees        Cognition/Psychosocial    Affect/Mental Status (Cognition) WFL     Orientation Status (Cognition) oriented x 4     Follows Commands (Cognition) WFL        Dentition (Oral Motor)    Dentition (Oral Motor) adequate dentition        Facial Symmetry (Oral Motor)    Facial Symmetry (Oral Motor) WNL        Lip Function (Oral Motor)    Lip Range of Motion (Oral Motor) WNL     Lip Strength (Oral Motor) WNL        Tongue Function (Oral Motor)    Tongue ROM (Oral Motor) WNL     Tongue Strength (Oral Motor) WNL        Jaw Function (Oral Motor)    Jaw Function (Oral Motor) WNL        Cough/Swallow/Gag Reflex (Oral Motor)    Soft Palate/Velum (Oral Motor) WNL     Volitional Throat Clear/Cough (Oral Motor) WNL     Volitional Swallow (Oral Motor) WNL        Vocal Quality/Secretion Management (Oral Motor)    Vocal Quality (Oral Motor) WFL     Secretion Management (Oral Motor) WNL        Motor Speech    Speech Intelligibility (Motor Speech) WFL        Auditory Comprehension    Follows Commands (Auditory Comprehension) WFL      Yes/No Questions (Auditory Comprehension) WFL        Verbal Expression    Conversational Speech (Verbal Expression) WFL        Functional Communication Measures    FCM: Swallowing 7-->Level 7        General Swallowing Observations    Current Diet/Method of Nutritional Intake no diet restrictions     Signs/Symptoms of Aspiration (Current Diet) none     Respiratory Support (General Swallowing Observations) none        Food and Liquid Trials (NIS)    Patient Positioning upright in chair     Oral Intake/Feeding Performance independent/appropriate self-feeding skills     Liquid Consistencies Evaluated thin liquids     Thin Liquids WFL     Comment, Thin Liquids Adequate oral/pharyngeal control and no signs and symptoms of distress via single and consecutive straw and cup sips.     Food Consistencies Evaluated regular     Regular WFL     Comment, Regular Family incomplete mastication and oral clearance; no signs and symptoms of distress.     Oral Preparatory Phase of Swallow WFL     Oral Phase of Swallow WFL     Pharyngeal Phase of Swallow no clinical symptoms     Esophageal Phase of Swallow no clinical symptoms        Swallowing Recommendations    Diet Consistency Recommendations thin liquids;regular     Medication Administration whole with liquid     Supervision Level for Intake patient independent     Instrumental Assessment Recommendations instrumental evaluation not recommended at this time        Swallowing Intervention    Comment, Swallowing Interventions Eval only        Coping    Observed Emotional State calm;cooperative     Verbalized Emotional State acceptance     Trust Relationship/Rapport care explained;thoughts/feelings acknowledged     Family/Support Persons spouse     Involvement in Care at bedside;supportive of patient        AM-PAC (TM) - Cognition (Current Function)    Following/understanding a 10-15 minute speech or presentation? 4 - None     Understanding familiar people during ordinary conversations?  4 - None     Remembering to take medications at the appropriate time? 4 - None     Remembering where things were placed or put away? 4 - None     Remembering a list of 3 or 4 errands without writing it down? 4 - None     Taking care of complicated tasks? 4 - None     AM-PAC (TM) Cognition Score 24        Assessment/Plan (SLP)    Daily Outcome Statement Patient seen for initial evaluation.  He was out of bed to chair, with wife at the bedside.  He is currently on a regular diet with thin liquids.  He denies past or current swallowing difficulties.  At evaluation, he demonstrated no evidence of oral/pharyngeal dysphagia or aspiration.  He did complain of a slight sore throat; suspect this is related to his intubation for surgery.  Recommend continue with a regular diet and thin liquids.  No further SLP intervention is indicated at this time.     Rehab Potential other (see comments)   Eval only    Therapy Frequency evaluation only     Planned Therapy Interventions other (see comments)   Eval only              SLP Assessment/Plan      Most Recent Value   SLP Diagnosis Functional oral and pharyngeal stages of the swallow. at 01/21/2022 1217   Patient/Family Therapy Goal Statement To feel better. at 01/21/2022 1217        Involvement in Care  Family/Support Persons: spouse  Involvement in Care: at bedside,supportive of patient      Education Documentation  Treatment Plan, taught by Delfina Emerson CCC-SLP at 1/21/2022  3:20 PM.  Learner: Family  Readiness: Acceptance  Method: Explanation, Demonstration  Response: Verbalizes Understanding, Demonstrated Understanding  Comment: Reviewed evaluation results and recommendations with patient, spouse, nursing.    Treatment Plan, taught by Delfina Emerson CCC-SLP at 1/21/2022  3:20 PM.  Learner: Patient  Readiness: Acceptance  Method: Explanation, Demonstration  Response: Verbalizes Understanding, Demonstrated Understanding  Comment: Reviewed evaluation results and recommendations  with patient, spouse, nursing.

## 2022-01-21 NOTE — PLAN OF CARE
Problem: Adult Inpatient Plan of Care  Goal: Plan of Care Review  Outcome: Progressing  Flowsheets (Taken 1/21/2022 1156)  Progress: improving  Plan of Care Reviewed With: patient  Outcome Summary: OT: MIN A of 2 bed to chair, DEP LB ADLs

## 2022-01-21 NOTE — PROGRESS NOTES
Patient:  Matt Williamson  Location:  [facility] Intensive Care Unit 3210  MRN:  [id number]  Today's date:  1/21/2022     Patient supine in bed on fitted sheet,HOB elevated,  incontinence pad, bed alarm on, bed in lowest position, call bell and all needs in reach, RN aware.      Marco A is a 76 y.o. male admitted on 1/20/2022 with Low back pain with right-sided sciatica, unspecified back pain laterality, unspecified chronicity [M54.41]  Malignant neoplasm of vertebral column, excluding sacrum and coccyx (CMS/HCC) [C41.2]. Principal problem is Low back pain with right-sided sciatica.    Past Medical History  Marco A has a past medical history of Atrial fibrillation with rapid ventricular response (CMS/HCC) (10/25/2019), BPH (benign prostatic hyperplasia), Coronary artery disease, Hypertension, Hypothyroidism, Mixed hyperlipidemia, Thyroid cancer (CMS/HCC), Type 2 diabetes mellitus without complication (CMS/HCC), and Vertigo.    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 Pt seen by the neurosurgery service for surgical discussion regarding growth of his L3 vertebral body metastatic lesion. He has had progressive worsening midline-line low back pain with radiation to the right knee. An MRI lumbar spine was obtained 11/22/2021 showing extension of the L3 mass. He was sent for a PET scan 12/7/2021 showing increased activity of the L3 lesion without further metastatic lesions appreciated.    On 1/20/22, pt underwent: Stage 1: L2-L4 PSF with instrumentation, ORIF L3 pathologic fracture  Stage 2: L3 lateral corpectomy with expandable cage placement      OT Vitals    Date/Time Pulse SpO2 Pt Activity O2 Therapy BP BP Location BP Method Pt Position Observations [facility]   01/21/22 0933 98 100 % At rest None (Room air) 126/60 Right upper arm Automatic Lying -- KK   01/21/22 0940 106 -- -- -- 127/64 Right upper arm Automatic Sitting edge of bed    01/21/22  0947 103 99 % At rest None (Room air) 113/57 Right upper arm Automatic Sitting chair KK      OT Pain    Date/Time Pain Type Location Rating: Rest Rating: Activity Interventions [facility]   01/21/22 0933 Pain Assessment back 4 5 position adjusted KK   01/21/22 0947 Pain Assessment back 4 5 position adjusted KK          Prior Living Environment      Most Recent Value   People in Home spouse   Current Living Arrangements home   Living Environment Comment 3 steps to enter with R rail, bedroom and shower stall on first floor, full flight B/L rails to shower stall and bedroom, typically sleeps on second floor        Prior Level of Function      Most Recent Value   Dominant Hand right   Ambulation assistive equipment   Transferring assistive equipment   Toileting independent   Bathing independent   Dressing independent   Eating independent   Prior Level of Function Comment SPC use, drives, retired, active with basketball   Assistive Device Currently Used at Home cane, straight        Occupational Profile      Most Recent Value   Reason for Services/Referral spine surgery   Successful Occupations IND ADLs   Occupational History/Life Experiences retired restaraunt owner   Performance Patterns active- plays basketball   Environmental Supports and Barriers lives with spouse   Patient Goals home           OT Evaluation and Treatment - 01/21/22 0929        OT Time Calculation    Start Time 0929     Stop Time 0951     Time Calculation (min) 22 min        Session Details    Document Type initial evaluation     Mode of Treatment occupational therapy        General Information    Patient Profile Reviewed yes     Patient/Family/Caregiver Comments/Observations agreeable to sulema JOYA by RN     General Observations of Patient supine, A line L wrist     Existing Precautions/Restrictions fall;spinal;head of bed elevated 30 degrees   no brace order in chart time of eval, end of session when in chair Neurosurgery PA reports LSO needed OOB but OK  to be in chair with no brace on at this time, order then went into chart LSO OOB PT/OT       Cognition/Psychosocial    Affect/Mental Status (Cognition) flat/blunted affect;low arousal/lethargic     Orientation Status (Cognition) oriented x 4     Follows Commands (Cognition) follows one-step commands;50-74% accuracy;initiation impaired;increased processing time needed;repetition of directions required     Cognitive Function attention deficit;executive function deficit;safety deficit     Attention Deficit (Cognition) moderate deficit;concentration;focused/sustained attention   stats he feels half asleep    Executive Function Deficit (Cognition) information processing;insight/awareness of deficits;minimal deficit     Safety Deficit (Cognition) minimal deficit;ability to follow commands;awareness of need for assistance;impulsivity     Comment, Cognition cooperative        Hearing Assessment    Hearing Status WFL        Vision Assessment/Intervention    Visual Impairment/Limitations WFL;blurry vision        Sensory Assessment (Somatosensory)    Sensory Assessment (Somatosensory) sensation intact;bilateral UE        Range of Motion (ROM)    Range of Motion ROM is WNL;right upper extremity;left upper extremity ROM deficit     Left Upper Extremity (ROM) left UE ROM is WFL;wrist     Wrist, Left (ROM) unable to test due to A line        Strength (Manual Muscle Testing)    Strength (Manual Muscle Testing) strength is WFL;right upper extremity;left upper extremity strength deficit   supine    Left Upper Extremity Strength left UE strength is WFL except;wrist   unable to test wrist       Bed Mobility    [place], Roll Left maximum assist (25-49% patient effort);2 person assist     [place], Supine to Sit maximum assist (25-49% patient effort);2 person assist;verbal cues     Verbal Cues (Supine to Sit) safety;technique   difficulty with commands    Comment (Bed Mobility) log roll to L        Bed to Chair Transfer     [place], Bed to Chair minimum assist (75% or more patient effort);2 person assist     Verbal Cues safety;technique     Assistive Device other (see comments)   hand held    Comment to L        Sit to Stand Transfer    [place], Sit to Stand Transfer minimum assist (75% or more patient effort);2 person assist     Verbal Cues safety;technique     Assistive Device none   hand held assist, A line in L wrist    Comment bed        Stand to Sit Transfer    [place], Stand to Sit Transfer minimum assist (75% or more patient effort);2 person assist     Verbal Cues safety;technique     Assistive Device none   hand held    Comment chair        Balance    Static Sitting Balance WFL;supported;sitting in chair     Dynamic Sitting Balance mild impairment;unsupported;sitting, edge of bed     Static Standing Balance mild impairment;supported     Dynamic Standing Balance mild impairment;supported     Comment, Balance support= hand held assist        Lower Body Dressing    Tasks don;socks     [place] dependent (less than 25% patient effort)     Position edge of bed sitting     Comment limited by lines/pain/distal reach        Toileting    [place] perform bladder hygiene;dependent (less than 25% patient effort)     Comment silva cath        AM-PAC (TM) - ADL (Current Function)    Putting on and taking off regular lower body clothing? 1 - Total     Bathing? 2 - A Lot     Toileting? 1 - Total     Putting on/taking off regular upper body clothing? 2 - A Lot     How much help for taking care of personal grooming? 3 - A Little     Eating meals? 4 - None     AM-PAC (TM) ADL Score 13        Assessment/Plan (OT)    Daily Outcome Statement [facility] 13, MAX A of 2 supine to sit, MIN A of 2 sit to stand/stand to sit/bed to chair with hand held assist, DEP LB dressing and toileting, Pt s/p spinal surgery and not at baseline due to impairments related to pain/balance/endurance/cognition/spinal precautions, Rec continued OT and  acute rehab facility     Rehab Potential good, to achieve stated therapy goals     Therapy Frequency 3-5 times/wk     Planned Therapy Interventions activity tolerance training;adaptive equipment training;BADL retraining;cognitive/visual perception retraining;occupation/activity based interventions;patient/caregiver education/training;transfer/mobility retraining               OT Assessment/Plan      Most Recent Value   OT Recommended Discharge Disposition acute rehab/Inpatient Rehab Facility at 01/21/2022 0929   Anticipated Equipment Needs At Discharge (OT) commode, 3-in-1, dressing equipment, shower chair, walker, front-wheeled at 01/21/2022 0929   Patient/Family Therapy Goal Statement home at 01/21/2022 0929                    Education Documentation  Activity, taught by Radha Choudhury, OT at 1/21/2022 11:57 AM.  Learner: Patient  Readiness: Acceptance  Method: Explanation  Response: Needs Reinforcement  Comment: spinal precautions, bed mobility, safe functional transfers, call bell use          OT Goals      Most Recent Value   Transfer Goal 1    Activity/Assistive Device sit-to-stand/stand-to-sit, bed-to-chair/chair-to-bed, toilet, commode, 3-in-1, walker, front-wheeled at 01/21/2022 0929   [place] supervision required at 01/21/2022 0929   Time Frame by discharge at 01/21/2022 0929   Progress/Outcome goal ongoing at 01/21/2022 0929   Dressing Goal 1    Activity/Adaptive Equipment upper body dressing at 01/21/2022 0929   [place] supervision required at 01/21/2022 0929   Time Frame by discharge at 01/21/2022 0929   Progress/Outcome goal ongoing at 01/21/2022 0929   Dressing Goal 2    Activity/Adaptive Equipment lower body dressing, dressing stick, long-handled shoe horn, reacher, sock-aid at 01/21/2022 0929   [place] supervision required at 01/21/2022 0929   Time Frame by discharge at 01/21/2022 0929   Progress/Outcome goal ongoing at 01/21/2022 0929   Toileting Goal 1    Activity/Assistive Device  toileting skills, all at 01/21/2022 0929   [place] supervision required at 01/21/2022 0929   Time Frame by discharge at 01/21/2022 0929   Progress/Outcome goal ongoing at 01/21/2022 0929   Grooming Goal 1    Activity/Assistive Device grooming skills, all at 01/21/2022 0929   [place] modified independence at 01/21/2022 0929   Time Frame by discharge at 01/21/2022 0929   Progress/Outcome goal ongoing at 01/21/2022 0929

## 2022-01-21 NOTE — PROGRESS NOTES
Pulmonology and Critical Care Note    Subjective    Out of bed to chair awake and conversant      Objective     Vital Signs for the last 24 hours:  Temp:  [36.7 °C (98 °F)-36.9 °C (98.4 °F)] 36.7 °C (98.1 °F)  Heart Rate:  [] 94  Resp:  [10-36] 10  BP: (120-142)/(56-68) 142/68  SpO2:  [93 %-100 %] 99 %    Oxygen:  Oxygen Therapy: Supplemental oxygen  O2 Delivery Method: Nasal cannula     O2 Flow Rate (L/min): 2 L/min     Labs:   Lab Results   Component Value Date    GLUCOSE 184 (H) 01/21/2022    CALCIUM 8.0 (L) 01/21/2022     (L) 01/21/2022    K 4.0 01/21/2022    CO2 18 (L) 01/21/2022     01/21/2022    BUN 13 01/21/2022    CREATININE 0.9 01/21/2022     Lab Results   Component Value Date    WBC 14.53 (H) 01/21/2022    HGB 11.7 (L) 01/21/2022    HCT 36.4 (L) 01/21/2022    MCV 83.9 01/21/2022     01/21/2022     Lab Results   Component Value Date    ALBUMIN 3.0 (L) 01/20/2022    BILITOT 1.1 01/20/2022    ALKPHOS 55 01/20/2022    BILIDIR 0.1 12/09/2016    AST 21 01/20/2022    ALT 15 (L) 01/20/2022    PROTEIN 5.5 (L) 01/20/2022       Micro:   Microbiology Results     Procedure Component Value Units Date/Time    Covid-19 Pre-procedural/PAT [[id number]]  (Normal) Collected: 01/18/22 1407    Specimen: Nasopharyngeal Swab from Nasopharynx Updated: 01/19/22 1040    Narrative:      The following orders were created for panel order Covid-19 Pre-procedural/PAT.  Procedure                               Abnormality         Status                     ---------                               -----------         ------                     SARS-CoV-2 (COVID-19), PCR[[id number]]   Normal              Final result                 Please view results for these tests on the individual orders.    SARS-CoV-2 (COVID-19), PCR [[id number]]  (Normal) Collected: 01/18/22 1407    Specimen: Nasopharyngeal Swab from Nasopharynx Updated: 01/19/22 1040     SARS-CoV-2 (COVID-19) Negative          Imaging:  X-RAY CHEST 2  VIEWS    Result Date: 1/18/2022  IMPRESSION: See comment.    X-RAY LUMBAR SPINE 2 OR 3 VIEWS    Result Date: 1/20/2022  IMPRESSION: Intraoperative fluoroscopic guidance as described above.     CT HEAD WITHOUT IV CONTRAST    Result Date: 1/20/2022  IMPRESSION:  No acute intracranial abnormality. COMMENT: A standard noncontrast head CT was performed. CT DOSE:  One or more dose reduction techniques (e.g. automated exposure control, adjustment of the mA and/or kV according to patient size, use of iterative reconstruction technique) utilized for this examination. Comparison:  No prior studies are available for comparison. Findings:  Sulci, ventricles and basal cisterns are within normal limits for patient's age.   Attenuation in the brain parenchyma appears within normal limits.  No acute hemorrhage, acute territorial infarct, or mass effect is seen. There is no extra-axial fluid collection.  The visualized paranasal sinuses demonstrates scattered mucosal thickening of the maxillary sinuses bilaterally. Mastoid air cells are clear.    CT LUMBAR SPINE WITHOUT IV CONTRAST    Result Date: 1/20/2022  IMPRESSION: Interval corpectomy at L3, posterior spinal fusion hardware with bilateral pedicle screws at L2-L4. COMMENT: Lumbosacral alignment: Anatomic. Vertebral bodies: Interval corpectomy at L3.  There is posterior spinal fusion hardware with bilateral pedicle screws at L2-L4. Intervertebral discs: Normal in height. Lumbosacral spinal canal: Patent. OTHER: Scattered air lucencies in the paraspinal soft tissues and retro-orbital peritoneum consistent with recent postoperative changes.  Tiny hyperdense focus in the right psoas muscle, may represent a small area of hemorrhage or bony fragment.     FL FLUOROSCOPY TECHNICAL ASSISTANCE    Result Date: 1/20/2022  IMPRESSION: Intraoperative fluoroscopic guidance as described above.       IMAGING PERSONALLY REVIEWED      Physical Exam:  About the chair awake alert conversing heart  rate regular abdomen soft extremities warm lungs clear    Assessment   76-year-old man with a history of metastatic thyroid cancer with known enlarging L3 vertebral body metastatic mass lesion holding and increasing back pain now status post two stage intervention January 20, 2022    Stage 1: L2-L4 PSF with instrumentation, ORIF L3 pathologic fracture    Stage 2: L3 lateral corpectomy with expandable cage placement         Plan    Discussed with neurosurgery  Continue postoperative neurosurgical care.      Dexamethasone under direction of neurosurgery.      DVT prophylaxis as outlined by neurosurgery.       40 minutes time spent reviewing his outpatient medical record, discussing with consultants, discussing with critical care nursing staff, examining the patient document the patient and coordinating care      Travis Verdugo, DO

## 2022-01-21 NOTE — HOSPITAL COURSE
Marco A is a 76 y.o. male admitted on 1/20/2022 with Low back pain with right-sided sciatica, unspecified back pain laterality, unspecified chronicity [M54.41]  Malignant neoplasm of vertebral column, excluding sacrum and coccyx (CMS/HCC) [C41.2]. Principal problem is Low back pain with right-sided sciatica.    Past Medical History  Marco A has a past medical history of Atrial fibrillation with rapid ventricular response (CMS/HCC) (10/25/2019), BPH (benign prostatic hyperplasia), Coronary artery disease, Hypertension, Hypothyroidism, Mixed hyperlipidemia, Thyroid cancer (CMS/HCC), Type 2 diabetes mellitus without complication (CMS/HCC), and Vertigo.    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 Pt seen by the neurosurgery service for surgical discussion regarding growth of his L3 vertebral body metastatic lesion. He has had progressive worsening midline-line low back pain with radiation to the right knee. An MRI lumbar spine was obtained 11/22/2021 showing extension of the L3 mass. He was sent for a PET scan 12/7/2021 showing increased activity of the L3 lesion without further metastatic lesions appreciated.    On 1/20/22, pt underwent: Stage 1: L2-L4 PSF with instrumentation, ORIF L3 pathologic fracture  Stage 2: L3 lateral corpectomy with expandable cage placement

## 2022-01-22 PROBLEM — E03.9 ACQUIRED HYPOTHYROIDISM: Status: ACTIVE | Noted: 2022-01-22

## 2022-01-22 LAB
ANION GAP SERPL CALC-SCNC: 11 MEQ/L (ref 3–15)
BUN SERPL-MCNC: 15 MG/DL (ref 8–20)
CALCIUM SERPL-MCNC: 8.2 MG/DL (ref 8.9–10.3)
CHLORIDE SERPL-SCNC: 101 MEQ/L (ref 98–109)
CO2 SERPL-SCNC: 22 MEQ/L (ref 22–32)
CREAT SERPL-MCNC: 0.7 MG/DL (ref 0.8–1.3)
ERYTHROCYTE [DISTWIDTH] IN BLOOD BY AUTOMATED COUNT: 14.8 % (ref 11.6–14.4)
GFR SERPL CREATININE-BSD FRML MDRD: >60 ML/MIN/1.73M*2
GLUCOSE BLD-MCNC: 166 MG/DL (ref 70–99)
GLUCOSE BLD-MCNC: 180 MG/DL (ref 70–99)
GLUCOSE BLD-MCNC: 200 MG/DL (ref 70–99)
GLUCOSE BLD-MCNC: 230 MG/DL (ref 70–99)
GLUCOSE SERPL-MCNC: 189 MG/DL (ref 70–99)
HCT VFR BLDCO AUTO: 32 % (ref 40.1–51)
HGB BLD-MCNC: 10.2 G/DL (ref 13.7–17.5)
MCH RBC QN AUTO: 27 PG (ref 28–33.2)
MCHC RBC AUTO-ENTMCNC: 31.9 G/DL (ref 32.2–36.5)
MCV RBC AUTO: 84.7 FL (ref 83–98)
PDW BLD AUTO: 9.8 FL (ref 9.4–12.4)
PLATELET # BLD AUTO: 213 K/UL (ref 150–350)
POCT TEST: ABNORMAL
POTASSIUM SERPL-SCNC: 4.3 MEQ/L (ref 3.6–5.1)
RBC # BLD AUTO: 3.78 M/UL (ref 4.5–5.8)
SODIUM SERPL-SCNC: 134 MEQ/L (ref 136–144)
WBC # BLD AUTO: 12.47 K/UL (ref 3.8–10.5)

## 2022-01-22 PROCEDURE — 63600000 HC DRUGS/DETAIL CODE: Performed by: NURSE PRACTITIONER

## 2022-01-22 PROCEDURE — 85027 COMPLETE CBC AUTOMATED: CPT | Performed by: NURSE PRACTITIONER

## 2022-01-22 PROCEDURE — 20600000 HC ROOM AND CARE INTERMEDIATE/TELEMETRY

## 2022-01-22 PROCEDURE — 97535 SELF CARE MNGMENT TRAINING: CPT | Mod: GO

## 2022-01-22 PROCEDURE — 200200 PR NO CHARGE: Performed by: NEUROLOGICAL SURGERY

## 2022-01-22 PROCEDURE — 97530 THERAPEUTIC ACTIVITIES: CPT | Mod: GP

## 2022-01-22 PROCEDURE — 80048 BASIC METABOLIC PNL TOTAL CA: CPT | Performed by: NURSE PRACTITIONER

## 2022-01-22 PROCEDURE — 63700000 HC SELF-ADMINISTRABLE DRUG: Performed by: NURSE PRACTITIONER

## 2022-01-22 PROCEDURE — 36415 COLL VENOUS BLD VENIPUNCTURE: CPT | Performed by: NURSE PRACTITIONER

## 2022-01-22 PROCEDURE — 25000000 HC PHARMACY GENERAL: Performed by: NURSE PRACTITIONER

## 2022-01-22 PROCEDURE — 63700000 HC SELF-ADMINISTRABLE DRUG: Performed by: HOSPITALIST

## 2022-01-22 PROCEDURE — 25800000 HC PHARMACY IV SOLUTIONS: Performed by: NURSE PRACTITIONER

## 2022-01-22 PROCEDURE — 99233 SBSQ HOSP IP/OBS HIGH 50: CPT | Performed by: HOSPITALIST

## 2022-01-22 RX ADMIN — HEPARIN SODIUM 5000 UNITS: 5000 INJECTION INTRAVENOUS; SUBCUTANEOUS at 21:19

## 2022-01-22 RX ADMIN — METOPROLOL SUCCINATE 100 MG: 100 TABLET, EXTENDED RELEASE ORAL at 20:53

## 2022-01-22 RX ADMIN — GABAPENTIN 600 MG: 300 CAPSULE ORAL at 05:51

## 2022-01-22 RX ADMIN — GABAPENTIN 600 MG: 300 CAPSULE ORAL at 15:12

## 2022-01-22 RX ADMIN — SENNOSIDES AND DOCUSATE SODIUM 1 TABLET: 50; 8.6 TABLET ORAL at 08:49

## 2022-01-22 RX ADMIN — CEFAZOLIN SODIUM 2 G: 10 POWDER, FOR SOLUTION INTRAVENOUS at 15:21

## 2022-01-22 RX ADMIN — POLYETHYLENE GLYCOL (3350) 17 G: 17 POWDER, FOR SOLUTION ORAL at 08:48

## 2022-01-22 RX ADMIN — INSULIN ASPART 3 UNITS: 100 INJECTION, SOLUTION INTRAVENOUS; SUBCUTANEOUS at 17:38

## 2022-01-22 RX ADMIN — ACETAMINOPHEN 650 MG: 325 TABLET, FILM COATED ORAL at 12:01

## 2022-01-22 RX ADMIN — INSULIN ASPART 3 UNITS: 100 INJECTION, SOLUTION INTRAVENOUS; SUBCUTANEOUS at 12:09

## 2022-01-22 RX ADMIN — INSULIN ASPART 3 UNITS: 100 INJECTION, SOLUTION INTRAVENOUS; SUBCUTANEOUS at 08:51

## 2022-01-22 RX ADMIN — GABAPENTIN 600 MG: 300 CAPSULE ORAL at 21:20

## 2022-01-22 RX ADMIN — ACETAMINOPHEN 650 MG: 325 TABLET, FILM COATED ORAL at 05:51

## 2022-01-22 RX ADMIN — PANTOPRAZOLE SODIUM 40 MG: 40 TABLET, DELAYED RELEASE ORAL at 17:39

## 2022-01-22 RX ADMIN — OXYCODONE HYDROCHLORIDE 5 MG: 5 TABLET ORAL at 17:43

## 2022-01-22 RX ADMIN — PRAVASTATIN SODIUM 40 MG: 20 TABLET ORAL at 08:49

## 2022-01-22 RX ADMIN — OXYCODONE HYDROCHLORIDE 10 MG: 5 TABLET ORAL at 05:56

## 2022-01-22 RX ADMIN — HEPARIN SODIUM 5000 UNITS: 5000 INJECTION INTRAVENOUS; SUBCUTANEOUS at 15:12

## 2022-01-22 RX ADMIN — EMPAGLIFLOZIN 10 MG: 10 TABLET, FILM COATED ORAL at 12:03

## 2022-01-22 RX ADMIN — OXYCODONE HYDROCHLORIDE 5 MG: 5 TABLET ORAL at 12:02

## 2022-01-22 RX ADMIN — HEPARIN SODIUM 5000 UNITS: 5000 INJECTION INTRAVENOUS; SUBCUTANEOUS at 05:51

## 2022-01-22 RX ADMIN — CEFAZOLIN SODIUM 2 G: 10 POWDER, FOR SOLUTION INTRAVENOUS at 08:39

## 2022-01-22 RX ADMIN — SENNOSIDES AND DOCUSATE SODIUM 1 TABLET: 50; 8.6 TABLET ORAL at 20:52

## 2022-01-22 RX ADMIN — LEVOTHYROXINE SODIUM 150 MCG: 0.07 TABLET ORAL at 05:51

## 2022-01-22 RX ADMIN — DEXAMETHASONE SODIUM PHOSPHATE 4 MG: 4 INJECTION, SOLUTION INTRA-ARTICULAR; INTRALESIONAL; INTRAMUSCULAR; INTRAVENOUS; SOFT TISSUE at 08:50

## 2022-01-22 ASSESSMENT — COGNITIVE AND FUNCTIONAL STATUS - GENERAL
HELP NEEDED FOR PERSONAL GROOMING: 3 - A LITTLE
DRESSING REGULAR UPPER BODY CLOTHING: 3 - A LITTLE
MOVING TO AND FROM BED TO CHAIR: 3 - A LITTLE
TOILETING: 2 - A LOT
STANDING UP FROM CHAIR USING ARMS: 3 - A LITTLE
HELP NEEDED FOR BATHING: 2 - A LOT
AFFECT: WFL
WALKING IN HOSPITAL ROOM: 3 - A LITTLE
CLIMB 3 TO 5 STEPS WITH RAILING: 2 - A LOT
EATING MEALS: 4 - NONE
AFFECT: WFL
DRESSING REGULAR LOWER BODY CLOTHING: 2 - A LOT

## 2022-01-22 NOTE — ASSESSMENT & PLAN NOTE
Currently in sinus rhythm  Patient is not on long-term anticoagulation  He takes Rythmol as needed for palpitations

## 2022-01-22 NOTE — PROGRESS NOTES
Hospital Medicine Service -  Daily Progress Note       SUBJECTIVE     Interval History: No acute events overnight.  Feels okay.  Denies chest pain or dyspnea.  Reports moderate postop lower back pain.  Denies focal deficits     OBJECTIVE        Vital signs in last 24 hours:  Temp:  [36.4 °C (97.5 °F)-37 °C (98.6 °F)] 36.8 °C (98.3 °F)  Heart Rate:  [73-97] 81  Resp:  [12-19] 18  BP: (100-141)/(51-78) 114/58  I/O last 3 completed shifts:  In: 2668.3 [P.O.:640; I.V.:1928.3; IV Piggyback:100]  Out: 2425 [Urine:2165; Drains:260]    PHYSICAL EXAMINATION        GEN: well-developed and well-nourished; not in acute distress  HEENT: normocephalic; atraumatic  NECK: no JVD; no bruits  CARDIO: regular rate and rhythm; no murmurs or rubs  RESP: clear to auscultation bilaterally; no rales, rhonchi, or wheezes  ABD: soft, non-distended, non-tender, normal bowel sounds  EXT: no cyanosis, clubbing, or edema  SKIN: clean, dry, warm, and intact  MUSCULOSKELETAL: no injury or deformity  NEURO: alert and oriented x 3; nonfocal  BEHAVIOR/EMOTIONAL: appropriate; cooperative     LABS / IMAGING / TELE        Labs  Lab Results   Component Value Date    WBC 12.47 (H) 01/22/2022    HGB 10.2 (L) 01/22/2022    HCT 32.0 (L) 01/22/2022    MCV 84.7 01/22/2022     01/22/2022     Lab Results   Component Value Date    GLUCOSE 189 (H) 01/22/2022    CALCIUM 8.2 (L) 01/22/2022     (L) 01/22/2022    K 4.3 01/22/2022    CO2 22 01/22/2022     01/22/2022    BUN 15 01/22/2022    CREATININE 0.7 (L) 01/22/2022     Lab Results   Component Value Date    INR 1.0 01/18/2022    INR 1.0 01/17/2020    INR 1.0 01/14/2016       Imaging  X-RAY CHEST 2 VIEWS    Result Date: 1/18/2022  IMPRESSION: See comment.    X-RAY LUMBAR SPINE 2 OR 3 VIEWS    Result Date: 1/20/2022  IMPRESSION: Intraoperative fluoroscopic guidance as described above.     CT HEAD WITHOUT IV CONTRAST    Result Date: 1/20/2022  IMPRESSION:  No acute intracranial abnormality.  COMMENT: A standard noncontrast head CT was performed. CT DOSE:  One or more dose reduction techniques (e.g. automated exposure control, adjustment of the mA and/or kV according to patient size, use of iterative reconstruction technique) utilized for this examination. Comparison:  No prior studies are available for comparison. Findings:  Sulci, ventricles and basal cisterns are within normal limits for patient's age.   Attenuation in the brain parenchyma appears within normal limits.  No acute hemorrhage, acute territorial infarct, or mass effect is seen. There is no extra-axial fluid collection.  The visualized paranasal sinuses demonstrates scattered mucosal thickening of the maxillary sinuses bilaterally. Mastoid air cells are clear.    CT LUMBAR SPINE WITHOUT IV CONTRAST    Result Date: 1/20/2022  IMPRESSION: Interval corpectomy at L3, posterior spinal fusion hardware with bilateral pedicle screws at L2-L4. COMMENT: Lumbosacral alignment: Anatomic. Vertebral bodies: Interval corpectomy at L3.  There is posterior spinal fusion hardware with bilateral pedicle screws at L2-L4. Intervertebral discs: Normal in height. Lumbosacral spinal canal: Patent. OTHER: Scattered air lucencies in the paraspinal soft tissues and retro-orbital peritoneum consistent with recent postoperative changes.  Tiny hyperdense focus in the right psoas muscle, may represent a small area of hemorrhage or bony fragment.     FL FLUOROSCOPY TECHNICAL ASSISTANCE    Result Date: 1/20/2022  IMPRESSION: Intraoperative fluoroscopic guidance as described above.       ECG/Telemetry  I have independently reviewed the telemetry. No events for the last 24 hours.    ASSESSMENT AND PLAN      Atherosclerosis of coronary artery  Assessment & Plan  Status post LAD stenting 17 years ago  Patient follows up with Dr. Ji  Continue metoprolol, statin  Restart aspirin once cleared by neurosurgery    Acquired hypothyroidism  Assessment & Plan  Status post  thyroidectomy  Continue levothyroxine  TSH 0.104 days ago  Repeat TFTs as outpatient in 4 weeks    Malignant neoplasm of vertebral column, excluding sacrum and coccyx (CMS/HCC)  Assessment & Plan  Metastatic thyroid ca S/p L3 pathologic fracture s/p right lateral L3 corpectomy and placement of an expandable cage, followed by L2-4 posterolateral instrumented fusion  Continue postop management per neurosurgery  DVT prophylaxis, Lewis, pain management per neurosurgery  Rehab placement on discharge    Paroxysmal atrial fibrillation (CMS/HCC)  Assessment & Plan  Currently in sinus rhythm  Patient is not on long-term anticoagulation  He takes Rythmol as needed for palpitations    Type 2 diabetes mellitus without complication (CMS/HCC)  Assessment & Plan  Patient on Jardiance, Trulicity and metformin as outpatient  Follow Accu-Cheks and administer sliding scale insulin  Hemoglobin A1c 6.94 days ago  Continue Jardiance and can restart metformin prior to discharge  Metformin should be changed to twice daily or extended release once daily prior to discharge.  Patient takes metformin 4 times daily at home    Metastasis from thyroid cancer (CMS/HCC)  Assessment & Plan  Follows up with   Status post radiation    Mixed hyperlipidemia  Assessment & Plan  Continue statin    Essential hypertension  Assessment & Plan  Stable  Continue metoprolol       VTE Assessment: Pad[place]    Code Status: Full Code  Estimated discharge date: 1/25/2022     Vinay Marlow MD  1/22/2022  9:48 AM

## 2022-01-22 NOTE — PROGRESS NOTES
POD#2 s/p right sided L3 corpectomy with insertion of expandable cage, and L2-4 posterolateral instrumented fusion for metastatic thyroid cancer and pathologic fracture and worsening back pain and ambulatory dysfunction. Right leg and thigh pain preop. Patient tolerated procedure well and extubated before transfer to ICU.      Radicular pain:  Improvement in L3 radiculopathy on right, but still having right thigh pain (slowly improving)  Incisional pain:  Moderate 4/10   Neurological:  Intact (right thigh/IP weakness 2/2 to lateral approach)  Voiding:  Lewis to be removed today  BM: Not yet. No flatus  Ambulating: Yes    Drains: 90/80 cc      AAOx3 NAD  Face symmetric  HRR, Abdomen soft, NT, ND. No evidence of labored breathing     Neurologic Examination:   Motor:   RUE: D: 5/5, T: 5/5, B: 5/5, WE: 5/5, H/5, IH: 5/5  LUE: D: 5/5, T: 5/5, B: 5/5, WE: 5/5, H/5, IH: 5/5  RLE: IP  4-/5, Q  4+/5, DF 5/5, EHL 5/5, PF 5/5  LLE: IP  5/5, Q  5/5, DF 5/5, EHL 5/5, PF 5/5     Reflexes: Normoreflexive throughout.   Sensation: intact to light touch      A/P:  This is a 77yo M with metastatic thyroid ca now POD#2 L3 pathologic fracture s/p right lateral L3 corpectomy and placement of an expandable cage, followed by L2-4 posterolateral instrumented fusion.                  - Post-op blood loss anemia. Hemodynamically stable. Trend CBC               - Monitor drain output. Keep drains to bulb suction x 3-4 days given output               - Will need LSO brace when OOB              - PT/OT               - DVT proph: SCDs and Lovenox               - Continue multimodal pain regimen               - D/C Lewis              - Post-op CT lumbar spine shows well placed cage/hardware              - Transfer to regular room

## 2022-01-22 NOTE — CONSULTS
Physical Medicine and Rehabilitation Consult Note    Subjective     Matt Williamson is a 76 y.o. male who was admitted for Low back pain with right-sided sciatica, unspecified back pain laterality, unspecified chronicity [M54.41]  Malignant neoplasm of vertebral column, excluding sacrum and coccyx (CMS/HCC) [C41.2]. Patient was referred by Dr Marlow for evaluate rehab needs. Patient is a 76-year-old male with a history of atrial fibrillation, CAD, hypertension, hypothyroidism, hyperlipidemia, thyroid cancer, type 2 diabetes developed significant mid to low back pain with walking and standing.  He has metastatic thyroid carcinoma with metastasis to the L3 body status post radiation in May 2020.  Recent PET scan showed increased activity at the L3 level without further metastatic lesions.  He was evaluated by neurosurgery who recommended surgical resection.  He was taken to the OR on 1/20 and underwent a stage I L2-L4 PSF with instrumentation, ORIF of L3 pathologic fracture, stage II L3 lateral corpectomy with expandable cage placement.  There has been no significant postoperative complications.  His pain is fairly well controlled.  An LSO has been ordered.  He worked with PT and OT yesterday needing minimal assistance of 2 to transfer sit to stand and ambulate a few steps.  Other than surgical pain he has a Lewis catheter in place.  He denies any other complaints of headache or dizziness, chest pain or shortness of breath.  He is on subcutaneous heparin for DVT prophylaxis.    Pertinent radiology results reviewed. Pertinent lab results reviewed..    Medical History:   Past Medical History:   Diagnosis Date   • Atrial fibrillation with rapid ventricular response (CMS/HCC) 10/25/2019    Hospitalized on 10/25/19 at [facility].    • BPH (benign prostatic hyperplasia)    • Coronary artery disease     LAD stent 2005   • Hypertension    • Hypothyroidism    • Mixed hyperlipidemia    • Thyroid cancer (CMS/HCC)     • Type 2 diabetes mellitus without complication (CMS/HCC)    • Vertigo        Surgical History:   Past Surgical History:   Procedure Laterality Date   • ANAL FISSURECTOMY  1986   • CARDIAC CATHETERIZATION  11/21/2005    LM FOD. 70-80% proximal LAD.Mild disease CX, RCA and branches.    • CARDIAC CATHETERIZATION  11/28/2005    LM FOD. Pristine LAD stent. CX FOD. RCA mild disease.   • CORONARY ANGIOPLASTY WITH STENT PLACEMENT  11/22/2005    LM FOD. Moderate, diffuse disease LAD. Irregular CX w/ PLVB distal to PDA 99%. Mild disease RCA. Stent to distal LAD.   • EYE SURGERY Left Cataract   • FOOT SURGERY  2009    For osteomyelitis.   • GASTROCUTANEOUS FISTULA CLOSURE     • THYROIDECTOMY  2012   • UPPER GASTROINTESTINAL ENDOSCOPY         Social History:   Social History   He lives with his wife in [place].  They have a two-story home with 3 steps to enter.  Recently he has been ambulatory with a cane.  He had been independent with ADLs.  He has been rather sedentary due to severe back pain.  Social History Narrative   • Not on file     Lives with:    Prior Function Level: Prior Level of Function  Dominant Hand: right  Ambulation: assistive equipment  Transferring: assistive equipment  Toileting: independent  Bathing: independent  Dressing: independent  Eating: independent  Communication: understands/communicates without difficulty  Swallowing: swallows foods/liquids without difficulty  Baseline Diet/Method of Nutritional Intake: no diet restrictions  Past History of Dysphagia: Patient denies past or current difficulty swallowing.  Prior Level of Function Comment: SPC use, drives, retired, active with basketball  Family History:   Family History   Problem Relation Age of Onset   • Cancer Biological Mother    • Breast cancer Biological Mother    • Emphysema Biological Father    • Lung cancer Biological Father      History also provided by:   Allergies: Penicillins    Current Inpatient Medications   Medication Dose  Route Frequency Provider Last Rate Last Admin   • acetaminophen (TYLENOL) tablet 650 mg  650 mg oral q6h Livia Micheal CRNP   650 mg at 01/22/22 0551   • alum-mag hydroxide-simeth (MAALOX) 200-200-20 mg/5 mL suspension 30 mL  30 mL oral q4h PRLivia Murphy CRNP       • bisacodyL (DULCOLAX) 10 mg suppository 10 mg  10 mg rectal Daily PRLivia Murphy CRNP       • ceFAZolin in dextrose (iso-os) (ANCEF) 2 gram/100 mL IVPB premix  2 g intravenous q8h Livia Noriega CRNP   2 g at 01/22/22 0839   • glucose chewable tablet 16-32 g of dextrose  16-32 g of dextrose oral PRLivia Murphy CRNP        Or   • dextrose 40 % oral gel 15-30 g of dextrose  15-30 g of dextrose oral Livia Turner CRNP        Or   • glucagon (GLUCAGEN) injection 1 mg  1 mg intramuscular PRLivia Murphy CRNP        Or   • dextrose in water injection 12.5 g  25 mL intravenous PRLivia Murphy CRNP       • diazePAM (VALIUM) tablet 5 mg  5 mg oral q6h PRLivia Murphy CRNP       • diphenhydrAMINE (BENADRYL) capsule 25 mg  25 mg oral q6h PRLivia Murphy CRNP        Or   • diphenhydrAMINE (BENADRYL) injection 25 mg  25 mg intravenous q6h PRLivia Murphy CRNP       • empagliflozin (JARDIANCE) tablet 10 mg  10 mg oral Daily Vinay Marlow MD       • gabapentin (NEURONTIN) capsule 600 mg  600 mg oral q8h Livia Michael CRNP   600 mg at 01/22/22 0551   • heparin (porcine) 5,000 unit/mL injection 5,000 Units  5,000 Units subcutaneous q8h Livia Michael CRNP   5,000 Units at 01/22/22 0551   • oxyCODONE (ROXICODONE) immediate release tablet 5-10 mg  5-10 mg oral q4h PRLivia Murphy CRNP   10 mg at 01/22/22 0556    And   • HYDROmorphone (DILAUDID) injection 0.5 mg  0.5 mg intravenous q3h CAITLINN Livia Manzanares CRNP   0.5 mg at 01/21/22 0517   • insulin aspart U-100 (NovoLOG) pen 3-5 Units  3-5 Units subcutaneous Before meals & nightly Livia Manzanares CRNP   3 Units at 01/22/22 0851   • levothyroxine (SYNTHROID) tablet 150 mcg   150 mcg oral Daily (6a) Livia Manzanares CRNP   150 mcg at 01/22/22 0551   • metoprolol succinate XL (TOPROL-XL) 24 hr ER tablet 100 mg  100 mg oral BID Livia Manzanares CRNP       • ondansetron ODT (ZOFRAN-ODT) disintegrating tablet 4 mg  4 mg oral q8h PRN Livia Manzanares CRNP        Or   • ondansetron (ZOFRAN) injection 4 mg  4 mg intravenous q8h PRN Livia Manzanares CRNP       • pantoprazole (PROTONIX) tablet,delayed release (DR/EC) 40 mg  40 mg oral q PM Livia Manzanares CRNP   40 mg at 01/21/22 1707   • polyethylene glycol (MIRALAX) 17 gram packet 17 g  17 g oral Daily Livia Manzanares CRNP   17 g at 01/22/22 0848   • pravastatin (PRAVACHOL) tablet 40 mg  40 mg oral Daily Livia Manzanares CRNP   40 mg at 01/22/22 0849   • sennosides-docusate sodium (SENOKOT-S) 8.6-50 mg per tablet 1 tablet  1 tablet oral BID Livia Manzanares CRNP   1 tablet at 01/22/22 0849        Medication List      ASK your doctor about these medications    aspirin 81 mg enteric coated tablet  Take 81 mg by mouth daily.  Dose: 81 mg     gabapentin 300 mg capsule  Commonly known as: NEURONTIN  Take 2 capsules (600 mg total) by mouth 3 (three) times a day.  Dose: 600 mg     glipiZIDE 5 mg tablet  Commonly known as: GLUCOTROL  Take 1 tablet by mouth as needed.  Dose: 1 tablet     JARDIANCE 10 mg tablet  TAKE 1 TABLET(10 MG) BY MOUTH DAILY  Generic drug: empagliflozin     metFORMIN 500 mg tablet  Commonly known as: GLUCOPHAGE  TAKE 1 TABLET BY MOUTH FOUR TIMES DAILY     metoprolol succinate  mg 24 hr tablet  Commonly known as: TOPROL-XL  Take 1 tablet (100 mg total) by mouth 2 (two) times a day.  Dose: 100 mg     pantoprazole 40 mg EC tablet  Commonly known as: PROTONIX  TAKE 1 TABLET(40 MG) BY MOUTH DAILY     pravastatin 40 mg tablet  Commonly known as: PRAVACHOL  Take 1 tablet (40 mg total) by mouth daily.  Dose: 40 mg     propafenone 150 mg tablet  Commonly known as: RYTHMOL  Take 1 tablet by mouth as needed (Palpitations).  Dose: 1 tablet    "  SYNTHROID 150 mcg tablet  Take 1 tablet (150 mcg total) by mouth once daily.  Dose: 150 mcg  Generic drug: levothyroxine     TRULICITY 0.75 mg/0.5 mL pen injector  once a week. Every Tuesday  Generic drug: dulaglutide          Review of Systems  Pertinent items are noted in HPI.    Objective   Labs  I have reviewed the patient's labs.  Significant abnormals are White count is 12.47, sodium is 134.    Imaging  I have reviewed the Imaging from the last 24 hrs.    Telemetry:   I have independently reviewed the patient's telemtry. All telemetry are within normal limits.    Physical Exam  Visit Vitals  BP (!) 114/58 (BP Location: Right upper arm, Patient Position: Sitting)   Pulse 81   Temp 36.8 °C (98.3 °F) (Oral)   Resp 18   Ht 1.676 m (5' 6\")   Wt 79.8 kg (175 lb 14.4 oz)   SpO2 100%   BMI 28.39 kg/m²     General appearance: well-developed, well-nourished, pleasant and cooperative  Neck: supple, no carotid bruit and no adenopathy  Back: Incision dressed drain in place  Lungs: clear to auscultation bilaterally  Heart: regular rate and rhythm, S1, S2 normal, no murmur, click, rub or gallop  Abdomen: soft, non-tender, bowel sounds normal and no masses/no organomegaly  Extremities: extremities normal, warm and well-perfused; no cyanosis, clubbing, or edema  Neurologic: alert, oriented with intact speech and cognition  sensation intact to touch  motor:no focal weakness        Assessment   76 y.o. male being consulted for evaluate rehab needs  Plan of care was discussed with patient           Plan     Mr. Williamson is a 76-year-old male with an ADL and ambulatory dysfunction secondary to metastatic thyroid carcinoma with metastasis to L3.  He is now status post lumbar fusion with ORIF of L3 pathologic fracture and lateral corpectomy.  He previously had been completely independent with mobility and self-care.  Given his current functional deficits and need for close medical monitoring recommendation is referral to acute level " inpatient rehabilitation for comprehensive rehab program with 3 hours daily physical and occupational therapies with medical management and oversight by physiatry.  Reasonable goals are to work on and improve his mobility including transfers and ambulation, stair climbing, assess ADLs and need for adaptive equipment, family training and establishing a safe discharge plan.  His estimated length of stay is 10 days.   will work on discharge planning when stable.

## 2022-01-22 NOTE — ASSESSMENT & PLAN NOTE
Patient on Jardiance, Trulicity and metformin as outpatient  Follow Accu-Cheks and administer sliding scale insulin  Hemoglobin A1c 6.94 days ago  Continue Jardiance and can restart metformin prior to discharge  Metformin should be changed to twice daily or extended release once daily prior to discharge.  Patient takes metformin 4 times daily at home

## 2022-01-22 NOTE — ASSESSMENT & PLAN NOTE
Status post LAD stenting 17 years ago  Patient follows up with Dr. Ji  Continue metoprolol, statin  Restart aspirin once cleared by neurosurgery

## 2022-01-22 NOTE — ASSESSMENT & PLAN NOTE
Status post thyroidectomy  Continue levothyroxine  TSH 0.104 days ago  Repeat TFTs as outpatient in 4 weeks

## 2022-01-22 NOTE — PROGRESS NOTES
Patient:  Matt Williamson  Location:  [facility] Unit 3220  MRN:  [id number]  Today's date:  1/22/2022     Pt. Seated in chair on draw sheet, incontinence pad, legs elevated, intact chair pad alarm on, call bell and all needs in reach, reviewed mobility safety protocol with patient. Discussed OT evaluative findings and recommendations with RN.  Spouse Present     Marco A is a 76 y.o. male admitted on 1/20/2022 with Low back pain with right-sided sciatica, unspecified back pain laterality, unspecified chronicity [M54.41]  Malignant neoplasm of vertebral column, excluding sacrum and coccyx (CMS/HCC) [C41.2]. Principal problem is Low back pain with right-sided sciatica.    Past Medical History  Marco A has a past medical history of Atrial fibrillation with rapid ventricular response (CMS/HCC) (10/25/2019), BPH (benign prostatic hyperplasia), Coronary artery disease, Hypertension, Hypothyroidism, Mixed hyperlipidemia, Thyroid cancer (CMS/HCC), Type 2 diabetes mellitus without complication (CMS/HCC), and Vertigo.    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 Pt seen by the neurosurgery service for surgical discussion regarding growth of his L3 vertebral body metastatic lesion. He has had progressive worsening midline-line low back pain with radiation to the right knee. An MRI lumbar spine was obtained 11/22/2021 showing extension of the L3 mass. He was sent for a PET scan 12/7/2021 showing increased activity of the L3 lesion without further metastatic lesions appreciated.    On 1/20/22, pt underwent: Stage 1: L2-L4 PSF with instrumentation, ORIF L3 pathologic fracture  Stage 2: L3 lateral corpectomy with expandable cage placement      OT Vitals    Date/Time Pulse SpO2 Pt Activity O2 Therapy BP BP Location BP Method Pt Position Observations Who   01/22/22 1227 93 100 % At rest None (Room air) 148/84 Right upper arm Automatic  [place] [facility] [place]   01/22/22 1242 97 100 % At rest None (Room air) -- -- -- -- PT/OT- post mobility EEC      OT Pain    Date/Time Pain Type Location Rating: Rest Rating: Activity Interventions [facility]   01/22/22 1227 Pain Assessment back 0 5 position adjusted EEC          Prior Living Environment      Most Recent Value   People in Home spouse   Current Living Arrangements home   Living Environment Comment 3 steps to enter with R rail, bedroom and shower stall on first floor, full flight B/L rails to shower stall and bedroom, typically sleeps on second floor        Prior Level of Function      Most Recent Value   Dominant Hand right   Ambulation assistive equipment   Transferring assistive equipment   Toileting independent   Bathing independent   Dressing independent   Eating independent   Communication understands/communicates without difficulty   Swallowing swallows foods/liquids without difficulty   Baseline Diet/Method of Nutritional Intake no diet restrictions   Past History of Dysphagia Patient denies past or current difficulty swallowing.   Prior Level of Function Comment SPC use, drives, retired, active with basketball   Assistive Device Currently Used at Home cane, straight        Occupational Profile      Most Recent Value   Reason for Services/Referral Diminished Indep in BADL and Mobility   Successful Occupations Retired restaraunt owner   Occupational History/Life Experiences Enjoys Playing Basketball   Performance Patterns Indep in [facility]   Environmental Supports and Barriers Resides with Spouse   Patient Goals Short Term Acute Care Rehab           OT Evaluation and Treatment - 01/22/22 1224        OT Time Calculation    Start Time 1224     Stop Time 1242     Time Calculation (min) 18 min        Session Details    Document Type daily treatment/progress note     Mode of Treatment occupational therapy        General Information    Patient Profile Reviewed yes     Onset of Illness/Injury or Date of Surgery  01/20/22     Referring Physician [place]     Patient/Family/Caregiver Comments/Observations RN Cleared for OT Treatment Session / Care Clustered with PT / Spouse Present     General Observations of Patient Patient Received Reclined in Bedside Chair Willing to Participate LOS Resting Loosly around Patient's Waist     Existing Precautions/Restrictions fall;spinal;brace worn when out of bed;head of bed elevated 30 degrees   LSO When OOB    Limitations/Impairments --   None       Cognition/Psychosocial    Affect/Mental Status (Cognition) WFL     Orientation Status (Cognition) oriented x 3     Follows Commands (Cognition) WFL     Attention Deficit (Cognition) minimal deficit;focused/sustained attention     Executive Function Deficit (Cognition) minimal deficit;information processing;insight/awareness of deficits     Safety Deficit (Cognition) minimal deficit;insight into deficits/self-awareness     Comment, Cognition Cooperative and Pleasant     Cognitive Interventions/Strategies external compensatory strategy training;environmental modifications        Bed Mobility    Comment (Bed Mobility) Patient received OOB        Transfers    Transfers toilet transfer        Sit to Stand Transfer    [place], Sit to Stand Transfer minimum assist (75% or more patient effort);1 person assist;verbal cues     Verbal Cues hand placement;safety     Assistive Device walker, front-wheeled     Comment from Chair / Secured LSO Brace        Stand to Sit Transfer    [place], Stand to Sit Transfer minimum assist (75% or more patient effort);1 person assist;verbal cues     Verbal Cues safety;hand placement     Assistive Device walker, front-wheeled     Comment to Chair        Toilet Transfer    Transfer Technique sit-stand;stand-sit     [place], Toilet Transfer minimum assist (75% or more patient effort);1 person assist;verbal cues     Verbal Cues hand placement;safety;technique     Assistive Device grab bars/safety frame;walker,  front-wheeled        Safety Issues, Functional Mobility    Safety Issues Affecting Function (Mobility) insight into deficits/self-awareness     Impairments Affecting Function (Mobility) balance;endurance/activity tolerance;pain        Balance    Balance Assessment sitting static balance;sitting dynamic balance;sit to stand dynamic balance;standing static balance;standing dynamic balance     Static Sitting Balance WFL;supported;sitting in chair     Dynamic Sitting Balance WFL;unsupported   on toilet    Sit to Stand Dynamic Balance mild impairment;supported   RW    Static Standing Balance WFL;supported   RW    Dynamic Standing Balance mild impairment;supported   RW    Balance Interventions occupation based/functional task     Comment, Balance Patient stood for 2 minutes with RW / 1 self corrected LOB noted        Motor Skills    Functional Endurance Fair as limited by Pain        Upper Body Dressing    Tasks don;[facility] minimum assist (75% or more patient effort)     Position supported standing     Adaptive Equipment none        Lower Body Dressing    Tasks don;socks     [place] dependent (less than 25% patient effort)     Position supported sitting     Adaptive Equipment none     Comment Limited by pain        BADL Safety/Performance    Impairments, BADL Safety/Performance balance;endurance/activity tolerance;pain     Skilled BADL Treatment/Intervention BADL process/adaptation training;environmental modifications;compensatory training     Progress in BADL Status improvement noted;level of supervision required;cueing required;use of environmental adaptations;effort and initiation        ADL Interventions    Self-Care (BADL) Promotion activity adapted to sitting;caregiver involved in training;close supervision for balance provided;cues and encouragement provided;set-up provided        Orthotics & Prosthetics Management    Orthosis Location spinal orthosis        Spinal Orthosis Management     Type (Spinal Orthosis) LSO (lumbar sacral orthosis)     Therapeutic Indications Spinal Orthosis post-op positioning/protection;stabilization and support     Wearing Schedule (Spinal Orthosis) wear when out of bed only     Orthosis Training (Spinal Orthosis) patient and caregiver;wearing schedule;orthosis adjustment;requires cues;requires assistance        Coping    Observed Emotional State calm;cooperative;pleasant     Verbalized Emotional State acceptance;hopefulness     Trust Relationship/Rapport care explained;choices provided;questions answered;thoughts/feelings acknowledged     Family/Support Persons spouse     Involvement in Care at bedside;supportive of patient     Family/Support System Care support provided;self-care encouraged        AM-PAC (TM) - ADL (Current Function)    Putting on and taking off regular lower body clothing? 2 - A Lot     Bathing? 2 - A Lot     Toileting? 2 - A Lot     Putting on/taking off regular upper body clothing? 3 - A Little     How much help for taking care of personal grooming? 3 - A Little     Eating meals? 4 - None     AM-PAC (TM) ADL Score 16        Assessment/Plan (OT)    Daily Outcome Statement OT Treatment Session AmPac Score 16 reflects need for Min to Moderate Assistance with BADL Routines due to Diminished Endurance, Compromised Functional Balance and Pain Which is a deviation from PLOF / Continue Acute Care OT / Recommend Acute Care Rehab when Medically Appropriate     Rehab Potential good, to achieve stated therapy goals     Therapy Frequency 3-5 times/wk     Planned Therapy Interventions activity tolerance training;BADL retraining;occupation/activity based interventions;patient/caregiver education/training;transfer/mobility retraining               OT Assessment/Plan      Most Recent Value   OT Recommended Discharge Disposition acute rehab/Inpatient Rehab Facility at 01/22/2022 1224   Anticipated Equipment Needs At Discharge (OT) haydee, 3-in-1, walker,  front-wheeled, dressing equipment, shower chair at 01/22/2022 1224   Patient/Family Therapy Goal Statement Return Home Following Rehab at 01/22/2022 1224        Involvement in Care  Family/Support Persons: spouse  Involvement in Care: at bedside,supportive of patient           Education Documentation  Treatment Plan, taught by Amanda Almanzar OT at 1/22/2022  2:26 PM.  Learner: Family  Readiness: Acceptance  Method: Explanation, Demonstration  Response: Needs Reinforcement  Comment: Adaptive Strategies for BADL Engagement   Spinal Precautions   Safe Mobiltiy    Treatment Plan, taught by Amanda Almanzar OT at 1/22/2022  2:26 PM.  Learner: Patient  Readiness: Acceptance  Method: Explanation, Demonstration  Response: Needs Reinforcement  Comment: Adaptive Strategies for BADL Engagement   Spinal Precautions   Safe Mobiltiy          OT Goals      Most Recent Value   Transfer Goal 1    Activity/Assistive Device sit-to-stand/stand-to-sit, bed-to-chair/chair-to-bed, toilet, commode, 3-in-1, walker, front-wheeled at 01/21/2022 0929   [place] supervision required at 01/21/2022 0929   Time Frame by discharge at 01/21/2022 0929   Progress/Outcome continuing progress toward goal, goal ongoing at 01/22/2022 1224   Dressing Goal 1    Activity/Adaptive Equipment upper body dressing at 01/21/2022 0929   [place] supervision required at 01/21/2022 0929   Time Frame by discharge at 01/21/2022 0929   Progress/Outcome continuing progress toward goal, goal ongoing at 01/22/2022 1224   Dressing Goal 2    Activity/Adaptive Equipment lower body dressing, dressing stick, long-handled shoe horn, reacher, sock-aid at 01/21/2022 0929   [place] supervision required at 01/21/2022 0929   Time Frame by discharge at 01/21/2022 0929   Progress/Outcome continuing progress toward goal, goal ongoing at 01/22/2022 1224   Toileting Goal 1    Activity/Assistive Device toileting skills, all at 01/21/2022 0929    [place] supervision required at 01/21/2022 0929   Time Frame by discharge at 01/21/2022 0929   Progress/Outcome continuing progress toward goal, goal ongoing at 01/22/2022 1224   Grooming Goal 1    Activity/Assistive Device grooming skills, all at 01/21/2022 0929   [place] modified independence at 01/21/2022 0929   Time Frame by discharge at 01/21/2022 0929   Progress/Outcome continuing progress toward goal, goal ongoing at 01/22/2022 1224

## 2022-01-22 NOTE — PROGRESS NOTES
Patient: Matt Williamson  Location:  [facility] Unit [facility]20  MRN:  [id number]  Today's date:  1/22/2022     Patient left in chair w/ call bell w/in reach, LSO donned, & chair alarm activated. Pt in NAD. Wife present. RN notified of patient's status.    Marco A is a 76 y.o. male admitted on 1/20/2022 with Low back pain with right-sided sciatica, unspecified back pain laterality, unspecified chronicity [M54.41]  Malignant neoplasm of vertebral column, excluding sacrum and coccyx (CMS/HCC) [C41.2]. Principal problem is Low back pain with right-sided sciatica.    Past Medical History  Marco A has a past medical history of Atrial fibrillation with rapid ventricular response (CMS/HCC) (10/25/2019), BPH (benign prostatic hyperplasia), Coronary artery disease, Hypertension, Hypothyroidism, Mixed hyperlipidemia, Thyroid cancer (CMS/HCC), Type 2 diabetes mellitus without complication (CMS/HCC), and Vertigo.    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 Pt seen by the neurosurgery service for surgical discussion regarding growth of his L3 vertebral body metastatic lesion. He has had progressive worsening midline-line low back pain with radiation to the right knee. An MRI lumbar spine was obtained 11/22/2021 showing extension of the L3 mass. He was sent for a PET scan 12/7/2021 showing increased activity of the L3 lesion without further metastatic lesions appreciated.    On 1/20/22, pt underwent: Stage 1: L2-L4 PSF with instrumentation, ORIF L3 pathologic fracture  Stage 2: L3 lateral corpectomy with expandable cage placement      PT Vitals    Date/Time Pulse SpO2 Pt Activity O2 Therapy BP BP Location BP Method Pt Position Observations Who   01/22/22 1227 93 100 % At rest None (Room air) 148/84 Right upper arm Automatic Sitting PT/OT [facility]   01/22/22 1242 97 100 % At rest None (Room air) -- -- -- -- PT/OT- post mobility EEC      PT Pain     Date/Time Pain Type Location Rating: Rest Rating: Activity Interventions [facility]   01/22/22 1227 Pain Assessment back 0 5 position adjusted EEC          Prior Living Environment      Most Recent Value   People in Home spouse   Current Living Arrangements home   Living Environment Comment 3 steps to enter with R rail, bedroom and shower stall on first floor, full flight B/L rails to shower stall and bedroom, typically sleeps on second floor        Prior Level of Function      Most Recent Value   Dominant Hand right   Ambulation assistive equipment   Transferring assistive equipment   Toileting independent   Bathing independent   Dressing independent   Eating independent   Communication understands/communicates without difficulty   Swallowing swallows foods/liquids without difficulty   Baseline Diet/Method of Nutritional Intake no diet restrictions   Past History of Dysphagia Patient denies past or current difficulty swallowing.   Prior Level of Function Comment SPC use, drives, retired, active with basketball   Assistive Device Currently Used at Home cane, straight           PT Evaluation and Treatment - 01/22/22 1226        PT Time Calculation    Start Time 1226     Stop Time 1244     Time Calculation (min) 18 min        Session Details    Document Type daily treatment/progress note     Mode of Treatment physical therapy        General Information    Patient Profile Reviewed yes     Patient/Family/Caregiver Comments/Observations Wife present t/o session     General Observations of Patient RN cleared pt for session. Pt received in chair, agreeable to therapy; LSO around pt but not fastened     Existing Precautions/Restrictions fall;spinal;head of bed elevated 30 degrees;brace worn when out of bed   LSO when OOB       Cognition/Psychosocial    Affect/Mental Status (Cognition) WFL     Orientation Status (Cognition) oriented x 3     Follows Commands (Cognition) WFL     Comment, Cognition pleasant/cooperative/receptive.  Req'd cues for safety at times. See OT note for full cog assessment        Bed Mobility    Comment (Bed Mobility) NT as patient received OOB in chair at beginning of session        Sit to Stand Transfer    [place], Sit to Stand Transfer minimum assist (75% or more patient effort);1 person assist;verbal cues     Verbal Cues hand placement;safety     Assistive Device walker, front-wheeled     Comment from chair & toilet. Fastened LSO prior to initial stand from chair.        Stand to Sit Transfer    [place], Stand to Sit Transfer minimum assist (75% or more patient effort);1 person assist;verbal cues     Verbal Cues safety;hand placement     Assistive Device walker, front-wheeled     Comment to toilet & chair        Gait Training    [place], Gait minimum assist (75% or more patient effort);1 person assist     Assistive Device walker, front-wheeled     Distance in Feet 30 feet     Pattern (Gait) step-through     Deviations/Abnormal Patterns (Gait) step length decreased;stride length decreased;gait speed decreased     Comment (Gait/Stairs) 30'x1, 12'x1; cues for RW proximity. Mildly unsteady but no overt LOBs observed        Safety Issues, Functional Mobility    Impairments Affecting Function (Mobility) balance;endurance/activity tolerance;pain        Balance    Balance Assessment sitting static balance;sit to stand dynamic balance;standing static balance;standing dynamic balance     Static Sitting Balance WFL;sitting in chair     Sit to Stand Dynamic Balance mild impairment     Static Standing Balance WFL;supported     Dynamic Standing Balance mild impairment;supported     Comment, Balance support = RW; close supervision for static standing w/ RW for ~2min prior to mobility        Orthotics & Prosthetics Management    Orthosis Location spinal orthosis        Spinal Orthosis Management    Type (Spinal Orthosis) LSO (lumbar sacral orthosis)     Therapeutic Indications Spinal Orthosis post-op  positioning/protection     Wearing Schedule (Spinal Orthosis) wear when out of bed only     Orthosis Training (Spinal Orthosis) patient and caregiver;orthosis adjustment;wearing schedule        AM-PAC (TM) - Mobility (Current Function)    Turning from your back to your side while in a flat bed without using bedrails? 3 - A Little     Moving from lying on your back to sitting on the side of a flat bed without using bedrails? 2 - A Lot     Moving to and from a bed to a chair? 3 - A Little     Standing up from a chair using your arms? 3 - A Little     To walk in a hospital room? 3 - A Little     Climbing 3-5 steps with a railing? 2 - A Lot     AM-PAC (TM) Mobility Score 16        Assessment/Plan (PT)    Daily Outcome Statement PT treatment session complete. [facility] 16. Patient req Josselyn x1 for STS & amb w/ RW & demos improved activity tolerance vs. initial evaluation. Req'd occasional cues for safety during transfers. Pt receptive to education t/o session. He req cont'd skilled PT to address balance & endurance deficits to max safety & independence for all fxnl activities & dec fall risk as he remains below his PLOF. Rec inpatient rehab at d/c.     Rehab Potential good, to achieve stated therapy goals     Therapy Frequency 5 times/wk     Planned Therapy Interventions balance training;bed mobility training;gait training;patient/family education;ROM (range of motion);strengthening;stair training;transfer training               PT Assessment/Plan      Most Recent Value   PT Recommended Discharge Disposition acute rehab/Inpatient Rehab Facility at 01/22/2022 1226   Anticipated Equipment Needs at Discharge (PT) other (see comments)  [TBD at next level of care] at 01/22/2022 1226   Patient/Family Therapy Goals Statement less pain at 01/22/2022 1226                    Education Documentation  Activity, taught by Grace Rodrigues, PT at 1/22/2022  1:43 PM.  Learner: Significant Other  Readiness: Acceptance  Method:  Explanation  Response: Verbalizes Understanding  Comment: PT POC, spinal precautions, LSO wear schedule/adjusting, use of call bell, ensure staff present prior to mobility, to perform LE AROM/TE to promote mobility/strength    Activity, taught by Grace Rodrigues, PT at 1/22/2022  1:43 PM.  Learner: Patient  Readiness: Acceptance  Method: Explanation  Response: Verbalizes Understanding  Comment: PT POC, spinal precautions, LSO wear schedule/adjusting, use of call bell, ensure staff present prior to mobility, to perform LE AROM/TE to promote mobility/strength          PT Goals      Most Recent Value   Bed Mobility Goal 1    Activity/Assistive Device rolling to left, rolling to right, sit to supine, supine to sit at 01/21/2022 0930   [place] modified independence at 01/21/2022 0930   Time Frame by discharge at 01/21/2022 0930   Progress/Outcome goal ongoing at 01/21/2022 0930   Transfer Goal 1    Activity/Assistive Device sit-to-stand/stand-to-sit, bed-to-chair/chair-to-bed, stand pivot, walker, front-wheeled at 01/21/2022 0930   [place] modified independence at 01/21/2022 0930   Time Frame by discharge at 01/21/2022 0930   Progress/Outcome goal ongoing at 01/21/2022 0930   Gait Training Goal 1    Activity/Assistive Device gait (walking locomotion), walker, front-wheeled at 01/21/2022 0930   [place] modified independence at 01/21/2022 0930   Distance 150 at 01/21/2022 0930   Time Frame by discharge at 01/21/2022 0930   Progress/Outcome goal ongoing at 01/21/2022 0930   Stairs Goal 1    Activity/Assistive Device ascending stairs, descending stairs, using handrail, left, using handrail, right, step-to-step at 01/21/2022 0930   [place] modified independence at 01/21/2022 0930   Number of Stairs 12 at 01/21/2022 0930   Time Frame by discharge at 01/21/2022 0930   Progress/Outcome goal ongoing at 01/21/2022 0930

## 2022-01-22 NOTE — PLAN OF CARE
Problem: Adult Inpatient Plan of Care  Goal: Plan of Care Review  Outcome: Progressing  Flowsheets (Taken 1/22/2022 1613)  Progress: improving  Plan of Care Reviewed With:   spouse   patient  Outcome Summary: OT Treatment Session AmPa Score 16 reflects need for Min to Moderate Assistance with BADL Routines due to Diminished Endurance, Compromised Functional Balance and Pain Which is a deviation from PLOF / Continue Acute Care OT / Recommend Acute Care Rehab when Medically Appropriate

## 2022-01-22 NOTE — ASSESSMENT & PLAN NOTE
Metastatic thyroid ca S/p L3 pathologic fracture s/p right lateral L3 corpectomy and placement of an expandable cage, followed by L2-4 posterolateral instrumented fusion  Continue postop management per neurosurgery.  Drain was removed  DVT prophylaxis, [place], pain management per neurosurgery  Rehab placement on discharge  Discharge to [facility] when ileus resolves

## 2022-01-23 ENCOUNTER — BMR PREADMISSION ASSESSMENT (OUTPATIENT)
Dept: ADMISSIONS | Facility: REHABILITATION | Age: 77
End: 2022-01-23
Payer: MEDICARE

## 2022-01-23 LAB
ANION GAP SERPL CALC-SCNC: 11 MEQ/L (ref 3–15)
BUN SERPL-MCNC: 18 MG/DL (ref 8–20)
CALCIUM SERPL-MCNC: 8.4 MG/DL (ref 8.9–10.3)
CHLORIDE SERPL-SCNC: 99 MEQ/L (ref 98–109)
CO2 SERPL-SCNC: 25 MEQ/L (ref 22–32)
CREAT SERPL-MCNC: 0.9 MG/DL (ref 0.8–1.3)
ERYTHROCYTE [DISTWIDTH] IN BLOOD BY AUTOMATED COUNT: 14.6 % (ref 11.6–14.4)
GFR SERPL CREATININE-BSD FRML MDRD: >60 ML/MIN/1.73M*2
GLUCOSE BLD-MCNC: 125 MG/DL (ref 70–99)
GLUCOSE BLD-MCNC: 142 MG/DL (ref 70–99)
GLUCOSE BLD-MCNC: 144 MG/DL (ref 70–99)
GLUCOSE SERPL-MCNC: 138 MG/DL (ref 70–99)
HCT VFR BLDCO AUTO: 33.3 % (ref 40.1–51)
HGB BLD-MCNC: 10.6 G/DL (ref 13.7–17.5)
MCH RBC QN AUTO: 26.9 PG (ref 28–33.2)
MCHC RBC AUTO-ENTMCNC: 31.8 G/DL (ref 32.2–36.5)
MCV RBC AUTO: 84.5 FL (ref 83–98)
PDW BLD AUTO: 9.8 FL (ref 9.4–12.4)
PLATELET # BLD AUTO: 247 K/UL (ref 150–350)
POCT TEST: ABNORMAL
POTASSIUM SERPL-SCNC: 4 MEQ/L (ref 3.6–5.1)
RBC # BLD AUTO: 3.94 M/UL (ref 4.5–5.8)
SODIUM SERPL-SCNC: 135 MEQ/L (ref 136–144)
WBC # BLD AUTO: 12.66 K/UL (ref 3.8–10.5)

## 2022-01-23 PROCEDURE — 20600000 HC ROOM AND CARE INTERMEDIATE/TELEMETRY

## 2022-01-23 PROCEDURE — 80048 BASIC METABOLIC PNL TOTAL CA: CPT | Performed by: NURSE PRACTITIONER

## 2022-01-23 PROCEDURE — 36415 COLL VENOUS BLD VENIPUNCTURE: CPT | Performed by: NURSE PRACTITIONER

## 2022-01-23 PROCEDURE — 63600000 HC DRUGS/DETAIL CODE: Performed by: NURSE PRACTITIONER

## 2022-01-23 PROCEDURE — 99232 SBSQ HOSP IP/OBS MODERATE 35: CPT | Performed by: HOSPITALIST

## 2022-01-23 PROCEDURE — 63700000 HC SELF-ADMINISTRABLE DRUG: Performed by: NURSE PRACTITIONER

## 2022-01-23 PROCEDURE — 200200 PR NO CHARGE: Performed by: NEUROLOGICAL SURGERY

## 2022-01-23 PROCEDURE — 63700000 HC SELF-ADMINISTRABLE DRUG: Performed by: HOSPITALIST

## 2022-01-23 PROCEDURE — 85027 COMPLETE CBC AUTOMATED: CPT | Performed by: NURSE PRACTITIONER

## 2022-01-23 RX ORDER — METFORMIN HYDROCHLORIDE 500 MG/1
500 TABLET ORAL 2 TIMES DAILY WITH MEALS
Status: DISCONTINUED | OUTPATIENT
Start: 2022-01-23 | End: 2022-01-27 | Stop reason: HOSPADM

## 2022-01-23 RX ADMIN — METOPROLOL SUCCINATE 100 MG: 100 TABLET, EXTENDED RELEASE ORAL at 22:00

## 2022-01-23 RX ADMIN — PANTOPRAZOLE SODIUM 40 MG: 40 TABLET, DELAYED RELEASE ORAL at 17:10

## 2022-01-23 RX ADMIN — SENNOSIDES AND DOCUSATE SODIUM 1 TABLET: 50; 8.6 TABLET ORAL at 09:34

## 2022-01-23 RX ADMIN — GABAPENTIN 600 MG: 300 CAPSULE ORAL at 22:00

## 2022-01-23 RX ADMIN — OXYCODONE HYDROCHLORIDE 10 MG: 5 TABLET ORAL at 15:45

## 2022-01-23 RX ADMIN — GABAPENTIN 600 MG: 300 CAPSULE ORAL at 05:16

## 2022-01-23 RX ADMIN — SENNOSIDES AND DOCUSATE SODIUM 1 TABLET: 50; 8.6 TABLET ORAL at 21:59

## 2022-01-23 RX ADMIN — OXYCODONE HYDROCHLORIDE 10 MG: 5 TABLET ORAL at 10:44

## 2022-01-23 RX ADMIN — HEPARIN SODIUM 5000 UNITS: 5000 INJECTION INTRAVENOUS; SUBCUTANEOUS at 14:43

## 2022-01-23 RX ADMIN — EMPAGLIFLOZIN 10 MG: 10 TABLET, FILM COATED ORAL at 09:33

## 2022-01-23 RX ADMIN — METOPROLOL SUCCINATE 100 MG: 100 TABLET, EXTENDED RELEASE ORAL at 09:41

## 2022-01-23 RX ADMIN — HEPARIN SODIUM 5000 UNITS: 5000 INJECTION INTRAVENOUS; SUBCUTANEOUS at 05:15

## 2022-01-23 RX ADMIN — HEPARIN SODIUM 5000 UNITS: 5000 INJECTION INTRAVENOUS; SUBCUTANEOUS at 21:59

## 2022-01-23 RX ADMIN — OXYCODONE HYDROCHLORIDE 10 MG: 5 TABLET ORAL at 00:20

## 2022-01-23 RX ADMIN — POLYETHYLENE GLYCOL (3350) 17 G: 17 POWDER, FOR SOLUTION ORAL at 09:34

## 2022-01-23 RX ADMIN — GABAPENTIN 600 MG: 300 CAPSULE ORAL at 14:43

## 2022-01-23 RX ADMIN — PRAVASTATIN SODIUM 40 MG: 20 TABLET ORAL at 09:34

## 2022-01-23 RX ADMIN — OXYCODONE HYDROCHLORIDE 5 MG: 5 TABLET ORAL at 22:05

## 2022-01-23 RX ADMIN — METFORMIN HYDROCHLORIDE 500 MG: 500 TABLET ORAL at 16:56

## 2022-01-23 RX ADMIN — OXYCODONE HYDROCHLORIDE 5 MG: 5 TABLET ORAL at 06:36

## 2022-01-23 RX ADMIN — LEVOTHYROXINE SODIUM 150 MCG: 0.07 TABLET ORAL at 05:15

## 2022-01-23 NOTE — PROGRESS NOTES
Hospital Medicine Service -  Daily Progress Note       SUBJECTIVE     Interval History: No acute events overnight.  Feels okay.  Denies chest pain or dyspnea.  Reports moderate postop lower back pain and constipation.  Denies focal deficits     OBJECTIVE        Vital signs in last 24 hours:  Temp:  [36.1 °C (97 °F)-37.2 °C (98.9 °F)] 36.8 °C (98.2 °F)  Heart Rate:  [] 77  Resp:  [18] 18  BP: (117-164)/(56-84) 119/56  I/O last 3 completed shifts:  In: 520 [P.O.:520]  Out: 1965 [Urine:1810; Drains:155]    PHYSICAL EXAMINATION        GEN: well-developed and well-nourished; not in acute distress  HEENT: normocephalic; atraumatic  NECK: no JVD; no bruits  CARDIO: regular rate and rhythm; no murmurs or rubs  RESP: clear to auscultation bilaterally; no rales, rhonchi, or wheezes  ABD: soft, non-distended, non-tender, normal bowel sounds  EXT: no cyanosis, clubbing, or edema  SKIN: clean, dry, warm, and intact  MUSCULOSKELETAL: no injury or deformity  NEURO: alert and oriented x 3; nonfocal  BEHAVIOR/EMOTIONAL: appropriate; cooperative     LABS / IMAGING / TELE        Labs  Lab Results   Component Value Date    WBC 12.66 (H) 01/23/2022    HGB 10.6 (L) 01/23/2022    HCT 33.3 (L) 01/23/2022    MCV 84.5 01/23/2022     01/23/2022     Lab Results   Component Value Date    GLUCOSE 138 (H) 01/23/2022    CALCIUM 8.4 (L) 01/23/2022     (L) 01/23/2022    K 4.0 01/23/2022    CO2 25 01/23/2022    CL 99 01/23/2022    BUN 18 01/23/2022    CREATININE 0.9 01/23/2022     Lab Results   Component Value Date    INR 1.0 01/18/2022    INR 1.0 01/17/2020    INR 1.0 01/14/2016       Imaging  X-RAY CHEST 2 VIEWS    Result Date: 1/18/2022  IMPRESSION: See comment.    X-RAY LUMBAR SPINE 2 OR 3 VIEWS    Result Date: 1/20/2022  IMPRESSION: Intraoperative fluoroscopic guidance as described above.     CT HEAD WITHOUT IV CONTRAST    Result Date: 1/20/2022  IMPRESSION:  No acute intracranial abnormality. COMMENT: A standard  noncontrast head CT was performed. CT DOSE:  One or more dose reduction techniques (e.g. automated exposure control, adjustment of the mA and/or kV according to patient size, use of iterative reconstruction technique) utilized for this examination. Comparison:  No prior studies are available for comparison. Findings:  Sulci, ventricles and basal cisterns are within normal limits for patient's age.   Attenuation in the brain parenchyma appears within normal limits.  No acute hemorrhage, acute territorial infarct, or mass effect is seen. There is no extra-axial fluid collection.  The visualized paranasal sinuses demonstrates scattered mucosal thickening of the maxillary sinuses bilaterally. Mastoid air cells are clear.    CT LUMBAR SPINE WITHOUT IV CONTRAST    Result Date: 1/20/2022  IMPRESSION: Interval corpectomy at L3, posterior spinal fusion hardware with bilateral pedicle screws at L2-L4. COMMENT: Lumbosacral alignment: Anatomic. Vertebral bodies: Interval corpectomy at L3.  There is posterior spinal fusion hardware with bilateral pedicle screws at L2-L4. Intervertebral discs: Normal in height. Lumbosacral spinal canal: Patent. OTHER: Scattered air lucencies in the paraspinal soft tissues and retro-orbital peritoneum consistent with recent postoperative changes.  Tiny hyperdense focus in the right psoas muscle, may represent a small area of hemorrhage or bony fragment.     FL FLUOROSCOPY TECHNICAL ASSISTANCE    Result Date: 1/20/2022  IMPRESSION: Intraoperative fluoroscopic guidance as described above.       ECG/Telemetry  I have independently reviewed the telemetry. No events for the last 24 hours.    ASSESSMENT AND PLAN      Atherosclerosis of coronary artery  Assessment & Plan  Status post LAD stenting 17 years ago  Patient follows up with Dr. Ji  Continue metoprolol, statin  Restart aspirin once cleared by neurosurgery    Acquired hypothyroidism  Assessment & Plan  Status post thyroidectomy  Continue  levothyroxine  TSH 0.10 4 days ago  Repeat TFTs as outpatient in 4 weeks    Malignant neoplasm of vertebral column, excluding sacrum and coccyx (CMS/HCC)  Assessment & Plan  Metastatic thyroid ca S/p L3 pathologic fracture s/p right lateral L3 corpectomy and placement of an expandable cage, followed by L2-4 posterolateral instrumented fusion  Continue postop management per neurosurgery  DVT prophylaxis, Lewis, pain management per neurosurgery  Heparin Sq for DVT prophylaxis  Rehab placement on discharge    Paroxysmal atrial fibrillation (CMS/HCC)  Assessment & Plan  Currently in sinus rhythm  Patient is not on long-term anticoagulation  He takes Rythmol as needed for palpitations    Type 2 diabetes mellitus without complication (CMS/HCC)  Assessment & Plan  Patient on Jardiance, Trulicity and metformin as outpatient  Follow Accu-Cheks and administer sliding scale insulin  Hemoglobin A1c 6.94 days ago  Continue Jardiance and can restart metformin  Metformin should be changed to twice daily or extended release once daily prior to discharge.  Patient takes metformin 4 times daily at home.Unsure why    Metastasis from thyroid cancer (CMS/HCC)  Assessment & Plan  Follows up with   Status post radiation    Mixed hyperlipidemia  Assessment & Plan  Continue statin    Essential hypertension  Assessment & Plan  Stable  Continue metoprolol    Constipation      Cont Miralax and Senna     VTE Assessment: Padua    Code Status: Full Code  Estimated discharge date: 1/25/2022     Vinay Marlow MD  1/23/2022  9:35 AM

## 2022-01-23 NOTE — PROGRESS NOTES
POD#3 s/p right sided L3 corpectomy with insertion of expandable cage, and L2-4 posterolateral instrumented fusion for metastatic thyroid cancer and pathologic fracture and worsening back pain and ambulatory dysfunction. Right leg and thigh pain preop. Patient tolerated procedure well and extubated before transfer to ICU.      Radicular pain:  Improvement in L3 radiculopathy on right, but still having right thigh pain (slowly improving)  Incisional pain:  Moderate 4/10   Neurological:  Intact (right thigh/IP weakness 2/2 to lateral approach)  Voiding:  Yes  BM: Not yet. + flatus  Ambulating: Yes     Drains: 50/45 cc      AAOx3 NAD  Face symmetric  HRR, Abdomen soft, NT, ND. No evidence of labored breathing     Neurologic Examination:   Motor:   RUE: D: 5/5, T: 5/5, B: 5/5, WE: 5/5, H/5, IH: 5/5  LUE: D: 5/5, T: 5/5, B: 5/5, WE: 5/5, H/5, IH: 5/5  RLE: IP  4-/5, Q  4+/5, DF 5/5, EHL 5/5, PF 5/5  LLE: IP  5/5, Q  5/5, DF 5/5, EHL 5/5, PF 5/5     Reflexes: Normoreflexive throughout.   Sensation: intact to light touch       A/P:  This is a 77yo M with metastatic thyroid ca now POD#3 L3 pathologic fracture s/p right lateral L3 corpectomy and placement of an expandable cage, followed by L2-4 posterolateral instrumented fusion.                  - Post-op blood loss anemia. Hemodynamically stable. Trend CBC               - Will need LSO brace when OOB              - PT/OT               - DVT proph: SCDs and Lovenox               - Continue multimodal pain regimen               - D/C Lewis              - Post-op CT lumbar spine shows well placed cage/hardware              - Drains likely to be removed tomorrow.

## 2022-01-24 LAB
GLUCOSE BLD-MCNC: 123 MG/DL (ref 70–99)
GLUCOSE BLD-MCNC: 138 MG/DL (ref 70–99)
GLUCOSE BLD-MCNC: 147 MG/DL (ref 70–99)
GLUCOSE BLD-MCNC: 205 MG/DL (ref 70–99)
POCT TEST: ABNORMAL
SARS-COV-2 RNA RESP QL NAA+PROBE: NEGATIVE

## 2022-01-24 PROCEDURE — 63700000 HC SELF-ADMINISTRABLE DRUG: Performed by: NURSE PRACTITIONER

## 2022-01-24 PROCEDURE — 63600000 HC DRUGS/DETAIL CODE: Performed by: NURSE PRACTITIONER

## 2022-01-24 PROCEDURE — 99233 SBSQ HOSP IP/OBS HIGH 50: CPT | Performed by: HOSPITALIST

## 2022-01-24 PROCEDURE — U0002 COVID-19 LAB TEST NON-CDC: HCPCS | Performed by: HOSPITALIST

## 2022-01-24 PROCEDURE — 97535 SELF CARE MNGMENT TRAINING: CPT | Mod: GO

## 2022-01-24 PROCEDURE — 63700000 HC SELF-ADMINISTRABLE DRUG: Performed by: HOSPITALIST

## 2022-01-24 PROCEDURE — 97530 THERAPEUTIC ACTIVITIES: CPT | Mod: GP

## 2022-01-24 PROCEDURE — 20600000 HC ROOM AND CARE INTERMEDIATE/TELEMETRY

## 2022-01-24 PROCEDURE — 99024 POSTOP FOLLOW-UP VISIT: CPT | Performed by: NEUROLOGICAL SURGERY

## 2022-01-24 RX ADMIN — HEPARIN SODIUM 5000 UNITS: 5000 INJECTION INTRAVENOUS; SUBCUTANEOUS at 15:07

## 2022-01-24 RX ADMIN — INSULIN ASPART 3 UNITS: 100 INJECTION, SOLUTION INTRAVENOUS; SUBCUTANEOUS at 17:14

## 2022-01-24 RX ADMIN — GABAPENTIN 600 MG: 300 CAPSULE ORAL at 15:07

## 2022-01-24 RX ADMIN — PANTOPRAZOLE SODIUM 40 MG: 40 TABLET, DELAYED RELEASE ORAL at 17:13

## 2022-01-24 RX ADMIN — HEPARIN SODIUM 5000 UNITS: 5000 INJECTION INTRAVENOUS; SUBCUTANEOUS at 21:30

## 2022-01-24 RX ADMIN — POLYETHYLENE GLYCOL (3350) 17 G: 17 POWDER, FOR SOLUTION ORAL at 09:26

## 2022-01-24 RX ADMIN — SENNOSIDES AND DOCUSATE SODIUM 1 TABLET: 50; 8.6 TABLET ORAL at 21:29

## 2022-01-24 RX ADMIN — EMPAGLIFLOZIN 10 MG: 10 TABLET, FILM COATED ORAL at 09:26

## 2022-01-24 RX ADMIN — GABAPENTIN 600 MG: 300 CAPSULE ORAL at 21:29

## 2022-01-24 RX ADMIN — OXYCODONE HYDROCHLORIDE 10 MG: 5 TABLET ORAL at 06:22

## 2022-01-24 RX ADMIN — OXYCODONE HYDROCHLORIDE 10 MG: 5 TABLET ORAL at 11:14

## 2022-01-24 RX ADMIN — METFORMIN HYDROCHLORIDE 500 MG: 500 TABLET ORAL at 17:13

## 2022-01-24 RX ADMIN — METOPROLOL SUCCINATE 100 MG: 100 TABLET, EXTENDED RELEASE ORAL at 21:29

## 2022-01-24 RX ADMIN — GABAPENTIN 600 MG: 300 CAPSULE ORAL at 06:22

## 2022-01-24 RX ADMIN — SENNOSIDES AND DOCUSATE SODIUM 1 TABLET: 50; 8.6 TABLET ORAL at 09:26

## 2022-01-24 RX ADMIN — OXYCODONE HYDROCHLORIDE 10 MG: 5 TABLET ORAL at 02:20

## 2022-01-24 RX ADMIN — HEPARIN SODIUM 5000 UNITS: 5000 INJECTION INTRAVENOUS; SUBCUTANEOUS at 06:22

## 2022-01-24 RX ADMIN — PRAVASTATIN SODIUM 40 MG: 20 TABLET ORAL at 09:25

## 2022-01-24 RX ADMIN — METFORMIN HYDROCHLORIDE 500 MG: 500 TABLET ORAL at 09:25

## 2022-01-24 RX ADMIN — LEVOTHYROXINE SODIUM 150 MCG: 0.07 TABLET ORAL at 06:22

## 2022-01-24 ASSESSMENT — COGNITIVE AND FUNCTIONAL STATUS - GENERAL
DRESSING REGULAR LOWER BODY CLOTHING: 2 - A LOT
HELP NEEDED FOR PERSONAL GROOMING: 3 - A LITTLE
AFFECT: WFL
AFFECT: WFL
MOVING TO AND FROM BED TO CHAIR: 3 - A LITTLE
STANDING UP FROM CHAIR USING ARMS: 3 - A LITTLE
EATING MEALS: 4 - NONE
CLIMB 3 TO 5 STEPS WITH RAILING: 2 - A LOT
HELP NEEDED FOR BATHING: 2 - A LOT
WALKING IN HOSPITAL ROOM: 3 - A LITTLE
TOILETING: 2 - A LOT
DRESSING REGULAR UPPER BODY CLOTHING: 3 - A LITTLE

## 2022-01-24 NOTE — HOSPITAL COURSE
Patient is a 76-year-old male with a history of atrial fibrillation, CAD, hypertension, hypothyroidism, hyperlipidemia, thyroid cancer, type 2 diabetes developed significant mid to low back pain with walking and standing.  He has metastatic thyroid carcinoma with metastasis to the L3 body status post radiation in May 2020.  Recent PET scan showed increased activity at the L3 level without further metastatic lesions.  He was evaluated by neurosurgery who recommended surgical resection.  He was taken to the OR on 1/20 and underwent a stage I L2-L4 PSF with instrumentation, ORIF of L3 pathologic fracture, stage II L3 lateral corpectomy with expandable cage placement.  There has been no significant postoperative complications.  His pain is fairly well controlled.  An LSO has been ordered.  He worked with PT and OT yesterday needing minimal assistance of 2 to transfer sit to stand and ambulate a few steps.  Other than surgical pain he has a Lewis catheter in place.  He denies any other complaints of headache or dizziness, chest pain or shortness of breath.  He is on subcutaneous heparin for DVT prophylaxis.     He lives with his wife in [place].  They have a two-story home with 3 steps to enter.  Recently he has been ambulatory with a cane.  He had been independent with ADLs.  He has been rather sedentary due to severe back pain.    With the pt's current deficits and need for close medical monitoring recommendation will be referral to acute level rehabilitation for comprehensive rehab program with 3 hours daily physical and occupational therapy with medical management and oversight by physiatry.  Patient would benefit from the daily therapy sessions of 3 hours in order to maximize overall functional mobility in order to return to OF and prevent manifestations of sequela diagnosis related to decrease mobility (UTI, PNE, DVT), and possible hospital re-admission. Goals  to be addressed will be for  reconditioning,  strengthening, balance,  improve functional mobility, and  ADLs, family/caregiver training as needed and establish a safe discharge plan home at [facility] and decrease risk for re-admission.     1/27 Re-eval delay in admission due to pt developed an ileus.  Pt chriss diet and having BM's

## 2022-01-24 NOTE — PROGRESS NOTES
Patient: Matt Williamson  Location:  [facility] 3A 3020  MRN:  [id number]  Today's date:  1/24/2022    Pt was left seated in chair with alarm on, call bell in reach, NAD, and all needs met. RN notified.    Marco A is a 76 y.o. male admitted on 1/20/2022 with Low back pain with right-sided sciatica, unspecified back pain laterality, unspecified chronicity [M54.41]  Malignant neoplasm of vertebral column, excluding sacrum and coccyx (CMS/HCC) [C41.2]. Principal problem is Low back pain with right-sided sciatica.    Past Medical History  Marco A has a past medical history of Atrial fibrillation with rapid ventricular response (CMS/HCC) (10/25/2019), BPH (benign prostatic hyperplasia), Coronary artery disease, Hypertension, Hypothyroidism, Mixed hyperlipidemia, Thyroid cancer (CMS/HCC), Type 2 diabetes mellitus without complication (CMS/HCC), and Vertigo.    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 Pt seen by the neurosurgery service for surgical discussion regarding growth of his L3 vertebral body metastatic lesion. He has had progressive worsening midline-line low back pain with radiation to the right knee. An MRI lumbar spine was obtained 11/22/2021 showing extension of the L3 mass. He was sent for a PET scan 12/7/2021 showing increased activity of the L3 lesion without further metastatic lesions appreciated.    On 1/20/22, pt underwent: Stage 1: L2-L4 PSF with instrumentation, ORIF L3 pathologic fracture  Stage 2: L3 lateral corpectomy with expandable cage placement      PT Vitals    Date/Time Pulse SpO2 Pt Activity O2 Therapy BP BP Location BP Method Pt Position Observations [facility]   01/24/22 1005 86 97 % At rest None (Room air) 124/60 Right upper arm Automatic Lying OT/PT ECM      PT Pain    Date/Time Location Rating: Rest Rating: Activity Interventions [facility]   01/24/22 1005 back 0 3 care clustered ECM          Prior Living Environment       Most Recent Value   People in Home spouse   Current Living Arrangements home   Living Environment Comment 3 steps to enter with R rail, bedroom and shower stall on first floor, full flight B/L rails to shower stall and bedroom, typically sleeps on second floor        Prior Level of Function      Most Recent Value   Dominant Hand right   Ambulation assistive equipment   Transferring assistive equipment   Toileting independent   Bathing independent   Dressing independent   Eating independent   Communication understands/communicates without difficulty   Swallowing swallows foods/liquids without difficulty   Baseline Diet/Method of Nutritional Intake no diet restrictions   Past History of Dysphagia Patient denies past or current difficulty swallowing.   Prior Level of Function Comment SPC use, drives, retired, active with basketball   Assistive Device Currently Used at Home cane, straight           PT Evaluation and Treatment - 01/24/22 1002        PT Time Calculation    Start Time 1002     Stop Time 1019     Time Calculation (min) 17 min        Session Details    Document Type daily treatment/progress note     Mode of Treatment physical therapy        General Information    Patient Profile Reviewed yes     Onset of Illness/Injury or Date of Surgery 01/20/22     Referring Physician Moraima     Patient/Family/Caregiver Comments/Observations RN cleared     General Observations of Patient pt presents resting in bed, agreeable to therapy     Existing Precautions/Restrictions fall;brace worn when out of bed;spinal   +CRYSTAL drains       Cognition/Psychosocial    Affect/Mental Status (Cognition) WFL     Orientation Status (Cognition) oriented x 3;verbal cues/prompts needed for orientation     Follows Commands (Cognition) follows one-step commands;WFL     Comment, Cognition cooperative during session, reports feeling tired today. requires cues for direction during ambulation, education about spinal precautions, and cues for safe  transfer technique.        Bed Mobility    Dolores, Supine to Sit close supervision;verbal cues;increased time to complete     Verbal Cues (Supine to Sit) safety;technique;maintaining precautions     Assistive Device head of bed elevated;bed rails     Comment (Bed Mobility) to L side of bed. education provided about log roll technique and maintaining spinal precautions. pt reports having adjustable base bed at home, used controls to elevated HOB. LSO donned while seated EOB.        Sit to Stand Transfer    Dolores, Sit to Stand Transfer close supervision;verbal cues     Verbal Cues hand placement;safety;technique;maintaining precautions     Assistive Device walker, front-wheeled     Comment from EOB. requires cues for proper technique and maintaining precautions. pt keeping B hands on walker to stand. education provided about pushing up from solid surface.        Stand to Sit Transfer    Dolores, Stand to Sit Transfer close supervision;verbal cues     Verbal Cues hand placement;safety;technique     Assistive Device walker, front-wheeled     Comment to chair        Gait Training    Dolores, Gait minimum assist (75% or more patient effort)     Assistive Device walker, front-wheeled     Distance in Feet 40 feet   with brief standing rest break    Pattern (Gait) step-through     Deviations/Abnormal Patterns (Gait) step length decreased;stride length decreased     Comment (Gait/Stairs) pt was able to ambulate short distance into hallway with min A and RW. requires cues for proper positioning of RW during mobility. pt attempting to turn RW, then body requiring repeated education about spinal precautions/ avoiding twisting. able to demonstrate carry-over.        Safety Issues, Functional Mobility    Impairments Affecting Function (Mobility) balance;endurance/activity tolerance;range of motion (ROM);strength        Balance    Balance Assessment sitting static balance;sit to stand dynamic balance;standing  static balance;standing dynamic balance     Static Sitting Balance WFL;sitting in chair     Sit to Stand Dynamic Balance mild impairment;supported     Static Standing Balance WFL;supported     Dynamic Standing Balance mild impairment;supported     Comment, Balance supported = RW        Spinal Orthosis Management    Type (Spinal Orthosis) LSO (lumbar sacral orthosis)     Functional Design (Spinal Orthosis) static orthosis     Therapeutic Indications Spinal Orthosis stabilization and support;post-op positioning/protection     Wearing Schedule (Spinal Orthosis) wear when out of bed only   donned at EOB prior to transfers    Orthosis Training (Spinal Orthosis) patient;requires assistance;donning/doffing orthosis        AM-PAC (TM) - Mobility (Current Function)    Turning from your back to your side while in a flat bed without using bedrails? 3 - A Little     Moving from lying on your back to sitting on the side of a flat bed without using bedrails? 2 - A Lot     Moving to and from a bed to a chair? 3 - A Little     Standing up from a chair using your arms? 3 - A Little     To walk in a hospital room? 3 - A Little     Climbing 3-5 steps with a railing? 2 - A Lot     AM-PAC (TM) Mobility Score 16        Assessment/Plan (PT)    Daily Outcome Statement PT treat completed. pt requires CS for transfers, min A for ambulation with RW. pt is limited by decreased balance and endurance, spinal precautions, and pain. requires cues and education for proper mobility techniques and spinal precautions. recommend continued PT services to maximize functional independence and mobility. [facility] 16/24.     Rehab Potential good, to achieve stated therapy goals     Therapy Frequency 5 times/wk     Planned Therapy Interventions balance training;bed mobility training;gait training;strengthening;transfer training;patient/family education               PT Assessment/Plan      Most Recent Value   PT Recommended Discharge Disposition acute  rehab/Inpatient Rehab Facility at 01/24/2022 1002   Anticipated Equipment Needs at Discharge (PT) other (see comments)  [TBD next level of care] at 01/24/2022 1002   Patient/Family Therapy Goals Statement less pain at 01/22/2022 1226                    Education Documentation  Activity, taught by Thelma Villagran, PT at 1/24/2022  4:27 PM.  Learner: Patient  Readiness: Acceptance  Method: Explanation  Response: Verbalizes Understanding  Comment: PT POC, LSO when OOB, layer of clothing between skin and LSO brace, use of RW, spinal precautions, log roll technique, use of RW, use of call bell, having staff present for mobility          PT Goals      Most Recent Value   Bed Mobility Goal 1    Activity/Assistive Device rolling to left, rolling to right, sit to supine, supine to sit at 01/21/2022 0930   [place] modified independence at 01/21/2022 0930   Time Frame by discharge at 01/21/2022 0930   Progress/Outcome goal ongoing at 01/24/2022 1002   Transfer Goal 1    Activity/Assistive Device sit-to-stand/stand-to-sit, bed-to-chair/chair-to-bed, stand pivot, walker, front-wheeled at 01/21/2022 0930   [place] modified independence at 01/21/2022 0930   Time Frame by discharge at 01/21/2022 0930   Progress/Outcome goal ongoing at 01/24/2022 1002   Gait Training Goal 1    Activity/Assistive Device gait (walking locomotion), walker, front-wheeled at 01/21/2022 0930   [place] modified independence at 01/21/2022 0930   Distance 150 at 01/21/2022 0930   Time Frame by discharge at 01/21/2022 0930   Progress/Outcome goal ongoing at 01/24/2022 1002   Stairs Goal 1    Activity/Assistive Device ascending stairs, descending stairs, using handrail, left, using handrail, right, step-to-step at 01/21/2022 0930   [place] modified independence at 01/21/2022 0930   Number of Stairs 12 at 01/21/2022 0930   Time Frame by discharge at 01/21/2022 0930   Progress/Outcome goal ongoing at 01/21/2022 0930

## 2022-01-24 NOTE — PATIENT CARE CONFERENCE
Care Progression Rounds Note  Date: 1/24/2022  Time: 9:01 AM     Patient Name: Matt Williamson     Medical Record Number: [id number]   YOB: 1945  Sex: Male      Room/Bed: 3020    Admitting Diagnosis: Low back pain with right-sided sciatica, unspecified back pain laterality, unspecified chronicity [M54.41]  Malignant neoplasm of vertebral column, excluding sacrum and coccyx (CMS/HCC) [C41.2]   Admit Date/Time: 1/20/2022  6:04 AM    Primary Diagnosis: Low back pain with right-sided sciatica  Principal Problem: Low back pain with right-sided sciatica    GMLOS: 5.2  Anticipated Discharge Date: 1/25/2022    AM-PAC:  Mobility Score: 16    Discharge Planning:  Current Living Arrangements: home  Concerns to be Addressed: adjustment to diagnosis/illness,care coordination/care conferences,discharge planning  Anticipated Discharge Disposition: acute rehab/Inpatient Rehab Facility  Type of Home Care Services: home OT,home PT,nursing  Type of Outpatient Services: physical therapy    Barriers to Discharge:  Medical issues not resolved    Comments:       Participants:  ,nursing

## 2022-01-24 NOTE — PROGRESS NOTES
Patient:  Matt Williamson  Location:  [facility] 3A 3020  MRN:  [id number]  Today's date:  1/24/2022    Pt resting in chair at end of session with chair alarm on, incontinence pad in place and call bell in reach. ANAHI hightower. RN notified       Marco A is a 76 y.o. male admitted on 1/20/2022 with Low back pain with right-sided sciatica, unspecified back pain laterality, unspecified chronicity [M54.41]  Malignant neoplasm of vertebral column, excluding sacrum and coccyx (CMS/HCC) [C41.2]. Principal problem is Low back pain with right-sided sciatica.    Past Medical History  Marco A has a past medical history of Atrial fibrillation with rapid ventricular response (CMS/HCC) (10/25/2019), BPH (benign prostatic hyperplasia), Coronary artery disease, Hypertension, Hypothyroidism, Mixed hyperlipidemia, Thyroid cancer (CMS/HCC), Type 2 diabetes mellitus without complication (CMS/HCC), and Vertigo.    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 Pt seen by the neurosurgery service for surgical discussion regarding growth of his L3 vertebral body metastatic lesion. He has had progressive worsening midline-line low back pain with radiation to the right knee. An MRI lumbar spine was obtained 11/22/2021 showing extension of the L3 mass. He was sent for a PET scan 12/7/2021 showing increased activity of the L3 lesion without further metastatic lesions appreciated.    On 1/20/22, pt underwent: Stage 1: L2-L4 PSF with instrumentation, ORIF L3 pathologic fracture  Stage 2: L3 lateral corpectomy with expandable cage placement      OT Vitals    Date/Time Pulse SpO2 Pt Activity O2 Therapy BP BP Location BP Method Pt Position Observations [facility]   01/24/22 1005 86 97 % At rest None (Room air) 124/60 Right upper arm Automatic Lying OT/PT ECM      OT Pain    Date/Time Location Rating: Rest Rating: Activity Interventions [facility]   01/24/22 1005 back 0 3 care clustered ECM           Prior Living Environment      Most Recent Value   People in Home spouse   Current Living Arrangements home   Living Environment Comment 3 steps to enter with R rail, bedroom and shower stall on first floor, full flight B/L rails to shower stall and bedroom, typically sleeps on second floor        Prior Level of Function      Most Recent Value   Dominant Hand right   Ambulation assistive equipment   Transferring assistive equipment   Toileting independent   Bathing independent   Dressing independent   Eating independent   Communication understands/communicates without difficulty   Swallowing swallows foods/liquids without difficulty   Baseline Diet/Method of Nutritional Intake no diet restrictions   Past History of Dysphagia Patient denies past or current difficulty swallowing.   Prior Level of Function Comment SPC use, drives, retired, active with basketball   Assistive Device Currently Used at Home cane, straight        Occupational Profile      Most Recent Value   Reason for Services/Referral Diminished Indep in BADL and Mobility   Successful Occupations Retired restaraunt owner   Occupational History/Life Experiences Enjoys Playing Basketball   Performance Patterns Indep in BADL   Environmental Supports and Barriers Resides with Spouse   Patient Goals Short Term Acute Care Rehab           OT Evaluation and Treatment - 01/24/22 1001        OT Time Calculation    Start Time 1001     Stop Time 1020     Time Calculation (min) 19 min        Session Details    Document Type daily treatment/progress note     Mode of Treatment occupational therapy        General Information    Patient Profile Reviewed yes     Onset of Illness/Injury or Date of Surgery 01/20/22     Referring Physician Moraima     Patient/Family/Caregiver Comments/Observations RN cleared for OT     General Observations of Patient Pt resting in bed     Existing Precautions/Restrictions fall;brace worn when out of bed;spinal        Cognition/Psychosocial     Affect/Mental Status (Cognition) WFL     Orientation Status (Cognition) oriented x 3;verbal cues/prompts needed for orientation     Follows Commands (Cognition) WFL     Executive Function Deficit (Cognition) minimal deficit;information processing;self-monitoring/self-correction     Comment, Executive Function repetition of cues required, receptive     Safety Deficit (Cognition) minimal deficit;safety precautions follow-through/compliance;safety precautions awareness     Comment, Cognition cues for safety in transfer, receptive     Cognitive Interventions/Strategies occupation/activity based interventions        Bed Mobility    [place], Supine to Sit close supervision;increased time to complete;verbal cues     Verbal Cues (Supine to Sit) technique;safety     Assistive Device head of bed elevated;bed rails;matress firmed     Comment (Bed Mobility) Pt reports mechanical bed at home utilizing HOB elevated greater than 30 deg CLS level VC for log roll technique        Transfers    Transfers toilet transfer     Comment MIN A x1 for short household distance mobility w/ rw        Bed to Chair Transfer    [place], Bed to Chair minimum assist (75% or more patient effort);1 person assist;verbal cues     Verbal Cues proper use of assistive device;technique     Assistive Device walker, front-wheeled        Sit to Stand Transfer    [place], Sit to Stand Transfer close supervision;verbal cues     Verbal Cues hand placement;proper use of assistive device;preparatory posture     Assistive Device walker, front-wheeled     Comment from bed, cues to maintain spinal pxns and for technique of sit > stand w/ rw, does not carry over despite cues        Stand to Sit Transfer    [place], Stand to Sit Transfer close supervision;verbal cues     Verbal Cues proper use of assistive device;technique     Assistive Device walker, front-wheeled     Comment to chair w/ cues for safety / hand placement        Toilet Transfer     Transfer Technique sit-stand;stand-sit     [place], Toilet Transfer minimum assist (75% or more patient effort);verbal cues     Verbal Cues hand placement;proper use of assistive device;technique     Assistive Device grab bars/safety frame     Comment VC for GB and to maintain spinal pxn in transfer        Safety Issues, Functional Mobility    Safety Issues Affecting Function (Mobility) safety precaution awareness;safety precautions follow-through/compliance;positioning of assistive device     Impairments Affecting Function (Mobility) balance;endurance/activity tolerance;range of motion (ROM);pain     Comment, Safety Issues/Impairments (Mobility) reports fatigue as a result of limited  sleep this date        Balance    Static Sitting Balance WFL;sitting in chair     Dynamic Sitting Balance WFL;sitting, edge of bed     Sit to Stand Dynamic Balance mild impairment;supported     Static Standing Balance WFL;supported     Dynamic Standing Balance mild impairment;supported     Comment, Balance benefits from rw        Motor Skills    Functional Endurance fair (+), improving however limited by fatigue        Upper Body Dressing    Tasks hospital gow     [place] minimum assist (75% or more patient effort)     Position edge of bed sitting        Lower Body Dressing    Tasks underwear     Self-Performance pulls underpants up or down     [place] Assistance threads left leg, underpants;threads right leg, underpants;pulls underpants up or down     [place] maximum assist (25-49% patient effort)     Position unsupported sitting;unsupported standing     Comment attempted crossed leg technique unable to achieve figure 4 and MAX A for completion        Grooming    Self-Performance brushes/mnacilla hair     [place] set up     Position supported sitting     Setup Assistance obtain supplies        BADL Safety/Performance    Impairments, BADL Safety/Performance balance;endurance/activity tolerance;range of motion;pain      Skilled BADL Treatment/Intervention BADL process/adaptation training     Progress in BADL Status cueing required;level of supervision required;effort and initiation        ADL Interventions    Energy Conservation Techniques correct body mechanics utilized;equipment and device use facilitated     Self-Care (BADL) Promotion activity adapted to sitting;set-up provided;cues for sequencing provided        Spinal Orthosis Management    Type (Spinal Orthosis) LSO (lumbar sacral orthosis)     Functional Design (Spinal Orthosis) static orthosis     Therapeutic Indications Spinal Orthosis stabilization and support;post-op positioning/protection;pain management     Wearing Schedule (Spinal Orthosis) wear when out of bed only     Orthosis Training (Spinal Orthosis) patient;donning/doffing orthosis;purpose/goals of orthosis;able to verbalize training;able to show back training;requires assistance;partially meets, needs review/practice     Compliance/Wearing Issues (Spinal Orthosis) patient/caregiver comprehend strategies;follow-up training required        Coping    Observed Emotional State cooperative     Verbalized Emotional State acceptance     Trust Relationship/Rapport care explained        AM-PAC (TM) - ADL (Current Function)    Putting on and taking off regular lower body clothing? 2 - A Lot     Bathing? 2 - A Lot     Toileting? 2 - A Lot     Putting on/taking off regular upper body clothing? 3 - A Little     How much help for taking care of personal grooming? 3 - A Little     Eating meals? 4 - None     AM-PAC (TM) ADL Score 16        Assessment/Plan (OT)    Daily Outcome Statement OT follow up tx complete. Pt progressing with goals however continues to require MIN A for toilet transfer. Pt is MAX A for LBD task. Pt MIN A for UBD. Pt recalls 3/3 spinal pxn requiring cues in function / transfer to adhere to spinal pxn. Pt set up for seated grooming. Pt would benefit from continued OT to facilitate increased IND w/ ADL and  transfer     Rehab Potential good, to achieve stated therapy goals     Therapy Frequency 3-5 times/wk     Planned Therapy Interventions occupation/activity based interventions;transfer/mobility retraining;functional balance retraining;adaptive equipment training               OT Assessment/Plan      Most Recent Value   OT Recommended Discharge Disposition acute rehab/Inpatient Rehab Facility at 01/24/2022 1001   Anticipated Equipment Needs At Discharge (OT) commode, 3-in-1, walker, front-wheeled, dressing equipment, shower chair at 01/24/2022 1001   Patient/Family Therapy Goal Statement Return Home Following Rehab at 01/22/2022 1224                    Education Documentation  Activity, taught by Grace Escudero OT at 1/24/2022  1:49 PM.  Learner: Patient  Readiness: Acceptance  Method: Explanation  Response: Verbalizes Understanding, Needs Reinforcement  Comment: OT POC, toilet transfer, spinal pxn, LSO wearing schedule, ADL safety, LBD techniques, safe transfer techniques / rw handling          OT Goals      Most Recent Value   Transfer Goal 1    Activity/Assistive Device sit-to-stand/stand-to-sit, bed-to-chair/chair-to-bed, toilet, commode, 3-in-1, walker, front-wheeled at 01/21/2022 0929   [place] supervision required at 01/21/2022 0929   Time Frame by discharge at 01/21/2022 0929   Progress/Outcome continuing progress toward goal, goal ongoing at 01/22/2022 1224   Dressing Goal 1    Activity/Adaptive Equipment upper body dressing at 01/21/2022 0929   [place] supervision required at 01/21/2022 0929   Time Frame by discharge at 01/21/2022 0929   Progress/Outcome continuing progress toward goal, goal ongoing at 01/22/2022 1224   Dressing Goal 2    Activity/Adaptive Equipment lower body dressing, dressing stick, long-handled shoe horn, reacher, sock-aid at 01/21/2022 0929   [place] supervision required at 01/21/2022 0929   Time Frame by discharge at 01/21/2022 0929   Progress/Outcome continuing  progress toward goal, goal ongoing at 01/22/2022 1224   Toileting Goal 1    Activity/Assistive Device toileting skills, all at 01/21/2022 0929   [place] supervision required at 01/21/2022 0929   Time Frame by discharge at 01/21/2022 0929   Progress/Outcome continuing progress toward goal, goal ongoing at 01/22/2022 1224   Grooming Goal 1    Activity/Assistive Device grooming skills, all at 01/21/2022 0929   [place] modified independence at 01/21/2022 0929   Time Frame by discharge at 01/21/2022 0929   Progress/Outcome continuing progress toward goal, goal ongoing at 01/22/2022 1224

## 2022-01-24 NOTE — OP NOTE
OPERATIVE NOTE     Date of Surgery: 01/20/2022     PREOPERATIVE DIAGNOSES:  1. Pathological L3 Fracture  2. Axial and Mechanical back pain    POSTOPERATIVE DIAGNOSES:  1. As above     PROCEDURE:     Part 1.  1. Open reduction/ Internal fixation (ORIF) of L3 pathological fracture  2. Placement of L2, L3, L4 pedicle screw instrumentation (Globus Creo)  3. L2-L3, L3-L4 posterolateral instrumented fusion with titanium screws allograft  4. Neurophysiological monitoring  5. Use and interpretation of intra-operative navigation (O-ARM)     Part 2.   1. Right sided retroperitoneal approach with partial resection of T12 rib  2. L2-3, L3-4 radical discectomies  3. L3 anterolateral corpectomy (resection of L3 vertebral body)  4. L2-L4 anterolateral interbody arthrodesis with expandable titanium cage, packed with allograft, autograft, bone morphogenic protein (Globus Fortify Cage)      ANESTHESIA:   General endotracheal.     SURGEON:  Colt Heller MD     ASSISTANT:  Zenobia To MD (a skilled assistant was required to aid in the deformity correction, instrumentation)     POSITION:   Part 1: Prone, Part 2: Left Lateral decubitus     ESTIMATED BLOOD LOSS:   approximately 350 cc.     COMPLICATIONS:   None.      DRAINS:   2 subfascial CRSYTAL drains    SPECIMENS: L3 Vertebral body       INDICATIONS FOR PROCEDURE:      In summary, In summary, Matt Williamson is a pleasant 75 y.o. male with thyroid cancer metastatic to the L3 vertebral body s/p radiation stereotactic body radiation 5/2020 seen today by the neurosurgery service for surgical discussion regarding growth of his L3 vertebral body metastatic lesion. He has had progressive worsening midline-line low back pain with radiation to the right knee. An MRI lumbar spine was obtained 11/22/2021 showing extension of the L3 mass. He was sent for a PET scan 12/7/2021 showing increased activity of the L3 lesion without further metastatic lesions appreciated. He was referred to  [facility] however there has been some delay with getting an appointment.      He does not have any weakness or bowel/bladder incontinence. He is following with oncology (Dr. Casey) who does not recommend any chemotherapy at this time but surgical intervention as the primary treatment given the suspected isolated area of progression at the L3 vertebral body. He remains full strength on exam with 2 beats of ankle clonus on the left. His biceps, brachioradialis, patellar and achillis reflexes are normal. His sensation is intact.     Mr. Williamson has had progressive worsening midline back pain with increased activity of the L3 metastatic lesion. His lumbar x-rays show overall maintained alignment with no significant focal kyphosis. His MRI shows moderate-severe foraminal narrowing on the right secondary to retropulsion of the inferior endplate in the setting of ligament and joint hypertrophy. There is some enhancement of the superior endplate of L4 but no activity appreciated on the PET scan. There is no significant central compression in the lumbar spine. His CT demonstrates significant bony erosion of the L3 vertebral body with a new oblique fracture.      Mr. Williamson was initially referred to [facility] in [place] for spondylectomy given the questionable pedicle involvement. However, PET scan does not show posterior column activity and a L3 corpectomy would be appropriate. We have scheduled him for Stage 1: L2-L4 PSF with instrumentation, ORIF L3 pathologic fracture  Stage 2: L3 lateral corpectomy with expandable cage placement 1/20/2022. He will require cardiac clearance prior to the surgery with his history of CAD with stent. We have ordered A1C as well.      The risks, benefits, and alternatives to the procedure were discussed in detail between Marco A Williamson, his wife and Colt Heller MD.  The risks include but are not limited to: local bleeding (perhaps requiring transfusion), infection, cerebrospinal  fluid leakage, failure of fusion (pseudoarthrosis), hardware malplacement, accelerated spinal degeneration, proximal junction kyphosis, new neurologic symptoms of weakness, sensory, bowel, bladder, sexual disturbance, paralysis, cardiac/pulmonary/renal/hepatic event in the perioperative period, blindness from prolonged prone positioning, allergic response to the anesthetic, death. The patient acknowledges all the above and would like to proceed with surgery at this time. The patient understood the risks and benefits and agreed to proceed.     OPERATIVE COURSE:      PART I     The patient was taken to the operating room on a stretcher and transferred to the OR table. He was intubated in the supine position after adequate anesthesia as obtained. Intravenous lines and neurophysiologic monitors were subsequently placed. KEN stockings/ sequential compression devices were placed on the lower extremities. IV antibiotics were administered.  Neurophysiological monitoring remained stable throughout the case.     The patient  was then flipped into the prone position onto a Antonio table. His arms were kept perpendicular to his torso on padded arm boards. His lower extremities were all appropriately cushioned. All pressure points were checked. His lumbar region was then prepped and draped according to the standard sterile surgical fashion. A secondary [place] timeout protocol was adhered to.     Lateral fluoroscopy was used to identify the levels of interest. A #10 blade was used to make a midline incision from the L2-L4 region. This was followed by Bovie electrocautery to divide the subdermal tissues until the dorsal fascia was evident. Superficial retractors were then placed to maintain exposure. Subperiosteal dissection was performed to visualize the L2-L4 posterior spinal elements. Cerebellar retractors were then introduced to maintain exposure. The level was reconfirmed with lateral fluoroscopy.      Next, the  intra-operative navigation system was brought into the room and a CT scan was obtained.  The navigation reference array was attached to the L5 spinous process.  We then turned our attention to placement of L2, L3, L4 pedicle screw instrumentation. Navigation was utilized to identify entry sites for pedicle screw placement. Anatomic landmarks aided in this process.  holes were created at these sites bilaterally utilized a high speed drill. Lenke probes were used to interrogate the pedicles in an anatomic fashion. This was confirmed with navigation. The pedicles were under tapped at L2, 6.5 mm wide x 45 mm long screws were placed with excellent purchase. At L3 5.5 mm wide x 25 mm long screws were placed with excellent purchase. At L4, 6.5 mm wide x 45 mm long screws were placed with excellent purchase.  Super Derivatives Instrumentation was used.      The intra-operative CT scanner was brought back into the field and another CT was obtained which demonstrated that the hardware was in appropriate position.     Next attention was paid to open reduction/ internal fixation (ORIF) of the patient's L3 pathological fracture. This was accomplished with the aid of implanted pedicle screw instrumentation, 2 titanium rods affixed to the pedicle screws, set screws and deformity correction maneuvers.     The posterolateral structures were then decorticated with a high-speed drill. Local autograft from the facet joints was placed on the lamina with allograft. Set screws were final tightened. Excellent position of the construct was noted with AP and lateral fluoroscopy. Of note, no neurophysiologic monitoring changes were observed during this portion of the procedure.     Interrupted 0-0 Vicryl sutures were used to reapproximate the muscle and fascial layers. 2 subfascial 10 flat CRYSTAL drains were tunneled into the operative site prior to closure of the fascial layer, 0.5 g of VANCOMYCIN powder was introduced into the operative site.  Subsequently, interrupted 2-0 Vicryl sutures were used to reapproximate the subdermal layers, and 3-0 Monocryl suture was used to close the skin in a subcuticular fashion. This was followed by a silver impregnated Mepilex dressing. A 3-0 nylon suture was used to tack the exiting drains to the skin surface. The drain was then affixed to self-suction.        PART II     The patient was then repositioned into the left lateral decubitus position exposing the right hip and flank. The margins of the L2-3, L3-4 disc spaces and vertebral bodies of L2, L3, L4 were traced on the skin using lateral fluoroscopy. A 4 inch incision was planned overlying the T12 rib. Of note, the patient was secured to the table with 3 inch silk tape. All pressure points were appropriately padded. Cushions were placed between the ankle and knee joints as well. He was then prepped and draped using standard sterile surgical technique. A Main[place] time-out protocol was adhered to.     A 4 inch overlying the T12 rib was made with a #10 scalpel and Bovie electrocautery. The underlying tissues were dissected in an anatomic fashion until the rib was exposed. A  rib dissector was used to strip muscle, neurovascular bundle from the rib. The rib was then resected (and kept aside for arthrodesis), exposing the underlying external oblique.  The layers of the abdomen were dissected in an anatomic fashion using Cloward hand-held retractors.  Using blunt dissection, we then entered through the transversalis fasia.  Using digital manipulation, we then swept the peritoneal contents anteriorly, feeling for the transverse processes posteriorly, while developing a surgical corridor over the psoas muscle.       Serial dilating tubes were then docked over the L3 vertebral body with the aid of lateral fluoroscopy. This was followed by a Globus retractor oriented perpendicular to the L3 vertebral body. A connecting arm was attached to the retractor. AP and lateral  fluoroscopy confirmed excellent positioning of the retractor overlying the vertebral body. Lighting was affixed to the retractor. The retractor was opened several centimeters superiorly as well as medial and laterally. The visualized working space included the inferior margin of the L2, as well as the superior margin of the L4 vertebrae. The L3 vertebral body was identified.  We remained anterior to the psoas with the placement of the retractor.     A radical discectomy was then completed with a variety of curettes, Juany, pituitary rongeurs. A complete vertebrectomy ( >75%) of L3 was performed with osteotomes, pituitary, kerrison rongeurs and sent for pathology. The inferior endplate of L2 and superior endplate of L4 were prepared for arthrodesis using a variety of curettes/ rasps.      A 22 mm wide expandable titanium cage with 45 mm wide endplates with 4 degrees of lordosis on each endplate, was packed with allograft/ autograft, bone morphogenic protein was placed into the vertebrectomy defect. The cage was expanded to a final height of 40 mm, restoring the patient's alignment. More allograft/ and autograft from the rib, was packed into the defect. The Globus retractor was subsequently removed.     Hemostasis was obtained with bipolar electrocautery and floseal. 2-0 Vicryl sutures were used to reapproximate the subdermal layers in an interrupted fashion. 3-0 monocryl suture was used to close the skin. A sterile dressing was applied.       The patient was then flipped into the supine position and extubated. All counts were correct. No complications were encountered. The patient was found to be at his preoperative neurologic baseline.

## 2022-01-24 NOTE — PLAN OF CARE
Problem: Adult Inpatient Plan of Care  Goal: Plan of Care Review  Flowsheets (Taken 1/24/2022 1415)  Plan of Care Reviewed With:  • patient  • spouse   Met with pt and spouse at bedside. Discussed DCP. Going to [facility] on Tuesday 1/25. Requested updated PT/OT notes.     Discussed transportation via w/c van. Waiting for confirmation of w/c van through [facility]. Per neuro surgery -- drains will be removed prior to DC.     Will continue to follow    1500: wc van confirmed for 1030am  1/25. Updated HMS/RN

## 2022-01-24 NOTE — PROGRESS NOTES
Hospital Medicine Service -  Daily Progress Note       SUBJECTIVE   Interval History: no acute events overnight. Patient feeling well. No chest pain or sob. No n/v.      OBJECTIVE      Vital signs in last 24 hours:  Temp:  [36.4 °C (97.5 °F)-37 °C (98.6 °F)] 36.7 °C (98.1 °F)  Heart Rate:  [] 92  Resp:  [16-18] 16  BP: ()/(53-67) 133/64    Intake/Output Summary (Last 24 hours) at 1/24/2022 1318  Last data filed at 1/24/2022 0937  Gross per 24 hour   Intake --   Output 1105 ml   Net -1105 ml       PHYSICAL EXAMINATION      Physical Exam    GEN: well-developed and well-nourished; not in acute distress  HEENT: normocephalic; atraumatic  NECK: no JVD; no bruits  CARDIO: regular rate and rhythm; no murmurs or rubs  RESP: clear to auscultation bilaterally; no rales, rhonchi, or wheezes  ABD: soft, non-distended, non-tender, normal bowel sounds  EXT: no cyanosis, clubbing, or edema  SKIN: clean, dry, warm, and intact  MUSCULOSKELETAL: no injury or deformity; +CRYSTAL drain in back  NEURO: alert and oriented x 3; nonfocal  BEHAVIOR/EMOTIONAL: appropriate; cooperative     LINES, CATHETERS, DRAINS, AIRWAYS, AND WOUNDS   Lines, Drains, and Airways:  Wounds (agree with documentation and present on admission):  Peripheral IV (Adult) 01/20/22 Left;Posterior Hand (Active)   Number of days: 4       Peripheral IV (Adult) 01/20/22 Right;Posterior Hand (Active)   Number of days: 4       Drain 1 Midline Back 10 Fr. (Active)   Number of days: 4       Drain 1 Midline Back 10 Fr. (Active)   Number of days: 4       Surgical Incision Back (Active)   Number of days: 4         Comments:      LABS / IMAGING / TELE      Labs  Lab Results   Component Value Date    WBC 12.66 (H) 01/23/2022    HGB 10.6 (L) 01/23/2022    HCT 33.3 (L) 01/23/2022    MCV 84.5 01/23/2022     01/23/2022     Lab Results   Component Value Date    GLUCOSE 138 (H) 01/23/2022    CALCIUM 8.4 (L) 01/23/2022     (L) 01/23/2022    K 4.0 01/23/2022    CO2  25 01/23/2022    CL 99 01/23/2022    BUN 18 01/23/2022    CREATININE 0.9 01/23/2022     Lab Results   Component Value Date    INR 1.0 01/18/2022    INR 1.0 01/17/2020    INR 1.0 01/14/2016       Micro  Microbiology Results     Procedure Component Value Units Date/Time    Covid-19 Pre-procedural/PAT [[id number]]  (Normal) Collected: 01/18/22 1407    Specimen: Nasopharyngeal Swab from Nasopharynx Updated: 01/19/22 1040    Narrative:      The following orders were created for panel order Covid-19 Pre-procedural/PAT.  Procedure                               Abnormality         Status                     ---------                               -----------         ------                     SARS-CoV-2 (COVID-19), PCR[[id number]]   Normal              Final result                 Please view results for these tests on the individual orders.    SARS-CoV-2 (COVID-19), PCR [[id number]]  (Normal) Collected: 01/18/22 1407    Specimen: Nasopharyngeal Swab from Nasopharynx Updated: 01/19/22 1040     SARS-CoV-2 (COVID-19) Negative          Imaging  X-RAY CHEST 2 VIEWS    Result Date: 1/18/2022  IMPRESSION: See comment.    X-RAY LUMBAR SPINE 2 OR 3 VIEWS    Result Date: 1/20/2022  IMPRESSION: Intraoperative fluoroscopic guidance as described above.     CT HEAD WITHOUT IV CONTRAST    Result Date: 1/20/2022  IMPRESSION:  No acute intracranial abnormality. COMMENT: A standard noncontrast head CT was performed. CT DOSE:  One or more dose reduction techniques (e.g. automated exposure control, adjustment of the mA and/or kV according to patient size, use of iterative reconstruction technique) utilized for this examination. Comparison:  No prior studies are available for comparison. Findings:  Sulci, ventricles and basal cisterns are within normal limits for patient's age.   Attenuation in the brain parenchyma appears within normal limits.  No acute hemorrhage, acute territorial infarct, or mass effect is seen. There is no extra-axial fluid  collection.  The visualized paranasal sinuses demonstrates scattered mucosal thickening of the maxillary sinuses bilaterally. Mastoid air cells are clear.    CT LUMBAR SPINE WITHOUT IV CONTRAST    Result Date: 1/20/2022  IMPRESSION: Interval corpectomy at L3, posterior spinal fusion hardware with bilateral pedicle screws at L2-L4. COMMENT: Lumbosacral alignment: Anatomic. Vertebral bodies: Interval corpectomy at L3.  There is posterior spinal fusion hardware with bilateral pedicle screws at L2-L4. Intervertebral discs: Normal in height. Lumbosacral spinal canal: Patent. OTHER: Scattered air lucencies in the paraspinal soft tissues and retro-orbital peritoneum consistent with recent postoperative changes.  Tiny hyperdense focus in the right psoas muscle, may represent a small area of hemorrhage or bony fragment.     FL FLUOROSCOPY TECHNICAL ASSISTANCE    Result Date: 1/20/2022  IMPRESSION: Intraoperative fluoroscopic guidance as described above.       ECG/Telemetry  I have independently reviewed the telemetry. No events for the last 24 hours.    ASSESSMENT AND PLAN      Acquired hypothyroidism  Assessment & Plan  Status post thyroidectomy  Continue levothyroxine  TSH 0.104 days ago  Repeat TFTs as outpatient in 4 weeks    Malignant neoplasm of vertebral column, excluding sacrum and coccyx (CMS/HCC)  Assessment & Plan  Metastatic thyroid ca S/p L3 pathologic fracture s/p right lateral L3 corpectomy and placement of an expandable cage, followed by L2-4 posterolateral instrumented fusion  Continue postop management per neurosurgery  DVT prophylaxis, Lewis, pain management per neurosurgery  Rehab placement on discharge  Likely d/c in AM to [facility]    Paroxysmal atrial fibrillation (CMS/HCC)  Assessment & Plan  Currently in sinus rhythm  Patient is not on long-term anticoagulation  He takes Rythmol as needed for palpitations    Type 2 diabetes mellitus without complication (CMS/HCC)  Assessment & Plan  Patient on Jardiance,  Trulicity and metformin as outpatient  Follow Accu-Cheks and administer sliding scale insulin  Hemoglobin A1c 6.94 days ago  Continue Jardiance and can restart metformin prior to discharge  Metformin should be changed to twice daily or extended release once daily prior to discharge.  Patient takes metformin 4 times daily at home    Metastasis from thyroid cancer (CMS/HCC)  Assessment & Plan  Follows up with   Status post radiation    Mixed hyperlipidemia  Assessment & Plan  Continue statin    Essential hypertension  Assessment & Plan  Stable  Continue metoprolol    Atherosclerosis of coronary artery  Assessment & Plan  Status post LAD stenting 17 years ago  Patient follows up with Dr. Ji  Continue metoprolol, statin  Restart aspirin once cleared by neurosurgery        Plan of care discussed with patient, nurse     VTE Assessment: Padua    VTE Prophylaxis:  Current anticoagulants:  heparin (porcine) 5,000 unit/mL injection 5,000 Units, subcutaneous, q8h SALIMA      Code Status: Full Code      Estimated Discharge Date: 1/25/2022     Disposition Planning: d/c in AM to [facility] if bed available     Devin Valera MD  1/24/2022

## 2022-01-24 NOTE — PROGRESS NOTES
Neurosurgery Service -  Progress Note           TIME OF EVALUATION   Patient seen and examined :  at 9:10 AM on 22     INTERVAL HISTORY        (+) BM yesterday. Ambulating. Voiding. Continues with right thigh pain, slowly improving.     SUBJECTIVE        77yo M with metastatic thyroid ca with L3 pathologic fracture s/p right lateral L3 corpectomy and placement of an expandable cage, followed by L2-4 posterolateral instrumented fusion 2022.     OBJECTIVE     Temp:  [36.3 °C (97.4 °F)-37 °C (98.6 °F)] 36.8 °C (98.3 °F)  Heart Rate:  [] 86  Resp:  [16-18] 16  BP: (100-144)/(56-67) 100/58  SpO2:  [95 %-99 %] 95 %  Oxygen Therapy: None (Room air)      Output by Drain (mL) 22 0700 - 22 1859 22 1900 - 22 0659 22 07 - 22 1859 22 1900 - 22 0659 22 0700 - 22 0910   Drain 1 Midline Back 10 Fr. 30 15 35 30    Drain 1 Midline Back 10 Fr. 35 15 30 15           Intake/Output Summary (Last 24 hours) at 2022 0910  Last data filed at 2022 0300  Gross per 24 hour   Intake --   Output 860 ml   Net -860 ml           PHYSICAL EXAM        Neurologic Examination:     Mental status: awake, alert, and oriented to person, place, and time. Speech and fund of knowledge are normal.     PERRL, EOMI, face symmetric, tongue protrudes to the midline, no nystagmus or diplopia.     Motor:   RUE: D: 5/5, T: 5/5, B: 5, WE: 5/5, H/5, IH:   LUE: D: 5/5, T: 55, B: 55, WE: 5/5, H/5, IH:   RLE: IP  4-/5, Q  4+/5, DF 5/5, EHL 5/5, PF 5/  LLE: IP  5/5, Q  5/5, DF 5/5, EHL 5/5, PF 5/5    Reflexes: Normoreflexive throughout. y.     Sensation: intact to light touch    Drains: 30/15cc overnight, 110cc 24hrs         DATA     Current anticoagulants:  heparin (porcine) 5,000 unit/mL injection 5,000 Units, subcutaneous, q8h SALIMA        •  alum-mag hydroxide-simeth, 30 mL, oral, q4h PRN  •  bisacodyL, 10 mg, rectal, Daily PRN  •  glucose, 16-32 g of  dextrose, oral, PRN **OR** dextrose, 15-30 g of dextrose, oral, PRN **OR** glucagon, 1 mg, intramuscular, PRN **OR** dextrose in water, 25 mL, intravenous, PRN  •  diazePAM, 5 mg, oral, q6h PRN  •  diphenhydrAMINE, 25 mg, oral, q6h PRN **OR** diphenhydrAMINE, 25 mg, intravenous, q6h PRN  •  empagliflozin, 10 mg, oral, Daily  •  gabapentin, 600 mg, oral, q8h SALIMA  •  heparin (porcine), 5,000 Units, subcutaneous, q8h SALIMA  •  oxyCODONE, 5-10 mg, oral, q4h PRN **AND** HYDROmorphone, 0.5 mg, intravenous, q3h PRN  •  insulin aspart U-100, 3-5 Units, subcutaneous, Before meals & nightly  •  levothyroxine, 150 mcg, oral, Daily (6a)  •  metFORMIN, 500 mg, oral, BID with meals  •  metoprolol succinate XL, 100 mg, oral, BID  •  ondansetron ODT, 4 mg, oral, q8h PRN **OR** ondansetron, 4 mg, intravenous, q8h PRN  •  pantoprazole, 40 mg, oral, q PM  •  polyethylene glycol, 17 g, oral, Daily  •  pravastatin, 40 mg, oral, Daily  •  sennosides-docusate sodium, 1 tablet, oral, BID      Labs       CBC Results       01/23/22 01/22/22 01/21/22     0537 0540 0458    WBC 12.66 12.47 14.53    RBC 3.94 3.78 4.34    HGB 10.6 10.2 11.7    HCT 33.3 32.0 36.4    MCV 84.5 84.7 83.9    MCH 26.9 27.0 27.0    MCHC 31.8 31.9 32.1     213 239        ,   BMP Results       01/23/22 01/22/22 01/21/22     0537 0540 0458     134 134    K 4.0 4.3 4.0    Cl 99 101 104    CO2 25 22 18    Glucose 138 189 184    BUN 18 15 13    Creatinine 0.9 0.7 0.9    Calcium 8.4 8.2 8.0    Anion Gap 11 11 12    EGFR >60.0 >60.0 >60.0         and   Results from last 7 days   Lab Units 01/18/22  1408   INR  1.0   PTT sec 30         Imaging    X-RAY CHEST 2 VIEWS    Result Date: 1/18/2022  IMPRESSION: See comment.    X-RAY LUMBAR SPINE 2 OR 3 VIEWS    Result Date: 1/20/2022  IMPRESSION: Intraoperative fluoroscopic guidance as described above.     CT HEAD WITHOUT IV CONTRAST    Result Date: 1/20/2022  IMPRESSION:  No acute intracranial abnormality. COMMENT: A  standard noncontrast head CT was performed. CT DOSE:  One or more dose reduction techniques (e.g. automated exposure control, adjustment of the mA and/or kV according to patient size, use of iterative reconstruction technique) utilized for this examination. Comparison:  No prior studies are available for comparison. Findings:  Sulci, ventricles and basal cisterns are within normal limits for patient's age.   Attenuation in the brain parenchyma appears within normal limits.  No acute hemorrhage, acute territorial infarct, or mass effect is seen. There is no extra-axial fluid collection.  The visualized paranasal sinuses demonstrates scattered mucosal thickening of the maxillary sinuses bilaterally. Mastoid air cells are clear.    CT LUMBAR SPINE WITHOUT IV CONTRAST    Result Date: 1/20/2022  IMPRESSION: Interval corpectomy at L3, posterior spinal fusion hardware with bilateral pedicle screws at L2-L4. COMMENT: Lumbosacral alignment: Anatomic. Vertebral bodies: Interval corpectomy at L3.  There is posterior spinal fusion hardware with bilateral pedicle screws at L2-L4. Intervertebral discs: Normal in height. Lumbosacral spinal canal: Patent. OTHER: Scattered air lucencies in the paraspinal soft tissues and retro-orbital peritoneum consistent with recent postoperative changes.  Tiny hyperdense focus in the right psoas muscle, may represent a small area of hemorrhage or bony fragment.     FL FLUOROSCOPY TECHNICAL ASSISTANCE    Result Date: 1/20/2022  IMPRESSION: Intraoperative fluoroscopic guidance as described above.          Increased risk from baseline; VTE Prophylaxis as ordered  Both Mechanical and Pharmacological Prophylaxis      ASSESSMENT AND PLAN           77yo M with metastatic thyroid ca with L3 pathologic fx now POD#4 L3 s/p right lateral L3 corpectomy and placement of an expandable cage, L2-4 posterolateral instrumented fusion. Continues with right thigh pain. IP weakness secondary to lateral surgical  approach.                  - Post-op blood loss anemia. Hemodynamically stable. Hgb 10.6 yesterday.               - LSO brace when OOB              - PT/OT. PMR consult placed - recommending [facility]              - DVT proph: SCDs and HSQ              - Continue multimodal pain regimen              - Voiding on own.               - Post-op CT lumbar spine shows well placed cage/hardware              - Drains likely to be removed today    Discussed with GRAZYNA Iqbal

## 2022-01-25 ENCOUNTER — APPOINTMENT (INPATIENT)
Dept: RADIOLOGY | Facility: HOSPITAL | Age: 77
DRG: 454 | End: 2022-01-25
Attending: PHYSICIAN ASSISTANT
Payer: MEDICARE

## 2022-01-25 PROBLEM — K56.7 ILEUS (CMS/HCC): Status: ACTIVE | Noted: 2022-01-25

## 2022-01-25 LAB
ANION GAP SERPL CALC-SCNC: 16 MEQ/L (ref 3–15)
BASOPHILS # BLD: 0.04 K/UL (ref 0.01–0.1)
BASOPHILS NFR BLD: 0.5 %
BUN SERPL-MCNC: 27 MG/DL (ref 8–20)
CALCIUM SERPL-MCNC: 8.4 MG/DL (ref 8.9–10.3)
CHLORIDE SERPL-SCNC: 98 MEQ/L (ref 98–109)
CO2 SERPL-SCNC: 22 MEQ/L (ref 22–32)
CREAT SERPL-MCNC: 1.2 MG/DL (ref 0.8–1.3)
DIFFERENTIAL METHOD BLD: ABNORMAL
EOSINOPHIL # BLD: 0.15 K/UL (ref 0.04–0.54)
EOSINOPHIL NFR BLD: 1.8 %
ERYTHROCYTE [DISTWIDTH] IN BLOOD BY AUTOMATED COUNT: 15.2 % (ref 11.6–14.4)
GFR SERPL CREATININE-BSD FRML MDRD: 58.6 ML/MIN/1.73M*2
GLUCOSE BLD-MCNC: 106 MG/DL (ref 70–99)
GLUCOSE BLD-MCNC: 120 MG/DL (ref 70–99)
GLUCOSE BLD-MCNC: 133 MG/DL (ref 70–99)
GLUCOSE BLD-MCNC: 153 MG/DL (ref 70–99)
GLUCOSE SERPL-MCNC: 163 MG/DL (ref 70–99)
HCT VFR BLDCO AUTO: 35.7 % (ref 40.1–51)
HGB BLD-MCNC: 11.2 G/DL (ref 13.7–17.5)
IMM GRANULOCYTES # BLD AUTO: 0.15 K/UL (ref 0–0.08)
IMM GRANULOCYTES NFR BLD AUTO: 1.8 %
LYMPHOCYTES # BLD: 1.38 K/UL (ref 1.2–3.5)
LYMPHOCYTES NFR BLD: 16.1 %
MCH RBC QN AUTO: 27.1 PG (ref 28–33.2)
MCHC RBC AUTO-ENTMCNC: 31.4 G/DL (ref 32.2–36.5)
MCV RBC AUTO: 86.4 FL (ref 83–98)
MONOCYTES # BLD: 1.04 K/UL (ref 0.3–1)
MONOCYTES NFR BLD: 12.1 %
NEUTROPHILS # BLD: 5.81 K/UL (ref 1.7–7)
NEUTS SEG NFR BLD: 67.7 %
NRBC BLD-RTO: 0 %
PDW BLD AUTO: 9.7 FL (ref 9.4–12.4)
PLATELET # BLD AUTO: 339 K/UL (ref 150–350)
POCT TEST: ABNORMAL
POTASSIUM SERPL-SCNC: 3.8 MEQ/L (ref 3.6–5.1)
RBC # BLD AUTO: 4.13 M/UL (ref 4.5–5.8)
SODIUM SERPL-SCNC: 136 MEQ/L (ref 136–144)
WBC # BLD AUTO: 8.57 K/UL (ref 3.8–10.5)

## 2022-01-25 PROCEDURE — 63700000 HC SELF-ADMINISTRABLE DRUG: Performed by: HOSPITALIST

## 2022-01-25 PROCEDURE — 80048 BASIC METABOLIC PNL TOTAL CA: CPT | Performed by: HOSPITALIST

## 2022-01-25 PROCEDURE — 74022 RADEX COMPL AQT ABD SERIES: CPT

## 2022-01-25 PROCEDURE — 20600000 HC ROOM AND CARE INTERMEDIATE/TELEMETRY

## 2022-01-25 PROCEDURE — 82310 ASSAY OF CALCIUM: CPT | Performed by: HOSPITALIST

## 2022-01-25 PROCEDURE — 85025 COMPLETE CBC W/AUTO DIFF WBC: CPT | Performed by: HOSPITALIST

## 2022-01-25 PROCEDURE — 63600000 HC DRUGS/DETAIL CODE: Performed by: NURSE PRACTITIONER

## 2022-01-25 PROCEDURE — 99024 POSTOP FOLLOW-UP VISIT: CPT | Performed by: NEUROLOGICAL SURGERY

## 2022-01-25 PROCEDURE — 63700000 HC SELF-ADMINISTRABLE DRUG: Performed by: NURSE PRACTITIONER

## 2022-01-25 PROCEDURE — 25800000 HC PHARMACY IV SOLUTIONS: Performed by: PHYSICIAN ASSISTANT

## 2022-01-25 PROCEDURE — 63700000 HC SELF-ADMINISTRABLE DRUG: Performed by: STUDENT IN AN ORGANIZED HEALTH CARE EDUCATION/TRAINING PROGRAM

## 2022-01-25 PROCEDURE — A9698 NON-RAD CONTRAST MATERIALNOC: HCPCS | Performed by: PHYSICIAN ASSISTANT

## 2022-01-25 PROCEDURE — 25500000 HC DRUGS/INCIDENT RAD: Performed by: PHYSICIAN ASSISTANT

## 2022-01-25 PROCEDURE — 99233 SBSQ HOSP IP/OBS HIGH 50: CPT | Performed by: HOSPITALIST

## 2022-01-25 PROCEDURE — G1004 CDSM NDSC: HCPCS

## 2022-01-25 PROCEDURE — 36415 COLL VENOUS BLD VENIPUNCTURE: CPT | Performed by: HOSPITALIST

## 2022-01-25 RX ORDER — BISACODYL 10 MG/1
10 SUPPOSITORY RECTAL DAILY PRN
Status: DISCONTINUED | OUTPATIENT
Start: 2022-01-25 | End: 2022-01-27 | Stop reason: HOSPADM

## 2022-01-25 RX ORDER — SODIUM CHLORIDE, SODIUM GLUCONATE, SODIUM ACETATE, POTASSIUM CHLORIDE AND MAGNESIUM CHLORIDE 30; 37; 368; 526; 502 MG/100ML; MG/100ML; MG/100ML; MG/100ML; MG/100ML
100 INJECTION, SOLUTION INTRAVENOUS CONTINUOUS
Status: ACTIVE | OUTPATIENT
Start: 2022-01-25 | End: 2022-01-26

## 2022-01-25 RX ORDER — BISACODYL 10 MG/1
10 SUPPOSITORY RECTAL DAILY
Status: DISCONTINUED | OUTPATIENT
Start: 2022-01-25 | End: 2022-01-27 | Stop reason: HOSPADM

## 2022-01-25 RX ORDER — POTASSIUM CHLORIDE 750 MG/1
20 TABLET, FILM COATED, EXTENDED RELEASE ORAL ONCE
Status: COMPLETED | OUTPATIENT
Start: 2022-01-25 | End: 2022-01-25

## 2022-01-25 RX ADMIN — METOPROLOL SUCCINATE 100 MG: 100 TABLET, EXTENDED RELEASE ORAL at 21:53

## 2022-01-25 RX ADMIN — EMPAGLIFLOZIN 10 MG: 10 TABLET, FILM COATED ORAL at 08:59

## 2022-01-25 RX ADMIN — PANTOPRAZOLE SODIUM 40 MG: 40 TABLET, DELAYED RELEASE ORAL at 18:35

## 2022-01-25 RX ADMIN — OXYCODONE HYDROCHLORIDE 10 MG: 5 TABLET ORAL at 09:11

## 2022-01-25 RX ADMIN — SODIUM CHLORIDE, SODIUM GLUCONATE, SODIUM ACETATE, POTASSIUM CHLORIDE AND MAGNESIUM CHLORIDE 100 ML/HR: 526; 502; 368; 37; 30 INJECTION, SOLUTION INTRAVENOUS at 13:10

## 2022-01-25 RX ADMIN — GABAPENTIN 600 MG: 300 CAPSULE ORAL at 06:21

## 2022-01-25 RX ADMIN — HEPARIN SODIUM 5000 UNITS: 5000 INJECTION INTRAVENOUS; SUBCUTANEOUS at 21:57

## 2022-01-25 RX ADMIN — PRAVASTATIN SODIUM 40 MG: 20 TABLET ORAL at 08:59

## 2022-01-25 RX ADMIN — GABAPENTIN 600 MG: 300 CAPSULE ORAL at 21:53

## 2022-01-25 RX ADMIN — SODIUM PHOSPHATE, DIBASIC AND SODIUM PHOSPHATE, MONOBASIC 1 ENEMA: 7; 19 ENEMA RECTAL at 11:59

## 2022-01-25 RX ADMIN — SENNOSIDES AND DOCUSATE SODIUM 1 TABLET: 50; 8.6 TABLET ORAL at 08:59

## 2022-01-25 RX ADMIN — GABAPENTIN 600 MG: 300 CAPSULE ORAL at 15:39

## 2022-01-25 RX ADMIN — OXYCODONE HYDROCHLORIDE 10 MG: 5 TABLET ORAL at 03:57

## 2022-01-25 RX ADMIN — METOPROLOL SUCCINATE 100 MG: 100 TABLET, EXTENDED RELEASE ORAL at 09:11

## 2022-01-25 RX ADMIN — LEVOTHYROXINE SODIUM 150 MCG: 0.07 TABLET ORAL at 06:21

## 2022-01-25 RX ADMIN — BISACODYL 10 MG: 10 SUPPOSITORY RECTAL at 13:05

## 2022-01-25 RX ADMIN — HEPARIN SODIUM 5000 UNITS: 5000 INJECTION INTRAVENOUS; SUBCUTANEOUS at 06:21

## 2022-01-25 RX ADMIN — POLYETHYLENE GLYCOL (3350) 17 G: 17 POWDER, FOR SOLUTION ORAL at 08:59

## 2022-01-25 RX ADMIN — SENNOSIDES AND DOCUSATE SODIUM 1 TABLET: 50; 8.6 TABLET ORAL at 21:53

## 2022-01-25 RX ADMIN — IOHEXOL 1000 ML: 9 SOLUTION ORAL at 15:43

## 2022-01-25 RX ADMIN — METFORMIN HYDROCHLORIDE 500 MG: 500 TABLET ORAL at 08:59

## 2022-01-25 RX ADMIN — HEPARIN SODIUM 5000 UNITS: 5000 INJECTION INTRAVENOUS; SUBCUTANEOUS at 15:39

## 2022-01-25 RX ADMIN — POTASSIUM CHLORIDE 20 MEQ: 750 TABLET, EXTENDED RELEASE ORAL at 06:20

## 2022-01-25 NOTE — PATIENT CARE CONFERENCE
Care Progression Rounds Note  Date: 1/25/2022  Time: 8:52 AM     Patient Name: Matt Williamson     Medical Record Number: [id number]   YOB: 1945  Sex: Male      Room/Bed: 3020    Admitting Diagnosis: Low back pain with right-sided sciatica, unspecified back pain laterality, unspecified chronicity [M54.41]  Malignant neoplasm of vertebral column, excluding sacrum and coccyx (CMS/HCC) [C41.2]   Admit Date/Time: 1/20/2022  6:04 AM    Primary Diagnosis: Low back pain with right-sided sciatica  Principal Problem: Low back pain with right-sided sciatica    GMLOS: 5.2  Anticipated Discharge Date: 1/25/2022    AM-PAC:  Mobility Score: 16    Discharge Planning:  Current Living Arrangements: home  Concerns to be Addressed: adjustment to diagnosis/illness,care coordination/care conferences,discharge planning  Anticipated Discharge Disposition: acute rehab/Inpatient Rehab Facility  Type of Home Care Services: home OT,home PT,nursing  Type of Outpatient Services: physical therapy    Barriers to Discharge:  None    Comments:       Participants:  ,nursing,pharmacy

## 2022-01-25 NOTE — ASSESSMENT & PLAN NOTE
Patient w/ abd distension. NS ordered obstruction series - showed severe ileus  Discussed with surgery antonia. Will give suppository and enema  Seen by GI  CT a/p showed ileus as well  Per Dr. Frankel, patient looks great. Will start diet  Tolerating diet.

## 2022-01-25 NOTE — PLAN OF CARE
Problem: Adult Inpatient Plan of Care  Goal: Plan of Care Review  Flowsheets (Taken 1/25/2022 1005)  Plan of Care Reviewed With: patient   Pt cleared for DC today. Going to [facility]. [facility] providing w/c van at 1030am. Updated HMS/RN provided report number. Updated pt at bedside.     COVID test neg 1/24    Drains pulled this am    Discussed with david quinn    1045: DC delayed. Updated kai @ [facility]

## 2022-01-25 NOTE — PROGRESS NOTES
Neurosurgery Service -  Progress Note           TIME OF EVALUATION   Patient seen and examined :  at 8:57 AM on 22     INTERVAL HISTORY        (+) small BM this am. Ambulating. Voiding. Continues with right thigh pain, slowly improving. Abdominal distension and pain. No N/V.     SUBJECTIVE        75yo M with metastatic thyroid ca with L3 pathologic fracture s/p right lateral L3 corpectomy and placement of an expandable cage, followed by L2-4 posterolateral instrumented fusion 2022.     OBJECTIVE     Temp:  [36.7 °C (98.1 °F)-37.3 °C (99.1 °F)] 36.9 °C (98.5 °F)  Heart Rate:  [] 88  Resp:  [16-18] 18  BP: ()/(53-72) 128/64  SpO2:  [95 %-98 %] 96 %  Oxygen Therapy: None (Room air)      Output by Drain (mL) 22 0700 - 22 1859 22 1900 - 22 0659 22 0700 - 22 1859 22 1900 - 22 0659 22 0700 - 22 0857   Drain 1 Midline Back 10 Fr. 35 30  50    Drain 1 Midline Back 10 Fr. 30 15  35           Intake/Output Summary (Last 24 hours) at 2022 0857  Last data filed at 2022 0400  Gross per 24 hour   Intake --   Output 610 ml   Net -610 ml           PHYSICAL EXAM        Abdomen: Distended. Soft. Mild discomfort with deep palpation in the epigastric, LUQ region.     Neurologic Examination:     Mental status: awake, alert, and oriented to person, place, and time. Speech and fund of knowledge are normal.     PERRL, EOMI, face symmetric, tongue protrudes to the midline, no nystagmus or diplopia.     Motor:   RUE: D: 5/5, T: 5/5, B: 5/5, WE: 5/5, H/5, IH:   LUE: D: 5/5, T: 55, B: 55, WE: 5/5, H/5, IH:   RLE: IP  4/5, Q  4+/5, DF 5/5, EHL 5/5, PF 5/5  LLE: IP  5/5, Q  5/5, DF 5/5, EHL 5/5, PF 5/5    Reflexes: Normoreflexive throughout.     Sensation: intact to light touch    Drains:  85cc 24hrs         DATA     Current anticoagulants:  heparin (porcine) 5,000 unit/mL injection 5,000 Units, subcutaneous, q8h SALIMA        •   alum-mag hydroxide-simeth, 30 mL, oral, q4h PRN  •  bisacodyL, 10 mg, rectal, Daily PRN  •  glucose, 16-32 g of dextrose, oral, PRN **OR** dextrose, 15-30 g of dextrose, oral, PRN **OR** glucagon, 1 mg, intramuscular, PRN **OR** dextrose in water, 25 mL, intravenous, PRN  •  diazePAM, 5 mg, oral, q6h PRN  •  diphenhydrAMINE, 25 mg, oral, q6h PRN **OR** diphenhydrAMINE, 25 mg, intravenous, q6h PRN  •  empagliflozin, 10 mg, oral, Daily  •  gabapentin, 600 mg, oral, q8h SALIMA  •  heparin (porcine), 5,000 Units, subcutaneous, q8h SALIMA  •  oxyCODONE, 5-10 mg, oral, q4h PRN **AND** HYDROmorphone, 0.5 mg, intravenous, q3h PRN  •  insulin aspart U-100, 3-5 Units, subcutaneous, Before meals & nightly  •  levothyroxine, 150 mcg, oral, Daily (6a)  •  metFORMIN, 500 mg, oral, BID with meals  •  metoprolol succinate XL, 100 mg, oral, BID  •  ondansetron ODT, 4 mg, oral, q8h PRN **OR** ondansetron, 4 mg, intravenous, q8h PRN  •  pantoprazole, 40 mg, oral, q PM  •  polyethylene glycol, 17 g, oral, Daily  •  pravastatin, 40 mg, oral, Daily  •  sennosides-docusate sodium, 1 tablet, oral, BID      Labs       CBC Results       01/23/22 01/22/22 01/21/22     0537 0540 0458    WBC 12.66 12.47 14.53    RBC 3.94 3.78 4.34    HGB 10.6 10.2 11.7    HCT 33.3 32.0 36.4    MCV 84.5 84.7 83.9    MCH 26.9 27.0 27.0    MCHC 31.8 31.9 32.1     213 239        ,   BMP Results       01/23/22 01/22/22 01/21/22     0537 0540 0458     134 134    K 4.0 4.3 4.0    Cl 99 101 104    CO2 25 22 18    Glucose 138 189 184    BUN 18 15 13    Creatinine 0.9 0.7 0.9    Calcium 8.4 8.2 8.0    Anion Gap 11 11 12    EGFR >60.0 >60.0 >60.0         and   Results from last 7 days   Lab Units 01/18/22  1408   INR  1.0   PTT sec 30         Imaging    X-RAY CHEST 2 VIEWS    Result Date: 1/18/2022  IMPRESSION: See comment.    X-RAY LUMBAR SPINE 2 OR 3 VIEWS    Result Date: 1/20/2022  IMPRESSION: Intraoperative fluoroscopic guidance as described above.     CT HEAD  WITHOUT IV CONTRAST    Result Date: 1/20/2022  IMPRESSION:  No acute intracranial abnormality. COMMENT: A standard noncontrast head CT was performed. CT DOSE:  One or more dose reduction techniques (e.g. automated exposure control, adjustment of the mA and/or kV according to patient size, use of iterative reconstruction technique) utilized for this examination. Comparison:  No prior studies are available for comparison. Findings:  Sulci, ventricles and basal cisterns are within normal limits for patient's age.   Attenuation in the brain parenchyma appears within normal limits.  No acute hemorrhage, acute territorial infarct, or mass effect is seen. There is no extra-axial fluid collection.  The visualized paranasal sinuses demonstrates scattered mucosal thickening of the maxillary sinuses bilaterally. Mastoid air cells are clear.    CT LUMBAR SPINE WITHOUT IV CONTRAST    Result Date: 1/20/2022  IMPRESSION: Interval corpectomy at L3, posterior spinal fusion hardware with bilateral pedicle screws at L2-L4. COMMENT: Lumbosacral alignment: Anatomic. Vertebral bodies: Interval corpectomy at L3.  There is posterior spinal fusion hardware with bilateral pedicle screws at L2-L4. Intervertebral discs: Normal in height. Lumbosacral spinal canal: Patent. OTHER: Scattered air lucencies in the paraspinal soft tissues and retro-orbital peritoneum consistent with recent postoperative changes.  Tiny hyperdense focus in the right psoas muscle, may represent a small area of hemorrhage or bony fragment.     FL FLUOROSCOPY TECHNICAL ASSISTANCE    Result Date: 1/20/2022  IMPRESSION: Intraoperative fluoroscopic guidance as described above.          Increased risk from baseline; VTE Prophylaxis as ordered  Both Mechanical and Pharmacological Prophylaxis      ASSESSMENT AND PLAN           77yo M with metastatic thyroid ca with L3 pathologic fx now POD#4 L3 s/p right lateral L3 corpectomy and placement of an expandable cage, L2-4  posterolateral instrumented fusion. Continues with right thigh pain. IP weakness secondary to lateral surgical approach.     Abdominal distension this am with some discomfort.               - Obstruction series ordered.               - Post-op blood loss anemia. Hemodynamically stable. Hgb 11.2              - LSO brace when OOB              - PT/OT. PMR consult placed - [facility] today               - DVT proph: SCDs and HSQ              - Continue multimodal pain regimen              - Voiding on own.               - Post-op CT lumbar spine shows well placed cage/hardware              - Drains removed     Discussed with GRAZYNA Iqbal C

## 2022-01-25 NOTE — BMR PREADMISSION NOTE
[facility]  [facility]    Patient Name:  Matt Williamson  YOB: 1945    Referral Date:  01/23/22  Evaluation Date:  01/25/22  Referring Facility Admission Date: 01/19/22  Referring Facility: [facility]   Referring Provider: Corey Rock MD     Reason for Referral: Matt Williamson is a 76 y.o. male whose primary indication for inpatient rehabilitation is Spinal Cord, Non-Traumatic.     Pertinent History of Current Functional Problem:  Patient is a 76-year-old male with a history of atrial fibrillation, CAD, hypertension, hypothyroidism, hyperlipidemia, thyroid cancer, type 2 diabetes developed significant mid to low back pain with walking and standing.  He has metastatic thyroid carcinoma with metastasis to the L3 body status post radiation in May 2020.  Recent PET scan showed increased activity at the L3 level without further metastatic lesions.  He was evaluated by neurosurgery who recommended surgical resection.  He was taken to the OR on 1/20 and underwent a stage I L2-L4 PSF with instrumentation, ORIF of L3 pathologic fracture, stage II L3 lateral corpectomy with expandable cage placement.  There has been no significant postoperative complications.  His pain is fairly well controlled.  An LSO has been ordered.  He worked with PT and OT yesterday needing minimal assistance of 2 to transfer sit to stand and ambulate a few steps.  Other than surgical pain he has a Lewis catheter in place.  He denies any other complaints of headache or dizziness, chest pain or shortness of breath.  He is on subcutaneous heparin for DVT prophylaxis.     He lives with his wife in [place].  They have a two-story home with 3 steps to enter.  Recently he has been ambulatory with a cane.  He had been independent with ADLs.  He has been rather sedentary due to severe back pain.    With the pt's current deficits and need for close medical monitoring recommendation will be referral to  acute level rehabilitation for comprehensive rehab program with 3 hours daily physical and occupational therapy with medical management and oversight by physiatry.  Patient would benefit from the daily therapy sessions of 3 hours in order to maximize overall functional mobility in order to return to [facility] and prevent manifestations of sequela diagnosis related to decrease mobility (UTI, PNE, DVT), and possible hospital re-admission. Goals  to be addressed will be for  reconditioning, strengthening, balance,  improve functional mobility, and  ADLs, family/caregiver training as needed and establish a safe discharge plan home at [facility] and decrease risk for re-admission.               Active Medical Conditions:  Patient Active Problem List   Diagnosis   • BPH (benign prostatic hyperplasia)   • Atherosclerosis of coronary artery   • Cataract   • Essential hypertension   • Former tobacco use   • Mixed hyperlipidemia   • Metastasis from thyroid cancer (CMS/HCC)   • Type 2 diabetes mellitus without complication (CMS/HCC)   • Cyst of thyroid   • Esophageal reflux   • Paroxysmal atrial fibrillation (CMS/HCC)   • Pre-op evaluation   • Preop cardiovascular exam   • Cancer associated pain   • Low back pain with right-sided sciatica   • Malignant neoplasm of vertebral column, excluding sacrum and coccyx (CMS/HCC)   • Acquired hypothyroidism       Active Medications:  Facility-Administered Medications Ordered in Other Visits   Medication Dose Route Frequency Provider Last Rate Last Admin   • alum-mag hydroxide-simeth (MAALOX) 200-200-20 mg/5 mL suspension 30 mL  30 mL oral q4h PRLivia Murphy CRNP       • bisacodyL (DULCOLAX) 10 mg suppository 10 mg  10 mg rectal Daily PRLivia Murphy CRNP       • glucose chewable tablet 16-32 g of dextrose  16-32 g of dextrose oral PRLivia Murphy CRNP        Or   • dextrose 40 % oral gel 15-30 g of dextrose  15-30 g of dextrose oral PRLivia Murphy CRNP        Or   • glucagon  (GLUCAGEN) injection 1 mg  1 mg intramuscular PRLivia Murphy CRNP        Or   • dextrose in water injection 12.5 g  25 mL intravenous PRN Livia Manzanares CRNP       • diazePAM (VALIUM) tablet 5 mg  5 mg oral q6h PRLivia Murphy CRNP       • diphenhydrAMINE (BENADRYL) capsule 25 mg  25 mg oral q6h PRN Livia Manzanares CRNP        Or   • diphenhydrAMINE (BENADRYL) injection 25 mg  25 mg intravenous q6h PRLivia Murphy CRNP       • empagliflozin (JARDIANCE) tablet 10 mg  10 mg oral Daily Vinay Marlow MD   10 mg at 01/24/22 0926   • gabapentin (NEURONTIN) capsule 600 mg  600 mg oral q8h Livia Michael CRNP   600 mg at 01/25/22 0621   • heparin (porcine) 5,000 unit/mL injection 5,000 Units  5,000 Units subcutaneous q8h Livia Michael CRNP   5,000 Units at 01/25/22 0621   • oxyCODONE (ROXICODONE) immediate release tablet 5-10 mg  5-10 mg oral q4h PRLivia Murphy CRNP   10 mg at 01/25/22 0357    And   • HYDROmorphone (DILAUDID) injection 0.5 mg  0.5 mg intravenous q3h PRLivia Murphy CRNP   0.5 mg at 01/21/22 0517   • insulin aspart U-100 (NovoLOG) pen 3-5 Units  3-5 Units subcutaneous Before meals & nightly Livia Manzanares CRNP   3 Units at 01/24/22 1714   • levothyroxine (SYNTHROID) tablet 150 mcg  150 mcg oral Daily (6a) Livia Manzanares CRNP   150 mcg at 01/25/22 0621   • metFORMIN (GLUCOPHAGE) tablet 500 mg  500 mg oral BID with meals Vinay Marlow MD   500 mg at 01/24/22 1713   • metoprolol succinate XL (TOPROL-XL) 24 hr ER tablet 100 mg  100 mg oral BID Vinay Marlow MD   100 mg at 01/24/22 2129   • ondansetron ODT (ZOFRAN-ODT) disintegrating tablet 4 mg  4 mg oral q8h PRN Livia Manzanares CRNP        Or   • ondansetron (ZOFRAN) injection 4 mg  4 mg intravenous q8h PRN Livia Manzanares CRNP       • pantoprazole (PROTONIX) tablet,delayed release (DR/EC) 40 mg  40 mg oral q PM Livia Manzanares CRNP   40 mg at 01/24/22 1713   • polyethylene glycol (MIRALAX) 17 gram packet 17 g  17 g oral Daily Le[place]on  Livia FIGUEROA NANCY   17 g at 01/24/22 0926   • pravastatin (PRAVACHOL) tablet 40 mg  40 mg oral Daily Livia Manzanares NANCY   40 mg at 01/24/22 0925   • sennosides-docusate sodium (SENOKOT-S) 8.6-50 mg per tablet 1 tablet  1 tablet oral BID Livia Manzanares CRNP   1 tablet at 01/24/22 2129   No medication comments found.    HISTORY:    Past Medical History:   Diagnosis Date   • Atrial fibrillation with rapid ventricular response (CMS/HCC) 10/25/2019    Hospitalized on 10/25/19 at [facility].    • BPH (benign prostatic hyperplasia)    • Coronary artery disease     LAD stent 2005   • Hypertension    • Hypothyroidism    • Mixed hyperlipidemia    • Thyroid cancer (CMS/HCC)    • Type 2 diabetes mellitus without complication (CMS/HCC)    • Vertigo      Past Surgical History:   Procedure Laterality Date   • ANAL FISSURECTOMY  1986   • CARDIAC CATHETERIZATION  11/21/2005    LM FOD. 70-80% proximal LAD.Mild disease CX, RCA and branches.    • CARDIAC CATHETERIZATION  11/28/2005    LM FOD. [place] LAD stent. CX FOD. RCA mild disease.   • CORONARY ANGIOPLASTY WITH STENT PLACEMENT  11/22/2005    LM FOD. Moderate, diffuse disease LAD. Irregular CX w/ PLVB distal to PDA 99%. Mild disease RCA. Stent to distal LAD.   • EYE SURGERY Left Cataract   • FOOT SURGERY  2009    For osteomyelitis.   • GASTROCUTANEOUS FISTULA CLOSURE     • LUMBAR SPINE SURGERY  01/20/2022    Stage 1: L2-L4 PSF with instrumentation, ORIF L3 pathologic fracture Stage 2: L3 lateral corpectomy with expandable cage placement   • THYROIDECTOMY  2012   • UPPER GASTROINTESTINAL ENDOSCOPY       Tobacco Use as of 1/23/2022     Smoking Status Smoking Start Date Smoking Quit Date Packs/Day Years Used    Former Smoker -- 1980 2.00 20.00    Types Comments Smokeless Tobacco Status Smokeless Tobacco Quit Date Source     Cigarettes -- Never Used -- Provider            Alcohol Use as of 1/23/2022     Alcohol Use Drinks/Week Alcohol/Week Comments Source    Yes   --  Social Provider            Drug Use as of 1/23/2022     Drug Use Types Frequency Comments Source    Never -- 0.0 -- Provider            Sexual Activity as of 1/23/2022     Sexually Active Birth Control Partners Comments Source    Not Currently -- Female -- Provider            Occupational as of 1/23/2022     Occupation Employer Comments Source    Retired -- -- Provider            Socioeconomic as of 1/23/2022     Marital Status Spouse Name Number of Children Years Education Education Level Preferred Language Ethnicity Race Source     -- -- -- -- English Not , /a, or Mongolian origin White Provider        Allergies  Allergies   Allergen Reactions   • Penicillins Rash     Childhood allergy       Premorbid Functional Status:   Dominant Hand: right  Ambulation: assistive equipment (SPC)  Transferring: independent  Toileting: independent  Bathing: independent  Dressing: independent  Eating: independent  Communication: understands/communicates without difficulty  Swallowing: swallows foods/liquids without difficulty  Baseline Diet/Method of Nutritional Intake: no diet restrictions  Past History of Dysphagia: Patient denies past or current difficulty swallowing.  Assistive Device/Animal Currently Used at Home: cane, straight  Prior Level of Function Comment: SPC use, drives, retired, active with basketball    Living Environment:  People in Home: spouse  Name(s) of People in Home: Ania  Current Living Arrangements: home  Living Environment Comment: pt lives in 2  in [place]  Number of Stairs, Main Entrance: 3  Stair Railings, Main Entrance: railing on right side (ascending)  Stairs Comment, Main Entrance: pt has a  bed and shower stall on the first floor  Number of Stairs, Within Home, Primary: 12    TEST RESULTS:  Chemistry (Up to last 3 results from the past 720 hours)      01/22 0540 01/23 0537 01/25 0404    Sodium       134            135            136         Potassium       4.3             4.0            3.8         BUN       15            18            27         Creatinine       0.7            0.9            1.2         Glucose       189            138            163         CO2       22            25            22         Chloride       101            99            98           Hepatic (Up to last 3 results from the past 720 hours)      01/18 1408 01/20 1829    ALT (SGPT)       17            15         AST (SGOT)       19            21         Alkaline Phosphatase       62            55         Bilirubin, Total       0.8            1.1           Metabolic (Up to last 3 results from the past 720 hours)      01/18 1408    Hemoglobin A1C       6.9  Comment: In the absence of an established diagnosis of Diabetes Mellitus, HbA1c levels between 5.7% and 6.4% are indicative of increased risk for developing Diabetes(Pre-Diabetes). Levels of 6.5% or greater are diagnostic for Diabetes Mellitus.         TSH       0.10         Cholesterol       149         Triglycerides       106         HDL       49  Comment: 2001 NCEP GUIDELINES<40 mg/dL Low>=60 mg/dL High         LDL Calculated       79  Comment: ATP III GUIDELINESOptimal: <100 mg/dLNear/Above Optimal: 100-129 mg/dLBorderline High: 130-159 mg/dLHigh: 160-189 mg/dLVery High: >190 mg/dL           Hematologic (Up to last 3 results from the past 720 hours)      01/22 0540 01/23 0537 01/25 0404    WBC       12.47            12.66            8.57         Hemoglobin       10.2            10.6            11.2         Hematocrit       32.0            33.3            35.7         Platelets       213            247            339         Protime       --            --            --         INR       --            --            --           01/18 1408        WBC       --           Hemoglobin       --           Hematocrit       --           Platelets       --           Protime       13.2           INR       1.0  Comment: INR has no defined significance when PT is  within Reference Range.             Other (Up to last 3 results from the past 720 hours)      01/18 1408    INR       1.0  Comment: INR has no defined significance when PT is within Reference Range.                  ASSESSMENT:  Vitals:  Temp:  [36.7 °C (98.1 °F)-37.3 °C (99.1 °F)] 36.9 °C (98.5 °F)  Heart Rate:  [] 88  Resp:  [16-18] 18  BP: ()/(53-72) 128/64    Lines/Drains/Airways:       Risk for Clinical Complications:  Constipation: Moderate  DVT: Moderate  Falls: Moderate  Infection: Moderate    Precautions:  Existing Precautions/Restrictions: fall; brace worn when out of bed; spinal      Current Diet:   Diet: no diet restrictions    Current Functional Status:   Preadmission Current Function     Row Name 01/22/22 1224       Cognition/Psychosocial    Affect/Mental Status (Cognition) WFL    Orientation Status (Cognition) oriented x 3    Follows Commands (Cognition) WFL    Attention Deficit (Cognition) minimal deficit;focused/sustained attention    Executive Function Deficit (Cognition) minimal deficit;information processing;insight/awareness of deficits    Safety Deficit (Cognition) minimal deficit;insight into deficits/self-awareness    Comment, Cognition Cooperative and Pleasant    Cognitive Interventions/Strategies external compensatory strategy training;environmental modifications       Upper Body Dressing    Tasks don;hospital gown    [place] minimum assist (75% or more patient effort)    Position supported standing    Adaptive Equipment none       Lower Body Dressing    Tasks don;socks    [place] dependent (less than 25% patient effort)    Position supported sitting    Adaptive Equipment none    Comment Limited by pain       Bed Mobility    Comment (Bed Mobility) Patient received OOB       Sit to Stand Transfer    [place], Sit to Stand Transfer minimum assist (75% or more patient effort);1 person assist;verbal cues    Verbal Cues hand placement;safety    Assistive Device walker,  front-wheeled    Comment from Chair / Secured LSO Brace       Stand to Sit Transfer    [place], Stand to Sit Transfer minimum assist (75% or more patient effort);1 person assist;verbal cues    Verbal Cues safety;hand placement    Assistive Device walker, front-wheeled    Comment to Chair       Toilet Transfer    Transfer Technique sit-stand;stand-sit    [place], Toilet Transfer minimum assist (75% or more patient effort);1 person assist;verbal cues    Verbal Cues hand placement;safety;technique    Assistive Device grab bars/safety frame;walker, front-wheeled       Safety Issues, Functional Mobility    Safety Issues Affecting Function (Mobility) insight into deficits/self-awareness    Impairments Affecting Function (Mobility) balance;endurance/activity tolerance;pain       Balance    Balance Assessment sitting static balance;sitting dynamic balance;sit to stand dynamic balance;standing static balance;standing dynamic balance    Static Sitting Balance WFL;supported;sitting in chair    Dynamic Sitting Balance WFL;unsupported  on toilet    Sit to Stand Dynamic Balance mild impairment;supported  RW    Static Standing Balance WFL;supported  RW    Dynamic Standing Balance mild impairment;supported  RW    Balance Interventions occupation based/functional task    Comment, Balance Patient stood for 2 minutes with RW / 1 self corrected LOB noted    Row Name 01/22/22 1226       Cognition/Psychosocial    Affect/Mental Status (Cognition) WFL    Orientation Status (Cognition) oriented x 3    Follows Commands (Cognition) WFL    Comment, Cognition pleasant/cooperative/receptive. Req'd cues for safety at times. See OT note for full cog assessment       Bed Mobility    Comment (Bed Mobility) NT as patient received OOB in chair at beginning of session       Sit to Stand Transfer    [place], Sit to Stand Transfer minimum assist (75% or more patient effort);1 person assist;verbal cues    Verbal Cues hand placement;safety     Assistive Device walker, front-wheeled    Comment from chair & toilet. Fastened LSO prior to initial stand from chair.       Stand to Sit Transfer    [place], Stand to Sit Transfer minimum assist (75% or more patient effort);1 person assist;verbal cues    Verbal Cues safety;hand placement    Assistive Device walker, front-wheeled    Comment to toilet & chair       Gait Training    [place], Gait minimum assist (75% or more patient effort);1 person assist    Assistive Device walker, front-wheeled    Distance in Feet 30 feet    Pattern (Gait) step-through    Deviations/Abnormal Patterns (Gait) step length decreased;stride length decreased;gait speed decreased    Comment (Gait/Stairs) 30'x1, 12'x1; cues for RW proximity. Mildly unsteady but no overt LOBs observed       Safety Issues, Functional Mobility    Impairments Affecting Function (Mobility) balance;endurance/activity tolerance;pain       Balance    Balance Assessment sitting static balance;sit to stand dynamic balance;standing static balance;standing dynamic balance    Static Sitting Balance WFL;sitting in chair    Sit to Stand Dynamic Balance mild impairment    Static Standing Balance WFL;supported    Dynamic Standing Balance mild impairment;supported    Comment, Balance support = RW; close supervision for static standing w/ RW for ~2min prior to mobility    Row Name 01/24/22 1001       Cognition/Psychosocial    Affect/Mental Status (Cognition) WFL    Orientation Status (Cognition) oriented x 3;verbal cues/prompts needed for orientation    Follows Commands (Cognition) WFL    Executive Function Deficit (Cognition) minimal deficit;information processing;self-monitoring/self-correction    Comment, Executive Function repetition of cues required, receptive    Safety Deficit (Cognition) minimal deficit;safety precautions follow-through/compliance;safety precautions awareness    Comment, Cognition cues for safety in transfer, receptive    Cognitive  Interventions/Strategies occupation/activity based interventions       Basic Activities of Daily Living (BADLs)    Energy Conservation Techniques correct body mechanics utilized;equipment and device use facilitated       Upper Body Dressing    Tasks hospital gown    [place] minimum assist (75% or more patient effort)    Position edge of bed sitting       Lower Body Dressing    Tasks underwear    Self-Performance pulls underpants up or down    [place] Assistance threads left leg, underpants;threads right leg, underpants;pulls underpants up or down    [place] maximum assist (25-49% patient effort)    Position unsupported sitting;unsupported standing    Comment attempted crossed leg technique unable to achieve figure 4 and MAX A for completion       Grooming    Self-Performance brushes/mancilla hair    [place] set up    Position supported sitting    Setup Assistance obtain supplies       Bed Mobility    [place], Supine to Sit close supervision;increased time to complete;verbal cues    Verbal Cues (Supine to Sit) technique;safety    Assistive Device head of bed elevated;bed rails;matress firmed    Comment (Bed Mobility) Pt reports mechanical bed at home utilizing HOB elevated greater than 30 deg CLS level VC for log roll technique       Transfers    Comment MIN A x1 for short household distance mobility w/ rw       Bed to Chair Transfer    [place], Bed to Chair minimum assist (75% or more patient effort);1 person assist;verbal cues    Verbal Cues proper use of assistive device;technique    Assistive Device walker, front-wheeled       Sit to Stand Transfer    [place], Sit to Stand Transfer close supervision;verbal cues    Verbal Cues hand placement;proper use of assistive device;preparatory posture    Assistive Device walker, front-wheeled    Comment from bed, cues to maintain spinal pxns and for technique of sit > stand w/ rw, does not carry over despite cues       Stand to Sit Transfer     [place], Stand to Sit Transfer close supervision;verbal cues    Verbal Cues proper use of assistive device;technique    Assistive Device walker, front-wheeled    Comment to chair w/ cues for safety / hand placement       Toilet Transfer    Transfer Technique sit-stand;stand-sit    [place], Toilet Transfer minimum assist (75% or more patient effort);verbal cues    Verbal Cues hand placement;proper use of assistive device;technique    Assistive Device grab bars/safety frame    Comment VC for GB and to maintain spinal pxn in transfer       Safety Issues, Functional Mobility    Safety Issues Affecting Function (Mobility) safety precaution awareness;safety precautions follow-through/compliance;positioning of assistive device    Impairments Affecting Function (Mobility) balance;endurance/activity tolerance;range of motion (ROM);pain    Comment, Safety Issues/Impairments (Mobility) reports fatigue as a result of limited  sleep this date       Balance    Static Sitting Balance WFL;sitting in chair    Dynamic Sitting Balance WFL;sitting, edge of bed    Sit to Stand Dynamic Balance mild impairment;supported    Static Standing Balance WFL;supported    Dynamic Standing Balance mild impairment;supported    Comment, Balance benefits from rw    Row Name 01/24/22 1002       Cognition/Psychosocial    Affect/Mental Status (Cognition) WFL    Orientation Status (Cognition) oriented x 3;verbal cues/prompts needed for orientation    Follows Commands (Cognition) follows one-step commands;WFL    Comment, Cognition cooperative during session, reports feeling tired today. requires cues for direction during ambulation, education about spinal precautions, and cues for safe transfer technique.       Bed Mobility    [place], Supine to Sit close supervision;verbal cues;increased time to complete    Verbal Cues (Supine to Sit) safety;technique;maintaining precautions    Assistive Device head of bed elevated;bed rails    Comment (Bed  Mobility) to L side of bed. education provided about log roll technique and maintaining spinal precautions. pt reports having adjustable base bed at home, used controls to elevated HOB. LSO donned while seated EOB.       Sit to Stand Transfer    [place], Sit to Stand Transfer close supervision;verbal cues    Verbal Cues hand placement;safety;technique;maintaining precautions    Assistive Device walker, front-wheeled    Comment from EOB. requires cues for proper technique and maintaining precautions. pt keeping B hands on walker to stand. education provided about pushing up from solid surface.       Stand to Sit Transfer    [place], Stand to Sit Transfer close supervision;verbal cues    Verbal Cues hand placement;safety;technique    Assistive Device walker, front-wheeled    Comment to chair       Gait Training    [place], Gait minimum assist (75% or more patient effort)    Assistive Device walker, front-wheeled    Distance in Feet 40 feet  with brief standing rest break    Pattern (Gait) step-through    Deviations/Abnormal Patterns (Gait) step length decreased;stride length decreased    Comment (Gait/Stairs) pt was able to ambulate short distance into hallway with min A and RW. requires cues for proper positioning of RW during mobility. pt attempting to turn RW, then body requiring repeated education about spinal precautions/ avoiding twisting. able to demonstrate carry-over.       Safety Issues, Functional Mobility    Impairments Affecting Function (Mobility) balance;endurance/activity tolerance;range of motion (ROM);strength       Balance    Balance Assessment sitting static balance;sit to stand dynamic balance;standing static balance;standing dynamic balance    Static Sitting Balance WFL;sitting in chair    Sit to Stand Dynamic Balance mild impairment;supported    Static Standing Balance WFL;supported    Dynamic Standing Balance mild impairment;supported    Comment, Balance supported = RW                 Support System:  Designated Primary Caregiver: spouse Ania [phone]  Availability, Primary Caregiver: available most of time, some coverage needed  Health, Primary Caregiver: no health issues  Physical Limitations, Primary Caregiver: no physical limitations  Caregiver Engagement: advocates for the patient      Patient/Family Goals:  Patient's Goals For Discharge: return home; take care of myself at home; return to all previous roles/activities      Educational Background:      RECOMMENDATIONS / PLAN:  Special Needs:  Is an  Needed/Used?: N  DNR is current code status at referring facility?: No      Plan:  Identified Referral Needs: occupational therapy,medical consultative services,psychiatry/psychology services,physical therapy  OT Frequency: 5-7 times per week  OT Intensity: 1.5 hours  PT Frequency: 5-7 times per week  PT Intensity: 1.5 hours    Therapy Intensity: Requires, can tolerate and will benefit from 3 hours of therapy at least 5 days per week  Projected Length of Stay (days): 10 days  Patient is willing to participate in rehab program: yes    Impairments to be addressed: mobility,motor dysfunction,safety,self-care  Medical Necessity Admission Criteria: abnormal labs,other active medical conditions (see comments) (L2-L4 PSF with instrumentation, ORIF L3 pathologic fx Stage 2: L3 lateral corpectomy with expandable cage. hxa-fib,BPH,CAD,HTN,HLD, DM, vertigo)    Expected Level of Function at Discharge:  Expected Functional Improvement: mobility; motor dysfunction; safety; self-care  Self-Care: Setup or clean-up assistance  Sphincter Control: Independent  Transfers: Setup or clean-up assistance  Locomotion: Setup or clean-up assistance  Communication: Independent  Social Cognition: Independent      Post-Discharge Needs:  Concerns to be Addressed: adjustment to diagnosis/illness,caregiver training,care coordination/care conferences,basic needs  Anticipated Discharge Disposition: home  with home health  Type of Home Care Services: home OT,home PT,nursing  Equipment Needed After Discharge: commode, 3-in-1,walker, front-wheeled,shower chair

## 2022-01-25 NOTE — CONSULTS
GASTROENTEROLOGY CONSULTATION   [facility]     PATIENT NAME:  Matt Williamson        YOB: 1945   AGE:  76 y.o.         MRN:  [id number]              HISTORY   CC: abdominal distention    This is a 77 y/o M with a PMHx of metastatic thyroid cancer, Afib, CAD s/p stent placement, HTN, hypothyroidism, MLD, DM II being seen in consult for abdominal distention.     Regarding his current hospitalization he was complaining of increasing back pain and was found to have an enlarging L3 vertebral body metastatic mass lesion. He is s/p L2-L4 PSF with instrumentation, ORIF L3 pathologic fracture and L3 lateral corpectomy with expandable cage placement.     He mentions that he has had increased abdominal distention over the past few days. He admits to passing flatus and belching a few days ago but none for the past 1-2 days. He admits to some abdominal discomfort with bearing down. He denies nausea and vomiting. He last passed a small amount of loose-soft stool this afternoon per his nurse.     Abdominal Xray from today suggestive of  A severe small and large bowel ileus.     PAST MEDICAL HISTORY     Past Medical History:   Diagnosis Date   • Atrial fibrillation with rapid ventricular response (CMS/HCC) 10/25/2019    Hospital[facility] on 10/25/19 at [facility].    • BPH (benign prostatic hyperplasia)    • Coronary artery disease     LAD stent 2005   • Hypertension    • Hypothyroidism    • Mixed hyperlipidemia    • Thyroid cancer (CMS/HCC)    • Type 2 diabetes mellitus without complication (CMS/HCC)    • Vertigo        PAST SURGICAL HISTORY     Past Surgical History:   Procedure Laterality Date   • ANAL FISSURECTOMY  1986   • CARDIAC CATHETERIZATION  11/21/2005    LM FOD. 70-80% proximal LAD.Mild disease CX, RCA and branches.    • CARDIAC CATHETERIZATION  11/28/2005    LM FOD. Pristine LAD stent. CX FOD. RCA mild disease.   • CORONARY ANGIOPLASTY WITH STENT PLACEMENT  11/22/2005    LM FOD. Moderate,  diffuse disease LAD. Irregular CX w/ PLVB distal to PDA 99%. Mild disease RCA. Stent to distal LAD.   • EYE SURGERY Left Cataract   • FOOT SURGERY      For osteomyelitis.   • GASTROCUTANEOUS FISTULA CLOSURE     • LUMBAR SPINE SURGERY  2022    Stage 1: L2-L4 PSF with instrumentation, ORIF L3 pathologic fracture Stage 2: L3 lateral corpectomy with expandable cage placement   • THYROIDECTOMY     • UPPER GASTROINTESTINAL ENDOSCOPY          FAMILY HISTORY     Family History   Problem Relation Age of Onset   • Cancer Biological Mother    • Breast cancer Biological Mother    • Emphysema Biological Father    • Lung cancer Biological Father      No significant Neurological History    SOCIAL HISTORY     Social History     Tobacco Use   • Smoking status: Former Smoker     Packs/day: 2.00     Years: 20.00     Pack years: 40.00     Types: Cigarettes     Quit date:      Years since quittin.0   • Smokeless tobacco: Never Used   Substance Use Topics   • Alcohol use: Yes     Comment: Social       MEDICATIONS   Home Medication:  •  glipiZIDE, Take 1 tablet by mouth as needed.   •  JARDIANCE, TAKE 1 TABLET(10 MG) BY MOUTH DAILY  •  metFORMIN, TAKE 1 TABLET BY MOUTH FOUR TIMES DAILY  •  metoprolol succinate XL, Take 1 tablet (100 mg total) by mouth 2 (two) times a day.  •  pantoprazole, TAKE 1 TABLET(40 MG) BY MOUTH DAILY (Patient taking differently: Take 40 mg by mouth every evening.  )  •  pravastatin, Take 1 tablet (40 mg total) by mouth daily.  •  SYNTHROID, Take 1 tablet (150 mcg total) by mouth once daily.  •  TRULICITY, once a week. Every Tuesday   •  aspirin, Take 81 mg by mouth daily.  •  gabapentin, Take 2 capsules (600 mg total) by mouth 3 (three) times a day. (Patient taking differently: Take 600 mg by mouth 2 (two) times a day.  )  •  propafenone, Take 1 tablet by mouth as needed (Palpitations).      MEDICATIONS:  Infusions:    • electrolyte-A  100 mL/hr            Scheduled:   • bisacodyL  10 mg  "rectal Daily   • empagliflozin  10 mg oral Daily   • gabapentin  600 mg oral q8h SALIMA   • heparin (porcine)  5,000 Units subcutaneous q8h SALIMA   • insulin aspart U-100  3-5 Units subcutaneous Before meals & nightly   • levothyroxine  150 mcg oral Daily (6a)   • metFORMIN  500 mg oral BID with meals   • metoprolol succinate XL  100 mg oral BID   • pantoprazole  40 mg oral q PM   • polyethylene glycol  17 g oral Daily   • pravastatin  40 mg oral Daily   • sennosides-docusate sodium  1 tablet oral BID       ALLERGIES     Allergies   Allergen Reactions   • Penicillins Rash     Childhood allergy       REVIEW OF SYSTEMS   13 point review of systems completed. Negative except for above mentioned history.    PHYSICAL EXAMINATION   Temperature: Temp (24hrs), Av.9 °C (98.5 °F), Min:36.8 °C (98.2 °F), Max:37.3 °C (99.1 °F)      BP:   Pulse: 91 Respirations: 18  Height: 1.676 m (5' 6\") Weight: 76.4 kg (168 lb 6.4 oz) Body mass index is 27.18 kg/m².     General appearance: no distress  Head: normocephalic  Eyes:  Anicteric  ENT:  Oral mucosa moist  Abdomen: Abdomen was not examined as the patient was in the restroom for >20min trying to have a bm. Will return to reassess the patient.   Extremities: no edema  Skin:  No rashes or lesions  Neurologic: awake and alert and oriented  Pscyh:  cooperative     Diagnostic Data:     Labs reviewed-  Lab Results   Component Value Date    WBC 8.57 2022    HGB 11.2 (L) 2022    HCT 35.7 (L) 2022     2022    CHOL 149 2022    TRIG 106 2022    HDL 49 2022    ALT 15 (L) 2022    AST 21 2022     2022    K 3.8 2022    CL 98 2022    CREATININE 1.2 2022    BUN 27 (H) 2022    CO2 22 2022    TSH 0.10 (L) 2022    PSA 3.8 2020    INR 1.0 2022    HGBA1C 6.9 (H) 2022    MICROALBUR 6.0 2022     Imaging reviewed  X-RAY CHEST 2 VIEWS    Result Date: 2022  IMPRESSION: See " comment.    X-RAY LUMBAR SPINE 2 OR 3 VIEWS    Result Date: 1/20/2022  IMPRESSION: Intraoperative fluoroscopic guidance as described above.     X-RAY OBSTRUCTION SERIES (ABDOMEN 2 VIEWS WITH CHEST 1 VIEW)    Result Date: 1/25/2022  IMPRESSION: Findings most suggestive of a severe small and large bowel ileus.    CT HEAD WITHOUT IV CONTRAST    Result Date: 1/20/2022  IMPRESSION:  No acute intracranial abnormality. COMMENT: A standard noncontrast head CT was performed. CT DOSE:  One or more dose reduction techniques (e.g. automated exposure control, adjustment of the mA and/or kV according to patient size, use of iterative reconstruction technique) utilized for this examination. Comparison:  No prior studies are available for comparison. Findings:  Sulci, ventricles and basal cisterns are within normal limits for patient's age.   Attenuation in the brain parenchyma appears within normal limits.  No acute hemorrhage, acute territorial infarct, or mass effect is seen. There is no extra-axial fluid collection.  The visualized paranasal sinuses demonstrates scattered mucosal thickening of the maxillary sinuses bilaterally. Mastoid air cells are clear.    CT LUMBAR SPINE WITHOUT IV CONTRAST    Result Date: 1/20/2022  IMPRESSION: Interval corpectomy at L3, posterior spinal fusion hardware with bilateral pedicle screws at L2-L4. COMMENT: Lumbosacral alignment: Anatomic. Vertebral bodies: Interval corpectomy at L3.  There is posterior spinal fusion hardware with bilateral pedicle screws at L2-L4. Intervertebral discs: Normal in height. Lumbosacral spinal canal: Patent. OTHER: Scattered air lucencies in the paraspinal soft tissues and retro-orbital peritoneum consistent with recent postoperative changes.  Tiny hyperdense focus in the right psoas muscle, may represent a small area of hemorrhage or bony fragment.     FL FLUOROSCOPY TECHNICAL ASSISTANCE    Result Date: 1/20/2022  IMPRESSION: Intraoperative fluoroscopic guidance as  described above.         ASSESSMENT/PLAN     This is a 77 y/o M with a PMHx of metastatic thyroid cancer, Afib, CAD s/p stent placement, HTN, hypothyroidism, MLD, DM II being seen in consult for abdominal distention.     1) Abdominal distention: Abdominal xray with findings suggestive of severe small and large bowel ileus. Passed a small amount of stool this afternoon. He was given an enema x 1 but believes it was done incorrectly, and therefor, a subsequent suppository was given.   - Recommend bowel rest - Keep NPO  - No nausea or vomiting - do not recommend NG tube decompression at this time  - Continue to monitor output following enema and suppository  - Will d/w attending     2) Metastatic thyroid cancer:  s/p L2-L4 PSF with instrumentation, ORIF L3 pathologic fracture and L3 lateral corpectomy with expandable cage placement.     GI Update 13:46:  Discussed with Dr. Nguyen. Will proceed with non-contrast CT A/P. Limit use of narcotics if possible. Consider rectal tube and possibly dose of Neostigmine. Dr. Nguyen to see. Updated Dr. Valera.       AUTHOR:  GRAZYNA Lara  1/25/2022

## 2022-01-25 NOTE — PROGRESS NOTES
Hospital Medicine Service -  Daily Progress Note       SUBJECTIVE   Interval History: no acute events overnight. Patient feeling distended in his abdomen. No chest pain or sob. No n/v.      OBJECTIVE      Vital signs in last 24 hours:  Temp:  [36.7 °C (98.1 °F)-37.3 °C (99.1 °F)] 36.9 °C (98.5 °F)  Heart Rate:  [] 91  Resp:  [16-18] 18  BP: ()/(53-84) 122/84    Intake/Output Summary (Last 24 hours) at 1/25/2022 1031  Last data filed at 1/25/2022 0400  Gross per 24 hour   Intake --   Output 335 ml   Net -335 ml       PHYSICAL EXAMINATION      Physical Exam    GEN: well-developed and well-nourished; not in acute distress  HEENT: normocephalic; atraumatic  NECK: no JVD; no bruits  CARDIO: regular rate and rhythm; no murmurs or rubs  RESP: clear to auscultation bilaterally; no rales, rhonchi, or wheezes  ABD: soft, non-distended, non-tender, normal bowel sounds  EXT: no cyanosis, clubbing, or edema  SKIN: clean, dry, warm, and intact  MUSCULOSKELETAL: no injury or deformity; +CRYSTAL drain in back  NEURO: alert and oriented x 3; nonfocal  BEHAVIOR/EMOTIONAL: appropriate; cooperative     LINES, CATHETERS, DRAINS, AIRWAYS, AND WOUNDS   Lines, Drains, and Airways:  Wounds (agree with documentation and present on admission):  Peripheral IV (Adult) 01/20/22 Left;Posterior Hand (Active)   Number of days: 4       Peripheral IV (Adult) 01/20/22 Right;Posterior Hand (Active)   Number of days: 4       Drain 1 Midline Back 10 Fr. (Active)   Number of days: 4       Drain 1 Midline Back 10 Fr. (Active)   Number of days: 4       Surgical Incision Back (Active)   Number of days: 4         Comments:      LABS / IMAGING / TELE      Labs  Lab Results   Component Value Date    WBC 8.57 01/25/2022    HGB 11.2 (L) 01/25/2022    HCT 35.7 (L) 01/25/2022    MCV 86.4 01/25/2022     01/25/2022     Lab Results   Component Value Date    GLUCOSE 163 (H) 01/25/2022    CALCIUM 8.4 (L) 01/25/2022     01/25/2022    K 3.8  01/25/2022    CO2 22 01/25/2022    CL 98 01/25/2022    BUN 27 (H) 01/25/2022    CREATININE 1.2 01/25/2022     Lab Results   Component Value Date    INR 1.0 01/18/2022    INR 1.0 01/17/2020    INR 1.0 01/14/2016       Micro  Microbiology Results     Procedure Component Value Units Date/Time    Covid-19 Pre-procedural/PAT [[id number]]  (Normal) Collected: 01/18/22 1407    Specimen: Nasopharyngeal Swab from Nasopharynx Updated: 01/19/22 1040    Narrative:      The following orders were created for panel order Covid-19 Pre-procedural/PAT.  Procedure                               Abnormality         Status                     ---------                               -----------         ------                     SARS-CoV-2 (COVID-19), PCR[[id number]]   Normal              Final result                 Please view results for these tests on the individual orders.    SARS-CoV-2 (COVID-19), PCR [[id number]]  (Normal) Collected: 01/18/22 1407    Specimen: Nasopharyngeal Swab from Nasopharynx Updated: 01/19/22 1040     SARS-CoV-2 (COVID-19) Negative          Imaging  X-RAY CHEST 2 VIEWS    Result Date: 1/18/2022  IMPRESSION: See comment.    X-RAY LUMBAR SPINE 2 OR 3 VIEWS    Result Date: 1/20/2022  IMPRESSION: Intraoperative fluoroscopic guidance as described above.     X-RAY OBSTRUCTION SERIES (ABDOMEN 2 VIEWS WITH CHEST 1 VIEW)    Result Date: 1/25/2022  IMPRESSION: Findings most suggestive of a severe small and large bowel ileus.    CT HEAD WITHOUT IV CONTRAST    Result Date: 1/20/2022  IMPRESSION:  No acute intracranial abnormality. COMMENT: A standard noncontrast head CT was performed. CT DOSE:  One or more dose reduction techniques (e.g. automated exposure control, adjustment of the mA and/or kV according to patient size, use of iterative reconstruction technique) utilized for this examination. Comparison:  No prior studies are available for comparison. Findings:  Sulci, ventricles and basal cisterns are within normal limits  for patient's age.   Attenuation in the brain parenchyma appears within normal limits.  No acute hemorrhage, acute territorial infarct, or mass effect is seen. There is no extra-axial fluid collection.  The visualized paranasal sinuses demonstrates scattered mucosal thickening of the maxillary sinuses bilaterally. Mastoid air cells are clear.    CT LUMBAR SPINE WITHOUT IV CONTRAST    Result Date: 1/20/2022  IMPRESSION: Interval corpectomy at L3, posterior spinal fusion hardware with bilateral pedicle screws at L2-L4. COMMENT: Lumbosacral alignment: Anatomic. Vertebral bodies: Interval corpectomy at L3.  There is posterior spinal fusion hardware with bilateral pedicle screws at L2-L4. Intervertebral discs: Normal in height. Lumbosacral spinal canal: Patent. OTHER: Scattered air lucencies in the paraspinal soft tissues and retro-orbital peritoneum consistent with recent postoperative changes.  Tiny hyperdense focus in the right psoas muscle, may represent a small area of hemorrhage or bony fragment.     FL FLUOROSCOPY TECHNICAL ASSISTANCE    Result Date: 1/20/2022  IMPRESSION: Intraoperative fluoroscopic guidance as described above.       ECG/Telemetry  I have independently reviewed the telemetry. No events for the last 24 hours.    ASSESSMENT AND PLAN      Ileus (CMS/HCC)  Assessment & Plan  Patient w/ abd distension. NS ordered obstruction series - showed severe ileus  Discussed with surgery antonia. Will give suppository and enema  Hold off on discharge    Acquired hypothyroidism  Assessment & Plan  Status post thyroidectomy  Continue levothyroxine  TSH 0.104 days ago  Repeat TFTs as outpatient in 4 weeks    Malignant neoplasm of vertebral column, excluding sacrum and coccyx (CMS/HCC)  Assessment & Plan  Metastatic thyroid ca S/p L3 pathologic fracture s/p right lateral L3 corpectomy and placement of an expandable cage, followed by L2-4 posterolateral instrumented fusion  Continue postop management per  neurosurgery.  Drain was removed  DVT prophylaxis, Lewis, pain management per neurosurgery  Rehab placement on discharge  Discharge to [facility] when ileus resolves    Paroxysmal atrial fibrillation (CMS/[facility])  Assessment & Plan  Currently in sinus rhythm  Patient is not on long-term anticoagulation  He takes Rythmol as needed for palpitations    Type 2 diabetes mellitus without complication (CMS/[facility])  Assessment & Plan  Patient on Jardiance, Trulicity and metformin as outpatient  Follow Accu-Cheks and administer sliding scale insulin  Hemoglobin A1c 6.94 days ago  Continue Jardiance and can restart metformin prior to discharge  Metformin should be changed to twice daily or extended release once daily prior to discharge.  Patient takes metformin 4 times daily at home    Metastasis from thyroid cancer (CMS/[facility])  Assessment & Plan  Follows up with   Status post radiation    Mixed hyperlipidemia  Assessment & Plan  Continue statin    Essential hypertension  Assessment & Plan  Stable  Continue metoprolol    Atherosclerosis of coronary artery  Assessment & Plan  Status post LAD stenting 17 years ago  Patient follows up with Dr. Ji  Continue metoprolol, statin  Restart aspirin once cleared by neurosurgery      Plan of care discussed with patient, nurse     VTE Assessment: Padua    VTE Prophylaxis:  Current anticoagulants:  heparin (porcine) 5,000 unit/mL injection 5,000 Units, subcutaneous, q8h SALIMA      Code Status: Full Code      Estimated Discharge Date: 1/26/2022     Disposition Planning: d/c to [facility] when ileus resolves     Devin Valera MD  1/25/2022

## 2022-01-26 LAB
ANION GAP SERPL CALC-SCNC: 20 MEQ/L (ref 3–15)
BASOPHILS # BLD: 0.07 K/UL (ref 0.01–0.1)
BASOPHILS NFR BLD: 0.5 %
BUN SERPL-MCNC: 23 MG/DL (ref 8–20)
CALCIUM SERPL-MCNC: 8.1 MG/DL (ref 8.9–10.3)
CHLORIDE SERPL-SCNC: 101 MEQ/L (ref 98–109)
CO2 SERPL-SCNC: 14 MEQ/L (ref 22–32)
CREAT SERPL-MCNC: 1 MG/DL (ref 0.8–1.3)
DIFFERENTIAL METHOD BLD: ABNORMAL
EOSINOPHIL # BLD: 0.11 K/UL (ref 0.04–0.54)
EOSINOPHIL NFR BLD: 0.8 %
ERYTHROCYTE [DISTWIDTH] IN BLOOD BY AUTOMATED COUNT: 15.3 % (ref 11.6–14.4)
GFR SERPL CREATININE-BSD FRML MDRD: >60 ML/MIN/1.73M*2
GLUCOSE BLD-MCNC: 106 MG/DL (ref 70–99)
GLUCOSE BLD-MCNC: 124 MG/DL (ref 70–99)
GLUCOSE BLD-MCNC: 153 MG/DL (ref 70–99)
GLUCOSE BLD-MCNC: 177 MG/DL (ref 70–99)
GLUCOSE SERPL-MCNC: 121 MG/DL (ref 70–99)
HCT VFR BLDCO AUTO: 34.2 % (ref 40.1–51)
HGB BLD-MCNC: 10.6 G/DL (ref 13.7–17.5)
IMM GRANULOCYTES # BLD AUTO: 0.28 K/UL (ref 0–0.08)
IMM GRANULOCYTES NFR BLD AUTO: 2 %
LYMPHOCYTES # BLD: 0.91 K/UL (ref 1.2–3.5)
LYMPHOCYTES NFR BLD: 6.6 %
MCH RBC QN AUTO: 27.1 PG (ref 28–33.2)
MCHC RBC AUTO-ENTMCNC: 31 G/DL (ref 32.2–36.5)
MCV RBC AUTO: 87.5 FL (ref 83–98)
MONOCYTES # BLD: 1.45 K/UL (ref 0.3–1)
MONOCYTES NFR BLD: 10.5 %
NEUTROPHILS # BLD: 10.98 K/UL (ref 1.7–7)
NEUTS SEG NFR BLD: 79.6 %
NRBC BLD-RTO: 0 %
PDW BLD AUTO: 9.2 FL (ref 9.4–12.4)
PLATELET # BLD AUTO: 342 K/UL (ref 150–350)
POCT TEST: ABNORMAL
POTASSIUM SERPL-SCNC: 4.7 MEQ/L (ref 3.6–5.1)
RBC # BLD AUTO: 3.91 M/UL (ref 4.5–5.8)
SODIUM SERPL-SCNC: 135 MEQ/L (ref 136–144)
WBC # BLD AUTO: 13.8 K/UL (ref 3.8–10.5)

## 2022-01-26 PROCEDURE — 63700000 HC SELF-ADMINISTRABLE DRUG: Performed by: HOSPITALIST

## 2022-01-26 PROCEDURE — 97530 THERAPEUTIC ACTIVITIES: CPT | Mod: GP,CQ

## 2022-01-26 PROCEDURE — 80048 BASIC METABOLIC PNL TOTAL CA: CPT | Performed by: HOSPITALIST

## 2022-01-26 PROCEDURE — 20600000 HC ROOM AND CARE INTERMEDIATE/TELEMETRY

## 2022-01-26 PROCEDURE — 63700000 HC SELF-ADMINISTRABLE DRUG: Performed by: NURSE PRACTITIONER

## 2022-01-26 PROCEDURE — 63700000 HC SELF-ADMINISTRABLE DRUG: Performed by: STUDENT IN AN ORGANIZED HEALTH CARE EDUCATION/TRAINING PROGRAM

## 2022-01-26 PROCEDURE — 63700000 HC SELF-ADMINISTRABLE DRUG: Performed by: INTERNAL MEDICINE

## 2022-01-26 PROCEDURE — 25800000 HC PHARMACY IV SOLUTIONS: Performed by: PHYSICIAN ASSISTANT

## 2022-01-26 PROCEDURE — 99233 SBSQ HOSP IP/OBS HIGH 50: CPT | Performed by: HOSPITALIST

## 2022-01-26 PROCEDURE — 36415 COLL VENOUS BLD VENIPUNCTURE: CPT | Performed by: HOSPITALIST

## 2022-01-26 PROCEDURE — 85025 COMPLETE CBC W/AUTO DIFF WBC: CPT | Performed by: HOSPITALIST

## 2022-01-26 PROCEDURE — 63600000 HC DRUGS/DETAIL CODE: Performed by: NURSE PRACTITIONER

## 2022-01-26 PROCEDURE — 97535 SELF CARE MNGMENT TRAINING: CPT | Mod: GO

## 2022-01-26 PROCEDURE — 99024 POSTOP FOLLOW-UP VISIT: CPT | Performed by: NEUROLOGICAL SURGERY

## 2022-01-26 RX ORDER — ACETAMINOPHEN 325 MG/1
650 TABLET ORAL EVERY 4 HOURS PRN
Status: DISCONTINUED | OUTPATIENT
Start: 2022-01-26 | End: 2022-01-27 | Stop reason: HOSPADM

## 2022-01-26 RX ORDER — ACETAMINOPHEN 325 MG/1
650 TABLET ORAL EVERY 4 HOURS PRN
Status: DISCONTINUED | OUTPATIENT
Start: 2022-01-26 | End: 2022-01-26

## 2022-01-26 RX ADMIN — ACETAMINOPHEN 650 MG: 325 TABLET, FILM COATED ORAL at 20:43

## 2022-01-26 RX ADMIN — HEPARIN SODIUM 5000 UNITS: 5000 INJECTION INTRAVENOUS; SUBCUTANEOUS at 20:44

## 2022-01-26 RX ADMIN — GABAPENTIN 600 MG: 300 CAPSULE ORAL at 20:44

## 2022-01-26 RX ADMIN — METFORMIN HYDROCHLORIDE 500 MG: 500 TABLET ORAL at 10:10

## 2022-01-26 RX ADMIN — PANTOPRAZOLE SODIUM 40 MG: 40 TABLET, DELAYED RELEASE ORAL at 17:31

## 2022-01-26 RX ADMIN — SODIUM CHLORIDE, SODIUM GLUCONATE, SODIUM ACETATE, POTASSIUM CHLORIDE AND MAGNESIUM CHLORIDE 100 ML/HR: 526; 502; 368; 37; 30 INJECTION, SOLUTION INTRAVENOUS at 01:04

## 2022-01-26 RX ADMIN — LEVOTHYROXINE SODIUM 150 MCG: 0.07 TABLET ORAL at 05:36

## 2022-01-26 RX ADMIN — GABAPENTIN 600 MG: 300 CAPSULE ORAL at 05:36

## 2022-01-26 RX ADMIN — SODIUM PHOSPHATE, DIBASIC AND SODIUM PHOSPHATE, MONOBASIC 1 ENEMA: 7; 19 ENEMA RECTAL at 06:31

## 2022-01-26 RX ADMIN — PRAVASTATIN SODIUM 40 MG: 20 TABLET ORAL at 10:09

## 2022-01-26 RX ADMIN — HEPARIN SODIUM 5000 UNITS: 5000 INJECTION INTRAVENOUS; SUBCUTANEOUS at 15:04

## 2022-01-26 RX ADMIN — POLYETHYLENE GLYCOL (3350) 17 G: 17 POWDER, FOR SOLUTION ORAL at 10:10

## 2022-01-26 RX ADMIN — EMPAGLIFLOZIN 10 MG: 10 TABLET, FILM COATED ORAL at 10:10

## 2022-01-26 RX ADMIN — HEPARIN SODIUM 5000 UNITS: 5000 INJECTION INTRAVENOUS; SUBCUTANEOUS at 05:37

## 2022-01-26 RX ADMIN — SENNOSIDES AND DOCUSATE SODIUM 1 TABLET: 50; 8.6 TABLET ORAL at 10:09

## 2022-01-26 RX ADMIN — METOPROLOL SUCCINATE 100 MG: 100 TABLET, EXTENDED RELEASE ORAL at 20:44

## 2022-01-26 RX ADMIN — METFORMIN HYDROCHLORIDE 500 MG: 500 TABLET ORAL at 17:31

## 2022-01-26 RX ADMIN — GABAPENTIN 600 MG: 300 CAPSULE ORAL at 15:04

## 2022-01-26 RX ADMIN — BISACODYL 10 MG: 10 SUPPOSITORY RECTAL at 10:11

## 2022-01-26 ASSESSMENT — COGNITIVE AND FUNCTIONAL STATUS - GENERAL
DRESSING REGULAR UPPER BODY CLOTHING: 3 - A LITTLE
AFFECT: WFL
HELP NEEDED FOR BATHING: 2 - A LOT
DRESSING REGULAR LOWER BODY CLOTHING: 2 - A LOT
MOVING TO AND FROM BED TO CHAIR: 2 - A LOT
HELP NEEDED FOR PERSONAL GROOMING: 3 - A LITTLE
CLIMB 3 TO 5 STEPS WITH RAILING: 2 - A LOT
TOILETING: 2 - A LOT
STANDING UP FROM CHAIR USING ARMS: 2 - A LOT
WALKING IN HOSPITAL ROOM: 3 - A LITTLE
EATING MEALS: 4 - NONE

## 2022-01-26 NOTE — PATIENT CARE CONFERENCE
Care Progression Rounds Note  Date: 1/26/2022  Time: 9:03 AM     Patient Name: Matt Williamson     Medical Record Number: [id number]   YOB: 1945  Sex: Male      Room/Bed: 3020    Admitting Diagnosis: Low back pain with right-sided sciatica, unspecified back pain laterality, unspecified chronicity [M54.41]  Malignant neoplasm of vertebral column, excluding sacrum and coccyx (CMS/HCC) [C41.2]   Admit Date/Time: 1/20/2022  6:04 AM    Primary Diagnosis: Low back pain with right-sided sciatica  Principal Problem: Low back pain with right-sided sciatica    GMLOS: 5.2  Anticipated Discharge Date: 1/27/2022    AM-PAC:  Mobility Score: 16    Discharge Planning:  Current Living Arrangements: home  Concerns to be Addressed: adjustment to diagnosis/illness,care coordination/care conferences,discharge planning  Anticipated Discharge Disposition: acute rehab/Inpatient Rehab Facility  Type of Home Care Services: home OT,home PT,nursing  Type of Outpatient Services: physical therapy    Barriers to Discharge:  Medical issues not resolved    Comments:       Participants:  ,nursing

## 2022-01-26 NOTE — DISCHARGE INSTRUCTIONS
LSO brace to be worn when OOB      Lumbar Spine Surgery Postoperative and Discharge Instructions      These instructions have been prepared for you by your neurosurgeon to guide you, as well as to serve as a reference to frequently asked questions which may arise during your recovery from surgery.      General instructions:  The surgical approach to the spine through the back muscles will cause temporary aching pain around the incision after surgery.  This particular pain will slowly get better as you heal, generally within 2-4 weeks.  Infrequently, your ‘typical nerve pain’ may worsen transiently after surgery before it starts to get better due to intra-operative manipulation of the nerve root.  If there has been any numbness present in the leg(s) for a lengthy time preoperatively this should get better but does so over the course of several weeks; typical nerve pain usually improves promptly after surgery.    Follow-up Appointments:  • When you are discharged from the hospital and are settled at home/rehab, please call to schedule or confirm your follow up appointments  1. a wound check visit 8-14 days after surgery with the Physician Assistant or Nurse Practitioner   2. a post-operative visit 4-6 weeks after surgery with the surgeon  • You can reach the office at:   [facility], [address], [phone]  [facility], [address] [phone]  [facility], [address]  [phone]  • Please prepare for your visit and bring with you any necessary paperwork you may need completed such as: prescriptions needed, work/school notes needed, etc.  Please note certain forms may take up to 5 business days to be completed and returned.  Notify the  of any of these needs upon your arrival.  • Bring any applicable imaging studies such as films or CD-ROM’s which may contain MRI or x-ray images even if the doctor has already reviewed them in the event  he needs to reinvestigate them.     Incision care:  • If not already done so in the hospital, remove the incision dressing on day 2 after surgery.    • Some swelling and bruising around the incision is normal.  Your muscles have been cut, , and sewn back together as part of your surgical procedure.  You will leave the hospital with neck or back discomfort from the surgical incision.  As you become more active and the incision and muscles continue to heal, the swelling and pain will decrease.   • You may shower on day 2 after surgery.  Allow soap and water to wash over the incision site, do not scrub, and pat dry when finished.  • Once home, you should be washing your incision and showering DAILY.   • Do not put any lotion, ointments, alcohol, or peroxide on the incision.  • Dress with comfortable clothing using garments and undergarments that do not tightly compress the incision.  • If you have Steri-strips (small white paper tapes over the incision), they may start to curl up and fall off. This is OK but do not actively peel them off.  • At your wound check visit, any non-absorbable sutures, staples, or steri-strips will be removed.   • Do not soak or immerse your incision in water for at least 1 month.  For example, no tub baths, swimming pools or jacuzzi.    • Have someone look at the incision daily for 2 weeks.  Call the surgeon’s office if you  notice any of the following:  o Increased redness along the length of the incision      o Increased swelling of the area around your incision       o Drainage from the incision               o Weakness of your extremities greater than before surgery      o Loss of bowel or bladder control                                 o Development of severe headache    o Leg swelling or calf tenderness   o Fever above 101.5    Medications:  • You will be given prescriptions for pain medications upon discharge from the hospital.  Stool softeners and laxatives may be purchased  over the counter.  • Pain medications should be taken as prescribed by your surgeon or nurse practitioner/ physician assistant. You are allowed to gradually reduce the number of pills you take when the pain begins to subside.   • If you are taking more than the recommended dose, please contact the office to discuss this with a practitioner (the medication may need to be increased or changed).   • If you continue to require medications, you may be referred to a pain management specialist or your medical doctor for ongoing management of your pain medications.  Our office will only provide pain medications for the immediate post operative phase.   • Constipation: Pain medications (narcotics) may cause constipation (difficulty having a bowel movement).  It is important to be aware of your bowel habits so you don’t develop severe constipation. Call the office if this occurs for more than 3 days or if you have stomach pain. You may combine any and/or all of these for maximal effect as needed:  • Laxatives  • Ducolax 5mg 2-3times a day  • Miralax 17g of powder per day  • Senokot 8.6mg 1-2 tablets 1-2 times a day  • Stool Softener  • Colace 100mg  2-3 times per day  • High fiber diet  • Fibercon  • Metamucil  • Medications to avoid:  • Hold taking fish oil for 1 week post operatively  • Hold any immunosupressants until cleared by your surgeon  • Hold any blood thinners (aspirin, Plavix, Brillinta, Coumadin, warfarin, Xarelto, or Eloquis) until cleared by your surgeon  • Avoid NSAIDS for 6 weeks post-operative or until cleared by your surgeon. These medications include, but are not limited to the following:  • Non-Steroidal Anti-Inflammatory Agents: Advil, Aleve, Cataflam, Clinoril, Diclofenac, Dolobid, Feldene, Ibuprofen, Indocin, Medipren, Meloxicam, Motrin, Mobic, Nalfon, Naprosyn, Nuprin, Relafen, Rufen, Tolectin, Toradol, Trilisate, Voltarin  • You will be given a printed form at the hospital called a “medical  reconciliation form” as part of your discharge packet.  This will list what medications you are being discharged from the hospital with and will contain instructions.  If you have any questions about your medications, believe there is an error, or have any other questions or problem with regards to your discharge medications call the office to discuss.    Driving:  • You should not drive for 2-4 weeks after surgery. You should discuss driving  restrictions with your surgeon.   • You may ride in a car for short distances and avoid sitting in one position for too long .  If you must take long car rides, do not ride for more than 60 minutes without taking a break to stretch (walk for several minutes and change position)  • You are not permitted to drive if your condition prohibits you from driving safely. This includes severe pain, inability to twist your body, weakness, wearing a cervical collar, etc.    • You are not permitted to drive under any circumstances if you are undergoing medication treatment with narcotic (opioid) pain killers, or any other medication which may inhibit your ability to operate a motor vehicle safely.    • If attempted travel causes any discomfort or worsening pain, please call the office.    Work and School:    Your surgeon will discuss with you work or school restrictions as they may apply to you and your occupation or schooling.    Activity Guidelines:  • You MAY be given a RIGID BRACE that you will wear whenever sitting up, standing, or walking.  You will wear it for 4-12 weeks after surgery. See the last page of these instructions for details on wearing the brace. You must wear your lumbar brace as instructed by your physician, nurse practitioner, or physician assistant.   • Avoid strenuous activity, bending, pushing, or reaching overhead.  For example, you should not vacuum, do large loads of laundry, walk the dog, wash the car, etc. until your follow-up visit with your surgeon.  Do not  hold objects with arms fully extended such as grasping and holding something with outstretched arms.  • Avoid any excessive bending, twisting, and extension of the neck and/or back.    • Avoid heavy lifting.  Do not lift anything over 5-10 pounds for the first few weeks that you are home from the hospital.  • Increase your activities a little each day.  Walking is a form of exercise.  Exercise should not cause pain.  Limit yourself to things that you can do comfortably and plan rest periods throughout the day.  • Take 3 to 5 short walks per day, even if it means walking inside your home.    • You may resume sexual activity 2-4 weeks after surgery, avoiding stress on the neck, shoulders, and back    • Reaching: When you have to reach things on or near the floor, always squat (bending the knees), rather than bending over at the waist.  Do not hold objects with arms fully extended such as grasping and holding something with outstretched arms.  • Lying down: when lying on your back, you may find that a pillow under the knees is more comfortable.  When on your side, a pillow between the knees will help keep your back straight.    • Sitting: should be limited to 20-30 minutes at a time for the first week.  Slowly increase the amount of sitting time, remembering that it should not increase your back pain.  • Stairs: use stairs only once or twice a day for the first week, or as directed by the surgeon.  Climb steps one at a time, placing both feet on the step before moving to the next one.  • Sexual activity: you may resume sexual activity 2-4 weeks after surgery (avoiding pain or stress on the back).     Smoking  DO NOT smoke. Smoking delays healing and can increase the risk of complications.    Reduction in symptoms:   • Short-term symptoms: if the pain has been present for a short period of time before surgery, then patients who have experienced back and radiating leg pain should anticipate a significant decrease in  pre-operative symptoms.    • Longer-term symptoms: If the pain has been present for a longer period (months to years), the pre-operative symptoms will recover on a more gradual basis week by week.  It is not practical to expect immediate relief of symptoms.  Routinely, pain will gradually improve on a weekly basis, weakness on a monthly basis, and numbness in the range of 6 months to 1 year.   • It is not practical to expect immediate relief of symptoms.  Routinely, pain will gradually improve on a weekly basis, weakness on a monthly basis, and numbness in a range of 6 months to 1 year.      Physical Therapy  • Outpatient Physical Therapy (if appropriate) will not begin until after your 6 week post-operative visit with your surgeon. A prescription is needed for formal outpatient therapy.   • You may be given simple stretching exercises or a prescription for formal outpatient physical therapy, based on what your needs are after surgery.      Blood Clots in the Leg   • It is not uncommon for patients who recently had surgery to develop blood clots in leg veins.   • Symptoms include low-grade fever, and/or redness, swelling, tenderness, and/or an    aching/cramping pain in your calf.   • You should call your doctor immediately if you have these symptoms.   • To prevent blood clots in legs, try walking and/or pumping ankles several times during the day.   • If the blood clot breaks free from the leg vein, it can travel to the lungs and cause severe breathing difficulty and/or chest pain. If you experience this, call 911 immediately.    Questions  • Any questions may be directed to your surgeon or nurse practitioner/ physician assistant.    ? During normal business hours (8:30am- 4:00pm), you can call the Neurosurgery office directly at:  o Jacki - [phone]   o Jose Fracnisco Moreno - [phone]   o Luis Antonio - [phone]  o Turnaround time for a phone call is 24 hours.    ? After normal business hours, you can call the  office and ask to be connected to the Neurosurgeon on-call.    • If you are calling with an urgent medical issue, please tell the office that it is an “urgent issue” and needs to be discussed in less than 24 hours (i.e. pain unrelieved with medications, wound breakdown/infection, or new neurological symptoms).    ThoracoLumbar (TLSO) or Lumbar (LSO) Brace Guidelines:   • You MAY have been given a rigid brace that you will wear for 4-12 weeks after surgery.  • The brace should be worn when you are out of bed and walking as tolerated unless directed otherwise by your surgeon. You do not need to wear the brace when you are lying in bed.   • You should put on your brace as you have been instructed by the brace specialist.  Instructions will be reviewed in the hospital by the nursing staff and Physical Therapist.  • Keep the name and phone number of the person who fitted and dispensed your brace close by in case you need to have the brace checked and/or adjusted.  • You should always have a barrier between your surgical incision and the brace.  For example, you may want to put on a light t-shirt and then the brace before getting dressed for the day.    • During periods of rest, take off the brace and expose the incision to the air by lying on your side for a few hours.  This will reduce the chance of your wound breaking down.  • Showering: You may remove your brace to shower.  You may wish to use a shower chair to help prevent bending/twisting while bathing. You should have someone help wash your back and legs.  • If you are not tolerating the brace or having wound healing issues, please call the office.

## 2022-01-26 NOTE — PROGRESS NOTES
Neurosurgery Service -  Progress Note           TIME OF EVALUATION   Patient seen and examined :  at 8:59 AM on 22     INTERVAL HISTORY        Continues with BM's passing mostly gas and liquid. Ambulating. Voiding. Continues with right thigh pain. Improved abdominal distension.     SUBJECTIVE        77yo M with metastatic thyroid ca with L3 pathologic fracture s/p right lateral L3 corpectomy and placement of an expandable cage, followed by L2-4 posterolateral instrumented fusion 2022.     OBJECTIVE     Temp:  [36.3 °C (97.4 °F)-36.8 °C (98.3 °F)] 36.3 °C (97.4 °F)  Heart Rate:  [85-94] 88  Resp:  [18] 18  BP: (113-135)/(58-84) 113/59  SpO2:  [96 %-99 %] 99 %  Oxygen Therapy: None (Room air)      Output by Drain (mL) 22 0700 - 22 1859 22 1900 - 22 0659 22 07 - 22 1859 22 1900 - 22 0659 22 0700 - 22 0859   Patient has no LDAs of requested type attached.          Intake/Output Summary (Last 24 hours) at 2022 0859  Last data filed at 2022 0300  Gross per 24 hour   Intake --   Output 701 ml   Net -701 ml           PHYSICAL EXAM        Abdomen: Distended. Soft. Non-tender.    Neurologic Examination:     Mental status: awake, alert, and oriented to person, place, and time. Speech and fund of knowledge are normal.     PERRL, EOMI, face symmetric, tongue protrudes to the midline, no nystagmus or diplopia.     Motor:   RUE: D: 5/5, T: /5, B: /5, WE: 5/5, H/5, IH:   LUE: D: 5/5, T: 55, B: 55, WE: 5/5, H/5, IH:   RLE: IP  4/5, Q  5/5, DF 5/5, EHL 5, PF   LLE: IP  5/5, Q  5/5, DF 5/5, EHL 5/5, PF 5/5    Reflexes: Normoreflexive throughout.     Sensation: intact to light touch        DATA     Current anticoagulants:  heparin (porcine) 5,000 unit/mL injection 5,000 Units, subcutaneous, q8h SALIMA        •  alum-mag hydroxide-simeth, 30 mL, oral, q4h PRN  •  bisacodyL, 10 mg, rectal, Daily PRN  •  bisacodyL, 10 mg,  rectal, Daily  •  glucose, 16-32 g of dextrose, oral, PRN **OR** dextrose, 15-30 g of dextrose, oral, PRN **OR** glucagon, 1 mg, intramuscular, PRN **OR** dextrose in water, 25 mL, intravenous, PRN  •  diazePAM, 5 mg, oral, q6h PRN  •  diphenhydrAMINE, 25 mg, oral, q6h PRN **OR** diphenhydrAMINE, 25 mg, intravenous, q6h PRN  •  electrolyte-A, 100 mL/hr, intravenous, Continuous  •  empagliflozin, 10 mg, oral, Daily  •  gabapentin, 600 mg, oral, q8h SALIMA  •  heparin (porcine), 5,000 Units, subcutaneous, q8h SALIMA  •  oxyCODONE, 5-10 mg, oral, q4h PRN **AND** HYDROmorphone, 0.5 mg, intravenous, q3h PRN  •  insulin aspart U-100, 3-5 Units, subcutaneous, Before meals & nightly  •  levothyroxine, 150 mcg, oral, Daily (6a)  •  metFORMIN, 500 mg, oral, BID with meals  •  metoprolol succinate XL, 100 mg, oral, BID  •  ondansetron ODT, 4 mg, oral, q8h PRN **OR** ondansetron, 4 mg, intravenous, q8h PRN  •  pantoprazole, 40 mg, oral, q PM  •  polyethylene glycol, 17 g, oral, Daily  •  pravastatin, 40 mg, oral, Daily  •  sennosides-docusate sodium, 1 tablet, oral, BID      Labs       CBC Results       01/25/22 01/23/22 01/22/22     0404 0537 0540    WBC 8.57 12.66 12.47    RBC 4.13 3.94 3.78    HGB 11.2 10.6 10.2    HCT 35.7 33.3 32.0    MCV 86.4 84.5 84.7    MCH 27.1 26.9 27.0    MCHC 31.4 31.8 31.9     247 213         BMP Results       01/25/22 01/23/22 01/22/22     0404 0537 0540     135 134    K 3.8 4.0 4.3    Cl 98 99 101    CO2 22 25 22    Glucose 163 138 189    BUN 27 18 15    Creatinine 1.2 0.9 0.7    Calcium 8.4 8.4 8.2    Anion Gap 16 11 11    EGFR 58.6 >60.0 >60.0                Imaging    CT ABDOMEN PELVIS WITHOUT IV CONTRAST    Result Date: 1/25/2022  IMPRESSION: 1.  Findings in keeping with mild to moderate proximal colonic ileus. 2.  Postoperative changes from recent lumbar spine surgery as described. 3.  Mild prominence of the ureters and collecting systems bilaterally may be secondary to the distended  urinary bladder. 4.  Additional chronic and incidental findings as described above.     X-RAY CHEST 2 VIEWS    Result Date: 1/18/2022  IMPRESSION: See comment.    X-RAY LUMBAR SPINE 2 OR 3 VIEWS    Result Date: 1/20/2022  IMPRESSION: Intraoperative fluoroscopic guidance as described above.     X-RAY OBSTRUCTION SERIES (ABDOMEN 2 VIEWS WITH CHEST 1 VIEW)    Result Date: 1/25/2022  IMPRESSION: Findings most suggestive of a severe small and large bowel ileus.    CT HEAD WITHOUT IV CONTRAST    Result Date: 1/20/2022  IMPRESSION:  No acute intracranial abnormality. COMMENT: A standard noncontrast head CT was performed. CT DOSE:  One or more dose reduction techniques (e.g. automated exposure control, adjustment of the mA and/or kV according to patient size, use of iterative reconstruction technique) utilized for this examination. Comparison:  No prior studies are available for comparison. Findings:  Sulci, ventricles and basal cisterns are within normal limits for patient's age.   Attenuation in the brain parenchyma appears within normal limits.  No acute hemorrhage, acute territorial infarct, or mass effect is seen. There is no extra-axial fluid collection.  The visualized paranasal sinuses demonstrates scattered mucosal thickening of the maxillary sinuses bilaterally. Mastoid air cells are clear.    CT LUMBAR SPINE WITHOUT IV CONTRAST    Result Date: 1/20/2022  IMPRESSION: Interval corpectomy at L3, posterior spinal fusion hardware with bilateral pedicle screws at L2-L4. COMMENT: Lumbosacral alignment: Anatomic. Vertebral bodies: Interval corpectomy at L3.  There is posterior spinal fusion hardware with bilateral pedicle screws at L2-L4. Intervertebral discs: Normal in height. Lumbosacral spinal canal: Patent. OTHER: Scattered air lucencies in the paraspinal soft tissues and retro-orbital peritoneum consistent with recent postoperative changes.  Tiny hyperdense focus in the right psoas muscle, may represent a small area  of hemorrhage or bony fragment.     FL FLUOROSCOPY TECHNICAL ASSISTANCE    Result Date: 1/20/2022  IMPRESSION: Intraoperative fluoroscopic guidance as described above.          Increased risk from baseline; VTE Prophylaxis as ordered  Both Mechanical and Pharmacological Prophylaxis      ASSESSMENT AND PLAN           75yo M with metastatic thyroid ca with L3 pathologic fx now POD#4 L3 s/p right lateral L3 corpectomy and placement of an expandable cage, L2-4 posterolateral instrumented fusion. Continues with right thigh pain. IP weakness secondary to lateral surgical approach.     Abdominal distension improving. Obstruction series yesterday showed ileus. Seems to be resolving. Continues with passing air and liquid. GI has signed off. Morning labs pending.                 - Post-op blood loss anemia. Hemodynamically stable. Hgb 11.2              - LSO brace when OOB              - PT/OT. PMR consult placed - [facility] tomorrow               - DVT proph: SCDs and HSQ              - Continue multimodal pain regimen - limit narcotics secondary to ileus               - Voiding on own.               - Post-op CT lumbar spine shows well placed cage/hardware         Discussed with GRAZYNA Iqbal

## 2022-01-26 NOTE — PROGRESS NOTES
Patient: Matt Williamson  [facility]  [facility]  MRN:  [id number]  Today's date:  1/26/2022 Pt ;in recliner, legs elevated, call bell/phone in hand. Chair alarm on. RN notified of pt status.    Marco A is a 76 y.o. male admitted on 1/20/2022 with Low back pain with right-sided sciatica, unspecified back pain laterality, unspecified chronicity [M54.41]  Malignant neoplasm of vertebral column, excluding sacrum and coccyx (CMS/HCC) [C41.2]. Principal problem is Low back pain with right-sided sciatica.    Past Medical History  Marco A has a past medical history of Atrial fibrillation with rapid ventricular response (CMS/HCC) (10/25/2019), BPH (benign prostatic hyperplasia), Coronary artery disease, Hypertension, Hypothyroidism, Mixed hyperlipidemia, Thyroid cancer (CMS/HCC), Type 2 diabetes mellitus without complication (CMS/HCC), and Vertigo.    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 Pt seen by the neurosurgery service for surgical discussion regarding growth of his L3 vertebral body metastatic lesion. He has had progressive worsening midline-line low back pain with radiation to the right knee. An MRI lumbar spine was obtained 11/22/2021 showing extension of the L3 mass. He was sent for a PET scan 12/7/2021 showing increased activity of the L3 lesion without further metastatic lesions appreciated.    On 1/20/22, pt underwent: Stage 1: L2-L4 PSF with instrumentation, ORIF L3 pathologic fracture  Stage 2: L3 lateral corpectomy with expandable cage placement      PT Vitals    Date/Time Pulse O2 Therapy [facility]   01/26/22 1027 90 None (Room air) CHANCE      PT Pain    Date/Time Pain Type Side/Orientation Location Rating: Rest Rating: Activity Radiation to Interventions [facility]   01/26/22 1027 Pain Assessment lower back 6 L LE 8/10, Abdominal 2/10 7 leg, left position adjusted LC          Prior Living Environment      Most Recent Value   People in  Home spouse   Current Living Arrangements home   Living Environment Comment 3 steps to enter with R rail, bedroom and shower stall on first floor, full flight B/L rails to shower stall and bedroom, typically sleeps on second floor        Prior Level of Function      Most Recent Value   Dominant Hand right   Ambulation assistive equipment   Transferring assistive equipment   Toileting independent   Bathing independent   Dressing independent   Eating independent   Communication understands/communicates without difficulty   Swallowing swallows foods/liquids without difficulty   Baseline Diet/Method of Nutritional Intake no diet restrictions   Past History of Dysphagia Patient denies past or current difficulty swallowing.   Prior Level of Function Comment SPC use, drives, retired, active with basketball   Assistive Device Currently Used at Home cane, straight           PT Evaluation and Treatment - 01/26/22 1027        PT Time Calculation    Start Time 1027     Stop Time 1043     Time Calculation (min) 16 min        Session Details    Document Type daily treatment/progress note     Mode of Treatment physical therapy        General Information    Patient Profile Reviewed yes     Onset of Illness/Injury or Date of Surgery 01/20/22     Patient/Family/Caregiver Comments/Observations RN cleared for session     General Observations of Patient Pt. in recliner, legs up, agreeable to therapy     Existing Precautions/Restrictions brace worn when out of bed;spinal;head of bed elevated 30 degrees   LSO = OOB/Therapy    Limitations/Impairments other (see comments)   Pain       Sit to Stand Transfer    [place], Sit to Stand Transfer moderate assist (50-74% patient effort)     Verbal Cues hand placement;maintaining center of gravity over base of support;safety;technique     Assistive Device walker, front-wheeled     Comment LOB backwards when standing, difficulty maintaining weight forward. Support required.        Stand to Sit  Transfer    [place], Stand to Sit Transfer minimum assist (75% or more patient effort)     Verbal Cues safety;technique     Assistive Device walker, front-wheeled     Comment Slow controlled descent, limited by pain. VC to reach back to chair        Gait Training    [place], Gait minimum assist (75% or more patient effort)     Assistive Device walker, front-wheeled     Distance in Feet 80 feet     Pattern (Gait) step-through     Deviations/Abnormal Patterns (Gait) step length decreased;stride length decreased;other (see comments)     Comment (Gait/Stairs) 20x4; stopped 4 times to rest, fix positioning of RW. Pt. presents with decreased stride length lands on ball of foot on R. Min A for balance        Balance    Static Sitting Balance WFL     Dynamic Sitting Balance WFL     Sit to Stand Dynamic Balance moderate impairment     Static Standing Balance mild impairment     Dynamic Standing Balance mild impairment     Comment, Balance Mod A to support STS w/ RW        Therapeutic Exercise    Therapeutic Exercise lower extremity        Lower Extremity (Therapeutic Exercise)    Exercise Position/Type seated;AROM (active range of motion);AAROM (active assistive range of motion)     General Exercise ankle pumps;quad sets     Range of Motion Exercises hip flexion/extension     Reps and Sets x8     Comment c/o R leg pain, able to complete ROM with active assistance        Spinal Orthosis Management    Type (Spinal Orthosis) LSO (lumbar sacral orthosis)        AM-PAC (TM) - Mobility (Current Function)    Turning from your back to your side while in a flat bed without using bedrails? 3 - A Little     Moving from lying on your back to sitting on the side of a flat bed without using bedrails? 2 - A Lot     Moving to and from a bed to a chair? 2 - A Lot     Standing up from a chair using your arms? 2 - A Lot     To walk in a hospital room? 3 - A Little     Climbing 3-5 steps with a railing? 2 - A Lot     AM-PAC (TM)  Mobility Score 14        Assessment/Plan (PT)    Daily Outcome Statement [facility] 14. Pt. Mod A for STS to prevent LOB backward, Min A for ambulation & stand to sit. Pt. would continue to benefit from skilled PT to address strength/balacne/ambulation. Requires LSO for all OOB activity.     Rehab Potential good, to achieve stated therapy goals     Therapy Frequency 5 times/wk     Planned Therapy Interventions balance training;bed mobility training;gait training;strengthening;transfer training;patient/family education               PT Assessment/Plan      Most Recent Value   PT Recommended Discharge Disposition acute rehab/Inpatient Rehab Facility at 01/26/2022 1027   Anticipated Equipment Needs at Discharge (PT) other (see comments)  [TBD] at 01/26/2022 1027   Patient/Family Therapy Goals Statement less pain, to get back on the basketball court at 01/26/2022 1027                    Education Documentation  Activity, taught by Teri Arambula PTA at 1/26/2022 12:06 PM.  Learner: Patient  Readiness: Acceptance  Method: Explanation, Demonstration  Response: Needs Reinforcement, Verbalizes Understanding  Comment: Weight shifting forward out of chair to standing.          PT Goals      Most Recent Value   Bed Mobility Goal 1    Activity/Assistive Device rolling to left, rolling to right, sit to supine, supine to sit at 01/21/2022 0930   [place] modified independence at 01/21/2022 0930   Time Frame by discharge at 01/21/2022 0930   Progress/Outcome goal ongoing at 01/24/2022 1002   Transfer Goal 1    Activity/Assistive Device sit-to-stand/stand-to-sit, bed-to-chair/chair-to-bed, stand pivot, walker, front-wheeled at 01/21/2022 0930   [place] modified independence at 01/21/2022 0930   Time Frame by discharge at 01/21/2022 0930   Progress/Outcome goal ongoing at 01/24/2022 1002   Gait Training Goal 1    Activity/Assistive Device gait (walking locomotion), walker, front-wheeled at 01/21/2022 0930   [place] modified  independence at 01/21/2022 0930   Distance 150 at 01/21/2022 0930   Time Frame by discharge at 01/21/2022 0930   Progress/Outcome goal ongoing at 01/24/2022 1002   Stairs Goal 1    Activity/Assistive Device ascending stairs, descending stairs, using handrail, left, using handrail, right, step-to-step at 01/21/2022 0930   [place] modified independence at 01/21/2022 0930   Number of Stairs 12 at 01/21/2022 0930   Time Frame by discharge at 01/21/2022 0930   Progress/Outcome goal ongoing at 01/21/2022 0930

## 2022-01-26 NOTE — PROGRESS NOTES
Patient:  Matt Williamson  Location:  [facility] 3A 3020  MRN:  [id number]  Today's date:  1/26/2022   RN cleared for session. Pt left bedside chair with personal items in place and call bell within reach, posey alarm intact. Pt reports no further questions for OT at this time and all needs are met. RN notified      Marco A is a 76 y.o. male admitted on 1/20/2022 with Low back pain with right-sided sciatica, unspecified back pain laterality, unspecified chronicity [M54.41]  Malignant neoplasm of vertebral column, excluding sacrum and coccyx (CMS/HCC) [C41.2]. Principal problem is Low back pain with right-sided sciatica.    Past Medical History  Marco A has a past medical history of Atrial fibrillation with rapid ventricular response (CMS/HCC) (10/25/2019), BPH (benign prostatic hyperplasia), Coronary artery disease, Hypertension, Hypothyroidism, Mixed hyperlipidemia, Thyroid cancer (CMS/HCC), Type 2 diabetes mellitus without complication (CMS/HCC), and Vertigo.    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 Pt seen by the neurosurgery service for surgical discussion regarding growth of his L3 vertebral body metastatic lesion. He has had progressive worsening midline-line low back pain with radiation to the right knee. An MRI lumbar spine was obtained 11/22/2021 showing extension of the L3 mass. He was sent for a PET scan 12/7/2021 showing increased activity of the L3 lesion without further metastatic lesions appreciated.    On 1/20/22, pt underwent: Stage 1: L2-L4 PSF with instrumentation, ORIF L3 pathologic fracture  Stage 2: L3 lateral corpectomy with expandable cage placement      OT Vitals    Date/Time Pulse HR Source SpO2 Pt Activity O2 Therapy BP BP Location BP Method Pt Position Who   01/26/22 0905 86 Monitor 99 % At rest None (Room air) 113/59 Left upper arm Automatic Sitting MECHELLE      OT Pain    Date/Time Pain Type Side/Orientation  Location Rating: Rest Rating: Activity Interventions [facility]   01/26/22 0905 Pain Assessment lower back 4 - moderate pain 6 - moderate-severe pain position adjusted MECHELLE          Prior Living Environment      Most Recent Value   People in Home spouse   Current Living Arrangements home   Living Environment Comment 3 steps to enter with R rail, bedroom and shower stall on first floor, full flight B/L rails to shower stall and bedroom, typically sleeps on second floor        Prior Level of Function      Most Recent Value   Dominant Hand right   Ambulation assistive equipment   Transferring assistive equipment   Toileting independent   Bathing independent   Dressing independent   Eating independent   Communication understands/communicates without difficulty   Swallowing swallows foods/liquids without difficulty   Baseline Diet/Method of Nutritional Intake no diet restrictions   Past History of Dysphagia Patient denies past or current difficulty swallowing.   Prior Level of Function Comment SPC use, drives, retired, active with basketball   Assistive Device Currently Used at Home cane, straight        Occupational Profile      Most Recent Value   Reason for Services/Referral ADL / amb dysfunction s/p spinal surgery   Successful Occupations Retired restaraunt owner   Occupational History/Life Experiences Enjoys Playing Basketball   Performance Patterns Indep in BADL   Environmental Supports and Barriers Resides with Spouse   Patient Goals Short Term Acute Care Rehab           OT Evaluation and Treatment - 01/26/22 0859        OT Time Calculation    Start Time 0859     Stop Time 0923     Time Calculation (min) 24 min        Session Details    Document Type daily treatment/progress note     Mode of Treatment occupational therapy        General Information    Patient Profile Reviewed yes     Patient/Family/Caregiver Comments/Observations RN cleared for session     General Observations of Patient pt rec'd on commode, agreeable to  participate in therapy     Existing Precautions/Restrictions brace worn when out of bed;fall;spinal;head of bed elevated 30 degrees   LSO OOB/therapy - OK for no brace sitting in chair per neurosurgery       Cognition/Psychosocial    Affect/Mental Status (Cognition) WFL     Orientation Status (Cognition) oriented x 4     Follows Commands (Cognition) follows multi-step commands;over 90% accuracy     Cognitive Function executive function deficit     Executive Function Deficit (Cognition) minimal deficit;judgment;insight/awareness of deficits     Comment, Cognition Pt is alert and cooperative, able to recall 3/3 spinal precautions and follow multistep commands with minimal cueing for insight/judgement        Bed Mobility    Comment (Bed Mobility) pt rec'd OOB and returned to bedside chair        Transfers    Transfers toilet transfer        Sit to Stand Transfer    [place], Sit to Stand Transfer minimum assist (75% or more patient effort)     Verbal Cues hand placement;safety;technique     Assistive Device walker, front-wheeled     Comment slow to rise with increased effort, able to maintain neutral spinal with LSO intact        Stand to Sit Transfer    [place], Stand to Sit Transfer close supervision     Verbal Cues hand placement;safety;technique     Assistive Device walker, front-wheeled     Comment slow controlled descent with good safety awareness        Toilet Transfer    Transfer Technique sit-stand;stand-sit     [place], Toilet Transfer minimum assist (75% or more patient effort)     Verbal Cues technique     Assistive Device commode, 3-in-1     Comment slow to rise off elevated surface this session, limited by back pain        Safety Issues, Functional Mobility    Safety Issues Affecting Function (Mobility) insight into deficits/self-awareness;judgment     Impairments Affecting Function (Mobility) balance;endurance/activity tolerance;pain     Comment, Safety Issues/Impairments (Mobility) min A  with RW for mobility within room, limited by pain/endurance        Balance    Balance Assessment sitting static balance;sitting dynamic balance;sit to stand dynamic balance;standing static balance;standing dynamic balance     Static Sitting Balance WFL     Dynamic Sitting Balance WFL     Sit to Stand Dynamic Balance mild impairment     Static Standing Balance mild impairment     Dynamic Standing Balance mild impairment     Comment, Balance min A to support with RW, no overt LOB noted        Lower Body Dressing    Tasks doff;don;socks     [place] moderate assist (50-74% patient effort)     Position supported sitting     Comment able to achieve crossover position with LLE only, limited by R hip pain        Toileting    [place] perform bowel hygiene;moderate assist (50-74% patient effort)     Position supported standing     Comment min A for standing balance with increased assistance for bowel hygiene thoroughness 2/2 dec fxl reach with low squat technique for spinal precautions        BADL Safety/Performance    Impairments, BADL Safety/Performance balance;endurance/activity tolerance;pain     Skilled BADL Treatment/Intervention BADL process/adaptation training     Progress in BADL Status effort and initiation;improvement noted        Spinal Orthosis Management    Type (Spinal Orthosis) LSO (lumbar sacral orthosis)     Functional Design (Spinal Orthosis) static orthosis     Therapeutic Indications Spinal Orthosis post-op positioning/protection     Wearing Schedule (Spinal Orthosis) wear when out of bed only     Orthosis Training (Spinal Orthosis) patient;activity limitations/precautions;purpose/goals of orthosis;wearing schedule;donning/doffing orthosis     Compliance/Wearing Issues (Spinal Orthosis) follow-up training required        AM-PAC (TM) - ADL (Current Function)    Putting on and taking off regular lower body clothing? 2 - A Lot     Bathing? 2 - A Lot     Toileting? 2 - A Lot     Putting on/taking  off regular upper body clothing? 3 - A Little     How much help for taking care of personal grooming? 3 - A Little     Eating meals? 4 - None     AM-PAC (TM) ADL Score 16        Assessment/Plan (OT)    Daily Outcome Statement Pt seen for OT session. Pt requires min A for fxl transfers/mobility with RW, mod A toileting and LBD. Reviewed spinal precautions/LSO application. Pt continues to be limited by pain, balance, endurance. Will continue to follow. Rec acute inpatient rehab upon dc     Rehab Potential good, to achieve stated therapy goals     Therapy Frequency 3-5 times/wk     Planned Therapy Interventions activity tolerance training;adaptive equipment training;BADL retraining;functional balance retraining;occupation/activity based interventions;orthotic fabrication/fitting/training;strengthening exercise;transfer/mobility retraining;patient/caregiver education/training               OT Assessment/Plan      Most Recent Value   OT Recommended Discharge Disposition acute rehab/Inpatient Rehab Facility at 01/26/2022 0859   Anticipated Equipment Needs At Discharge (OT) --  [TBD at next LOC] at 01/26/2022 0859   Patient/Family Therapy Goal Statement less pain at 01/26/2022 0859                    Education Documentation  Treatment Plan, taught by Angi Moyer OT at 1/26/2022 10:54 AM.  Learner: Patient  Readiness: Acceptance  Method: Explanation  Response: Verbalizes Understanding  Comment: transfer safety, adl management, spinal precautions/LSO          OT Goals      Most Recent Value   Transfer Goal 1    Activity/Assistive Device sit-to-stand/stand-to-sit, bed-to-chair/chair-to-bed, toilet, commode, 3-in-1, walker, front-wheeled at 01/21/2022 0929   [place] supervision required at 01/21/2022 0929   Time Frame by discharge at 01/21/2022 0929   Progress/Outcome continuing progress toward goal, goal ongoing at 01/22/2022 1224   Dressing Goal 1    Activity/Adaptive Equipment upper body dressing at 01/21/2022  0929   [place] supervision required at 01/21/2022 0929   Time Frame by discharge at 01/21/2022 0929   Progress/Outcome continuing progress toward goal, goal ongoing at 01/22/2022 1224   Dressing Goal 2    Activity/Adaptive Equipment lower body dressing, dressing stick, long-handled shoe horn, reacher, sock-aid at 01/21/2022 0929   [place] supervision required at 01/21/2022 0929   Time Frame by discharge at 01/21/2022 0929   Progress/Outcome continuing progress toward goal, goal ongoing at 01/22/2022 1224   Toileting Goal 1    Activity/Assistive Device toileting skills, all at 01/21/2022 0929   [place] supervision required at 01/21/2022 0929   Time Frame by discharge at 01/21/2022 0929   Progress/Outcome continuing progress toward goal, goal ongoing at 01/22/2022 1224   Grooming Goal 1    Activity/Assistive Device grooming skills, all at 01/21/2022 0929   [place] modified independence at 01/21/2022 0929   Time Frame by discharge at 01/21/2022 0929   Progress/Outcome continuing progress toward goal, goal ongoing at 01/22/2022 1224

## 2022-01-26 NOTE — PROGRESS NOTES
Hospital Medicine Service -  Daily Progress Note       SUBJECTIVE   Interval History: no acute events overnight. Patient feeling better. Seen by GI. Started on diet. Passing gas.     OBJECTIVE      Vital signs in last 24 hours:  Temp:  [36.2 °C (97.2 °F)-36.7 °C (98 °F)] 36.2 °C (97.2 °F)  Heart Rate:  [85-92] 87  Resp:  [18] 18  BP: (113-135)/(59-76) 128/59    Intake/Output Summary (Last 24 hours) at 1/26/2022 1517  Last data filed at 1/26/2022 0300  Gross per 24 hour   Intake --   Output 701 ml   Net -701 ml       PHYSICAL EXAMINATION      Physical Exam    GEN: well-developed and well-nourished; not in acute distress  HEENT: normocephalic; atraumatic  NECK: no JVD; no bruits  CARDIO: regular rate and rhythm; no murmurs or rubs  RESP: clear to auscultation bilaterally; no rales, rhonchi, or wheezes  ABD: soft, non-distended, non-tender, normal bowel sounds  EXT: no cyanosis, clubbing, or edema  SKIN: clean, dry, warm, and intact  MUSCULOSKELETAL: no injury or deformity; +CRYSTAL drain in back  NEURO: alert and oriented x 3; nonfocal  BEHAVIOR/EMOTIONAL: appropriate; cooperative     LINES, CATHETERS, DRAINS, AIRWAYS, AND WOUNDS   Lines, Drains, and Airways:  Wounds (agree with documentation and present on admission):  Peripheral IV (Adult) 01/20/22 Left;Posterior Hand (Active)   Number of days: 4       Peripheral IV (Adult) 01/20/22 Right;Posterior Hand (Active)   Number of days: 4       Drain 1 Midline Back 10 Fr. (Active)   Number of days: 4       Drain 1 Midline Back 10 Fr. (Active)   Number of days: 4       Surgical Incision Back (Active)   Number of days: 4         Comments:      LABS / IMAGING / TELE      Labs  Lab Results   Component Value Date    WBC 13.80 (H) 01/26/2022    HGB 10.6 (L) 01/26/2022    HCT 34.2 (L) 01/26/2022    MCV 87.5 01/26/2022     01/26/2022     Lab Results   Component Value Date    GLUCOSE 121 (H) 01/26/2022    CALCIUM 8.1 (L) 01/26/2022     (L) 01/26/2022    K 4.7 01/26/2022     CO2 14 (L) 01/26/2022     01/26/2022    BUN 23 (H) 01/26/2022    CREATININE 1.0 01/26/2022     Lab Results   Component Value Date    INR 1.0 01/18/2022    INR 1.0 01/17/2020    INR 1.0 01/14/2016       Micro  Microbiology Results     Procedure Component Value Units Date/Time    Covid-19 Pre-procedural/PAT [[id number]]  (Normal) Collected: 01/18/22 1407    Specimen: Nasopharyngeal Swab from Nasopharynx Updated: 01/19/22 1040    Narrative:      The following orders were created for panel order Covid-19 Pre-procedural/PAT.  Procedure                               Abnormality         Status                     ---------                               -----------         ------                     SARS-CoV-2 (COVID-19), PCR[[id number]]   Normal              Final result                 Please view results for these tests on the individual orders.    SARS-CoV-2 (COVID-19), PCR [[id number]]  (Normal) Collected: 01/18/22 1407    Specimen: Nasopharyngeal Swab from Nasopharynx Updated: 01/19/22 1040     SARS-CoV-2 (COVID-19) Negative          Imaging  CT ABDOMEN PELVIS WITHOUT IV CONTRAST    Result Date: 1/25/2022  IMPRESSION: 1.  Findings in keeping with mild to moderate proximal colonic ileus. 2.  Postoperative changes from recent lumbar spine surgery as described. 3.  Mild prominence of the ureters and collecting systems bilaterally may be secondary to the distended urinary bladder. 4.  Additional chronic and incidental findings as described above.     X-RAY CHEST 2 VIEWS    Result Date: 1/18/2022  IMPRESSION: See comment.    X-RAY LUMBAR SPINE 2 OR 3 VIEWS    Result Date: 1/20/2022  IMPRESSION: Intraoperative fluoroscopic guidance as described above.     X-RAY OBSTRUCTION SERIES (ABDOMEN 2 VIEWS WITH CHEST 1 VIEW)    Result Date: 1/25/2022  IMPRESSION: Findings most suggestive of a severe small and large bowel ileus.    CT HEAD WITHOUT IV CONTRAST    Result Date: 1/20/2022  IMPRESSION:  No acute intracranial  abnormality. COMMENT: A standard noncontrast head CT was performed. CT DOSE:  One or more dose reduction techniques (e.g. automated exposure control, adjustment of the mA and/or kV according to patient size, use of iterative reconstruction technique) utilized for this examination. Comparison:  No prior studies are available for comparison. Findings:  Sulci, ventricles and basal cisterns are within normal limits for patient's age.   Attenuation in the brain parenchyma appears within normal limits.  No acute hemorrhage, acute territorial infarct, or mass effect is seen. There is no extra-axial fluid collection.  The visualized paranasal sinuses demonstrates scattered mucosal thickening of the maxillary sinuses bilaterally. Mastoid air cells are clear.    CT LUMBAR SPINE WITHOUT IV CONTRAST    Result Date: 1/20/2022  IMPRESSION: Interval corpectomy at L3, posterior spinal fusion hardware with bilateral pedicle screws at L2-L4. COMMENT: Lumbosacral alignment: Anatomic. Vertebral bodies: Interval corpectomy at L3.  There is posterior spinal fusion hardware with bilateral pedicle screws at L2-L4. Intervertebral discs: Normal in height. Lumbosacral spinal canal: Patent. OTHER: Scattered air lucencies in the paraspinal soft tissues and retro-orbital peritoneum consistent with recent postoperative changes.  Tiny hyperdense focus in the right psoas muscle, may represent a small area of hemorrhage or bony fragment.     FL FLUOROSCOPY TECHNICAL ASSISTANCE    Result Date: 1/20/2022  IMPRESSION: Intraoperative fluoroscopic guidance as described above.       ECG/Telemetry  I have independently reviewed the telemetry. No events for the last 24 hours.    ASSESSMENT AND PLAN      Ileus (CMS/HCC)  Assessment & Plan  Patient w/ abd distension. NS ordered obstruction series - showed severe ileus  Discussed with surgery antonia. Will give suppository and enema  Seen by GI  CT a/p showed ileus as well  Per Dr. Frankel, patient looks  ritesh. Will start diet  If tolerates diet, d/c to [facility]    Acquired hypothyroidism  Assessment & Plan  Status post thyroidectomy  Continue levothyroxine  TSH 0.104 days ago  Repeat TFTs as outpatient in 4 weeks    Malignant neoplasm of vertebral column, excluding sacrum and coccyx (CMS/HCC)  Assessment & Plan  Metastatic thyroid ca S/p L3 pathologic fracture s/p right lateral L3 corpectomy and placement of an expandable cage, followed by L2-4 posterolateral instrumented fusion  Continue postop management per neurosurgery.  Drain was removed  DVT prophylaxis, Lewis, pain management per neurosurgery  Rehab placement on discharge  Discharge to [facility] when ileus resolves    Paroxysmal atrial fibrillation (CMS/HCC)  Assessment & Plan  Currently in sinus rhythm  Patient is not on long-term anticoagulation  He takes Rythmol as needed for palpitations    Type 2 diabetes mellitus without complication (CMS/HCC)  Assessment & Plan  Patient on Jardiance, Trulicity and metformin as outpatient  Follow Accu-Cheks and administer sliding scale insulin  Hemoglobin A1c 6.94 days ago  Continue Jardiance and can restart metformin prior to discharge  Metformin should be changed to twice daily or extended release once daily prior to discharge.  Patient takes metformin 4 times daily at home    Metastasis from thyroid cancer (CMS/HCC)  Assessment & Plan  Follows up with   Status post radiation    Mixed hyperlipidemia  Assessment & Plan  Continue statin    Essential hypertension  Assessment & Plan  Stable  Continue metoprolol    Atherosclerosis of coronary artery  Assessment & Plan  Status post LAD stenting 17 years ago  Patient follows up with Dr. Ji  Continue metoprolol, statin  Restart aspirin once cleared by neurosurgery      Plan of care discussed with patient, nurse     VTE Assessment: Padua    VTE Prophylaxis:  Current anticoagulants:  heparin (porcine) 5,000 unit/mL injection 5,000 Units, subcutaneous, q8h  SALIMA      Code Status: Full Code      Estimated Discharge Date: 1/27/2022     Disposition Planning: d/c to [facility] hopefully in AM     Devin Valera MD  1/26/2022

## 2022-01-26 NOTE — PLAN OF CARE
Problem: Adult Inpatient Plan of Care  Goal: Plan of Care Review  Flowsheets (Taken 1/26/2022 1133)  Plan of Care Reviewed With: other (see comments)   Chart reviewed; discussed in rounds. Pt should be ready for DC tomorrow 1/27 -- going to [facility]. Bed confirmed. Need to confirm pt can tolerate increased diet and labs are okay.     Updated pt at bedside. Pt agreeable. Discussed WC van and cost again. Unable to use [facility] due to timing; will need to pay for [facility] transport -- pt verbalized understanding

## 2022-01-26 NOTE — PROGRESS NOTES
GI Daily Progress Note          CC: ..No chief complaint on file.        Subjective   Interval History:   Pt has had some small BMs and passing moderate amount of gas. Keeps feeling the urge to go so keeps getting up to go to the bathroom with variable results. No significant abdominal pain at rest. When he gets up he has back pain which he says radiates to the front.     Objective     Vital signs in last 24 hours:  Temp:  [36.3 °C (97.4 °F)-36.8 °C (98.3 °F)] 36.3 °C (97.4 °F)  Heart Rate:  [85-94] 88  Resp:  [18] 18  BP: (113-135)/(58-84) 113/59      Intake/Output Summary (Last 24 hours) at 1/26/2022 0848  Last data filed at 1/26/2022 0300  Gross per 24 hour   Intake --   Output 701 ml   Net -701 ml     Intake/Output this shift:  No intake/output data recorded.    Physical exam:  Gen: AAOx3  HEENT: NC/AT  Cv: RRR  Pulm: CTAB/L  Abd: s/nt/nd +BS  Ext: No c/c/e  Psych: appropriate    Current Medications:  •  alum-mag hydroxide-simeth, 30 mL, oral, q4h PRN  •  bisacodyL, 10 mg, rectal, Daily PRN  •  bisacodyL, 10 mg, rectal, Daily  •  glucose, 16-32 g of dextrose, oral, PRN **OR** dextrose, 15-30 g of dextrose, oral, PRN **OR** glucagon, 1 mg, intramuscular, PRN **OR** dextrose in water, 25 mL, intravenous, PRN  •  diazePAM, 5 mg, oral, q6h PRN  •  diphenhydrAMINE, 25 mg, oral, q6h PRN **OR** diphenhydrAMINE, 25 mg, intravenous, q6h PRN  •  electrolyte-A, 100 mL/hr, intravenous, Continuous  •  empagliflozin, 10 mg, oral, Daily  •  gabapentin, 600 mg, oral, q8h SALIMA  •  heparin (porcine), 5,000 Units, subcutaneous, q8h SALIMA  •  oxyCODONE, 5-10 mg, oral, q4h PRN **AND** HYDROmorphone, 0.5 mg, intravenous, q3h PRN  •  insulin aspart U-100, 3-5 Units, subcutaneous, Before meals & nightly  •  levothyroxine, 150 mcg, oral, Daily (6a)  •  metFORMIN, 500 mg, oral, BID with meals  •  metoprolol succinate XL, 100 mg, oral, BID  •  ondansetron ODT, 4 mg, oral, q8h PRN **OR** ondansetron, 4 mg, intravenous, q8h PRN  •   pantoprazole, 40 mg, oral, q PM  •  polyethylene glycol, 17 g, oral, Daily  •  pravastatin, 40 mg, oral, Daily  •  sennosides-docusate sodium, 1 tablet, oral, BID         Labs  CBC Results       01/25/22 01/23/22 01/22/22     0404 0537 0540    WBC 8.57 12.66 12.47    RBC 4.13 3.94 3.78    HGB 11.2 10.6 10.2    HCT 35.7 33.3 32.0    MCV 86.4 84.5 84.7    MCH 27.1 26.9 27.0    MCHC 31.4 31.8 31.9     247 213        CMP Results       01/25/22 01/23/22 01/22/22     0404 0537 0540     135 134    K 3.8 4.0 4.3    Cl 98 99 101    CO2 22 25 22    Glucose 163 138 189    BUN 27 18 15    Creatinine 1.2 0.9 0.7    Calcium 8.4 8.4 8.2    Anion Gap 16 11 11    EGFR 58.6 >60.0 >60.0        PT PTT Results       01/18/22 01/17/20 01/14/16     1408 0752 1012    PT 13.2 12.8 12.7    INR 1.0 1.0 1.0    PTT 30 -- 27         Comment for INR at 1408 on 01/18/22: INR has no defined significance when PT is within Reference Range.    Comment for INR at 0752 on 01/17/20:   INR has no defined significance when PT is within Reference Range.    Comment for INR at 1012 on 01/14/16: INR HAS NO DEFINED SIGNIFICANCE WHEN PT IS WITHIN THE REFERENCE RANGE.          Imaging  Reviewed last 24 hours      Assessment/Plan:  76M with post-operative pseudoobstruction, initially with x-ray raising concern for cecal distention of 11.5, but CT scan the same day did not see any concerning distention. He is passing gas and some stool and has the urge to go now. His abdomen is very soft without discomfort.    It seems this is resolving spontaneously. Would not use laxatives or enemas. He can start on a low fiber diet if he is improving, avoid opioids, encourage ambulation, correct any electrolyte abnormalities.      Call if any further questions.        Expected Discharge Date:   1/26/2022 No discharge date for patient encounter.      Robert A. Frankel, MD  1/26/2022  8:48 AM

## 2022-01-27 ENCOUNTER — HOSPITAL ENCOUNTER (INPATIENT)
Facility: REHABILITATION | Age: 77
LOS: 12 days | Discharge: HOME | DRG: 949 | End: 2022-02-08
Attending: PHYSICAL MEDICINE & REHABILITATION | Admitting: PHYSICAL MEDICINE & REHABILITATION
Payer: MEDICARE

## 2022-01-27 VITALS
BODY MASS INDEX: 26.52 KG/M2 | HEART RATE: 79 BPM | DIASTOLIC BLOOD PRESSURE: 56 MMHG | SYSTOLIC BLOOD PRESSURE: 112 MMHG | OXYGEN SATURATION: 97 % | RESPIRATION RATE: 18 BRPM | TEMPERATURE: 97.9 F | HEIGHT: 66 IN | WEIGHT: 165 LBS

## 2022-01-27 DIAGNOSIS — F43.22 ADJUSTMENT DISORDER WITH ANXIETY: ICD-10-CM

## 2022-01-27 DIAGNOSIS — I87.303 CHRONIC VENOUS HYPERTENSION (IDIOPATHIC) WITHOUT COMPLICATIONS OF BILATERAL LOWER EXTREMITY: ICD-10-CM

## 2022-01-27 DIAGNOSIS — M79.89 LEG SWELLING: Primary | ICD-10-CM

## 2022-01-27 PROBLEM — M51.16 LUMBAR DISC DISEASE WITH RADICULOPATHY: Status: ACTIVE | Noted: 2022-01-27

## 2022-01-27 PROBLEM — Z98.1 S/P LUMBAR FUSION: Status: ACTIVE | Noted: 2022-01-27

## 2022-01-27 LAB
ANION GAP SERPL CALC-SCNC: 13 MEQ/L (ref 3–15)
BASOPHILS # BLD: 0.04 K/UL (ref 0.01–0.1)
BASOPHILS NFR BLD: 0.4 %
BUN SERPL-MCNC: 17 MG/DL (ref 8–20)
CALCIUM SERPL-MCNC: 7.9 MG/DL (ref 8.9–10.3)
CHLORIDE SERPL-SCNC: 105 MEQ/L (ref 98–109)
CO2 SERPL-SCNC: 17 MEQ/L (ref 22–32)
CREAT SERPL-MCNC: 1 MG/DL (ref 0.8–1.3)
DIFFERENTIAL METHOD BLD: ABNORMAL
EOSINOPHIL # BLD: 0.29 K/UL (ref 0.04–0.54)
EOSINOPHIL NFR BLD: 2.9 %
ERYTHROCYTE [DISTWIDTH] IN BLOOD BY AUTOMATED COUNT: 15.6 % (ref 11.6–14.4)
GFR SERPL CREATININE-BSD FRML MDRD: >60 ML/MIN/1.73M*2
GLUCOSE BLD-MCNC: 109 MG/DL (ref 70–99)
GLUCOSE BLD-MCNC: 180 MG/DL (ref 70–99)
GLUCOSE BLD-MCNC: 186 MG/DL (ref 70–99)
GLUCOSE BLD-MCNC: 95 MG/DL (ref 70–99)
GLUCOSE SERPL-MCNC: 115 MG/DL (ref 70–99)
HCT VFR BLDCO AUTO: 29.6 % (ref 40.1–51)
HGB BLD-MCNC: 9.5 G/DL (ref 13.7–17.5)
IMM GRANULOCYTES # BLD AUTO: 0.42 K/UL (ref 0–0.08)
IMM GRANULOCYTES NFR BLD AUTO: 4.2 %
LYMPHOCYTES # BLD: 1.22 K/UL (ref 1.2–3.5)
LYMPHOCYTES NFR BLD: 12.1 %
MCH RBC QN AUTO: 27.5 PG (ref 28–33.2)
MCHC RBC AUTO-ENTMCNC: 32.1 G/DL (ref 32.2–36.5)
MCV RBC AUTO: 85.5 FL (ref 83–98)
MONOCYTES # BLD: 1.22 K/UL (ref 0.3–1)
MONOCYTES NFR BLD: 12.1 %
NEUTROPHILS # BLD: 6.9 K/UL (ref 1.7–7)
NEUTS SEG NFR BLD: 68.3 %
NRBC BLD-RTO: 0 %
PDW BLD AUTO: 9.2 FL (ref 9.4–12.4)
PLATELET # BLD AUTO: 304 K/UL (ref 150–350)
POCT TEST: ABNORMAL
POCT TEST: NORMAL
POTASSIUM SERPL-SCNC: 3.7 MEQ/L (ref 3.6–5.1)
RBC # BLD AUTO: 3.46 M/UL (ref 4.5–5.8)
SODIUM SERPL-SCNC: 135 MEQ/L (ref 136–144)
WBC # BLD AUTO: 10.09 K/UL (ref 3.8–10.5)

## 2022-01-27 PROCEDURE — 99239 HOSP IP/OBS DSCHRG MGMT >30: CPT | Performed by: HOSPITALIST

## 2022-01-27 PROCEDURE — 63700000 HC SELF-ADMINISTRABLE DRUG: Performed by: STUDENT IN AN ORGANIZED HEALTH CARE EDUCATION/TRAINING PROGRAM

## 2022-01-27 PROCEDURE — 63700000 HC SELF-ADMINISTRABLE DRUG: Performed by: NURSE PRACTITIONER

## 2022-01-27 PROCEDURE — 12800001 HC ROOM AND CARE SEMIPRIVATE REHAB-BRAIN INJ

## 2022-01-27 PROCEDURE — 36415 COLL VENOUS BLD VENIPUNCTURE: CPT | Performed by: HOSPITALIST

## 2022-01-27 PROCEDURE — 99024 POSTOP FOLLOW-UP VISIT: CPT | Performed by: NEUROLOGICAL SURGERY

## 2022-01-27 PROCEDURE — 63600000 HC DRUGS/DETAIL CODE: Performed by: INTERNAL MEDICINE

## 2022-01-27 PROCEDURE — 85025 COMPLETE CBC W/AUTO DIFF WBC: CPT | Performed by: HOSPITALIST

## 2022-01-27 PROCEDURE — 63700000 HC SELF-ADMINISTRABLE DRUG: Performed by: HOSPITALIST

## 2022-01-27 PROCEDURE — 80048 BASIC METABOLIC PNL TOTAL CA: CPT | Performed by: HOSPITALIST

## 2022-01-27 PROCEDURE — 63600000 HC DRUGS/DETAIL CODE: Performed by: NURSE PRACTITIONER

## 2022-01-27 PROCEDURE — 63700000 HC SELF-ADMINISTRABLE DRUG: Performed by: INTERNAL MEDICINE

## 2022-01-27 RX ORDER — PRAVASTATIN SODIUM 20 MG/1
40 TABLET ORAL
Status: DISCONTINUED | OUTPATIENT
Start: 2022-01-28 | End: 2022-02-08 | Stop reason: HOSPADM

## 2022-01-27 RX ORDER — PANTOPRAZOLE SODIUM 40 MG/1
40 TABLET, DELAYED RELEASE ORAL
Status: DISCONTINUED | OUTPATIENT
Start: 2022-01-28 | End: 2022-01-28

## 2022-01-27 RX ORDER — BISACODYL 10 MG/1
10 SUPPOSITORY RECTAL DAILY
Qty: 12 SUPPOSITORY | Refills: 0
Start: 2022-01-28 | End: 2022-02-08 | Stop reason: HOSPADM

## 2022-01-27 RX ORDER — POLYETHYLENE GLYCOL 3350 17 G/17G
17 POWDER, FOR SOLUTION ORAL DAILY
Qty: 3 PACKET | Refills: 0
Start: 2022-01-27 | End: 2022-02-08 | Stop reason: HOSPADM

## 2022-01-27 RX ORDER — METOPROLOL SUCCINATE 100 MG/1
100 TABLET, EXTENDED RELEASE ORAL NIGHTLY
Status: DISCONTINUED | OUTPATIENT
Start: 2022-01-27 | End: 2022-02-08 | Stop reason: HOSPADM

## 2022-01-27 RX ORDER — AMOXICILLIN 250 MG
1 CAPSULE ORAL 2 TIMES DAILY
Qty: 60 TABLET | Refills: 0
Start: 2022-01-27 | End: 2022-04-12

## 2022-01-27 RX ORDER — DEXTROSE 40 %
15-30 GEL (GRAM) ORAL AS NEEDED
Status: DISCONTINUED | OUTPATIENT
Start: 2022-01-27 | End: 2022-02-08 | Stop reason: HOSPADM

## 2022-01-27 RX ORDER — DEXTROSE 50 % IN WATER (D50W) INTRAVENOUS SYRINGE
25 AS NEEDED
Status: DISCONTINUED | OUTPATIENT
Start: 2022-01-27 | End: 2022-02-08 | Stop reason: HOSPADM

## 2022-01-27 RX ORDER — BISACODYL 10 MG/1
10 SUPPOSITORY RECTAL
Status: DISCONTINUED | OUTPATIENT
Start: 2022-01-28 | End: 2022-01-30

## 2022-01-27 RX ORDER — OXYCODONE HYDROCHLORIDE 5 MG/1
10 TABLET ORAL EVERY 4 HOURS PRN
Status: DISCONTINUED | OUTPATIENT
Start: 2022-01-27 | End: 2022-02-04

## 2022-01-27 RX ORDER — ACETAMINOPHEN 325 MG/1
650 TABLET ORAL EVERY 4 HOURS PRN
Status: DISCONTINUED | OUTPATIENT
Start: 2022-01-27 | End: 2022-02-08 | Stop reason: HOSPADM

## 2022-01-27 RX ORDER — OXYCODONE HYDROCHLORIDE 5 MG/1
5 TABLET ORAL EVERY 4 HOURS PRN
Status: DISCONTINUED | OUTPATIENT
Start: 2022-01-27 | End: 2022-02-04

## 2022-01-27 RX ORDER — HEPARIN SODIUM 5000 [USP'U]/ML
5000 INJECTION, SOLUTION INTRAVENOUS; SUBCUTANEOUS EVERY 8 HOURS
Status: DISCONTINUED | OUTPATIENT
Start: 2022-01-27 | End: 2022-02-07

## 2022-01-27 RX ORDER — ALUMINUM HYDROXIDE, MAGNESIUM HYDROXIDE, AND SIMETHICONE 1200; 120; 1200 MG/30ML; MG/30ML; MG/30ML
30 SUSPENSION ORAL EVERY 4 HOURS PRN
Status: DISCONTINUED | OUTPATIENT
Start: 2022-01-27 | End: 2022-02-08 | Stop reason: HOSPADM

## 2022-01-27 RX ORDER — IBUPROFEN 200 MG
16-32 TABLET ORAL AS NEEDED
Status: DISCONTINUED | OUTPATIENT
Start: 2022-01-27 | End: 2022-02-08 | Stop reason: HOSPADM

## 2022-01-27 RX ORDER — SODIUM BICARBONATE 650 MG/1
650 TABLET ORAL 4 TIMES DAILY
Status: DISCONTINUED | OUTPATIENT
Start: 2022-01-27 | End: 2022-01-28

## 2022-01-27 RX ORDER — INSULIN ASPART 100 [IU]/ML
1-10 INJECTION, SOLUTION INTRAVENOUS; SUBCUTANEOUS
Status: DISCONTINUED | OUTPATIENT
Start: 2022-01-27 | End: 2022-01-30

## 2022-01-27 RX ORDER — METFORMIN HYDROCHLORIDE 500 MG/1
500 TABLET ORAL 2 TIMES DAILY WITH MEALS
Status: DISCONTINUED | OUTPATIENT
Start: 2022-01-27 | End: 2022-02-06

## 2022-01-27 RX ORDER — DOCUSATE SODIUM 100 MG/1
100 CAPSULE, LIQUID FILLED ORAL 3 TIMES DAILY
Status: DISCONTINUED | OUTPATIENT
Start: 2022-01-27 | End: 2022-01-30

## 2022-01-27 RX ORDER — LEVOTHYROXINE SODIUM 75 UG/1
150 TABLET ORAL
Status: DISCONTINUED | OUTPATIENT
Start: 2022-01-28 | End: 2022-02-08 | Stop reason: HOSPADM

## 2022-01-27 RX ORDER — POLYETHYLENE GLYCOL 3350 17 G/17G
17 POWDER, FOR SOLUTION ORAL DAILY
Status: DISCONTINUED | OUTPATIENT
Start: 2022-01-27 | End: 2022-01-30

## 2022-01-27 RX ORDER — ACETAMINOPHEN 325 MG/1
650 TABLET ORAL EVERY 6 HOURS
Status: DISCONTINUED | OUTPATIENT
Start: 2022-01-27 | End: 2022-01-29

## 2022-01-27 RX ORDER — GABAPENTIN 300 MG/1
600 CAPSULE ORAL EVERY 8 HOURS
Status: DISCONTINUED | OUTPATIENT
Start: 2022-01-27 | End: 2022-02-08 | Stop reason: HOSPADM

## 2022-01-27 RX ORDER — SENNOSIDES 8.6 MG/1
3 TABLET ORAL DAILY
Status: DISCONTINUED | OUTPATIENT
Start: 2022-01-28 | End: 2022-01-30

## 2022-01-27 RX ORDER — DIPHENHYDRAMINE HCL 25 MG
25 CAPSULE ORAL EVERY 6 HOURS PRN
Status: DISCONTINUED | OUTPATIENT
Start: 2022-01-27 | End: 2022-02-08 | Stop reason: HOSPADM

## 2022-01-27 RX ORDER — ONDANSETRON 4 MG/1
4 TABLET, ORALLY DISINTEGRATING ORAL EVERY 8 HOURS PRN
Status: DISCONTINUED | OUTPATIENT
Start: 2022-01-27 | End: 2022-02-08 | Stop reason: HOSPADM

## 2022-01-27 RX ORDER — DIAZEPAM 5 MG/1
5 TABLET ORAL EVERY 6 HOURS PRN
Status: DISCONTINUED | OUTPATIENT
Start: 2022-01-27 | End: 2022-02-08 | Stop reason: HOSPADM

## 2022-01-27 RX ADMIN — EMPAGLIFLOZIN 10 MG: 10 TABLET, FILM COATED ORAL at 09:00

## 2022-01-27 RX ADMIN — METFORMIN HYDROCHLORIDE 500 MG: 500 TABLET ORAL at 09:01

## 2022-01-27 RX ADMIN — METOPROLOL SUCCINATE 100 MG: 100 TABLET, EXTENDED RELEASE ORAL at 20:56

## 2022-01-27 RX ADMIN — ACETAMINOPHEN 650 MG: 325 TABLET, FILM COATED ORAL at 02:35

## 2022-01-27 RX ADMIN — PRAVASTATIN SODIUM 40 MG: 20 TABLET ORAL at 09:01

## 2022-01-27 RX ADMIN — METFORMIN HYDROCHLORIDE 500 MG: 500 TABLET ORAL at 16:50

## 2022-01-27 RX ADMIN — INSULIN ASPART 1 UNITS: 100 INJECTION, SOLUTION INTRAVENOUS; SUBCUTANEOUS at 22:06

## 2022-01-27 RX ADMIN — INSULIN ASPART 3 UNITS: 100 INJECTION, SOLUTION INTRAVENOUS; SUBCUTANEOUS at 12:30

## 2022-01-27 RX ADMIN — GABAPENTIN 600 MG: 300 CAPSULE ORAL at 16:50

## 2022-01-27 RX ADMIN — ACETAMINOPHEN 650 MG: 325 TABLET, FILM COATED ORAL at 17:09

## 2022-01-27 RX ADMIN — HEPARIN SODIUM 5000 UNITS: 5000 INJECTION INTRAVENOUS; SUBCUTANEOUS at 05:51

## 2022-01-27 RX ADMIN — GABAPENTIN 600 MG: 300 CAPSULE ORAL at 20:55

## 2022-01-27 RX ADMIN — DOCUSATE SODIUM 100 MG: 100 CAPSULE, LIQUID FILLED ORAL at 16:50

## 2022-01-27 RX ADMIN — GABAPENTIN 600 MG: 300 CAPSULE ORAL at 05:51

## 2022-01-27 RX ADMIN — POLYETHYLENE GLYCOL 3350 17 G: 17 POWDER, FOR SOLUTION ORAL at 16:50

## 2022-01-27 RX ADMIN — HEPARIN SODIUM 5000 UNITS: 5000 INJECTION, SOLUTION INTRAVENOUS; SUBCUTANEOUS at 16:50

## 2022-01-27 RX ADMIN — HEPARIN SODIUM 5000 UNITS: 5000 INJECTION, SOLUTION INTRAVENOUS; SUBCUTANEOUS at 20:55

## 2022-01-27 RX ADMIN — SODIUM BICARBONATE 650 MG TABLET 650 MG: at 16:50

## 2022-01-27 RX ADMIN — LEVOTHYROXINE SODIUM 150 MCG: 0.07 TABLET ORAL at 05:51

## 2022-01-27 RX ADMIN — SODIUM BICARBONATE 650 MG TABLET 650 MG: at 20:55

## 2022-01-27 ASSESSMENT — PATIENT HEALTH QUESTIONNAIRE - PHQ9: SUM OF ALL RESPONSES TO PHQ9 QUESTIONS 1 & 2: 0

## 2022-01-27 NOTE — BMR PREADMISSION NOTE
[facility]  Preadmission Assessment    Patient Name:  Matt Williamson  YOB: 1945    Referral Date:  01/23/22  Evaluation Date:  01/27/22  Referring Facility Admission Date: 01/19/22  Referring Facility: [facility]   Referring Provider: Corey Rock MD     Reason for Referral: Matt Williamson is a 76 y.o. male whose primary indication for inpatient rehabilitation is Spinal Cord, Non-Traumatic.     Pertinent History of Current Functional Problem:  Patient is a 76-year-old male with a history of atrial fibrillation, CAD, hypertension, hypothyroidism, hyperlipidemia, thyroid cancer, type 2 diabetes developed significant mid to low back pain with walking and standing.  He has metastatic thyroid carcinoma with metastasis to the L3 body status post radiation in May 2020.  Recent PET scan showed increased activity at the L3 level without further metastatic lesions.  He was evaluated by neurosurgery who recommended surgical resection.  He was taken to the OR on 1/20 and underwent a stage I L2-L4 PSF with instrumentation, ORIF of L3 pathologic fracture, stage II L3 lateral corpectomy with expandable cage placement.  There has been no significant postoperative complications.  His pain is fairly well controlled.  An LSO has been ordered.  He worked with PT and OT yesterday needing minimal assistance of 2 to transfer sit to stand and ambulate a few steps.  Other than surgical pain he has a Lewis catheter in place.  He denies any other complaints of headache or dizziness, chest pain or shortness of breath.  He is on subcutaneous heparin for DVT prophylaxis.     He lives with his wife in [place].  They have a two-story home with 3 steps to enter.  Recently he has been ambulatory with a cane.  He had been independent with ADLs.  He has been rather sedentary due to severe back pain.    With the pt's current deficits and need for close medical monitoring recommendation will be referral to  acute level rehabilitation for comprehensive rehab program with 3 hours daily physical and occupational therapy with medical management and oversight by physiatry.  Patient would benefit from the daily therapy sessions of 3 hours in order to maximize overall functional mobility in order to return to [facility] and prevent manifestations of sequela diagnosis related to decrease mobility (UTI, PNE, DVT), and possible hospital re-admission. Goals  to be addressed will be for  reconditioning, strengthening, balance,  improve functional mobility, and  ADLs, family/caregiver training as needed and establish a safe discharge plan home at [facility] and decrease risk for re-admission.     1/27 Re-eval delay in admission due to pt developed an ileus.  Pt chriss diet and having BM's          Active Medical Conditions:  Patient Active Problem List   Diagnosis   • BPH (benign prostatic hyperplasia)   • Atherosclerosis of coronary artery   • Cataract   • Essential hypertension   • Former tobacco use   • Mixed hyperlipidemia   • Metastasis from thyroid cancer (CMS/HCC)   • Type 2 diabetes mellitus without complication (CMS/HCC)   • Cyst of thyroid   • Esophageal reflux   • Paroxysmal atrial fibrillation (CMS/HCC)   • Pre-op evaluation   • Preop cardiovascular exam   • Cancer associated pain   • Low back pain with right-sided sciatica   • Malignant neoplasm of vertebral column, excluding sacrum and coccyx (CMS/HCC)   • Acquired hypothyroidism   • Ileus (CMS/HCC)       Active Medications:  Facility-Administered Medications Ordered in Other Visits   Medication Dose Route Frequency Provider Last Rate Last Admin   • acetaminophen (TYLENOL) tablet 650 mg  650 mg oral q4h PRN Cj Sagastume MD   650 mg at 01/27/22 0235   • alum-mag hydroxide-simeth (MAALOX) 200-200-20 mg/5 mL suspension 30 mL  30 mL oral q4h PRN Livia Manzanares CRNP       • bisacodyL (DULCOLAX) 10 mg suppository 10 mg  10 mg rectal Daily PRN Devin Valera MD       • bisacodyL  (DULCOLAX) 10 mg suppository 10 mg  10 mg rectal Daily Devin Valera MD   10 mg at 01/26/22 1011   • glucose chewable tablet 16-32 g of dextrose  16-32 g of dextrose oral PRLivia Murphy CRNP        Or   • dextrose 40 % oral gel 15-30 g of dextrose  15-30 g of dextrose oral PRLivia Murphy CRNP        Or   • glucagon (GLUCAGEN) injection 1 mg  1 mg intramuscular PRLivia Murphy CRNP        Or   • dextrose in water injection 12.5 g  25 mL intravenous PRN Livia Manzanares CRNP       • diazePAM (VALIUM) tablet 5 mg  5 mg oral q6h PRN Livia Manzanares CRNP       • diphenhydrAMINE (BENADRYL) capsule 25 mg  25 mg oral q6h PRLivia Murphy CRNP        Or   • diphenhydrAMINE (BENADRYL) injection 25 mg  25 mg intravenous q6h PRLivia Murphy CRNP       • empagliflozin (JARDIANCE) tablet 10 mg  10 mg oral Daily Vinay Marlow MD   10 mg at 01/26/22 1010   • gabapentin (NEURONTIN) capsule 600 mg  600 mg oral q8h Livia Michael CRNP   600 mg at 01/27/22 0551   • heparin (porcine) 5,000 unit/mL injection 5,000 Units  5,000 Units subcutaneous q8h Livia Michael CRNP   5,000 Units at 01/27/22 0551   • oxyCODONE (ROXICODONE) immediate release tablet 5-10 mg  5-10 mg oral q4h PRLivia Murphy CRNP   10 mg at 01/25/22 0911    And   • HYDROmorphone (DILAUDID) injection 0.5 mg  0.5 mg intravenous q3h PRLivia Murphy CRNP   0.5 mg at 01/21/22 0517   • insulin aspart U-100 (NovoLOG) pen 3-5 Units  3-5 Units subcutaneous Before meals & nightly Livia Manzanares CRNP   3 Units at 01/24/22 1714   • levothyroxine (SYNTHROID) tablet 150 mcg  150 mcg oral Daily (6a) Livia Manzanares CRNP   150 mcg at 01/27/22 0551   • metFORMIN (GLUCOPHAGE) tablet 500 mg  500 mg oral BID with meals Vinay Marlow MD   500 mg at 01/26/22 1731   • metoprolol succinate XL (TOPROL-XL) 24 hr ER tablet 100 mg  100 mg oral BID Vinay Marlow MD   100 mg at 01/26/22 2044   • ondansetron ODT (ZOFRAN-ODT) disintegrating tablet 4 mg  4 mg oral q8h PRN  Livia Manzanares CRNP        Or   • ondansetron (ZOFRAN) injection 4 mg  4 mg intravenous q8h PRN Livia Manzanares CRNP       • pantoprazole (PROTONIX) tablet,delayed release (DR/EC) 40 mg  40 mg oral q PM Livia Manzanares CRNP   40 mg at 01/26/22 1731   • polyethylene glycol (MIRALAX) 17 gram packet 17 g  17 g oral Daily Livia Manzanares CRNP   17 g at 01/26/22 1010   • pravastatin (PRAVACHOL) tablet 40 mg  40 mg oral Daily Livia Manzanares CRNP   40 mg at 01/26/22 1009   • sennosides-docusate sodium (SENOKOT-S) 8.6-50 mg per tablet 1 tablet  1 tablet oral BID Livia Manzanares CRNP   1 tablet at 01/26/22 1009   No medication comments found.    HISTORY:    Past Medical History:   Diagnosis Date   • Atrial fibrillation with rapid ventricular response (CMS/HCC) 10/25/2019    Hospitalized on 10/25/19 at [facility].    • BPH (benign prostatic hyperplasia)    • Coronary artery disease     LAD stent 2005   • Hypertension    • Hypothyroidism    • Mixed hyperlipidemia    • Thyroid cancer (CMS/HCC)    • Type 2 diabetes mellitus without complication (CMS/HCC)    • Vertigo      Past Surgical History:   Procedure Laterality Date   • ANAL FISSURECTOMY  1986   • CARDIAC CATHETERIZATION  11/21/2005    LM FOD. 70-80% proximal LAD.Mild disease CX, RCA and branches.    • CARDIAC CATHETERIZATION  11/28/2005    LM FOD. Pristine LAD stent. CX FOD. RCA mild disease.   • CORONARY ANGIOPLASTY WITH STENT PLACEMENT  11/22/2005    LM FOD. Moderate, diffuse disease LAD. Irregular CX w/ PLVB distal to PDA 99%. Mild disease RCA. Stent to distal LAD.   • EYE SURGERY Left Cataract   • FOOT SURGERY  2009    For osteomyelitis.   • GASTROCUTANEOUS FISTULA CLOSURE     • LUMBAR SPINE SURGERY  01/20/2022    Stage 1: L2-L4 PSF with instrumentation, ORIF L3 pathologic fracture Stage 2: L3 lateral corpectomy with expandable cage placement   • THYROIDECTOMY  2012   • UPPER GASTROINTESTINAL ENDOSCOPY       Tobacco Use as of 1/23/2022     Smoking Status  Smoking Start Date Smoking Quit Date Packs/Day Years Used    Former Smoker -- 1980 2.00 20.00    Types Comments Smokeless Tobacco Status Smokeless Tobacco Quit Date Source     Cigarettes -- Never Used -- Provider            Alcohol Use as of 1/23/2022     Alcohol Use Drinks/Week Alcohol/Week Comments Source    Yes   -- Social Provider            Drug Use as of 1/23/2022     Drug Use Types Frequency Comments Source    Never -- 0.0 -- Provider            Sexual Activity as of 1/23/2022     Sexually Active Birth Control Partners Comments Source    Not Currently -- Female -- Provider            Occupational as of 1/23/2022     Occupation Employer Comments Source    Retired -- -- Provider            Socioeconomic as of 1/23/2022     Marital Status Spouse Name Number of Children Years Education Education Level Preferred Language Ethnicity Race Source     -- -- -- -- English Not , /a, or Tajik origin White Provider        Allergies  Allergies   Allergen Reactions   • Penicillins Rash     Childhood allergy       Premorbid Functional Status:   Dominant Hand: right  Ambulation: assistive equipment (SPC)  Transferring: independent  Toileting: independent  Bathing: independent  Dressing: independent  Eating: independent  Communication: understands/communicates without difficulty  Swallowing: swallows foods/liquids without difficulty  Baseline Diet/Method of Nutritional Intake: no diet restrictions  Past History of Dysphagia: Patient denies past or current difficulty swallowing.  Assistive Device/Animal Currently Used at Home: cane, straight  Prior Level of Function Comment: SPC use, drives, retired, active with basketball    Living Environment:  People in Home: spouse  Name(s) of People in Home: Ania  Current Living Arrangements: home  Living Environment Comment: pt lives in [facility] in [place]  Number of Stairs, Main Entrance: 3  Stair Railings, Main Entrance: railing on right side  (ascending)  Stairs Comment, Main Entrance: pt has a  bed and shower stall on the first floor  Number of Stairs, Within Home, Primary: 12    TEST RESULTS:  Chemistry (Up to last 3 results from the past 720 hours)      01/25 0404 01/26 0951 01/27 0347    Sodium       136            135            135         Potassium       3.8            4.7            3.7         BUN       27            23            17         Creatinine       1.2            1.0            1.0         Glucose       163            121            115         CO2       22            14            17         Chloride       98            101            105           Hepatic (Up to last 3 results from the past 720 hours)      01/18 1408 01/20 1829    ALT (SGPT)       17            15         AST (SGOT)       19            21         Alkaline Phosphatase       62            55         Bilirubin, Total       0.8            1.1           Metabolic (Up to last 3 results from the past 720 hours)      01/18 1408    Hemoglobin A1C       6.9  Comment: In the absence of an established diagnosis of Diabetes Mellitus, HbA1c levels between 5.7% and 6.4% are indicative of increased risk for developing Diabetes(Pre-Diabetes). Levels of 6.5% or greater are diagnostic for Diabetes Mellitus.         TSH       0.10         Cholesterol       149         Triglycerides       106         HDL       49  Comment: 2001 NCEP GUIDELINES<40 mg/dL Low>=60 mg/dL High         LDL Calculated       79  Comment: ATP III GUIDELINESOptimal: <100 mg/dLNear/Above Optimal: 100-129 mg/dLBorderline High: 130-159 mg/dLHigh: 160-189 mg/dLVery High: >190 mg/dL           Hematologic (Up to last 3 results from the past 720 hours)      01/25 0404 01/26 0951 01/27 0347    WBC       8.57            13.80            10.09         Hemoglobin       11.2            10.6            9.5         Hematocrit       35.7            34.2            29.6         Platelets       339            342            304          Protime       --            --            --         INR       --            --            --           01/18 1408        WBC       --           Hemoglobin       --           Hematocrit       --           Platelets       --           Protime       13.2           INR       1.0  Comment: INR has no defined significance when PT is within Reference Range.             Other (Up to last 3 results from the past 720 hours)      01/18 1408    INR       1.0  Comment: INR has no defined significance when PT is within Reference Range.                  ASSESSMENT:  Vitals:  Temp:  [36.2 °C (97.2 °F)-36.8 °C (98.3 °F)] 36.6 °C (97.9 °F)  Heart Rate:  [74-98] 79  Resp:  [18] 18  BP: (112-148)/(56-69) 112/56    Lines/Drains/Airways:       Risk for Clinical Complications:  Constipation: Moderate  DVT: Moderate  Falls: Moderate  Infection: Moderate    Precautions:  Existing Precautions/Restrictions: brace worn when out of bed; spinal; head of bed elevated 30 degrees (LSO = OOB/Therapy)      Current Diet:   Diet: no diet restrictions    Current Functional Status:   Preadmission Current Function     Row Name 01/24/22 1001       Cognition/Psychosocial    Affect/Mental Status (Cognition) WFL    Orientation Status (Cognition) oriented x 3;verbal cues/prompts needed for orientation    Follows Commands (Cognition) WFL    Executive Function Deficit (Cognition) minimal deficit;information processing;self-monitoring/self-correction    Comment, Executive Function repetition of cues required, receptive    Safety Deficit (Cognition) minimal deficit;safety precautions follow-through/compliance;safety precautions awareness    Comment, Cognition cues for safety in transfer, receptive    Cognitive Interventions/Strategies occupation/activity based interventions       Basic Activities of Daily Living (BADLs)    Energy Conservation Techniques correct body mechanics utilized;equipment and device use facilitated       Upper Body Dressing    Tasks  hospital gown    [place] minimum assist (75% or more patient effort)    Position edge of bed sitting       Lower Body Dressing    Tasks underwear    Self-Performance pulls underpants up or down    [place] Assistance threads left leg, underpants;threads right leg, underpants;pulls underpants up or down    [place] maximum assist (25-49% patient effort)    Position unsupported sitting;unsupported standing    Comment attempted crossed leg technique unable to achieve figure 4 and MAX A for completion       Grooming    Self-Performance brushes/mancilla hair    [place] set up    Position supported sitting    Setup Assistance obtain supplies       Bed Mobility    [place], Supine to Sit close supervision;increased time to complete;verbal cues    Verbal Cues (Supine to Sit) technique;safety    Assistive Device head of bed elevated;bed rails;matress firmed    Comment (Bed Mobility) Pt reports mechanical bed at home utilizing HOB elevated greater than 30 deg CLS level VC for log roll technique       Transfers    Comment MIN A x1 for short household distance mobility w/ rw       Bed to Chair Transfer    [place], Bed to Chair minimum assist (75% or more patient effort);1 person assist;verbal cues    Verbal Cues proper use of assistive device;technique    Assistive Device walker, front-wheeled       Sit to Stand Transfer    [place], Sit to Stand Transfer close supervision;verbal cues    Verbal Cues hand placement;proper use of assistive device;preparatory posture    Assistive Device walker, front-wheeled    Comment from bed, cues to maintain spinal pxns and for technique of sit > stand w/ rw, does not carry over despite cues       Stand to Sit Transfer    [place], Stand to Sit Transfer close supervision;verbal cues    Verbal Cues proper use of assistive device;technique    Assistive Device walker, front-wheeled    Comment to chair w/ cues for safety / hand placement       Toilet Transfer    Transfer  Technique sit-stand;stand-sit    [place], Toilet Transfer minimum assist (75% or more patient effort);verbal cues    Verbal Cues hand placement;proper use of assistive device;technique    Assistive Device grab bars/safety frame    Comment VC for GB and to maintain spinal pxn in transfer       Safety Issues, Functional Mobility    Safety Issues Affecting Function (Mobility) safety precaution awareness;safety precautions follow-through/compliance;positioning of assistive device    Impairments Affecting Function (Mobility) balance;endurance/activity tolerance;range of motion (ROM);pain    Comment, Safety Issues/Impairments (Mobility) reports fatigue as a result of limited  sleep this date       Balance    Static Sitting Balance WFL;sitting in chair    Dynamic Sitting Balance WFL;sitting, edge of bed    Sit to Stand Dynamic Balance mild impairment;supported    Static Standing Balance WFL;supported    Dynamic Standing Balance mild impairment;supported    Comment, Balance benefits from rw    Row Name 01/24/22 1002       Cognition/Psychosocial    Affect/Mental Status (Cognition) WFL    Orientation Status (Cognition) oriented x 3;verbal cues/prompts needed for orientation    Follows Commands (Cognition) follows one-step commands;WFL    Comment, Cognition cooperative during session, reports feeling tired today. requires cues for direction during ambulation, education about spinal precautions, and cues for safe transfer technique.       Bed Mobility    [place], Supine to Sit close supervision;verbal cues;increased time to complete    Verbal Cues (Supine to Sit) safety;technique;maintaining precautions    Assistive Device head of bed elevated;bed rails    Comment (Bed Mobility) to L side of bed. education provided about log roll technique and maintaining spinal precautions. pt reports having adjustable base bed at home, used controls to elevated HOB. LSO donned while seated EOB.       Sit to Stand Transfer     [place], Sit to Stand Transfer close supervision;verbal cues    Verbal Cues hand placement;safety;technique;maintaining precautions    Assistive Device walker, front-wheeled    Comment from EOB. requires cues for proper technique and maintaining precautions. pt keeping B hands on walker to stand. education provided about pushing up from solid surface.       Stand to Sit Transfer    [place], Stand to Sit Transfer close supervision;verbal cues    Verbal Cues hand placement;safety;technique    Assistive Device walker, front-wheeled    Comment to chair       Gait Training    [place], Gait minimum assist (75% or more patient effort)    Assistive Device walker, front-wheeled    Distance in Feet 40 feet  with brief standing rest break    Pattern (Gait) step-through    Deviations/Abnormal Patterns (Gait) step length decreased;stride length decreased    Comment (Gait/Stairs) pt was able to ambulate short distance into hallway with min A and RW. requires cues for proper positioning of RW during mobility. pt attempting to turn RW, then body requiring repeated education about spinal precautions/ avoiding twisting. able to demonstrate carry-over.       Safety Issues, Functional Mobility    Impairments Affecting Function (Mobility) balance;endurance/activity tolerance;range of motion (ROM);strength       Balance    Balance Assessment sitting static balance;sit to stand dynamic balance;standing static balance;standing dynamic balance    Static Sitting Balance WFL;sitting in chair    Sit to Stand Dynamic Balance mild impairment;supported    Static Standing Balance WFL;supported    Dynamic Standing Balance mild impairment;supported    Comment, Balance supported = RW    Row Name 01/26/22 0859       Cognition/Psychosocial    Affect/Mental Status (Cognition) WFL    Orientation Status (Cognition) oriented x 4    Follows Commands (Cognition) follows multi-step commands;over 90% accuracy    Cognitive Function executive  function deficit    Executive Function Deficit (Cognition) minimal deficit;judgment;insight/awareness of deficits    Comment, Cognition Pt is alert and cooperative, able to recall 3/3 spinal precautions and follow multistep commands with minimal cueing for insight/judgement       Lower Body Dressing    Tasks doff;don;socks    [place] moderate assist (50-74% patient effort)    Position supported sitting    Comment able to achieve crossover position with LLE only, limited by R hip pain       Toileting    [place] perform bowel hygiene;moderate assist (50-74% patient effort)    Position supported standing    Comment min A for standing balance with increased assistance for bowel hygiene thoroughness 2/2 dec fxl reach with low squat technique for spinal precautions       Bed Mobility    Comment (Bed Mobility) pt rec'd OOB and returned to bedside chair       Sit to Stand Transfer    [place], Sit to Stand Transfer minimum assist (75% or more patient effort)    Verbal Cues hand placement;safety;technique    Assistive Device walker, front-wheeled    Comment slow to rise with increased effort, able to maintain neutral spinal with LSO intact       Stand to Sit Transfer    [place], Stand to Sit Transfer close supervision    Verbal Cues hand placement;safety;technique    Assistive Device walker, front-wheeled    Comment slow controlled descent with good safety awareness       Toilet Transfer    Transfer Technique sit-stand;stand-sit    [place], Toilet Transfer minimum assist (75% or more patient effort)    Verbal Cues technique    Assistive Device commode, 3-in-1    Comment slow to rise off elevated surface this session, limited by back pain       Safety Issues, Functional Mobility    Safety Issues Affecting Function (Mobility) insight into deficits/self-awareness;judgment    Impairments Affecting Function (Mobility) balance;endurance/activity tolerance;pain    Comment, Safety Issues/Impairments  (Mobility) min A with RW for mobility within room, limited by pain/endurance       Balance    Balance Assessment sitting static balance;sitting dynamic balance;sit to stand dynamic balance;standing static balance;standing dynamic balance    Static Sitting Balance WFL    Dynamic Sitting Balance WFL    Sit to Stand Dynamic Balance mild impairment    Static Standing Balance mild impairment    Dynamic Standing Balance mild impairment    Comment, Balance min A to support with RW, no overt LOB noted    Row Name 01/26/22 1027       Sit to Stand Transfer    [place], Sit to Stand Transfer moderate assist (50-74% patient effort)    Verbal Cues hand placement;maintaining center of gravity over base of support;safety;technique    Assistive Device walker, front-wheeled    Comment LOB backwards when standing, difficulty maintaining weight forward. Support required.       Stand to Sit Transfer    [place], Stand to Sit Transfer minimum assist (75% or more patient effort)    Verbal Cues safety;technique    Assistive Device walker, front-wheeled    Comment Slow controlled descent, limited by pain. VC to reach back to chair       Gait Training    [place], Gait minimum assist (75% or more patient effort)    Assistive Device walker, front-wheeled    Distance in Feet 80 feet    Pattern (Gait) step-through    Deviations/Abnormal Patterns (Gait) step length decreased;stride length decreased;other (see comments)    Comment (Gait/Stairs) 20x4; stopped 4 times to rest, fix positioning of RW. Pt. presents with decreased stride length lands on ball of foot on R. Min A for balance       Balance    Static Sitting Balance WFL    Dynamic Sitting Balance WFL    Sit to Stand Dynamic Balance moderate impairment    Static Standing Balance mild impairment    Dynamic Standing Balance mild impairment    Comment, Balance Mod A to support STS w/ RW                Support System:  Designated Primary Caregiver: spouse Ania [phone]  [phone]  Availability, Primary Caregiver: available most of time, some coverage needed  Health, Primary Caregiver: no health issues  Physical Limitations, Primary Caregiver: no physical limitations  Caregiver Engagement: advocates for the patient      Patient/Family Goals:  Patient's Goals For Discharge: return home; take care of myself at home; return to all previous roles/activities      Educational Background:      RECOMMENDATIONS / PLAN:  Special Needs:  Is an  Needed/Used?: N  DNR is current code status at referring facility?: No      Plan:  Identified Referral Needs: occupational therapy,medical consultative services,psychiatry/psychology services,physical therapy  OT Frequency: 5-7 times per week  OT Intensity: 1.5 hours  PT Frequency: 5-7 times per week  PT Intensity: 1.5 hours    Therapy Intensity: Requires, can tolerate and will benefit from 3 hours of therapy at least 5 days per week  Projected Length of Stay (days): 10 days  Patient is willing to participate in rehab program: yes    Impairments to be addressed: mobility,motor dysfunction,safety,self-care  Medical Necessity Admission Criteria: abnormal labs,other active medical conditions (see comments) (L2-L4 PSF with instrumentation, ORIF L3 pathologic fx Stage 2: L3 lateral corpectomy with expandable cage. hxa-fib,BPH,CAD,HTN,HLD, DM, vertigo)    Expected Level of Function at Discharge:  Expected Functional Improvement: mobility; motor dysfunction; safety; self-care  Self-Care: Setup or clean-up assistance  Sphincter Control: Independent  Transfers: Setup or clean-up assistance  Locomotion: Setup or clean-up assistance  Communication: Independent  Social Cognition: Independent      Post-Discharge Needs:  Concerns to be Addressed: adjustment to diagnosis/illness,caregiver training,care coordination/care conferences,basic needs  Anticipated Discharge Disposition: home with home health  Type of Home Care Services: home OT,home  PT,nursing  Equipment Needed After Discharge: commode, 3-in-1,walker, front-wheeled,shower chair

## 2022-01-27 NOTE — PLAN OF CARE
Problem: Adult Inpatient Plan of Care  Goal: Plan of Care Review  Flowsheets (Taken 1/27/2022 4249)  Plan of Care Reviewed With: patient   Confirmed with team pt cleared for DC today. Going to [facility]. W/C van confirmed for 1300 p/u. Updated HMS/RN/liaison.     Updated pt at bedside. Pt agreeable to plan and cost of wc van.

## 2022-01-27 NOTE — H&P
Patient Name: Matt Williamson  MRN: [id number]  : 1945  Admission Date: 2022    Chief Complaint:    Dysfunction in ambulation, transfers and self-care status post L2-L4 posterior spinal fusion with instrumentation, ORIF of L3 pathologic fracture, L3 lateral corpectomy with expandable cage placement, for L3 pathologic fracture, from metastatic thyroid cancer, multiple medical problems. (Patient was admitted to [facility] on 22. Records reviewed on 22. Patient evaluated on 22 at about 4:45 PM. Full H&P done on 22. Orders put in CPOE on 22. Plan of care discussed with patient and with his wife at bedside.  Discussed with nurse)    History of Present Illness:    Mr. Matt Williamson is a 76-year-old right-handed white male with chronic conditions significant for hypertension, hypothyroidism, thyroid cancer status post thyroidectomy, atrial fibrillation, coronary artery disease, hyperlipidemia, type 2 diabetes mellitus, metastatic thyroid carcinoma with metastasis to the L3 vertebral body status post stereotactic radiation in May 2020, who had progressively worsening midline low back pain with radiation to the right knee. MRI lumbar spine obtained on 21 showing extension of the L3 mass, moderate-severe foraminal narrowing on the right secondary to retropulsion of the inferior endplate in the setting of ligament and joint hypertrophy. CT demonstrates significant bony erosion of the L3 vertebral body with a new oblique fracture. PET scan on 21 showed increased activity of the L3 lesion without further metastatic lesions appreciated. He was initially referred to [facility] however there has been some delay with getting an appointment.  He denied any bowel or bladder incontinence or weakness and follows up with oncologist Dr. Casey who did not recommend any chemotherapy at this time but surgical intervention as the primary treatment given the suspected  isolated area of progression at the L3 vertebral body.  Per neurosurgery notes he had preserved strength except 2 beats ankle clonus on the left.   He was recommended surgical resection by neurosurgery as well as oncology.  He was admitted to [facility] on 1/20/22 and underwent a stage I L2-L4 posterior spinal fusion with instrumentation, ORIF of L3 pathologic fracture, and stage II L3 lateral corpectomy with expandable cage placement with neurosurgeon Dr Colt Heller on 1/20/22 at [facility].  He is allowed weightbearing as tolerated on both lower extremities and was given an LSO brace for use when out of bed.  Postoperative course was significant for urinary retention and he had an indwelling Lewis catheter.  The Lewis catheter was removed 2 days back and per patient, he is starting to void now.  I mentioned to him we will monitor post void residuals to see if he is emptying his bladder completely or not.  He has some paresthesias/hyperesthesias in his right thigh especially lateral aspect and some right hip proximal weakness and per neurosurgery notes that is not uncommon given the nature of his anterior lumbar surgery.  He is on subcutaneous Heparin and SCDs for DVT prophylaxis.  I discussed his recent clinical course with patient and with his wife at bedside.  On 1/27/22, hemoglobin was 9.5, WBC count 10.09, platelets are 304, BUN was 17, and creatinine was 1.0.  He has been needing assistance for mobility, transfers, self-care. I have reviewed the preadmission screening and concur with that information and there are no significant changes from patient’s preadmission screening. He is transferred to [facility] on 1/27/22 for further rehabilitation care.   status post L2-L4 posterior spinal fusion with instrumentation, ORIF of L3 pathologic fracture, L3 lateral corpectomy with expandable cage placement, for L3 pathologic fracture, from metastatic thyroid cancer, multiple medical  problems      Past Medical History:    Past Medical History:   Diagnosis Date   • Atrial fibrillation with rapid ventricular response (CMS/HCC) 10/25/2019    Hospitalized on 10/25/19 at [facility]    • BPH (benign prostatic hyperplasia)    • Coronary artery disease     LAD stent 2005   • Hypertension    • Hypothyroidism    • Mixed hyperlipidemia    • Thyroid cancer (CMS/HCC)    • Type 2 diabetes mellitus without complication (CMS/HCC)    • Vertigo        Past Surgical History:    Past Surgical History:   Procedure Laterality Date   • ANAL FISSURECTOMY  1986   • CARDIAC CATHETERIZATION  11/21/2005    LM FOD. 70-80% proximal LAD.Mild disease CX, RCA and branches.    • CARDIAC CATHETERIZATION  11/28/2005    LM FOD. [place] LAD stent. CX FOD. RCA mild disease.   • CORONARY ANGIOPLASTY WITH STENT PLACEMENT  11/22/2005    LM FOD. Moderate, diffuse disease LAD. Irregular CX w/ PLVB distal to PDA 99%. Mild disease RCA. Stent to distal LAD.   • EYE SURGERY Left Cataract   • FOOT SURGERY  2009    For osteomyelitis.   • GASTROCUTANEOUS FISTULA CLOSURE     • LUMBAR SPINE SURGERY  01/20/2022    Stage 1: L2-L4 PSF with instrumentation, ORIF L3 pathologic fracture Stage 2: L3 lateral corpectomy with expandable cage placement   • THYROIDECTOMY  2012   • UPPER GASTROINTESTINAL ENDOSCOPY         Medications:    Current Facility-Administered Medications   Medication Dose Route Frequency   • acetaminophen  650 mg oral q4h PRN   • acetaminophen  650 mg oral q6h SALIMA   • alum-mag hydroxide-simeth  30 mL oral q4h PRN   • [START ON 1/28/2022] bisacodyL  10 mg rectal Daily (6p)   • glucose  16-32 g of dextrose oral PRN    Or   • dextrose  15-30 g of dextrose oral PRN    Or   • glucagon  1 mg intramuscular PRN    Or   • dextrose in water  25 mL intravenous PRN   • diazePAM  5 mg oral q6h PRN   • diphenhydrAMINE  25 mg oral q6h PRN   • docusate sodium  100 mg oral TID   • [START ON 1/28/2022] empagliflozin  10 mg oral Daily    • gabapentin  600 mg oral q8h SALIMA   • heparin (porcine)  5,000 Units subcutaneous q8h SALIMA   • insulin aspart U-100  1-10 Units subcutaneous With meals & nightly   • [START ON 2022] levothyroxine  150 mcg oral Daily (6a)   • metFORMIN  500 mg oral BID with meals   • metoprolol succinate XL  100 mg oral Nightly   • ondansetron ODT  4 mg oral q8h PRN   • oxyCODONE  10 mg oral q4h PRN    And   • oxyCODONE  5 mg oral q4h PRN   • [START ON 2022] pantoprazole  40 mg oral Daily before breakfast   • polyethylene glycol  17 g oral Daily   • [START ON 2022] pravastatin  40 mg oral Daily (6p)   • [START ON 2022] senna  3 tablet oral Daily   • sodium bicarbonate  650 mg oral QID       Allergies:    Allergies   Allergen Reactions   • Penicillins Rash     Childhood allergy       Family History:    Family History   Problem Relation Age of Onset   • Cancer Biological Mother    • Breast cancer Biological Mother    • Emphysema Biological Father    • Lung cancer Biological Father        Social History:    The patient is  and lives with his wife in a 2 level home in [place], [place]. 3 steps to enter, bedroom and bathroom on the first level. 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  He stopped smoking in  but was smoking 2 packs of cigarettes daily for 20 years prior to that, drinks alcohol socially and denies using recreational drugs.    Social History     Tobacco Use   • Smoking status: Former Smoker     Packs/day: 2.00     Years: 20.00     Pack years: 40.00     Types: Cigarettes     Quit date:      Years since quittin.1   • Smokeless tobacco: Never Used   Vaping Use   • Vaping Use: Never used   Substance Use Topics   • Alcohol use: Yes     Comment: Social   • Drug use: Never       Functional History:    The patient is a right-hand dominant person. He was independent in ADLs and ambulation prior to his hospitalization.  He ambulated with a cane.  He drives a car.  His  "mobility has been somewhat limited due to increased back pain.    Review Of Systems:     A complete and comprehensive review of systems was performed. The patient denies any chest pain, shortness of breath. He indicates last bowel movement was today.  He had postoperative urinary retention and indwelling Lewis catheter was removed 2 days back and he indicates that he has been voiding.  We will monitor post void residuals.  He has some pain in his back.  He has LSO brace for use out of bed.  I discussed spinal precautions with him.  Otherwise, a complete and comprehensive review of systems is negative.    Physical Examination:    Blood pressure (!) 146/68, pulse 87, temperature 36.8 °C (98.2 °F), temperature source Oral, resp. rate 18, height 1.676 m (5' 6\"), weight 75 kg (165 lb 6 oz), SpO2 100 %.    Body mass index is 26.69 kg/m².    General Appearance: Not in acute distress  Head/Ear/Nose/Throat: Normocephalic; Atraumatic.   Eye: EOMI; PERRL.   Neck: No JVD; No Bruits.   Respiratory: Decreased breath sounds at bases.   Cardiovascular: RRR; Normal S1, S2.   Gastrointestinal: Soft; bowel sounds present but somewhat hypoactive.  Extremities: Bilateral lower extremity edema noted.    Musculoskeletal: Functional active range of motion in both upper extremities.  Some limitation of active range of motion in right hip limited by pain.    Neurological: AAO ×3. Speech is fluent. Cranial nerve examination does not reveal any gross facial asymmetry. Strength testing shows about 4+/5 strength in both upper extremities.  Right hip flexion is less than 3/5.  Right quadriceps is 4/5 with the right thigh supported.  Right ankle dorsiflexion, plantar flexion and right EHL are 4+/5.  Left hip flexion is 4/5.  Left quadriceps is 4+/5.  Left ankle dorsiflexion, plantar flexion and left EHL are 4+/5.  He is grossly able to localize touch and position sense in great toes, but does report some hyperesthesia in the right thigh lateral " aspect.  Deep tendon reflexes are hypoactive and symmetric bilaterally.  Plantars are flexor.  Coordination is functional upper extremities.    Behavior/Emotional: Appropriate; Cooperative.   Skin: No obvious rashes noted.  Healing incision noted on right flank.  Healing incision also noted on posterior lumbar spine.  Mepilex border dressing in place on incisions.    Assessment and Plan:    ASSESSMENT PLAN:  1. 76-year-old right-handed white male with chronic conditions significant for hypertension, hypothyroidism, thyroid cancer status post thyroidectomy, atrial fibrillation, coronary artery disease, hyperlipidemia, type 2 diabetes mellitus, metastatic thyroid carcinoma with metastasis to the L3 vertebral body status post stereotactic radiation in May 2020, who had progressively worsening midline low back pain with radiation to the right knee. MRI lumbar spine obtained on 11/22/21 showing extension of the L3 mass, moderate-severe foraminal narrowing on the right secondary to retropulsion of the inferior endplate in the setting of ligament and joint hypertrophy. CT demonstrates significant bony erosion of the L3 vertebral body with a new oblique fracture. PET scan on 12/7/21 showed increased activity of the L3 lesion without further metastatic lesions appreciated. He was initially referred to [facility] however there has been some delay with getting an appointment.  He denied any bowel or bladder incontinence or weakness and follows up with oncologist Dr. Casey who did not recommend any chemotherapy at this time but surgical intervention as the primary treatment given the suspected isolated area of progression at the L3 vertebral body.  Per neurosurgery notes he had preserved strength except 2 beats ankle clonus on the left.   He was recommended surgical resection by neurosurgery as well as oncology.  He was admitted to [facility] on 1/20/22 and underwent a stage I L2-L4 posterior spinal fusion with  instrumentation, ORIF of L3 pathologic fracture, and stage II L3 lateral corpectomy with expandable cage placement with neurosurgeon Dr Colt Heller on 1/20/22 at [facility].  He is allowed weightbearing as tolerated on both lower extremities and was given an LSO brace for use when out of bed.  Postoperative course was significant for urinary retention and he had an indwelling Lewis catheter.  The Lewis catheter was removed 2 days back and per patient, he is starting to void now.  I mentioned to him we will monitor post void residuals to see if he is emptying his bladder completely or not.  He has some paresthesias/hyperesthesias in his right thigh especially lateral aspect and some right hip proximal weakness and per neurosurgery notes that is not uncommon given the nature of his anterior lumbar surgery.  He is on subcutaneous Heparin and SCDs for DVT prophylaxis.  I discussed his recent clinical course with patient and with his wife at bedside.  On 1/27/22, hemoglobin was 9.5, WBC count 10.09, platelets are 304, BUN was 17, and creatinine was 1.0.  He has been needing assistance for mobility, transfers, self-care. He is transferred to [facility] on 1/27/22 for further rehabilitation care.     2. DVT prophylaxis - on subcutaneous Heparin.  On SCDs.  Check doppler.    3.  Neurosurgery - status post L2-L4 posterior spinal fusion with instrumentation, ORIF of L3 pathologic fracture, L3 lateral corpectomy with expandable cage placement, for L3 pathologic fracture, from metastatic thyroid cancer, multiple medical problems - continue PT, OT, psychology.  Follow falls precautions, cardiac precautions, monitor pulse oximetry in therapy.  Follow spinal precautions.  LSO brace when out of bed.    4. GI - On Protonix for GI prophylaxis. Continue Colace, Senokot, MiraLAX, Dulcolax.  On Zofran ODT for nausea.  On Maalox PRN.    5.  - Postoperative course was significant for urinary retention and he had an  indwelling Lewis catheter.  The Lewis catheter was removed 2 days back and per patient, he is starting to void now.  I mentioned to him we will monitor post void residuals to see if he is emptying his bladder completely or not.      6.  Diabetes mellitus - on Jardiance.  On Metformin.  On sliding-scale NovoLog coverage.  On hypoglycemic protocol.    7. Pain - on Tylenol.  On Neurontin.  On PRN Oxycodone.  On Valium PRN for spasms.    8. Hematology - monitor hemoglobin, platelets.      9.  Endocrinology - history of thyroid cancer status post thyroidectomy.  On Synthroid.    10. CVS - history of hypertension, atrial fibrillation, coronary artery disease - on Toprol-XL.    11.  Hyperlipidemia - on Pravachol.    12.  Oncology - history of metastatic thyroid cancer status post thyroidectomy.  He had L2-L4 posterior spinal fusion with instrumentation, ORIF of L3 pathologic fracture, L3 lateral corpectomy with expandable cage placement, for L3 pathologic fracture, from metastatic thyroid cancer.  He will follow up with oncology and neurosurgery.    13.  Skin - monitor healing of incisions on right flank and on posterior lumbar spine.  Dermal defense will be consulted.    14.  F/E/N - on sodium bicarbonate.    15. Rehabilitation medicine - Continue comprehensive rehabilitation care. Continue PT, OT, psychology.  We will follow falls precautions, cardiac precautions, monitor pulse oximetry in therapy and follow aspiration precautions.  We will follow spinal precautions.  LSO brace when out of bed.    16. Reviewed labs today.    17. Consultations - Dr. Dawkins will be consulted from internal medicine standpoint.  Dermal defense and podiatry will be consulted.  Nutrition will be consulted.  Will consult psychology.  He will follow up with neurosurgeon and oncologist on outpatient basis.    Estimated Length Of Stay:    Will be about 14 days, and will be adjusted in team meetings depending upon functional status and progress in  therapy.     Goals Of This Stay:    Goals of this stay would be to increase his strength; improve his endurance; maximize his independence in transfers, ambulation, self care, keeping him weight-bearing as tolerated on both lower extremities; evaluate the need for assistive devices and adaptive equipment; do patient and family education; do caregiver training as appropriate; monitor him medically, monitor skin healing of his incisions, teach him spinal precautions and try to control his pain.     Post Admission Physician Evaluation:    Matt Williamson is admitted to [facility] for comprehensive inpatient rehabilitation for Spinal Cord, Non-Traumatic status post L2-L4 posterior spinal fusion with instrumentation, ORIF of L3 pathologic fracture, L3 lateral corpectomy with expandable cage placement, for L3 pathologic fracture, from metastatic thyroid cancer, multiple medical problems with functional deficits in mobility; motor dysfunction; safety; self-care. Patient is receiving the following services: occupational therapy; medical consultative services; psychiatry/psychology services; physical therapy, rehabitation nursing care, case management evaluation.    Active medical management is required for  Patient Active Problem List   Diagnosis   • BPH (benign prostatic hyperplasia)   • Atherosclerosis of coronary artery   • Cataract   • Essential hypertension   • Former tobacco use   • Mixed hyperlipidemia   • Metastasis from thyroid cancer (CMS/HCC)   • Type 2 diabetes mellitus without complication (CMS/HCC)   • Cyst of thyroid   • Esophageal reflux   • Paroxysmal atrial fibrillation (CMS/HCC)   • Pre-op evaluation   • Preop cardiovascular exam   • Cancer associated pain   • Low back pain with right-sided sciatica   • Malignant neoplasm of vertebral column, excluding sacrum and coccyx (CMS/HCC)   • Acquired hypothyroidism   • Ileus (CMS/HCC)   • Leg swelling   • Lumbar disc disease with  radiculopathy       Premorbid Function  Dominant Hand: right  Ambulation: assistive equipment (SPC)  Transferring: independent  Toileting: independent  Bathing: independent  Dressing: independent  Eating: independent  Communication: understands/communicates without difficulty  Swallowing: swallows foods/liquids without difficulty  Baseline Diet/Method of Nutritional Intake: no diet restrictions  Past History of Dysphagia: Patient denies past or current difficulty swallowing.  Assistive Device/Animal Currently Used at Home: cane, straight  Prior Level of Function Comment: SPC use, drives, retired, active with basketball      Current Function  Gait  [place], Gait: minimum assist (75% or more patient effort)  Assistive Device: walker, front-wheeled  Comment (Gait/Stairs): 20x4; stopped 4 times to rest, fix positioning of RW. Pt. presents with decreased stride length lands on ball of foot on R. Min A for balance    Stairs     Wheelchair     Transfers  [place], Roll Left: maximum assist (25-49% patient effort); 2 person assist  [place], Supine to Sit: close supervision; verbal cues; increased time to complete  Verbal Cues (Supine to Sit): safety; technique; maintaining precautions  Assistive Device: head of bed elevated; bed rails  Comment (Bed Mobility): pt rec'd OOB and returned to bedside chair  Comment: MIN A x1 for short household distance mobility w/ rw  [place], Bed to Chair: minimum assist (75% or more patient effort); 1 person assist; verbal cues  Verbal Cues: proper use of assistive device; technique  Assistive Device: walker, front-wheeled  Comment: to L  [place], Sit to Stand Transfer: moderate assist (50-74% patient effort)  Verbal Cues: hand placement; maintaining center of gravity over base of support; safety; technique  Assistive Device: walker, front-wheeled  Comment: LOB backwards when standing, difficulty maintaining weight forward. Support required.  [place], Stand to Sit  Transfer: minimum assist (75% or more patient effort)  Verbal Cues: safety; technique  Assistive Device: walker, front-wheeled  Comment: Slow controlled descent, limited by pain. VC to reach back to chair    Transfer Technique: sit-stand; stand-sit  [place], Toilet Transfer: minimum assist (75% or more patient effort)  Verbal Cues: technique  Assistive Device: commode, 3-in-1  Comment: slow to rise off elevated surface this session, limited by back pain    Self Care  Adaptive Equipment: none  Tasks: doff; don; socks  Self-Performance: pulls underpants up or down  [place] Assistance: threads left leg, underpants; threads right leg, underpants; pulls underpants up or down  Adaptive Equipment: none  [place]: moderate assist (50-74% patient effort)  Comment: able to achieve crossover position with LLE only, limited by R hip pain  [place]: perform bowel hygiene; moderate assist (50-74% patient effort)  Comment: min A for standing balance with increased assistance for bowel hygiene thoroughness 2/2 dec fxl reach with low squat technique for spinal precautions  Self-Performance: brushes/mancilla hair  Setup Assistance: obtain supplies  [place]: set up    Cognition  Affect/Mental Status (Cognition): WFL  Orientation Status (Cognition): oriented x 4  Follows Commands (Cognition): follows multi-step commands; over 90% accuracy  Cognitive Function: executive function deficit  Attention Deficit (Cognition): minimal deficit; focused/sustained attention  Executive Function Deficit (Cognition): minimal deficit; judgment; insight/awareness of deficits  Comment, Executive Function: repetition of cues required, receptive  Safety Deficit (Cognition): minimal deficit; safety precautions follow-through/compliance; safety precautions awareness  Comment, Cognition: Pt is alert and cooperative, able to recall 3/3 spinal precautions and follow multistep commands with minimal cueing for  insight/judgement    Communication  Speech Intelligibility (Motor Speech): WFL  Follows Commands (Auditory Comprehension): WFL  Yes/No Questions (Auditory Comprehension): WFL    Swallow  Dentition (Oral Motor): adequate dentition  Diet Consistency Recommendations: thin liquids; regular      Risk for Complications  Constipation: Moderate  DVT: Moderate  Falls: Moderate  Infection: Moderate      Expected Level of Function  Expected Functional Improvement: mobility; motor dysfunction; safety; self-care  Self-Care: Setup or clean-up assistance  Sphincter Control: Independent  Transfers: Setup or clean-up assistance  Locomotion: Setup or clean-up assistance  Communication: Independent  Social Cognition: Independent      Anticipated Discharge Plan  Anticipated Discharge Disposition: acute rehab/Inpatient Rehab Facility  Type of Home Care Services: home OT; home PT; nursing  Type of Outpatient Services: physical therapy      I have reviewed the pre-admission screening and there are no relevant changes.    Expected length of stay: 14 days          James Hidalgo MD  1/27/2022

## 2022-01-27 NOTE — CONSULTS
Consult Note    Reason For Referral: Medical comanagements  Referring Provider: James Paez MD   [facility] medical records reviewed    CC:Thyroid cancer with progressive lumbar metastatic lesions and L3 pathologic fracture complicated with myeloradiculopathy, s/p surgical intervention and ORIF, neurogenic B/B, ADL and ambulatory dysfunction     76 y.o. male with PMH of HTN, dyslipidemia, CAD, type 2 DM, hypothyroidism, thyroid cancer metastatic to L3 vertebral body s/p radiation stereotactic body radiation 5/2020, seen by the neurosurgery service for surgical discussion regarding growth of his L3 vertebral body metastatic lesion. He has had progressive worsening midline-line low back pain with radiation to the right knee. An MRI lumbar spine was obtained 11/22/2021 showing extension of the L3 mass. He was sent for a PET scan 12/7/2021 showing increased activity of the L3 lesion without further metastatic lesions appreciated. He was referred to [facility] however there has been some delay with getting an appointment.      He has had persistent midline low back pain with walking and standing which has progressively worsened. He does not have any weakness or bowel/bladder incontinence. He is following with oncology (Dr. Casey) who does not recommend any chemotherapy at this time but surgical intervention as the primary treatment given the suspected isolated area of progression at the L3 vertebral body.    He was admitted to [facility] on 1/20/22 and underwent elective :  Stage 1: L2-L4 PLSF with instrumentation, ORIF L3 pathologic fracture  Stage 2: L3 lateral corpectomy with expandable cage placement  Post op ileus, s/p aggressive bowel program, resolved, tolerated diet. Functionally he has ADL and ambulatory dysfunction, requiring inpt acute rehab, transferred to [facility] on 1/27/22.       Denies nausea, vomiting, abdominal pain or discomfort, dysuria, cough/sputum, running nose, sore throat, chest pain, palpitation,  SOB or orthopnea, dizziness or LH,  HA.    Tolerating Diet: regular thin liquid diet       Allergies:    Allergies   Allergen Reactions   • Penicillins Rash     Childhood allergy       Current medication list:  No current facility-administered medications for this encounter.       Past Medical History:   Past Medical History:   Diagnosis Date   • Atrial fibrillation with rapid ventricular response (CMS/HCC) 10/25/2019    Hospitalized on 10/25/19 at [facility].    • BPH (benign prostatic hyperplasia)    • Coronary artery disease     LAD stent 2005   • Hypertension    • Hypothyroidism    • Mixed hyperlipidemia    • Thyroid cancer (CMS/HCC)    • Type 2 diabetes mellitus without complication (CMS/[facility])    • Vertigo        Past Surgical History:   Past Surgical History:   Procedure Laterality Date   • ANAL FISSURECTOMY  1986   • CARDIAC CATHETERIZATION  11/21/2005    LM FOD. 70-80% proximal LAD.Mild disease CX, RCA and branches.    • CARDIAC CATHETERIZATION  11/28/2005    LM FOD. Pristine LAD stent. CX FOD. RCA mild disease.   • CORONARY ANGIOPLASTY WITH STENT PLACEMENT  11/22/2005    LM FOD. Moderate, diffuse disease LAD. Irregular CX w/ PLVB distal to PDA 99%. Mild disease RCA. Stent to distal LAD.   • EYE SURGERY Left Cataract   • FOOT SURGERY  2009    For osteomyelitis.   • GASTROCUTANEOUS FISTULA CLOSURE     • LUMBAR SPINE SURGERY  01/20/2022    Stage 1: L2-L4 PSF with instrumentation, ORIF L3 pathologic fracture Stage 2: L3 lateral corpectomy with expandable cage placement   • THYROIDECTOMY  2012   • UPPER GASTROINTESTINAL ENDOSCOPY         Social History:   Social History     Socioeconomic History   • Marital status:      Spouse name: Not on file   • Number of children: Not on file   • Years of education: Not on file   • Highest education level: Not on file   Occupational History   • Occupation: Retired   Tobacco Use   • Smoking status: Former Smoker     Packs/day: 2.00     Years: 20.00      "Pack years: 40.00     Types: Cigarettes     Quit date:      Years since quittin.1   • Smokeless tobacco: Never Used   Vaping Use   • Vaping Use: Never used   Substance and Sexual Activity   • Alcohol use: Yes     Comment: Social   • Drug use: Never   • Sexual activity: Not Currently     Partners: Female   Other Topics Concern   • Not on file   Social History Narrative   • Not on file     Social Determinants of Health     Financial Resource Strain: Not on file   Food Insecurity: Not on file   Transportation Needs: Not on file   Physical Activity: Not on file   Stress: No Stress Concern Present   • Feeling of Stress : Not at all   Social Connections: Not on file   Intimate Partner Violence: Not on file   Housing Stability: Not on file       Family History:   Family History   Problem Relation Age of Onset   • Cancer Biological Mother    • Breast cancer Biological Mother    • Emphysema Biological Father    • Lung cancer Biological Father        ROS  Complete Review of Systems:  All other systems reviewed and not remarkable except as noted in the HPI.    Vital signs:  Visit Vitals  BP (!) 146/68 (BP Location: Right upper arm, Patient Position: Lying)   Pulse 87   Temp 36.8 °C (98.2 °F) (Oral)   Resp 18   Ht 1.676 m (5' 6\")   Wt 75 kg (165 lb 6 oz)   SpO2 100%   BMI 26.69 kg/m²       Physical Examination:  Head/Ear/Nose/Throat: normocephalic; atraumatic; moisture mouth mm, no oropharyngeal thrush noted.   Eyes: anicteric sclera, EOMI; PERRL.   Neck : supple, no JVD, no carotid bruits appeciated.   Respiratory: no evidence of labored breathing, lung sounds CTA b/l, good aeration bibasilar area, no w/r/c.   Cardiovascular: RRR; normal S1, S2; no m/r/g; no S3 or S4.   Gastrointestinal: soft; NT; BS normal; mildly distended; no CVAT b/l.   Genitourinary: no silva.   Extremities : b/l LE pitting edema .   Neurological: AO x 3, fluent speeches, following commands, CNS II-XII grossly intact; no focal neurologic " deficits.   Behavior/Emotional: in NAD, appropriate; cooperative.   Skin: sacral area skin lesions.    Labs:  Lab Results   Component Value Date    WBC 10.09 01/27/2022    HGB 9.5 (L) 01/27/2022    HCT 29.6 (L) 01/27/2022    MCV 85.5 01/27/2022     01/27/2022     Lab Results   Component Value Date    GLUCOSE 115 (H) 01/27/2022    CALCIUM 7.9 (L) 01/27/2022     (L) 01/27/2022    K 3.7 01/27/2022    CO2 17 (L) 01/27/2022     01/27/2022    BUN 17 01/27/2022    CREATININE 1.0 01/27/2022       Imaging  OSH imaging study reports reviewed      Assessment    CC:Thyroid cancer with progressive lumbar metastatic lesions and L3 pathologic fracture complicated with myeloradiculopathy, s/p surgical intervention and ORIF, neurogenic B/B, ADL and ambulatory dysfunction     76 y.o. male with PMH of HTN, dyslipidemia, CAD, type 2 DM, hypothyroidism, thyroid cancer metastatic to L3 vertebral body s/p radiation stereotactic body radiation 5/2020, seen by the neurosurgery service for surgical discussion regarding growth of his L3 vertebral body metastatic lesion. He has had progressive worsening midline-line low back pain with radiation to the right knee. An MRI lumbar spine was obtained 11/22/2021 showing extension of the L3 mass. He was sent for a PET scan 12/7/2021 showing increased activity of the L3 lesion without further metastatic lesions appreciated. He was referred to [facility] however there has been some delay with getting an appointment.      He has had persistent midline low back pain with walking and standing which has progressively worsened. He does not have any weakness or bowel/bladder incontinence. He is following with oncology (Dr. Casey) who does not recommend any chemotherapy at this time but surgical intervention as the primary treatment given the suspected isolated area of progression at the L3 vertebral body.    He was admitted to [facility] on 1/20/22 and underwent elective :  Stage 1: L2-L4  PLSF with instrumentation, ORIF L3 pathologic fracture  Stage 2: L3 lateral corpectomy with expandable cage placement  Post op ileus, s/p aggressive bowel program, resolved, tolerated diet. Functionally he has ADL and ambulatory dysfunction, requiring inpt acute rehab, transferred to [facility] on 1/27/22.         Ileus (CMS/[facility])  Resolved after aggressive bowel program, start diet  Tolerating diet, cont bowel regimen.     Acquired hypothyroidism  Assessment & Plan  Status post thyroidectomy  Continue levothyroxine       Malignant neoplasm of vertebral column, excluding sacrum and coccyx (CMS/[facility])  Assessment & Plan  Metastatic thyroid ca S/p L3 pathologic fracture s/p right lateral L3 corpectomy and placement of an expandable cage, followed by L2-4 posterolateral instrumented fusion  Continue postop management per neurosurgery.  Drain was removed  DVT prophylaxis, Lewis, pain management per neurosurgery  Rehab as inpt at [place] [facility]      Paroxysmal atrial fibrillation (CMS/[facility])  Assessment & Plan  Currently in sinus rhythm  Patient is not on long-term anticoagulation  He takes Rythmol as needed for palpitations     Type 2 diabetes mellitus without complication (CMS/[facility])  Assessment & Plan  Patient on Jardiance, Trulicity and metformin as outpatient  Cont inpt Accu-Cheks with sliding scale insulin  Hemoglobin A1c 6.9  Continue Jardiance and restarted metformin and trulicity  Metformin should be changed to twice daily or extended release once daily      Metastasis from thyroid cancer (CMS/[facility])  Assessment & Plan  Follows up with medical oncologist  and radiation oncologist  Status post radiation     Mixed hyperlipidemia  Assessment & Plan  Continue statin     Essential hypertension  Assessment & Plan  Stable  Continue metoprolol     Atherosclerosis of coronary artery  Assessment & Plan  Status post LAD stenting 17 years ago  Patient follows up with Dr. Ji  Continue metoprolol, statin  Restart aspirin  once cleared by neurosurgery    Orders entered into CPOE personally  Billing code: [id number]  Diagnoses:  Patient Active Problem List   Diagnosis   • BPH (benign prostatic hyperplasia)   • Atherosclerosis of coronary artery   • Cataract   • Essential hypertension   • Former tobacco use   • Mixed hyperlipidemia   • Metastasis from thyroid cancer (CMS/HCC)   • Type 2 diabetes mellitus without complication (CMS/HCC)   • Cyst of thyroid   • Esophageal reflux   • Paroxysmal atrial fibrillation (CMS/HCC)   • Pre-op evaluation   • Preop cardiovascular exam   • Cancer associated pain   • Low back pain with right-sided sciatica   • Malignant neoplasm of vertebral column, excluding sacrum and coccyx (CMS/HCC)   • Acquired hypothyroidism   • Ileus (CMS/HCC)           Arjun Dawkins MD  1/27/2022

## 2022-01-27 NOTE — PLAN OF CARE
Plan of Care Review  Plan of Care Reviewed With: patient  Progress: improving  Outcome Summary: Oriented to new care setting.

## 2022-01-27 NOTE — DISCHARGE SUMMARY
Inpatient Discharge Summary    BRIEF OVERVIEW  Admitting Provider:  H&P Notes 12/28/2021 to 1/27/2022         Date of Service   Author Author Type Status Note Type File Time    01/19/22 1657  Josephine Cruz PA C Physician Assistant Signed H&P 01/19/22 1659          Attending Provider: Devin Valera MD Attending phys phone: [phone]  Primary Care Physician at Discharge: Raj Arreola [phone]    Admission Date: 1/20/2022     Discharge Date: 1/27/2022    Primary Discharge Diagnosis  Low back pain with right-sided sciatica    Secondary Discharge Diagnosis  Active Hospital Problems    Diagnosis Date Noted   • Ileus (CMS/HCC) 01/25/2022   • Acquired hypothyroidism 01/22/2022   • Low back pain with right-sided sciatica 01/12/2022   • Malignant neoplasm of vertebral column, excluding sacrum and coccyx (CMS/HCC) 01/12/2022   • Paroxysmal atrial fibrillation (CMS/HCC) 10/25/2019   • Essential hypertension 06/20/2016   • Type 2 diabetes mellitus without complication (CMS/HCC) 06/20/2016   • Mixed hyperlipidemia 02/18/2016   • Atherosclerosis of coronary artery 09/14/2012   • Metastasis from thyroid cancer (CMS/HCC) 09/14/2012      Resolved Hospital Problems   No resolved problems to display.       DETAILS OF HOSPITAL STAY    Operative Procedures Performed  Procedure(s):  Stage 1: L2-L4 PSF with instrumentation, ORIF L3 pathologic fracture  Stage 2: L3 lateral corpectomy with expandable cage placement    Consults:   Consult Notes 12/28/2021 to 1/27/2022         Date of Service   Author Author Type Status Note Type File Time    01/25/22 1239  Brandy Mccullough PA C Physician Assistant Attested Consults 01/25/22 1643    01/22/22 1103  Teofilo Horowitz S, DO Physician Signed Consults 01/22/22 1111    01/20/22 1836  Travis Verdugo, DO Physician Signed Consults 01/20/22 1841    01/20/22 1724  Livia Manzanares CRNP Nurse Practitioner Addendum Consults 01/20/22 1801          Consult Orders During Admission:  IP  CONSULT TO INTENSIVIST  IP CONSULT TO CASE MANAGEMENT  IP CONSULT TO PULMONOLOGY/SLEEP MEDICINE  IP CONSULT TO PHYSICAL MEDICINE REHAB  IP CONSULT TO GASTROENTEROLOGY     Imaging  CT ABDOMEN PELVIS WITHOUT IV CONTRAST    Result Date: 1/25/2022  IMPRESSION: 1.  Findings in keeping with mild to moderate proximal colonic ileus. 2.  Postoperative changes from recent lumbar spine surgery as described. 3.  Mild prominence of the ureters and collecting systems bilaterally may be secondary to the distended urinary bladder. 4.  Additional chronic and incidental findings as described above.     X-RAY CHEST 2 VIEWS    Result Date: 1/18/2022  IMPRESSION: See comment.    X-RAY LUMBAR SPINE 2 OR 3 VIEWS    Result Date: 1/20/2022  IMPRESSION: Intraoperative fluoroscopic guidance as described above.     X-RAY OBSTRUCTION SERIES (ABDOMEN 2 VIEWS WITH CHEST 1 VIEW)    Result Date: 1/25/2022  IMPRESSION: Findings most suggestive of a severe small and large bowel ileus.    CT HEAD WITHOUT IV CONTRAST    Result Date: 1/20/2022  IMPRESSION:  No acute intracranial abnormality. COMMENT: A standard noncontrast head CT was performed. CT DOSE:  One or more dose reduction techniques (e.g. automated exposure control, adjustment of the mA and/or kV according to patient size, use of iterative reconstruction technique) utilized for this examination. Comparison:  No prior studies are available for comparison. Findings:  Sulci, ventricles and basal cisterns are within normal limits for patient's age.   Attenuation in the brain parenchyma appears within normal limits.  No acute hemorrhage, acute territorial infarct, or mass effect is seen. There is no extra-axial fluid collection.  The visualized paranasal sinuses demonstrates scattered mucosal thickening of the maxillary sinuses bilaterally. Mastoid air cells are clear.    CT LUMBAR SPINE WITHOUT IV CONTRAST    Result Date: 1/20/2022  IMPRESSION: Interval corpectomy at L3, posterior spinal fusion  hardware with bilateral pedicle screws at L2-L4. COMMENT: Lumbosacral alignment: Anatomic. Vertebral bodies: Interval corpectomy at L3.  There is posterior spinal fusion hardware with bilateral pedicle screws at L2-L4. Intervertebral discs: Normal in height. Lumbosacral spinal canal: Patent. OTHER: Scattered air lucencies in the paraspinal soft tissues and retro-orbital peritoneum consistent with recent postoperative changes.  Tiny hyperdense focus in the right psoas muscle, may represent a small area of hemorrhage or bony fragment.     FL FLUOROSCOPY TECHNICAL ASSISTANCE    Result Date: 1/20/2022  IMPRESSION: Intraoperative fluoroscopic guidance as described above.       Presenting Problem/History of Present Illness  Low back pain with right-sided sciatica, unspecified back pain laterality, unspecified chronicity [M54.41]  Malignant neoplasm of vertebral column, excluding sacrum and coccyx (CMS/HCC) [C41.2]   Matt Williamson is a pleasant 76 y.o. male with thyroid cancer metastatic to L3 vertebral body s/p radiation stereotactic body radiation 5/2020 seen today by the neurosurgery service for surgical discussion regarding growth of his L3 vertebral body metastatic lesion. He has had progressive worsening midline-line low back pain with radiation to the right knee. An MRI lumbar spine was obtained 11/22/2021 showing extension of the L3 mass. He was sent for a PET scan 12/7/2021 showing increased activity of the L3 lesion without further metastatic lesions appreciated. He was referred to [facility] however there has been some delay with getting an appointment.      He has had persistent midline low back pain with walking and standing which has progressively worsened. He does not have any weakness or bowel/bladder incontinence. He is following with oncology (Dr. Casey) who does not recommend any chemotherapy at this time but surgical intervention as the primary treatment given the suspected isolated area of  "progression at the L3 vertebral body.      Exam on Day of Discharge  Patient seen and examined on day of discharge. Patient feeling well. No chest pain or sob. Moving bowels.     Visit Vitals  BP (!) 112/56 (BP Location: Left upper arm, Patient Position: Lying)   Pulse 79   Temp 36.6 °C (97.9 °F) (Oral)   Resp 18   Ht 1.676 m (5' 6\")   Wt 74.8 kg (165 lb)   SpO2 97%   BMI 26.63 kg/m²     GEN: well-developed and well-nourished; not in acute distress  HEENT: normocephalic; atraumatic  NECK: no JVD; no bruits  CARDIO: regular rate and rhythm; no murmurs or rubs  RESP: clear to auscultation bilaterally; no rales, rhonchi, or wheezes  ABD: soft, non-distended, non-tender, normal bowel sounds  EXT: no cyanosis, clubbing, or edema  SKIN: clean, dry, warm, and intact  MUSCULOSKELETAL: no injury or deformity  NEURO: alert and oriented x 3; nonfocal  BEHAVIOR/EMOTIONAL: appropriate; cooperative    Hospital Course by Problem    Ileus (CMS/HCC)  Assessment & Plan  Patient w/ abd distension. NS ordered obstruction series - showed severe ileus  Discussed with surgery curbside. Will give suppository and enema  Seen by GI  CT a/p showed ileus as well  Per Dr. Frankel, patient looks great. Will start diet  Tolerating diet.    Acquired hypothyroidism  Assessment & Plan  Status post thyroidectomy  Continue levothyroxine  TSH 0.104 days ago  Repeat TFTs as outpatient in 4 weeks    Malignant neoplasm of vertebral column, excluding sacrum and coccyx (CMS/[facility])  Assessment & Plan  Metastatic thyroid ca S/p L3 pathologic fracture s/p right lateral L3 corpectomy and placement of an expandable cage, followed by L2-4 posterolateral instrumented fusion  Continue postop management per neurosurgery.  Drain was removed  DVT prophylaxis, Lewis, pain management per neurosurgery  Rehab placement on discharge  Discharge to [place] [facility] when ileus resolves    Paroxysmal atrial fibrillation (CMS/[facility])  Assessment & Plan  Currently in sinus " rhythm  Patient is not on long-term anticoagulation  He takes Rythmol as needed for palpitations    Type 2 diabetes mellitus without complication (CMS/[facility])  Assessment & Plan  Patient on Jardiance, Trulicity and metformin as outpatient  Follow Accu-Cheks and administer sliding scale insulin  Hemoglobin A1c 6.94 days ago  Continue Jardiance and can restart metformin prior to discharge  Metformin should be changed to twice daily or extended release once daily prior to discharge.  Patient takes metformin 4 times daily at home    Metastasis from thyroid cancer (CMS/[facility])  Assessment & Plan  Follows up with   Status post radiation    Mixed hyperlipidemia  Assessment & Plan  Continue statin    Essential hypertension  Assessment & Plan  Stable  Continue metoprolol    Atherosclerosis of coronary artery  Assessment & Plan  Status post LAD stenting 17 years ago  Patient follows up with Dr. Ji  Continue metoprolol, statin  Restart aspirin once cleared by neurosurgery        Discharge Medications     Medication List      START taking these medications    bisacodyL 10 mg suppository  Commonly known as: DULCOLAX  Start taking on: January 28, 2022  Insert 1 suppository (10 mg total) into the rectum daily for 10 days.  Dose: 10 mg     polyethylene glycol 17 gram packet  Commonly known as: MIRALAX  Take 17 g by mouth daily for 3 days.  Dose: 17 g     sennosides-docusate sodium 8.6-50 mg  Commonly known as: SENOKOT-S  Take 1 tablet by mouth 2 (two) times a day.  Dose: 1 tablet        CONTINUE taking these medications    aspirin 81 mg enteric coated tablet  Take 81 mg by mouth daily.  Dose: 81 mg     gabapentin 300 mg capsule  Commonly known as: NEURONTIN  Take 2 capsules (600 mg total) by mouth 3 (three) times a day.  Dose: 600 mg     glipiZIDE 5 mg tablet  Commonly known as: GLUCOTROL  Take 1 tablet by mouth as needed.  Dose: 1 tablet     JARDIANCE 10 mg tablet  TAKE 1 TABLET(10 MG) BY MOUTH DAILY  Generic drug:  empagliflozin     metFORMIN 500 mg tablet  Commonly known as: GLUCOPHAGE  TAKE 1 TABLET BY MOUTH FOUR TIMES DAILY     metoprolol succinate  mg 24 hr tablet  Commonly known as: TOPROL-XL  Take 1 tablet (100 mg total) by mouth 2 (two) times a day.  Dose: 100 mg     pantoprazole 40 mg EC tablet  Commonly known as: PROTONIX  TAKE 1 TABLET(40 MG) BY MOUTH DAILY     pravastatin 40 mg tablet  Commonly known as: PRAVACHOL  Take 1 tablet (40 mg total) by mouth daily.  Dose: 40 mg     propafenone 150 mg tablet  Commonly known as: RYTHMOL  Take 1 tablet by mouth as needed (Palpitations).  Dose: 1 tablet     SYNTHROID 150 mcg tablet  Take 1 tablet (150 mcg total) by mouth once daily.  Dose: 150 mcg  Generic drug: levothyroxine     TRULICITY 0.75 mg/0.5 mL pen injector  once a week. Every Tuesday  Generic drug: dulaglutide            Outpatient Follow-Ups            In 6 days Colt Heller MD [facility]    In 1 month Raj Arreola DO [facility]    In 3 months Colt Heller MD [facility]        Referrals:  No orders of the defined types were placed in this encounter.      Active Issues Requiring Follow-up  Issue: s/p lumbar surgery  What is Needed: f/u w/ neurosurgery      Test Results Pending at Discharge  Unresulted Labs (From admission, onward)            None          Discharge Disposition  [facility]  Code Status at Discharge: Full Code  Physician Order for Life-Sustaining Treatment Document Status      No documents found              The patient was discharged to [facility]. At the time of discharge I reviewed the plan of care with the patient. The patient had the opportunity to ask questions regarding the plan of care and I answered those questions to the patient's satisfaction. The patient demonstrated an understanding of the plan of care.      Total time spent in  coordination of care for discharge 35 minutes including review of medications and plan for follow-up.    Devin Valera MD  1/27/2022

## 2022-01-27 NOTE — NURSING NOTE
Pt being dcd to [facility]. Report given to Dorothea. IV dcd heart monitor sent back.

## 2022-01-27 NOTE — PATIENT CARE CONFERENCE
Care Progression Rounds Note  Date: 1/27/2022  Time: 9:02 AM     Patient Name: Matt Williamson     Medical Record Number: [id number]   YOB: 1945  Sex: Male      Room/Bed: 3020    Admitting Diagnosis: Low back pain with right-sided sciatica, unspecified back pain laterality, unspecified chronicity [M54.41]  Malignant neoplasm of vertebral column, excluding sacrum and coccyx (CMS/HCC) [C41.2]   Admit Date/Time: 1/20/2022  6:04 AM    Primary Diagnosis: Low back pain with right-sided sciatica  Principal Problem: Low back pain with right-sided sciatica    GMLOS: 5.2  Anticipated Discharge Date: 1/27/2022    AM-PAC:  Mobility Score: 14    Discharge Planning:  Current Living Arrangements: home  Concerns to be Addressed: adjustment to diagnosis/illness,care coordination/care conferences,discharge planning  Anticipated Discharge Disposition: acute rehab/Inpatient Rehab Facility  Type of Home Care Services: home OT,home PT,nursing  Type of Outpatient Services: physical therapy    Barriers to Discharge:  None    Comments:       Participants:  nursing,

## 2022-01-27 NOTE — PROGRESS NOTES
Neurosurgery Service -  Progress Note           TIME OF EVALUATION   Patient seen and examined :  at 8:29 AM on 22     INTERVAL HISTORY        No belly pain today. Ambulating. Voiding. Continues with right thigh pain.    SUBJECTIVE        77yo M with metastatic thyroid ca with L3 pathologic fracture s/p right lateral L3 corpectomy and placement of an expandable cage, followed by L2-4 posterolateral instrumented fusion 2022.     OBJECTIVE     Temp:  [36.2 °C (97.2 °F)-36.8 °C (98.3 °F)] 36.6 °C (97.9 °F)  Heart Rate:  [74-98] 79  Resp:  [18] 18  BP: (112-148)/(56-69) 112/56  SpO2:  [97 %-99 %] 97 %  Oxygen Therapy: None (Room air)      Output by Drain (mL) 22 0700 - 22 1859 22 1900 - 22 0659 22 0700 - 22 1859 22 1900 - 22 0659 22 0700 - 22 0829   Patient has no LDAs of requested type attached.          Intake/Output Summary (Last 24 hours) at 2022 0829  Last data filed at 2022 0400  Gross per 24 hour   Intake --   Output 650 ml   Net -650 ml           PHYSICAL EXAM        Abdomen: Mild distension. Soft. Non-tender.    Neurologic Examination:     Mental status: awake, alert, and oriented to person, place, and time. Speech and fund of knowledge are normal.     PERRL, EOMI, face symmetric, tongue protrudes to the midline, no nystagmus or diplopia.     Motor:   RUE: D: 5/5, T: , B: , WE: 5/, H/5, IH:   LUE: D: 5/5, T: , B: 5, WE: 5/5, H/5, IH:   RLE: IP  4/5, Q  5/5, DF 5/5, EHL 5/5, PF 55  LLE: IP  5/5, Q  5/5, DF 5/5, EHL 5/5, PF     Reflexes: Normoreflexive throughout.     Sensation: intact to light touch        DATA     Current anticoagulants:  heparin (porcine) 5,000 unit/mL injection 5,000 Units, subcutaneous, q8h SALIMA        •  acetaminophen, 650 mg, oral, q4h PRN  •  alum-mag hydroxide-simeth, 30 mL, oral, q4h PRN  •  bisacodyL, 10 mg, rectal, Daily PRN  •  bisacodyL, 10 mg, rectal, Daily  •   glucose, 16-32 g of dextrose, oral, PRN **OR** dextrose, 15-30 g of dextrose, oral, PRN **OR** glucagon, 1 mg, intramuscular, PRN **OR** dextrose in water, 25 mL, intravenous, PRN  •  diazePAM, 5 mg, oral, q6h PRN  •  diphenhydrAMINE, 25 mg, oral, q6h PRN **OR** diphenhydrAMINE, 25 mg, intravenous, q6h PRN  •  empagliflozin, 10 mg, oral, Daily  •  gabapentin, 600 mg, oral, q8h SALIMA  •  heparin (porcine), 5,000 Units, subcutaneous, q8h SALIMA  •  oxyCODONE, 5-10 mg, oral, q4h PRN **AND** HYDROmorphone, 0.5 mg, intravenous, q3h PRN  •  insulin aspart U-100, 3-5 Units, subcutaneous, Before meals & nightly  •  levothyroxine, 150 mcg, oral, Daily (6a)  •  metFORMIN, 500 mg, oral, BID with meals  •  metoprolol succinate XL, 100 mg, oral, BID  •  ondansetron ODT, 4 mg, oral, q8h PRN **OR** ondansetron, 4 mg, intravenous, q8h PRN  •  pantoprazole, 40 mg, oral, q PM  •  polyethylene glycol, 17 g, oral, Daily  •  pravastatin, 40 mg, oral, Daily  •  sennosides-docusate sodium, 1 tablet, oral, BID      Labs       CBC Results       01/27/22 01/26/22 01/25/22     0347 0951 0404    WBC 10.09 13.80 8.57    RBC 3.46 3.91 4.13    HGB 9.5 10.6 11.2    HCT 29.6 34.2 35.7    MCV 85.5 87.5 86.4    MCH 27.5 27.1 27.1    MCHC 32.1 31.0 31.4     342 339         BMP Results       01/27/22 01/26/22 01/25/22     0347 0951 0404     135 136    K 3.7 4.7 3.8    Cl 105 101 98    CO2 17 14 22    Glucose 115 121 163    BUN 17 23 27    Creatinine 1.0 1.0 1.2    Calcium 7.9 8.1 8.4    Anion Gap 13 20 16    EGFR >60.0 >60.0 58.6                Imaging    CT ABDOMEN PELVIS WITHOUT IV CONTRAST    Result Date: 1/25/2022  IMPRESSION: 1.  Findings in keeping with mild to moderate proximal colonic ileus. 2.  Postoperative changes from recent lumbar spine surgery as described. 3.  Mild prominence of the ureters and collecting systems bilaterally may be secondary to the distended urinary bladder. 4.  Additional chronic and incidental findings as  described above.     X-RAY CHEST 2 VIEWS    Result Date: 1/18/2022  IMPRESSION: See comment.    X-RAY LUMBAR SPINE 2 OR 3 VIEWS    Result Date: 1/20/2022  IMPRESSION: Intraoperative fluoroscopic guidance as described above.     X-RAY OBSTRUCTION SERIES (ABDOMEN 2 VIEWS WITH CHEST 1 VIEW)    Result Date: 1/25/2022  IMPRESSION: Findings most suggestive of a severe small and large bowel ileus.    CT HEAD WITHOUT IV CONTRAST    Result Date: 1/20/2022  IMPRESSION:  No acute intracranial abnormality. COMMENT: A standard noncontrast head CT was performed. CT DOSE:  One or more dose reduction techniques (e.g. automated exposure control, adjustment of the mA and/or kV according to patient size, use of iterative reconstruction technique) utilized for this examination. Comparison:  No prior studies are available for comparison. Findings:  Sulci, ventricles and basal cisterns are within normal limits for patient's age.   Attenuation in the brain parenchyma appears within normal limits.  No acute hemorrhage, acute territorial infarct, or mass effect is seen. There is no extra-axial fluid collection.  The visualized paranasal sinuses demonstrates scattered mucosal thickening of the maxillary sinuses bilaterally. Mastoid air cells are clear.    CT LUMBAR SPINE WITHOUT IV CONTRAST    Result Date: 1/20/2022  IMPRESSION: Interval corpectomy at L3, posterior spinal fusion hardware with bilateral pedicle screws at L2-L4. COMMENT: Lumbosacral alignment: Anatomic. Vertebral bodies: Interval corpectomy at L3.  There is posterior spinal fusion hardware with bilateral pedicle screws at L2-L4. Intervertebral discs: Normal in height. Lumbosacral spinal canal: Patent. OTHER: Scattered air lucencies in the paraspinal soft tissues and retro-orbital peritoneum consistent with recent postoperative changes.  Tiny hyperdense focus in the right psoas muscle, may represent a small area of hemorrhage or bony fragment.     FL FLUOROSCOPY TECHNICAL  ASSISTANCE    Result Date: 1/20/2022  IMPRESSION: Intraoperative fluoroscopic guidance as described above.          Increased risk from baseline; VTE Prophylaxis as ordered  Both Mechanical and Pharmacological Prophylaxis      ASSESSMENT AND PLAN           77yo M with metastatic thyroid ca with L3 pathologic fx now POD#7 L3 s/p right lateral L3 corpectomy and placement of an expandable cage, L2-4 posterolateral instrumented fusion. Continues with right thigh pain. IP weakness secondary to lateral surgical approach.     No abdominal pain today. Continues with BM                - Hgb 9.5. Likely dilutional given IV Fluids for NPO diet. Continue to trend,.               - LSO brace when OOB              - PT/OT. PMR consult placed - [facility] today              - DVT proph: SCDs and HSQ              - Continue multimodal pain regimen - limit narcotics secondary to ileus               - Voiding on own.               - Post-op CT lumbar spine shows well placed cage/hardware              - Restart ASA in at 10 days post-op (1/30/2022)              - Follow-up with neurosurgery at 2 weeks post-op for wound check          Discussed with GRAZYNA Iqbal

## 2022-01-28 ENCOUNTER — APPOINTMENT (INPATIENT)
Dept: OCCUPATIONAL THERAPY | Facility: REHABILITATION | Age: 77
DRG: 949 | End: 2022-01-28
Payer: MEDICARE

## 2022-01-28 ENCOUNTER — APPOINTMENT (OUTPATIENT)
Dept: PSYCHOLOGY | Facility: CLINIC | Age: 77
End: 2022-01-28
Attending: PHYSICAL MEDICINE & REHABILITATION
Payer: MEDICARE

## 2022-01-28 ENCOUNTER — APPOINTMENT (INPATIENT)
Dept: PHYSICAL THERAPY | Facility: REHABILITATION | Age: 77
DRG: 949 | End: 2022-01-28
Payer: MEDICARE

## 2022-01-28 ENCOUNTER — APPOINTMENT (INPATIENT)
Dept: CARDIOLOGY | Facility: REHABILITATION | Age: 77
DRG: 949 | End: 2022-01-28
Attending: INTERNAL MEDICINE
Payer: MEDICARE

## 2022-01-28 LAB
25(OH)D3 SERPL-MCNC: 12 NG/ML (ref 30–100)
ALBUMIN SERPL-MCNC: 2.3 G/DL (ref 3.4–5)
ALP SERPL-CCNC: 55 IU/L (ref 35–126)
ALT SERPL-CCNC: 8 IU/L (ref 16–63)
ANION GAP SERPL CALC-SCNC: 15 MEQ/L (ref 3–15)
AST SERPL-CCNC: 11 IU/L (ref 15–41)
BACTERIA URNS QL MICRO: ABNORMAL /HPF
BASOPHILS # BLD: 0.04 K/UL (ref 0.01–0.1)
BASOPHILS NFR BLD: 0.5 %
BILIRUB SERPL-MCNC: 1.2 MG/DL (ref 0.3–1.2)
BILIRUB UR QL STRIP.AUTO: NEGATIVE MG/DL
BSA FOR ECHO PROCEDURE: 1.87 M2
BUN SERPL-MCNC: 12 MG/DL (ref 8–20)
CALCIUM SERPL-MCNC: 8 MG/DL (ref 8.9–10.3)
CHLORIDE SERPL-SCNC: 101 MEQ/L (ref 98–109)
CLARITY UR REFRACT.AUTO: CLEAR
CO2 SERPL-SCNC: 21 MEQ/L (ref 22–32)
COLOR UR AUTO: YELLOW
CREAT SERPL-MCNC: 0.8 MG/DL (ref 0.8–1.3)
DIFFERENTIAL METHOD BLD: ABNORMAL
EOSINOPHIL # BLD: 0.31 K/UL (ref 0.04–0.54)
EOSINOPHIL NFR BLD: 3.9 %
ERYTHROCYTE [DISTWIDTH] IN BLOOD BY AUTOMATED COUNT: 15.8 % (ref 11.6–14.4)
FERRITIN SERPL-MCNC: 118 NG/ML (ref 24–250)
GFR SERPL CREATININE-BSD FRML MDRD: >60 ML/MIN/1.73M*2
GLUCOSE BLD-MCNC: 110 MG/DL (ref 70–99)
GLUCOSE BLD-MCNC: 169 MG/DL (ref 70–99)
GLUCOSE BLD-MCNC: 82 MG/DL (ref 70–99)
GLUCOSE BLD-MCNC: 87 MG/DL (ref 70–99)
GLUCOSE SERPL-MCNC: 92 MG/DL (ref 70–99)
GLUCOSE UR STRIP.AUTO-MCNC: >=1000 MG/DL
HCT VFR BLDCO AUTO: 28.8 % (ref 40.1–51)
HGB BLD-MCNC: 9.2 G/DL (ref 13.7–17.5)
HGB UR QL STRIP.AUTO: NEGATIVE
HYALINE CASTS #/AREA URNS LPF: ABNORMAL /LPF
IMM GRANULOCYTES # BLD AUTO: 0.36 K/UL (ref 0–0.08)
IMM GRANULOCYTES NFR BLD AUTO: 4.5 %
IRON SATN MFR SERPL: 14 % (ref 15–45)
IRON SERPL-MCNC: 26 UG/DL (ref 35–150)
KETONES UR STRIP.AUTO-MCNC: 4 MG/DL
LEUKOCYTE ESTERASE UR QL STRIP.AUTO: NEGATIVE
LYMPHOCYTES # BLD: 1.33 K/UL (ref 1.2–3.5)
LYMPHOCYTES NFR BLD: 16.6 %
MAGNESIUM SERPL-MCNC: 1.6 MG/DL (ref 1.8–2.5)
MCH RBC QN AUTO: 27.3 PG (ref 28–33.2)
MCHC RBC AUTO-ENTMCNC: 31.9 G/DL (ref 32.2–36.5)
MCV RBC AUTO: 85.5 FL (ref 83–98)
MONOCYTES # BLD: 0.91 K/UL (ref 0.3–1)
MONOCYTES NFR BLD: 11.4 %
MUCOUS THREADS URNS QL MICRO: ABNORMAL /LPF
NEUTROPHILS # BLD: 5.06 K/UL (ref 1.7–7)
NEUTS SEG NFR BLD: 63.1 %
NITRITE UR QL STRIP.AUTO: NEGATIVE
NRBC BLD-RTO: 0 %
PDW BLD AUTO: 9.4 FL (ref 9.4–12.4)
PH UR STRIP.AUTO: 6 [PH]
PHOSPHATE SERPL-MCNC: 2.7 MG/DL (ref 2.4–4.7)
PLATELET # BLD AUTO: 323 K/UL (ref 150–350)
POCT TEST: ABNORMAL
POCT TEST: ABNORMAL
POCT TEST: NORMAL
POCT TEST: NORMAL
POTASSIUM SERPL-SCNC: 3.5 MEQ/L (ref 3.6–5.1)
PREALB SERPL-MCNC: <7 MG/DL (ref 18–38)
PROT SERPL-MCNC: 4.8 G/DL (ref 6–8.2)
PROT UR QL STRIP.AUTO: ABNORMAL
RBC # BLD AUTO: 3.37 M/UL (ref 4.5–5.8)
RBC #/AREA URNS HPF: ABNORMAL /HPF
SODIUM SERPL-SCNC: 137 MEQ/L (ref 136–144)
SP GR UR REFRACT.AUTO: >1.035
SQUAMOUS URNS QL MICRO: ABNORMAL /HPF
TIBC SERPL-MCNC: 192 UG/DL (ref 270–460)
UIBC SERPL-MCNC: 166 UG/DL (ref 180–360)
UROBILINOGEN UR STRIP-ACNC: 0.2 EU/DL
WBC # BLD AUTO: 8.01 K/UL (ref 3.8–10.5)
WBC #/AREA URNS HPF: ABNORMAL /HPF

## 2022-01-28 PROCEDURE — 84134 ASSAY OF PREALBUMIN: CPT | Performed by: INTERNAL MEDICINE

## 2022-01-28 PROCEDURE — 84100 ASSAY OF PHOSPHORUS: CPT | Performed by: INTERNAL MEDICINE

## 2022-01-28 PROCEDURE — 80053 COMPREHEN METABOLIC PANEL: CPT | Performed by: INTERNAL MEDICINE

## 2022-01-28 PROCEDURE — 82306 VITAMIN D 25 HYDROXY: CPT | Performed by: INTERNAL MEDICINE

## 2022-01-28 PROCEDURE — 12800001 HC ROOM AND CARE SEMIPRIVATE REHAB-BRAIN INJ

## 2022-01-28 PROCEDURE — 83540 ASSAY OF IRON: CPT | Performed by: INTERNAL MEDICINE

## 2022-01-28 PROCEDURE — 63700000 HC SELF-ADMINISTRABLE DRUG: Performed by: INTERNAL MEDICINE

## 2022-01-28 PROCEDURE — 36415 COLL VENOUS BLD VENIPUNCTURE: CPT | Performed by: INTERNAL MEDICINE

## 2022-01-28 PROCEDURE — 93970 EXTREMITY STUDY: CPT

## 2022-01-28 PROCEDURE — 97166 OT EVAL MOD COMPLEX 45 MIN: CPT | Mod: GO

## 2022-01-28 PROCEDURE — 85025 COMPLETE CBC W/AUTO DIFF WBC: CPT | Performed by: INTERNAL MEDICINE

## 2022-01-28 PROCEDURE — 82728 ASSAY OF FERRITIN: CPT | Performed by: INTERNAL MEDICINE

## 2022-01-28 PROCEDURE — 83735 ASSAY OF MAGNESIUM: CPT | Performed by: INTERNAL MEDICINE

## 2022-01-28 PROCEDURE — 97162 PT EVAL MOD COMPLEX 30 MIN: CPT | Mod: GP

## 2022-01-28 PROCEDURE — 82040 ASSAY OF SERUM ALBUMIN: CPT | Performed by: INTERNAL MEDICINE

## 2022-01-28 PROCEDURE — 63600000 HC DRUGS/DETAIL CODE: Performed by: INTERNAL MEDICINE

## 2022-01-28 PROCEDURE — 90791 PSYCH DIAGNOSTIC EVALUATION: CPT | Performed by: PSYCHOLOGIST

## 2022-01-28 PROCEDURE — 97116 GAIT TRAINING THERAPY: CPT | Mod: GP

## 2022-01-28 PROCEDURE — 81001 URINALYSIS AUTO W/SCOPE: CPT | Performed by: INTERNAL MEDICINE

## 2022-01-28 RX ORDER — ASPIRIN 81 MG/1
81 TABLET ORAL DAILY
Status: DISCONTINUED | OUTPATIENT
Start: 2022-02-03 | End: 2022-02-08 | Stop reason: HOSPADM

## 2022-01-28 RX ORDER — SODIUM CHLORIDE, SODIUM LACTATE, POTASSIUM CHLORIDE, CALCIUM CHLORIDE 600; 310; 30; 20 MG/100ML; MG/100ML; MG/100ML; MG/100ML
INJECTION, SOLUTION INTRAVENOUS CONTINUOUS
Status: DISCONTINUED | OUTPATIENT
Start: 2022-01-28 | End: 2022-01-29

## 2022-01-28 RX ORDER — SODIUM BICARBONATE 650 MG/1
650 TABLET ORAL 3 TIMES DAILY
Status: DISCONTINUED | OUTPATIENT
Start: 2022-01-28 | End: 2022-01-29

## 2022-01-28 RX ORDER — INSULIN ASPART 100 [IU]/ML
4 INJECTION, SOLUTION INTRAVENOUS; SUBCUTANEOUS
Status: DISCONTINUED | OUTPATIENT
Start: 2022-01-28 | End: 2022-01-29

## 2022-01-28 RX ORDER — INSULIN GLARGINE 100 [IU]/ML
10 INJECTION, SOLUTION SUBCUTANEOUS DAILY
Status: DISCONTINUED | OUTPATIENT
Start: 2022-01-28 | End: 2022-01-29

## 2022-01-28 RX ORDER — ERGOCALCIFEROL 1.25 MG/1
50000 CAPSULE ORAL WEEKLY
Status: DISCONTINUED | OUTPATIENT
Start: 2022-01-29 | End: 2022-02-08 | Stop reason: HOSPADM

## 2022-01-28 RX ORDER — MAGNESIUM CHLORIDE 64 MG
128 TABLET, DELAYED RELEASE (ENTERIC COATED) ORAL 2 TIMES DAILY
Status: DISCONTINUED | OUTPATIENT
Start: 2022-01-28 | End: 2022-02-07

## 2022-01-28 RX ORDER — FERROUS SULFATE 325(65) MG
325 TABLET ORAL 2 TIMES DAILY WITH MEALS
Status: DISCONTINUED | OUTPATIENT
Start: 2022-01-29 | End: 2022-01-30

## 2022-01-28 RX ADMIN — MAGNESIUM 64 MG (MAGNESIUM CHLORIDE) TABLET,DELAYED RELEASE 128 MG: at 22:03

## 2022-01-28 RX ADMIN — PANTOPRAZOLE SODIUM 40 MG: 40 TABLET, DELAYED RELEASE ORAL at 08:06

## 2022-01-28 RX ADMIN — GABAPENTIN 600 MG: 300 CAPSULE ORAL at 22:02

## 2022-01-28 RX ADMIN — SODIUM BICARBONATE 650 MG TABLET 650 MG: at 18:14

## 2022-01-28 RX ADMIN — DOCUSATE SODIUM 100 MG: 100 CAPSULE, LIQUID FILLED ORAL at 08:04

## 2022-01-28 RX ADMIN — LEVOTHYROXINE SODIUM 150 MCG: 150 TABLET ORAL at 05:59

## 2022-01-28 RX ADMIN — HEPARIN SODIUM 5000 UNITS: 5000 INJECTION, SOLUTION INTRAVENOUS; SUBCUTANEOUS at 13:40

## 2022-01-28 RX ADMIN — GABAPENTIN 600 MG: 300 CAPSULE ORAL at 13:40

## 2022-01-28 RX ADMIN — METOPROLOL SUCCINATE 100 MG: 100 TABLET, EXTENDED RELEASE ORAL at 22:12

## 2022-01-28 RX ADMIN — ACETAMINOPHEN 650 MG: 325 TABLET, FILM COATED ORAL at 18:14

## 2022-01-28 RX ADMIN — ACETAMINOPHEN 650 MG: 325 TABLET, FILM COATED ORAL at 12:46

## 2022-01-28 RX ADMIN — INSULIN GLARGINE 10 UNITS: 100 INJECTION, SOLUTION SUBCUTANEOUS at 10:21

## 2022-01-28 RX ADMIN — POTASSIUM BICARBONATE 40 MEQ: 782 TABLET, EFFERVESCENT ORAL at 22:24

## 2022-01-28 RX ADMIN — OXYCODONE HYDROCHLORIDE 5 MG: 5 TABLET ORAL at 08:05

## 2022-01-28 RX ADMIN — HEPARIN SODIUM 5000 UNITS: 5000 INJECTION, SOLUTION INTRAVENOUS; SUBCUTANEOUS at 05:59

## 2022-01-28 RX ADMIN — ACETAMINOPHEN 650 MG: 325 TABLET, FILM COATED ORAL at 23:24

## 2022-01-28 RX ADMIN — ACETAMINOPHEN 650 MG: 325 TABLET, FILM COATED ORAL at 05:59

## 2022-01-28 RX ADMIN — SODIUM BICARBONATE 650 MG TABLET 650 MG: at 08:04

## 2022-01-28 RX ADMIN — SENNOSIDES 3 TABLET: 8.6 TABLET, FILM COATED ORAL at 12:48

## 2022-01-28 RX ADMIN — SODIUM BICARBONATE 650 MG TABLET 650 MG: at 22:02

## 2022-01-28 RX ADMIN — POLYETHYLENE GLYCOL 3350 17 G: 17 POWDER, FOR SOLUTION ORAL at 08:08

## 2022-01-28 RX ADMIN — DOCUSATE SODIUM 100 MG: 100 CAPSULE, LIQUID FILLED ORAL at 22:02

## 2022-01-28 RX ADMIN — DOCUSATE SODIUM 100 MG: 100 CAPSULE, LIQUID FILLED ORAL at 18:14

## 2022-01-28 RX ADMIN — INSULIN ASPART 4 UNITS: 100 INJECTION, SOLUTION INTRAVENOUS; SUBCUTANEOUS at 18:17

## 2022-01-28 RX ADMIN — MAGNESIUM 64 MG (MAGNESIUM CHLORIDE) TABLET,DELAYED RELEASE 128 MG: at 12:48

## 2022-01-28 RX ADMIN — GABAPENTIN 600 MG: 300 CAPSULE ORAL at 05:59

## 2022-01-28 RX ADMIN — PRAVASTATIN SODIUM 40 MG: 20 TABLET ORAL at 18:13

## 2022-01-28 RX ADMIN — ACETAMINOPHEN 650 MG: 325 TABLET, FILM COATED ORAL at 00:14

## 2022-01-28 RX ADMIN — METFORMIN HYDROCHLORIDE 500 MG: 500 TABLET ORAL at 08:04

## 2022-01-28 RX ADMIN — HEPARIN SODIUM 5000 UNITS: 5000 INJECTION, SOLUTION INTRAVENOUS; SUBCUTANEOUS at 22:01

## 2022-01-28 RX ADMIN — SODIUM CHLORIDE, POTASSIUM CHLORIDE, SODIUM LACTATE AND CALCIUM CHLORIDE: 600; 310; 30; 20 INJECTION, SOLUTION INTRAVENOUS at 23:19

## 2022-01-28 RX ADMIN — POTASSIUM BICARBONATE 40 MEQ: 782 TABLET, EFFERVESCENT ORAL at 12:45

## 2022-01-28 RX ADMIN — INSULIN ASPART 4 UNITS: 100 INJECTION, SOLUTION INTRAVENOUS; SUBCUTANEOUS at 12:49

## 2022-01-28 RX ADMIN — SODIUM CHLORIDE, POTASSIUM CHLORIDE, SODIUM LACTATE AND CALCIUM CHLORIDE: 600; 310; 30; 20 INJECTION, SOLUTION INTRAVENOUS at 11:46

## 2022-01-28 SDOH — ECONOMIC STABILITY: FOOD INSECURITY: WITHIN THE PAST 12 MONTHS, THE FOOD YOU BOUGHT JUST DIDN'T LAST AND YOU DIDN'T HAVE MONEY TO GET MORE.: NEVER TRUE

## 2022-01-28 SDOH — ECONOMIC STABILITY: FOOD INSECURITY: WITHIN THE PAST 12 MONTHS, YOU WORRIED THAT YOUR FOOD WOULD RUN OUT BEFORE YOU GOT MONEY TO BUY MORE.: NEVER TRUE

## 2022-01-28 ASSESSMENT — MINI MENTAL STATE EXAM
PLEASE NAME 2 OBJECTS? (EX. PEN, WATCH): 2-->NAMES THEM BOTH OBJECTS
WHAT CITY/TOWN ARE WE IN?: 1-->CORRECT
WHAT IS TODAY'S DATE?: 1-->CORRECT
WHAT STATE [OR PROVINCE] ARE WE IN?: 1-->CORRECT
WHAT MONTH IS THIS?: 1-->CORRECT
NOW WHAT WERE THE THREE OBJECTS I ASKED YOU TO REMEMBER?: 3-->ALL CORRECT
NOW WHAT WERE THE THREE OBJECTS I ASKED YOU TO REMEMBER?: 2-->2 OUT OF 3 CORRECT
SAY: I WOULD LIKE YOU TO REPEAT THIS PHRASE AFTER ME: NO IFS, ANDS, OR BUTS.: 1-->ABLE TO PERFORM
WHICH SEASON IS THIS?: 1-->CORRECT
WHAT YEAR IS THIS?: 1-->CORRECT
SAY:  READ THE WORDS ON THE PAGE AND THEN DO WHAT IT SAYS.  THEN HAND THE PERSON
THE SHEET WITH CLOSE YOUR EYES ON IT.  IF THE SUBJECT READS AND DOES NOT CLOSE THEIR
EYES, REPEAT UP TO THREE TIMES.  SCORE ONLY IF SUBJECT CLOSES EYES.: 1-->ABLE TO PERFORM
WHAT DAY OF THE WEEK IS THIS?: 1-->CORRECT
SAY: PUT THE PAPER DOWN ON THE FLOOR, SCORE IF PAPER IS PLACED BACK ON FLOOR: 3-->ALL CORRECT
WHAT IS THE NAME OF THIS BUILDING [IN FACILITY]?/WHAT IS THE STREET ADDRESS OF THIS HOUSE [IN HOME]?: 1-->CORRECT
SPELL THE WORD WORLD. NOW SPELL IT BACKWARDS.: 5-->DLROW
WHAT IS THE NAME OF THIS BUILDING [IN FACILITY]?/WHAT IS THE STREET ADDRESS OF THIS HOUSE [IN HOME]?: 1-->CORRECT

## 2022-01-28 ASSESSMENT — COGNITIVE AND FUNCTIONAL STATUS - GENERAL
MOOD: HOPEFUL;MOTIVATED
PERCEPTUAL FUNCTION: NORMAL
PSYCHOMOTOR FUNCTIONING: DECREASED
REMOTE MEMORY: WNL
SLEEP_WAKE_CYCLE: NO CHANGE
LIBIDO: NON-CONTRIBUTORY
INSIGHT: INTACT
CONCENTRATION: IMPAIRED, MILD
EYE_CONTACT: WNL
IMPULSE CONTROL: INTACT
SPEECH: REGULAR
AFFECT: WFL
APPEARANCE: WELL GROOMED
RECENT MEMORY: MILD LOSS
AFFECT: FULL RANGE
AROUSAL LEVEL: ALERT
THOUGHT_PROCESS: WNL
ORIENTATION: FULLY ORIENTED
APPETITE: DECREASED
THOUGHT_CONTENT: APPROPRIATE
EST. PREMORBID INTELLIGENCE: ABOVE AVERAGE
DELUSIONS: NONE OR AGE APPROPRIATE
ATTENTION: WNL

## 2022-01-28 NOTE — PROGRESS NOTES
Subjective    PPatient seen and examined on rounds.  Chart reviewed.  Events overnight noted.  History reviewed briefly with patient.    CC:  Deficits in mobility, transfers, self-care status post L2-L4 posterior spinal fusion with instrumentation, ORIF of L3 pathologic fracture, L3 lateral corpectomy with expandable cage placement, for L3 pathologic fracture, from metastatic thyroid cancer, multiple medical problems.    HPI:  Mr. Matt Williamson is a 76-year-old right-handed white male with chronic conditions significant for hypertension, hypothyroidism, thyroid cancer status post thyroidectomy, atrial fibrillation, coronary artery disease, hyperlipidemia, type 2 diabetes mellitus, metastatic thyroid carcinoma with metastasis to the L3 vertebral body status post stereotactic radiation in May 2020, who had progressively worsening midline low back pain with radiation to the right knee. MRI lumbar spine obtained on 11/22/21 showing extension of the L3 mass, moderate-severe foraminal narrowing on the right secondary to retropulsion of the inferior endplate in the setting of ligament and joint hypertrophy. CT demonstrates significant bony erosion of the L3 vertebral body with a new oblique fracture. PET scan on 12/7/21 showed increased activity of the L3 lesion without further metastatic lesions appreciated. He was initially referred to [facility] however there has been some delay with getting an appointment.  He denied any bowel or bladder incontinence or weakness and follows up with oncologist Dr. Casey who did not recommend any chemotherapy at this time but surgical intervention as the primary treatment given the suspected isolated area of progression at the L3 vertebral body.  Per neurosurgery notes he had preserved strength except 2 beats ankle clonus on the left.   He was recommended surgical resection by neurosurgery as well as oncology.  He was admitted to [facility] on 1/20/22 and underwent a stage I  "L2-L4 posterior spinal fusion with instrumentation, ORIF of L3 pathologic fracture, and stage II L3 lateral corpectomy with expandable cage placement with neurosurgeon Dr Colt Heller on 1/20/22 at [facility].  He is allowed weightbearing as tolerated on both lower extremities and was given an LSO brace for use when out of bed.  Postoperative course was significant for urinary retention and he had an indwelling Lewis catheter.  The Lewis catheter was removed 2 days back and per patient, he is starting to void now.  I mentioned to him we will monitor post void residuals to see if he is emptying his bladder completely or not.  He has some paresthesias/hyperesthesias in his right thigh especially lateral aspect and some right hip proximal weakness and per neurosurgery notes that is not uncommon given the nature of his anterior lumbar surgery.  He is on subcutaneous Heparin and SCDs for DVT prophylaxis.  I discussed his recent clinical course with patient and with his wife at bedside.  On 1/27/22, hemoglobin was 9.5, WBC count 10.09, platelets are 304, BUN was 17, and creatinine was 1.0.  He has been needing assistance for mobility, transfers, self-care. He is transferred to [facility] on 1/27/22 for further rehabilitation care.     SUBJ: Tolerating therapies per endurance.  Reports pain medications are helping.  Had a bowel movement.  Voiding well.    ROS: Denies chest pain or shortness of breath. Other ROS negative. Past, family, social history is unchanged.      Physical Exam      Blood pressure 123/62, pulse 79, temperature 36.6 °C (97.9 °F), temperature source Oral, resp. rate 18, height 1.676 m (5' 6\"), weight 75 kg (165 lb 6 oz), SpO2 97 %.  Body mass index is 26.69 kg/m².    General Appearance: Not in acute distress  Head/Ear/Nose/Throat: Normocephalic; Atraumatic.   Eye: EOMI; PERRL.   Neck: No JVD; No Bruits.   Respiratory: Decreased breath sounds at bases.   Cardiovascular: RRR; Normal S1, S2. "   Gastrointestinal: Soft; bowel sounds present but somewhat hypoactive.  Extremities: Bilateral lower extremity edema noted.    Musculoskeletal: Functional active range of motion in both upper extremities.  Some limitation of active range of motion in right hip limited by pain.    Neurological: AAO ×3. Speech is fluent. Cranial nerve examination does not reveal any gross facial asymmetry. Strength testing shows about 4+/5 strength in both upper extremities.  Right hip flexion is less than 3/5.  Right quadriceps is 4/5 with the right thigh supported.  Right ankle dorsiflexion, plantar flexion and right EHL are 4+/5.  Left hip flexion is 4/5.  Left quadriceps is 4+/5.  Left ankle dorsiflexion, plantar flexion and left EHL are 4+/5.  He is grossly able to localize touch and position sense in great toes, but does report some hyperesthesia in the right thigh lateral aspect.  Deep tendon reflexes are hypoactive and symmetric bilaterally.  Plantars are flexor.  Coordination is functional upper extremities.    Behavior/Emotional: Appropriate; Cooperative.   Skin: No obvious rashes noted.  Healing incision noted on right flank.  Healing incision also noted on posterior lumbar spine.  Mepilex border dressing in place on incisions.      Current Facility-Administered Medications:   •  acetaminophen (TYLENOL) tablet 650 mg, 650 mg, oral, q4h PRN, Arjun Dawkins MD  •  acetaminophen (TYLENOL) tablet 650 mg, 650 mg, oral, q6h SALIMA, Arjun Dawkins MD, 650 mg at 01/28/22 1246  •  alum-mag hydroxide-simeth (MAALOX) 200-200-20 mg/5 mL suspension 30 mL, 30 mL, oral, q4h PRN, Arjun Dawkins MD  •  [START ON 2/3/2022] aspirin enteric coated tablet 81 mg, 81 mg, oral, Daily, Arjun Dawkins MD  •  bisacodyL (DULCOLAX) 10 mg suppository 10 mg, 10 mg, rectal, Daily (6p), Arjun Dawkins MD  •  glucose chewable tablet 16-32 g of dextrose, 16-32 g of dextrose, oral, PRN **OR** dextrose 40 % oral gel 15-30 g of dextrose, 15-30 g of dextrose,  oral, PRN **OR** glucagon (GLUCAGEN) injection 1 mg, 1 mg, intramuscular, PRN **OR** dextrose in water injection 12.5 g, 25 mL, intravenous, PRN, Arjun Dawkins MD  •  diazePAM (VALIUM) tablet 5 mg, 5 mg, oral, q6h PRN, Arjun Dawkins MD  •  diphenhydrAMINE (BENADRYL) capsule 25 mg, 25 mg, oral, q6h PRN, Arjun Dawkins MD  •  docusate sodium (COLACE) capsule 100 mg, 100 mg, oral, TID, Arjun Dawkins MD, 100 mg at 01/28/22 0804  •  gabapentin (NEURONTIN) capsule 600 mg, 600 mg, oral, q8h SALIMA, Arjun Dawkins MD, 600 mg at 01/28/22 1340  •  heparin (porcine) 5,000 unit/mL injection 5,000 Units, 5,000 Units, subcutaneous, q8h SALIMA, Arjun Dawkins MD, 5,000 Units at 01/28/22 1340  •  insulin aspart U-100 (NovoLOG) pen 1-10 Units, 1-10 Units, subcutaneous, With meals & nightly, Arjun Dawkins MD, 1 Units at 01/27/22 2206  •  insulin aspart U-100 (NovoLOG) pen 4 Units, 4 Units, subcutaneous, TID with meals, Arjun Dawkins MD, 4 Units at 01/28/22 1249  •  insulin glargine U-100 (LANTUS SOLOSTAR/BASAGLAR) pen 10 Units, 10 Units, subcutaneous, Daily, Arjun Dawkins MD, 10 Units at 01/28/22 1021  •  lactated ringer's infusion, , intravenous, Continuous, Arjun Dawkins MD, Last Rate: 100 mL/hr at 01/28/22 1146, New Bag at 01/28/22 1146  •  levothyroxine (SYNTHROID) tablet 150 mcg, 150 mcg, oral, Daily (6a), Arjun Dawkins MD, 150 mcg at 01/28/22 0559  •  magnesium chloride tablet,delayed release (DR/EC) 128 mg, 128 mg, oral, BID, Arjun Dawkins MD, 128 mg at 01/28/22 1248  •  [Provider Managed Hold] metFORMIN (GLUCOPHAGE) tablet 500 mg, 500 mg, oral, BID with meals, Arjun Dawkins MD, 500 mg at 01/28/22 0804  •  metoprolol succinate XL (TOPROL-XL) 24 hr ER tablet 100 mg, 100 mg, oral, Nightly, Arjun Dawkins MD, 100 mg at 01/27/22 2056  •  ondansetron ODT (ZOFRAN-ODT) disintegrating tablet 4 mg, 4 mg, oral, q8h PRN, Arjun Dawkins MD  •  oxyCODONE (ROXICODONE) immediate release tablet 10 mg, 10 mg, oral, q4h PRN **AND**  oxyCODONE (ROXICODONE) immediate release tablet 5 mg, 5 mg, oral, q4h PRN, Arjun Dawkins MD, 5 mg at 01/28/22 0805  •  polyethylene glycol (MIRALAX) 17 gram packet 17 g, 17 g, oral, Daily, Arjun Dawkins MD, 17 g at 01/28/22 0808  •  potassium bicarbonate (EFFER-K) disintegrating tablet 40 mEq, 40 mEq, oral, BID, Arjun Dawkins MD, 40 mEq at 01/28/22 1245  •  pravastatin (PRAVACHOL) tablet 40 mg, 40 mg, oral, Daily (6p), Arjun Dawkins MD  •  senna (SENOKOT) tablet 3 tablet, 3 tablet, oral, Daily, Arjun Dawkins MD, 3 tablet at 01/28/22 1248  •  sodium bicarbonate tablet 650 mg, 650 mg, oral, TID, Arjun Dawkins MD       Labs / Radiology    Lab Results   Component Value Date    WBC 8.01 01/28/2022    HGB 9.2 (L) 01/28/2022    HCT 28.8 (L) 01/28/2022    MCV 85.5 01/28/2022     01/28/2022     Lab Results   Component Value Date    GLUCOSE 92 01/28/2022    CALCIUM 8.0 (L) 01/28/2022     01/28/2022    K 3.5 (L) 01/28/2022    CO2 21 (L) 01/28/2022     01/28/2022    BUN 12 01/28/2022    CREATININE 0.8 01/28/2022     Assessment and Plan    ASSESSMENT PLAN:  1. 76-year-old right-handed white male with chronic conditions significant for hypertension, hypothyroidism, thyroid cancer status post thyroidectomy, atrial fibrillation, coronary artery disease, hyperlipidemia, type 2 diabetes mellitus, metastatic thyroid carcinoma with metastasis to the L3 vertebral body status post stereotactic radiation in May 2020, who had progressively worsening midline low back pain with radiation to the right knee. MRI lumbar spine obtained on 11/22/21 showing extension of the L3 mass, moderate-severe foraminal narrowing on the right secondary to retropulsion of the inferior endplate in the setting of ligament and joint hypertrophy. CT demonstrates significant bony erosion of the L3 vertebral body with a new oblique fracture. PET scan on 12/7/21 showed increased activity of the L3 lesion without further metastatic lesions  appreciated. He was initially referred to [facility] however there has been some delay with getting an appointment.  He denied any bowel or bladder incontinence or weakness and follows up with oncologist Dr. Casey who did not recommend any chemotherapy at this time but surgical intervention as the primary treatment given the suspected isolated area of progression at the L3 vertebral body.  Per neurosurgery notes he had preserved strength except 2 beats ankle clonus on the left.   He was recommended surgical resection by neurosurgery as well as oncology.  He was admitted to [facility] on 1/20/22 and underwent a stage I L2-L4 posterior spinal fusion with instrumentation, ORIF of L3 pathologic fracture, and stage II L3 lateral corpectomy with expandable cage placement with neurosurgeon Dr Colt Heller on 1/20/22 at [facility].  He is allowed weightbearing as tolerated on both lower extremities and was given an LSO brace for use when out of bed.  Postoperative course was significant for urinary retention and he had an indwelling Lewis catheter.  The Lewis catheter was removed 2 days back and per patient, he is starting to void now.  I mentioned to him we will monitor post void residuals to see if he is emptying his bladder completely or not.  He has some paresthesias/hyperesthesias in his right thigh especially lateral aspect and some right hip proximal weakness and per neurosurgery notes that is not uncommon given the nature of his anterior lumbar surgery.  He is on subcutaneous Heparin and SCDs for DVT prophylaxis.  I discussed his recent clinical course with patient and with his wife at bedside.  On 1/27/22, hemoglobin was 9.5, WBC count 10.09, platelets are 304, BUN was 17, and creatinine was 1.0.  He has been needing assistance for mobility, transfers, self-care. He is transferred to [facility] on 1/27/22 for further rehabilitation care.      2. DVT prophylaxis - on subcutaneous Heparin.   On SCDs.  Check doppler. Platelets 323 on 1/28/2022.     3.  Neurosurgery - status post L2-L4 posterior spinal fusion with instrumentation, ORIF of L3 pathologic fracture, L3 lateral corpectomy with expandable cage placement, for L3 pathologic fracture, from metastatic thyroid cancer, multiple medical problems - continue PT, OT, psychology.  Follow falls precautions, cardiac precautions, monitor pulse oximetry in therapy.  Follow spinal precautions.  LSO brace when out of bed.     4. GI - On Protonix for GI prophylaxis. Continue Colace, Senokot, MiraLAX, Dulcolax.  On Zofran ODT for nausea.  On Maalox PRN.     5.  - Postoperative course was significant for urinary retention and he had an indwelling Lewis catheter.  The Lewis catheter was removed 2 days back and per patient, he is starting to void now.  I mentioned to him we will monitor post void residuals to see if he is emptying his bladder completely or not.       6.  Diabetes mellitus - on Jardiance.  On Metformin.  On sliding-scale NovoLog coverage.  On hypoglycemic protocol.     7. Pain - on Tylenol.  On Neurontin.  On PRN Oxycodone.  On Valium PRN for spasms.     8. Hematology -hemoglobin 9.2, WBC 8.01 on 1/28/2022.     9.  Endocrinology - history of thyroid cancer status post thyroidectomy.  On Synthroid.     10. CVS - history of hypertension, atrial fibrillation, coronary artery disease - on Toprol-XL.     11.  Hyperlipidemia - on Pravachol.     12.  Oncology - history of metastatic thyroid cancer status post thyroidectomy.  He had L2-L4 posterior spinal fusion with instrumentation, ORIF of L3 pathologic fracture, L3 lateral corpectomy with expandable cage placement, for L3 pathologic fracture, from metastatic thyroid cancer.  He will follow up with oncology and neurosurgery.     13.  Skin - monitor healing of incisions on right flank and on posterior lumbar spine.  Dermal defense will be consulted.     14.  F/E/N - on sodium bicarbonate.     15.  Rehabilitation medicine - Continue comprehensive rehabilitation care. Continue PT, OT, psychology.  We will follow falls precautions, cardiac precautions, monitor pulse oximetry in therapy and follow aspiration precautions.  We will follow spinal precautions.  LSO brace when out of bed.Tolerating therapies per endurance.  Reports pain medications are helping.  Had a bowel movement.  Voiding well.     16. Reviewed labs today. BUN 12, creatinine 0.8 on 1/28/2022.           James Hidalgo MD  1/28/2022

## 2022-01-28 NOTE — PROGRESS NOTES
Patient: Matt Williamson  Location: [facility]  MRN: [id number]  Today's date: 1/28/2022    History of Present Illness  Marco A is a 76 y.o. male admitted on 1/27/2022 with Lumbar disc disease with radiculopathy [M51.16]  S/P lumbar fusion [Z98.1]. Principal problem is Lumbar disc disease with radiculopathy.    Patient is a 76-year-old male with a history of atrial fibrillation, CAD, hypertension, hypothyroidism, hyperlipidemia, thyroid cancer, type 2 diabetes developed significant mid to low back pain with walking and standing.  He has metastatic thyroid carcinoma with metastasis to the L3 body status post radiation in May 2020.  Recent PET scan showed increased activity at the L3 level without further metastatic lesions.  He was evaluated by neurosurgery who recommended surgical resection.  He was taken to the OR on 1/20 and underwent a stage I L2-L4 PSF with instrumentation, ORIF of L3 pathologic fracture, stage II L3 lateral corpectomy with expandable cage placement.  There has been no significant postoperative complications.  His pain is fairly well controlled.  An LSO has been ordered.    Past Medical History  Marco A has a past medical history of Atrial fibrillation with rapid ventricular response (CMS/HCC) (10/25/2019), BPH (benign prostatic hyperplasia), Coronary artery disease, Hypertension, Hypothyroidism, Mixed hyperlipidemia, Thyroid cancer (CMS/HCC), Type 2 diabetes mellitus without complication (CMS/HCC), and Vertigo.      PT Vitals    Date/Time Pulse HR Source BP BP Location BP Method Pt Position [facility]   01/28/22 0958 75 Monitor 130/57 Right upper arm Automatic Sitting BS      PT Pain    Date/Time Pain Type Rating: Rest [facility]   01/28/22 0958 Pain Reassessment 0 BS          Prior Living Environment      Most Recent Value   People in Home spouse   Current Living Arrangements home   Living Environment Comment pt lives in 2  in [place]. 3 steps to enter with R rail, bedroom  and shower stall on first floor, full flight B/L rails to shower stall and bedroom, typically sleeps on second floor. has a bed and shower stall on first floor   Number of Stairs, Main Entrance 3   Stair Railings, Main Entrance railing on right side (ascending)   Location, Patient Bedroom second floor, must negotiate stairs to access   Location, Bathroom second floor, must negotiate stairs to access   Number of Stairs, Within Home, Primary 12   Stair Railings, Within Home, Primary railings on both sides of stairs          Prior Level of Function      Most Recent Value   Dominant Hand right   Ambulation assistive equipment   Transferring assistive equipment   Toileting independent   Bathing independent   Dressing independent   Eating independent   Prior Level of Function Comment Pt reports he was independent for all mobility using SPC, has shower chair at home.   Assistive Device Currently Used at Home cane, straight, shower chair           IRF PT Evaluation and Treatment - 01/28/22 0945        PT Time Calculation    Start Time 0945     Stop Time 1000     Time Calculation (min) 15 min        Session Details    Document Type daily treatment/progress note     Mode of Treatment physical therapy;individual therapy        General Information    General Observations of Patient Introduction of RW        Bed Mobility    [place], Sit to Supine touching/steadying assist     Verbal Cues (Sit to Supine) safety;technique;maintaining precautions     Comment (Bed Mobility) Steadying A for RLE mgmt during sit > supine via log roll technique.        Sit to Stand Transfer    [place], Sit to Stand Transfer minimum assist (75% or more patient effort)     Verbal Cues safety;technique;hand placement     Assistive Device walker, front-wheeled     Comment from w/c with Min A for hip ext and steadying        Stand to Sit Transfer    [place], Stand to Sit Transfer minimum assist (75% or more patient effort)     Verbal Cues  safety;technique;hand placement     Assistive Device walker, front-wheeled     Comment to w/c with Min A for controlled descent        Stand Pivot Transfer    [place], Stand Pivot/Stand Step Transfer minimum assist (75% or more patient effort)     Verbal Cues safety;technique     Assistive Device gait belt     Comment SPT from w/c to EOB with Min A for balance and sequencing        Gait Training    [place], Gait minimum assist (75% or more patient effort)     Assistive Device gait belt;walker, front-wheeled     Distance in Feet 90 feet     Pattern (Gait) step-through     Deviations/Abnormal Patterns (Gait) gait speed decreased;stride length decreased     Comment (Gait/Stairs) 90' x1 using RW with Min A at hips for balance. Cues for upright posture.        Daily Progress Summary (PT)    Symptoms Noted During/After Treatment none     Daily Outcome Statement Introduced RW during transfer and gait training. Improved [place]/fluidity and step length noted compared to no AD. During session, MD placed partial med hold d/t metabolic ketone acidosis with dehydration. RN requesting pt return to bed for IV placement, PT assisted with return to bed and RN notified. Assess 10 MWT and TUG as able.                    Education Documentation  Mobility, taught by Ilia Graves, PT at 1/28/2022 12:29 PM.  Learner: Patient  Readiness: Acceptance  Method: Explanation  Response: Verbalizes Understanding  Comment: Transfers and gait training using RW    IRF PT Goals      Most Recent Value   Bed Mobility Goal 1    Activity/Assistive Device bed mobility activities, all at 01/28/2022 0902   [place] supervision required at 01/28/2022 0902   Time Frame short-term goal (STG), 1 week at 01/28/2022 0902   Bed Mobility Goal 2    Activity/Assistive Device bed mobility activities, all at 01/28/2022 0902   [place] modified independence at 01/28/2022 0902   Time Frame long-term goal (LTG), 21 days or less at 01/28/2022 0902    Transfer Goal 1    Activity/Assistive Device sit-to-stand/stand-to-sit, stand pivot at 01/28/2022 0902   [place] tactile cues required at 01/28/2022 0902   Time Frame short-term goal (STG), 3 - 5 days at 01/28/2022 0902   Transfer Goal 2    Activity/Assistive Device sit-to-stand/stand-to-sit, stand pivot at 01/28/2022 0902   [place] modified independence at 01/28/2022 0902   Time Frame long-term goal (LTG), 21 days or less at 01/28/2022 0902   Gait/Walking Locomotion Goal 1    Activity/Assistive Device gait (walking locomotion) at 01/28/2022 0902   Distance 150 feet at 01/28/2022 0902   [place] minimum assist (75% or more patient effort) at 01/28/2022 0902   Time Frame short-term goal (STG), 3 - 5 days at 01/28/2022 0902   Gait/Walking Locomotion Goal 2    Activity/Assistive Device gait (walking locomotion) at 01/28/2022 0902   Distance 150 feet at 01/28/2022 0902   [place] modified independence at 01/28/2022 0902   Time Frame long-term goal (LTG), 21 days or less at 01/28/2022 0902   Stairs Goal 1    Activity/Assistive Device stairs, all skills at 01/28/2022 0902   Number of Stairs 12 at 01/28/2022 0902   [place] minimum assist (75% or more patient effort) at 01/28/2022 0902   Time Frame short-term goal (STG), 3 - 5 days at 01/28/2022 0902   Stairs Goal 2    Activity/Assistive Device stairs, all skills at 01/28/2022 0902   Number of Stairs 12 at 01/28/2022 0902   [place] modified independence at 01/28/2022 0902   Time Frame long-term goal (LTG), 21 days or less at 01/28/2022 0902

## 2022-01-28 NOTE — PROGRESS NOTES
Patient: Matt Williamson  Location: [facility]  MRN: [id number]  Today's date: 1/28/2022    History of Present Illness  Marco A is a 76 y.o. male admitted on 1/27/2022 with Lumbar disc disease with radiculopathy [M51.16]  S/P lumbar fusion [Z98.1]. Principal problem is Lumbar disc disease with radiculopathy.    Patient is a 76-year-old male with a history of atrial fibrillation, CAD, hypertension, hypothyroidism, hyperlipidemia, thyroid cancer, type 2 diabetes developed significant mid to low back pain with walking and standing.  He has metastatic thyroid carcinoma with metastasis to the L3 body status post radiation in May 2020.  Recent PET scan showed increased activity at the L3 level without further metastatic lesions.  He was evaluated by neurosurgery who recommended surgical resection.  He was taken to the OR on 1/20 and underwent a stage I L2-L4 PSF with instrumentation, ORIF of L3 pathologic fracture, stage II L3 lateral corpectomy with expandable cage placement.  There has been no significant postoperative complications.  His pain is fairly well controlled.  An LSO has been ordered.    1/28 Pt placed on Med hold after OT eval  and during PT eval 2/2 - metabolic ketone acidosis with dehydration    Past Medical History  Marco A has a past medical history of Atrial fibrillation with rapid ventricular response (CMS/HCC) (10/25/2019), BPH (benign prostatic hyperplasia), Coronary artery disease, Hypertension, Hypothyroidism, Mixed hyperlipidemia, Thyroid cancer (CMS/HCC), Type 2 diabetes mellitus without complication (CMS/HCC), and Vertigo.      OT Vitals    Date/Time Pulse HR Source BP BP Location BP Method Pt Position [facility]   01/28/22 0744 75 Monitor 135/66 Left upper arm Automatic Lying VMS   01/28/22 0858 77 -- 144/74 Right upper arm Automatic Sitting VMS      OT Pain    Date/Time Pain Type Location Rating: Rest Rating: Activity Rating: Rest Rating: Activity [facility]   01/28/22 0744 Pain  Assessment -- -- -- 2 - mild pain -- VMS   01/28/22 0805 Pain Assessment back 1 6 -- -- EM   01/28/22 0858 Pain Reassessment back -- -- -- 2 - mild pain VMS          Prior Living Environment      Most Recent Value   People in Home spouse   Current Living Arrangements home   Home Accessibility stairs to enter home (Group)   Living Environment Comment pt lives in 2  in [place]. 3 steps to enter with R rail, bedroom and shower stall on first floor, full flight B/L rails to shower stall and bedroom, typically sleeps on second floor. has a bed and shower stall on first floor   Number of Stairs, Main Entrance 3   Stair Railings, Main Entrance railing on right side (ascending)   Location, Patient Bedroom second floor, must negotiate stairs to access   Patient Bedroom Access Comment has bedroom on first floor if needed   Location, Bathroom second floor, must negotiate stairs to access   Bathroom Access Comment has bathroom on first floor   Number of Stairs, Within Home, Primary 12   Stair Railings, Within Home, Primary railings on both sides of stairs          Prior Level of Function      Most Recent Value   Dominant Hand right   Ambulation assistive equipment   Transferring assistive equipment   Toileting independent   Bathing independent   Dressing independent   Eating independent   Prior Level of Function Comment Pt reports he was independent for all mobility using SPC, has shower chair at home.   Assistive Device Currently Used at Home cane, straight, shower chair          Occupational Profile      Most Recent Value   Successful Occupations Retired restaraunt owner   Occupational History/Life Experiences Enjoys Playing Basketball. +    Environmental Supports and Barriers lives with spouse   Patient Goals To go home           IRF OT Evaluation and Treatment - 01/28/22 0722        OT Time Calculation    Start Time 0730     Stop Time 0900     Time Calculation (min) 90 min        Session Details    Document  Type initial evaluation     Mode of Treatment occupational therapy;individual therapy        General Information    General Observations of Patient Dr. Dawkins and Dr. Salazar present during session and assessed pt     Existing Precautions/Restrictions brace worn when out of bed   LSO brace when OOB except may shower without back brace on seated on a shower chair with incisions covered with waterproof dressing.       Living Environment    Name(s) of People in Home Ania     Primary Care Provided by self        Cognition/Psychosocial    Orientation Status (Cognition) oriented x 4     Comment, Cognition BIMs 14/15        Vision Assessment/Intervention    Visual Impairment/Limitations WNL;corrective lenses for reading        Sensory Assessment (Somatosensory)    Left UE Sensory Assessment light touch awareness;proprioception;intact     Right UE Sensory Assessment light touch awareness;proprioception;intact     Sensory Subjective Reports tingling   in fingertips, premorbid       Range of Motion (ROM)    Range of Motion ROM is WNL        Strength (Manual Muscle Testing)    Strength (Manual Muscle Testing) strength is WNL;left upper extremity;right upper extremity        Edema Assessment & Management    Location (Edema) lower extremity, left;lower extremity, right;ankle/foot, left;ankle/foot, right   MD notified, assessed and rec TEDs donned       Bed Mobility    Comment (Bed Mobility) supine to sit with cues for log roll with steadying A        Transfers    Comment gait belt and LSO worn for all tx        Sit to Stand Transfer    [place], Sit to Stand Transfer minimum assist (75% or more patient effort)     Verbal Cues safety;technique;hand placement        Stand to Sit Transfer    [place], Stand to Sit Transfer minimum assist (75% or more patient effort)     Verbal Cues safety;technique;hand placement        Stand Pivot Transfer    [place], Stand Pivot/Stand Step Transfer minimum assist (75% or more  patient effort)     Verbal Cues safety;technique;hand placement     Comment SPT        Toilet Transfer    [place], Toilet Transfer minimum assist (75% or more patient effort)     Verbal Cues safety;technique;hand placement        Shower Transfer    [place], Shower Transfer minimum assist (75% or more patient effort)     Assistive Device shower chair;grab bars/tub rail     Comment Pt has shower chair at home        Balance    Comment, Balance CS sitting unsupported, min A while standing        Motor Skills    Comment, Motor Skills coordination b/l hands WFL during ADL        Therapeutic Interventions    Comment, Therapeutic Intervention review of spinal precuations        Bathing    Self-Performance chest;left arm;right arm;abdomen;front perineal area;left upper leg;right upper leg     [place] Assistance buttocks;right lower leg, including foot;left lower leg, including foot     [place] moderate assist (50-74% patient effort)     Position supported sitting;unsupported standing     Setup Assistance adaptive equipment setup;adjust water temperature/flow;obtain supplies     Adaptive Equipment shower chair;grab bar/tub rail;hand-held shower spray hose     Comment assist with b/l LE 2/2 spinal precuations. Nursing covering incisions prior to shower        Upper Body Dressing    Self-Performance don;orthosis application;threads left arm, shirt;threads right arm, shirt;pulls shirt over head/around back;pulls shirt down/adjusts     [place] Assistance obtains clothes;orthosis application;pulls shirt down/adjusts     [place] minimum assist (75% or more patient effort)     Position supported sitting     Comment assist to fully pull down shirt in back, Assist for donning LSO        Lower Body Dressing    Self-Performance don;pulls underpants up or down;pulls pants/shorts up or down;dons/doffs left shoe;dons/doffs right shoe     [place] Assistance anti-embolic stocking application;threads left leg,  underpants;threads right leg, underpants;threads left leg, pants/shorts;threads right leg, pants/shorts;dons/doffs left shoe;dons/doffs right shoe     [place] maximum assist (25-49% patient effort)     Position supported sitting;unsupported standing     [place], Footwear maximum assist (25-49% patient effort)     Comment Pt able to assist with lifting b/l LE to assist with threading underwear and pants. Pt required min A to stand while hiking. Verbal order from Dr. Dawkins given to isidoro Bueno Pt able to assist with donning slide on shoes        Grooming    Self-Performance brushes/mancilla hair     [place] set up     [place], Oral Hygiene set up     Comment seated at sink        Toileting    [place] minimum assist (75% or more patient effort)     Adaptive Equipment raised toilet seat;grab bar/safety frame        Spinal Orthosis Management    Type (Spinal Orthosis) LSO (lumbar sacral orthosis)     Functional Design (Spinal Orthosis) static orthosis     Therapeutic Indications Spinal Orthosis post-op positioning/protection     Wearing Schedule (Spinal Orthosis) wear when out of bed only   May doff for showering sitting on shower chair    Orthosis Training (Spinal Orthosis) patient;wearing schedule;able to show back training;able to verbalize training;requires assistance        Patient/Family Goals    Patient's Goals For Discharge return home        Therapy Assessment/Plan (OT)    OT Diagnosis Lumbar sx     Rehab Potential/Prognosis (OT) good, to achieve stated therapy goals     Frequency of Treatment (OT) 5-7 times per week;60-90 minutes per day     Estimated Duration of Therapy (OT) 2 weeks     Problem List (OT) problems related to;balance;mobility;range of motion (ROM);strength;pain;postural control     Activity Limitations Related to Problem List unable to ambulate safely;unable to transfer safely;BADLs not performed adequately or safely;IADLs not performed adequately or safely;community  activities not performed adequately or safely;home management activity not performed adequately or safely     Planned Therapy Interventions activity tolerance training;adaptive equipment training;BADL retraining;edema control/reduction;IADL retraining;functional balance retraining;occupation/activity based interventions;patient/caregiver education/training;ROM/therapeutic exercise;strengthening exercise;transfer/mobility retraining     Comment, Therapy Assessment/Plan (OT) Marco A is a 76 year old male who present to [facility] s/p  stage I L2-L4 PSF with instrumentation, ORIF of L3 pathologic fracture, stage II L3 lateral corpectomy with expandable cage placement. Pt is limited by decreased balance, endurance, strength, Spinal precaution, pain, ROM LE, which impact his ability to safely and indep perform his ADLs and IADLs. Pt currently requires min A UB dressing, max A LB dressing, mod A bathing, min A toileting, grooming HEARD seated. Pt would continue to benefit from skilled OT services to increase indep, decrease caregiver burden and increase quality of life. Continue with POC                      Education Documentation  Orientation to Care Setting, Routine, taught by Meilssa Hdz, OT at 1/28/2022  4:15 PM.  Learner: Patient  Readiness: Acceptance  Method: Explanation  Response: Verbalizes Understanding  Comment: Education on 3 hour rule, role of OT, use of call bell and not standing without assistance          IRF OT Goals      Most Recent Value   Transfer Goal 1    Activity/Assistive Device toilet at 01/28/2022 0722   [place] --  [touching] at 01/28/2022 0722   Time Frame short-term goal (STG), 5 - 7 days at 01/28/2022 0722   Transfer Goal 2    Activity/Assistive Device toilet at 01/28/2022 0722   [place] modified independence at 01/28/2022 0722   Time Frame long-term goal (LTG), 14 days or less at 01/28/2022 0722   Transfer Goal 3    Activity/Assistive Device shower at 01/28/2022 0722   [place]  --  [touching A] at 01/28/2022 0722   Time Frame short-term goal (STG), 5 - 7 days at 01/28/2022 0722   Transfer Goal 4    Activity/Assistive Device shower at 01/28/2022 0722   [place] modified independence at 01/28/2022 0722   Time Frame long-term goal (LTG), 14 days or less at 01/28/2022 0722   Bathing Goal 1    [place] --  [touching A] at 01/28/2022 0722   Time Frame short-term goal (STG), 5 - 7 days at 01/28/2022 0722   Bathing Goal 2    [place] modified independence at 01/28/2022 0722   Time Frame long-term goal (LTG), 14 days or less at 01/28/2022 0722   UB Dressing Goal 1    [place] --  [CS] at 01/28/2022 0722   Time Frame short-term goal (STG), 5 - 7 days at 01/28/2022 0722   UB Dressing Goal 2    [place] modified independence at 01/28/2022 0722   Time Frame long-term goal (LTG), 14 days or less at 01/28/2022 0722   LB Dressing Goal 1    [place] minimum assist (75% or more patient effort) at 01/28/2022 0722   Time Frame short-term goal (STG), 5 - 7 days at 01/28/2022 0722   LB Dressing Goal 2    [place] modified independence at 01/28/2022 0722   Time Frame long-term goal (LTG), 14 days or less at 01/28/2022 0722   Grooming Goal 1    [place] --  [touching A] at 01/28/2022 0722   Time Frame short-term goal (STG), 5 - 7 days at 01/28/2022 0722   Strategies/Barriers in stance at 01/28/2022 0722   Grooming Goal 2    [place] modified independence at 01/28/2022 0722   Time Frame long-term goal (LTG), 14 days or less at 01/28/2022 0722   Toileting Goal 1    [place] --  [touching A] at 01/28/2022 0722   Time Frame short-term goal (STG), 5 - 7 days at 01/28/2022 0722   Toileting Goal 2    [place] modified independence at 01/28/2022 0722   Time Frame long-term goal (LTG), 14 days or less at 01/28/2022 0722

## 2022-01-28 NOTE — PROGRESS NOTES
01/28/22 1500   General Information   Preferred Language eng   Referring Facility Type acute care facility   Contact Information   Permission Granted to Share Info With family/designee   Contact Information Comments Ania [phone]   Advance Directives (for Healthcare)   Does patient have advance directive? No   Patient does not have Advance Directive Patient/Family declines further information   Living Environment   Current Living Arrangements home   Home Accessibility stairs to enter home (Group)   People in Home spouse   Name(s) of People in Home Ania   Primary Care Provided by self   Living Environment   Provides Primary Care For no one   Family Caregiver if Needed spouse   Family Caregiver Names Ania   Quality of Family Relationships supportive;involved;helpful   Able to Return to Prior Arrangements yes   Living Arrangement Comments 2 SH, set up for 1 fl. living.  Shower kitchen, bedroom first fl. if needed.   Employment/   Employment Status self-employed;retired;seasonal worker   Current or Previous Occupation service industry   Employment/ Comments Owned 3 restaraunts,   Financial/Legal   Source of Income social security   Values/Beliefs   Spiritual, Cultural Beliefs, Yarsanism Practices, Values that Affect Care no   Coping/Stress   Major Change/Loss/Stressor denies   Patient Personal Strengths assertive;courageous;motivated   Sources of Support spouse   Reaction to Health Status adjusting;accepting   Understanding of Condition and Treatment adequate understanding of medical condition   Discharge Needs Assessment   Concerns to be Addressed care coordination/care conferences;basic needs   Patient/Family Anticipates Transition to home with family   Patient/Family Anticipated Services at Transition outpatient care   Transportation Concerns car, none   Transportation Anticipated car, drives self   Anticipated Changes Related to Illness none   Outpatient/Agency/Support Group Needs  homecare agency   Anticipated Discharge Disposition home with outpatient services   Current Discharge Risk physical impairment   Discharge Planning   Patient/Family Concerns Related to Expected Discharge Disposition Wound, does not eat a lot taste buds are worn down.   Discharge Transportation   Does the patient need discharge transport arranged? No   Plan   Plan Admit [facility], participate therapy.   Patient/Family in Agreement with Plan yes   Discharge Review for Designated Lay Caregiver Designated Lay Caregiver available   Plan Comments Marco A will admit to [facility].  Marco A resides in [place] w/ wife Edna.   Resides in 2 , but originally [facility], does have [facility] floor living.   Has previous PT at [facility], plans to attend at [place].   Team Conference   Next Team Conference Date 02/01/22    Service   Is an  Needed/Used? N

## 2022-01-28 NOTE — PLAN OF CARE
Problem: Rehabilitation (IRF) Plan of Care  Goal: Plan of Care Review  Flowsheets (Taken 1/28/2022 1614)  Progress: improving  Plan of Care Reviewed With: patient  Outcome Summary: Ot initial eval completed and POC established     Problem: Rehabilitation (IRF) Plan of Care  Goal: Patient-Specific Goal (Individualized)  Flowsheets (Taken 1/28/2022 1614)  Patient-Specific Goals (Include Timeframe): To get stronger and go home

## 2022-01-28 NOTE — PLAN OF CARE
Problem: Rehabilitation (IRF) Plan of Care  Goal: Plan of Care Review  Outcome: Progressing  Flowsheets  Taken 1/28/2022 1233 by Ilia Graves, PT  Progress: improving  Outcome Summary: PT IE completed and POC established.  Taken 1/28/2022 0424 by Tawnya Strickland, RN  Plan of Care Reviewed With: patient

## 2022-01-28 NOTE — HOSPITAL COURSE
History of Present Illness  Marco A is a 76 y.o. male admitted on 1/27/2022 with Lumbar disc disease with radiculopathy [M51.16]  S/P lumbar fusion [Z98.1]. Principal problem is Lumbar disc disease with radiculopathy.    Patient is a 76-year-old male with a history of atrial fibrillation, CAD, hypertension, hypothyroidism, hyperlipidemia, thyroid cancer, type 2 diabetes developed significant mid to low back pain with walking and standing.  He has metastatic thyroid carcinoma with metastasis to the L3 body status post radiation in May 2020.  Recent PET scan showed increased activity at the L3 level without further metastatic lesions.  He was evaluated by neurosurgery who recommended surgical resection.  He was taken to the OR on 1/20 and underwent a stage I L2-L4 PSF with instrumentation, ORIF of L3 pathologic fracture, stage II L3 lateral corpectomy with expandable cage placement.  There has been no significant postoperative complications.  His pain is fairly well controlled.  An LSO has been ordered.    1/28 Pt placed on Med hold after OT eval  and during PT eval 2/2 - metabolic ketone acidosis with dehydration    Past Medical History  Marco A has a past medical history of Atrial fibrillation with rapid ventricular response (CMS/HCC) (10/25/2019), BPH (benign prostatic hyperplasia), Coronary artery disease, Hypertension, Hypothyroidism, Mixed hyperlipidemia, Thyroid cancer (CMS/HCC), Type 2 diabetes mellitus without complication (CMS/HCC), and Vertigo.

## 2022-01-28 NOTE — PROGRESS NOTES
Patient: Matt Williamson  Location: [facility] [address]  MRN: [id number]  Today's date: 1/28/2022    Attempted to see patient for therapy. Unable due to medical hold. During session, MD placed partial med hold d/t metabolic ketone acidosis with dehydration. RN requesting pt return to bed for IV placement, PT assisted with return to bed and RN notified. Last 30 min of session cancelled.

## 2022-01-28 NOTE — PROGRESS NOTES
01/28/22 1554   Food Insecurity   Within the past 12 months, you worried that your food would run out before you got the money to buy more. Never true   Within the past 12 months, the food you bought just didn’t last and you didn’t have money to get more. Never true

## 2022-01-28 NOTE — CONSULTS
Clinical Course: Patient is a 76 y.o. male who was admitted on 1/27/2022 with a diagnosis of Lumbar disc disease with radiculopathy [M51.16]  S/P lumbar fusion [Z98.1].     Nutrition Interventions/Recommendations:   1. Counseled nutrition needs for healing and recovery  2. Provided diabetic alternate meal list  3. Monitor PO, weight, labs, skin, POC  4. Continue NCS diet    Nutrition risk level 1     Past Medical History:   Diagnosis Date   • Atrial fibrillation with rapid ventricular response (CMS/HCC) 10/25/2019    Hospitalized on 10/25/19 at [facility].    • BPH (benign prostatic hyperplasia)    • Coronary artery disease     LAD stent 2005   • Hypertension    • Hypothyroidism    • Mixed hyperlipidemia    • Thyroid cancer (CMS/HCC)    • Type 2 diabetes mellitus without complication (CMS/HCC)    • Vertigo      Past Surgical History:   Procedure Laterality Date   • ANAL FISSURECTOMY  1986   • CARDIAC CATHETERIZATION  11/21/2005    LM FOD. 70-80% proximal LAD.Mild disease CX, RCA and branches.    • CARDIAC CATHETERIZATION  11/28/2005    LM FOD. Pristine LAD stent. CX FOD. RCA mild disease.   • CORONARY ANGIOPLASTY WITH STENT PLACEMENT  11/22/2005    LM FOD. Moderate, diffuse disease LAD. Irregular CX w/ PLVB distal to PDA 99%. Mild disease RCA. Stent to distal LAD.   • EYE SURGERY Left Cataract   • FOOT SURGERY  2009    For osteomyelitis.   • GASTROCUTANEOUS FISTULA CLOSURE     • LUMBAR SPINE SURGERY  01/20/2022    Stage 1: L2-L4 PSF with instrumentation, ORIF L3 pathologic fracture Stage 2: L3 lateral corpectomy with expandable cage placement   • THYROIDECTOMY  2012   • UPPER GASTROINTESTINAL ENDOSCOPY          Adult Nutrition Assessment - 01/28/22 1057        Charting Type    Nutrition Charting Type Nutrition Brief Assessment     Nutrition Status Classification Mild nutritional compromise     Time Spent (Minutes) 45        Reason for Assessment    Reason For Assessment identified at risk by  screening criteria     Diagnosis --   metastatic CA to spine       Nutrition/Diet History    Intake (%) --   75% breakfast, reports improving appetite    Food Preferences has alternate list     Supplemental Drinks/Foods/Additives none     Food Allergies NKFA     Factors Affecting Nutritional Intake --   poor appetite post op, now improving       Usual Body Weight (UBW)    Usual Body Weight 76.2 kg (168 lb)     Weight Loss --   2-3# below usual weight       Body Mass Index (BMI)    BMI Assessment BMI 25-29.9: overweight        Labs/Procedures/Meds    Lab Results Reviewed reviewed     Lab Results Comments 1/28: K+ 3.5L, mag 1.6L        Medications    Pertinent Medications Reviewed reviewed     Pertinent Medications Comments KCL, glucophage, magchloride, novolog 4 U TID, lantus 10 U        Physical Findings    Overall Physical Appearance --   normally nourished    Skin surgical incision   back incision, possible skin breakdown, perineum       Nutrition Order    Nutrition Order meets nutritional requirements     Nutrition Order Comments NCS        PES Statement    Nutritional Needs Met? Yes                 CMP Results       01/28/22 01/27/22 01/26/22     0552 0347 0951     135 135    K 3.5 3.7 4.7    Cl 101 105 101    CO2 21 17 14    Glucose 92 115 121    BUN 12 17 23    Creatinine 0.8 1.0 1.0    Calcium 8.0 7.9 8.1    Anion Gap 15 13 20    AST 11 -- --    ALT 8 -- --    Albumin 2.3 -- --    EGFR >60.0 >60.0 >60.0        Lab Results   Component Value Date    ALT 8 (L) 01/28/2022    AST 11 (L) 01/28/2022    ALKPHOS 55 01/28/2022    BILITOT 1.2 01/28/2022     No results found for: BEYOXEVL26  Lab Results   Component Value Date    WBC 8.01 01/28/2022    HGB 9.2 (L) 01/28/2022    HCT 28.8 (L) 01/28/2022    MCV 85.5 01/28/2022     01/28/2022     Lab Results   Component Value Date    CHOL 149 01/18/2022    CHOL 129 01/29/2020    CHOL 136 01/22/2019     Lab Results   Component Value Date    HDL 49 01/18/2022     HDL 47 01/29/2020    HDL 48 01/22/2019     Lab Results   Component Value Date    LDLCALC 79 01/18/2022    LDLCALC 67 01/29/2020    LDLCALC 69 01/22/2019     Lab Results   Component Value Date    TRIG 106 01/18/2022    TRIG 69 01/29/2020    TRIG 102 01/22/2019     No results found for: CHOLHDL  Lab Results   Component Value Date    CALCIUM 8.0 (L) 01/28/2022    PHOS 2.7 01/28/2022     Glucose Results       01/18/22     1408    HBG A1C 6.9    EST AVG GLUCOSE 151         Comment for HBG A1C at 1408 on 01/18/22: In the absence of an established diagnosis of Diabetes Mellitus, HbA1c levels between 5.7% and 6.4% are indicative of increased risk for developing Diabetes(Pre-Diabetes). Levels of 6.5% or greater are diagnostic for Diabetes Mellitus.    Comment for EST AVG GLUCOSE at 1408 on 01/18/22: Estimate of average glucose concentration continuously over 24 hours for previous 2 to 3 months(Per ADA Recommendation).      Results for HERON TADEO (MRN [id number]) as of 1/28/2022 11:07   Ref. Range 1/26/2022 11:59 1/26/2022 17:09 1/26/2022 21:05 1/27/2022 07:44 1/27/2022 12:18 1/27/2022 17:14 1/27/2022 21:48 1/28/2022 07:39   POC Test Unknown POC POC POC POC POC POC POC POC   POCT Bedside Glucose Latest Ref Range: 70 - 99 mg/dL 177 (H) 124 (H) 153 (H) 109 (H) 180 (H) 95 186 (H) 110 (H)         • acetaminophen  650 mg oral q6h SALIMA   • [START ON 2/3/2022] aspirin  81 mg oral Daily   • bisacodyL  10 mg rectal Daily (6p)   • docusate sodium  100 mg oral TID   • gabapentin  600 mg oral q8h SALIMA   • heparin (porcine)  5,000 Units subcutaneous q8h SALIMA   • insulin aspart U-100  1-10 Units subcutaneous With meals & nightly   • insulin aspart U-100  4 Units subcutaneous TID with meals   • insulin glargine U-100  10 Units subcutaneous Daily   • levothyroxine  150 mcg oral Daily (6a)   • magnesium chloride  128 mg oral BID   • [Provider Managed Hold] metFORMIN  500 mg oral BID with meals   • metoprolol succinate XL  100 mg oral  Nightly   • polyethylene glycol  17 g oral Daily   • potassium bicarbonate  40 mEq oral BID   • pravastatin  40 mg oral Daily (6p)   • senna  3 tablet oral Daily   • sodium bicarbonate  650 mg oral TID     • lactated ringer's                Dietary Orders   (From admission, onward)             Start     Ordered    01/27/22 1523  Adult Diet Regular; Thin Liquids; No Concentrated Sweets  Diet effective now        References:    IDDSI Diet reference   Question Answer Comment   Diet Texture Regular    Fluid Consistency: Thin Liquids    Other Restriction(s): No Concentrated Sweets        01/27/22 1527              Wt Readings from Last 3 Encounters:   01/27/22 75 kg (165 lb 6 oz)   01/27/22 74.8 kg (165 lb)   01/18/22 76.3 kg (168 lb 3.2 oz)       Weights (last 7 days)     Date/Time Weight    01/27/22 1405 75 kg (165 lb 6 oz)          Clinical Comments:  Pt seen, eating late breakfast due to therapy,  Appetite improving.  RD counseled adequate protein for   Healing and recovery.  Pt with DM X 20 years, well controlled . He says somewhat erratic POC since  Surgery which is expected. Otherwise A1C in goal range. Note magnesium and potassium being supplemented.       Date: 01/28/22  Signature: LALY Boston

## 2022-01-28 NOTE — PROGRESS NOTES
[facility] Progress Note          Patient was seen and examined at bedside.    Subjective:   Saw him in am, awake and alert, denies remarkable pain, no new paresthesia, b/l LE edema, will check a venous compression U/S.  Issues:  1. AGMA from non hyperglycemic ketoacidosis, UA ketone +4, glucose >=1000, likely from SGLT2 inhibitor, dehydration, plus perioperative poor po intake.  D/c jardiance and hold metformin, insulin regimen, IVF plus po hydration, sodium bicarb supplement;  2. Electrolytes imbalance-hypokalemia and hypomagnesemia, supplement         Monitor his AGMA. Renal on board     Objective   Vital signs in last 24 hours:  Temp:  [36.8 °C (98.2 °F)] 36.8 °C (98.2 °F)  Heart Rate:  [72-87] 77  Resp:  [18] 18  BP: (110-176)/(60-83) 144/74    No intake or output data in the 24 hours ending 01/28/22 1000  Intake/Output this shift:  No intake/output data recorded.   Review of Systems:  All other systems reviewed and negative except as noted in the HPI.   Objective      Labs  reviewed his labs thoroughly   Lab Results   Component Value Date    WBC 8.01 01/28/2022    HGB 9.2 (L) 01/28/2022    HCT 28.8 (L) 01/28/2022    MCV 85.5 01/28/2022     01/28/2022     Lab Results   Component Value Date    GLUCOSE 92 01/28/2022    CALCIUM 8.0 (L) 01/28/2022     01/28/2022    K 3.5 (L) 01/28/2022    CO2 21 (L) 01/28/2022     01/28/2022    BUN 12 01/28/2022    CREATININE 0.8 01/28/2022       Imaging  OSH imaging study reports reviewed       Full Code    Physical Exam:  Head/Ear/Nose/Throat: normocephalic; atraumatic; moisture mouth mm, no oropharyngeal thrush noted.   Eyes: anicteric sclera, EOMI; PERRL.   Neck : supple, no JVD, no carotid bruits appeciated.   Respiratory: no evidence of labored breathing, lung sounds CTA b/l, good aeration bibasilar area, no w/r/c.   Cardiovascular: RRR; normal S1, S2; no m/r/g; no S3 or S4.   Gastrointestinal: soft; NT; BS normal; mildly  distended; no CVAT b/l.   Genitourinary: no silva.   Extremities : b/l LE pitting edema .   Neurological: AO x 3, fluent speeches, following commands, CNS II-XII grossly intact; no focal neurologic deficits.   Behavior/Emotional: in NAD, appropriate; cooperative.   Skin: sacral area skin lesions.     Plan of care was discussed with patient, RN, and PMR attending     Assessment   CC:Thyroid cancer with progressive lumbar metastatic lesions and L3 pathologic fracture complicated with myeloradiculopathy, s/p surgical intervention and ORIF, neurogenic B/B, ADL and ambulatory dysfunction      76 y.o. male with PMH of HTN, dyslipidemia, CAD, type 2 DM, hypothyroidism, thyroid cancer metastatic to L3 vertebral body s/p radiation stereotactic body radiation 5/2020, seen by the neurosurgery service for surgical discussion regarding growth of his L3 vertebral body metastatic lesion. He has had progressive worsening midline-line low back pain with radiation to the right knee. An MRI lumbar spine was obtained 11/22/2021 showing extension of the L3 mass. He was sent for a PET scan 12/7/2021 showing increased activity of the L3 lesion without further metastatic lesions appreciated. He was referred to [facility] however there has been some delay with getting an appointment.      He has had persistent midline low back pain with walking and standing which has progressively worsened. He does not have any weakness or bowel/bladder incontinence. He is following with oncology (Dr. Casey) who does not recommend any chemotherapy at this time but surgical intervention as the primary treatment given the suspected isolated area of progression at the L3 vertebral body.     He was admitted to [facility] on 1/20/22 and underwent elective :  Stage 1: L2-L4 PLSF with instrumentation, ORIF L3 pathologic fracture  Stage 2: L3 lateral corpectomy with expandable cage placement  Post op ileus, s/p aggressive bowel program, resolved, tolerated diet.  Functionally he has ADL and ambulatory dysfunction, requiring inpt acute rehab, transferred to [facility] on 1/27/22.            Ileus (CMS/HCC)  Resolved after aggressive bowel program, start diet  Tolerating diet, cont bowel regimen.     Acquired hypothyroidism  Assessment & Plan  Status post thyroidectomy  Continue levothyroxine        Malignant neoplasm of vertebral column, excluding sacrum and coccyx (CMS/HCC)  Assessment & Plan  Metastatic thyroid ca S/p L3 pathologic fracture s/p right lateral L3 corpectomy and placement of an expandable cage, followed by L2-4 posterolateral instrumented fusion  Continue postop management per neurosurgery.  Drain was removed  DVT prophylaxis, Lewis, pain management per neurosurgery  Rehab as inpt at [facility]      Paroxysmal atrial fibrillation (CMS/HCC)  Assessment & Plan  Currently in sinus rhythm  Patient is not on long-term anticoagulation  He takes Rythmol as needed for palpitations     Type 2 diabetes mellitus without complication (CMS/HCC)  Assessment & Plan  Patient on Jardiance, Trulicity and metformin as outpatient  Cont inpt Accu-Cheks with sliding scale insulin  Hemoglobin A1c 6.9  Continue Jardiance and restarted metformin and trulicity  Metformin should be changed to twice daily or extended release once daily      Metastasis from thyroid cancer (CMS/HCC)  Assessment & Plan  Follows up with medical oncologist  and radiation oncologist  Status post radiation     Mixed hyperlipidemia  Assessment & Plan  Continue statin     Essential hypertension  Assessment & Plan  Stable  Continue metoprolol     Atherosclerosis of coronary artery  Assessment & Plan  Status post LAD stenting 17 years ago  Patient follows up with Dr. Ji  Continue metoprolol, statin  Restart aspirin once cleared by neurosurgery     Billing code: [id number]  Diagnoses:  Patient Active Problem List   Diagnosis   • BPH (benign prostatic hyperplasia)   • Atherosclerosis of coronary artery   •  Cataract   • Essential hypertension   • Former tobacco use   • Mixed hyperlipidemia   • Metastasis from thyroid cancer (CMS/HCC)   • Type 2 diabetes mellitus without complication (CMS/HCC)   • Cyst of thyroid   • Esophageal reflux   • Paroxysmal atrial fibrillation (CMS/HCC)   • Pre-op evaluation   • Preop cardiovascular exam   • Cancer associated pain   • Low back pain with right-sided sciatica   • Malignant neoplasm of vertebral column, excluding sacrum and coccyx (CMS/HCC)   • Acquired hypothyroidism   • Ileus (CMS/HCC)   • Leg swelling   • Lumbar disc disease with radiculopathy   • S/P lumbar fusion   complicated case with multiple comorbidities as mentioned in subjective section, spent 35 min to manage the case, >50% of the time consulting the patient about current medical condition, existing comorbidities, new findings/concerns and care/management plan.                Arjun Dawkins MD  1/28/2022

## 2022-01-28 NOTE — PLAN OF CARE
Plan of Care Review  Plan of Care Reviewed With: patient  Progress: improving  Outcome Summary: Based on pt's labs, internist diagnosed pt with DKA. Therapy was held, pt was started on a liter of lactated ringer's. Pt states he feels fine. Pain controlled with oxycodone and scheduled Tylenol. Pt taking scheduled bowel meds. Continent of b/b, prefers to stand to use urinal, empties more completely. Medial back incision and R flank incision are WNL, stabilized with steri strips, drain sites sutured. Both covered with telfa to protect from rubbing on LSO brace. Pt on heparin for dvt prophylaxis. Blood sugar checked QID, both long and short acting insulin utilized, as well as oral hypoglycemics.

## 2022-01-28 NOTE — PROGRESS NOTES
Patient: Matt Williamson  Location: [facility]  MRN: [id number]  Today's date: 1/28/2022    History of Present Illness  Marco A is a 76 y.o. male admitted on 1/27/2022 with Lumbar disc disease with radiculopathy [M51.16]  S/P lumbar fusion [Z98.1]. Principal problem is Lumbar disc disease with radiculopathy.    Patient is a 76-year-old male with a history of atrial fibrillation, CAD, hypertension, hypothyroidism, hyperlipidemia, thyroid cancer, type 2 diabetes developed significant mid to low back pain with walking and standing.  He has metastatic thyroid carcinoma with metastasis to the L3 body status post radiation in May 2020.  Recent PET scan showed increased activity at the L3 level without further metastatic lesions.  He was evaluated by neurosurgery who recommended surgical resection.  He was taken to the OR on 1/20 and underwent a stage I L2-L4 PSF with instrumentation, ORIF of L3 pathologic fracture, stage II L3 lateral corpectomy with expandable cage placement.  There has been no significant postoperative complications.  His pain is fairly well controlled.  An LSO has been ordered.    Past Medical History  Marco A has a past medical history of Atrial fibrillation with rapid ventricular response (CMS/HCC) (10/25/2019), BPH (benign prostatic hyperplasia), Coronary artery disease, Hypertension, Hypothyroidism, Mixed hyperlipidemia, Thyroid cancer (CMS/HCC), Type 2 diabetes mellitus without complication (CMS/HCC), and Vertigo.      PT Vitals    Date/Time Pulse HR Source BP BP Location BP Method Pt Position [facility]   01/28/22 0903 77 Monitor 144/74 Right upper arm Automatic Sitting BS      PT Pain    Date/Time Pain Type Rating: Rest [facility]   01/28/22 0903 Pain Assessment 0 BS          Prior Living Environment      Most Recent Value   People in Home spouse   Current Living Arrangements home   Living Environment Comment pt lives in 2  in [place]. 3 steps to enter with R rail, bedroom and  shower stall on first floor, full flight B/L rails to shower stall and bedroom, typically sleeps on second floor. has a bed and shower stall on first floor   Number of Stairs, Main Entrance 3   Stair Railings, Main Entrance railing on right side (ascending)   Location, Patient Bedroom second floor, must negotiate stairs to access   Location, Bathroom second floor, must negotiate stairs to access   Number of Stairs, Within Home, Primary 12   Stair Railings, Within Home, Primary railings on both sides of stairs          Prior Level of Function      Most Recent Value   Dominant Hand right   Ambulation assistive equipment   Transferring assistive equipment   Toileting independent   Bathing independent   Dressing independent   Eating independent   Prior Level of Function Comment Pt reports he was independent for all mobility using SPC, has shower chair at home.   Assistive Device Currently Used at Home cane, straight, shower chair           IRF PT Evaluation and Treatment - 01/28/22 0902        PT Time Calculation    Start Time 0900     Stop Time 0945     Time Calculation (min) 45 min        Session Details    Document Type initial evaluation     Mode of Treatment physical therapy;individual therapy        General Information    Patient Profile Reviewed yes     General Observations of Patient Pt received seated in w/c via direct handoff from OT. Pleasant and cooperative.     Existing Precautions/Restrictions fall;spinal;brace worn when out of bed;aspiration;cardiac;weight bearing        Weight-bearing Status    Left UE Weight-Bearing Status weight-bearing as tolerated (WBAT)     Right UE Weight-Bearing Status weight-bearing as tolerated (WBAT)     Left LE Weight-Bearing Status weight-bearing as tolerated (WBAT)     Right LE Weight-Bearing Status weight-bearing as tolerated (WBAT)        Cognition/Psychosocial    Affect/Mental Status (Cognition) WFL     Orientation Status (Cognition) oriented x 4        Sensory Assessment  (Somatosensory)    Left LE Sensory Assessment light touch awareness;light touch localization;proprioception;intact     Right LE Sensory Assessment light touch awareness;light touch localization;proprioception;intact     Sensory Subjective Reports tingling   Tingling B/L fingertips, reports it's premorbid       Range of Motion (ROM)    Left Lower Extremity (ROM) left LE ROM is WFL     Right Lower Extremity (ROM) right LE ROM is WFL        Strength (Manual Muscle Testing)    Hip, Left (Strength) Flex 4+/5, Ext 4+/5, IR/ER 5/5, ABD 4+/5, ADD 5/5     Knee, Left (Strength) Ext 5/5, Flex 5/5     Ankle, Left (Strength) DF/PF/Inv/Ev 5/5     Hip, Right (Strength) Flex 4/5, Ext 4/5, IR/ER 5/5, ABD 3+/5, ADD 4/5     Knee, Right (Strength) Ext 4+/5, Flex 5/5     Ankle, Right (Strength) DF/PF/Inv/Ev 5/5        Bed Mobility    [place], Roll Left close supervision;verbal cues     Verbal Cues (Roll Left) safety;technique;hand placement     [place], Roll Right close supervision;verbal cues     Verbal Cues (Roll Right) safety;technique;hand placement     [place], Supine to Sit touching/steadying assist;verbal cues     Verbal Cues (Supine to Sit) safety;technique;maintaining precautions     [place], Sit to Supine touching/steadying assist;verbal cues     Verbal Cues (Sit to Supine) safety;technique;maintaining precautions     Assistive Device bed rails     Comment (Bed Mobility) Completed with HOB flat. Steadying A for trunk mgmt during supine > sit and RLE mgmt during sit > supine. Completed via log roll technique to maintain precautions.        Bed to Chair Transfer    [place], Bed to Chair minimum assist (75% or more patient effort)     Verbal Cues safety;technique     Comment SPT from w/c <> EOB without AD with Min A at hips for balance and lateral weight-shifting.        Chair to Bed Transfer    [place], Chair to Bed minimum assist (75% or more patient effort)     Verbal Cues safety;technique      Assistive Device none     Comment SPT from w/c <> EOB without AD with Min A at hips for balance and lateral weight-shifting.        Sit to Stand Transfer    [place], Sit to Stand Transfer minimum assist (75% or more patient effort)     Verbal Cues safety;technique;hand placement     Assistive Device none     Comment from w/c, EOB with Min A for hip ext and steadying        Stand to Sit Transfer    [place], Stand to Sit Transfer minimum assist (75% or more patient effort)     Verbal Cues safety;technique;hand placement     Assistive Device none     Comment to w/c, EOB with Min A to control descent        Stand Pivot Transfer    [place], Stand Pivot/Stand Step Transfer minimum assist (75% or more patient effort)     Verbal Cues safety;technique     Assistive Device none     Comment SPT from w/c <> EOB without AD with Min A at hips for balance and lateral weight-shifting.        Car Transfer    [place], Car Transfer unable to assess     Comment Unable to assess prior to benefit of therapy services.        Gait Training    [place], Gait minimum assist (75% or more patient effort)     Assistive Device gait belt     Distance in Feet 60 feet     Pattern (Gait) step-through     Deviations/Abnormal Patterns (Gait) gait speed decreased;step length decreased;stride length decreased;weight shifting decreased     [place], Picking Up Object unable to assess   unable to assess due to safety concerns    Comment (Gait/Stairs) 60' x1 without AD with Min A at hips for balance and lateral weight-shifting. Pt demo decreased mustapha and step length.        Curb Negotiation    [place] unable to assess     Comment Unable to assess prior to benefit of therapy services.        Rough/Uneven Surface Gait Skills    [place] unable to assess     Comment Unable to assess prior to benefit of therapy services.        Stairs Training    [place], Stairs minimum assist (75% or more patient effort)      "Assistive Device railing;gait belt     Handrail Location (Stairs) both sides     Number of Stairs 4     Stair Height 6 inches     Ascending Stairs Technique step-to-step     Descending Stairs Technique step-to-step     Comment Up/down 4 (6\") stairs using B HR with Min A at hips for balance.        Wheelchair Mobility/Management    Comment, Wheelchair Mobility Not expected to be goal at d/c.        Safety Issues, Functional Mobility    Comment, Safety Issues/Impairments (Mobility) Pt educated on all spinal precautions at start of session.        Balance    Static Sitting Balance WFL     Dynamic Sitting Balance WFL     Static Standing Balance mild impairment;unsupported     Dynamic Standing Balance mild impairment;unsupported        Motor Skills    Coordination WFL;bilateral;lower extremity     Functional Endurance Fair     Muscle Tone lower extremity(s);WNL        Postural Deviations    Comment, Postural Deviations WFL        Gait/Walking Locomotion Goal 1    Distance 150 feet        Gait/Walking Locomotion Goal 2    Distance 150 feet        Therapy Assessment/Plan (PT)    Rehab Potential/Prognosis (PT) good, to achieve stated therapy goals     Frequency of Treatment (PT) 5-7 times per week;60-90 minutes per day     Estimated Duration of Therapy (PT) 2 weeks     Problem List (PT) balance;mobility;strength;pain     Activity Limitations Related to Problem List unable to ambulate safely;unable to transfer safely     Planned Therapy Interventions balance training;bed mobility training;gait training;home exercise program;neuromuscular re-education;patient/family education;stretching;strengthening;stair training;ROM (range of motion);transfer training     Comment, Therapy Assessment/Plan (PT) Pt is a 77 y/o male admitted s/p stage I L2-L4 PSF with instrumentation, ORIF of L3 pathologic fracture, stage II L3 lateral corpectomy with expandable cage placement on 1/20/22. Pt reports he was independent for all mobility PTA with " "use of SPC as back pain worsened. Pt currently presents with pain and deficits in LE strength (R>L), static/dynamic balance, and endurance affecting overall independence with functional mobility. He required steadying A during supine <> sit via log roll technqiue. He completed STS and SPT without AD with Min A. He required Min A during gait without AD, demonstrating decreased overall mustapha/fluidity and B/L step length. He completed 4 (6\") stairs using B HR for support with Min A. Pt will benefit from intensive, skilled PT in IRF setting to address deficits and maximize functional independence/safety. ELOS pending first interdisciplinary team meeting.                      Education Documentation  Treatment Plan, taught by Ilia Graves PT at 1/28/2022 12:20 PM.  Learner: Patient  Readiness: Acceptance  Method: Explanation  Response: Verbalizes Understanding  Comment: role of PT, PT POC/goals, therapy schedule, use of call bell/assist          IRF PT Goals      Most Recent Value   Bed Mobility Goal 1    Activity/Assistive Device bed mobility activities, all at 01/28/2022 0902   [place] supervision required at 01/28/2022 0902   Time Frame short-term goal (STG), 1 week at 01/28/2022 0902   Bed Mobility Goal 2    Activity/Assistive Device bed mobility activities, all at 01/28/2022 0902   [place] modified independence at 01/28/2022 0902   Time Frame long-term goal (LTG), 21 days or less at 01/28/2022 0902   Transfer Goal 1    Activity/Assistive Device sit-to-stand/stand-to-sit, stand pivot at 01/28/2022 0902   [place] tactile cues required at 01/28/2022 0902   Time Frame short-term goal (STG), 3 - 5 days at 01/28/2022 0902   Transfer Goal 2    Activity/Assistive Device sit-to-stand/stand-to-sit, stand pivot at 01/28/2022 0902   [place] modified independence at 01/28/2022 0902   Time Frame long-term goal (LTG), 21 days or less at 01/28/2022 0902   Gait/Walking Locomotion Goal 1    Activity/Assistive " Device gait (walking locomotion) at 01/28/2022 0902   Distance 150 feet at 01/28/2022 0902   [place] minimum assist (75% or more patient effort) at 01/28/2022 0902   Time Frame short-term goal (STG), 3 - 5 days at 01/28/2022 0902   Gait/Walking Locomotion Goal 2    Activity/Assistive Device gait (walking locomotion) at 01/28/2022 0902   Distance 150 feet at 01/28/2022 0902   [place] modified independence at 01/28/2022 0902   Time Frame long-term goal (LTG), 21 days or less at 01/28/2022 0902   Stairs Goal 1    Activity/Assistive Device stairs, all skills at 01/28/2022 0902   Number of Stairs 12 at 01/28/2022 0902   [place] minimum assist (75% or more patient effort) at 01/28/2022 0902   Time Frame short-term goal (STG), 3 - 5 days at 01/28/2022 0902   Stairs Goal 2    Activity/Assistive Device stairs, all skills at 01/28/2022 0902   Number of Stairs 12 at 01/28/2022 0902   [place] modified independence at 01/28/2022 0902   Time Frame long-term goal (LTG), 21 days or less at 01/28/2022 0902

## 2022-01-28 NOTE — CONSULTS
NEPHROLOGY CONSULTATION    Reason for consult: GMAC    Consulting Physician: Rocky    HPI: Thank you for this consultation on Matt Williamson who is a 76 y.o. male   With history of DM.  He is on jardiance.  His labs showed GMAC.  AG level is 15. UA showed ketones. His bicarbonate level was 17 yesterday with of 20 at max.  He has normal creatinine level.  His BS is normal.  We are asked to evaluate patient for GMAC.     Review of Systems   All other systems reviewed and are negative.      Past Medical History:   Diagnosis Date   • Atrial fibrillation with rapid ventricular response (CMS/HCC) 10/25/2019    Hospitalized on 10/25/19 at [facility].    • BPH (benign prostatic hyperplasia)    • Coronary artery disease     LAD stent    • Hypertension    • Hypothyroidism    • Mixed hyperlipidemia    • Thyroid cancer (CMS/HCC)    • Type 2 diabetes mellitus without complication (CMS/HCC)    • Vertigo        Past Surgical History:   Procedure Laterality Date   • ANAL FISSURECTOMY     • CARDIAC CATHETERIZATION  2005    LM FOD. 70-80% proximal LAD.Mild disease CX, RCA and branches.    • CARDIAC CATHETERIZATION  2005    LM FOD. [place] LAD stent. CX FOD. RCA mild disease.   • CORONARY ANGIOPLASTY WITH STENT PLACEMENT  2005    LM FOD. Moderate, diffuse disease LAD. Irregular CX w/ PLVB distal to PDA 99%. Mild disease RCA. Stent to distal LAD.   • EYE SURGERY Left Cataract   • FOOT SURGERY      For osteomyelitis.   • GASTROCUTANEOUS FISTULA CLOSURE     • LUMBAR SPINE SURGERY  2022    Stage 1: L2-L4 PSF with instrumentation, ORIF L3 pathologic fracture Stage 2: L3 lateral corpectomy with expandable cage placement   • THYROIDECTOMY     • UPPER GASTROINTESTINAL ENDOSCOPY         Social History     Tobacco Use   • Smoking status: Former Smoker     Packs/day: 2.00     Years: 20.00     Pack years: 40.00     Types: Cigarettes     Quit date:      Years since quittin.1   •  Smokeless tobacco: Never Used   Vaping Use   • Vaping Use: Never used   Substance Use Topics   • Alcohol use: Yes     Comment: Social   • Drug use: Never       Family History   Problem Relation Age of Onset   • Cancer Biological Mother    • Breast cancer Biological Mother    • Emphysema Biological Father    • Lung cancer Biological Father        Allergies   Allergen Reactions   • Penicillins Rash     Childhood allergy       Home Medication List:  •  aspirin, Take 81 mg by mouth daily.  •  bisacodyL, Insert 1 suppository (10 mg total) into the rectum daily for 10 days.  •  gabapentin, Take 2 capsules (600 mg total) by mouth 3 (three) times a day. (Patient taking differently: Take 600 mg by mouth 2 (two) times a day.  )  •  glipiZIDE, Take 1 tablet by mouth as needed.   •  JARDIANCE, TAKE 1 TABLET(10 MG) BY MOUTH DAILY  •  metFORMIN, TAKE 1 TABLET BY MOUTH FOUR TIMES DAILY  •  metoprolol succinate XL, Take 1 tablet (100 mg total) by mouth 2 (two) times a day.  •  pantoprazole, TAKE 1 TABLET(40 MG) BY MOUTH DAILY (Patient taking differently: Take 40 mg by mouth every evening.  )  •  polyethylene glycol, Take 17 g by mouth daily for 3 days.  •  pravastatin, Take 1 tablet (40 mg total) by mouth daily.  •  propafenone, Take 1 tablet by mouth as needed (Palpitations).    •  sennosides-docusate sodium, Take 1 tablet by mouth 2 (two) times a day.  •  SYNTHROID, Take 1 tablet (150 mcg total) by mouth once daily.  •  TRULICITY, once a week. Every Tuesday       Current Facility-Administered Medications:   •  acetaminophen (TYLENOL) tablet 650 mg, 650 mg, oral, q4h PRN, Arjun Dawkins MD  •  acetaminophen (TYLENOL) tablet 650 mg, 650 mg, oral, q6h SALIMA, Arjun Dawkins MD, 650 mg at 01/28/22 0559  •  alum-mag hydroxide-simeth (MAALOX) 200-200-20 mg/5 mL suspension 30 mL, 30 mL, oral, q4h PRN, Arjun Dawkins MD  •  bisacodyL (DULCOLAX) 10 mg suppository 10 mg, 10 mg, rectal, Daily (6p), Arjun Dawkins MD  •  glucose chewable tablet  16-32 g of dextrose, 16-32 g of dextrose, oral, PRN **OR** dextrose 40 % oral gel 15-30 g of dextrose, 15-30 g of dextrose, oral, PRN **OR** glucagon (GLUCAGEN) injection 1 mg, 1 mg, intramuscular, PRN **OR** dextrose in water injection 12.5 g, 25 mL, intravenous, PRN, Arjun Dawkins MD  •  diazePAM (VALIUM) tablet 5 mg, 5 mg, oral, q6h PRN, Arjun Dawkins MD  •  diphenhydrAMINE (BENADRYL) capsule 25 mg, 25 mg, oral, q6h PRN, Arjun Dawkins MD  •  docusate sodium (COLACE) capsule 100 mg, 100 mg, oral, TID, Arjun Dawkins MD, 100 mg at 01/28/22 0804  •  gabapentin (NEURONTIN) capsule 600 mg, 600 mg, oral, q8h SALIMA, Arjun Dawkins MD, 600 mg at 01/28/22 0559  •  heparin (porcine) 5,000 unit/mL injection 5,000 Units, 5,000 Units, subcutaneous, q8h SALIMA, Arjun Dawkins MD, 5,000 Units at 01/28/22 0559  •  insulin aspart U-100 (NovoLOG) pen 1-10 Units, 1-10 Units, subcutaneous, With meals & nightly, Arjun Dawkins MD, 1 Units at 01/27/22 2206  •  levothyroxine (SYNTHROID) tablet 150 mcg, 150 mcg, oral, Daily (6a), Arjun Dawkins MD, 150 mcg at 01/28/22 0559  •  metFORMIN (GLUCOPHAGE) tablet 500 mg, 500 mg, oral, BID with meals, Arjun Dawkins MD, 500 mg at 01/28/22 0804  •  metoprolol succinate XL (TOPROL-XL) 24 hr ER tablet 100 mg, 100 mg, oral, Nightly, Arjun Dawkins MD, 100 mg at 01/27/22 2056  •  ondansetron ODT (ZOFRAN-ODT) disintegrating tablet 4 mg, 4 mg, oral, q8h PRN, Arjun Dawkins MD  •  oxyCODONE (ROXICODONE) immediate release tablet 10 mg, 10 mg, oral, q4h PRN **AND** oxyCODONE (ROXICODONE) immediate release tablet 5 mg, 5 mg, oral, q4h PRN, Arjun Dawkins MD, 5 mg at 01/28/22 0805  •  pantoprazole (PROTONIX) tablet,delayed release (DR/EC) 40 mg, 40 mg, oral, Daily before breakfast, Arjun Dawkins MD, 40 mg at 01/28/22 0806  •  polyethylene glycol (MIRALAX) 17 gram packet 17 g, 17 g, oral, Daily, Arjun Dawkins MD, 17 g at 01/28/22 0808  •  pravastatin (PRAVACHOL) tablet 40 mg, 40 mg, oral, Daily (6p), [place]  MD Arjun  •  senna (SENOKOT) tablet 3 tablet, 3 tablet, oral, Daily, Arjun Dawkins MD  •  sodium bicarbonate tablet 650 mg, 650 mg, oral, QID, Arjun Dawkins MD, 650 mg at 01/28/22 0804    PHYSICAL EXAM:  Vitals:    01/27/22 2000 01/28/22 0400 01/28/22 0701 01/28/22 0744   BP: 110/60  139/68 135/66   BP Location: Left upper arm  Left upper arm Left upper arm   Patient Position: Sitting  Lying Lying   Pulse: 86  72 75   Resp: 18  18    Temp: 36.8 °C (98.2 °F)  36.8 °C (98.2 °F)    TempSrc: Oral  Oral    SpO2: 97% 98% 97%    Weight:       Height:           No intake or output data in the 24 hours ending 01/28/22 0838    Physical Exam  Vitals reviewed.   HENT:      Head: Normocephalic.      Nose: Nose normal.      Mouth/Throat:      Mouth: Mucous membranes are moist.   Eyes:      Pupils: Pupils are equal, round, and reactive to light.   Cardiovascular:      Rate and Rhythm: Normal rate.      Pulses: Normal pulses.   Pulmonary:      Effort: Pulmonary effort is normal.   Abdominal:      General: Abdomen is flat.   Musculoskeletal:         General: Normal range of motion.   Skin:     General: Skin is warm.      Capillary Refill: Capillary refill takes less than 2 seconds.   Neurological:      General: No focal deficit present.      Mental Status: He is alert.         Results from last 7 days   Lab Units 01/28/22  0552   SODIUM mEQ/L 137   POTASSIUM mEQ/L 3.5*   CHLORIDE mEQ/L 101   CO2 mEQ/L 21*   BUN mg/dL 12   CREATININE mg/dL 0.8   EGFR mL/min/1.73m*2 >60.0   GLUCOSE mg/dL 92   CALCIUM mg/dL 8.0*   ALBUMIN g/dL 2.3*   PHOSPHORUS mg/dL 2.7   WBC K/uL 8.01   HEMOGLOBIN g/dL 9.2*   HEMATOCRIT % 28.8*   PLATELETS K/uL 323       Pertinent radiology and labs reviewed    ASSESSMENT:  Principal Problem:    Lumbar disc disease with radiculopathy  Active Problems:    Leg swelling    S/P lumbar fusion      PLAN:    GMAC due to ketosis  Ketosis is due to SGLT2i  Would stop jardiance at this time  Will need substitute for  glycemic control, will defer to IM  Trend BMP off SGLT2i        Salty Salazar DO

## 2022-01-28 NOTE — PLAN OF CARE
Plan of Care Review  Plan of Care Reviewed With: patient  Progress: improving  Outcome Summary: PT. calm and cooperative with care, asks appropriate questions about care. Continent of bowel and bladder. UA sent. QID accucheck. Pt. stated he does notwant to be on insulin long-term. LSO OOB. Pt. reported mild pain managed by tylenol and pillow support. Refused turns and repositioning overnight. SCDs on overnight.

## 2022-01-29 ENCOUNTER — APPOINTMENT (INPATIENT)
Dept: OCCUPATIONAL THERAPY | Facility: REHABILITATION | Age: 77
DRG: 949 | End: 2022-01-29
Payer: MEDICARE

## 2022-01-29 ENCOUNTER — APPOINTMENT (INPATIENT)
Dept: PHYSICAL THERAPY | Facility: REHABILITATION | Age: 77
DRG: 949 | End: 2022-01-29
Payer: MEDICARE

## 2022-01-29 PROBLEM — E11.10 DIABETIC KETOACIDOSIS ASSOCIATED WITH TYPE 2 DIABETES MELLITUS (CMS/HCC): Status: ACTIVE | Noted: 2022-01-29

## 2022-01-29 PROBLEM — D50.9 IDA (IRON DEFICIENCY ANEMIA): Status: ACTIVE | Noted: 2022-01-29

## 2022-01-29 PROBLEM — E55.9 VITAMIN D DEFICIENCY: Status: ACTIVE | Noted: 2022-01-29

## 2022-01-29 LAB
ANION GAP SERPL CALC-SCNC: 11 MEQ/L (ref 3–15)
B-OH-BUTYR SERPL-SCNC: 2.13 MMOL/L
BNP SERPL-MCNC: 43 PG/ML
BUN SERPL-MCNC: 8 MG/DL (ref 8–20)
CALCIUM SERPL-MCNC: 8.1 MG/DL (ref 8.9–10.3)
CHLORIDE SERPL-SCNC: 98 MEQ/L (ref 98–109)
CO2 SERPL-SCNC: 25 MEQ/L (ref 22–32)
CREAT SERPL-MCNC: 0.7 MG/DL (ref 0.8–1.3)
GFR SERPL CREATININE-BSD FRML MDRD: >60 ML/MIN/1.73M*2
GLUCOSE BLD-MCNC: 126 MG/DL (ref 70–99)
GLUCOSE BLD-MCNC: 60 MG/DL (ref 70–99)
GLUCOSE BLD-MCNC: 62 MG/DL (ref 70–99)
GLUCOSE BLD-MCNC: 75 MG/DL (ref 70–99)
GLUCOSE BLD-MCNC: 96 MG/DL (ref 70–99)
GLUCOSE BLD-MCNC: 96 MG/DL (ref 70–99)
GLUCOSE SERPL-MCNC: 68 MG/DL (ref 70–99)
MAGNESIUM SERPL-MCNC: 1.6 MG/DL (ref 1.8–2.5)
POCT TEST: ABNORMAL
POCT TEST: NORMAL
POTASSIUM SERPL-SCNC: 4 MEQ/L (ref 3.6–5.1)
SODIUM SERPL-SCNC: 134 MEQ/L (ref 136–144)

## 2022-01-29 PROCEDURE — 97110 THERAPEUTIC EXERCISES: CPT | Mod: GP

## 2022-01-29 PROCEDURE — 63700000 HC SELF-ADMINISTRABLE DRUG: Performed by: INTERNAL MEDICINE

## 2022-01-29 PROCEDURE — 80048 BASIC METABOLIC PNL TOTAL CA: CPT | Performed by: INTERNAL MEDICINE

## 2022-01-29 PROCEDURE — 36415 COLL VENOUS BLD VENIPUNCTURE: CPT | Performed by: INTERNAL MEDICINE

## 2022-01-29 PROCEDURE — 12800001 HC ROOM AND CARE SEMIPRIVATE REHAB-BRAIN INJ

## 2022-01-29 PROCEDURE — 82010 KETONE BODYS QUAN: CPT | Performed by: INTERNAL MEDICINE

## 2022-01-29 PROCEDURE — 63600000 HC DRUGS/DETAIL CODE: Performed by: INTERNAL MEDICINE

## 2022-01-29 PROCEDURE — 83880 ASSAY OF NATRIURETIC PEPTIDE: CPT | Performed by: INTERNAL MEDICINE

## 2022-01-29 PROCEDURE — 97110 THERAPEUTIC EXERCISES: CPT | Mod: GO

## 2022-01-29 PROCEDURE — 97116 GAIT TRAINING THERAPY: CPT | Mod: GP

## 2022-01-29 PROCEDURE — 25800000 HC PHARMACY IV SOLUTIONS: Performed by: INTERNAL MEDICINE

## 2022-01-29 PROCEDURE — 97530 THERAPEUTIC ACTIVITIES: CPT | Mod: GO

## 2022-01-29 PROCEDURE — 97530 THERAPEUTIC ACTIVITIES: CPT | Mod: GP

## 2022-01-29 PROCEDURE — 83735 ASSAY OF MAGNESIUM: CPT | Performed by: INTERNAL MEDICINE

## 2022-01-29 RX ORDER — SYRING-NEEDL,DISP,INSUL,0.3 ML 29 G X1/2"
148 SYRINGE, EMPTY DISPOSABLE MISCELLANEOUS 2 TIMES DAILY
Status: DISCONTINUED | OUTPATIENT
Start: 2022-01-29 | End: 2022-01-30

## 2022-01-29 RX ORDER — INSULIN GLARGINE 100 [IU]/ML
9 INJECTION, SOLUTION SUBCUTANEOUS NIGHTLY
Status: DISCONTINUED | OUTPATIENT
Start: 2022-01-30 | End: 2022-01-30

## 2022-01-29 RX ORDER — INSULIN ASPART 100 [IU]/ML
3 INJECTION, SOLUTION INTRAVENOUS; SUBCUTANEOUS
Status: DISCONTINUED | OUTPATIENT
Start: 2022-01-29 | End: 2022-01-30

## 2022-01-29 RX ORDER — SODIUM CHLORIDE 9 MG/ML
INJECTION, SOLUTION INTRAVENOUS
Status: DISCONTINUED | OUTPATIENT
Start: 2022-01-29 | End: 2022-01-31

## 2022-01-29 RX ORDER — SODIUM BICARBONATE 650 MG/1
650 TABLET ORAL 2 TIMES DAILY
Status: DISCONTINUED | OUTPATIENT
Start: 2022-01-29 | End: 2022-02-03

## 2022-01-29 RX ADMIN — INSULIN ASPART 4 UNITS: 100 INJECTION, SOLUTION INTRAVENOUS; SUBCUTANEOUS at 09:36

## 2022-01-29 RX ADMIN — SENNOSIDES 3 TABLET: 8.6 TABLET, FILM COATED ORAL at 12:49

## 2022-01-29 RX ADMIN — DOCUSATE SODIUM 100 MG: 100 CAPSULE, LIQUID FILLED ORAL at 21:43

## 2022-01-29 RX ADMIN — GABAPENTIN 600 MG: 300 CAPSULE ORAL at 14:43

## 2022-01-29 RX ADMIN — HEPARIN SODIUM 5000 UNITS: 5000 INJECTION, SOLUTION INTRAVENOUS; SUBCUTANEOUS at 21:43

## 2022-01-29 RX ADMIN — HEPARIN SODIUM 5000 UNITS: 5000 INJECTION, SOLUTION INTRAVENOUS; SUBCUTANEOUS at 05:40

## 2022-01-29 RX ADMIN — MAGNESIUM 64 MG (MAGNESIUM CHLORIDE) TABLET,DELAYED RELEASE 128 MG: at 21:43

## 2022-01-29 RX ADMIN — INSULIN GLARGINE 10 UNITS: 100 INJECTION, SOLUTION SUBCUTANEOUS at 09:38

## 2022-01-29 RX ADMIN — GABAPENTIN 600 MG: 300 CAPSULE ORAL at 21:43

## 2022-01-29 RX ADMIN — METOPROLOL SUCCINATE 100 MG: 100 TABLET, EXTENDED RELEASE ORAL at 21:43

## 2022-01-29 RX ADMIN — ERGOCALCIFEROL 50000 UNITS: 1.25 CAPSULE ORAL at 09:32

## 2022-01-29 RX ADMIN — DOCUSATE SODIUM 100 MG: 100 CAPSULE, LIQUID FILLED ORAL at 17:12

## 2022-01-29 RX ADMIN — SODIUM CHLORIDE: 9 INJECTION, SOLUTION INTRAVENOUS at 18:47

## 2022-01-29 RX ADMIN — PRAVASTATIN SODIUM 40 MG: 20 TABLET ORAL at 17:12

## 2022-01-29 RX ADMIN — SODIUM BICARBONATE 650 MG TABLET 650 MG: at 21:43

## 2022-01-29 RX ADMIN — FERROUS SULFATE TAB 325 MG (65 MG ELEMENTAL FE) 325 MG: 325 (65 FE) TAB at 09:32

## 2022-01-29 RX ADMIN — POLYETHYLENE GLYCOL 3350 17 G: 17 POWDER, FOR SOLUTION ORAL at 09:32

## 2022-01-29 RX ADMIN — BISACODYL 10 MG: 10 SUPPOSITORY RECTAL at 18:40

## 2022-01-29 RX ADMIN — MAGNESIUM 64 MG (MAGNESIUM CHLORIDE) TABLET,DELAYED RELEASE 128 MG: at 09:32

## 2022-01-29 RX ADMIN — OXYCODONE HYDROCHLORIDE 10 MG: 5 TABLET ORAL at 10:14

## 2022-01-29 RX ADMIN — Medication 16 G OF DEXTROSE: at 17:21

## 2022-01-29 RX ADMIN — HEPARIN SODIUM 5000 UNITS: 5000 INJECTION, SOLUTION INTRAVENOUS; SUBCUTANEOUS at 14:43

## 2022-01-29 RX ADMIN — Medication 16 G OF DEXTROSE: at 17:01

## 2022-01-29 RX ADMIN — LEVOTHYROXINE SODIUM 150 MCG: 150 TABLET ORAL at 05:40

## 2022-01-29 RX ADMIN — GABAPENTIN 600 MG: 300 CAPSULE ORAL at 05:40

## 2022-01-29 RX ADMIN — ACETAMINOPHEN 650 MG: 325 TABLET, FILM COATED ORAL at 05:40

## 2022-01-29 RX ADMIN — DOCUSATE SODIUM 100 MG: 100 CAPSULE, LIQUID FILLED ORAL at 09:32

## 2022-01-29 RX ADMIN — FERROUS SULFATE TAB 325 MG (65 MG ELEMENTAL FE) 325 MG: 325 (65 FE) TAB at 17:12

## 2022-01-29 RX ADMIN — INSULIN ASPART 3 UNITS: 100 INJECTION, SOLUTION INTRAVENOUS; SUBCUTANEOUS at 12:52

## 2022-01-29 RX ADMIN — SODIUM BICARBONATE 650 MG TABLET 650 MG: at 09:32

## 2022-01-29 NOTE — PROGRESS NOTES
[facility] Progress Note          Patient was seen and examined at bedside.    Subjective:   1/28/22  Saw him in am, awake and alert, denies remarkable pain, no new paresthesia, b/l LE edema, will check a venous compression U/S.  Issues:  1. AGMA from non hyperglycemic ketoacidosis, UA ketone +4, glucose >=1000, likely from SGLT2 inhibitor, dehydration, plus perioperative poor po intake.  D/c jardiance and hold metformin, insulin regimen, IVF plus po hydration, sodium bicarb supplement;  2. Electrolytes imbalance-hypokalemia and hypomagnesemia, supplement         Monitor his AGMA. Renal on board     1/29/22   AGMA resolved, CO2 21-25, AG 15-11, hypokalemia resolved, residual ketone on serum, off SGLT2 inhibitor, tolerated IVF hydration.    Multiple medical issues:  1. VitD deficiency with 25 OH VitD low at 12, loading with ergocaociferol 50,000 units weekly;  2. Anemia with AVI, provide ferrous sulfate oral supplement.    Clinically, he feels better, denies nausea, abd pain, his appetite is improving.  No BM, s/p ileus, hypomagnesemia, provide mag citrate with soap sunds enema   Objective   Vital signs in last 24 hours:  Temp:  [36.4 °C (97.6 °F)-36.9 °C (98.5 °F)] 36.9 °C (98.5 °F)  Heart Rate:  [76-88] 87  Resp:  [18] 18  BP: (116-163)/(62-79) 163/76      Intake/Output Summary (Last 24 hours) at 1/29/2022 1112  Last data filed at 1/29/2022 0540  Gross per 24 hour   Intake 1155 ml   Output 400 ml   Net 755 ml     Intake/Output this shift:  No intake/output data recorded.   Review of Systems:  All other systems reviewed and negative except as noted in the HPI.   Objective      Labs  reviewed his labs thoroughly   Lab Results   Component Value Date    WBC 8.01 01/28/2022    HGB 9.2 (L) 01/28/2022    HCT 28.8 (L) 01/28/2022    MCV 85.5 01/28/2022     01/28/2022     Lab Results   Component Value Date    GLUCOSE 68 (L) 01/29/2022    CALCIUM 8.1 (L) 01/29/2022     (L) 01/29/2022     K 4.0 01/29/2022    CO2 25 01/29/2022    CL 98 01/29/2022    BUN 8 01/29/2022    CREATININE 0.7 (L) 01/29/2022       Imaging  OSH imaging study reports reviewed       Full Code    Physical Exam:  Head/Ear/Nose/Throat: normocephalic; atraumatic; moisture mouth mm, no oropharyngeal thrush noted.   Eyes: anicteric sclera, EOMI; PERRL.   Neck : supple, no JVD, no carotid bruits appeciated.   Respiratory: no evidence of labored breathing, lung sounds CTA b/l, good aeration bibasilar area, no w/r/c.   Cardiovascular: RRR; normal S1, S2; no m/r/g; no S3 or S4.   Gastrointestinal: soft; NT; BS normal; mildly distended; no CVAT b/l.   Genitourinary: no silva.   Extremities : b/l LE pitting edema .   Neurological: AO x 3, fluent speeches, following commands, CNS II-XII grossly intact; no focal neurologic deficits.   Behavior/Emotional: in NAD, appropriate; cooperative.   Skin: sacral area skin lesions.     Plan of care was discussed with patient, RN, and PMR attending     Assessment   CC:Thyroid cancer with progressive lumbar metastatic lesions and L3 pathologic fracture complicated with myeloradiculopathy, s/p surgical intervention and ORIF, neurogenic B/B, ADL and ambulatory dysfunction      76 y.o. male with PMH of HTN, dyslipidemia, CAD, type 2 DM, hypothyroidism, thyroid cancer metastatic to L3 vertebral body s/p radiation stereotactic body radiation 5/2020, seen by the neurosurgery service for surgical discussion regarding growth of his L3 vertebral body metastatic lesion. He has had progressive worsening midline-line low back pain with radiation to the right knee. An MRI lumbar spine was obtained 11/22/2021 showing extension of the L3 mass. He was sent for a PET scan 12/7/2021 showing increased activity of the L3 lesion without further metastatic lesions appreciated. He was referred to [facility] however there has been some delay with getting an appointment.      He has had persistent midline low back pain with  walking and standing which has progressively worsened. He does not have any weakness or bowel/bladder incontinence. He is following with oncology (Dr. Casey) who does not recommend any chemotherapy at this time but surgical intervention as the primary treatment given the suspected isolated area of progression at the L3 vertebral body.     He was admitted to [facility] on 1/20/22 and underwent elective :  Stage 1: L2-L4 PLSF with instrumentation, ORIF L3 pathologic fracture  Stage 2: L3 lateral corpectomy with expandable cage placement  Post op ileus, s/p aggressive bowel program, resolved, tolerated diet. Functionally he has ADL and ambulatory dysfunction, requiring inpt acute rehab, transferred to [facility] on 1/27/22.            Ileus (CMS/HCC)  Resolved after aggressive bowel program, start diet  Tolerating diet, cont bowel regimen.     Acquired hypothyroidism  Assessment & Plan  Status post thyroidectomy  Continue levothyroxine        Malignant neoplasm of vertebral column, excluding sacrum and coccyx (CMS/HCC)  Assessment & Plan  Metastatic thyroid ca S/p L3 pathologic fracture s/p right lateral L3 corpectomy and placement of an expandable cage, followed by L2-4 posterolateral instrumented fusion  Continue postop management per neurosurgery.  Drain was removed  DVT prophylaxis, Lewis, pain management per neurosurgery  Rehab as inpt at [facility]      Paroxysmal atrial fibrillation (CMS/HCC)  Assessment & Plan  Currently in sinus rhythm  Patient is not on long-term anticoagulation  He takes Rythmol as needed for palpitations     Type 2 diabetes mellitus without complication (CMS/HCC)  Assessment & Plan  Patient on Jardiance, Trulicity and metformin as outpatient  Cont inpt Accu-Cheks with sliding scale insulin  Hemoglobin A1c 6.9  Continue Jardiance and restarted metformin and trulicity  Metformin should be changed to twice daily or extended release once daily      Metastasis from thyroid cancer (CMS/HCC)  Assessment  & Plan  Follows up with medical oncologist  and radiation oncologist  Status post radiation     Mixed hyperlipidemia  Assessment & Plan  Continue statin     Essential hypertension  Assessment & Plan  Stable  Continue metoprolol     Atherosclerosis of coronary artery  Assessment & Plan  Status post LAD stenting 17 years ago  Patient follows up with Dr. Ji  Continue metoprolol, statin  Restart aspirin once cleared by neurosurgery     #VitD deficiency with 25 OH VitD low at 12, loading with ergocaociferol 50,000 units weekly    #Anemia with AVI, provide ferrous sulfate oral supplement    # Euglycemic DKA, hold jardiance, IVF and po hydration, replacement K, Mag, insulin replacement     Billing code: [id number]  Diagnoses:  Patient Active Problem List   Diagnosis   • BPH (benign prostatic hyperplasia)   • Atherosclerosis of coronary artery   • Cataract   • Essential hypertension   • Former tobacco use   • Mixed hyperlipidemia   • Metastasis from thyroid cancer (CMS/HCC)   • Type 2 diabetes mellitus without complication (CMS/HCC)   • Cyst of thyroid   • Esophageal reflux   • Paroxysmal atrial fibrillation (CMS/HCC)   • Pre-op evaluation   • Preop cardiovascular exam   • Cancer associated pain   • Low back pain with right-sided sciatica   • Malignant neoplasm of vertebral column, excluding sacrum and coccyx (CMS/HCC)   • Acquired hypothyroidism   • Ileus (CMS/HCC)   • Leg swelling   • Lumbar disc disease with radiculopathy   • S/P lumbar fusion   • Diabetic ketoacidosis associated with type 2 diabetes mellitus (CMS/HCC)   • Vitamin D deficiency   • AVI (iron deficiency anemia)   complicated case with multiple comorbidities as mentioned in subjective section, spent 35 min to manage the case, >50% of the time consulting the patient about current medical condition, existing comorbidities, new findings/concerns and care/management plan.                Arjun Dawkins MD  1/29/2022

## 2022-01-29 NOTE — PLAN OF CARE
Plan of Care Review  Plan of Care Reviewed With: patient  Progress: improving  Outcome Summary: Pt.A&O x3 and able to make needs known.  Continent  during shift.  Pain managed with PRN and scheduled medications.  TLSO worn when OOB.  Care and medications reviewed with pt.  Safety maintained.

## 2022-01-29 NOTE — PROGRESS NOTES
Patient: Matt Williamson  Location: [facility] [address]  MRN: [id number]  Today's date: 1/29/2022    History of Present Illness  Marco A is a 76 y.o. male admitted on 1/27/2022 with Lumbar disc disease with radiculopathy [M51.16]  S/P lumbar fusion [Z98.1]. Principal problem is Lumbar disc disease with radiculopathy.    Patient is a 76-year-old male with a history of atrial fibrillation, CAD, hypertension, hypothyroidism, hyperlipidemia, thyroid cancer, type 2 diabetes developed significant mid to low back pain with walking and standing.  He has metastatic thyroid carcinoma with metastasis to the L3 body status post radiation in May 2020.  Recent PET scan showed increased activity at the L3 level without further metastatic lesions.  He was evaluated by neurosurgery who recommended surgical resection.  He was taken to the OR on 1/20 and underwent a stage I L2-L4 PSF with instrumentation, ORIF of L3 pathologic fracture, stage II L3 lateral corpectomy with expandable cage placement.  There has been no significant postoperative complications.  His pain is fairly well controlled.  An LSO has been ordered.    1/28 Pt placed on Med hold after OT eval  and during PT eval 2/2 - metabolic ketone acidosis with dehydration    Past Medical History  Marco A has a past medical history of Atrial fibrillation with rapid ventricular response (CMS/HCC) (10/25/2019), BPH (benign prostatic hyperplasia), Coronary artery disease, Hypertension, Hypothyroidism, Mixed hyperlipidemia, Thyroid cancer (CMS/HCC), Type 2 diabetes mellitus without complication (CMS/HCC), and Vertigo.      OT Vitals    Date/Time Pulse BP BP Location BP Method Pt Position [facility]   01/29/22 1306 93 137/71 Left upper arm Automatic Sitting JV      OT Pain    Date/Time Pain Type Side/Orientation Location Rating: Rest Interventions [facility]   01/29/22 1306 Pain Assessment lower back 1 premedicated for activity JV   01/29/22 1329 Pain Assessment -- -- 1 -- J           Prior Living Environment      Most Recent Value   People in Home spouse   Current Living Arrangements home   Home Accessibility stairs to enter home (Group)   Living Environment Comment pt lives in 2  in [place]. 3 steps to enter with R rail, bedroom and shower stall on first floor, full flight B/L rails to shower stall and bedroom, typically sleeps on second floor. has a bed and shower stall on first floor   Number of Stairs, Main Entrance 3   Stair Railings, Main Entrance railing on right side (ascending)   Location, Patient Bedroom second floor, must negotiate stairs to access   Patient Bedroom Access Comment has bedroom on first floor if needed   Location, Bathroom second floor, must negotiate stairs to access   Bathroom Access Comment has bathroom on first floor   Number of Stairs, Within Home, Primary 12   Stair Railings, Within Home, Primary railings on both sides of stairs          Prior Level of Function      Most Recent Value   Dominant Hand right   Ambulation assistive equipment   Transferring assistive equipment   Toileting independent   Bathing independent   Dressing independent   Eating independent   Prior Level of Function Comment Pt reports he was independent for all mobility using SPC, has shower chair at home.   Assistive Device Currently Used at Home cane, straight, shower chair          Occupational Profile      Most Recent Value   Successful Occupations Retired restaraunt owner   Occupational History/Life Experiences Enjoys Playing Basketball. +    Environmental Supports and Barriers lives with spouse   Patient Goals To go home           IRF OT Evaluation and Treatment - 01/29/22 1307        OT Time Calculation    Start Time 1300     Stop Time 1430     Time Calculation (min) 90 min        Session Details    Document Type daily treatment/progress note     Mode of Treatment occupational therapy;individual therapy        General Information    General Observations of Patient Pt  pleasant and agreeable to therapy.        Hand  Strength Testing    Left Hand, Setting 2 43, 55, 51     Comment, Left Hand WFL     Right Hand, Setting 2 55, 56, 46     Comment, Right Hand WFL        Sit to Stand Transfer    [place], Sit to Stand Transfer minimum assist (75% or more patient effort)     Verbal Cues preparatory posture;hand placement     Assistive Device walker, front-wheeled     Comment w/c to RW        Stand to Sit Transfer    [place], Stand to Sit Transfer minimum assist (75% or more patient effort)     Verbal Cues hand placement;technique     Assistive Device walker, front-wheeled     Comment to RW        Stand Pivot Transfer    [place], Stand Pivot/Stand Step Transfer minimum assist (75% or more patient effort)     Verbal Cues hand placement;technique     Assistive Device walker, front-wheeled     Comment Ambulatory approach to w/c        Safety Issues, Functional Mobility    Comment, Safety Issues/Impairments (Mobility) MIN A ambulating c RW during item retrieval activity.        Balance    Comment, Balance Safe item retrieval training. Pt educated on use of RW bag and reacher for safe item retrieval and transport. Pt retrieve 6 bean bags utilizing recommendations c MIN A for balance and MIN VCs for correct use of AE and RW management.        Motor Skills    Coordination WFL     Comment, Motor Skills Coordination WFL and pt reports no change from PLOF.        Therapeutic Interventions    Comment, Therapeutic Intervention Pt completes PSFS for outcome measure; Pt identifies 5 activities currently having difficulty performing as: cleaning (3), doing wash (4), bowling (1), shooting baskets (3), and running on treadmill (4). Total score: 3        Upper Extremity (Therapeutic Exercise)    Exercise Position/Type seated     General Exercise bilateral     Weight/Resistance resistance band   pink    Reps and Sets 10     Comment Pt issued theraband HEP for UB strengthening. OT providing  education on technique and demo of each exercise then pt performs x 10 reps. VCs for allowing for slack on theraband as appropriate c exercises. MOD VCs throughout for technique. Recommend ongoing review to ensure carryover of technique. Pt educated to complete 1x/day 10 reps.        Hand (Therapeutic Exercise)    Exercise Position/Type seated     General Exercise  strengthening;thumb pinch strengthening     Weight/Resistance therapy putty     Comment Pt issued theraputty HEP c medium-firm resistance putty. Pt completes HEP 1 x through according to directions c each hand. OT educating to complete HEP at least once daily. No hand grippers available for issue this session. Recommend issue when available.        Lower Body Dressing    Comment AE training completed in gym c donning/doffing footwear. Pt able to utilize cross leg position c LLE, however unable to bring RLE into position s UE lifting and breaking precautions. OT educating on use of AE to maintain spinal precautions. Pt utilizing following education/demo c MIN increased time and able to don/doff R footwear c CL S. AE issued. Recommend ongoing training.        Daily Progress Summary (OT)    Daily Outcome Statement Pt presents c FMC/ strength WNL for age/gender. Pt set-up c HEP for maintenance of strength while in IP rehab. Pt provided education on technique and frequency for all HEPs. PSFS completed c total score of 3. Pt benefiting from AE training for increased I c LB dressing and maintaining spinal precautions. Pt benefiting from safe item retrieval/transport training using recommended AE.                           IRF OT Goals      Most Recent Value   Transfer Goal 1    Activity/Assistive Device toilet at 01/28/2022 0722   [place] --  [touching] at 01/28/2022 0722   Time Frame short-term goal (STG), 5 - 7 days at 01/28/2022 0722   Transfer Goal 2    Activity/Assistive Device toilet at 01/28/2022 0722   [place] modified independence at  01/28/2022 0722   Time Frame long-term goal (LTG), 14 days or less at 01/28/2022 0722   Transfer Goal 3    Activity/Assistive Device shower at 01/28/2022 0722   [place] --  [touching A] at 01/28/2022 0722   Time Frame short-term goal (STG), 5 - 7 days at 01/28/2022 0722   Transfer Goal 4    Activity/Assistive Device shower at 01/28/2022 0722   [place] modified independence at 01/28/2022 0722   Time Frame long-term goal (LTG), 14 days or less at 01/28/2022 0722   Bathing Goal 1    [place] --  [touching A] at 01/28/2022 0722   Time Frame short-term goal (STG), 5 - 7 days at 01/28/2022 0722   Bathing Goal 2    [place] modified independence at 01/28/2022 0722   Time Frame long-term goal (LTG), 14 days or less at 01/28/2022 0722   UB Dressing Goal 1    [place] --  [CS] at 01/28/2022 0722   Time Frame short-term goal (STG), 5 - 7 days at 01/28/2022 0722   UB Dressing Goal 2    [place] modified independence at 01/28/2022 0722   Time Frame long-term goal (LTG), 14 days or less at 01/28/2022 0722   LB Dressing Goal 1    [place] minimum assist (75% or more patient effort) at 01/28/2022 0722   Time Frame short-term goal (STG), 5 - 7 days at 01/28/2022 0722   LB Dressing Goal 2    [place] modified independence at 01/28/2022 0722   Time Frame long-term goal (LTG), 14 days or less at 01/28/2022 0722   Grooming Goal 1    [place] --  [touching A] at 01/28/2022 0722   Time Frame short-term goal (STG), 5 - 7 days at 01/28/2022 0722   Strategies/Barriers in stance at 01/28/2022 0722   Grooming Goal 2    [place] modified independence at 01/28/2022 0722   Time Frame long-term goal (LTG), 14 days or less at 01/28/2022 0722   Toileting Goal 1    [place] --  [touching A] at 01/28/2022 0722   Time Frame short-term goal (STG), 5 - 7 days at 01/28/2022 0722   Toileting Goal 2    [place] modified independence at 01/28/2022 0722   Time Frame long-term goal (LTG), 14 days or less at  01/28/2022 0722

## 2022-01-29 NOTE — PLAN OF CARE
Plan of Care Review  Plan of Care Reviewed With: patient  Progress: improving  Outcome Summary: Pt aaox4, rings appropriately, is continent of bowel and bladder with urgency.  His pain was managed with scheduled tylenol.  TLSO brace worn OOB.  Back and flank dressings C,D, I.  Pt receiving LR for DKA with a recheck in labs this am.  Safety measures maintained.       Addendum to my documentation.  Pt wore a LSO not TLSO OOB.

## 2022-01-29 NOTE — PROGRESS NOTES
Patient: Matt Williamson  Location: [facility]  MRN: [id number]  Today's date: 1/29/2022    History of Present Illness  Marco A is a 76 y.o. male admitted on 1/27/2022 with Lumbar disc disease with radiculopathy [M51.16]  S/P lumbar fusion [Z98.1]. Principal problem is Lumbar disc disease with radiculopathy.    Patient is a 76-year-old male with a history of atrial fibrillation, CAD, hypertension, hypothyroidism, hyperlipidemia, thyroid cancer, type 2 diabetes developed significant mid to low back pain with walking and standing.  He has metastatic thyroid carcinoma with metastasis to the L3 body status post radiation in May 2020.  Recent PET scan showed increased activity at the L3 level without further metastatic lesions.  He was evaluated by neurosurgery who recommended surgical resection.  He was taken to the OR on 1/20 and underwent a stage I L2-L4 PSF with instrumentation, ORIF of L3 pathologic fracture, stage II L3 lateral corpectomy with expandable cage placement.  There has been no significant postoperative complications.  His pain is fairly well controlled.  An LSO has been ordered.    1/28 Pt placed on Med hold after OT eval  and during PT eval 2/2 - metabolic ketone acidosis with dehydration    Past Medical History  Marco A has a past medical history of Atrial fibrillation with rapid ventricular response (CMS/HCC) (10/25/2019), BPH (benign prostatic hyperplasia), Coronary artery disease, Hypertension, Hypothyroidism, Mixed hyperlipidemia, Thyroid cancer (CMS/HCC), Type 2 diabetes mellitus without complication (CMS/HCC), and Vertigo.      PT Vitals    Date/Time Pulse HR Source BP BP Location BP Method Pt Position [facility]   01/29/22 1043 87 Monitor 163/76 Left upper arm Automatic Sitting MEGAN      PT Pain    Date/Time Pain Type Location Rating: Rest Rating: Activity Interventions [facility]   01/29/22 1043 Pain Assessment back 5 -- premedicated for activity MEGAN   01/29/22 1059 Pain Assessment --  5 -- -- MEMO   01/29/22 1153 Pain Reassessment back -- 1 premedicated for activity MEGAN          Prior Living Environment      Most Recent Value   People in Home spouse   Current Living Arrangements home   Home Accessibility stairs to enter home (Group)   Living Environment Comment pt lives in 2  [place]. 3 steps to enter with R rail, bedroom and shower stall on first floor, full flight B/L rails to shower stall and bedroom, typically sleeps on second floor. has a bed and shower stall on first floor   Number of Stairs, Main Entrance 3   Stair Railings, Main Entrance railing on right side (ascending)   Location, Patient Bedroom second floor, must negotiate stairs to access   Patient Bedroom Access Comment has bedroom on first floor if needed   Location, Bathroom second floor, must negotiate stairs to access   Bathroom Access Comment has bathroom on first floor   Number of Stairs, Within Home, Primary 12   Stair Railings, Within Home, Primary railings on both sides of stairs          Prior Level of Function      Most Recent Value   Dominant Hand right   Ambulation assistive equipment   Transferring assistive equipment   Toileting independent   Bathing independent   Dressing independent   Eating independent   Prior Level of Function Comment Pt reports he was independent for all mobility using SPC, has shower chair at home.   Assistive Device Currently Used at Home cane, straight, shower chair           IRF PT Evaluation and Treatment - 01/29/22 1043        PT Time Calculation    Start Time 1030     Stop Time 1200     Time Calculation (min) 90 min        Session Details    Document Type daily treatment/progress note     Mode of Treatment individual therapy;physical therapy        General Information    General Observations of Patient Pt in WC in bathroom at start of session        Transfers    Transfers stand pivot transfer        Sit to Stand Transfer    [place], Sit to Stand Transfer minimum assist (75%  "or more patient effort)     Verbal Cues technique;safety     Assistive Device walker, front-wheeled;none;gait belt     Comment from WC to RW        Stand to Sit Transfer    [place], Stand to Sit Transfer minimum assist (75% or more patient effort)     Verbal Cues safety;technique     Assistive Device walker, front-wheeled;none;gait belt     Comment to WC, MIn A for controlled lowering        Stand Pivot Transfer    [place], Stand Pivot/Stand Step Transfer minimum assist (75% or more patient effort)     Verbal Cues proper use of assistive device;technique;safety     Assistive Device walker, front-wheeled;none;gait belt     Comment SPT to/from WC with RW        Gait Training    [place], Gait minimum assist (75% or more patient effort)     Assistive Device gait belt;walker, front-wheeled;none     Distance in Feet 80 feet     Pattern (Gait) step-through     Deviations/Abnormal Patterns (Gait) base of support, narrow;gait speed decreased     Advanced Gait Activity sloped surfaces;curb negotiation     Comment (Gait/Stairs) 80' (2x) with RW on first trial and no AD on 2nd trial. Min A for postural control with gait belt. Cues for trunk extension and hip ext in stance        Curb Negotiation    [place] minimum assist (75% or more patient effort)     Assistive Device none;gait belt     Curb Height 6 inches     Comment 6\" + 2\" curb step with No AD with Min A for postural control        Sloped Surface Gait Skills    [place] minimum assist (75% or more patient effort)     Assistive Device none;gait belt     Distance in Feet 5 feet     Comment 5' indoor simulated ramp with no AD        Stairs Training    [place], Stairs minimum assist (75% or more patient effort)     Assistive Device railing;gait belt     Handrail Location (Stairs) both sides     Number of Stairs 4     Stair Height 6 inches     Ascending Stairs Technique step-to-step     Descending Stairs Technique step-to-step     Comment Min A for " postural control and balance. Cues for foot position        Balance    Balance Test Results Timed Up and Go Test (TUG)        Timed Up and Go Test    Trial One: Timed Up and Go Test 27.97     Trial Two: Timed Up and Go Test 23.49     Trial Three: Timed Up and Go Test 19     Mean of 3 Trials: Timed Up and Go Test [id number]     Comment, Timed Up and Go Test From WC with RW. Additionally performed one trial with no AD (19.44 seconds)     Results, Timed Up and Go Test (Balance) Score indicates inc risk        Aerobic Exercise    Type recumbent stationary bike     Time Performed 10     Comment Motomed at gear 2 with B LEs        Orthotic Management    Orthosis Location spinal orthosis        Spinal Orthosis Management    Type (Spinal Orthosis) LSO (lumbar sacral orthosis)     Functional Design (Spinal Orthosis) static orthosis        Daily Progress Summary (PT)    Daily Outcome Statement Patient was able to initiate curb and ramp elevations during session. Pt performed ambulation and transfers with and without RW. Pt asking to trial without RW, however did note inc lateral sway at hips. Pt's current score on the TUG indicates inc risk of falls.                           IRF PT Goals      Most Recent Value   Bed Mobility Goal 1    Activity/Assistive Device bed mobility activities, all at 01/28/2022 0902   [place] supervision required at 01/28/2022 0902   Time Frame short-term goal (STG), 1 week at 01/28/2022 0902   Bed Mobility Goal 2    Activity/Assistive Device bed mobility activities, all at 01/28/2022 0902   [place] modified independence at 01/28/2022 0902   Time Frame long-term goal (LTG), 21 days or less at 01/28/2022 0902   Transfer Goal 1    Activity/Assistive Device sit-to-stand/stand-to-sit, stand pivot at 01/28/2022 0902   [place] tactile cues required at 01/28/2022 0902   Time Frame short-term goal (STG), 3 - 5 days at 01/28/2022 0902   Transfer Goal 2    Activity/Assistive Device  sit-to-stand/stand-to-sit, stand pivot at 01/28/2022 0902   [place] modified independence at 01/28/2022 0902   Time Frame long-term goal (LTG), 21 days or less at 01/28/2022 0902   Gait/Walking Locomotion Goal 1    Activity/Assistive Device gait (walking locomotion) at 01/28/2022 0902   Distance 150 feet at 01/28/2022 0902   [place] minimum assist (75% or more patient effort) at 01/28/2022 0902   Time Frame short-term goal (STG), 3 - 5 days at 01/28/2022 0902   Gait/Walking Locomotion Goal 2    Activity/Assistive Device gait (walking locomotion) at 01/28/2022 0902   Distance 150 feet at 01/28/2022 0902   [place] modified independence at 01/28/2022 0902   Time Frame long-term goal (LTG), 21 days or less at 01/28/2022 0902   Stairs Goal 1    Activity/Assistive Device stairs, all skills at 01/28/2022 0902   Number of Stairs 12 at 01/28/2022 0902   [place] minimum assist (75% or more patient effort) at 01/28/2022 0902   Time Frame short-term goal (STG), 3 - 5 days at 01/28/2022 0902   Stairs Goal 2    Activity/Assistive Device stairs, all skills at 01/28/2022 0902   Number of Stairs 12 at 01/28/2022 0902   [place] modified independence at 01/28/2022 0902   Time Frame long-term goal (LTG), 21 days or less at 01/28/2022 0902

## 2022-01-30 ENCOUNTER — APPOINTMENT (INPATIENT)
Dept: PHYSICAL THERAPY | Facility: REHABILITATION | Age: 77
DRG: 949 | End: 2022-01-30
Payer: MEDICARE

## 2022-01-30 ENCOUNTER — APPOINTMENT (INPATIENT)
Dept: OCCUPATIONAL THERAPY | Facility: REHABILITATION | Age: 77
DRG: 949 | End: 2022-01-30
Payer: MEDICARE

## 2022-01-30 LAB
GLUCOSE BLD-MCNC: 100 MG/DL (ref 70–99)
GLUCOSE BLD-MCNC: 198 MG/DL (ref 70–99)
GLUCOSE BLD-MCNC: 91 MG/DL (ref 70–99)
POCT TEST: ABNORMAL
POCT TEST: ABNORMAL
POCT TEST: NORMAL

## 2022-01-30 PROCEDURE — 97112 NEUROMUSCULAR REEDUCATION: CPT | Mod: GP

## 2022-01-30 PROCEDURE — 97110 THERAPEUTIC EXERCISES: CPT | Mod: GP

## 2022-01-30 PROCEDURE — 25800000 HC PHARMACY IV SOLUTIONS: Performed by: INTERNAL MEDICINE

## 2022-01-30 PROCEDURE — 63700000 HC SELF-ADMINISTRABLE DRUG: Performed by: INTERNAL MEDICINE

## 2022-01-30 PROCEDURE — 82570 ASSAY OF URINE CREATININE: CPT | Performed by: INTERNAL MEDICINE

## 2022-01-30 PROCEDURE — 63600000 HC DRUGS/DETAIL CODE: Performed by: INTERNAL MEDICINE

## 2022-01-30 PROCEDURE — 12800001 HC ROOM AND CARE SEMIPRIVATE REHAB-BRAIN INJ

## 2022-01-30 PROCEDURE — 97535 SELF CARE MNGMENT TRAINING: CPT | Mod: GO

## 2022-01-30 PROCEDURE — 97530 THERAPEUTIC ACTIVITIES: CPT | Mod: GO

## 2022-01-30 PROCEDURE — 97530 THERAPEUTIC ACTIVITIES: CPT | Mod: GP

## 2022-01-30 PROCEDURE — 97116 GAIT TRAINING THERAPY: CPT | Mod: GP

## 2022-01-30 RX ORDER — LOSARTAN POTASSIUM 25 MG/1
25 TABLET ORAL
Status: DISCONTINUED | OUTPATIENT
Start: 2022-01-30 | End: 2022-02-07

## 2022-01-30 RX ORDER — POLYETHYLENE GLYCOL 3350 17 G/17G
17 POWDER, FOR SOLUTION ORAL
Status: DISCONTINUED | OUTPATIENT
Start: 2022-01-31 | End: 2022-02-08 | Stop reason: HOSPADM

## 2022-01-30 RX ORDER — SENNOSIDES 8.6 MG/1
2 TABLET ORAL DAILY
Status: DISCONTINUED | OUTPATIENT
Start: 2022-01-31 | End: 2022-02-08 | Stop reason: HOSPADM

## 2022-01-30 RX ORDER — DOCUSATE SODIUM 100 MG/1
100 CAPSULE, LIQUID FILLED ORAL 2 TIMES DAILY
Status: DISCONTINUED | OUTPATIENT
Start: 2022-01-31 | End: 2022-02-08 | Stop reason: HOSPADM

## 2022-01-30 RX ORDER — INSULIN ASPART 100 [IU]/ML
1-10 INJECTION, SOLUTION INTRAVENOUS; SUBCUTANEOUS
Status: DISCONTINUED | OUTPATIENT
Start: 2022-01-30 | End: 2022-02-07

## 2022-01-30 RX ORDER — BISACODYL 10 MG/1
10 SUPPOSITORY RECTAL DAILY PRN
Status: DISCONTINUED | OUTPATIENT
Start: 2022-01-30 | End: 2022-02-08 | Stop reason: HOSPADM

## 2022-01-30 RX ADMIN — LEVOTHYROXINE SODIUM 150 MCG: 150 TABLET ORAL at 06:11

## 2022-01-30 RX ADMIN — GABAPENTIN 600 MG: 300 CAPSULE ORAL at 21:16

## 2022-01-30 RX ADMIN — SODIUM CHLORIDE: 9 INJECTION, SOLUTION INTRAVENOUS at 19:08

## 2022-01-30 RX ADMIN — ACETAMINOPHEN 650 MG: 325 TABLET, FILM COATED ORAL at 01:41

## 2022-01-30 RX ADMIN — LOSARTAN POTASSIUM 25 MG: 25 TABLET, FILM COATED ORAL at 17:09

## 2022-01-30 RX ADMIN — SODIUM CHLORIDE 125 MG: 9 INJECTION, SOLUTION INTRAVENOUS at 17:10

## 2022-01-30 RX ADMIN — DOCUSATE SODIUM 100 MG: 100 CAPSULE, LIQUID FILLED ORAL at 07:41

## 2022-01-30 RX ADMIN — POLYETHYLENE GLYCOL 3350 17 G: 17 POWDER, FOR SOLUTION ORAL at 07:42

## 2022-01-30 RX ADMIN — SODIUM BICARBONATE 650 MG TABLET 650 MG: at 07:41

## 2022-01-30 RX ADMIN — MAGNESIUM 64 MG (MAGNESIUM CHLORIDE) TABLET,DELAYED RELEASE 128 MG: at 21:11

## 2022-01-30 RX ADMIN — HEPARIN SODIUM 5000 UNITS: 5000 INJECTION, SOLUTION INTRAVENOUS; SUBCUTANEOUS at 06:10

## 2022-01-30 RX ADMIN — HEPARIN SODIUM 5000 UNITS: 5000 INJECTION, SOLUTION INTRAVENOUS; SUBCUTANEOUS at 21:16

## 2022-01-30 RX ADMIN — PRAVASTATIN SODIUM 40 MG: 20 TABLET ORAL at 17:09

## 2022-01-30 RX ADMIN — METOPROLOL SUCCINATE 100 MG: 100 TABLET, EXTENDED RELEASE ORAL at 21:11

## 2022-01-30 RX ADMIN — OXYCODONE HYDROCHLORIDE 10 MG: 5 TABLET ORAL at 23:56

## 2022-01-30 RX ADMIN — GABAPENTIN 600 MG: 300 CAPSULE ORAL at 13:24

## 2022-01-30 RX ADMIN — ACETAMINOPHEN 650 MG: 325 TABLET, FILM COATED ORAL at 06:10

## 2022-01-30 RX ADMIN — GABAPENTIN 600 MG: 300 CAPSULE ORAL at 06:10

## 2022-01-30 RX ADMIN — FERROUS SULFATE TAB 325 MG (65 MG ELEMENTAL FE) 325 MG: 325 (65 FE) TAB at 07:41

## 2022-01-30 RX ADMIN — INSULIN ASPART 1 UNITS: 100 INJECTION, SOLUTION INTRAVENOUS; SUBCUTANEOUS at 17:37

## 2022-01-30 RX ADMIN — HEPARIN SODIUM 5000 UNITS: 5000 INJECTION, SOLUTION INTRAVENOUS; SUBCUTANEOUS at 13:25

## 2022-01-30 RX ADMIN — OXYCODONE HYDROCHLORIDE 10 MG: 5 TABLET ORAL at 06:09

## 2022-01-30 RX ADMIN — MAGNESIUM 64 MG (MAGNESIUM CHLORIDE) TABLET,DELAYED RELEASE 128 MG: at 07:41

## 2022-01-30 RX ADMIN — SODIUM BICARBONATE 650 MG TABLET 650 MG: at 21:11

## 2022-01-30 RX ADMIN — ACETAMINOPHEN 650 MG: 325 TABLET, FILM COATED ORAL at 23:57

## 2022-01-30 RX ADMIN — INSULIN ASPART 3 UNITS: 100 INJECTION, SOLUTION INTRAVENOUS; SUBCUTANEOUS at 07:48

## 2022-01-30 RX ADMIN — OXYCODONE HYDROCHLORIDE 10 MG: 5 TABLET ORAL at 01:41

## 2022-01-30 NOTE — PROGRESS NOTES
Inpatient Psychology Initial Intake    Duration:  30 minutes    Matt Williamson, : 1945     HPI:76-year-old right-handed white male with chronic conditions significant for hypertension, hypothyroidism, thyroid cancer status post thyroidectomy, atrial fibrillation, coronary artery disease, hyperlipidemia, type 2 diabetes mellitus, metastatic thyroid carcinoma with metastasis to the L3 vertebral body status post stereotactic radiation in May 2020, who had progressively worsening midline low back pain with radiation to the right knee. MRI lumbar spine obtained on 21 showing extension of the L3 mass, moderate-severe foraminal narrowing on the right secondary to retropulsion of the inferior endplate in the setting of ligament and joint hypertrophy. CT demonstrates significant bony erosion of the L3 vertebral body with a new oblique fracture. PET scan on 21 showed increased activity of the L3 lesion without further metastatic lesions appreciated. He was initially referred to [facility] however there has been some delay with getting an appointment.  He denied any bowel or bladder incontinence or weakness and follows up with oncologist Dr. Casey who did not recommend any chemotherapy at this time but surgical intervention as the primary treatment given the suspected isolated area of progression at the L3 vertebral body.  Per neurosurgery notes he had preserved strength except 2 beats ankle clonus on the left.   He was recommended surgical resection by neurosurgery as well as oncology.  He was admitted to [facility] on 22 and underwent a stage I L2-L4 posterior spinal fusion with instrumentation, ORIF of L3 pathologic fracture, and stage II L3 lateral corpectomy with expandable cage placement with neurosurgeon Dr Colt Heller on 22 at [facility].  He is allowed weightbearing as tolerated on both lower extremities and was given an LSO brace for use when out of bed.  Postoperative  course was significant for urinary retention and he had an indwelling Lewis catheter.  The Lewis catheter was removed 2 days back and per patient, he is starting to void now.  I mentioned to him we will monitor post void residuals to see if he is emptying his bladder completely or not.  He has some paresthesias/hyperesthesias in his right thigh especially lateral aspect and some right hip proximal weakness and per neurosurgery notes that is not uncommon given the nature of his anterior lumbar surgery.  He is on subcutaneous Heparin and SCDs for DVT prophylaxis.  I discussed his recent clinical course with patient and with his wife at bedside.  On 1/27/22, hemoglobin was 9.5, WBC count 10.09, platelets are 304, BUN was 17, and creatinine was 1.0.  He has been needing assistance for mobility, transfers, self-care. I have reviewed the preadmission screening and concur with that information and there are no significant changes from patient’s preadmission screening. He is transferred to [facility] on 1/27/22 for further rehabilitation care.   status post L2-L4 posterior spinal fusion with instrumentation, ORIF of L3 pathologic fracture, L3 lateral corpectomy with expandable cage placement, for L3 pathologic fracture, from metastatic thyroid cancer, multiple medical problems       Past Medical History:   Diagnosis Date   • Atrial fibrillation with rapid ventricular response (CMS/HCC) 10/25/2019    Hospitalized on 10/25/19 at [facility]    • BPH (benign prostatic hyperplasia)    • Coronary artery disease     LAD stent 2005   • Hypertension    • Hypothyroidism    • Mixed hyperlipidemia    • Thyroid cancer (CMS/HCC)    • Type 2 diabetes mellitus without complication (CMS/HCC)    • Vertigo      Past Surgical History:   Procedure Laterality Date   • ANAL FISSURECTOMY  1986   • CARDIAC CATHETERIZATION  11/21/2005    LM FOD. 70-80% proximal LAD.Mild disease CX, RCA and branches.    • CARDIAC  CATHETERIZATION  2005    LM FOD. [place] LAD stent. CX FOD. RCA mild disease.   • CORONARY ANGIOPLASTY WITH STENT PLACEMENT  2005    LM FOD. Moderate, diffuse disease LAD. Irregular CX w/ PLVB distal to PDA 99%. Mild disease RCA. Stent to distal LAD.   • EYE SURGERY Left Cataract   • FOOT SURGERY      For osteomyelitis.   • GASTROCUTANEOUS FISTULA CLOSURE     • LUMBAR SPINE SURGERY  2022    Stage 1: L2-L4 PSF with instrumentation, ORIF L3 pathologic fracture Stage 2: L3 lateral corpectomy with expandable cage placement   • THYROIDECTOMY     • UPPER GASTROINTESTINAL ENDOSCOPY       Family History   Problem Relation Age of Onset   • Cancer Biological Mother    • Breast cancer Biological Mother    • Emphysema Biological Father    • Lung cancer Biological Father      Social History     Socioeconomic History   • Marital status:      Spouse name: None   • Number of children: None   • Years of education: None   • Highest education level: None   Occupational History   • Occupation: Retired   Tobacco Use   • Smoking status: Former Smoker     Packs/day: 2.00     Years: 20.00     Pack years: 40.00     Types: Cigarettes     Quit date:      Years since quittin.1   • Smokeless tobacco: Never Used   Vaping Use   • Vaping Use: Never used   Substance and Sexual Activity   • Alcohol use: Yes     Comment: Social   • Drug use: Never   • Sexual activity: Not Currently     Partners: Female   Other Topics Concern   • None   Social History Narrative    He lives with his wife.  He has 2 children who live in [place] in [place].  He reports he is independent in everything.  He reports he he likes playing sports including basketball and likes to model trains..  He reports he is a .  He denies a history of mental health services.     Social Determinants of Health     Financial Resource Strain: Not on file   Food Insecurity: No Food Insecurity   • Worried About Running Out of Food in the  Last Year: Never true   • Ran Out of Food in the Last Year: Never true   Transportation Needs: Not on file   Physical Activity: Not on file   Stress: No Stress Concern Present   • Feeling of Stress : Not at all   Social Connections: Not on file   Intimate Partner Violence: Not on file   Housing Stability: Not on file       Previous Mental Health History:   Previous Mental Health Treatment:  N/A  Previous Suicidal Behavior:  N/A  Previous Self-Injurious Behavior:  N/A  Previous Homicidal Behavior:  N/A  Previous Substance Abuse Treatment: N/A    Current Facility-Administered Medications   Medication Dose Route Frequency Provider Last Rate Last Admin   • acetaminophen (TYLENOL) tablet 650 mg  650 mg oral q4h PRN Arjun Dawkins MD   650 mg at 01/30/22 0610   • alum-mag hydroxide-simeth (MAALOX) 200-200-20 mg/5 mL suspension 30 mL  30 mL oral q4h PRN Arjun Dawkins MD       • [START ON 2/3/2022] aspirin enteric coated tablet 81 mg  81 mg oral Daily Arjun Dawkins MD       • bisacodyL (DULCOLAX) 10 mg suppository 10 mg  10 mg rectal Daily (6p) Arjun Dawkins MD   10 mg at 01/29/22 1840   • glucose chewable tablet 16-32 g of dextrose  16-32 g of dextrose oral PRN Arjun Dawkins MD   16 g of dextrose at 01/29/22 1721    Or   • dextrose 40 % oral gel 15-30 g of dextrose  15-30 g of dextrose oral PRN Arjun Dawkins MD        Or   • glucagon (GLUCAGEN) injection 1 mg  1 mg intramuscular PRN Arjun Dawkins MD        Or   • dextrose in water injection 12.5 g  25 mL intravenous PRN Arjun Dawkins MD       • diazePAM (VALIUM) tablet 5 mg  5 mg oral q6h PRN Arjun Dawkins MD       • diphenhydrAMINE (BENADRYL) capsule 25 mg  25 mg oral q6h PRN Arjun Dawkins MD       • docusate sodium (COLACE) capsule 100 mg  100 mg oral TID Arjun Dawkins MD   100 mg at 01/30/22 0741   • ergocalciferol (VITAMIN D2) capsule 50,000 Units  50,000 Units oral Weekly Arjun Dawkins MD   50,000 Units at 01/29/22 0932   • ferrous sulfate tablet 325 mg   325 mg oral BID with meals Arjun Dawkins MD   325 mg at 01/30/22 0741   • gabapentin (NEURONTIN) capsule 600 mg  600 mg oral q8h [facility] Arjun Dawkins MD   600 mg at 01/30/22 0610   • heparin (porcine) 5,000 unit/mL injection 5,000 Units  5,000 Units subcutaneous q8h [facility] Arjun Dawkins MD   5,000 Units at 01/30/22 0610   • insulin aspart U-100 (NovoLOG) pen 1-10 Units  1-10 Units subcutaneous TID with meals Arjun Dawkins MD       • levothyroxine (SYNTHROID) tablet 150 mcg  150 mcg oral Daily (6a) Arjun Dawkins MD   150 mcg at 01/30/22 0611   • magnesium chloride tablet,delayed release (DR/EC) 128 mg  128 mg oral BID Arjun Dawkins MD   128 mg at 01/30/22 0741   • magnesium citrate solution 148 mL  148 mL oral BID Arjun Dawkins MD       • [Provider Managed Hold] metFORMIN (GLUCOPHAGE) tablet 500 mg  500 mg oral BID with meals Arjun Dawkins MD   500 mg at 01/28/22 0804   • metoprolol succinate XL (TOPROL-XL) 24 hr ER tablet 100 mg  100 mg oral Nightly Arjun Dawkins MD   100 mg at 01/29/22 2143   • ondansetron ODT (ZOFRAN-ODT) disintegrating tablet 4 mg  4 mg oral q8h PRN Arjun Dawkins MD       • oxyCODONE (ROXICODONE) immediate release tablet 10 mg  10 mg oral q4h PRN Arjun Dawkins MD   10 mg at 01/30/22 0609    And   • oxyCODONE (ROXICODONE) immediate release tablet 5 mg  5 mg oral q4h PRN Arjun Dawkins MD   5 mg at 01/28/22 0805   • polyethylene glycol (MIRALAX) 17 gram packet 17 g  17 g oral Daily Arjun Dawkins MD   17 g at 01/30/22 0742   • pravastatin (PRAVACHOL) tablet 40 mg  40 mg oral Daily (6p) Arjun Dawkins MD   40 mg at 01/29/22 1712   • senna (SENOKOT) tablet 3 tablet  3 tablet oral Daily Arjun Dawkins MD   3 tablet at 01/29/22 1249   • sodium bicarbonate tablet 650 mg  650 mg oral BID Arjun Dawkins MD   650 mg at 01/30/22 0741   • sodium chloride 0.9 % infusion   intravenous Daily (6p) Arjun Dawkins MD 75 mL/hr at 01/29/22 1847 New Bag at 01/29/22 1847       Current Evaluation:   Mental Status  Exam:  Arousal Level: Alert  Appearance: Well Groomed  Speech: Regular  Psychomotor Functioning: Decreased  Eye Contact: WNL  Est. Premorbid Intelligence: Above average  Orientation: Fully oriented  Attention: WNL  Concentration: Impaired, Mild  Recent Memory: Mild Loss  Remote Memory: WNL (cues help 1/1)  Thought Content: Appropriate  Thought Process: WNL  Insight: Intact  Perceptual Function: Normal  Delusions: None or age appropriate  Sleeping: No Change  Appetite: Decreased (reports decline)  Libido: Non-Contributory  Affect: Full Range  Mood: Hopeful,Motivated    Assessments done this visit:     [place] Suicide Severity Rating Scale:  Not indicated           Safe-T Assessment:  Not indicated            Risk Assessment:   Suicidal Ideation: Suicide Risk Assessment Not Applicable for this patient  Self Injurious Behavior:  Not Present  Irritability:  Not Present  Homicidal Behavior: Not Present  Estimate of Risk:  None  If risk identified have suicide precautions been implemented? No    Goals Addressed    None         Interventions  Acceptance & Adjustment  Monitoring of Symptoms  Psychoeducation    Psychoeducation provided on:  Spinal Cord Injury and Recovery     Recommendations:      Individual Therapy  25 minutes1 times weekly      Visit Diagnosis:   Adjustment disorder with Anxiety      Diagnostic Impression:  Brittany Ybarra is a 76-year-old man with a history of  thyroid carcinoma with metastasis to the L3 vertebral body. He is status post underwent a stage I L2-L4 posterior spinal fusion with instrumentation, ORIF of L3 pathologic fracture, and stage II L3 lateral corpectomy with expandable cage placement, he provides an accurate history and displays a full range of affect.  He reports that he has been struggling with thyroid cancer since 2019.  He reports he is awaiting appointment at [facility].  He displays a full range of affect and he reports he is hopeful and motivated for recovery.  He reports he  "was pleased with his initial therapy sessions as he \"got to walk with a walker.\".  He reports the hardest thing is \"just being here he describes himself as a very independent dany and very private.\"  Despite this he reports he recognizes the hospitals procedures and he is managing these as best he can.  He denies significant emotional distress.  He is alert and oriented and correctly completes attention task.. However he did struggle initially with this task suggesting concentration is a little bit decreased.  Recall was 2 out of 3 and cues helped.  He appears to have good insight and safety awareness.  Psychology provided support and education on spinal cord recovery.  Will follow for support during this inpatient admission.           Amanda Ledbetter, [email or web address]  "

## 2022-01-30 NOTE — PLAN OF CARE
Individualized Plan of Care    Matt Williamson is admitted to [facility] for comprehensive inpatient rehabilitation for Spinal Cord, Non-Traumatic status post L2-L4 posterior spinal fusion with instrumentation, ORIF of L3 pathologic fracture, L3 lateral corpectomy with expandable cage placement, for L3 pathologic fracture, from metastatic thyroid cancer, multiple medical problems with functional deficits in mobility; motor dysfunction; safety; self-care. Patient is receiving the following services: occupational therapy; medical consultative services; psychiatry/psychology services; physical therapy, rehabitation nursing care, case management evaluation.     Frequency of Treatment (OT): 5-7 times per week,60-90 minutes per day    Frequency of Treatment (PT): 5-7 times per week,60-90 minutes per day           Active medical management is required for  Patient Active Problem List   Diagnosis   • BPH (benign prostatic hyperplasia)   • Atherosclerosis of coronary artery   • Cataract   • Essential hypertension   • Former tobacco use   • Mixed hyperlipidemia   • Metastasis from thyroid cancer (CMS/HCC)   • Type 2 diabetes mellitus without complication (CMS/HCC)   • Cyst of thyroid   • Esophageal reflux   • Paroxysmal atrial fibrillation (CMS/HCC)   • Pre-op evaluation   • Preop cardiovascular exam   • Cancer associated pain   • Low back pain with right-sided sciatica   • Malignant neoplasm of vertebral column, excluding sacrum and coccyx (CMS/HCC)   • Acquired hypothyroidism   • Ileus (CMS/HCC)   • Leg swelling   • Lumbar disc disease with radiculopathy   • S/P lumbar fusion   • Diabetic ketoacidosis associated with type 2 diabetes mellitus (CMS/HCC)   • Vitamin D deficiency   • AVI (iron deficiency anemia)       Risk for Complications  Constipation: Moderate  DVT: Moderate  Falls: Moderate  Infection: Moderate      The patient's medical prognosis is good to achieve the stated goals below.    Expected  Level of Function  Expected Functional Improvement: mobility; motor dysfunction; safety; self-care  Self-Care: Setup or clean-up assistance  Sphincter Control: Independent  Transfers: Setup or clean-up assistance  Locomotion: Setup or clean-up assistance  Communication: Independent  Social Cognition: Independent      Goals  IRF PT Goals      Most Recent Value   Bed Mobility Goal 1    Activity/Assistive Device bed mobility activities, all at 01/28/2022 0902   [place] supervision required at 01/28/2022 0902   Time Frame short-term goal (STG), 1 week at 01/28/2022 0902   Bed Mobility Goal 2    Activity/Assistive Device bed mobility activities, all at 01/28/2022 0902   [place] modified independence at 01/28/2022 0902   Time Frame long-term goal (LTG), 21 days or less at 01/28/2022 0902   Transfer Goal 1    Activity/Assistive Device sit-to-stand/stand-to-sit, stand pivot at 01/28/2022 0902   [place] tactile cues required at 01/28/2022 0902   Time Frame short-term goal (STG), 3 - 5 days at 01/28/2022 0902   Transfer Goal 2    Activity/Assistive Device sit-to-stand/stand-to-sit, stand pivot at 01/28/2022 0902   [place] modified independence at 01/28/2022 0902   Time Frame long-term goal (LTG), 21 days or less at 01/28/2022 0902   Gait/Walking Locomotion Goal 1    Activity/Assistive Device gait (walking locomotion) at 01/28/2022 0902   Distance 150 feet at 01/28/2022 0902   [place] minimum assist (75% or more patient effort) at 01/28/2022 0902   Time Frame short-term goal (STG), 3 - 5 days at 01/28/2022 0902   Gait/Walking Locomotion Goal 2    Activity/Assistive Device gait (walking locomotion) at 01/28/2022 0902   Distance 150 feet at 01/28/2022 0902   [place] modified independence at 01/28/2022 0902   Time Frame long-term goal (LTG), 21 days or less at 01/28/2022 0902   Stairs Goal 1    Activity/Assistive Device stairs, all skills at 01/28/2022 0902   Number of Stairs 12 at 01/28/2022 0902    [place] minimum assist (75% or more patient effort) at 01/28/2022 0902   Time Frame short-term goal (STG), 3 - 5 days at 01/28/2022 0902   Stairs Goal 2    Activity/Assistive Device stairs, all skills at 01/28/2022 0902   Number of Stairs 12 at 01/28/2022 0902   [place] modified independence at 01/28/2022 0902   Time Frame long-term goal (LTG), 21 days or less at 01/28/2022 0902        IRF OT Goals      Most Recent Value   Transfer Goal 1    Activity/Assistive Device toilet at 01/28/2022 0722   [place] --  [touching] at 01/28/2022 0722   Time Frame short-term goal (STG), 5 - 7 days at 01/28/2022 0722   Transfer Goal 2    Activity/Assistive Device toilet at 01/28/2022 0722   [place] modified independence at 01/28/2022 0722   Time Frame long-term goal (LTG), 14 days or less at 01/28/2022 0722   Transfer Goal 3    Activity/Assistive Device shower at 01/28/2022 0722   [place] --  [touching A] at 01/28/2022 0722   Time Frame short-term goal (STG), 5 - 7 days at 01/28/2022 0722   Transfer Goal 4    Activity/Assistive Device shower at 01/28/2022 0722   [place] modified independence at 01/28/2022 0722   Time Frame long-term goal (LTG), 14 days or less at 01/28/2022 0722   Bathing Goal 1    [place] --  [touching A] at 01/28/2022 0722   Time Frame short-term goal (STG), 5 - 7 days at 01/28/2022 0722   Bathing Goal 2    [place] modified independence at 01/28/2022 0722   Time Frame long-term goal (LTG), 14 days or less at 01/28/2022 0722   UB Dressing Goal 1    [place] --  [CS] at 01/28/2022 0722   Time Frame short-term goal (STG), 5 - 7 days at 01/28/2022 0722   UB Dressing Goal 2    [place] modified independence at 01/28/2022 0722   Time Frame long-term goal (LTG), 14 days or less at 01/28/2022 0722   LB Dressing Goal 1    [place] minimum assist (75% or more patient effort) at 01/28/2022 0722   Time Frame short-term goal (STG), 5 - 7 days at 01/28/2022 0722   LB  Dressing Goal 2    [place] modified independence at 01/28/2022 0722   Time Frame long-term goal (LTG), 14 days or less at 01/28/2022 0722   Grooming Goal 1    [place] --  [touching A] at 01/28/2022 0722   Time Frame short-term goal (STG), 5 - 7 days at 01/28/2022 0722   Strategies/Barriers in stance at 01/28/2022 0722   Grooming Goal 2    [place] modified independence at 01/28/2022 0722   Time Frame long-term goal (LTG), 14 days or less at 01/28/2022 0722   Toileting Goal 1    [place] --  [touching A] at 01/28/2022 0722   Time Frame short-term goal (STG), 5 - 7 days at 01/28/2022 0722   Toileting Goal 2    [place] modified independence at 01/28/2022 0722   Time Frame long-term goal (LTG), 14 days or less at 01/28/2022 0722          Anticipated Discharge Plan  Anticipated Discharge Disposition: home with outpatient services  Type of Home Care Services: home OT; home PT; nursing  Type of Outpatient Services: physical therapy      Expected length of stay: 14 days

## 2022-01-30 NOTE — PROGRESS NOTES
Patient: Matt Williamson  Location: [facility] [address]  MRN: [id number]  Today's date: 1/30/2022    History of Present Illness  Marco A is a 76 y.o. male admitted on 1/27/2022 with Lumbar disc disease with radiculopathy [M51.16]  S/P lumbar fusion [Z98.1]. Principal problem is Lumbar disc disease with radiculopathy.    Patient is a 76-year-old male with a history of atrial fibrillation, CAD, hypertension, hypothyroidism, hyperlipidemia, thyroid cancer, type 2 diabetes developed significant mid to low back pain with walking and standing.  He has metastatic thyroid carcinoma with metastasis to the L3 body status post radiation in May 2020.  Recent PET scan showed increased activity at the L3 level without further metastatic lesions.  He was evaluated by neurosurgery who recommended surgical resection.  He was taken to the OR on 1/20 and underwent a stage I L2-L4 PSF with instrumentation, ORIF of L3 pathologic fracture, stage II L3 lateral corpectomy with expandable cage placement.  There has been no significant postoperative complications.  His pain is fairly well controlled.  An LSO has been ordered.    1/28 Pt placed on Med hold after OT eval  and during PT eval 2/2 - metabolic ketone acidosis with dehydration    Past Medical History  Marco A has a past medical history of Atrial fibrillation with rapid ventricular response (CMS/HCC) (10/25/2019), BPH (benign prostatic hyperplasia), Coronary artery disease, Hypertension, Hypothyroidism, Mixed hyperlipidemia, Thyroid cancer (CMS/HCC), Type 2 diabetes mellitus without complication (CMS/HCC), and Vertigo.    Initial pain 4/10  No pain increased end of session       Prior Living Environment      Most Recent Value   People in Home spouse   Current Living Arrangements home   Home Accessibility stairs to enter home (Group)   Living Environment Comment pt lives in 2  in [place]. 3 steps to enter with R rail, bedroom and shower stall on first floor,  "full flight B/L rails to shower stall and bedroom, typically sleeps on second floor. has a bed and shower stall on first floor   Number of Stairs, Main Entrance 3   Stair Railings, Main Entrance railing on right side (ascending)   Location, Patient Bedroom second floor, must negotiate stairs to access   Patient Bedroom Access Comment has bedroom on first floor if needed   Location, Bathroom second floor, must negotiate stairs to access   Bathroom Access Comment has bathroom on first floor   Number of Stairs, Within Home, Primary 12   Stair Railings, Within Home, Primary railings on both sides of stairs          Prior Level of Function      Most Recent Value   Dominant Hand right   Ambulation assistive equipment   Transferring assistive equipment   Toileting independent   Bathing independent   Dressing independent   Eating independent   Prior Level of Function Comment Pt reports he was independent for all mobility using SPC, has shower chair at home.   Assistive Device Currently Used at Home cane, straight, shower chair          Occupational Profile      Most Recent Value   Successful Occupations Retired restaraunt owner   Occupational History/Life Experiences Enjoys Playing Basketball. +    Environmental Supports and Barriers lives with spouse   Patient Goals 1/29/22 PSFS  cleaning 3/10, doing wash 4/10, bowling 1/10, shooting baskets 3/10, and running on treadmill 4/10. Total score: 30%           IRF OT Evaluation and Treatment - 01/30/22 0837        OT Time Calculation    Start Time 0830     Stop Time 1000     Time Calculation (min) 90 min        Session Details    Document Type daily treatment/progress note     Mode of Treatment occupational therapy;individual therapy        General Information    General Observations of Patient Pt reported feeling \"stiff\" this mornign        Transfers    Comment gait belt and LSO worn for all tx and amb        Sit to Stand Transfer    [place], Sit to Stand Transfer " minimum assist (75% or more patient effort)     Verbal Cues safety;technique;hand placement     Assistive Device walker, 4-wheeled        Stand to Sit Transfer    [place], Stand to Sit Transfer minimum assist (75% or more patient effort)     Verbal Cues safety;technique;hand placement     Assistive Device walker, front-wheeled        Stand Pivot Transfer    [place], Stand Pivot/Stand Step Transfer minimum assist (75% or more patient effort)     Verbal Cues safety;technique;hand placement     Assistive Device walker, front-wheeled     Comment via amb tx        Toilet Transfer    [place], Toilet Transfer minimum assist (75% or more patient effort)     Verbal Cues safety;technique;hand placement     Assistive Device raised toilet seat;grab bars/safety frame     Comment via amb tx with RW        Safety Issues, Functional Mobility    Comment, Safety Issues/Impairments (Mobility) OT functional ambulation with RW with min A        Therapeutic Interventions    Comment, Therapeutic Intervention waterproof dressing place over incision prior to shower. Nursing changed dressings after shower        Bathing    Self-Performance chest;left arm;right arm;abdomen;buttocks;front perineal area;left upper leg;right upper leg     [place] Assistance left lower leg, including foot;right lower leg, including foot     [place] minimum assist (75% or more patient effort)     Position supported sitting     Setup Assistance adaptive equipment setup;adjust water temperature/flow;obtain supplies     Adaptive Equipment shower chair;grab bar/tub rail        Upper Body Dressing    Self-Performance don;orthosis application;threads left arm, shirt;threads right arm, shirt;pulls shirt over head/around back;pulls shirt down/adjusts     [place] Assistance obtains clothes;orthosis application     [place] minimum assist (75% or more patient effort)     Comment assist to fully adjust LSO        Lower Body Dressing    Self-Performance  threads left leg, underpants;threads right leg, underpants;pulls underpants up or down;threads left leg, pants/shorts;threads right leg, pants/shorts;pulls pants/shorts up or down;dons/doffs right shoe;dons/doffs left shoe     [place] Assistance anti-embolic stocking application;dons/doffs right shoe     [place] touching/steadying assist     Adaptive Equipment reacher;long-handled shoe horn     [place], Footwear moderate assist (50-74% patient effort)        Grooming    [place], Oral Hygiene set up     Comment seated at sink        Toileting    [place] touching/steadying assist     Adaptive Equipment grab bar/safety frame;raised toilet seat     Comment Pt with loose stool during session, Dr. Dawkins assessed        Daily Progress Summary (OT)    Daily Outcome Statement Pt with loose stools during session, MD assessed. Pt improving ADL status, limited by pain but agreeable. Pt able to demo use of AE. Pt would continue to benefit from skilled OT services to increase indep, decrease caregiver burden and increase quality of life. Continue with POC                           IRF OT Goals      Most Recent Value   Transfer Goal 1    Activity/Assistive Device toilet at 01/28/2022 0722   [place] --  [touching] at 01/28/2022 0722   Time Frame short-term goal (STG), 5 - 7 days at 01/28/2022 0722   Transfer Goal 2    Activity/Assistive Device toilet at 01/28/2022 0722   [place] modified independence at 01/28/2022 0722   Time Frame long-term goal (LTG), 14 days or less at 01/28/2022 0722   Transfer Goal 3    Activity/Assistive Device shower at 01/28/2022 0722   [place] --  [touching A] at 01/28/2022 0722   Time Frame short-term goal (STG), 5 - 7 days at 01/28/2022 0722   Transfer Goal 4    Activity/Assistive Device shower at 01/28/2022 0722   [place] modified independence at 01/28/2022 0722   Time Frame long-term goal (LTG), 14 days or less at 01/28/2022 0722   Bathing Goal 1    [place]  --  [touching A] at 01/28/2022 0722   Time Frame short-term goal (STG), 5 - 7 days at 01/28/2022 0722   Bathing Goal 2    [place] modified independence at 01/28/2022 0722   Time Frame long-term goal (LTG), 14 days or less at 01/28/2022 0722   UB Dressing Goal 1    [place] --  [CS] at 01/28/2022 0722   Time Frame short-term goal (STG), 5 - 7 days at 01/28/2022 0722   UB Dressing Goal 2    [place] modified independence at 01/28/2022 0722   Time Frame long-term goal (LTG), 14 days or less at 01/28/2022 0722   LB Dressing Goal 1    [place] minimum assist (75% or more patient effort) at 01/28/2022 0722   Time Frame short-term goal (STG), 5 - 7 days at 01/28/2022 0722   LB Dressing Goal 2    [place] modified independence at 01/28/2022 0722   Time Frame long-term goal (LTG), 14 days or less at 01/28/2022 0722   Grooming Goal 1    [place] --  [touching A] at 01/28/2022 0722   Time Frame short-term goal (STG), 5 - 7 days at 01/28/2022 0722   Strategies/Barriers in stance at 01/28/2022 0722   Grooming Goal 2    [place] modified independence at 01/28/2022 0722   Time Frame long-term goal (LTG), 14 days or less at 01/28/2022 0722   Toileting Goal 1    [place] --  [touching A] at 01/28/2022 0722   Time Frame short-term goal (STG), 5 - 7 days at 01/28/2022 0722   Toileting Goal 2    [place] modified independence at 01/28/2022 0722   Time Frame long-term goal (LTG), 14 days or less at 01/28/2022 0722

## 2022-01-30 NOTE — PROGRESS NOTES
Subjective    PPatient seen and examined on rounds.  Chart reviewed.  Events overnight noted.  History reviewed briefly with patient.    CC:  Deficits in mobility, transfers, self-care status post L2-L4 posterior spinal fusion with instrumentation, ORIF of L3 pathologic fracture, L3 lateral corpectomy with expandable cage placement, for L3 pathologic fracture, from metastatic thyroid cancer, multiple medical problems.    HPI:  Mr. Matt Williamson is a 76-year-old right-handed white male with chronic conditions significant for hypertension, hypothyroidism, thyroid cancer status post thyroidectomy, atrial fibrillation, coronary artery disease, hyperlipidemia, type 2 diabetes mellitus, metastatic thyroid carcinoma with metastasis to the L3 vertebral body status post stereotactic radiation in May 2020, who had progressively worsening midline low back pain with radiation to the right knee. MRI lumbar spine obtained on 11/22/21 showing extension of the L3 mass, moderate-severe foraminal narrowing on the right secondary to retropulsion of the inferior endplate in the setting of ligament and joint hypertrophy. CT demonstrates significant bony erosion of the L3 vertebral body with a new oblique fracture. PET scan on 12/7/21 showed increased activity of the L3 lesion without further metastatic lesions appreciated. He was initially referred to [facility] however there has been some delay with getting an appointment.  He denied any bowel or bladder incontinence or weakness and follows up with oncologist Dr. Casey who did not recommend any chemotherapy at this time but surgical intervention as the primary treatment given the suspected isolated area of progression at the L3 vertebral body.  Per neurosurgery notes he had preserved strength except 2 beats ankle clonus on the left.   He was recommended surgical resection by neurosurgery as well as oncology.  He was admitted to [facility] on 1/20/22 and underwent a stage I  "L2-L4 posterior spinal fusion with instrumentation, ORIF of L3 pathologic fracture, and stage II L3 lateral corpectomy with expandable cage placement with neurosurgeon Dr Colt Heller on 1/20/22 at [facility].  He is allowed weightbearing as tolerated on both lower extremities and was given an LSO brace for use when out of bed.  Postoperative course was significant for urinary retention and he had an indwelling Lewis catheter.  The Lewis catheter was removed 2 days back and per patient, he is starting to void now.  I mentioned to him we will monitor post void residuals to see if he is emptying his bladder completely or not.  He has some paresthesias/hyperesthesias in his right thigh especially lateral aspect and some right hip proximal weakness and per neurosurgery notes that is not uncommon given the nature of his anterior lumbar surgery.  He is on subcutaneous Heparin and SCDs for DVT prophylaxis.  I discussed his recent clinical course with patient and with his wife at bedside.  On 1/27/22, hemoglobin was 9.5, WBC count 10.09, platelets are 304, BUN was 17, and creatinine was 1.0.  He has been needing assistance for mobility, transfers, self-care. He is transferred to [facility] on 1/27/22 for further rehabilitation care.     SUBJ: Inspected incisions which are stable.  Requiring minimal assistance for transfers with physical therapy.  Able to ambulate 50 feet with front wheel walker with minimal assistance with physical therapy.    ROS: Denies chest pain or shortness of breath. Other ROS negative. Past, family, social history is unchanged.      Physical Exam      Blood pressure 114/69, pulse 94, temperature 37.6 °C (99.6 °F), temperature source Oral, resp. rate 16, height 1.676 m (5' 6\"), weight 75 kg (165 lb 6 oz), SpO2 97 %.  Body mass index is 26.69 kg/m².    General Appearance: Not in acute distress  Head/Ear/Nose/Throat: Normocephalic; Atraumatic.   Eye: EOMI; PERRL.   Neck: No JVD; No " Bruits.   Respiratory: Decreased breath sounds at bases.   Cardiovascular: RRR; Normal S1, S2.   Gastrointestinal: Soft; bowel sounds present but somewhat hypoactive.  Extremities: Bilateral lower extremity edema noted.    Musculoskeletal: Functional active range of motion in both upper extremities.  Some limitation of active range of motion in right hip limited by pain.    Neurological: AAO ×3. Speech is fluent. Cranial nerve examination does not reveal any gross facial asymmetry. Strength testing shows about 4+/5 strength in both upper extremities.  Right hip flexion is less than 3/5.  Right quadriceps is 4/5 with the right thigh supported.  Right ankle dorsiflexion, plantar flexion and right EHL are 4+/5.  Left hip flexion is 4/5.  Left quadriceps is 4+/5.  Left ankle dorsiflexion, plantar flexion and left EHL are 4+/5.  He is grossly able to localize touch and position sense in great toes, but does report some hyperesthesia in the right thigh lateral aspect.  Deep tendon reflexes are hypoactive and symmetric bilaterally.  Plantars are flexor.  Coordination is functional upper extremities.    Behavior/Emotional: Appropriate; Cooperative.   Skin: No obvious rashes noted.  Healing incision noted on right flank.  Healing incision also noted on posterior lumbar spine.  Mepilex border dressing in place on incisions.      Current Facility-Administered Medications:   •  acetaminophen (TYLENOL) tablet 650 mg, 650 mg, oral, q4h PRN, Arjun Dawkins MD, 650 mg at 01/30/22 0610  •  alum-mag hydroxide-simeth (MAALOX) 200-200-20 mg/5 mL suspension 30 mL, 30 mL, oral, q4h PRN, Arjun Dawkins MD  •  [START ON 2/3/2022] aspirin enteric coated tablet 81 mg, 81 mg, oral, Daily, Arjun Dawkins MD  •  bisacodyL (DULCOLAX) 10 mg suppository 10 mg, 10 mg, rectal, Daily PRN, Arjun Dawkins MD  •  glucose chewable tablet 16-32 g of dextrose, 16-32 g of dextrose, oral, PRN, 16 g of dextrose at 01/29/22 1721 **OR** dextrose 40 % oral gel  15-30 g of dextrose, 15-30 g of dextrose, oral, PRN **OR** glucagon (GLUCAGEN) injection 1 mg, 1 mg, intramuscular, PRN **OR** dextrose in water injection 12.5 g, 25 mL, intravenous, PRN, Arjun Dawkins MD  •  diazePAM (VALIUM) tablet 5 mg, 5 mg, oral, q6h PRN, Arjun Dawkins MD  •  diphenhydrAMINE (BENADRYL) capsule 25 mg, 25 mg, oral, q6h PRN, Arjun Dawkins MD  •  [START ON 1/31/2022] docusate sodium (COLACE) capsule 100 mg, 100 mg, oral, BID, Arjun Dawkins MD  •  ergocalciferol (VITAMIN D2) capsule 50,000 Units, 50,000 Units, oral, Weekly, Arjun Dawkins MD, 50,000 Units at 01/29/22 0932  •  ferric gluconate (FERRLECIT) 125 mg in sodium chloride 0.9 % 100 mL IVPB, 125 mg, intravenous, Daily, Arjun Dawkins MD, Last Rate: 100 mL/hr at 01/30/22 1710, 125 mg at 01/30/22 1710  •  gabapentin (NEURONTIN) capsule 600 mg, 600 mg, oral, q8h SALIMA, Arjun Dawkins MD, 600 mg at 01/30/22 1324  •  heparin (porcine) 5,000 unit/mL injection 5,000 Units, 5,000 Units, subcutaneous, q8h [facility], Arjun Dawkins MD, 5,000 Units at 01/30/22 1325  •  insulin aspart U-100 (NovoLOG) pen 1-10 Units, 1-10 Units, subcutaneous, TID with meals, Arjun Dawkins MD, 1 Units at 01/30/22 1737  •  levothyroxine (SYNTHROID) tablet 150 mcg, 150 mcg, oral, Daily (6a), Arjun Dawkins MD, 150 mcg at 01/30/22 0611  •  losartan (COZAAR) tablet 25 mg, 25 mg, oral, Daily with dinner, Arjun Dawkins MD, 25 mg at 01/30/22 1709  •  magnesium chloride tablet,delayed release (DR/EC) 128 mg, 128 mg, oral, BID, Arjun Dawkins MD, 128 mg at 01/30/22 0741  •  [Provider Managed Hold] metFORMIN (GLUCOPHAGE) tablet 500 mg, 500 mg, oral, BID with meals, Arjun Dawkins MD, 500 mg at 01/28/22 0804  •  metoprolol succinate XL (TOPROL-XL) 24 hr ER tablet 100 mg, 100 mg, oral, Nightly, Arjun Dawkins MD, 100 mg at 01/29/22 9031  •  ondansetron ODT (ZOFRAN-ODT) disintegrating tablet 4 mg, 4 mg, oral, q8h PRN, Arjun Dawkins MD  •  oxyCODONE (ROXICODONE) immediate release tablet  10 mg, 10 mg, oral, q4h PRN, 10 mg at 01/30/22 0609 **AND** oxyCODONE (ROXICODONE) immediate release tablet 5 mg, 5 mg, oral, q4h PRN, Arjun Dawkins MD, 5 mg at 01/28/22 0805  •  [START ON 1/31/2022] polyethylene glycol (MIRALAX) 17 gram packet 17 g, 17 g, oral, Daily with dinner, Arjun Dawkins MD  •  pravastatin (PRAVACHOL) tablet 40 mg, 40 mg, oral, Daily (6p), Arjun Dawkins MD, 40 mg at 01/30/22 1709  •  [START ON 1/31/2022] senna (SENOKOT) tablet 2 tablet, 2 tablet, oral, Daily, Arjun Dawkins MD  •  sodium bicarbonate tablet 650 mg, 650 mg, oral, BID, Arjun Dawkins MD, 650 mg at 01/30/22 0741  •  sodium chloride 0.9 % infusion, , intravenous, Daily (6p), Arjun Dawkins MD, Last Rate: 75 mL/hr at 01/29/22 1847, New Bag at 01/29/22 1847       Labs / Radiology    Lab Results   Component Value Date    WBC 8.01 01/28/2022    HGB 9.2 (L) 01/28/2022    HCT 28.8 (L) 01/28/2022    MCV 85.5 01/28/2022     01/28/2022     Lab Results   Component Value Date    GLUCOSE 68 (L) 01/29/2022    CALCIUM 8.1 (L) 01/29/2022     (L) 01/29/2022    K 4.0 01/29/2022    CO2 25 01/29/2022    CL 98 01/29/2022    BUN 8 01/29/2022    CREATININE 0.7 (L) 01/29/2022     Assessment and Plan    ASSESSMENT PLAN:  1. 76-year-old right-handed white male with chronic conditions significant for hypertension, hypothyroidism, thyroid cancer status post thyroidectomy, atrial fibrillation, coronary artery disease, hyperlipidemia, type 2 diabetes mellitus, metastatic thyroid carcinoma with metastasis to the L3 vertebral body status post stereotactic radiation in May 2020, who had progressively worsening midline low back pain with radiation to the right knee. MRI lumbar spine obtained on 11/22/21 showing extension of the L3 mass, moderate-severe foraminal narrowing on the right secondary to retropulsion of the inferior endplate in the setting of ligament and joint hypertrophy. CT demonstrates significant bony erosion of the L3 vertebral  body with a new oblique fracture. PET scan on 12/7/21 showed increased activity of the L3 lesion without further metastatic lesions appreciated. He was initially referred to [facility] however there has been some delay with getting an appointment.  He denied any bowel or bladder incontinence or weakness and follows up with oncologist Dr. Casey who did not recommend any chemotherapy at this time but surgical intervention as the primary treatment given the suspected isolated area of progression at the L3 vertebral body.  Per neurosurgery notes he had preserved strength except 2 beats ankle clonus on the left.   He was recommended surgical resection by neurosurgery as well as oncology.  He was admitted to [facility] on 1/20/22 and underwent a stage I L2-L4 posterior spinal fusion with instrumentation, ORIF of L3 pathologic fracture, and stage II L3 lateral corpectomy with expandable cage placement with neurosurgeon Dr Colt Heller on 1/20/22 at [facility].  He is allowed weightbearing as tolerated on both lower extremities and was given an LSO brace for use when out of bed.  Postoperative course was significant for urinary retention and he had an indwelling Lewis catheter.  The Lewis catheter was removed 2 days back and per patient, he is starting to void now.  I mentioned to him we will monitor post void residuals to see if he is emptying his bladder completely or not.  He has some paresthesias/hyperesthesias in his right thigh especially lateral aspect and some right hip proximal weakness and per neurosurgery notes that is not uncommon given the nature of his anterior lumbar surgery.  He is on subcutaneous Heparin and SCDs for DVT prophylaxis.  I discussed his recent clinical course with patient and with his wife at bedside.  On 1/27/22, hemoglobin was 9.5, WBC count 10.09, platelets are 304, BUN was 17, and creatinine was 1.0.  He has been needing assistance for mobility, transfers, self-care. He is  transferred to [facility] on 1/27/22 for further rehabilitation care.      2. DVT prophylaxis - on subcutaneous Heparin.  On SCDs.  Check doppler. Platelets 323 on 1/28/2022.     3.  Neurosurgery - status post L2-L4 posterior spinal fusion with instrumentation, ORIF of L3 pathologic fracture, L3 lateral corpectomy with expandable cage placement, for L3 pathologic fracture, from metastatic thyroid cancer, multiple medical problems - continue PT, OT, psychology.  Follow falls precautions, cardiac precautions, monitor pulse oximetry in therapy.  Follow spinal precautions.  LSO brace when out of bed.     4. GI - On Protonix for GI prophylaxis. Continue Colace, Senokot, MiraLAX, Dulcolax.  On Zofran ODT for nausea.  On Maalox PRN.     5.  - Postoperative course was significant for urinary retention and he had an indwelling Lewis catheter.  The Lewis catheter was removed 2 days back and per patient, he is starting to void now.  I mentioned to him we will monitor post void residuals to see if he is emptying his bladder completely or not.       6.  Diabetes mellitus - on Jardiance.  On Metformin.  On sliding-scale NovoLog coverage.  On hypoglycemic protocol.     7. Pain - on Tylenol.  On Neurontin.  On PRN Oxycodone.  On Valium PRN for spasms.     8. Hematology -hemoglobin 9.2, WBC 8.01 on 1/28/2022.     9.  Endocrinology - history of thyroid cancer status post thyroidectomy.  On Synthroid.     10. CVS - history of hypertension, atrial fibrillation, coronary artery disease - on Toprol-XL.     11.  Hyperlipidemia - on Pravachol.     12.  Oncology - history of metastatic thyroid cancer status post thyroidectomy.  He had L2-L4 posterior spinal fusion with instrumentation, ORIF of L3 pathologic fracture, L3 lateral corpectomy with expandable cage placement, for L3 pathologic fracture, from metastatic thyroid cancer.  He will follow up with oncology and neurosurgery.     13.  Skin - monitor healing of incisions  on right flank and on posterior lumbar spine.  Dermal defense will be consulted.     14.  F/E/N - on sodium bicarbonate.     15. Rehabilitation medicine - Continue comprehensive rehabilitation care. Continue PT, OT, psychology.  We will follow falls precautions, cardiac precautions, monitor pulse oximetry in therapy and follow aspiration precautions.  We will follow spinal precautions.  LSO brace when out of bed.Tolerating therapies per endurance.  Reports pain medications are helping.  Had a bowel movement.  Voiding well.Inspected incisions which are stable.  Requiring minimal assistance for transfers with physical therapy.  Able to ambulate 50 feet with front wheel walker with minimal assistance with physical therapy.     16. Reviewed labs today. BUN 12, creatinine 0.8 on 1/28/2022.           James Hidalgo MD  1/30/2022

## 2022-01-30 NOTE — PLAN OF CARE
Problem: Rehabilitation ([facility]) Plan of Care  Goal: Plan of Care Review  Outcome: Progressing  Flowsheets (Taken 1/30/2022 0022)  Progress: improving  Plan of Care Reviewed With: patient  Outcome Summary: Patient is resting in bed with the call bell within reach and the bed alarm on.   Plan of Care Review  Plan of Care Reviewed With: patient  Progress: improving  Outcome Summary: Patient is resting in bed with the call bell within reach and the bed alarm on.

## 2022-01-30 NOTE — PROGRESS NOTES
Patient: Matt Williamson  Location: [facility]  MRN: [id number]  Today's date: 1/30/2022    History of Present Illness  Marco A is a 76 y.o. male admitted on 1/27/2022 with Lumbar disc disease with radiculopathy [M51.16]  S/P lumbar fusion [Z98.1]. Principal problem is Lumbar disc disease with radiculopathy.    Patient is a 76-year-old male with a history of atrial fibrillation, CAD, hypertension, hypothyroidism, hyperlipidemia, thyroid cancer, type 2 diabetes developed significant mid to low back pain with walking and standing.  He has metastatic thyroid carcinoma with metastasis to the L3 body status post radiation in May 2020.  Recent PET scan showed increased activity at the L3 level without further metastatic lesions.  He was evaluated by neurosurgery who recommended surgical resection.  He was taken to the OR on 1/20 and underwent a stage I L2-L4 PSF with instrumentation, ORIF of L3 pathologic fracture, stage II L3 lateral corpectomy with expandable cage placement.  There has been no significant postoperative complications.  His pain is fairly well controlled.  An LSO has been ordered.    1/28 Pt placed on Med hold after OT eval  and during PT eval 2/2 - metabolic ketone acidosis with dehydration    Past Medical History  Marco A has a past medical history of Atrial fibrillation with rapid ventricular response (CMS/HCC) (10/25/2019), BPH (benign prostatic hyperplasia), Coronary artery disease, Hypertension, Hypothyroidism, Mixed hyperlipidemia, Thyroid cancer (CMS/HCC), Type 2 diabetes mellitus without complication (CMS/HCC), and Vertigo.      PT Vitals    Date/Time Pulse HR Source SpO2 Pt Activity O2 Therapy BP BP Location BP Method Pt Position [facility]   01/30/22 1035 85 Monitor 97 % At rest None (Room air) 111/55 Left upper arm Automatic Sitting LNB      PT Pain    Date/Time Pain Type Side/Orientation Location Rating: Rest Rating: Activity Description Interventions [facility]   01/30/22 1035 Pain  Assessment lower back 2 -- incisional position adjusted;diversional activity provided LNB              01/30/22 1155 Pain Reassessment;Post Activity lower back 3 3 incisional position adjusted LNB          Prior Living Environment      Most Recent Value   People in Home spouse   Current Living Arrangements home   Home Accessibility stairs to enter home (Group)   Living Environment Comment pt lives in 2  in [place]. 3 steps to enter with R rail, bedroom and shower stall on first floor, full flight B/L rails to shower stall and bedroom, typically sleeps on second floor. has a bed and shower stall on first floor   Number of Stairs, Main Entrance 3   Stair Railings, Main Entrance railing on right side (ascending)   Location, Patient Bedroom second floor, must negotiate stairs to access   Patient Bedroom Access Comment has bedroom on first floor if needed   Location, Bathroom second floor, must negotiate stairs to access   Bathroom Access Comment has bathroom on first floor   Number of Stairs, Within Home, Primary 12   Stair Railings, Within Home, Primary railings on both sides of stairs          Prior Level of Function      Most Recent Value   Dominant Hand right   Ambulation assistive equipment   Transferring assistive equipment   Toileting independent   Bathing independent   Dressing independent   Eating independent   Prior Level of Function Comment Pt reports he was independent for all mobility using SPC, has shower chair at home.   Assistive Device Currently Used at Home cane, straight, shower chair           IRF PT Evaluation and Treatment - 01/30/22 1031        PT Time Calculation    Start Time 1030     Stop Time 1200     Time Calculation (min) 90 min        Session Details    Document Type daily treatment/progress note     Mode of Treatment physical therapy;individual therapy        General Information    Patient Profile Reviewed yes     General Observations of Patient pleasant and cooperative; initially  "\"stiff\" when standing, however improved during session        Sit to Stand Transfer    [place], Sit to Stand Transfer minimum assist (75% or more patient effort)     Verbal Cues safety;technique;hand placement     Assistive Device walker, front-wheeled;none;gait belt     Comment Min Ax1 to trunk for anterior weight shift. Increased time to complete.        Stand to Sit Transfer    [place], Stand to Sit Transfer touching/steadying assist     Verbal Cues safety;technique;hand placement     Assistive Device walker, front-wheeled;none;gait belt     Comment Steadying Ax1 to trunk for controlled descent.        Stand Pivot Transfer    [place], Stand Pivot/Stand Step Transfer minimum assist (75% or more patient effort)     Verbal Cues safety;technique;hand placement     Assistive Device walker, front-wheeled;none;gait belt     Comment Ambulatory approach. Min Ax1 to trunk for weight shifting and balance.        Car Transfer    Transfer Technique sit-stand;stand-sit     [place], Car Transfer minimum assist (75% or more patient effort)     Verbal Cues hand placement;maintaining precautions;preparatory posture;safety;technique     Assistive Device walker, front-wheeled     Comment ambulatory approach. Min Ax1 to trunk for balance and postural control. Assist to R LE for advancement into/out of car.        Gait Training    [place], Gait minimum assist (75% or more patient effort)     Assistive Device walker, front-wheeled;none;gait belt     Distance in Feet 50 feet   +10'x2    Pattern (Gait) step-through     Deviations/Abnormal Patterns (Gait) base of support, narrow;mustapha decreased;gait speed decreased;step length decreased;stride length decreased     Advanced Gait Activity 10 Meter Walk Test (Self-Selected Velocity)     Comment (Gait/Stairs) First trial: 50' with RW and min Ax1 to trunk for balance. Additional trials: 10'x2 without RW with Min Ax1 to trunk for balance. Increased multi-directional " "postural sway and unsteadiness observed without using RW. No significant losses of balance. Verbal cues for safety and technique.        Curb Negotiation    [place] minimum assist (75% or more patient effort)     Assistive Device none;gait belt     Curb Height 6 inches     Comment ascend/descend 6\" + 2\" curb with min Ax1 to trunk for balance and weight shifting. Verbal cues for sequencing, foot placement, technique, and safety.        Sloped Surface Gait Skills    [place] minimum assist (75% or more patient effort)     Assistive Device none;gait belt     Distance in Feet 10 feet     Comment ascending/descending 5' ramp with Min Ax1 to trunk for balance and weight shifting. Verbal cues for upright posture and safety.        10 Meter Walk Test (Self-Selected Velocity)    Trial One: Ten Meter Walk Test (sec) 13.13 seconds     Trial Two: Ten Meter Walk Test (sec) 9.95 seconds     Assistive Device walker, front-wheeled   Min Ax1 to trunk for balance    Trial One: Gait Speed (m/s) 0.46 m/s     Trial Two: Gait Speed (m/s) 0.6 m/s     Average Gait Speed (m/s): Two Trials 0.53 m/s        Stairs Training    [place], Stairs minimum assist (75% or more patient effort)     Assistive Device railing;gait belt     Handrail Location (Stairs) both sides     Number of Stairs 8     Stair Height 6 inches     Ascending Stairs Technique step-to-step   LLE leading    Descending Stairs Technique step-to-step   RLE leading    Comment Min Ax1 to trunk for balance. Verbal cues for sequencing, technique, and safety. Pt intermittently alternated leading extremity for comfort. No increased unsteadiness or postural sway observed.        Wheelchair Mobility/Management    Comment, Wheelchair Mobility Elevating legrests adjusted for optimal pt comfort and support.        Balance    Static Sitting Balance WFL     Static Standing Balance mild impairment;unsupported     Dynamic Standing Balance mild impairment;supported;unsupported     " "Comment, Balance [place] around 6 cones x2 without assistive device. Min Ax1 to trunk for balance and weight shifting. 2 mild losses of balance self corrected with stepping strategy. Verbal cues for technique and safety.        Motor Skills    Motor Control/Coordination Interventions neuro-muscular re-education        Neuromuscular Re-education    Interventions weight bearing;weight shifting     Positioning standing     Comment Min Ax1 to trunk for toe taps to 4\" box with R LE x10, L LE x10, and alternating LE's x10. RW for support. Verbal cues for technique, upright posture, and safety.        Lower Extremity (Therapeutic Exercise)    Exercise Position/Type seated     General Exercise bilateral;heel raises;hip aDduction;LAQ (long arc quad)     Comment seated in w/c: B heel raises x20, hip ADD (ball squeezes) 10x5s, LAQ's x10 each LE        Aerobic Exercise    Type recumbent elliptical      Time Performed 10     Comment NuStep, level 1, B LE's        Spinal Orthosis Management    Type (Spinal Orthosis) LSO (lumbar sacral orthosis)     Functional Design (Spinal Orthosis) static orthosis     Therapeutic Indications Spinal Orthosis post-op positioning/protection;stabilization and support        Daily Progress Summary (PT)    Daily Outcome Statement 10 MWT initiated, in which pt has average gait speed of 0.53 m/s, placing him at increased risk for falls. Elevating leg rests adjusted for pt positioning and comfort. Pt will benefit from continued interventions to improve core strength and functional endurance.                           IRF PT Goals      Most Recent Value   Bed Mobility Goal 1    Activity/Assistive Device bed mobility activities, all at 01/28/2022 0902   [place] supervision required at 01/28/2022 0902   Time Frame short-term goal (STG), 1 week at 01/28/2022 0902   Bed Mobility Goal 2    Activity/Assistive Device bed mobility activities, all at 01/28/2022 0902   [place] modified " independence at 01/28/2022 0902   Time Frame long-term goal (LTG), 21 days or less at 01/28/2022 0902   Transfer Goal 1    Activity/Assistive Device sit-to-stand/stand-to-sit, stand pivot at 01/28/2022 0902   [place] tactile cues required at 01/28/2022 0902   Time Frame short-term goal (STG), 3 - 5 days at 01/28/2022 0902   Transfer Goal 2    Activity/Assistive Device sit-to-stand/stand-to-sit, stand pivot at 01/28/2022 0902   [place] modified independence at 01/28/2022 0902   Time Frame long-term goal (LTG), 21 days or less at 01/28/2022 0902   Gait/Walking Locomotion Goal 1    Activity/Assistive Device gait (walking locomotion) at 01/28/2022 0902   Distance 150 feet at 01/28/2022 0902   [place] minimum assist (75% or more patient effort) at 01/28/2022 0902   Time Frame short-term goal (STG), 3 - 5 days at 01/28/2022 0902   Gait/Walking Locomotion Goal 2    Activity/Assistive Device gait (walking locomotion) at 01/28/2022 0902   Distance 150 feet at 01/28/2022 0902   [place] modified independence at 01/28/2022 0902   Time Frame long-term goal (LTG), 21 days or less at 01/28/2022 0902   Stairs Goal 1    Activity/Assistive Device stairs, all skills at 01/28/2022 0902   Number of Stairs 12 at 01/28/2022 0902   [place] minimum assist (75% or more patient effort) at 01/28/2022 0902   Time Frame short-term goal (STG), 3 - 5 days at 01/28/2022 0902   Stairs Goal 2    Activity/Assistive Device stairs, all skills at 01/28/2022 0902   Number of Stairs 12 at 01/28/2022 0902   [place] modified independence at 01/28/2022 0902   Time Frame long-term goal (LTG), 21 days or less at 01/28/2022 0902

## 2022-01-30 NOTE — PLAN OF CARE
Plan of Care Review  Plan of Care Reviewed With: patient  Progress: improving  Outcome Summary: PT A&O x3 and able to make needs known.  Requires min assist x1 with transfers.  Pain managed with scheduled medications.  Continent x2 during shift.  Care and medications reviewed with pt.  Safety maintained.

## 2022-01-30 NOTE — PROGRESS NOTES
[facility] Progress Note          Patient was seen and examined at bedside.    Subjective:   1/28/22  Saw him in am, awake and alert, denies remarkable pain, no new paresthesia, b/l LE edema, will check a venous compression U/S.  Issues:  1. AGMA from non hyperglycemic ketoacidosis, UA ketone +4, glucose >=1000, likely from SGLT2 inhibitor, dehydration, plus perioperative poor po intake.  D/c jardiance and hold metformin, insulin regimen, IVF plus po hydration, sodium bicarb supplement;  2. Electrolytes imbalance-hypokalemia and hypomagnesemia, supplement         Monitor his AGMA. Renal on board     1/29/22   AGMA resolved, CO2 21-25, AG 15-11, hypokalemia resolved, residual ketone on serum, off SGLT2 inhibitor, tolerated IVF hydration.    Multiple medical issues:  1. VitD deficiency with 25 OH VitD low at 12, loading with ergocaociferol 50,000 units weekly;  2. Anemia with AVI, provide ferrous sulfate oral supplement.    Clinically, he feels better, denies nausea, abd pain, his appetite is improving.  No BM, s/p ileus, hypomagnesemia, provide mag citrate with soap sunds enema     1/30/22  Has had good BM, he feels all cleared up, his lungs CTA b/l, still dry mouth but improved, lower extremity edema which is third space fluid retention with circulation dehydration coexist. Not volume over load !!    B/l LE venous compression U/S 1/28/22:  No evidence of deep vein thrombus (DVT) in either lower extremity.  TEDS, elevate legs when sitting, ARB or ACEI will help his LE edema   Objective   Vital signs in last 24 hours:  Temp:  [36.7 °C (98.1 °F)-37.1 °C (98.7 °F)] 37 °C (98.6 °F)  Heart Rate:  [73-93] 73  Resp:  [16-18] 16  BP: (114-163)/(60-76) 120/61    No intake or output data in the 24 hours ending 01/30/22 0940  Intake/Output this shift:  No intake/output data recorded.   Review of Systems:  All other systems reviewed and negative except as noted in the HPI.   Objective       Labs  reviewed his labs thoroughly   Lab Results   Component Value Date    WBC 8.01 01/28/2022    HGB 9.2 (L) 01/28/2022    HCT 28.8 (L) 01/28/2022    MCV 85.5 01/28/2022     01/28/2022     Lab Results   Component Value Date    GLUCOSE 68 (L) 01/29/2022    CALCIUM 8.1 (L) 01/29/2022     (L) 01/29/2022    K 4.0 01/29/2022    CO2 25 01/29/2022    CL 98 01/29/2022    BUN 8 01/29/2022    CREATININE 0.7 (L) 01/29/2022       Imaging  OSH imaging study reports reviewed   B/l LE venous compression U/S 1/28/22:  No evidence of deep vein thrombus (DVT) in either lower extremity.      Full Code    Physical Exam:  Head/Ear/Nose/Throat: normocephalic; atraumatic; moisture mouth mm, no oropharyngeal thrush noted.   Eyes: anicteric sclera, EOMI; PERRL.   Neck : supple, no JVD, no carotid bruits appeciated.   Respiratory: no evidence of labored breathing, lung sounds CTA b/l, good aeration bibasilar area, no w/r/c.   Cardiovascular: RRR; normal S1, S2; no m/r/g; no S3 or S4.   Gastrointestinal: soft; NT; BS normal; mildly distended; no CVAT b/l.   Genitourinary: no silva.   Extremities : b/l LE pitting edema .   Neurological: AO x 3, fluent speeches, following commands, CNS II-XII grossly intact; no focal neurologic deficits.   Behavior/Emotional: in NAD, appropriate; cooperative.   Skin: sacral area skin lesions.     Plan of care was discussed with patient, RN, and PMR attending     Assessment   CC:Thyroid cancer with progressive lumbar metastatic lesions and L3 pathologic fracture complicated with myeloradiculopathy, s/p surgical intervention and ORIF, neurogenic B/B, ADL and ambulatory dysfunction      76 y.o. male with PMH of HTN, dyslipidemia, CAD, type 2 DM, hypothyroidism, thyroid cancer metastatic to L3 vertebral body s/p radiation stereotactic body radiation 5/2020, seen by the neurosurgery service for surgical discussion regarding growth of his L3 vertebral body metastatic lesion. He has had progressive  worsening midline-line low back pain with radiation to the right knee. An MRI lumbar spine was obtained 11/22/2021 showing extension of the L3 mass. He was sent for a PET scan 12/7/2021 showing increased activity of the L3 lesion without further metastatic lesions appreciated. He was [facility] to [facility] however there has been some delay with getting an appointment.      He has had persistent midline low back pain with walking and standing which has progressively worsened. He does not have any weakness or bowel/bladder incontinence. He is following with oncology (Dr. Casey) who does not recommend any chemotherapy at this time but surgical intervention as the primary treatment given the suspected isolated area of progression at the L3 vertebral body.     He was admitted to [facility] on 1/20/22 and underwent elective :  Stage 1: L2-L4 PLSF with instrumentation, ORIF L3 pathologic fracture  Stage 2: L3 lateral corpectomy with expandable cage placement  Post op ileus, s/p aggressive bowel program, resolved, tolerated diet. Functionally he has ADL and ambulatory dysfunction, requiring inpt acute rehab, transferred to [facility] on 1/27/22.            Ileus (CMS/HCC)  Resolved after aggressive bowel program, start diet  Tolerating diet, cont bowel regimen.     Acquired hypothyroidism  Assessment & Plan  Status post thyroidectomy  Continue levothyroxine        Malignant neoplasm of vertebral column, excluding sacrum and coccyx (CMS/HCC)  Assessment & Plan  Metastatic thyroid ca S/p L3 pathologic fracture s/p right lateral L3 corpectomy and placement of an expandable cage, followed by L2-4 posterolateral instrumented fusion  Continue postop management per neurosurgery.  Drain was removed  DVT prophylaxis, [place], pain management per neurosurgery  Rehab as inpt at [place] [facility]      Paroxysmal atrial fibrillation (CMS/HCC)  Assessment & Plan  Currently in sinus rhythm  Patient is not on long-term anticoagulation  He takes  Rythmol as needed for palpitations     Type 2 diabetes mellitus without complication (CMS/HCC)  Assessment & Plan  Patient on Jardiance, Trulicity and metformin as outpatient  Cont inpt Accu-Cheks with sliding scale insulin  Hemoglobin A1c 6.9  Continue Jardiance and restarted metformin and trulicity  Metformin should be changed to twice daily or extended release once daily      Metastasis from thyroid cancer (CMS/HCC)  Assessment & Plan  Follows up with medical oncologist  and radiation oncologist  Status post radiation     Mixed hyperlipidemia  Assessment & Plan  Continue statin     Essential hypertension  Assessment & Plan  Stable  Continue metoprolol     Atherosclerosis of coronary artery  Assessment & Plan  Status post LAD stenting 17 years ago  Patient follows up with Dr. Ji  Continue metoprolol, statin  Restart aspirin once cleared by neurosurgery     #VitD deficiency with 25 OH VitD low at 12, loading with ergocaociferol 50,000 units weekly    #Anemia with AVI, provide ferrous sulfate oral supplement    # Euglycemic DKA, hold jardiance, IVF and po hydration, replacement K, Mag, insulin replacement     Billing code: [id number]  Diagnoses:  Patient Active Problem List   Diagnosis   • BPH (benign prostatic hyperplasia)   • Atherosclerosis of coronary artery   • Cataract   • Essential hypertension   • Former tobacco use   • Mixed hyperlipidemia   • Metastasis from thyroid cancer (CMS/HCC)   • Type 2 diabetes mellitus without complication (CMS/HCC)   • Cyst of thyroid   • Esophageal reflux   • Paroxysmal atrial fibrillation (CMS/HCC)   • Pre-op evaluation   • Preop cardiovascular exam   • Cancer associated pain   • Low back pain with right-sided sciatica   • Malignant neoplasm of vertebral column, excluding sacrum and coccyx (CMS/HCC)   • Acquired hypothyroidism   • Ileus (CMS/HCC)   • Leg swelling   • Lumbar disc disease with radiculopathy   • S/P lumbar fusion   • Diabetic ketoacidosis associated  with type 2 diabetes mellitus (CMS/HCC)   • Vitamin D deficiency   • AVI (iron deficiency anemia)        Arjun Dawkins MD  1/30/2022

## 2022-01-31 ENCOUNTER — APPOINTMENT (INPATIENT)
Dept: OCCUPATIONAL THERAPY | Facility: REHABILITATION | Age: 77
DRG: 949 | End: 2022-01-31
Payer: MEDICARE

## 2022-01-31 ENCOUNTER — APPOINTMENT (INPATIENT)
Dept: PHYSICAL THERAPY | Facility: REHABILITATION | Age: 77
DRG: 949 | End: 2022-01-31
Payer: MEDICARE

## 2022-01-31 LAB
ALBUMIN/CREAT UR: 2 UG/MG
ANION GAP SERPL CALC-SCNC: 11 MEQ/L (ref 3–15)
BUN SERPL-MCNC: 7 MG/DL (ref 8–20)
CALCIUM SERPL-MCNC: 7.8 MG/DL (ref 8.9–10.3)
CHLORIDE SERPL-SCNC: 99 MEQ/L (ref 98–109)
CO2 SERPL-SCNC: 26 MEQ/L (ref 22–32)
CREAT SERPL-MCNC: 0.8 MG/DL (ref 0.8–1.3)
CREAT UR-MCNC: 101.9 MG/DL
ERYTHROCYTE [DISTWIDTH] IN BLOOD BY AUTOMATED COUNT: 15.8 % (ref 11.6–14.4)
GFR SERPL CREATININE-BSD FRML MDRD: >60 ML/MIN/1.73M*2
GLUCOSE BLD-MCNC: 131 MG/DL (ref 70–99)
GLUCOSE BLD-MCNC: 159 MG/DL (ref 70–99)
GLUCOSE BLD-MCNC: 181 MG/DL (ref 70–99)
GLUCOSE SERPL-MCNC: 132 MG/DL (ref 70–99)
HCT VFR BLDCO AUTO: 29.1 % (ref 40.1–51)
HGB BLD-MCNC: 9.2 G/DL (ref 13.7–17.5)
MAGNESIUM SERPL-MCNC: 1.7 MG/DL (ref 1.8–2.5)
MCH RBC QN AUTO: 26.8 PG (ref 28–33.2)
MCHC RBC AUTO-ENTMCNC: 31.6 G/DL (ref 32.2–36.5)
MCV RBC AUTO: 84.8 FL (ref 83–98)
MICROALBUMIN UR-MCNC: 2 MG/L
PDW BLD AUTO: 9.7 FL (ref 9.4–12.4)
PLATELET # BLD AUTO: 338 K/UL (ref 150–350)
POCT TEST: ABNORMAL
POTASSIUM SERPL-SCNC: 3.9 MEQ/L (ref 3.6–5.1)
RBC # BLD AUTO: 3.43 M/UL (ref 4.5–5.8)
SODIUM SERPL-SCNC: 136 MEQ/L (ref 136–144)
WBC # BLD AUTO: 7.1 K/UL (ref 3.8–10.5)

## 2022-01-31 PROCEDURE — 36415 COLL VENOUS BLD VENIPUNCTURE: CPT | Performed by: INTERNAL MEDICINE

## 2022-01-31 PROCEDURE — 85027 COMPLETE CBC AUTOMATED: CPT | Performed by: INTERNAL MEDICINE

## 2022-01-31 PROCEDURE — 97530 THERAPEUTIC ACTIVITIES: CPT | Mod: GO

## 2022-01-31 PROCEDURE — 80048 BASIC METABOLIC PNL TOTAL CA: CPT | Performed by: INTERNAL MEDICINE

## 2022-01-31 PROCEDURE — 25800000 HC PHARMACY IV SOLUTIONS: Performed by: INTERNAL MEDICINE

## 2022-01-31 PROCEDURE — 83735 ASSAY OF MAGNESIUM: CPT | Performed by: INTERNAL MEDICINE

## 2022-01-31 PROCEDURE — 97110 THERAPEUTIC EXERCISES: CPT | Mod: GP

## 2022-01-31 PROCEDURE — 97110 THERAPEUTIC EXERCISES: CPT | Mod: GO

## 2022-01-31 PROCEDURE — 12800001 HC ROOM AND CARE SEMIPRIVATE REHAB-BRAIN INJ

## 2022-01-31 PROCEDURE — 63700000 HC SELF-ADMINISTRABLE DRUG: Performed by: INTERNAL MEDICINE

## 2022-01-31 PROCEDURE — 97535 SELF CARE MNGMENT TRAINING: CPT | Mod: GO

## 2022-01-31 PROCEDURE — 63600000 HC DRUGS/DETAIL CODE: Performed by: INTERNAL MEDICINE

## 2022-01-31 PROCEDURE — 97116 GAIT TRAINING THERAPY: CPT | Mod: GP

## 2022-01-31 PROCEDURE — 97112 NEUROMUSCULAR REEDUCATION: CPT | Mod: GP

## 2022-01-31 RX ORDER — BISACODYL 5 MG
10 TABLET, DELAYED RELEASE (ENTERIC COATED) ORAL ONCE
Status: COMPLETED | OUTPATIENT
Start: 2022-01-31 | End: 2022-01-31

## 2022-01-31 RX ORDER — SYRING-NEEDL,DISP,INSUL,0.3 ML 29 G X1/2"
148 SYRINGE, EMPTY DISPOSABLE MISCELLANEOUS 2 TIMES DAILY
Status: COMPLETED | OUTPATIENT
Start: 2022-01-31 | End: 2022-01-31

## 2022-01-31 RX ADMIN — DOCUSATE SODIUM 100 MG: 100 CAPSULE, LIQUID FILLED ORAL at 20:49

## 2022-01-31 RX ADMIN — MAGNESIUM CITRATE 148 ML: 1.75 LIQUID ORAL at 16:15

## 2022-01-31 RX ADMIN — METOPROLOL SUCCINATE 100 MG: 100 TABLET, EXTENDED RELEASE ORAL at 20:49

## 2022-01-31 RX ADMIN — SODIUM CHLORIDE 125 MG: 9 INJECTION, SOLUTION INTRAVENOUS at 16:57

## 2022-01-31 RX ADMIN — LEVOTHYROXINE SODIUM 150 MCG: 150 TABLET ORAL at 05:31

## 2022-01-31 RX ADMIN — HEPARIN SODIUM 5000 UNITS: 5000 INJECTION, SOLUTION INTRAVENOUS; SUBCUTANEOUS at 05:31

## 2022-01-31 RX ADMIN — SODIUM BICARBONATE 650 MG TABLET 650 MG: at 20:49

## 2022-01-31 RX ADMIN — SODIUM BICARBONATE 650 MG TABLET 650 MG: at 08:57

## 2022-01-31 RX ADMIN — INSULIN ASPART 1 UNITS: 100 INJECTION, SOLUTION INTRAVENOUS; SUBCUTANEOUS at 17:36

## 2022-01-31 RX ADMIN — MAGNESIUM 64 MG (MAGNESIUM CHLORIDE) TABLET,DELAYED RELEASE 128 MG: at 08:57

## 2022-01-31 RX ADMIN — PRAVASTATIN SODIUM 40 MG: 20 TABLET ORAL at 17:36

## 2022-01-31 RX ADMIN — HEPARIN SODIUM 5000 UNITS: 5000 INJECTION, SOLUTION INTRAVENOUS; SUBCUTANEOUS at 13:49

## 2022-01-31 RX ADMIN — LOSARTAN POTASSIUM 25 MG: 25 TABLET, FILM COATED ORAL at 16:15

## 2022-01-31 RX ADMIN — HEPARIN SODIUM 5000 UNITS: 5000 INJECTION, SOLUTION INTRAVENOUS; SUBCUTANEOUS at 20:50

## 2022-01-31 RX ADMIN — OXYCODONE HYDROCHLORIDE 10 MG: 5 TABLET ORAL at 13:53

## 2022-01-31 RX ADMIN — ACETAMINOPHEN 650 MG: 325 TABLET, FILM COATED ORAL at 05:33

## 2022-01-31 RX ADMIN — GABAPENTIN 600 MG: 300 CAPSULE ORAL at 20:49

## 2022-01-31 RX ADMIN — SENNOSIDES 2 TABLET: 8.6 TABLET, FILM COATED ORAL at 12:55

## 2022-01-31 RX ADMIN — MAGNESIUM 64 MG (MAGNESIUM CHLORIDE) TABLET,DELAYED RELEASE 128 MG: at 20:49

## 2022-01-31 RX ADMIN — BISACODYL 10 MG: 5 TABLET, COATED ORAL at 12:55

## 2022-01-31 RX ADMIN — GABAPENTIN 600 MG: 300 CAPSULE ORAL at 13:49

## 2022-01-31 RX ADMIN — GABAPENTIN 600 MG: 300 CAPSULE ORAL at 05:31

## 2022-01-31 RX ADMIN — MAGNESIUM CITRATE 148 ML: 1.75 LIQUID ORAL at 20:57

## 2022-01-31 RX ADMIN — POLYETHYLENE GLYCOL 3350 17 G: 17 POWDER, FOR SOLUTION ORAL at 16:17

## 2022-01-31 ASSESSMENT — ENCOUNTER SYMPTOMS
APPETITE CHANGE: 0
DIARRHEA: 0
BACK PAIN: 1
ACTIVITY CHANGE: 1
DIFFICULTY URINATING: 0
NAUSEA: 0
SHORTNESS OF BREATH: 0

## 2022-01-31 NOTE — PROGRESS NOTES
NEPHROLOGY PROGRESS NOTE    Subjective:   Patient seen in follow up for AGMA.  Reports he believes this happened once in the past when he had a UTI.  He reports his blood glucose is always very well controlled.  He reports several episodes of urge incontinence over the past few nights d/t limited mobility and IVFs.  He offers no other complaints.       Review of Systems   Constitutional: Positive for activity change. Negative for appetite change.   Respiratory: Negative for shortness of breath.    Gastrointestinal: Negative for diarrhea and nausea.   Genitourinary: Negative for decreased urine volume and difficulty urinating.   Musculoskeletal: Positive for back pain.   All other systems reviewed and are negative.      Vitals:    01/31/22 0200 01/31/22 0723 01/31/22 0905 01/31/22 0934   BP:  (!) 119/56 136/61 (!) 98/54   BP Location:  Left upper arm Left upper arm Left upper arm   Patient Position:  Sitting Sitting Sitting   Pulse: 94 72 95    Resp:  18     Temp:  36.7 °C (98 °F)     TempSrc:  Oral     SpO2: 97% 97%     Weight:       Height:           No intake or output data in the 24 hours ending 01/31/22 1046    Physical Exam  Vitals reviewed.   Constitutional:       General: He is not in acute distress.  HENT:      Head: Normocephalic.      Mouth/Throat:      Mouth: Mucous membranes are moist.   Eyes:      General: No scleral icterus.  Cardiovascular:      Rate and Rhythm: Normal rate.      Heart sounds: Normal heart sounds.   Pulmonary:      Breath sounds: Normal breath sounds.   Abdominal:      Palpations: Abdomen is soft.   Musculoskeletal:      Right lower leg: Edema present.      Left lower leg: Edema present.      Comments: Trace BLLE   Skin:     General: Skin is warm and dry.   Neurological:      Mental Status: He is alert and oriented to person, place, and time.   Psychiatric:         Behavior: Behavior normal.         LABS:  Results from last 7 days   Lab Units 01/31/22  0526 01/29/22  0578  01/28/22  0552   SODIUM mEQ/L 136   < > 137   POTASSIUM mEQ/L 3.9   < > 3.5*   CHLORIDE mEQ/L 99   < > 101   CO2 mEQ/L 26   < > 21*   BUN mg/dL 7*   < > 12   CREATININE mg/dL 0.8   < > 0.8   EGFR mL/min/1.73m*2 >60.0   < > >60.0   GLUCOSE mg/dL 132*   < > 92   CALCIUM mg/dL 7.8*   < > 8.0*   ALBUMIN g/dL  --   --  2.3*   PHOSPHORUS mg/dL  --   --  2.7   WBC K/uL 7.10  --  8.01   HEMOGLOBIN g/dL 9.2*  --  9.2*   HEMATOCRIT % 29.1*  --  28.8*   PLATELETS K/uL 338  --  323    < > = values in this interval not displayed.       Meds:    Current Facility-Administered Medications:   •  acetaminophen (TYLENOL) tablet 650 mg, 650 mg, oral, q4h PRN, Arjun Dawkins MD, 650 mg at 01/31/22 0533  •  alum-mag hydroxide-simeth (MAALOX) 200-200-20 mg/5 mL suspension 30 mL, 30 mL, oral, q4h PRN, Arjun Dawkins MD  •  [START ON 2/3/2022] aspirin enteric coated tablet 81 mg, 81 mg, oral, Daily, Arjun Dawkins MD  •  bisacodyL (DULCOLAX) 10 mg suppository 10 mg, 10 mg, rectal, Daily PRN, Arjun Dawkins MD  •  glucose chewable tablet 16-32 g of dextrose, 16-32 g of dextrose, oral, PRN, 16 g of dextrose at 01/29/22 1721 **OR** dextrose 40 % oral gel 15-30 g of dextrose, 15-30 g of dextrose, oral, PRN **OR** glucagon (GLUCAGEN) injection 1 mg, 1 mg, intramuscular, PRN **OR** dextrose in water injection 12.5 g, 25 mL, intravenous, PRN, Arjun Dawkins MD  •  diazePAM (VALIUM) tablet 5 mg, 5 mg, oral, q6h PRN, Arjun Dawkins MD  •  diphenhydrAMINE (BENADRYL) capsule 25 mg, 25 mg, oral, q6h PRN, Arjun Dawkins MD  •  docusate sodium (COLACE) capsule 100 mg, 100 mg, oral, BID, Arjun Dawkins MD  •  ergocalciferol (VITAMIN D2) capsule 50,000 Units, 50,000 Units, oral, Weekly, Arjun Dawkins MD, 50,000 Units at 01/29/22 0932  •  ferric gluconate (FERRLECIT) 125 mg in sodium chloride 0.9 % 100 mL IVPB, 125 mg, intravenous, Daily, Arjun Dawkins MD, Stopped at 01/30/22 1906  •  gabapentin (NEURONTIN) capsule 600 mg, 600 mg, oral, q8h SALIMA, [place],  MD Arjun, 600 mg at 01/31/22 0531  •  heparin (porcine) 5,000 unit/mL injection 5,000 Units, 5,000 Units, subcutaneous, q8h SALIMA, Arjun Dawkins MD, 5,000 Units at 01/31/22 0531  •  insulin aspart U-100 (NovoLOG) pen 1-10 Units, 1-10 Units, subcutaneous, TID with meals, Arjun Dawkins MD, 1 Units at 01/30/22 1737  •  levothyroxine (SYNTHROID) tablet 150 mcg, 150 mcg, oral, Daily (6a), Arjun Dawkins MD, 150 mcg at 01/31/22 0531  •  losartan (COZAAR) tablet 25 mg, 25 mg, oral, Daily with dinner, Arjun Dawkins MD, 25 mg at 01/30/22 1709  •  magnesium chloride tablet,delayed release (DR/EC) 128 mg, 128 mg, oral, BID, Arjun Dawkins MD, 128 mg at 01/31/22 0857  •  [Provider Managed Hold] metFORMIN (GLUCOPHAGE) tablet 500 mg, 500 mg, oral, BID with meals, Arjun Dawkins MD, 500 mg at 01/28/22 0804  •  metoprolol succinate XL (TOPROL-XL) 24 hr ER tablet 100 mg, 100 mg, oral, Nightly, Arjun Dawkins MD, 100 mg at 01/30/22 2111  •  ondansetron ODT (ZOFRAN-ODT) disintegrating tablet 4 mg, 4 mg, oral, q8h PRN, Arjun Dawkins MD  •  oxyCODONE (ROXICODONE) immediate release tablet 10 mg, 10 mg, oral, q4h PRN, 10 mg at 01/30/22 2356 **AND** oxyCODONE (ROXICODONE) immediate release tablet 5 mg, 5 mg, oral, q4h PRN, Arjun Dawkins MD, 5 mg at 01/28/22 0805  •  polyethylene glycol (MIRALAX) 17 gram packet 17 g, 17 g, oral, Daily with dinner, Arjun Dawkins MD  •  pravastatin (PRAVACHOL) tablet 40 mg, 40 mg, oral, Daily (6p), Arjun Dawkins MD, 40 mg at 01/30/22 1709  •  senna (SENOKOT) tablet 2 tablet, 2 tablet, oral, Daily, Arjun Dawkins MD  •  sodium bicarbonate tablet 650 mg, 650 mg, oral, BID, Arjun Dawkins MD, 650 mg at 01/31/22 0857  •  sodium chloride 0.9 % infusion, , intravenous, Daily (6p), Arjun Dawkins MD, Last Rate: 75 mL/hr at 01/30/22 1908, New Bag at 01/30/22 1908    ASSESSMENT:  Principal Problem:    Lumbar disc disease with radiculopathy  Active Problems:    Leg swelling    S/P lumbar fusion    Diabetic  ketoacidosis associated with type 2 diabetes mellitus (CMS/HCC)    Vitamin D deficiency    AVI (iron deficiency anemia)      PLAN:  1. AGMA:  D/o ketosis with SGLT2i.  Jardiance on hold at this time.  CO2 26. Can d/c overnight IVFs  Encouraged PO fluid intake.     2. DMT2: Jardiance on hold at this time.  SSI for meal time coverage. Defer management to IM.       NANCY Valencia

## 2022-01-31 NOTE — PROGRESS NOTES
Patient: Matt Williamson  Location: [facility]  MRN: [id number]  Today's date: 1/31/2022    History of Present Illness  Marco A is a 76 y.o. male admitted on 1/27/2022 with Lumbar disc disease with radiculopathy [M51.16]  S/P lumbar fusion [Z98.1]. Principal problem is Lumbar disc disease with radiculopathy.    Patient is a 76-year-old male with a history of atrial fibrillation, CAD, hypertension, hypothyroidism, hyperlipidemia, thyroid cancer, type 2 diabetes developed significant mid to low back pain with walking and standing.  He has metastatic thyroid carcinoma with metastasis to the L3 body status post radiation in May 2020.  Recent PET scan showed increased activity at the L3 level without further metastatic lesions.  He was evaluated by neurosurgery who recommended surgical resection.  He was taken to the OR on 1/20 and underwent a stage I L2-L4 PSF with instrumentation, ORIF of L3 pathologic fracture, stage II L3 lateral corpectomy with expandable cage placement.  There has been no significant postoperative complications.  His pain is fairly well controlled.  An LSO has been ordered.    1/28 Pt placed on Med hold after OT eval  and during PT eval 2/2 - metabolic ketone acidosis with dehydration    Past Medical History  Marco A has a past medical history of Atrial fibrillation with rapid ventricular response (CMS/HCC) (10/25/2019), BPH (benign prostatic hyperplasia), Coronary artery disease, Hypertension, Hypothyroidism, Mixed hyperlipidemia, Thyroid cancer (CMS/HCC), Type 2 diabetes mellitus without complication (CMS/HCC), and Vertigo.    Pain 5/10 beginning of session   OT Vitals    Date/Time BP BP Location BP Method Pt Position [facility]   01/31/22 1108 88/52 Left upper arm Manual Sitting VMS   01/31/22 1114 120/62 Left upper arm Manual Lying VMS   01/31/22 1159 128/66 Left upper arm Manual Lying VMS      pain end of session pain 5/10    Prior Living Environment      Most Recent Value    People in Home spouse   Current Living Arrangements home   Home Accessibility stairs to enter home (Group)   Living Environment Comment pt lives in 2  in [place]. 3 steps to enter with R rail, bedroom and shower stall on first floor, full flight B/L rails to shower stall and bedroom, typically sleeps on second floor. has a bed and shower stall on first floor   Number of Stairs, Main Entrance 3   Stair Railings, Main Entrance railing on right side (ascending)   Location, Patient Bedroom second floor, must negotiate stairs to access   Patient Bedroom Access Comment has bedroom on first floor if needed   Location, Bathroom second floor, must negotiate stairs to access   Bathroom Access Comment has bathroom on first floor   Number of Stairs, Within Home, Primary 12   Stair Railings, Within Home, Primary railings on both sides of stairs          Prior Level of Function      Most Recent Value   Dominant Hand right   Ambulation assistive equipment   Transferring assistive equipment   Toileting independent   Bathing independent   Dressing independent   Eating independent   Prior Level of Function Comment Pt reports he was independent for all mobility using SPC, has shower chair at home.   Assistive Device Currently Used at Home cane, straight, shower chair          Occupational Profile      Most Recent Value   Successful Occupations Retired restaraunt owner   Occupational History/Life Experiences Enjoys Playing Basketball. +    Environmental Supports and Barriers lives with spouse   Patient Goals 1/29/22 PSFS  cleaning 3/10, doing wash 4/10, bowling 1/10, shooting baskets 3/10, and running on treadmill 4/10. Total score: 30%           IRF OT Evaluation and Treatment - 01/31/22 1118        OT Time Calculation    Start Time 1100     Stop Time 1200     Time Calculation (min) 60 min        Session Details    Document Type daily treatment/progress note     Mode of Treatment occupational therapy;individual  therapy        General Information    General Observations of Patient Pt reported fatigue, noted low BP, assisted to supine in bed        Bed Mobility    Comment (Bed Mobility) steadying A for supine to sit.        Sit to Stand Transfer    [place], Sit to Stand Transfer touching/steadying assist     Verbal Cues safety     Assistive Device walker, front-wheeled        Stand to Sit Transfer    [place], Stand to Sit Transfer touching/steadying assist     Verbal Cues safety     Assistive Device walker, front-wheeled        Therapeutic Interventions    Comment, Therapeutic Intervention Assist with positioning in bed for improed sleeping position. Noted best with knee flexion in bed. encouraged fluids        Upper Extremity (Therapeutic Exercise)    Weight/Resistance resistance band   pink    Reps and Sets 10x2     Comment diagonal shoulder flexion, chest press, shoulder flexion.. scapular retraction, shoulder external rotation with retraction        Lower Extremity (Therapeutic Exercise)    Reps and Sets 10x 2     Comment quad sets with knee extension. ankle pumps, marching in bed with feet on bed to decrease weight of LE.  Figure 4 LE stretch        Bathing    Comment provided pt with long handled sponge with review for bathing        Upper Body Dressing    Comment Pt able to doff LSO for laying        Lower Body Dressing    Comment pt able to remove shoes        Daily Progress Summary (OT)    Daily Outcome Statement Pt reported begin fatigued at beginning of session. Pt with low BP. Assisted supine in bed. Focus on supine ther ex and positioning in bed. Pt reported not sleeping well and having to wake up multiple times during night to have BM. Pt educated on energy conservation techniques. Pt required increased rest breaks during session. Nursing assessed pt during session. End of session pt planning on taking a nap prior to PT session. Pt would continue to benefit from skilled OT services to increase indep,  decrease caregiver burden and increase quality of life. Continue with POC                           IRF OT Goals      Most Recent Value   Transfer Goal 1    Activity/Assistive Device toilet at 01/28/2022 0722   [place] --  [touching] at 01/28/2022 0722   Time Frame short-term goal (STG), 5 - 7 days at 01/28/2022 0722   Transfer Goal 2    Activity/Assistive Device toilet at 01/28/2022 0722   [place] modified independence at 01/28/2022 0722   Time Frame long-term goal (LTG), 14 days or less at 01/28/2022 0722   Transfer Goal 3    Activity/Assistive Device shower at 01/28/2022 0722   [place] --  [touching A] at 01/28/2022 0722   Time Frame short-term goal (STG), 5 - 7 days at 01/28/2022 0722   Transfer Goal 4    Activity/Assistive Device shower at 01/28/2022 0722   [place] modified independence at 01/28/2022 0722   Time Frame long-term goal (LTG), 14 days or less at 01/28/2022 0722   Bathing Goal 1    [place] --  [touching A] at 01/28/2022 0722   Time Frame short-term goal (STG), 5 - 7 days at 01/28/2022 0722   Bathing Goal 2    [place] modified independence at 01/28/2022 0722   Time Frame long-term goal (LTG), 14 days or less at 01/28/2022 0722   UB Dressing Goal 1    [place] --  [CS] at 01/28/2022 0722   Time Frame short-term goal (STG), 5 - 7 days at 01/28/2022 0722   UB Dressing Goal 2    [place] modified independence at 01/28/2022 0722   Time Frame long-term goal (LTG), 14 days or less at 01/28/2022 0722   LB Dressing Goal 1    [place] minimum assist (75% or more patient effort) at 01/28/2022 0722   Time Frame short-term goal (STG), 5 - 7 days at 01/28/2022 0722   LB Dressing Goal 2    [place] modified independence at 01/28/2022 0722   Time Frame long-term goal (LTG), 14 days or less at 01/28/2022 0722   Grooming Goal 1    [place] --  [touching A] at 01/28/2022 0722   Time Frame short-term goal (STG), 5 - 7 days at 01/28/2022 0722   Strategies/Barriers in  stance at 01/28/2022 0722   Grooming Goal 2    [place] modified independence at 01/28/2022 0722   Time Frame long-term goal (LTG), 14 days or less at 01/28/2022 0722   Toileting Goal 1    [place] --  [touching A] at 01/28/2022 0722   Time Frame short-term goal (STG), 5 - 7 days at 01/28/2022 0722   Toileting Goal 2    [place] modified independence at 01/28/2022 0722   Time Frame long-term goal (LTG), 14 days or less at 01/28/2022 0722

## 2022-01-31 NOTE — PLAN OF CARE
Problem: Rehabilitation (IRF) Plan of Care  Goal: Plan of Care Review  Outcome: Progressing  Flowsheets (Taken 1/31/2022 0217)  Progress: improving  Plan of Care Reviewed With: patient  Outcome Summary: Patient is resting in bed with the call bell within reach and the bed alarm on.   Plan of Care Review  Plan of Care Reviewed With: patient  Progress: improving  Outcome Summary: Patient is resting in bed with the call bell within reach and the bed alarm on.

## 2022-01-31 NOTE — PROGRESS NOTES
Patient: Matt Williamson  Location: [facility]  MRN: [id number]  Today's date: 1/31/2022    History of Present Illness  Marco A is a 76 y.o. male admitted on 1/27/2022 with Lumbar disc disease with radiculopathy [M51.16]  S/P lumbar fusion [Z98.1]. Principal problem is Lumbar disc disease with radiculopathy.    Patient is a 76-year-old male with a history of atrial fibrillation, CAD, hypertension, hypothyroidism, hyperlipidemia, thyroid cancer, type 2 diabetes developed significant mid to low back pain with walking and standing.  He has metastatic thyroid carcinoma with metastasis to the L3 body status post radiation in May 2020.  Recent PET scan showed increased activity at the L3 level without further metastatic lesions.  He was evaluated by neurosurgery who recommended surgical resection.  He was taken to the OR on 1/20 and underwent a stage I L2-L4 PSF with instrumentation, ORIF of L3 pathologic fracture, stage II L3 lateral corpectomy with expandable cage placement.  There has been no significant postoperative complications.  His pain is fairly well controlled.  An LSO has been ordered.    1/28 Pt placed on Med hold after OT eval  and during PT eval 2/2 - metabolic ketone acidosis with dehydration    Past Medical History  Marco A has a past medical history of Atrial fibrillation with rapid ventricular response (CMS/HCC) (10/25/2019), BPH (benign prostatic hyperplasia), Coronary artery disease, Hypertension, Hypothyroidism, Mixed hyperlipidemia, Thyroid cancer (CMS/HCC), Type 2 diabetes mellitus without complication (CMS/HCC), and Vertigo.      OT Vitals    Date/Time HR Source BP BP Location BP Method Pt Position [facility]   01/31/22 0934 Monitor 98/54 Left upper arm Manual Sitting VMS      OT Pain    Date/Time Pain Type Rating: Rest [facility]   01/31/22 0934 Pain Assessment 2 VMS      Pt reported no increased pain sitting in WC end of session, he reported increased pain while standing, improved with  sitting     Prior Living Environment      Most Recent Value   People in Home spouse   Current Living Arrangements home   Home Accessibility stairs to enter home (Group)   Living Environment Comment pt lives in 2  in [place]. 3 steps to enter with R rail, bedroom and shower stall on first floor, full flight B/L rails to shower stall and bedroom, typically sleeps on second floor. has a bed and shower stall on first floor   Number of Stairs, Main Entrance 3   Stair Railings, Main Entrance railing on right side (ascending)   Location, Patient Bedroom second floor, must negotiate stairs to access   Patient Bedroom Access Comment has bedroom on first floor if needed   Location, Bathroom second floor, must negotiate stairs to access   Bathroom Access Comment has bathroom on first floor   Number of Stairs, Within Home, Primary 12   Stair Railings, Within Home, Primary railings on both sides of stairs          Prior Level of Function      Most Recent Value   Dominant Hand right   Ambulation assistive equipment   Transferring assistive equipment   Toileting independent   Bathing independent   Dressing independent   Eating independent   Prior Level of Function Comment Pt reports he was independent for all mobility using SPC, has shower chair at home.   Assistive Device Currently Used at Home cane, straight, shower chair          Occupational Profile      Most Recent Value   Successful Occupations Retired restaraunt owner   Occupational History/Life Experiences Enjoys Playing Basketball. +    Environmental Supports and Barriers lives with spouse   Patient Goals 1/29/22 PSFS  cleaning 3/10, doing wash 4/10, bowling 1/10, shooting baskets 3/10, and running on treadmill 4/10. Total score: 30%           IRF OT Evaluation and Treatment - 01/31/22 0944        OT Time Calculation    Start Time 0930     Stop Time 1000     Time Calculation (min) 30 min        Session Details    Document Type daily treatment/progress note      Mode of Treatment occupational therapy;individual therapy        Transfers    Comment Gait belt worn for tx and LSO        Sit to Stand Transfer    [place], Sit to Stand Transfer touching/steadying assist     Verbal Cues safety;technique     Assistive Device walker, front-wheeled        Stand to Sit Transfer    [place], Stand to Sit Transfer touching/steadying assist     Verbal Cues safety;technique     Assistive Device walker, front-wheeled        Stand Pivot Transfer    [place], Stand Pivot/Stand Step Transfer touching/steadying assist     Verbal Cues safety;technique     Assistive Device walker, front-wheeled     Comment via amb tx        Safety Issues, Functional Mobility    Comment, Safety Issues/Impairments (Mobility) OT functional ambulation with RW with min A cues for decreasing tension in shoulder        Balance    Comment, Balance Standing with RW and reaching for items in cabinet        Lower Body Dressing    Comment Pt able to use reacher to doff shoes and socks. Assist to don TEDs. with [facility] pt able to don shoes        Daily Progress Summary (OT)    Daily Outcome Statement Pt with good participation during therapy session. encouraged fluids. Pt improving use of AE for LE dressing. Pt improving standing endurance, limited by back discomfort and required to sit. Pt would continue to benefit from skilled OT services. Continue with POC                           IRF OT Goals      Most Recent Value   Transfer Goal 1    Activity/Assistive Device toilet at 01/28/2022 0722   [place] --  [touching] at 01/28/2022 0722   Time Frame short-term goal (STG), 5 - 7 days at 01/28/2022 0722   Transfer Goal 2    Activity/Assistive Device toilet at 01/28/2022 0722   [place] modified independence at 01/28/2022 0722   Time Frame long-term goal (LTG), 14 days or less at 01/28/2022 0722   Transfer Goal 3    Activity/Assistive Device shower at 01/28/2022 0722   [place] --  [touching A] at  01/28/2022 0722   Time Frame short-term goal (STG), 5 - 7 days at 01/28/2022 0722   Transfer Goal 4    Activity/Assistive Device shower at 01/28/2022 0722   [place] modified independence at 01/28/2022 0722   Time Frame long-term goal (LTG), 14 days or less at 01/28/2022 0722   Bathing Goal 1    [place] --  [touching A] at 01/28/2022 0722   Time Frame short-term goal (STG), 5 - 7 days at 01/28/2022 0722   Bathing Goal 2    [place] modified independence at 01/28/2022 0722   Time Frame long-term goal (LTG), 14 days or less at 01/28/2022 0722   UB Dressing Goal 1    [place] --  [CS] at 01/28/2022 0722   Time Frame short-term goal (STG), 5 - 7 days at 01/28/2022 0722   UB Dressing Goal 2    [place] modified independence at 01/28/2022 0722   Time Frame long-term goal (LTG), 14 days or less at 01/28/2022 0722   LB Dressing Goal 1    [place] minimum assist (75% or more patient effort) at 01/28/2022 0722   Time Frame short-term goal (STG), 5 - 7 days at 01/28/2022 0722   LB Dressing Goal 2    [place] modified independence at 01/28/2022 0722   Time Frame long-term goal (LTG), 14 days or less at 01/28/2022 0722   Grooming Goal 1    [place] --  [touching A] at 01/28/2022 0722   Time Frame short-term goal (STG), 5 - 7 days at 01/28/2022 0722   Strategies/Barriers in stance at 01/28/2022 0722   Grooming Goal 2    [place] modified independence at 01/28/2022 0722   Time Frame long-term goal (LTG), 14 days or less at 01/28/2022 0722   Toileting Goal 1    [place] --  [touching A] at 01/28/2022 0722   Time Frame short-term goal (STG), 5 - 7 days at 01/28/2022 0722   Toileting Goal 2    [place] modified independence at 01/28/2022 0722   Time Frame long-term goal (LTG), 14 days or less at 01/28/2022 0797

## 2022-01-31 NOTE — PLAN OF CARE
Plan of Care Review  Plan of Care Reviewed With: patient  Progress: improving  Outcome Summary: Patient AAOx4, continent of bowel and bladder.  LSO worn when OOB.  22g RFA for iv iron.  TID accuchecks.  Patient became slightly orthostatic with therapy recovered when in bed with legs up.  TEDS ordered.

## 2022-01-31 NOTE — PROGRESS NOTES
[facility] Progress Note          Patient was seen and examined at bedside.    Subjective:   1/29/22   AGMA resolved, CO2 21-25, AG 15-11, hypokalemia resolved, residual ketone on serum, off SGLT2 inhibitor, tolerated IVF hydration.    Multiple medical issues:  1. VitD deficiency with 25 OH VitD low at 12, loading with ergocaociferol 50,000 units weekly;  2. Anemia with AVI, provide ferrous sulfate oral supplement.    Clinically, he feels better, denies nausea, abd pain, his appetite is improving.  No BM, s/p ileus, hypomagnesemia, provide mag citrate with soap sunds enema     1/30/22  Has had good BM, he feels all cleared up, his lungs CTA b/l, still dry mouth but improved, lower extremity edema which is third space fluid retention with circulation dehydration coexist. Not volume over load !!    B/l LE venous compression U/S 1/28/22:  No evidence of deep vein thrombus (DVT) in either lower extremity.  TEDS, elevate legs when sitting, ARB or ACEI will help his LE edema.    1/31/22  He is drinking well, his dehydration has replaced, may d/c IVF; metabolic acidosis corrected, his BG levels ar acceptable, labs stable and benign, constipated, provide mag citrate with dulcolax today.   Objective   Vital signs in last 24 hours:  Temp:  [36.7 °C (98 °F)-37.6 °C (99.6 °F)] 36.7 °C (98 °F)  Heart Rate:  [72-95] 95  Resp:  [16-18] 18  BP: ()/(52-69) 120/62    No intake or output data in the 24 hours ending 01/31/22 1131  Intake/Output this shift:  No intake/output data recorded.   Review of Systems:  All other systems reviewed and negative except as noted in the HPI.   Objective      Labs  reviewed his labs thoroughly   Lab Results   Component Value Date    WBC 7.10 01/31/2022    HGB 9.2 (L) 01/31/2022    HCT 29.1 (L) 01/31/2022    MCV 84.8 01/31/2022     01/31/2022     Lab Results   Component Value Date    GLUCOSE 132 (H) 01/31/2022    CALCIUM 7.8 (L) 01/31/2022     01/31/2022     K 3.9 01/31/2022    CO2 26 01/31/2022    CL 99 01/31/2022    BUN 7 (L) 01/31/2022    CREATININE 0.8 01/31/2022       Imaging  OSH imaging study reports reviewed   B/l LE venous compression U/S 1/28/22:  No evidence of deep vein thrombus (DVT) in either lower extremity.      Full Code    Physical Exam:  Head/Ear/Nose/Throat: normocephalic; atraumatic; moisture mouth mm, no oropharyngeal thrush noted.   Eyes: anicteric sclera, EOMI; PERRL.   Neck : supple, no JVD, no carotid bruits appeciated.   Respiratory: no evidence of labored breathing, lung sounds CTA b/l, good aeration bibasilar area, no w/r/c.   Cardiovascular: RRR; normal S1, S2; no m/r/g; no S3 or S4.   Gastrointestinal: soft; NT; BS normal; mildly distended; no CVAT b/l.   Genitourinary: no silva.   Extremities : b/l LE pitting edema .   Neurological: AO x 3, fluent speeches, following commands, CNS II-XII grossly intact; no focal neurologic deficits.   Behavior/Emotional: in NAD, appropriate; cooperative.   Skin: sacral area skin lesions.     Plan of care was discussed with patient, RN, and PMR attending     Assessment   CC:Thyroid cancer with progressive lumbar metastatic lesions and L3 pathologic fracture complicated with myeloradiculopathy, s/p surgical intervention and ORIF, neurogenic B/B, ADL and ambulatory dysfunction      76 y.o. male with PMH of HTN, dyslipidemia, CAD, type 2 DM, hypothyroidism, thyroid cancer metastatic to L3 vertebral body s/p radiation stereotactic body radiation 5/2020, seen by the neurosurgery service for surgical discussion regarding growth of his L3 vertebral body metastatic lesion. He has had progressive worsening midline-line low back pain with radiation to the right knee. An MRI lumbar spine was obtained 11/22/2021 showing extension of the L3 mass. He was sent for a PET scan 12/7/2021 showing increased activity of the L3 lesion without further metastatic lesions appreciated. He was referred to [facility] however  there has been some delay with getting an appointment.      He has had persistent midline low back pain with walking and standing which has progressively worsened. He does not have any weakness or bowel/bladder incontinence. He is following with oncology (Dr. Casey) who does not recommend any chemotherapy at this time but surgical intervention as the primary treatment given the suspected isolated area of progression at the L3 vertebral body.     He was admitted to [facility] on 1/20/22 and underwent elective :  Stage 1: L2-L4 PLSF with instrumentation, ORIF L3 pathologic fracture  Stage 2: L3 lateral corpectomy with expandable cage placement  Post op ileus, s/p aggressive bowel program, resolved, tolerated diet. Functionally he has ADL and ambulatory dysfunction, requiring inpt acute rehab, transferred to [facility] on 1/27/22.            Ileus (CMS/HCC)  Resolved after aggressive bowel program, start diet  Tolerating diet, cont bowel regimen.     Acquired hypothyroidism  Assessment & Plan  Status post thyroidectomy  Continue levothyroxine        Malignant neoplasm of vertebral column, excluding sacrum and coccyx (CMS/HCC)  Assessment & Plan  Metastatic thyroid ca S/p L3 pathologic fracture s/p right lateral L3 corpectomy and placement of an expandable cage, followed by L2-4 posterolateral instrumented fusion  Continue postop management per neurosurgery.  Drain was removed  DVT prophylaxis, [place], pain management per neurosurgery  Rehab as inpt at [place] [facility]      Paroxysmal atrial fibrillation (CMS/HCC)  Assessment & Plan  Currently in sinus rhythm  Patient is not on long-term anticoagulation  He takes Rythmol as needed for palpitations     Type 2 diabetes mellitus without complication (CMS/HCC)  Assessment & Plan  Patient on Jardiance, Trulicity and metformin as outpatient  Cont inpt Accu-Cheks with sliding scale insulin  Hemoglobin A1c 6.9  Continue Jardiance and restarted metformin and trulicity  Metformin should be  changed to twice daily or extended release once daily      Metastasis from thyroid cancer (CMS/HCC)  Assessment & Plan  Follows up with medical oncologist  and radiation oncologist  Status post radiation     Mixed hyperlipidemia  Assessment & Plan  Continue statin     Essential hypertension  Assessment & Plan  Stable  Continue metoprolol     Atherosclerosis of coronary artery  Assessment & Plan  Status post LAD stenting 17 years ago  Patient follows up with Dr. Ji  Continue metoprolol, statin  Restart aspirin once cleared by neurosurgery     #VitD deficiency with 25 OH VitD low at 12, loading with ergocaociferol 50,000 units weekly    #Anemia with AVI, provide ferrous sulfate oral supplement    # Euglycemic DKA, hold jardiance, IVF and po hydration, replacement K, Mag, insulin replacement     Billing code: [id number]  Diagnoses:  Patient Active Problem List   Diagnosis   • BPH (benign prostatic hyperplasia)   • Atherosclerosis of coronary artery   • Cataract   • Essential hypertension   • Former tobacco use   • Mixed hyperlipidemia   • Metastasis from thyroid cancer (CMS/HCC)   • Type 2 diabetes mellitus without complication (CMS/HCC)   • Cyst of thyroid   • Esophageal reflux   • Paroxysmal atrial fibrillation (CMS/HCC)   • Pre-op evaluation   • Preop cardiovascular exam   • Cancer associated pain   • Low back pain with right-sided sciatica   • Malignant neoplasm of vertebral column, excluding sacrum and coccyx (CMS/HCC)   • Acquired hypothyroidism   • Ileus (CMS/HCC)   • Leg swelling   • Lumbar disc disease with radiculopathy   • S/P lumbar fusion   • Diabetic ketoacidosis associated with type 2 diabetes mellitus (CMS/HCC)   • Vitamin D deficiency   • AVI (iron deficiency anemia)        Arjun Dawkins MD  1/31/2022

## 2022-01-31 NOTE — PROGRESS NOTES
Patient: Matt Wliliamson  Location: [facility] [address]  MRN: [id number]  Today's date: 1/31/2022    History of Present Illness  Marco A is a 76 y.o. male admitted on 1/27/2022 with Lumbar disc disease with radiculopathy [M51.16]  S/P lumbar fusion [Z98.1]. Principal problem is Lumbar disc disease with radiculopathy.    Patient is a 76-year-old male with a history of atrial fibrillation, CAD, hypertension, hypothyroidism, hyperlipidemia, thyroid cancer, type 2 diabetes developed significant mid to low back pain with walking and standing.  He has metastatic thyroid carcinoma with metastasis to the L3 body status post radiation in May 2020.  Recent PET scan showed increased activity at the L3 level without further metastatic lesions.  He was evaluated by neurosurgery who recommended surgical resection.  He was taken to the OR on 1/20 and underwent a stage I L2-L4 PSF with instrumentation, ORIF of L3 pathologic fracture, stage II L3 lateral corpectomy with expandable cage placement.  There has been no significant postoperative complications.  His pain is fairly well controlled.  An LSO has been ordered.    1/28 Pt placed on Med hold after OT eval  and during PT eval 2/2 - metabolic ketone acidosis with dehydration    Past Medical History  Marco A has a past medical history of Atrial fibrillation with rapid ventricular response (CMS/HCC) (10/25/2019), BPH (benign prostatic hyperplasia), Coronary artery disease, Hypertension, Hypothyroidism, Mixed hyperlipidemia, Thyroid cancer (CMS/HCC), Type 2 diabetes mellitus without complication (CMS/HCC), and Vertigo.      PT Vitals    Date/Time Pulse HR Source BP BP Location BP Method Pt Position [employer]   01/31/22 0905 95 Monitor 136/61 Left upper arm Automatic Sitting BS      PT Pain    Date/Time Pain Type Side/Orientation Location Rating: Rest Rating: Activity [employer]   01/31/22 0905 Pain Assessment lower back 2 2 BS   01/31/22 0927 Pain Reassessment lower back 2 2 BS           Prior Living Environment      Most Recent Value   People in Home spouse   Current Living Arrangements home   Home Accessibility stairs to enter home (Group)   Living Environment Comment pt lives in 2  in [place]. 3 steps to enter with R rail, bedroom and shower stall on first floor, full flight B/L rails to shower stall and bedroom, typically sleeps on second floor. has a bed and shower stall on first floor   Number of Stairs, Main Entrance 3   Stair Railings, Main Entrance railing on right side (ascending)   Location, Patient Bedroom second floor, must negotiate stairs to access   Patient Bedroom Access Comment has bedroom on first floor if needed   Location, Bathroom second floor, must negotiate stairs to access   Bathroom Access Comment has bathroom on first floor   Number of Stairs, Within Home, Primary 12   Stair Railings, Within Home, Primary railings on both sides of stairs          Prior Level of Function      Most Recent Value   Dominant Hand right   Ambulation assistive equipment   Transferring assistive equipment   Toileting independent   Bathing independent   Dressing independent   Eating independent   Prior Level of Function Comment Pt reports he was independent for all mobility using SPC, has shower chair at home.   Assistive Device Currently Used at Home cane, straight, shower chair           IRF PT Evaluation and Treatment - 01/31/22 0902        PT Time Calculation    Start Time 0900     Stop Time 0930     Time Calculation (min) 30 min        Session Details    Document Type daily treatment/progress note     Mode of Treatment physical therapy;individual therapy        General Information    General Observations of Patient Pleasant and cooperative.        Sit to Stand Transfer    [place], Sit to Stand Transfer touching/steadying assist     Verbal Cues safety;technique;hand placement     Assistive Device walker, front-wheeled     Comment from w/c with steadying A for balance. Cues  "for proper hand placement.        Stand to Sit Transfer    [place], Stand to Sit Transfer touching/steadying assist     Verbal Cues safety;technique     Assistive Device walker, front-wheeled     Comment to w/c with steadying A to control descent        Gait Training    [place], Gait minimum assist (75% or more patient effort)     Assistive Device walker, front-wheeled     Distance in Feet 100 feet     Pattern (Gait) step-through     Deviations/Abnormal Patterns (Gait) base of support, narrow;gait speed decreased;stride length decreased;step length decreased     Comment (Gait/Stairs) 100' x1 using RW with Min A at hips for balance. Cues to keep STACEY closer to RW to maintain upright posture.        Lower Extremity (Therapeutic Exercise)    Exercise Position/Type standing     General Exercise bilateral;partial squats;heel raises;marching while standing     Reps and Sets 2x10     Comment Standing with BUE support on RW and steadying A for balance/safety.        Daily Progress Summary (PT)    Symptoms Noted During/After Treatment none     Daily Outcome Statement Session focused on LE strengthening and gait training. Pt able to tolerate increased gait distance this session, reported \"burning\" pain on lateral aspect of B/L thighs which decreased with seated rest break. Pt reports he believes \"burning\" sensation was present prior to surgery. Cont to progress dynamic balance and gait training next session.                           IRF PT Goals      Most Recent Value   Bed Mobility Goal 1    Activity/Assistive Device bed mobility activities, all at 01/28/2022 0902   [place] supervision required at 01/28/2022 0902   Time Frame short-term goal (STG), 1 week at 01/28/2022 0902   Bed Mobility Goal 2    Activity/Assistive Device bed mobility activities, all at 01/28/2022 0902   [place] modified independence at 01/28/2022 0902   Time Frame long-term goal (LTG), 21 days or less at 01/28/2022 0902   Transfer Goal " 1    Activity/Assistive Device sit-to-stand/stand-to-sit, stand pivot at 01/28/2022 0902   Walker tactile cues required at 01/28/2022 0902   Time Frame short-term goal (STG), 3 - 5 days at 01/28/2022 0902   Transfer Goal 2    Activity/Assistive Device sit-to-stand/stand-to-sit, stand pivot at 01/28/2022 0902   [place] modified independence at 01/28/2022 0902   Time Frame long-term goal (LTG), 21 days or less at 01/28/2022 0902   Gait/Walking Locomotion Goal 1    Activity/Assistive Device gait (walking locomotion) at 01/28/2022 0902   Distance 150 feet at 01/28/2022 0902   [place] minimum assist (75% or more patient effort) at 01/28/2022 0902   Time Frame short-term goal (STG), 3 - 5 days at 01/28/2022 0902   Gait/Walking Locomotion Goal 2    Activity/Assistive Device gait (walking locomotion) at 01/28/2022 0902   Distance 150 feet at 01/28/2022 0902   [place] modified independence at 01/28/2022 0902   Time Frame long-term goal (LTG), 21 days or less at 01/28/2022 0902   Stairs Goal 1    Activity/Assistive Device stairs, all skills at 01/28/2022 0902   Number of Stairs 12 at 01/28/2022 0902   [place] minimum assist (75% or more patient effort) at 01/28/2022 0902   Time Frame short-term goal (STG), 3 - 5 days at 01/28/2022 0902   Stairs Goal 2    Activity/Assistive Device stairs, all skills at 01/28/2022 0902   Number of Stairs 12 at 01/28/2022 0902   [place] modified independence at 01/28/2022 0902   Time Frame long-term goal (LTG), 21 days or less at 01/28/2022 0902

## 2022-01-31 NOTE — PROGRESS NOTES
Patient: Matt Williamson  Location: [facility] [address]  MRN: [id number]  Today's date: 1/31/2022    History of Present Illness  Marco A is a 76 y.o. male admitted on 1/27/2022 with Lumbar disc disease with radiculopathy [M51.16]  S/P lumbar fusion [Z98.1]. Principal problem is Lumbar disc disease with radiculopathy.    Patient is a 76-year-old male with a history of atrial fibrillation, CAD, hypertension, hypothyroidism, hyperlipidemia, thyroid cancer, type 2 diabetes developed significant mid to low back pain with walking and standing.  He has metastatic thyroid carcinoma with metastasis to the L3 body status post radiation in May 2020.  Recent PET scan showed increased activity at the L3 level without further metastatic lesions.  He was evaluated by neurosurgery who recommended surgical resection.  He was taken to the OR on 1/20 and underwent a stage I L2-L4 PSF with instrumentation, ORIF of L3 pathologic fracture, stage II L3 lateral corpectomy with expandable cage placement.  There has been no significant postoperative complications.  His pain is fairly well controlled.  An LSO has been ordered.    1/28 Pt placed on Med hold after OT eval  and during PT eval 2/2 - metabolic ketone acidosis with dehydration    Past Medical History  Marco A has a past medical history of Atrial fibrillation with rapid ventricular response (CMS/HCC) (10/25/2019), BPH (benign prostatic hyperplasia), Coronary artery disease, Hypertension, Hypothyroidism, Mixed hyperlipidemia, Thyroid cancer (CMS/HCC), Type 2 diabetes mellitus without complication (CMS/HCC), and Vertigo.      PT Vitals    Date/Time Pulse HR Source Pt Activity O2 Therapy BP BP Location BP Method Pt Position [facility]   01/31/22 1407 91 Monitor -- -- 122/63 Left upper arm Automatic Sitting BS                  PT Pain    Date/Time Pain Type Acceptable Pain Level Side/Orientation Location Rating: Rest Rating: Activity Description Interventions  "Sleep/Rest/Relaxation Who   01/31/22 1407 Pain Assessment -- lower back 2 4 -- premedicated for activity -- BS                01/31/22 1458 Pain Reassessment -- lower back 2 4 -- position adjusted -- BS          Prior Living Environment      Most Recent Value   People in Home spouse   Current Living Arrangements home   Home Accessibility stairs to enter home (Group)   Living Environment Comment pt lives in 2  in [place]. 3 steps to enter with R rail, bedroom and shower stall on first floor, full flight B/L rails to shower stall and bedroom, typically sleeps on second floor. has a bed and shower stall on first floor   Number of Stairs, Main Entrance 3   Stair Railings, Main Entrance railing on right side (ascending)   Location, Patient Bedroom second floor, must negotiate stairs to access   Patient Bedroom Access Comment has bedroom on first floor if needed   Location, Bathroom second floor, must negotiate stairs to access   Bathroom Access Comment has bathroom on first floor   Number of Stairs, Within Home, Primary 12   Stair Railings, Within Home, Primary railings on both sides of stairs          Prior Level of Function      Most Recent Value   Dominant Hand right   Ambulation assistive equipment   Transferring assistive equipment   Toileting independent   Bathing independent   Dressing independent   Eating independent   Prior Level of Function Comment Pt reports he was independent for all mobility using SPC, has shower chair at home.   Assistive Device Currently Used at Home cane, straight, shower chair           IRF PT Evaluation and Treatment - 01/31/22 1402        PT Time Calculation    Start Time 1400     Stop Time 1500     Time Calculation (min) 60 min        Session Details    Document Type daily treatment/progress note     Mode of Treatment physical therapy;individual therapy        General Information    General Observations of Patient Pt reporting he had \"rollercoaster\" morning due to frequent " "toileting needs.        Sit to Stand Transfer    [place], Sit to Stand Transfer touching/steadying assist     Verbal Cues safety     Assistive Device walker, front-wheeled     Comment from w/c with steadying A for balance. Cues for anterior weight-shift.        Stand to Sit Transfer    [place], Stand to Sit Transfer touching/steadying assist     Verbal Cues safety     Assistive Device walker, front-wheeled     Comment to w/c with steadying A to control descent        Gait Training    [place], Gait minimum assist (75% or more patient effort)     Assistive Device walker, front-wheeled     Pattern (Gait) step-through     Deviations/Abnormal Patterns (Gait) base of support, narrow;gait speed decreased;step length decreased;stride length decreased     Comment (Gait/Stairs) 150' x1, 100' x1 using RW with Min A at hips for balance. Cues for upright posture and to maintain fwd gaze.        Balance    Balance Interventions standing;static;dynamic     Comment, Balance (1) 3x30\" EC while standing on foam, no UE support with steadying A for balance. (2) 2x12 alternating toe taps to 6\" block. First trial 1 UE support on RW, 2nd trial no UE support with Min A at hips for balance.        Lower Extremity (Therapeutic Exercise)    Exercise Position/Type seated     General Exercise bilateral;marching while seated;LAQ (long arc quad);knee flexion;hip aBduction     Reps and Sets 2x10     Comment Seated in w/c: 2x10 LAQ (3# cuff weights), 2x10 marches (3# cuff weights), resisted knee flexion (green TB), hip ABD (green TB).        Daily Progress Summary (PT)    Symptoms Noted During/After Treatment none     Daily Outcome Statement Session focused on dynamic balance and gait training. Pt able to tolerate increased gait distance this session, benefits from cues to maintain fwd gaze and upright posture. Pt demo 1-2 LOB during toe taps without UE support, requiring Min A to correct. Cont to progress LE strengthening and " endurance next session.                           IRF PT Goals      Most Recent Value   Bed Mobility Goal 1    Activity/Assistive Device bed mobility activities, all at 01/28/2022 0902   [place] supervision required at 01/28/2022 0902   Time Frame short-term goal (STG), 1 week at 01/28/2022 0902   Bed Mobility Goal 2    Activity/Assistive Device bed mobility activities, all at 01/28/2022 0902   [place] modified independence at 01/28/2022 0902   Time Frame long-term goal (LTG), 21 days or less at 01/28/2022 0902   Transfer Goal 1    Activity/Assistive Device sit-to-stand/stand-to-sit, stand pivot at 01/28/2022 0902   [place] tactile cues required at 01/28/2022 0902   Time Frame short-term goal (STG), 3 - 5 days at 01/28/2022 0902   Transfer Goal 2    Activity/Assistive Device sit-to-stand/stand-to-sit, stand pivot at 01/28/2022 0902   [place] modified independence at 01/28/2022 0902   Time Frame long-term goal (LTG), 21 days or less at 01/28/2022 0902   Gait/Walking Locomotion Goal 1    Activity/Assistive Device gait (walking locomotion) at 01/28/2022 0902   Distance 150 feet at 01/28/2022 0902   [place] minimum assist (75% or more patient effort) at 01/28/2022 0902   Time Frame short-term goal (STG), 3 - 5 days at 01/28/2022 0902   Gait/Walking Locomotion Goal 2    Activity/Assistive Device gait (walking locomotion) at 01/28/2022 0902   Distance 150 feet at 01/28/2022 0902   [place] modified independence at 01/28/2022 0902   Time Frame long-term goal (LTG), 21 days or less at 01/28/2022 0902   Stairs Goal 1    Activity/Assistive Device stairs, all skills at 01/28/2022 0902   Number of Stairs 12 at 01/28/2022 0902   [place] minimum assist (75% or more patient effort) at 01/28/2022 0902   Time Frame short-term goal (STG), 3 - 5 days at 01/28/2022 0902   Stairs Goal 2    Activity/Assistive Device stairs, all skills at 01/28/2022 0902   Number of Stairs 12 at 01/28/2022 0902    [place] modified independence at 01/28/2022 0902   Time Frame long-term goal (LTG), 21 days or less at 01/28/2022 0902

## 2022-02-01 ENCOUNTER — APPOINTMENT (INPATIENT)
Dept: PHYSICAL THERAPY | Facility: REHABILITATION | Age: 77
DRG: 949 | End: 2022-02-01
Payer: MEDICARE

## 2022-02-01 ENCOUNTER — APPOINTMENT (INPATIENT)
Dept: WOUND CARE | Facility: REHABILITATION | Age: 77
DRG: 949 | End: 2022-02-01
Payer: MEDICARE

## 2022-02-01 ENCOUNTER — APPOINTMENT (INPATIENT)
Dept: OCCUPATIONAL THERAPY | Facility: REHABILITATION | Age: 77
DRG: 949 | End: 2022-02-01
Payer: MEDICARE

## 2022-02-01 LAB
GLUCOSE BLD-MCNC: 134 MG/DL (ref 70–99)
GLUCOSE BLD-MCNC: 144 MG/DL (ref 70–99)
GLUCOSE BLD-MCNC: 182 MG/DL (ref 70–99)
POCT TEST: ABNORMAL

## 2022-02-01 PROCEDURE — 97110 THERAPEUTIC EXERCISES: CPT | Mod: GP

## 2022-02-01 PROCEDURE — 97530 THERAPEUTIC ACTIVITIES: CPT | Mod: GO

## 2022-02-01 PROCEDURE — 25800000 HC PHARMACY IV SOLUTIONS: Performed by: INTERNAL MEDICINE

## 2022-02-01 PROCEDURE — 97110 THERAPEUTIC EXERCISES: CPT | Mod: GO

## 2022-02-01 PROCEDURE — 63600000 HC DRUGS/DETAIL CODE: Performed by: INTERNAL MEDICINE

## 2022-02-01 PROCEDURE — 97116 GAIT TRAINING THERAPY: CPT | Mod: GP

## 2022-02-01 PROCEDURE — 12800001 HC ROOM AND CARE SEMIPRIVATE REHAB-BRAIN INJ

## 2022-02-01 PROCEDURE — 97112 NEUROMUSCULAR REEDUCATION: CPT | Mod: GP

## 2022-02-01 PROCEDURE — 63700000 HC SELF-ADMINISTRABLE DRUG: Performed by: INTERNAL MEDICINE

## 2022-02-01 RX ADMIN — GABAPENTIN 600 MG: 300 CAPSULE ORAL at 05:53

## 2022-02-01 RX ADMIN — PRAVASTATIN SODIUM 40 MG: 20 TABLET ORAL at 16:52

## 2022-02-01 RX ADMIN — HEPARIN SODIUM 5000 UNITS: 5000 INJECTION, SOLUTION INTRAVENOUS; SUBCUTANEOUS at 15:01

## 2022-02-01 RX ADMIN — METOPROLOL SUCCINATE 100 MG: 100 TABLET, EXTENDED RELEASE ORAL at 20:30

## 2022-02-01 RX ADMIN — SODIUM BICARBONATE 650 MG TABLET 650 MG: at 20:21

## 2022-02-01 RX ADMIN — LEVOTHYROXINE SODIUM 150 MCG: 150 TABLET ORAL at 05:53

## 2022-02-01 RX ADMIN — GABAPENTIN 600 MG: 300 CAPSULE ORAL at 15:01

## 2022-02-01 RX ADMIN — DOCUSATE SODIUM 100 MG: 100 CAPSULE, LIQUID FILLED ORAL at 07:27

## 2022-02-01 RX ADMIN — SODIUM CHLORIDE 125 MG: 9 INJECTION, SOLUTION INTRAVENOUS at 15:54

## 2022-02-01 RX ADMIN — LOSARTAN POTASSIUM 25 MG: 25 TABLET, FILM COATED ORAL at 16:52

## 2022-02-01 RX ADMIN — MAGNESIUM 64 MG (MAGNESIUM CHLORIDE) TABLET,DELAYED RELEASE 128 MG: at 07:27

## 2022-02-01 RX ADMIN — SODIUM BICARBONATE 650 MG TABLET 650 MG: at 07:27

## 2022-02-01 RX ADMIN — HEPARIN SODIUM 5000 UNITS: 5000 INJECTION, SOLUTION INTRAVENOUS; SUBCUTANEOUS at 05:53

## 2022-02-01 RX ADMIN — HEPARIN SODIUM 5000 UNITS: 5000 INJECTION, SOLUTION INTRAVENOUS; SUBCUTANEOUS at 21:12

## 2022-02-01 RX ADMIN — DOCUSATE SODIUM 100 MG: 100 CAPSULE, LIQUID FILLED ORAL at 20:22

## 2022-02-01 RX ADMIN — GABAPENTIN 600 MG: 300 CAPSULE ORAL at 21:12

## 2022-02-01 RX ADMIN — OXYCODONE HYDROCHLORIDE 5 MG: 5 TABLET ORAL at 20:22

## 2022-02-01 RX ADMIN — OXYCODONE HYDROCHLORIDE 10 MG: 5 TABLET ORAL at 12:14

## 2022-02-01 RX ADMIN — MAGNESIUM 64 MG (MAGNESIUM CHLORIDE) TABLET,DELAYED RELEASE 128 MG: at 20:21

## 2022-02-01 NOTE — PROGRESS NOTES
Subjective    PPatient seen and examined on rounds.  Chart reviewed.  Events overnight noted.  History reviewed briefly with patient.    CC:  Deficits in mobility, transfers, self-care status post L2-L4 posterior spinal fusion with instrumentation, ORIF of L3 pathologic fracture, L3 lateral corpectomy with expandable cage placement, for L3 pathologic fracture, from metastatic thyroid cancer, multiple medical problems.    HPI:  Mr. Matt Williamson is a 76-year-old right-handed white male with chronic conditions significant for hypertension, hypothyroidism, thyroid cancer status post thyroidectomy, atrial fibrillation, coronary artery disease, hyperlipidemia, type 2 diabetes mellitus, metastatic thyroid carcinoma with metastasis to the L3 vertebral body status post stereotactic radiation in May 2020, who had progressively worsening midline low back pain with radiation to the right knee. MRI lumbar spine obtained on 11/22/21 showing extension of the L3 mass, moderate-severe foraminal narrowing on the right secondary to retropulsion of the inferior endplate in the setting of ligament and joint hypertrophy. CT demonstrates significant bony erosion of the L3 vertebral body with a new oblique fracture. PET scan on 12/7/21 showed increased activity of the L3 lesion without further metastatic lesions appreciated. He was initially referred to [facility] however there has been some delay with getting an appointment.  He denied any bowel or bladder incontinence or weakness and follows up with oncologist Dr. Casey who did not recommend any chemotherapy at this time but surgical intervention as the primary treatment given the suspected isolated area of progression at the L3 vertebral body.  Per neurosurgery notes he had preserved strength except 2 beats ankle clonus on the left.   He was recommended surgical resection by neurosurgery as well as oncology.  He was admitted to [facility] on 1/20/22 and underwent a stage I  "L2-L4 posterior spinal fusion with instrumentation, ORIF of L3 pathologic fracture, and stage II L3 lateral corpectomy with expandable cage placement with neurosurgeon Dr Colt Heller on 1/20/22 at [facility].  He is allowed weightbearing as tolerated on both lower extremities and was given an LSO brace for use when out of bed.  Postoperative course was significant for urinary retention and he had an indwelling Lewis catheter.  The Lewis catheter was removed 2 days back and per patient, he is starting to void now.  I mentioned to him we will monitor post void residuals to see if he is emptying his bladder completely or not.  He has some paresthesias/hyperesthesias in his right thigh especially lateral aspect and some right hip proximal weakness and per neurosurgery notes that is not uncommon given the nature of his anterior lumbar surgery.  He is on subcutaneous Heparin and SCDs for DVT prophylaxis.  I discussed his recent clinical course with patient and with his wife at bedside.  On 1/27/22, hemoglobin was 9.5, WBC count 10.09, platelets are 304, BUN was 17, and creatinine was 1.0.  He has been needing assistance for mobility, transfers, self-care. He is transferred to [place] [facility] on 1/27/22 for further rehabilitation care.     SUBJ: Discussed follow-up appointment with neurosurgery with him for this Wednesday.  Working on upper extremity strengthening and range of motion exercises.  Able to don and doff LSO brace.  Discussed spinal precautions with him.    ROS: Denies chest pain or shortness of breath. Other ROS negative. Past, family, social history is unchanged.      Physical Exam      Blood pressure (!) 143/65, pulse (!) 102, temperature 36.9 °C (98.4 °F), temperature source Oral, resp. rate 18, height 1.676 m (5' 6\"), weight 75 kg (165 lb 6 oz), SpO2 98 %.  Body mass index is 26.69 kg/m².    General Appearance: Not in acute distress  Head/Ear/Nose/Throat: Normocephalic; Atraumatic.   Eye: EOMI; " PERRL.   Neck: No JVD; No Bruits.   Respiratory: Decreased breath sounds at bases.   Cardiovascular: RRR; Normal S1, S2.   Gastrointestinal: Soft; bowel sounds present but somewhat hypoactive.  Extremities: Bilateral lower extremity edema noted.    Musculoskeletal: Functional active range of motion in both upper extremities.  Some limitation of active range of motion in right hip limited by pain.    Neurological: AAO ×3. Speech is fluent. Cranial nerve examination does not reveal any gross facial asymmetry. Strength testing shows about 4+/5 strength in both upper extremities.  Right hip flexion is less than 3/5.  Right quadriceps is 4/5 with the right thigh supported.  Right ankle dorsiflexion, plantar flexion and right EHL are 4+/5.  Left hip flexion is 4/5.  Left quadriceps is 4+/5.  Left ankle dorsiflexion, plantar flexion and left EHL are 4+/5.  He is grossly able to localize touch and position sense in great toes, but does report some hyperesthesia in the right thigh lateral aspect.  Deep tendon reflexes are hypoactive and symmetric bilaterally.  Plantars are flexor.  Coordination is functional upper extremities.    Behavior/Emotional: Appropriate; Cooperative.   Skin: No obvious rashes noted.  Healing incision noted on right flank.  Healing incision also noted on posterior lumbar spine.  Mepilex border dressing in place on incisions.      Current Facility-Administered Medications:   •  acetaminophen (TYLENOL) tablet 650 mg, 650 mg, oral, q4h PRN, Arjun Dawkins MD, 650 mg at 01/31/22 0573  •  alum-mag hydroxide-simeth (MAALOX) 200-200-20 mg/5 mL suspension 30 mL, 30 mL, oral, q4h PRN, Arjun Dawkins MD  •  [START ON 2/3/2022] aspirin enteric coated tablet 81 mg, 81 mg, oral, Daily, Arjun Dawkins MD  •  bisacodyL (DULCOLAX) 10 mg suppository 10 mg, 10 mg, rectal, Daily PRN, Arjun Dawkins MD  •  glucose chewable tablet 16-32 g of dextrose, 16-32 g of dextrose, oral, PRN, 16 g of dextrose at 01/29/22 3886  **OR** dextrose 40 % oral gel 15-30 g of dextrose, 15-30 g of dextrose, oral, PRN **OR** glucagon (GLUCAGEN) injection 1 mg, 1 mg, intramuscular, PRN **OR** dextrose in water injection 12.5 g, 25 mL, intravenous, PRN, Arjun Dawkins MD  •  diazePAM (VALIUM) tablet 5 mg, 5 mg, oral, q6h PRN, Arjun Dawkins MD  •  diphenhydrAMINE (BENADRYL) capsule 25 mg, 25 mg, oral, q6h PRN, Arjun Dawkins MD  •  docusate sodium (COLACE) capsule 100 mg, 100 mg, oral, BID, Arjun Dawkins MD, 100 mg at 01/31/22 2049  •  ergocalciferol (VITAMIN D2) capsule 50,000 Units, 50,000 Units, oral, Weekly, Arjun Dawkins MD, 50,000 Units at 01/29/22 0932  •  ferric gluconate (FERRLECIT) 125 mg in sodium chloride 0.9 % 100 mL IVPB, 125 mg, intravenous, Daily, Arjun Dawkins MD, Stopped at 01/31/22 1833  •  gabapentin (NEURONTIN) capsule 600 mg, 600 mg, oral, q8h [facility], Arjun Dawkins MD, 600 mg at 01/31/22 2049  •  heparin (porcine) 5,000 unit/mL injection 5,000 Units, 5,000 Units, subcutaneous, q8h [facility], Arjun Dawkins MD, 5,000 Units at 01/31/22 2050  •  insulin aspart U-100 (NovoLOG) pen 1-10 Units, 1-10 Units, subcutaneous, TID with meals, Arjun Dawkins MD, 1 Units at 01/31/22 1736  •  levothyroxine (SYNTHROID) tablet 150 mcg, 150 mcg, oral, Daily (6a), Arjun Dawkins MD, 150 mcg at 01/31/22 0531  •  losartan (COZAAR) tablet 25 mg, 25 mg, oral, Daily with dinner, Arjun Dawkins MD, 25 mg at 01/31/22 1615  •  magnesium chloride tablet,delayed release (DR/EC) 128 mg, 128 mg, oral, BID, Arjun Dawkins MD, 128 mg at 01/31/22 2049  •  [Provider Managed Hold] metFORMIN (GLUCOPHAGE) tablet 500 mg, 500 mg, oral, BID with meals, Arjun Dawkins MD, 500 mg at 01/28/22 0804  •  metoprolol succinate XL (TOPROL-XL) 24 hr ER tablet 100 mg, 100 mg, oral, Nightly, Arjun Dawkins MD, 100 mg at 01/31/22 2049  •  ondansetron ODT (ZOFRAN-ODT) disintegrating tablet 4 mg, 4 mg, oral, q8h PRN, Arjun Dawkins MD  •  oxyCODONE (ROXICODONE) immediate release tablet 10  mg, 10 mg, oral, q4h PRN, 10 mg at 01/31/22 1353 **AND** oxyCODONE (ROXICODONE) immediate release tablet 5 mg, 5 mg, oral, q4h PRN, Arjun Dawkins MD, 5 mg at 01/28/22 0805  •  polyethylene glycol (MIRALAX) 17 gram packet 17 g, 17 g, oral, Daily with dinner, Arjun Dawkins MD, 17 g at 01/31/22 1617  •  pravastatin (PRAVACHOL) tablet 40 mg, 40 mg, oral, Daily (6p), Arjun Dawkins MD, 40 mg at 01/31/22 1736  •  senna (SENOKOT) tablet 2 tablet, 2 tablet, oral, Daily, Arjun Dawkins MD, 2 tablet at 01/31/22 1255  •  sodium bicarbonate tablet 650 mg, 650 mg, oral, BID, Arjun Dawkins MD, 650 mg at 01/31/22 2049       Labs / Radiology    Lab Results   Component Value Date    WBC 7.10 01/31/2022    HGB 9.2 (L) 01/31/2022    HCT 29.1 (L) 01/31/2022    MCV 84.8 01/31/2022     01/31/2022     Lab Results   Component Value Date    GLUCOSE 132 (H) 01/31/2022    CALCIUM 7.8 (L) 01/31/2022     01/31/2022    K 3.9 01/31/2022    CO2 26 01/31/2022    CL 99 01/31/2022    BUN 7 (L) 01/31/2022    CREATININE 0.8 01/31/2022     Assessment and Plan    ASSESSMENT PLAN:  1. 76-year-old right-handed white male with chronic conditions significant for hypertension, hypothyroidism, thyroid cancer status post thyroidectomy, atrial fibrillation, coronary artery disease, hyperlipidemia, type 2 diabetes mellitus, metastatic thyroid carcinoma with metastasis to the L3 vertebral body status post stereotactic radiation in May 2020, who had progressively worsening midline low back pain with radiation to the right knee. MRI lumbar spine obtained on 11/22/21 showing extension of the L3 mass, moderate-severe foraminal narrowing on the right secondary to retropulsion of the inferior endplate in the setting of ligament and joint hypertrophy. CT demonstrates significant bony erosion of the L3 vertebral body with a new oblique fracture. PET scan on 12/7/21 showed increased activity of the L3 lesion without further metastatic lesions appreciated.  He was initially referred to [facility] however there has been some delay with getting an appointment.  He denied any bowel or bladder incontinence or weakness and follows up with oncologist Dr. Casey who did not recommend any chemotherapy at this time but surgical intervention as the primary treatment given the suspected isolated area of progression at the L3 vertebral body.  Per neurosurgery notes he had preserved strength except 2 beats ankle clonus on the left.   He was recommended surgical resection by neurosurgery as well as oncology.  He was admitted to [facility] on 1/20/22 and underwent a stage I L2-L4 posterior spinal fusion with instrumentation, ORIF of L3 pathologic fracture, and stage II L3 lateral corpectomy with expandable cage placement with neurosurgeon Dr Colt Heller on 1/20/22 at [facility].  He is allowed weightbearing as tolerated on both lower extremities and was given an LSO brace for use when out of bed.  Postoperative course was significant for urinary retention and he had an indwelling Lewis catheter.  The Lewis catheter was removed 2 days back and per patient, he is starting to void now.  I mentioned to him we will monitor post void residuals to see if he is emptying his bladder completely or not.  He has some paresthesias/hyperesthesias in his right thigh especially lateral aspect and some right hip proximal weakness and per neurosurgery notes that is not uncommon given the nature of his anterior lumbar surgery.  He is on subcutaneous Heparin and SCDs for DVT prophylaxis.  I discussed his recent clinical course with patient and with his wife at bedside.  On 1/27/22, hemoglobin was 9.5, WBC count 10.09, platelets are 304, BUN was 17, and creatinine was 1.0.  He has been needing assistance for mobility, transfers, self-care. He is transferred to [place] [facility] on 1/27/22 for further rehabilitation care.      2. DVT prophylaxis - on subcutaneous Heparin.  On SCDs.   Check doppler. Platelets 323 on 1/28/2022.     3.  Neurosurgery - status post L2-L4 posterior spinal fusion with instrumentation, ORIF of L3 pathologic fracture, L3 lateral corpectomy with expandable cage placement, for L3 pathologic fracture, from metastatic thyroid cancer, multiple medical problems - continue PT, OT, psychology.  Follow falls precautions, cardiac precautions, monitor pulse oximetry in therapy.  Follow spinal precautions.  LSO brace when out of bed.     4. GI - On Protonix for GI prophylaxis. Continue Colace, Senokot, MiraLAX, Dulcolax.  On Zofran ODT for nausea.  On Maalox PRN.     5.  - Postoperative course was significant for urinary retention and he had an indwelling Lewis catheter.  The Lewis catheter was removed 2 days back and per patient, he is starting to void now.  I mentioned to him we will monitor post void residuals to see if he is emptying his bladder completely or not.       6.  Diabetes mellitus - on Jardiance.  On Metformin.  On sliding-scale NovoLog coverage.  On hypoglycemic protocol.     7. Pain - on Tylenol.  On Neurontin.  On PRN Oxycodone.  On Valium PRN for spasms.     8. Hematology -hemoglobin 9.2, WBC 8.01 on 1/28/2022.     9.  Endocrinology - history of thyroid cancer status post thyroidectomy.  On Synthroid.     10. CVS - history of hypertension, atrial fibrillation, coronary artery disease - on Toprol-XL.     11.  Hyperlipidemia - on Pravachol.     12.  Oncology - history of metastatic thyroid cancer status post thyroidectomy.  He had L2-L4 posterior spinal fusion with instrumentation, ORIF of L3 pathologic fracture, L3 lateral corpectomy with expandable cage placement, for L3 pathologic fracture, from metastatic thyroid cancer.  He will follow up with oncology and neurosurgery.     13.  Skin - monitor healing of incisions on right flank and on posterior lumbar spine.  Dermal defense will be consulted.     14.  F/E/N - on sodium bicarbonate.     15. Rehabilitation  medicine - Continue comprehensive rehabilitation care. Continue PT, OT, psychology.  We will follow falls precautions, cardiac precautions, monitor pulse oximetry in therapy and follow aspiration precautions.  We will follow spinal precautions.  LSO brace when out of bed.Tolerating therapies per endurance.  Reports pain medications are helping.  Had a bowel movement.  Voiding well.Inspected incisions which are stable.  Requiring minimal assistance for transfers with physical therapy.  Able to ambulate 50 feet with front wheel walker with minimal assistance with physical therapy.Discussed follow-up appointment with neurosurgery with him for this Wednesday.  Working on upper extremity strengthening and range of motion exercises.  Able to don and doff LSO brace.  Discussed spinal precautions with him.     16. Reviewed labs today. BUN 12, creatinine 0.8 on 1/28/2022.           James Hidalgo MD  1/31/2022

## 2022-02-01 NOTE — PLAN OF CARE
Plan of Care Review  Plan of Care Reviewed With: patient  Progress: improving  Outcome Summary: Patient AAOx4, continent of bowel and bladder.  TID accuchecks.  LSO worn when OOB.  Incisions have steri strips covered with telfa nd tegaderm. right foreharm IV present for Ferric gluconate.

## 2022-02-01 NOTE — CONSULTS
Matt Williamson  [id number]  1/31/2022    PODIATRY CONSULT    Lumbar disc disease with radiculopathy [M51.16]  S/P lumbar fusion [Z98.1]    Reason for referral: dystrophic nails.  PAD    HPI : 76-year-old right-handed white male with chronic conditions significant for hypertension, hypothyroidism, thyroid cancer status post thyroidectomy, atrial fibrillation, coronary artery disease, hyperlipidemia, type 2 diabetes mellitus, metastatic thyroid carcinoma with metastasis to the L3 vertebral body status post stereotactic radiation in May 2020, who had progressively worsening midline low back pain with radiation to the right knee. MRI lumbar spine obtained on 11/22/21 showing extension of the L3 mass, moderate-severe foraminal narrowing on the right secondary to retropulsion of the inferior endplate in the setting of ligament and joint hypertrophy. CT demonstrates significant bony erosion of the L3 vertebral body with a new oblique fracture. PET scan on 12/7/21 showed increased activity of the L3 lesion without further metastatic lesions appreciated. He was initially referred to [facility] however there has been some delay with getting an appointment.  He denied any bowel or bladder incontinence or weakness and follows up with oncologist Dr. Casey who did not recommend any chemotherapy at this time but surgical intervention as the primary treatment given the suspected isolated area of progression at the L3 vertebral body.  Per neurosurgery notes he had preserved strength except 2 beats ankle clonus on the left.   He was recommended surgical resection by neurosurgery as well as oncology.  He was admitted to [facility] on 1/20/22 and underwent a stage I L2-L4 posterior spinal fusion with instrumentation, ORIF of L3 pathologic fracture, and stage II L3 lateral corpectomy with expandable cage placement with neurosurgeon Dr Colt Heller on 1/20/22 at [facility].  He is allowed weightbearing as tolerated on  both lower extremities and was given an LSO brace for use when out of bed.  Postoperative course was significant for urinary retention and he had an indwelling Lewis catheter.  The Lewis catheter was removed 2 days back and per patient, he is starting to void now.   He has some paresthesias/hyperesthesias in his right thigh especially lateral aspect and some right hip proximal weakness and per neurosurgery notes that is not uncommon given the nature of his anterior lumbar surgery.  He is on subcutaneous Heparin and SCDs for DVT prophylaxis.  He is transferred to [facility] on 1/27/22 for further rehabilitation care.   status post L2-L4 posterior spinal fusion with instrumentation, ORIF of L3 pathologic fracture, L3 lateral corpectomy with expandable cage placement, for L3 pathologic fracture, from metastatic thyroid cancer, multiple medical problems.        Subjective:  Patient was referred by Dr. Hidalgo for diabetic foot care. Patient is presenting for diabetic foot evaluation.        Allergies:   Allergies   Allergen Reactions   • Penicillins Rash     Childhood allergy         Allergies List Reviewed:  yes      Medications:   Current Facility-Administered Medications:   •  acetaminophen (TYLENOL) tablet 650 mg, 650 mg, oral, q4h PRN, Arjun Dawkins MD, 650 mg at 01/31/22 0533  •  alum-mag hydroxide-simeth (MAALOX) 200-200-20 mg/5 mL suspension 30 mL, 30 mL, oral, q4h PRN, Arjun Dawkins MD  •  [START ON 2/3/2022] aspirin enteric coated tablet 81 mg, 81 mg, oral, Daily, Arjun Dawkins MD  •  bisacodyL (DULCOLAX) 10 mg suppository 10 mg, 10 mg, rectal, Daily PRN, Arjun Dawkins MD  •  glucose chewable tablet 16-32 g of dextrose, 16-32 g of dextrose, oral, PRN, 16 g of dextrose at 01/29/22 1721 **OR** dextrose 40 % oral gel 15-30 g of dextrose, 15-30 g of dextrose, oral, PRN **OR** glucagon (GLUCAGEN) injection 1 mg, 1 mg, intramuscular, PRN **OR** dextrose in water injection 12.5 g, 25 mL, intravenous,  PRN, Arjun Dawkins MD  •  diazePAM (VALIUM) tablet 5 mg, 5 mg, oral, q6h PRN, Arjun Dawkins MD  •  diphenhydrAMINE (BENADRYL) capsule 25 mg, 25 mg, oral, q6h PRN, Arjun Dawkins MD  •  docusate sodium (COLACE) capsule 100 mg, 100 mg, oral, BID, Arjun Dawkins MD  •  ergocalciferol (VITAMIN D2) capsule 50,000 Units, 50,000 Units, oral, Weekly, Arjun Dawkins MD, 50,000 Units at 01/29/22 0932  •  ferric gluconate (FERRLECIT) 125 mg in sodium chloride 0.9 % 100 mL IVPB, 125 mg, intravenous, Daily, Arjun Dawkins MD, Stopped at 01/31/22 1833  •  gabapentin (NEURONTIN) capsule 600 mg, 600 mg, oral, q8h SALIMA, Arjun Dawkins MD, 600 mg at 01/31/22 1349  •  heparin (porcine) 5,000 unit/mL injection 5,000 Units, 5,000 Units, subcutaneous, q8h SALIMA, Arjun Dawkins MD, 5,000 Units at 01/31/22 1349  •  insulin aspart U-100 (NovoLOG) pen 1-10 Units, 1-10 Units, subcutaneous, TID with meals, Arjun Dawkins MD, 1 Units at 01/31/22 1736  •  levothyroxine (SYNTHROID) tablet 150 mcg, 150 mcg, oral, Daily (6a), Arjun Dawkins MD, 150 mcg at 01/31/22 0531  •  losartan (COZAAR) tablet 25 mg, 25 mg, oral, Daily with dinner, Arjun Dawkins MD, 25 mg at 01/31/22 1615  •  magnesium chloride tablet,delayed release (DR/EC) 128 mg, 128 mg, oral, BID, Arjun Dawkins MD, 128 mg at 01/31/22 0857  •  magnesium citrate solution 148 mL, 148 mL, oral, BID, Arjun Dawkins MD, 148 mL at 01/31/22 1615  •  [Provider Managed Hold] metFORMIN (GLUCOPHAGE) tablet 500 mg, 500 mg, oral, BID with meals, Arjun Dawkins MD, 500 mg at 01/28/22 0804  •  metoprolol succinate XL (TOPROL-XL) 24 hr ER tablet 100 mg, 100 mg, oral, Nightly, Arjun Dawkins MD, 100 mg at 01/30/22 2111  •  ondansetron ODT (ZOFRAN-ODT) disintegrating tablet 4 mg, 4 mg, oral, q8h PRN, Arjun Dawkins MD  •  oxyCODONE (ROXICODONE) immediate release tablet 10 mg, 10 mg, oral, q4h PRN, 10 mg at 01/31/22 1353 **AND** oxyCODONE (ROXICODONE) immediate release tablet 5 mg, 5 mg, oral, q4h PRN, Juancho  MD Arjun, 5 mg at 01/28/22 0805  •  polyethylene glycol (MIRALAX) 17 gram packet 17 g, 17 g, oral, Daily with dinner, Arjun Dawkins MD, 17 g at 01/31/22 1617  •  pravastatin (PRAVACHOL) tablet 40 mg, 40 mg, oral, Daily (6p), Arjun Dawkins MD, 40 mg at 01/31/22 1736  •  senna (SENOKOT) tablet 2 tablet, 2 tablet, oral, Daily, Arjun Dawkins MD, 2 tablet at 01/31/22 1255  •  sodium bicarbonate tablet 650 mg, 650 mg, oral, BID, Arjun Dawkins MD, 650 mg at 01/31/22 0857      Home Medications Reconcile: yes      Current Medication orders reviewed: yes      Medical History:    Active Ambulatory Problems     Diagnosis Date Noted   • BPH (benign prostatic hyperplasia) 09/14/2012   • Atherosclerosis of coronary artery 09/14/2012   • Cataract 09/11/2013   • Essential hypertension 06/20/2016   • Former tobacco use 05/02/2018   • Mixed hyperlipidemia 02/18/2016   • Metastasis from thyroid cancer (CMS/HCC) 09/14/2012   • Type 2 diabetes mellitus without complication (CMS/HCC) 06/20/2016   • Cyst of thyroid 01/15/2007   • Esophageal reflux 01/15/2007   • Paroxysmal atrial fibrillation (CMS/HCC) 10/25/2019   • Pre-op evaluation 11/20/2020   • Preop cardiovascular exam 06/21/2021   • Cancer associated pain 12/10/2021   • Low back pain with right-sided sciatica 01/12/2022   • Malignant neoplasm of vertebral column, excluding sacrum and coccyx (CMS/HCC) 01/12/2022   • Acquired hypothyroidism 01/22/2022   • Ileus (CMS/HCC) 01/25/2022     Resolved Ambulatory Problems     Diagnosis Date Noted   • Epigastric abdominal pain 02/18/2016   • Vertigo 05/02/2018   • PSA elevation 11/04/2018   • Bone metastases (CMS/HCC) 01/27/2020   • Low back pain 10/24/2019   • Fever, unspecified 06/29/2020     Past Medical History:   Diagnosis Date   • Atrial fibrillation with rapid ventricular response (CMS/HCC) 10/25/2019   • Coronary artery disease    • Hypertension    • Hypothyroidism    • Thyroid cancer (CMS/HCC)        Social History      Socioeconomic History   • Marital status:      Spouse name: Not on file   • Number of children: Not on file   • Years of education: Not on file   • Highest education level: Not on file   Occupational History   • Occupation: Retired   Tobacco Use   • Smoking status: Former Smoker     Packs/day: 2.00     Years: 20.00     Pack years: 40.00     Types: Cigarettes     Quit date:      Years since quittin.1   • Smokeless tobacco: Never Used   Vaping Use   • Vaping Use: Never used   Substance and Sexual Activity   • Alcohol use: Yes     Comment: Social   • Drug use: Never   • Sexual activity: Not Currently     Partners: Female   Other Topics Concern   • Not on file   Social History Narrative    He lives with his wife.  He has 2 children who live in [place] in [place].  He reports he is independent in everything.  He reports he he likes playing sports including basketball and likes to model trains..  He reports he is a .  He denies a history of mental health services.     Social Determinants of Health     Financial Resource Strain: Not on file   Food Insecurity: No Food Insecurity   • Worried About Running Out of Food in the Last Year: Never true   • Ran Out of Food in the Last Year: Never true   Transportation Needs: Not on file   Physical Activity: Not on file   Stress: No Stress Concern Present   • Feeling of Stress : Not at all   Social Connections: Not on file   Intimate Partner Violence: Not on file   Housing Stability: Not on file       Past Surgical History:   Procedure Laterality Date   • ANAL FISSURECTOMY     • CARDIAC CATHETERIZATION  2005    LM FOD. 70-80% proximal LAD.Mild disease CX, RCA and branches.    • CARDIAC CATHETERIZATION  2005    LM FOD. Pristine LAD stent. CX FOD. RCA mild disease.   • CORONARY ANGIOPLASTY WITH STENT PLACEMENT  2005    LM FOD. Moderate, diffuse disease LAD. Irregular CX w/ PLVB distal to PDA 99%. Mild disease RCA. Stent to distal  LAD.   • EYE SURGERY Left Cataract   • FOOT SURGERY  2009    For osteomyelitis.   • GASTROCUTANEOUS FISTULA CLOSURE     • LUMBAR SPINE SURGERY  01/20/2022    Stage 1: L2-L4 PSF with instrumentation, ORIF L3 pathologic fracture Stage 2: L3 lateral corpectomy with expandable cage placement   • THYROIDECTOMY  2012   • UPPER GASTROINTESTINAL ENDOSCOPY           Review of Systems - Negative except noted above.                Physical Exam: nonpalpable DP nonpalpable PT bilateral feet.  Decreased texture, temperature, and turgor bilateral feet.  Decreased digital hair bilateral feet.   Edema bilateral lower extremity.  Xerotic skin bilateral feet.  No open lesions bilateral.  Elongated, thickened, yellow nails with subungual debris  <6.  Tenderness with palpation of affected toenails.  Mild digital contracture noted toe 2-5 bilateral.  Mild hallux valgus deformity bilateral.  Epicritic sensation intact bilateral feet.        Lab Results   Component Value Date    WBC 7.10 01/31/2022    HGB 9.2 (L) 01/31/2022    HCT 29.1 (L) 01/31/2022    MCV 84.8 01/31/2022     01/31/2022       Lab Results   Component Value Date    GLUCOSE 132 (H) 01/31/2022    CALCIUM 7.8 (L) 01/31/2022     01/31/2022    K 3.9 01/31/2022    CO2 26 01/31/2022    CL 99 01/31/2022    BUN 7 (L) 01/31/2022    CREATININE 0.8 01/31/2022       CT ABDOMEN PELVIS WITHOUT IV CONTRAST    Result Date: 1/25/2022  IMPRESSION: 1.  Findings in keeping with mild to moderate proximal colonic ileus. 2.  Postoperative changes from recent lumbar spine surgery as described. 3.  Mild prominence of the ureters and collecting systems bilaterally may be secondary to the distended urinary bladder. 4.  Additional chronic and incidental findings as described above.     X-RAY CHEST 2 VIEWS    Result Date: 1/18/2022  IMPRESSION: See comment.    FL FLUOROSCOPY TECHNICAL ASSISTANCE, X-RAY LUMBAR SPINE 2 OR 3 VIEWS    Result Date: 1/20/2022  IMPRESSION: Intraoperative  fluoroscopic guidance as described above.     X-RAY OBSTRUCTION SERIES (ABDOMEN 2 VIEWS WITH CHEST 1 VIEW)    Result Date: 1/25/2022  IMPRESSION: Findings most suggestive of a severe small and large bowel ileus.    CT HEAD WITHOUT IV CONTRAST    Result Date: 1/20/2022  IMPRESSION:  No acute intracranial abnormality. COMMENT: A standard noncontrast head CT was performed. CT DOSE:  One or more dose reduction techniques (e.g. automated exposure control, adjustment of the mA and/or kV according to patient size, use of iterative reconstruction technique) utilized for this examination. Comparison:  No prior studies are available for comparison. Findings:  Sulci, ventricles and basal cisterns are within normal limits for patient's age.   Attenuation in the brain parenchyma appears within normal limits.  No acute hemorrhage, acute territorial infarct, or mass effect is seen. There is no extra-axial fluid collection.  The visualized paranasal sinuses demonstrates scattered mucosal thickening of the maxillary sinuses bilaterally. Mastoid air cells are clear.    CT LUMBAR SPINE WITHOUT IV CONTRAST    Result Date: 1/20/2022  IMPRESSION: Interval corpectomy at L3, posterior spinal fusion hardware with bilateral pedicle screws at L2-L4. COMMENT: Lumbosacral alignment: Anatomic. Vertebral bodies: Interval corpectomy at L3.  There is posterior spinal fusion hardware with bilateral pedicle screws at L2-L4. Intervertebral discs: Normal in height. Lumbosacral spinal canal: Patent. OTHER: Scattered air lucencies in the paraspinal soft tissues and retro-orbital peritoneum consistent with recent postoperative changes.  Tiny hyperdense focus in the right psoas muscle, may represent a small area of hemorrhage or bony fragment.       Pathology Results     ** No results found for the last 720 hours. **        Microbiology Results     Procedure Component Value Units Date/Time    SARS-CoV-2 (COVID-19), PCR Nasopharynx [[id number]]  (Normal)  Collected: 01/24/22 1807    Specimen: Nasopharyngeal Swab from Nasopharynx Updated: 01/24/22 1845    Narrative:      The following orders were created for panel order SARS-CoV-2 (COVID-19), PCR Nasopharynx.  Procedure                               Abnormality         Status                     ---------                               -----------         ------                     SARS-CoV-2 (COVID-19), P...[[id number]]  Normal              Final result                 Please view results for these tests on the individual orders.    SARS-CoV-2 (COVID-19), PCR Nasopharynx [[id number]]  (Normal) Collected: 01/24/22 1807    Specimen: Nasopharyngeal Swab from Nasopharynx Updated: 01/24/22 1845     SARS-CoV-2 (COVID-19) Negative    Covid-19 Pre-procedural/PAT [[id number]]  (Normal) Collected: 01/18/22 1407    Specimen: Nasopharyngeal Swab from Nasopharynx Updated: 01/19/22 1040    Narrative:      The following orders were created for panel order Covid-19 Pre-procedural/PAT.  Procedure                               Abnormality         Status                     ---------                               -----------         ------                     SARS-CoV-2 (COVID-19), PCR[[id number]]   Normal              Final result                 Please view results for these tests on the individual orders.    SARS-CoV-2 (COVID-19), PCR [[id number]]  (Normal) Collected: 01/18/22 1407    Specimen: Nasopharyngeal Swab from Nasopharynx Updated: 01/19/22 1040     SARS-CoV-2 (COVID-19) Negative          Temp:  [36.7 °C (98 °F)-36.9 °C (98.4 °F)] 36.9 °C (98.4 °F)  Heart Rate:  [] 105  Resp:  [18] 18  BP: ()/(52-75) 143/65      Pertinent radiology and labs reviewed      Impression: 76-year-old right-handed white male with chronic conditions significant for hypertension, hypothyroidism, thyroid cancer status post thyroidectomy, atrial fibrillation, coronary artery disease, hyperlipidemia, type 2 diabetes mellitus, metastatic thyroid  carcinoma with metastasis now with dystrophic nails, edema, PAD.      Plan: Diabetic foot evaluation performed.  No current issues.  Follow-up evaluation 2-3 months and prn.            Corey Oliver DPM    1/31/2022      8:32 PM

## 2022-02-01 NOTE — PROGRESS NOTES
Patient: Matt Williamson  Location: [facility]  MRN: [id number]  Today's date: 2/1/2022    History of Present Illness  Marco A is a 76 y.o. male admitted on 1/27/2022 with Lumbar disc disease with radiculopathy [M51.16]  S/P lumbar fusion [Z98.1]. Principal problem is Lumbar disc disease with radiculopathy.    Patient is a 76-year-old male with a history of atrial fibrillation, CAD, hypertension, hypothyroidism, hyperlipidemia, thyroid cancer, type 2 diabetes developed significant mid to low back pain with walking and standing.  He has metastatic thyroid carcinoma with metastasis to the L3 body status post radiation in May 2020.  Recent PET scan showed increased activity at the L3 level without further metastatic lesions.  He was evaluated by neurosurgery who recommended surgical resection.  He was taken to the OR on 1/20 and underwent a stage I L2-L4 PSF with instrumentation, ORIF of L3 pathologic fracture, stage II L3 lateral corpectomy with expandable cage placement.  There has been no significant postoperative complications.  His pain is fairly well controlled.  An LSO has been ordered.    1/28 Pt placed on Med hold after OT eval  and during PT eval 2/2 - metabolic ketone acidosis with dehydration    Past Medical History  Marco A has a past medical history of Atrial fibrillation with rapid ventricular response (CMS/HCC) (10/25/2019), BPH (benign prostatic hyperplasia), Coronary artery disease, Hypertension, Hypothyroidism, Mixed hyperlipidemia, Thyroid cancer (CMS/HCC), Type 2 diabetes mellitus without complication (CMS/HCC), and Vertigo.      OT Vitals    Date/Time Pulse HR Source Pt Activity O2 Therapy BP BP Location BP Method Pt Position [facility]   02/01/22 1036 78 Monitor -- -- 101/53 Left upper arm Automatic Sitting VMS   02/01/22 1113 -- -- At rest None (Room air) -- -- -- -- HJS      OT Pain    Date/Time Pain Type Acceptable Pain Level Side/Orientation Location Rating: Rest Rating:  Activity Description Interventions [facility]   02/01/22 1036 Pain Assessment -- -- -- 1 -- -- -- S   02/01/22 1113 Pain Assessment 2 lower back 1 4 incisional pain management plan reviewed with patient/caregiver HJS        Pt reported no increased pain end of session     Prior Living Environment      Most Recent Value   People in Home spouse   Current Living Arrangements home   Home Accessibility stairs to enter home (Group)   Living Environment Comment pt lives in 2  in [place]. 3 steps to enter with R rail, bedroom and shower stall on first floor, full flight B/L rails to shower stall and bedroom, typically sleeps on second floor. has a bed and shower stall on first floor   Number of Stairs, Main Entrance 3   Stair Railings, Main Entrance railing on right side (ascending)   Location, Patient Bedroom second floor, must negotiate stairs to access   Patient Bedroom Access Comment has bedroom on first floor if needed   Location, Bathroom second floor, must negotiate stairs to access   Bathroom Access Comment has bathroom on first floor   Number of Stairs, Within Home, Primary 12   Stair Railings, Within Home, Primary railings on both sides of stairs          Prior Level of Function      Most Recent Value   Dominant Hand right   Ambulation assistive equipment   Transferring assistive equipment   Toileting independent   Bathing independent   Dressing independent   Eating independent   Prior Level of Function Comment Pt reports he was independent for all mobility using SPC, has shower chair at home.   Assistive Device Currently Used at Home cane, straight, shower chair          Occupational Profile      Most Recent Value   Successful Occupations Retired restaraunt owner   Occupational History/Life Experiences Enjoys Playing Basketball. +    Environmental Supports and Barriers lives with spouse   Patient Goals 1/29/22 PSFS  cleaning 3/10, doing wash 4/10, bowling 1/10, shooting baskets 3/10, and running on  treadmill 4/10. Total score: 30%           IRF OT Evaluation and Treatment - 02/01/22 1036        OT Time Calculation    Start Time 1030     Stop Time 1200     Time Calculation (min) 90 min        Session Details    Document Type daily treatment/progress note     Mode of Treatment occupational therapy;individual therapy        Sit to Stand Transfer    [place], Sit to Stand Transfer touching/steadying assist     Verbal Cues safety     Assistive Device walker, front-wheeled        Stand to Sit Transfer    [place], Stand to Sit Transfer touching/steadying assist     Verbal Cues safety     Assistive Device walker, front-wheeled        Stand Pivot Transfer    [place], Stand Pivot/Stand Step Transfer touching/steadying assist     Verbal Cues safety     Assistive Device walker, front-wheeled     Comment via amb tx        Shower Transfer    Comment simmulated with barrior shower side step with grab bars and shower chair. Pt states he has a shower chair at home, requested hand out for other options with back for home and provided. Pt performed tx with steadying A, ambulated with RW        Safety Issues, Functional Mobility    Comment, Safety Issues/Impairments (Mobility) OT functional ambulation with RW picking items up from floor with [place] with 1 LOB with min A to recover for. Functional ambulation around kitchen, bathroom and ILU with CS - steadying        Balance    Comment, Balance standing balance for 2.5 mins bean bag toss. Functional ambulation in kitchen doing counter slidding method, gathering items, side stepping using counter. use of walker bag, education of walker bag and walker tray. sitting/standing from different height surfaces with and without arm rests to sit/swing at table.        Therapeutic Interventions    Comment, Therapeutic Intervention review of energy conservation stratagies        Upper Extremity (Therapeutic Exercise)    General Exercise bilateral     Range of Motion Exercises  shoulder flexion/extension;elbow flexion/extension;shoulder abduction/adduction   punches    Weight/Resistance 2 pounds        Daily Progress Summary (OT)    Daily Outcome Statement Pt with good participation during therapy session today. Improved endurance. Pt continues to be limited by pain. Pt improving balance, endurance, functional mbolity. Handout provided for shower chairs at home. Pt would continue to benefit from skilled OT services, continue with POC                           IRF OT Goals      Most Recent Value   Transfer Goal 1    Activity/Assistive Device toilet at 01/28/2022 0722   [place] --  [touching] at 01/28/2022 0722   Time Frame short-term goal (STG), 5 - 7 days at 01/28/2022 0722   Transfer Goal 2    Activity/Assistive Device toilet at 01/28/2022 0722   [place] modified independence at 01/28/2022 0722   Time Frame long-term goal (LTG), 14 days or less at 01/28/2022 0722   Transfer Goal 3    Activity/Assistive Device shower at 01/28/2022 0722   [place] --  [touching A] at 01/28/2022 0722   Time Frame short-term goal (STG), 5 - 7 days at 01/28/2022 0722   Transfer Goal 4    Activity/Assistive Device shower at 01/28/2022 0722   [place] modified independence at 01/28/2022 0722   Time Frame long-term goal (LTG), 14 days or less at 01/28/2022 0722   Bathing Goal 1    [place] --  [touching A] at 01/28/2022 0722   Time Frame short-term goal (STG), 5 - 7 days at 01/28/2022 0722   Bathing Goal 2    [place] modified independence at 01/28/2022 0722   Time Frame long-term goal (LTG), 14 days or less at 01/28/2022 0722   UB Dressing Goal 1    [place] --  [CS] at 01/28/2022 0722   Time Frame short-term goal (STG), 5 - 7 days at 01/28/2022 0722   UB Dressing Goal 2    [place] modified independence at 01/28/2022 0722   Time Frame long-term goal (LTG), 14 days or less at 01/28/2022 0722   LB Dressing Goal 1    [place] minimum assist (75% or more patient effort) at  01/28/2022 0722   Time Frame short-term goal (STG), 5 - 7 days at 01/28/2022 0722   LB Dressing Goal 2    [place] modified independence at 01/28/2022 0722   Time Frame long-term goal (LTG), 14 days or less at 01/28/2022 0722   Grooming Goal 1    [place] --  [touching A] at 01/28/2022 0722   Time Frame short-term goal (STG), 5 - 7 days at 01/28/2022 0722   Strategies/Barriers in stance at 01/28/2022 0722   Grooming Goal 2    [place] modified independence at 01/28/2022 0722   Time Frame long-term goal (LTG), 14 days or less at 01/28/2022 0722   Toileting Goal 1    [place] --  [touching A] at 01/28/2022 0722   Time Frame short-term goal (STG), 5 - 7 days at 01/28/2022 0722   Toileting Goal 2    [place] modified independence at 01/28/2022 0722   Time Frame long-term goal (LTG), 14 days or less at 01/28/2022 0722

## 2022-02-01 NOTE — PROGRESS NOTES
Patient: Matt Williamson  Location: [facility]  MRN: [id number]  Today's date: 2/1/2022    History of Present Illness  Marco A is a 76 y.o. male admitted on 1/27/2022 with Lumbar disc disease with radiculopathy [M51.16]  S/P lumbar fusion [Z98.1]. Principal problem is Lumbar disc disease with radiculopathy.    Patient is a 76-year-old male with a history of atrial fibrillation, CAD, hypertension, hypothyroidism, hyperlipidemia, thyroid cancer, type 2 diabetes developed significant mid to low back pain with walking and standing.  He has metastatic thyroid carcinoma with metastasis to the L3 body status post radiation in May 2020.  Recent PET scan showed increased activity at the L3 level without further metastatic lesions.  He was evaluated by neurosurgery who recommended surgical resection.  He was taken to the OR on 1/20 and underwent a stage I L2-L4 PSF with instrumentation, ORIF of L3 pathologic fracture, stage II L3 lateral corpectomy with expandable cage placement.  There has been no significant postoperative complications.  His pain is fairly well controlled.  An LSO has been ordered.    1/28 Pt placed on Med hold after OT eval  and during PT eval 2/2 - metabolic ketone acidosis with dehydration    Past Medical History  Marco A has a past medical history of Atrial fibrillation with rapid ventricular response (CMS/HCC) (10/25/2019), BPH (benign prostatic hyperplasia), Coronary artery disease, Hypertension, Hypothyroidism, Mixed hyperlipidemia, Thyroid cancer (CMS/HCC), Type 2 diabetes mellitus without complication (CMS/HCC), and Vertigo.      PT Vitals    Date/Time Pulse HR Source BP BP Location BP Method Pt Position [facility]   02/01/22 1302 80 Monitor 118/61 Left upper arm Automatic Sitting BS      PT Pain    Date/Time Pain Type Side/Orientation Location Rating: Rest Rating: Rest Rating: Activity Interventions [facility]   02/01/22 1302 Pain Assessment -- -- 0 -- -- premedicated for activity BS    02/01/22 1427 Pain Reassessment lower back -- 2 - mild pain 2 - mild pain position adjusted BS          Prior Living Environment      Most Recent Value   People in Home spouse   Current Living Arrangements home   Home Accessibility stairs to enter home (Group)   Living Environment Comment pt lives in 2  in [place]. 3 steps to enter with R rail, bedroom and shower stall on first floor, full flight B/L rails to shower stall and bedroom, typically sleeps on second floor. has a bed and shower stall on first floor   Number of Stairs, Main Entrance 3   Stair Railings, Main Entrance railing on right side (ascending)   Location, Patient Bedroom second floor, must negotiate stairs to access   Patient Bedroom Access Comment has bedroom on first floor if needed   Location, Bathroom second floor, must negotiate stairs to access   Bathroom Access Comment has bathroom on first floor   Number of Stairs, Within Home, Primary 12   Stair Railings, Within Home, Primary railings on both sides of stairs          Prior Level of Function      Most Recent Value   Dominant Hand right   Ambulation assistive equipment   Transferring assistive equipment   Toileting independent   Bathing independent   Dressing independent   Eating independent   Prior Level of Function Comment Pt reports he was independent for all mobility using SPC, has shower chair at home.   Assistive Device Currently Used at Home cane, straight, shower chair           IRF PT Evaluation and Treatment - 02/01/22 1302        PT Time Calculation    Start Time 1300     Stop Time 1430     Time Calculation (min) 90 min        Session Details    Document Type daily treatment/progress note     Mode of Treatment physical therapy;individual therapy        General Information    General Observations of Patient Pt reporting increased fatigue at start of session.        Sit to Stand Transfer    [place], Sit to Stand Transfer touching/steadying assist     Verbal Cues safety  "    Assistive Device walker, front-wheeled     Comment from w/c with steadying A for balance        Stand to Sit Transfer    [place], Stand to Sit Transfer touching/steadying assist     Verbal Cues safety     Assistive Device walker, front-wheeled     Comment to w/c with steadying A to control descent        Stand Pivot Transfer    [place], Stand Pivot/Stand Step Transfer touching/steadying assist     Verbal Cues safety     Assistive Device walker, front-wheeled     Comment Via amb approach to w/c using RW        Gait Training    [place], Gait touching/steadying assist     Assistive Device walker, front-wheeled     Distance in Feet 200 feet     Pattern (Gait) step-through     Deviations/Abnormal Patterns (Gait) [place] decreased;gait speed decreased;stride length decreased     Comment (Gait/Stairs) (1) 200' x1 using RW with steadying A for balance. Pt demo decreased gait speed requiring increased time to complete. (2) 150' x2 using SPC with Min-steadying A at hips for balance. Pt trialed both SPC in RUE/LUE - reports he prefers RUE. Intermittent cues for SPC sequencing.        Stairs Training    [place], Stairs touching/steadying assist     Assistive Device railing;gait belt     Handrail Location (Stairs) both sides     Number of Stairs 12     Stair Height 7 inches     Ascending Stairs Technique step-to-step     Descending Stairs Technique step-to-step     Comment Up/down 12 (7\") stairs using B HR with steadying A at hips for balance. Pt self-selected step-to sequencing.        Wheelchair Mobility/Management    Comment, Wheelchair Mobility Egg crate padding added to B/L elevating leg rests for skin protection.        Balance    Balance Interventions standing;dynamic reaching     Comment, Balance (1) x3 min ball toss mildly outside STACE[place], steadying A at hips for balance. (2) x2 trials multidirectional reaching for bean bags while standing on foam and tossing anteriorly to target, Min-steadying A for " balance.        Lower Extremity (Therapeutic Exercise)    Exercise Position/Type seated     General Exercise bilateral;LAQ (long arc quad);marching while seated;hip aBduction;knee flexion     Reps and Sets 2x15     Comment Seated: 2x15 LAQ (3# cuff weight), alternating marches (3# cuff weight), knee flexion (green TB), hip ABD (green TB).        Daily Progress Summary (PT)    Symptoms Noted During/After Treatment increased pain     Daily Outcome Statement Pt progressed to full flight of stairs with steadying A this session. He was able to tolerate increased gait distance using RW with grossly steadying A for balance/safety. Initiated gait training using SPC - pt initially required Min A, progressing to steadying A, but benefitted from cues for proper sequencing. Cont to progress dynamic balance and gait training using SPC.                      Education Documentation  Mobility, taught by Ilia Graves, PT at 2/1/2022  4:21 PM.  Learner: Patient  Readiness: Acceptance  Method: Explanation  Response: Verbalizes Understanding  Comment: Transfer and gait training using SPC          IRF PT Goals      Most Recent Value   Bed Mobility Goal 1    Activity/Assistive Device bed mobility activities, all at 01/28/2022 0902   [place] supervision required at 01/28/2022 0902   Time Frame short-term goal (STG), 1 week at 01/28/2022 0902   Progress/Outcome goal ongoing, good progress toward goal at 02/01/2022 0830   Bed Mobility Goal 2    Activity/Assistive Device bed mobility activities, all at 01/28/2022 0902   [place] modified independence at 01/28/2022 0902   Time Frame long-term goal (LTG), 21 days or less at 01/28/2022 0902   Progress/Outcome goal ongoing at 02/01/2022 0830   Transfer Goal 1    Activity/Assistive Device sit-to-stand/stand-to-sit, stand pivot, walker, front-wheeled at 02/01/2022 0830   [place] supervision required at 02/01/2022 0830   Time Frame short-term goal (STG), 3 - 5 days at 02/01/2022  0830   Progress/Outcome goal met, goal revised this date at 02/01/2022 0830   Transfer Goal 2    Activity/Assistive Device sit-to-stand/stand-to-sit, stand pivot at 01/28/2022 0902   [place] modified independence at 01/28/2022 0902   Time Frame long-term goal (LTG), 21 days or less at 01/28/2022 0902   Progress/Outcome goal ongoing, good progress toward goal at 02/01/2022 0830   Gait/Walking Locomotion Goal 1    Activity/Assistive Device gait (walking locomotion), walker, front-wheeled at 02/01/2022 0830   Distance 150 feet at 02/01/2022 0830   [place] supervision required at 02/01/2022 0830   Time Frame short-term goal (STG), 5 - 7 days at 02/01/2022 0830   Progress/Outcome goal met, goal revised this date at 02/01/2022 0830   Gait/Walking Locomotion Goal 2    Activity/Assistive Device gait (walking locomotion) at 01/28/2022 0902   Distance 150 feet at 01/28/2022 0902   [place] modified independence at 01/28/2022 0902   Time Frame long-term goal (LTG), 21 days or less at 01/28/2022 0902   Progress/Outcome goal ongoing at 02/01/2022 0830   Stairs Goal 1    Activity/Assistive Device stairs, all skills at 01/28/2022 0902   Number of Stairs 12 at 01/28/2022 0902   [place] minimum assist (75% or more patient effort) at 01/28/2022 0902   Time Frame short-term goal (STG), 3 - 5 days at 01/28/2022 0902   Progress/Outcome goal ongoing, good progress toward goal at 02/01/2022 0830   Stairs Goal 2    Activity/Assistive Device stairs, all skills at 01/28/2022 0902   Number of Stairs 12 at 01/28/2022 0902   [place] modified independence at 01/28/2022 0902   Time Frame long-term goal (LTG), 21 days or less at 01/28/2022 0902   Progress/Outcome goal ongoing at 02/01/2022 0830

## 2022-02-01 NOTE — CONSULTS
Wound Ostomy Continence Note    Subjective    HPI Patient is a 76 y.o. male who was admitted on 1/27/2022 with a diagnosis of Lumbar disc disease with radiculopathy [M51.16]  S/P lumbar fusion [Z98.1].    Problem list Problem List:   Patient Active Problem List   Diagnosis   • BPH (benign prostatic hyperplasia)   • Atherosclerosis of coronary artery   • Cataract   • Essential hypertension   • Former tobacco use   • Mixed hyperlipidemia   • Metastasis from thyroid cancer (CMS/HCC)   • Type 2 diabetes mellitus without complication (CMS/HCC)   • Cyst of thyroid   • Esophageal reflux   • Paroxysmal atrial fibrillation (CMS/HCC)   • Pre-op evaluation   • Preop cardiovascular exam   • Cancer associated pain   • Low back pain with right-sided sciatica   • Malignant neoplasm of vertebral column, excluding sacrum and coccyx (CMS/HCC)   • Acquired hypothyroidism   • Ileus (CMS/HCC)   • Leg swelling   • Lumbar disc disease with radiculopathy   • S/P lumbar fusion   • Diabetic ketoacidosis associated with type 2 diabetes mellitus (CMS/HCC)   • Vitamin D deficiency   • AVI (iron deficiency anemia)      PMH/PSH Medical History:   Past Medical History:   Diagnosis Date   • Atrial fibrillation with rapid ventricular response (CMS/HCC) 10/25/2019    Hospitalized on 10/25/19 at [facility].    • BPH (benign prostatic hyperplasia)    • Coronary artery disease     LAD stent 2005   • Hypertension    • Hypothyroidism    • Mixed hyperlipidemia    • Thyroid cancer (CMS/HCC)    • Type 2 diabetes mellitus without complication (CMS/HCC)    • Vertigo      Surgical History:   Past Surgical History:   Procedure Laterality Date   • ANAL FISSURECTOMY  1986   • CARDIAC CATHETERIZATION  11/21/2005    LM FOD. 70-80% proximal LAD.Mild disease CX, RCA and branches.    • CARDIAC CATHETERIZATION  11/28/2005    LM FOD. Pristine LAD stent. CX FOD. RCA mild disease.   • CORONARY ANGIOPLASTY WITH STENT PLACEMENT  11/22/2005    LM FOD.  Moderate, diffuse disease LAD. Irregular CX w/ PLVB distal to PDA 99%. Mild disease RCA. Stent to distal LAD.   • EYE SURGERY Left Cataract   • FOOT SURGERY  2009    For osteomyelitis.   • GASTROCUTANEOUS FISTULA CLOSURE     • LUMBAR SPINE SURGERY  01/20/2022    Stage 1: L2-L4 PSF with instrumentation, ORIF L3 pathologic fracture Stage 2: L3 lateral corpectomy with expandable cage placement   • THYROIDECTOMY  2012   • UPPER GASTROINTESTINAL ENDOSCOPY        Assessment and Recommendation         Seen for skin assessment. PI prevention education given. Back incision, steri strips, unable to visualize incision, no drainage, steri strips, 15cm in length. Right flank, steri strips, no drainage, unable to visualize incision. DSD applied to both. Suggest: Maintain the PREVENT bundle. Corey score is an 18. Patient is at risk for the development of a PI. Offload heels from bed, turn q  2 hours in bed, weight shift in wheelchair, q 30 minutes, WOCN consult is completed. Please order a new consult if an additional skin assessment is indicated.     Date: 02/01/22  Signature: Penelope Noel RN

## 2022-02-01 NOTE — PATIENT CARE CONFERENCE
Inpatient Rehabilitation Team Conference Note  Date: 2/1/2022  Time: 9:35 AM       Patient Name: Matt Williamson     Medical Record Number: [id number]   YOB: 1945  Sex: Male      Room/Bed: 103/103D  Payor Info: Payor: MEDICARE / Plan: MEDICARE PART A & B / Product Type: Medicare /     Admitting Diagnosis: Lumbar disc disease with radiculopathy [M51.16]  S/P lumbar fusion [Z98.1]   Admit Date/Time: 1/27/2022  1:42 PM  Admission Comments: No comment available     Primary Diagnosis: Lumbar disc disease with radiculopathy  Principal Problem: Lumbar disc disease with radiculopathy    Patient Active Problem List    Diagnosis Date Noted   • Diabetic ketoacidosis associated with type 2 diabetes mellitus (CMS/[facility]) 01/29/2022   • Vitamin D deficiency 01/29/2022   • AVI (iron deficiency anemia) 01/29/2022   • Leg swelling 01/27/2022   • Lumbar disc disease with radiculopathy 01/27/2022   • S/P lumbar fusion 01/27/2022   • Ileus (CMS/[facility]) 01/25/2022   • Acquired hypothyroidism 01/22/2022   • Low back pain with right-sided sciatica 01/12/2022   • Malignant neoplasm of vertebral column, excluding sacrum and coccyx (CMS/[facility]) 01/12/2022   • Cancer associated pain 12/10/2021   • Preop cardiovascular exam 06/21/2021   • Pre-op evaluation 11/20/2020   • Paroxysmal atrial fibrillation (CMS/[facility]) 10/25/2019   • Former tobacco use 05/02/2018   • Essential hypertension 06/20/2016   • Type 2 diabetes mellitus without complication (CMS/[facility]) 06/20/2016   • Mixed hyperlipidemia 02/18/2016   • Cataract 09/11/2013   • BPH (benign prostatic hyperplasia) 09/14/2012   • Atherosclerosis of coronary artery 09/14/2012   • Metastasis from thyroid cancer (CMS/[facility]) 09/14/2012   • Cyst of thyroid 01/15/2007   • Esophageal reflux 01/15/2007       Premorbid Status:  Dominant Hand: right  Ambulation: assistive equipment  Transferring: assistive equipment  Toileting: independent  Bathing: independent  Dressing: independent  Eating:  independent  Communication: understands/communicates without difficulty  Swallowing: swallows foods/liquids without difficulty  Baseline Diet/Method of Nutritional Intake: no diet restrictions  Past History of Dysphagia: Patient denies past or current difficulty swallowing.  Assistive Device/Animal Currently Used at Home: cane, straight; shower chair  Prior Level of Function Comment: Pt reports he was independent for all mobility using SPC, has shower chair at home.      Current Functional Status:  Mobility  Gait  [place], Gait: minimum assist (75% or more patient effort)  Assistive Device: walker, front-wheeled  Comment (Gait/Stairs): 150' x1, 100' x1 using RW with Min A at hips for balance. Cues for upright posture and to maintain fwd gaze.    Stairs  [place], Stairs: minimum assist (75% or more patient effort)  Assistive Device: railing; gait belt  Handrail Location (Stairs): both sides  Number of Stairs: 8  Stair Height: 6 inches  Comment: Min Ax1 to trunk for balance. Verbal cues for sequencing, technique, and safety. Pt intermittently alternated leading extremity for comfort. No increased unsteadiness or postural sway observed.    Wheelchair  Comment, Wheelchair Mobility: Elevating legrests adjusted for optimal pt comfort and support.    Transfers  [place], Roll Left: close supervision; verbal cues  Verbal Cues (Roll Left): safety; technique; hand placement  [place], Roll Right: close supervision; verbal cues  Verbal Cues (Roll Right): safety; technique; hand placement  [place], Supine to Sit: touching/steadying assist; verbal cues  Verbal Cues (Supine to Sit): safety; technique; maintaining precautions  [place], Sit to Supine: touching/steadying assist  Verbal Cues (Sit to Supine): safety; technique; maintaining precautions  Assistive Device: bed rails  Comment (Bed Mobility): steadying A for supine to sit.  Comment: Gait belt worn for tx and LSO  [place], Bed to Chair:  minimum assist (75% or more patient effort)  Verbal Cues: safety; technique  Assistive Device: walker, front-wheeled  Comment: SPT from w/c <> EOB without AD with Min A at hips for balance and lateral weight-shifting.  [place], Chair to Bed: minimum assist (75% or more patient effort)  Verbal Cues: safety; technique  Assistive Device: none  Comment: SPT from w/c <> EOB without AD with Min A at hips for balance and lateral weight-shifting.  [place], Sit to Stand Transfer: touching/steadying assist  Verbal Cues: safety  Assistive Device: walker, front-wheeled  Comment: from w/c with steadying A for balance. Cues for anterior weight-shift.  [place], Stand to Sit Transfer: touching/steadying assist  Verbal Cues: safety  Assistive Device: walker, front-wheeled  Comment: to w/c with steadying A to control descent  [place], Stand Pivot/Stand Step Transfer: touching/steadying assist  Verbal Cues: safety; technique  Assistive Device: walker, front-wheeled  Comment: via amb tx    Transfer Technique: sit-stand; stand-sit  [place], Toilet Transfer: minimum assist (75% or more patient effort)  Verbal Cues: safety; technique; hand placement  Assistive Device: raised toilet seat; grab bars/safety frame  Comment: via amb tx with RW  [place], Shower Transfer: minimum assist (75% or more patient effort)  Assistive Device: shower chair; grab bars/tub rail  Comment: Pt has shower chair at home      Self Care  Self-Performance: don; orthosis application; threads left arm, shirt; threads right arm, shirt; pulls shirt over head/around back; pulls shirt down/adjusts  [place] Assistance: obtains clothes; orthosis application  Adaptive Equipment: none  Comment: Pt able to doff LSO for laying  Tasks: doff; don; socks  Self-Performance: threads left leg, underpants; threads right leg, underpants; pulls underpants up or down; threads left leg, pants/shorts; threads right leg, pants/shorts; pulls pants/shorts up or  down; dons/doffs right shoe; dons/doffs left shoe  [place] Assistance: anti-embolic stocking application; dons/doffs right shoe  Adaptive Equipment: reacher; long-handled shoe horn  [place]: touching/steadying assist  Comment: pt able to remove shoes  Self-Performance: chest; left arm; right arm; abdomen; buttocks; front perineal area; left upper leg; right upper leg  Adaptive Equipment: shower chair; grab bar/tub rail  [place] Assistance: adaptive equipment setup; adjust water temperature/flow; obtain supplies  Comment: provided pt with long handled sponge with review for bathing  [place]: touching/steadying assist  Adaptive Equipment: grab bar/safety frame  Setup Assistance: obtain supplies  Comment: Pt with loose stool during session, Dr. Dawkins assessed  Self-Performance: brushes/[place]illa hair  Setup Assistance: obtain supplies  [place]: set up  Comment: seated at sink    Cognition  Affect/Mental Status (Cognition): WFL  Orientation Status (Cognition): oriented x 4  Follows Commands (Cognition): follows multi-step commands; over 90% accuracy  Cognitive Function: executive function deficit  Attention Deficit (Cognition): minimal deficit; focused/sustained attention  Executive Function Deficit (Cognition): minimal deficit; judgment; insight/awareness of deficits  Comment, Executive Function: repetition of cues required, receptive  Safety Deficit (Cognition): minimal deficit; safety precautions follow-through/compliance; safety precautions awareness  Comment, Cognition: BIMs 14/15    Communication  Speech Intelligibility (Motor Speech): WFL  Follows Commands (Auditory Comprehension): WFL  Yes/No Questions (Auditory Comprehension): WFL      Frequency of Treatment (OT): 5-7 times per week,60-90 minutes per day  Frequency of Treatment (PT): 5-7 times per week,60-90 minutes per day    Weekly Outcome Summaries:  Weekly Progress Summary (PT)  Progress Toward Functional Goals (PT): progressing toward functional  "goals as expected  Weekly Outcome Statement: Pt is a 77 y/o male admitted s/p stage I L2-L4 PSF with instrumentation, ORIF of L3 pathologic fracture, stage II L3 lateral corpectomy with expandable cage placement on 1/20/22. Pt currently presents with pain and deficits in LE strength (R>L), static/dynamic balance, and endurance affecting overall independence with functional mobility. He is completing rolling R/L with Cl S and supine <> sit via log roll technique with steadying A. He is completing STS and SPT using RW with steadying A. He is ambulating up to 150’ using RW with Min A. He is completing 8 (6\") stairs using B HR for support with Min A. His gait speed is 0.53 m/s, which is below age/gender matched norms. He completed TUG in 23.5 seconds indicating increased fall risk. Pt will benefit from cont skilled PT in IRF setting to address deficits and maximize functional independence/safety. Goals currently set at Mod I. Pt likely will require RW at d/c.  Impairments Continuing to Limit Function: decreased flexibility,decreased strength,impaired balance,pain  Recommendations: Cont skilled IP PT 5-7 days/week, 60-90 min/day  Weekly Progress Summary (OT)  Progress Toward Functional Goals (OT): progressing toward functional goals as expected  Weekly Outcome Statement: Marco A is a 76 year old male who presents to [facility] s/p lumbar sx. Pt currently requires touching A - min A for functional transfers with min A - touching A for ADLs. Pt is limited by fatigue and pain. Pt has shower chair at home. Pt progressing with AE for dressing. Pt would continue to benefit from skilled OT services to increase indep, decrease caregiver burden and increase quality of life. Continue with POC  Impairments Continuing to Limit Function: decreased strength,decreased ROM,impaired balance,impaired postural control,pain  Recommendations: Continue with skilled OT 5-7 days per week, 60 -90 mins per day  Weekly Outcome Summary " "(Interdisciplinary)  Weekly Outcome Statement: Brittany Ybarra is a 76-year-old man with a history of  thyroid carcinoma with metastasis to the L3 vertebral body. He is status post underwent a stage I L2-L4 posterior spinal fusion with instrumentation, ORIF of L3 pathologic fracture, and stage II L3 lateral corpectomy with expandable cage placement, he provides an accurate history and displays a full range of affect.  He reports that he has been struggling with thyroid cancer since 2019.  He reports he is awaiting appointment at [facility].  He displays a full range of affect and he reports he is hopeful and motivated for recovery.  He reports he was pleased with his initial therapy sessions as he \"got to walk with a walker.\".  He reports the hardest thing is \"just being here he describes himself as a very independent dany and very private.\"  Despite this he reports he recognizes the hospitals procedures and he is managing these as best he can.  He denies significant emotional distress.  He is alert and oriented and correctly completes attention task.. However he did struggle initially with this task suggesting concentration is a little bit decreased.  Recall was 2 out of 3 and cues helped.  He appears to have good insight and safety awareness.  Psychology provided support and education on spinal cord recovery.  Will follow for support during this inpatient admission.    Problem Resolution:  Coping/Stress/Tolerance  Do you feel stress - tense, restless, nervous, or anxious, or unable to sleep at night because your mind is troubled all the time - these days?: Not at all  Major Change/Loss/Stressor/Fears: denies,medical condition, self  Techniques to [place] with Loss/Stress/Change: medication  Coping/Stress  Major Change/Loss/Stressor: denies  Patient Personal Strengths: assertive,courageous,motivated  Sources of Support: spouse  Techniques to [place] with Loss/Stress/Change: medication  Reaction to Health Status: " adjusting,accepting  Understanding of Condition and Treatment: adequate understanding of medical conditionBladder Management: external device for managementSelf-Care Promotion: independence encouraged  Identified Problems & Impairments: (not recorded)  Comment, Identified Problems & Impairments: (not recorded)  Resolved Problems and Impairments: (not recorded)  Comment, Resolved Problems & Impairments: (not recorded) Team Weekly Outcome Statement: f/up w/ neurosurg tomorrow. had urinary retention and silva-initialy- taken off. Rn: cont b&B- voiding fine. BM today. LSO OOB, TEDS for LE edema, accuchecks have been okay, heparin. PT: CS rolling, staA supine to sit, sit to stand RW, STA, RW amb 150ft min-staA, 8stairs BLHR Josselyn. goals adelia. RW needed at d/c. OT: limited by fatigue yesterday. trans StA, staA-Josselyn w/ ADLS. limited by pain and need rest breaks throughout. (02/01/22 0923)Comment, Facilitators for Discharge: RW @ d/c.       Goals/Support System:  Patient/Family Goals  Patient's Goals For Discharge: return home  Family/Support System  Family/Support System Care: self-care encouraged    IRF PT Goals      Most Recent Value   Bed Mobility Goal 1    Activity/Assistive Device bed mobility activities, all at 01/28/2022 0902   [place] supervision required at 01/28/2022 0902   Time Frame short-term goal (STG), 1 week at 01/28/2022 0902   Progress/Outcome goal ongoing, good progress toward goal at 02/01/2022 0830   Bed Mobility Goal 2    Activity/Assistive Device bed mobility activities, all at 01/28/2022 0902   [place] modified independence at 01/28/2022 0902   Time Frame long-term goal (LTG), 21 days or less at 01/28/2022 0902   Progress/Outcome goal ongoing at 02/01/2022 0830   Transfer Goal 1    Activity/Assistive Device sit-to-stand/stand-to-sit, stand pivot, walker, front-wheeled at 02/01/2022 0830   [place] supervision required at 02/01/2022 0830   Time Frame short-term goal (STG), 3 - 5 days at  02/01/2022 0830   Progress/Outcome goal met, goal revised this date at 02/01/2022 0830   Transfer Goal 2    Activity/Assistive Device sit-to-stand/stand-to-sit, stand pivot at 01/28/2022 0902   [place] modified independence at 01/28/2022 0902   Time Frame long-term goal (LTG), 21 days or less at 01/28/2022 0902   Progress/Outcome goal ongoing, good progress toward goal at 02/01/2022 0830   Gait/Walking Locomotion Goal 1    Activity/Assistive Device gait (walking locomotion), walker, front-wheeled at 02/01/2022 0830   Distance 150 feet at 02/01/2022 0830   [place] supervision required at 02/01/2022 0830   Time Frame short-term goal (STG), 5 - 7 days at 02/01/2022 0830   Progress/Outcome goal met, goal revised this date at 02/01/2022 0830   Gait/Walking Locomotion Goal 2    Activity/Assistive Device gait (walking locomotion) at 01/28/2022 0902   Distance 150 feet at 01/28/2022 0902   [place] modified independence at 01/28/2022 0902   Time Frame long-term goal (LTG), 21 days or less at 01/28/2022 0902   Progress/Outcome goal ongoing at 02/01/2022 0830   Stairs Goal 1    Activity/Assistive Device stairs, all skills at 01/28/2022 0902   Number of Stairs 12 at 01/28/2022 0902   [place] minimum assist (75% or more patient effort) at 01/28/2022 0902   Time Frame short-term goal (STG), 3 - 5 days at 01/28/2022 0902   Progress/Outcome goal ongoing, good progress toward goal at 02/01/2022 0830   Stairs Goal 2    Activity/Assistive Device stairs, all skills at 01/28/2022 0902   Number of Stairs 12 at 01/28/2022 0902   [place] modified independence at 01/28/2022 0902   Time Frame long-term goal (LTG), 21 days or less at 01/28/2022 0902   Progress/Outcome goal ongoing at 02/01/2022 0830        IRF OT Goals      Most Recent Value   Transfer Goal 1    Activity/Assistive Device toilet at 01/28/2022 0722   [place] --  [touching] at 01/28/2022 0722   Time Frame short-term goal (STG), 5 - 7 days at  01/28/2022 0722   Transfer Goal 2    Activity/Assistive Device toilet at 01/28/2022 0722   [place] modified independence at 01/28/2022 0722   Time Frame long-term goal (LTG), 14 days or less at 01/28/2022 0722   Transfer Goal 3    Activity/Assistive Device shower at 01/28/2022 0722   [place] --  [touching A] at 01/28/2022 0722   Time Frame short-term goal (STG), 5 - 7 days at 01/28/2022 0722   Transfer Goal 4    Activity/Assistive Device shower at 01/28/2022 0722   [place] modified independence at 01/28/2022 0722   Time Frame long-term goal (LTG), 14 days or less at 01/28/2022 0722   Bathing Goal 1    [place] --  [touching A] at 01/28/2022 0722   Time Frame short-term goal (STG), 5 - 7 days at 01/28/2022 0722   Bathing Goal 2    [place] modified independence at 01/28/2022 0722   Time Frame long-term goal (LTG), 14 days or less at 01/28/2022 0722   UB Dressing Goal 1    [place] --  [CS] at 01/28/2022 0722   Time Frame short-term goal (STG), 5 - 7 days at 01/28/2022 0722   UB Dressing Goal 2    [place] modified independence at 01/28/2022 0722   Time Frame long-term goal (LTG), 14 days or less at 01/28/2022 0722   LB Dressing Goal 1    [place] minimum assist (75% or more patient effort) at 01/28/2022 0722   Time Frame short-term goal (STG), 5 - 7 days at 01/28/2022 0722   LB Dressing Goal 2    [place] modified independence at 01/28/2022 0722   Time Frame long-term goal (LTG), 14 days or less at 01/28/2022 0722   Grooming Goal 1    [place] --  [touching A] at 01/28/2022 0722   Time Frame short-term goal (STG), 5 - 7 days at 01/28/2022 0722   Strategies/Barriers in stance at 01/28/2022 0722   Grooming Goal 2    [place] modified independence at 01/28/2022 0722   Time Frame long-term goal (LTG), 14 days or less at 01/28/2022 0722   Toileting Goal 1    [place] --  [touching A] at 01/28/2022 0722   Time Frame short-term goal (STG), 5 - 7 days at 01/28/2022 0722    Toileting Goal 2    [place] modified independence at 01/28/2022 0722   Time Frame long-term goal (LTG), 14 days or less at 01/28/2022 0722          Risk for Complications  Constipation: Moderate  DVT: Moderate  Falls: Moderate  Infection: Moderate      The patient's medical prognosis is good to achieve the stated goals below.    Expected Level of Function  Expected Functional Improvement: mobility; motor dysfunction; safety; self-care  Self-Care: Independent  Sphincter Control: Independent  Transfers: Independent  Locomotion: Independent  Communication: Independent  Social Cognition: Independent  Comment, Expected Level of Function at Discharge: may need RW, has SC at home.       Discharge Planning:  Anticipated Discharge Disposition: home with outpatient services  Type of Home Care Services: home OT; home PT; nursing  Type of Outpatient Services: physical therapy    Equipment/Device Needs at Discharge  Assistive Device/Animal Currently Used at Home: cane, straight,shower chair  Discharge Planning  Does the patient need discharge transport arranged?: No  Patient/Family Concerns Related to Expected Discharge Disposition: Wound, does not eat a lot taste buds are worn down.    Anticipated Discharge Date: 2/8/2022    Needs Identified:       Team Members Present:     Rehab Attending Present:  James Hidalgo MD    Care Coordinator Present:  Lizzie Chacko    Nurse Present:  Gayle Regan RN    OT Present:  Melissa Hdz OT    PT Present:  Ilia Graves PT        Next Team Conference Date: 02/08/22

## 2022-02-01 NOTE — PLAN OF CARE
Plan of Care Review  Plan of Care Reviewed With: patient  Progress: improving  Outcome Summary: PT. AAOx4, cooperative with care. He is anxious at times about continence with bladder overnight. He has been continent of bowel and bladder during this shift thus far. TID accucheck. Refused SCDs overnight. Pt. declined turning and repositioning, education provided. R forearm IV flushed without difficulty. LSO OOB. Call bell within reach. Safety maintained.

## 2022-02-02 ENCOUNTER — APPOINTMENT (INPATIENT)
Dept: OCCUPATIONAL THERAPY | Facility: REHABILITATION | Age: 77
DRG: 949 | End: 2022-02-02
Payer: MEDICARE

## 2022-02-02 ENCOUNTER — APPOINTMENT (INPATIENT)
Dept: PHYSICAL THERAPY | Facility: REHABILITATION | Age: 77
DRG: 949 | End: 2022-02-02
Payer: MEDICARE

## 2022-02-02 PROBLEM — E11.10 DIABETIC KETOACIDOSIS ASSOCIATED WITH TYPE 2 DIABETES MELLITUS (CMS/HCC): Status: RESOLVED | Noted: 2022-01-29 | Resolved: 2022-02-02

## 2022-02-02 LAB
GLUCOSE BLD-MCNC: 141 MG/DL (ref 70–99)
GLUCOSE BLD-MCNC: 153 MG/DL (ref 70–99)
GLUCOSE BLD-MCNC: 196 MG/DL (ref 70–99)
POCT TEST: ABNORMAL

## 2022-02-02 PROCEDURE — 97110 THERAPEUTIC EXERCISES: CPT | Mod: GO

## 2022-02-02 PROCEDURE — 97116 GAIT TRAINING THERAPY: CPT | Mod: GP | Performed by: PHYSICAL THERAPIST

## 2022-02-02 PROCEDURE — 63600000 HC DRUGS/DETAIL CODE: Performed by: INTERNAL MEDICINE

## 2022-02-02 PROCEDURE — 12800001 HC ROOM AND CARE SEMIPRIVATE REHAB-BRAIN INJ

## 2022-02-02 PROCEDURE — 97530 THERAPEUTIC ACTIVITIES: CPT | Mod: GO

## 2022-02-02 PROCEDURE — 97530 THERAPEUTIC ACTIVITIES: CPT | Mod: GP | Performed by: PHYSICAL THERAPIST

## 2022-02-02 PROCEDURE — 63700000 HC SELF-ADMINISTRABLE DRUG: Performed by: INTERNAL MEDICINE

## 2022-02-02 PROCEDURE — 25800000 HC PHARMACY IV SOLUTIONS: Performed by: INTERNAL MEDICINE

## 2022-02-02 PROCEDURE — 97110 THERAPEUTIC EXERCISES: CPT | Mod: GP | Performed by: PHYSICAL THERAPIST

## 2022-02-02 RX ADMIN — DOCUSATE SODIUM 100 MG: 100 CAPSULE, LIQUID FILLED ORAL at 20:31

## 2022-02-02 RX ADMIN — METOPROLOL SUCCINATE 100 MG: 100 TABLET, EXTENDED RELEASE ORAL at 20:31

## 2022-02-02 RX ADMIN — PRAVASTATIN SODIUM 40 MG: 20 TABLET ORAL at 18:11

## 2022-02-02 RX ADMIN — MAGNESIUM 64 MG (MAGNESIUM CHLORIDE) TABLET,DELAYED RELEASE 128 MG: at 08:51

## 2022-02-02 RX ADMIN — ACETAMINOPHEN 650 MG: 325 TABLET, FILM COATED ORAL at 20:31

## 2022-02-02 RX ADMIN — ACETAMINOPHEN 650 MG: 325 TABLET, FILM COATED ORAL at 08:51

## 2022-02-02 RX ADMIN — SODIUM BICARBONATE 650 MG TABLET 650 MG: at 08:51

## 2022-02-02 RX ADMIN — GABAPENTIN 600 MG: 300 CAPSULE ORAL at 13:50

## 2022-02-02 RX ADMIN — SODIUM CHLORIDE 125 MG: 9 INJECTION, SOLUTION INTRAVENOUS at 18:27

## 2022-02-02 RX ADMIN — METFORMIN HYDROCHLORIDE 500 MG: 500 TABLET ORAL at 18:11

## 2022-02-02 RX ADMIN — GABAPENTIN 600 MG: 300 CAPSULE ORAL at 05:47

## 2022-02-02 RX ADMIN — ACETAMINOPHEN 650 MG: 325 TABLET, FILM COATED ORAL at 13:50

## 2022-02-02 RX ADMIN — LEVOTHYROXINE SODIUM 150 MCG: 150 TABLET ORAL at 05:47

## 2022-02-02 RX ADMIN — OXYCODONE HYDROCHLORIDE 10 MG: 5 TABLET ORAL at 04:00

## 2022-02-02 RX ADMIN — HEPARIN SODIUM 5000 UNITS: 5000 INJECTION, SOLUTION INTRAVENOUS; SUBCUTANEOUS at 13:50

## 2022-02-02 RX ADMIN — OXYCODONE HYDROCHLORIDE 5 MG: 5 TABLET ORAL at 10:35

## 2022-02-02 RX ADMIN — POLYETHYLENE GLYCOL 3350 17 G: 17 POWDER, FOR SOLUTION ORAL at 18:12

## 2022-02-02 RX ADMIN — INSULIN ASPART 1 UNITS: 100 INJECTION, SOLUTION INTRAVENOUS; SUBCUTANEOUS at 18:17

## 2022-02-02 RX ADMIN — HEPARIN SODIUM 5000 UNITS: 5000 INJECTION, SOLUTION INTRAVENOUS; SUBCUTANEOUS at 05:47

## 2022-02-02 RX ADMIN — SODIUM BICARBONATE 650 MG TABLET 650 MG: at 20:31

## 2022-02-02 RX ADMIN — OXYCODONE HYDROCHLORIDE 10 MG: 5 TABLET ORAL at 21:52

## 2022-02-02 RX ADMIN — SENNOSIDES 2 TABLET: 8.6 TABLET, FILM COATED ORAL at 13:50

## 2022-02-02 RX ADMIN — LOSARTAN POTASSIUM 25 MG: 25 TABLET, FILM COATED ORAL at 18:11

## 2022-02-02 RX ADMIN — HEPARIN SODIUM 5000 UNITS: 5000 INJECTION, SOLUTION INTRAVENOUS; SUBCUTANEOUS at 21:53

## 2022-02-02 RX ADMIN — MAGNESIUM 64 MG (MAGNESIUM CHLORIDE) TABLET,DELAYED RELEASE 128 MG: at 20:31

## 2022-02-02 RX ADMIN — GABAPENTIN 600 MG: 300 CAPSULE ORAL at 21:52

## 2022-02-02 NOTE — PROGRESS NOTES
[facility] Progress Note          Patient was seen and examined at bedside.    Subjective:   1/30/22  Has had good BM, he feels all cleared up, his lungs CTA b/l, still dry mouth but improved, lower extremity edema which is third space fluid retention with circulation dehydration coexist. Not volume over load !!    B/l LE venous compression U/S 1/28/22:  No evidence of deep vein thrombus (DVT) in either lower extremity.  TEDS, elevate legs when sitting, ARB or ACEI will help his LE edema.    1/31/22  He is drinking well, his dehydration has replaced, may d/c IVF; metabolic acidosis corrected, his BG levels ar acceptable, labs stable and benign, constipated, provide mag citrate with dulcolax today.     2/2/22  Stable, pleasant, no new complaints or concerns, he is focusing on his PT/OT and is making progression.    Objective   Vital signs in last 24 hours:  Temp:  [36.4 °C (97.5 °F)-37.1 °C (98.7 °F)] 36.8 °C (98.3 °F)  Heart Rate:  [73-90] 78  Resp:  [18] 18  BP: (100-131)/(55-67) 111/56    No intake or output data in the 24 hours ending 02/02/22 1103  Intake/Output this shift:  No intake/output data recorded.   Review of Systems:  All other systems reviewed and negative except as noted in the HPI.   Objective      Labs  reviewed his labs thoroughly   Lab Results   Component Value Date    WBC 7.10 01/31/2022    HGB 9.2 (L) 01/31/2022    HCT 29.1 (L) 01/31/2022    MCV 84.8 01/31/2022     01/31/2022     Lab Results   Component Value Date    GLUCOSE 132 (H) 01/31/2022    CALCIUM 7.8 (L) 01/31/2022     01/31/2022    K 3.9 01/31/2022    CO2 26 01/31/2022    CL 99 01/31/2022    BUN 7 (L) 01/31/2022    CREATININE 0.8 01/31/2022       Imaging  OSH imaging study reports reviewed   B/l LE venous compression U/S 1/28/22:  No evidence of deep vein thrombus (DVT) in either lower extremity.      Full Code    Physical Exam:  Head/Ear/Nose/Throat: normocephalic; atraumatic; moisture mouth mm,  no oropharyngeal thrush noted.   Eyes: anicteric sclera, EOMI; PERRL.   Neck : supple, no JVD, no carotid bruits appeciated.   Respiratory: no evidence of labored breathing, lung sounds CTA b/l, good aeration bibasilar area, no w/r/c.   Cardiovascular: RRR; normal S1, S2; no m/r/g; no S3 or S4.   Gastrointestinal: soft; NT; BS normal; mildly distended; no CVAT b/l.   Genitourinary: no silva.   Extremities : b/l LE pitting edema .   Neurological: AO x 3, fluent speeches, following commands, CNS II-XII grossly intact; no focal neurologic deficits.   Behavior/Emotional: in NAD, appropriate; cooperative.   Skin: sacral area skin lesions.     Plan of care was discussed with patient, RN, and PMR attending     Assessment   CC:Thyroid cancer with progressive lumbar metastatic lesions and L3 pathologic fracture complicated with myeloradiculopathy, s/p surgical intervention and ORIF, neurogenic B/B, ADL and ambulatory dysfunction      76 y.o. male with PMH of HTN, dyslipidemia, CAD, type 2 DM, hypothyroidism, thyroid cancer metastatic to L3 vertebral body s/p radiation stereotactic body radiation 5/2020, seen by the neurosurgery service for surgical discussion regarding growth of his L3 vertebral body metastatic lesion. He has had progressive worsening midline-line low back pain with radiation to the right knee. An MRI lumbar spine was obtained 11/22/2021 showing extension of the L3 mass. He was sent for a PET scan 12/7/2021 showing increased activity of the L3 lesion without further metastatic lesions appreciated. He was referred to [facility] however there has been some delay with getting an appointment.      He has had persistent midline low back pain with walking and standing which has progressively worsened. He does not have any weakness or bowel/bladder incontinence. He is following with oncology (Dr. Casey) who does not recommend any chemotherapy at this time but surgical intervention as the primary treatment  given the suspected isolated area of progression at the L3 vertebral body.     He was admitted to [facility] on 1/20/22 and underwent elective :  Stage 1: L2-L4 PLSF with instrumentation, ORIF L3 pathologic fracture  Stage 2: L3 lateral corpectomy with expandable cage placement  Post op ileus, s/p aggressive bowel program, resolved, tolerated diet. Functionally he has ADL and ambulatory dysfunction, requiring inpt acute rehab, transferred to [facility] on 1/27/22.            Ileus (CMS/HCC)  Resolved after aggressive bowel program, start diet  Tolerating diet, cont bowel regimen.     Acquired hypothyroidism  Assessment & Plan  Status post thyroidectomy  Continue levothyroxine        Malignant neoplasm of vertebral column, excluding sacrum and coccyx (CMS/HCC)  Assessment & Plan  Metastatic thyroid ca S/p L3 pathologic fracture s/p right lateral L3 corpectomy and placement of an expandable cage, followed by L2-4 posterolateral instrumented fusion  Continue postop management per neurosurgery.  Drain was removed  DVT prophylaxis, Lewis, pain management per neurosurgery  Rehab as inpt at [facility]      Paroxysmal atrial fibrillation (CMS/HCC)  Assessment & Plan  Currently in sinus rhythm  Patient is not on long-term anticoagulation  He takes Rythmol as needed for palpitations     Type 2 diabetes mellitus without complication (CMS/HCC)  Assessment & Plan  Patient on Jardiance, Trulicity and metformin as outpatient  Cont inpt Accu-Cheks with sliding scale insulin  Hemoglobin A1c 6.9  Continue Jardiance and restarted metformin and trulicity  Metformin should be changed to twice daily or extended release once daily      Metastasis from thyroid cancer (CMS/HCC)  Assessment & Plan  Follows up with medical oncologist  and radiation oncologist  Status post radiation     Mixed hyperlipidemia  Assessment & Plan  Continue statin     Essential hypertension  Assessment & Plan  Stable  Continue metoprolol     Atherosclerosis of  coronary artery  Assessment & Plan  Status post LAD stenting 17 years ago  Patient follows up with Dr. Ji  Continue metoprolol, statin  Restart aspirin once cleared by neurosurgery     #VitD deficiency with 25 OH VitD low at 12, loading with ergocaociferol 50,000 units weekly    #Anemia with AVI, provide ferrous sulfate oral supplement    # Euglycemic DKA, hold jardiance, IVF and po hydration, replacement K, Mag, insulin replacement     Billing code: [id number]  Diagnoses:  Patient Active Problem List   Diagnosis   • BPH (benign prostatic hyperplasia)   • Atherosclerosis of coronary artery   • Cataract   • Essential hypertension   • Former tobacco use   • Mixed hyperlipidemia   • Metastasis from thyroid cancer (CMS/HCC)   • Type 2 diabetes mellitus without complication (CMS/HCC)   • Cyst of thyroid   • Esophageal reflux   • Paroxysmal atrial fibrillation (CMS/HCC)   • Pre-op evaluation   • Preop cardiovascular exam   • Cancer associated pain   • Low back pain with right-sided sciatica   • Malignant neoplasm of vertebral column, excluding sacrum and coccyx (CMS/HCC)   • Acquired hypothyroidism   • Ileus (CMS/HCC)   • Leg swelling   • Lumbar disc disease with radiculopathy   • S/P lumbar fusion   • Vitamin D deficiency   • AVI (iron deficiency anemia)        Arjun Dawkins MD  2/2/2022

## 2022-02-02 NOTE — PROGRESS NOTES
Patient: Matt Williamson  Location: [facility] [address]  MRN: [id number]  Today's date: 2/2/2022    History of Present Illness  Marco A is a 76 y.o. male admitted on 1/27/2022 with Lumbar disc disease with radiculopathy [M51.16]  S/P lumbar fusion [Z98.1]. Principal problem is Lumbar disc disease with radiculopathy.    Patient is a 76-year-old male with a history of atrial fibrillation, CAD, hypertension, hypothyroidism, hyperlipidemia, thyroid cancer, type 2 diabetes developed significant mid to low back pain with walking and standing.  He has metastatic thyroid carcinoma with metastasis to the L3 body status post radiation in May 2020.  Recent PET scan showed increased activity at the L3 level without further metastatic lesions.  He was evaluated by neurosurgery who recommended surgical resection.  He was taken to the OR on 1/20 and underwent a stage I L2-L4 PSF with instrumentation, ORIF of L3 pathologic fracture, stage II L3 lateral corpectomy with expandable cage placement.  There has been no significant postoperative complications.  His pain is fairly well controlled.  An LSO has been ordered.    1/28 Pt placed on Med hold after OT eval  and during PT eval 2/2 - metabolic ketone acidosis with dehydration    Past Medical History  Marco A has a past medical history of Atrial fibrillation with rapid ventricular response (CMS/HCC) (10/25/2019), BPH (benign prostatic hyperplasia), Coronary artery disease, Hypertension, Hypothyroidism, Mixed hyperlipidemia, Thyroid cancer (CMS/HCC), Type 2 diabetes mellitus without complication (CMS/HCC), and Vertigo.      OT Vitals    Date/Time Pulse BP [facility]   02/02/22 0903 78 111/56 VMS      OT Pain    Date/Time Pain Type Rating: Rest [facility]   02/02/22 0903 Pain Assessment 2 VMS        No increased pain end of   session   Prior Living Environment      Most Recent Value   People in Home spouse   Current Living Arrangements home   Home Accessibility stairs to enter home  (Group)   Living Environment Comment pt lives in 2  in [place]. 3 steps to enter with R rail, bedroom and shower stall on first floor, full flight B/L rails to shower stall and bedroom, typically sleeps on second floor. has a bed and shower stall on first floor   Number of Stairs, Main Entrance 3   Stair Railings, Main Entrance railing on right side (ascending)   Location, Patient Bedroom second floor, must negotiate stairs to access   Patient Bedroom Access Comment has bedroom on first floor if needed   Location, Bathroom second floor, must negotiate stairs to access   Bathroom Access Comment has bathroom on first floor   Number of Stairs, Within Home, Primary 12   Stair Railings, Within Home, Primary railings on both sides of stairs          Prior Level of Function      Most Recent Value   Dominant Hand right   Ambulation assistive equipment   Transferring assistive equipment   Toileting independent   Bathing independent   Dressing independent   Eating independent   Prior Level of Function Comment Pt reports he was independent for all mobility using SPC, has shower chair at home.   Assistive Device Currently Used at Home cane, straight, shower chair          Occupational Profile      Most Recent Value   Successful Occupations Retired restaraunt owner   Occupational History/Life Experiences Enjoys Playing Basketball. +    Environmental Supports and Barriers lives with spouse   Patient Goals 1/29/22 PSFS  cleaning 3/10, doing wash 4/10, bowling 1/10, shooting baskets 3/10, and running on treadmill 4/10. Total score: 30%           IRF OT Evaluation and Treatment - 02/02/22 0902        OT Time Calculation    Start Time 0900     Stop Time 1030     Time Calculation (min) 90 min        Session Details    Document Type daily treatment/progress note     Mode of Treatment occupational therapy;individual therapy        Transfers    Comment gait belt worn during all tx and ambulation        Sit to Stand  Transfer    [place], Sit to Stand Transfer close supervision     Verbal Cues safety     Assistive Device walker, front-wheeled        Stand to Sit Transfer    [place], Stand to Sit Transfer touching/steadying assist     Verbal Cues safety     Assistive Device walker, front-wheeled        Stand Pivot Transfer    [place], Stand Pivot/Stand Step Transfer touching/steadying assist     Assistive Device walker, front-wheeled     Comment via amb tx        Balance    Comment, Balance OT: standing with RW for tossing items then performing functional ambulation with RW and reacher to pick items up from floor. Standing for table top task with 0 -1 UE support for total time of 6 mins for leisure card game. x 2 trials        Upper Extremity (Therapeutic Exercise)    Weight/Resistance resistance band   pink    Comment elbow extension, elbow flexion flexion        Lower Extremity (Therapeutic Exercise)    Comment seated hamstring stretching, knee extension, ankle pumps        Daily Progress Summary (OT)    Daily Outcome Statement Pt eith improving balance and endurance throughout session. Trial sit/stand from different height surfaces. Pt able to initiated seated rest breaks as needed. Pt able to maintain precuation. LSO worn throughout session. Pt would continue to benefit from skilled OT services. Continue with POC                           IRF OT Goals      Most Recent Value   Transfer Goal 1    Activity/Assistive Device toilet at 01/28/2022 0722   [place] --  [CS] at 02/01/2022 0724   Time Frame short-term goal (STG), 5 - 7 days at 02/01/2022 0724   Progress/Outcome goal revised this date at 02/01/2022 0724   Transfer Goal 2    Activity/Assistive Device toilet at 01/28/2022 0722   [place] modified independence at 01/28/2022 0722   Time Frame long-term goal (LTG), 14 days or less at 01/28/2022 0722   Progress/Outcome goal ongoing at 02/01/2022 0724   Transfer Goal 3    Activity/Assistive Device shower  at 01/28/2022 0722   [place] supervision required at 02/01/2022 0724   Time Frame short-term goal (STG), 5 - 7 days at 02/01/2022 0724   Progress/Outcome goal revised this date at 02/01/2022 0724   Transfer Goal 4    Activity/Assistive Device shower at 01/28/2022 0722   [place] modified independence at 01/28/2022 0722   Time Frame long-term goal (LTG), 14 days or less at 01/28/2022 0722   Progress/Outcome goal ongoing at 02/01/2022 0724   Bathing Goal 1    [place] supervision required at 02/01/2022 0724   Time Frame short-term goal (STG), 5 - 7 days at 02/01/2022 0724   Progress/Outcome goal revised this date at 02/01/2022 0724   Bathing Goal 2    [place] modified independence at 01/28/2022 0722   Time Frame long-term goal (LTG), 14 days or less at 01/28/2022 0722   Progress/Outcome goal ongoing at 02/01/2022 0724   UB Dressing Goal 1    [place] supervision required at 02/01/2022 0724   Time Frame short-term goal (STG), 5 - 7 days at 02/01/2022 0724   Progress/Outcome goal revised this date at 02/01/2022 0724   UB Dressing Goal 2    [place] modified independence at 01/28/2022 0722   Time Frame long-term goal (LTG), 14 days or less at 01/28/2022 0722   Progress/Outcome goal ongoing at 02/01/2022 0724   LB Dressing Goal 1    [place] supervision required at 02/01/2022 0724   Time Frame short-term goal (STG), 5 - 7 days at 01/28/2022 0722   Progress/Outcome goal revised this date at 02/01/2022 0724   LB Dressing Goal 2    [place] modified independence at 01/28/2022 0722   Time Frame long-term goal (LTG), 14 days or less at 01/28/2022 0722   Progress/Outcome goal ongoing at 02/01/2022 0724   Grooming Goal 1    [place] supervision required at 02/01/2022 0724   Time Frame short-term goal (STG), 5 - 7 days at 02/01/2022 0724   Strategies/Barriers in stance at 01/28/2022 0722   Progress/Outcome goal revised this date at 02/01/2022 0724   Grooming Goal 2    [place] modified  independence at 01/28/2022 0722   Time Frame long-term goal (LTG), 14 days or less at 01/28/2022 0722   Progress/Outcome goal ongoing at 02/01/2022 0724   Toileting Goal 1    [place] supervision required at 02/01/2022 0724   Time Frame short-term goal (STG), 5 - 7 days at 02/01/2022 0724   Progress/Outcome goal revised this date at 02/01/2022 0724   Toileting Goal 2    [place] modified independence at 01/28/2022 0722   Time Frame long-term goal (LTG), 14 days or less at 01/28/2022 0722   Progress/Outcome goal ongoing at 02/01/2022 0724

## 2022-02-02 NOTE — PROGRESS NOTES
Patient: Matt Williamson  Location: [facility]  MRN: [id number]  Today's date: 2/2/2022    History of Present Illness  Marco A is a 76 y.o. male admitted on 1/27/2022 with Lumbar disc disease with radiculopathy [M51.16]  S/P lumbar fusion [Z98.1]. Principal problem is Lumbar disc disease with radiculopathy.    Patient is a 76-year-old male with a history of atrial fibrillation, CAD, hypertension, hypothyroidism, hyperlipidemia, thyroid cancer, type 2 diabetes developed significant mid to low back pain with walking and standing.  He has metastatic thyroid carcinoma with metastasis to the L3 body status post radiation in May 2020.  Recent PET scan showed increased activity at the L3 level without further metastatic lesions.  He was evaluated by neurosurgery who recommended surgical resection.  He was taken to the OR on 1/20 and underwent a stage I L2-L4 PSF with instrumentation, ORIF of L3 pathologic fracture, stage II L3 lateral corpectomy with expandable cage placement.  There has been no significant postoperative complications.  His pain is fairly well controlled.  An LSO has been ordered.    1/28 Pt placed on Med hold after OT eval  and during PT eval 2/2 - metabolic ketone acidosis with dehydration    Past Medical History  Marco A has a past medical history of Atrial fibrillation with rapid ventricular response (CMS/HCC) (10/25/2019), BPH (benign prostatic hyperplasia), Coronary artery disease, Hypertension, Hypothyroidism, Mixed hyperlipidemia, Thyroid cancer (CMS/HCC), Type 2 diabetes mellitus without complication (CMS/HCC), and Vertigo.      PT Vitals    Date/Time Pulse HR Source SpO2 Pt Activity O2 Therapy BP BP Location BP Method Pt Position Observations Who   02/02/22 1105 75 Monitor 98 % At rest None (Room air) 97/54 Right upper arm Automatic Sitting -- DTW   02/02/22 1140 80 Monitor 99 % Walking None (Room air) 135/58 Right upper arm Automatic Sitting VS after amb. 150' with the  SPC. DTW      PT Pain    Date/Time Pain Type Side/Orientation Location Rating: Rest Rating: Activity Description Interventions Who   02/02/22 1105 Pain Assessment lower back 2 2 incisional;aching premedicated for activity DTW   02/02/22 1140 Post Activity -- -- 2 2 -- -- DTW          Prior Living Environment      Most Recent Value   People in Home spouse   Current Living Arrangements home   Home Accessibility stairs to enter home (Group)   Living Environment Comment pt lives in 2  in [place]. 3 steps to enter with R rail, bedroom and shower stall on first floor, full flight B/L rails to shower stall and bedroom, typically sleeps on second floor. has a bed and shower stall on first floor   Number of Stairs, Main Entrance 3   Stair Railings, Main Entrance railing on right side (ascending)   Location, Patient Bedroom second floor, must negotiate stairs to access   Patient Bedroom Access Comment has bedroom on first floor if needed   Location, Bathroom second floor, must negotiate stairs to access   Bathroom Access Comment has bathroom on first floor   Number of Stairs, Within Home, Primary 12   Stair Railings, Within Home, Primary railings on both sides of stairs          Prior Level of Function      Most Recent Value   Dominant Hand right   Ambulation assistive equipment   Transferring assistive equipment   Toileting independent   Bathing independent   Dressing independent   Eating independent   Prior Level of Function Comment Pt reports he was independent for all mobility using SPC, has shower chair at home.   Assistive Device Currently Used at Home cane, straight, shower chair           IRF PT Evaluation and Treatment - 02/02/22 1105        PT Time Calculation    Start Time 1100     Stop Time 1200     Time Calculation (min) 60 min        Session Details    Document Type daily treatment/progress note     Mode of Treatment physical therapy;individual therapy        General Information    Patient Profile  "Reviewed yes     General Observations of Patient Pt. pleasant and agreeable to tx.     Existing Precautions/Restrictions fall        Weight-bearing Status    Left LE Weight-Bearing Status weight-bearing as tolerated (WBAT)     Right LE Weight-Bearing Status weight-bearing as tolerated (WBAT)        Transfers    Transfers stand pivot transfer     Maintains Weight-Bearing Status (Transfers) able to maintain        Sit to Stand Transfer    [place], Sit to Stand Transfer close supervision;verbal cues;nonverbal cues (demo/gesture)     Verbal Cues safety     Assistive Device cane, straight;gait belt        Stand to Sit Transfer    [place], Stand to Sit Transfer close supervision;verbal cues;nonverbal cues (demo/gesture)     Verbal Cues safety     Assistive Device cane, straight;gait belt        Stand Pivot Transfer    [place], Stand Pivot/Stand Step Transfer touching/steadying assist;verbal cues;nonverbal cues (demo/gesture)     Verbal Cues safety     Assistive Device cane, straight;gait belt        Gait Training    [place], Gait minimum assist (75% or more patient effort)     Assistive Device cane, straight;gait belt     Distance in Feet 150 feet     Pattern (Gait) step-through     Deviations/Abnormal Patterns (Gait) gait speed decreased;step length decreased;mustapha decreased     Maintains Weight-bearing Status (Gait) able to maintain     Comment (Gait/Stairs) 2 x 150', 3 x 50' using the SPC with initial assist for proper SPC sequencing and balance.        Curb Negotiation    [place] minimum assist (75% or more patient effort);verbal cues;nonverbal cues (demo/gesture)     Assistive Device cane, straight;gait belt     Curb Height 6 inches     Comment Pt. ascended/descended 2\" and 6\" high curbs using an SPC  with assistance for balance, proper use of the SPC.        Sloped Surface Gait Skills    [place] minimum assist (75% or more patient effort);verbal cues;nonverbal cues (demo/gesture)     " Assistive Device cane, straight;gait belt     Distance in Feet 5 feet     Comment up/down a 5' long, non-ADA ramp using an SPT with assist fo balance and safe SPC use.        Stairs Training    [place], Stairs touching/steadying assist;verbal cues;nonverbal cues (demo/gesture)     Assistive Device railing;gait belt     Handrail Location (Stairs) other (see comments)     Number of Stairs 12     Stair Height 6 inches     Ascending Stairs Technique step-to-step;step-over-step   step over step when using B railings    Descending Stairs Technique step-to-step   step over step when using B railings    Maintains Weight-bearing Status (Stairs) able to maintain     Comment Pt. went up and down 3 x 4 steps with B railings 1st 4 steps then 1 railing, next 2 x 4 steps. Touching/steadying assist at hips.        Lower Extremity (Therapeutic Exercise)    Exercise Position/Type seated;standing;AROM (active range of motion);static stretching;static isometric contraction     General Exercise bilateral;ankle pumps;LAQ (long arc quad);marching while seated     Reps and Sets 2 x 15 each.        Daily Progress Summary (PT)    Symptoms Noted During/After Treatment none     Progress Toward Functional Goals (PT) progressing toward functional goals as expected     Daily Outcome Statement Pt. beginning to sequence his SPC correctly with gait training and pt. was able to practice the curb and ramp with the SPC with Min A of 1 and cueing.  Minimal c/o low back pain this session with BP variable, but pt. asymptomatic.     Recommendations (PT) Continue per PT plan of care.                      Education Documentation  Mobility, taught by Stacie Crockett, PT at 2/2/2022 12:47 PM.  Learner: Patient  Readiness: Acceptance  Method: Explanation, Demonstration  Response: Needs Reinforcement  Comment: Reviewed safe use of the SPC for transfers, ambulation, and management of curbs and ramps.          IRF PT Goals      Most Recent Value   Bed  Mobility Goal 1    Activity/Assistive Device bed mobility activities, all at 01/28/2022 0902   [place] supervision required at 01/28/2022 0902   Time Frame short-term goal (STG), 1 week at 01/28/2022 0902   Progress/Outcome goal ongoing, good progress toward goal at 02/01/2022 0830   Bed Mobility Goal 2    Activity/Assistive Device bed mobility activities, all at 01/28/2022 0902   [place] modified independence at 01/28/2022 0902   Time Frame long-term goal (LTG), 21 days or less at 01/28/2022 0902   Progress/Outcome goal ongoing at 02/01/2022 0830   Transfer Goal 1    Activity/Assistive Device sit-to-stand/stand-to-sit, stand pivot, walker, front-wheeled at 02/01/2022 0830   [place] supervision required at 02/01/2022 0830   Time Frame short-term goal (STG), 3 - 5 days at 02/01/2022 0830   Progress/Outcome goal met, goal revised this date at 02/01/2022 0830   Transfer Goal 2    Activity/Assistive Device sit-to-stand/stand-to-sit, stand pivot at 01/28/2022 0902   [place] modified independence at 01/28/2022 0902   Time Frame long-term goal (LTG), 21 days or less at 01/28/2022 0902   Progress/Outcome goal ongoing, good progress toward goal at 02/01/2022 0830   Gait/Walking Locomotion Goal 1    Activity/Assistive Device gait (walking locomotion), walker, front-wheeled at 02/01/2022 0830   Distance 150 feet at 02/01/2022 0830   [place] supervision required at 02/01/2022 0830   Time Frame short-term goal (STG), 5 - 7 days at 02/01/2022 0830   Progress/Outcome goal met, goal revised this date at 02/01/2022 0830   Gait/Walking Locomotion Goal 2    Activity/Assistive Device gait (walking locomotion) at 01/28/2022 0902   Distance 150 feet at 01/28/2022 0902   [place] modified independence at 01/28/2022 0902   Time Frame long-term goal (LTG), 21 days or less at 01/28/2022 0902   Progress/Outcome goal ongoing at 02/01/2022 0830   Stairs Goal 1    Activity/Assistive Device stairs, all skills at  01/28/2022 0902   Number of Stairs 12 at 01/28/2022 0902   [place] minimum assist (75% or more patient effort) at 01/28/2022 0902   Time Frame short-term goal (STG), 3 - 5 days at 01/28/2022 0902   Progress/Outcome goal ongoing, good progress toward goal at 02/01/2022 0830   Stairs Goal 2    Activity/Assistive Device stairs, all skills at 01/28/2022 0902   Number of Stairs 12 at 01/28/2022 0902   [place] modified independence at 01/28/2022 0902   Time Frame long-term goal (LTG), 21 days or less at 01/28/2022 0902   Progress/Outcome goal ongoing at 02/01/2022 0830

## 2022-02-02 NOTE — PLAN OF CARE
Plan of Care Review  Plan of Care Reviewed With: patient  Progress: improving  Outcome Summary: Pt. calm and cooperative with care, asks appropriate questions about care. Continent of bowel and bladder. LSO OOB; however, pt. wore brace in bed overnight per his request. TID accucheck. IV R forearm. Pt reported pain managed by oxycodone and diversional activity last PM. Pt declined turns and repositioning overnight, education provided. Declined SCDs. Call bell within reach. Safety maintained.

## 2022-02-02 NOTE — PLAN OF CARE
Problem: Rehabilitation (IRF) Plan of Care  Goal: Plan of Care Review  Flowsheets (Taken 2/2/2022 1430)  Plan of Care Reviewed With:   patient   spouse  Outcome Summary: team meeting held. CM spoke with pt and pts wife Ania. CM reviewed pts functional levels, ELOS 2/8 and goals for d/c being [place]. CM explained pt will go home with RW that PT danica order. CM provided pt with PT OP script to which pt will give to his wife. When CM spoke with ania she stated she will call the OP place that pt goes to and schedule an appointment for after d/c. CM advised to bring physical copy with him when he goes to appointment. CM explained to pts wife that pt is hoping to get out on saturday and that he is talking with the team about this. CM to follow up if anything changes re: d/c date. CM to follow.

## 2022-02-02 NOTE — PROGRESS NOTES
Patient: Matt Williamson  Location: [facility]  MRN: [id number]  Today's date: 2/2/2022    History of Present Illness  Marco A is a 76 y.o. male admitted on 1/27/2022 with Lumbar disc disease with radiculopathy [M51.16]  S/P lumbar fusion [Z98.1]. Principal problem is Lumbar disc disease with radiculopathy.    Patient is a 76-year-old male with a history of atrial fibrillation, CAD, hypertension, hypothyroidism, hyperlipidemia, thyroid cancer, type 2 diabetes developed significant mid to low back pain with walking and standing.  He has metastatic thyroid carcinoma with metastasis to the L3 body status post radiation in May 2020.  Recent PET scan showed increased activity at the L3 level without further metastatic lesions.  He was evaluated by neurosurgery who recommended surgical resection.  He was taken to the OR on 1/20 and underwent a stage I L2-L4 PSF with instrumentation, ORIF of L3 pathologic fracture, stage II L3 lateral corpectomy with expandable cage placement.  There has been no significant postoperative complications.  His pain is fairly well controlled.  An LSO has been ordered.    1/28 Pt placed on Med hold after OT eval  and during PT eval 2/2 - metabolic ketone acidosis with dehydration    Past Medical History  Marco A has a past medical history of Atrial fibrillation with rapid ventricular response (CMS/HCC) (10/25/2019), BPH (benign prostatic hyperplasia), Coronary artery disease, Hypertension, Hypothyroidism, Mixed hyperlipidemia, Thyroid cancer (CMS/HCC), Type 2 diabetes mellitus without complication (CMS/HCC), and Vertigo.      PT Vitals    Date/Time Pulse HR Source SpO2 Pt Activity O2 Therapy BP BP Location BP Method Pt Position Observations Who   02/02/22 1401 85 Monitor 97 % At rest None (Room air) 110/67 Left upper arm Automatic Sitting -- DTW   02/02/22 1425 88 Monitor 98 % Walking None (Room air) 116/71 Left upper arm Automatic Sitting VS after amb. 150' with the  SPC. DTW      PT Pain    Date/Time Pain Type Side/Orientation Location Rating: Rest Rating: Activity Description [facility]   02/02/22 1401 Pain Assessment lower back 0 2 aching DTW   02/02/22 1425 Post Activity lower back 0 2 aching DTW          Prior Living Environment      Most Recent Value   People in Home spouse   Current Living Arrangements home   Home Accessibility stairs to enter home (Group)   Living Environment Comment pt lives in 2  in [place]. 3 steps to enter with R rail, bedroom and shower stall on first floor, full flight B/L rails to shower stall and bedroom, typically sleeps on second floor. has a bed and shower stall on first floor   Number of Stairs, Main Entrance 3   Stair Railings, Main Entrance railing on right side (ascending)   Location, Patient Bedroom second floor, must negotiate stairs to access   Patient Bedroom Access Comment has bedroom on first floor if needed   Location, Bathroom second floor, must negotiate stairs to access   Bathroom Access Comment has bathroom on first floor   Number of Stairs, Within Home, Primary 12   Stair Railings, Within Home, Primary railings on both sides of stairs          Prior Level of Function      Most Recent Value   Dominant Hand right   Ambulation assistive equipment   Transferring assistive equipment   Toileting independent   Bathing independent   Dressing independent   Eating independent   Prior Level of Function Comment Pt reports he was independent for all mobility using SPC, has shower chair at home.   Assistive Device Currently Used at Home cane, straight, shower chair           IRF PT Evaluation and Treatment - 02/02/22 1401        PT Time Calculation    Start Time 1400     Stop Time 1430     Time Calculation (min) 30 min        Session Details    Document Type daily treatment/progress note     Mode of Treatment physical therapy;individual therapy        General Information    Patient Profile Reviewed yes     General Observations of Patient  Pt. feeling tired, but agreeable to tx.  Reports having some abdominal cramping from him bowel meds.     Existing Precautions/Restrictions fall        Weight-bearing Status    Left LE Weight-Bearing Status weight-bearing as tolerated (WBAT)     Right LE Weight-Bearing Status weight-bearing as tolerated (WBAT)        Transfers    Transfers stand pivot transfer     Maintains Weight-Bearing Status (Transfers) able to maintain        Sit to Stand Transfer    [place], Sit to Stand Transfer close supervision;verbal cues;nonverbal cues (demo/gesture)     Verbal Cues safety     Assistive Device cane, straight;gait belt        Stand to Sit Transfer    [place], Stand to Sit Transfer close supervision;verbal cues;nonverbal cues (demo/gesture)     Verbal Cues safety     Assistive Device cane, straight;gait belt        Stand Pivot Transfer    [place], Stand Pivot/Stand Step Transfer touching/steadying assist;verbal cues;nonverbal cues (demo/gesture)     Verbal Cues safety     Assistive Device cane, straight;gait belt     Comment via ambulatory approach.        Gait Training    [place], Gait touching/steadying assist;verbal cues;nonverbal cues (demo/gesture)     Assistive Device cane, straight;gait belt     Distance in Feet 150 feet   3 x 150'    Pattern (Gait) step-through     Deviations/Abnormal Patterns (Gait) step length decreased;mustapha decreased     Maintains Weight-bearing Status (Gait) able to maintain     Advanced Gait Activity rough/uneven surfaces        Rough/Uneven Surface Gait Skills    [place] minimum assist (75% or more patient effort);verbal cues;nonverbal cues (demo/gesture)     Assistive Device gait belt     Distance in Feet 150 feet     Comment 2 x 150' amb. on low carpeted surfaces using the SPC with assist for safety, balance,        Daily Progress Summary (PT)    Symptoms Noted During/After Treatment fatigue     Progress Toward Functional Goals (PT) progressing toward functional  goals as expected     Daily Outcome Statement Treatment this afternoon focused on continued gait training with the SPC with emphasis upon safe sequencing and even LE WB.  Some reduced L LE foot clearance in swing phase of gait noted, but pt.w/o toe catch or LOB.     Recommendations (PT) Continue per PT plan of care.                      Education Documentation  Mobility, taught by Stacie Crockett, PT at 2/2/2022 12:47 PM.  Learner: Patient  Readiness: Acceptance  Method: Explanation, Demonstration  Response: Needs Reinforcement  Comment: Reviewed safe use of the SPC for transfers, ambulation, and management of curbs and ramps.          IRF PT Goals      Most Recent Value   Bed Mobility Goal 1    Activity/Assistive Device bed mobility activities, all at 01/28/2022 0902   [place] supervision required at 01/28/2022 0902   Time Frame short-term goal (STG), 1 week at 01/28/2022 0902   Progress/Outcome goal ongoing, good progress toward goal at 02/01/2022 0830   Bed Mobility Goal 2    Activity/Assistive Device bed mobility activities, all at 01/28/2022 0902   [place] modified independence at 01/28/2022 0902   Time Frame long-term goal (LTG), 21 days or less at 01/28/2022 0902   Progress/Outcome goal ongoing at 02/01/2022 0830   Transfer Goal 1    Activity/Assistive Device sit-to-stand/stand-to-sit, stand pivot, walker, front-wheeled at 02/01/2022 0830   [place] supervision required at 02/01/2022 0830   Time Frame short-term goal (STG), 3 - 5 days at 02/01/2022 0830   Progress/Outcome goal met, goal revised this date at 02/01/2022 0830   Transfer Goal 2    Activity/Assistive Device sit-to-stand/stand-to-sit, stand pivot at 01/28/2022 0902   [place] modified independence at 01/28/2022 0902   Time Frame long-term goal (LTG), 21 days or less at 01/28/2022 0902   Progress/Outcome goal ongoing, good progress toward goal at 02/01/2022 0830   Gait/Walking Locomotion Goal 1    Activity/Assistive Device gait  (walking locomotion), walker, front-wheeled at 02/01/2022 0830   Distance 150 feet at 02/01/2022 0830   [place] supervision required at 02/01/2022 0830   Time Frame short-term goal (STG), 5 - 7 days at 02/01/2022 0830   Progress/Outcome goal met, goal revised this date at 02/01/2022 0830   Gait/Walking Locomotion Goal 2    Activity/Assistive Device gait (walking locomotion) at 01/28/2022 0902   Distance 150 feet at 01/28/2022 0902   [place] modified independence at 01/28/2022 0902   Time Frame long-term goal (LTG), 21 days or less at 01/28/2022 0902   Progress/Outcome goal ongoing at 02/01/2022 0830   Stairs Goal 1    Activity/Assistive Device stairs, all skills at 01/28/2022 0902   Number of Stairs 12 at 01/28/2022 0902   [place] minimum assist (75% or more patient effort) at 01/28/2022 0902   Time Frame short-term goal (STG), 3 - 5 days at 01/28/2022 0902   Progress/Outcome goal ongoing, good progress toward goal at 02/01/2022 0830   Stairs Goal 2    Activity/Assistive Device stairs, all skills at 01/28/2022 0902   Number of Stairs 12 at 01/28/2022 0902   [place] modified independence at 01/28/2022 0902   Time Frame long-term goal (LTG), 21 days or less at 01/28/2022 0902   Progress/Outcome goal ongoing at 02/01/2022 0830

## 2022-02-02 NOTE — PLAN OF CARE
Plan of Care Review  Plan of Care Reviewed With: patient  Progress: improving  Outcome Summary: Compression stockings donned prior to therapy. Skin is intact, incisions well-approximated. Heparin for dvt prophylaxis. Pt is continent of b/b, bowel meds held this am after report that pt had all liquid stools yesterday s/p mag citrate to be sure post-op ileus was completely resolved. Blood sugar monitored TID. Peripheral IV in R forearm is patent, being used for IV iron. Pt utilizes PRN tylenol and oxycodone for pain management.

## 2022-02-02 NOTE — PROGRESS NOTES
Subjective    PPatient seen and examined on rounds.  Chart reviewed.  Events overnight noted.  History reviewed briefly with patient.    CC:  Deficits in mobility, transfers, self-care status post L2-L4 posterior spinal fusion with instrumentation, ORIF of L3 pathologic fracture, L3 lateral corpectomy with expandable cage placement, for L3 pathologic fracture, from metastatic thyroid cancer, multiple medical problems.    HPI:  Mr. Matt Williamson is a 76-year-old right-handed white male with chronic conditions significant for hypertension, hypothyroidism, thyroid cancer status post thyroidectomy, atrial fibrillation, coronary artery disease, hyperlipidemia, type 2 diabetes mellitus, metastatic thyroid carcinoma with metastasis to the L3 vertebral body status post stereotactic radiation in May 2020, who had progressively worsening midline low back pain with radiation to the right knee. MRI lumbar spine obtained on 11/22/21 showing extension of the L3 mass, moderate-severe foraminal narrowing on the right secondary to retropulsion of the inferior endplate in the setting of ligament and joint hypertrophy. CT demonstrates significant bony erosion of the L3 vertebral body with a new oblique fracture. PET scan on 12/7/21 showed increased activity of the L3 lesion without further metastatic lesions appreciated. He was initially referred to [facility] however there has been some delay with getting an appointment.  He denied any bowel or bladder incontinence or weakness and follows up with oncologist Dr. Casey who did not recommend any chemotherapy at this time but surgical intervention as the primary treatment given the suspected isolated area of progression at the L3 vertebral body.  Per neurosurgery notes he had preserved strength except 2 beats ankle clonus on the left.   He was recommended surgical resection by neurosurgery as well as oncology.  He was admitted to [facility] on 1/20/22 and underwent a stage I  "L2-L4 posterior spinal fusion with instrumentation, ORIF of L3 pathologic fracture, and stage II L3 lateral corpectomy with expandable cage placement with neurosurgeon Dr Colt Heller on 1/20/22 at [facility].  He is allowed weightbearing as tolerated on both lower extremities and was given an LSO brace for use when out of bed.  Postoperative course was significant for urinary retention and he had an indwelling Lweis catheter.  The Lewis catheter was removed 2 days back and per patient, he is starting to void now.  I mentioned to him we will monitor post void residuals to see if he is emptying his bladder completely or not.  He has some paresthesias/hyperesthesias in his right thigh especially lateral aspect and some right hip proximal weakness and per neurosurgery notes that is not uncommon given the nature of his anterior lumbar surgery.  He is on subcutaneous Heparin and SCDs for DVT prophylaxis.  I discussed his recent clinical course with patient and with his wife at bedside.  On 1/27/22, hemoglobin was 9.5, WBC count 10.09, platelets are 304, BUN was 17, and creatinine was 1.0.  He has been needing assistance for mobility, transfers, self-care. He is transferred to [facility] on 1/27/22 for further rehabilitation care.     SUBJ: Discussed follow-up appointment with neurosurgery with him for this Wednesday.  Working on upper extremity strengthening and range of motion exercises.  Able to don and doff LSO brace.  Discussed spinal precautions with him.    ROS: Denies chest pain or shortness of breath. Other ROS negative. Past, family, social history is unchanged.      Physical Exam      Blood pressure (!) 104/55, pulse 90, temperature 37.1 °C (98.7 °F), temperature source Oral, resp. rate 18, height 1.676 m (5' 6\"), weight 75 kg (165 lb 6 oz), SpO2 100 %.  Body mass index is 26.69 kg/m².    General Appearance: Not in acute distress  Head/Ear/Nose/Throat: Normocephalic; Atraumatic.   Eye: EOMI; " PERRL.   Neck: No JVD; No Bruits.   Respiratory: Decreased breath sounds at bases.   Cardiovascular: RRR; Normal S1, S2.   Gastrointestinal: Soft; bowel sounds present but somewhat hypoactive.  Extremities: Bilateral lower extremity edema noted.    Musculoskeletal: Functional active range of motion in both upper extremities.  Some limitation of active range of motion in right hip limited by pain.    Neurological: AAO ×3. Speech is fluent. Cranial nerve examination does not reveal any gross facial asymmetry. Strength testing shows about 4+/5 strength in both upper extremities.  Right hip flexion is less than 3/5.  Right quadriceps is 4/5 with the right thigh supported.  Right ankle dorsiflexion, plantar flexion and right EHL are 4+/5.  Left hip flexion is 4/5.  Left quadriceps is 4+/5.  Left ankle dorsiflexion, plantar flexion and left EHL are 4+/5.  He is grossly able to localize touch and position sense in great toes, but does report some hyperesthesia in the right thigh lateral aspect.  Deep tendon reflexes are hypoactive and symmetric bilaterally.  Plantars are flexor.  Coordination is functional upper extremities.    Neurologically stable.  Behavior/Emotional: Appropriate; Cooperative.   Skin: No obvious rashes noted.  Healing incision noted on right flank.  Healing incision also noted on posterior lumbar spine.  Mepilex border dressing in place on incisions.      Current Facility-Administered Medications:   •  acetaminophen (TYLENOL) tablet 650 mg, 650 mg, oral, q4h PRN, Arjun Dawkins MD, 650 mg at 01/31/22 0533  •  alum-mag hydroxide-simeth (MAALOX) 200-200-20 mg/5 mL suspension 30 mL, 30 mL, oral, q4h PRN, Arjun Dawkins MD  •  [START ON 2/3/2022] aspirin enteric coated tablet 81 mg, 81 mg, oral, Daily, Arjun Dawkins MD  •  bisacodyL (DULCOLAX) 10 mg suppository 10 mg, 10 mg, rectal, Daily PRN, Arjun Dawkins MD  •  glucose chewable tablet 16-32 g of dextrose, 16-32 g of dextrose, oral, PRN, 16 g of  dextrose at 01/29/22 1721 **OR** dextrose 40 % oral gel 15-30 g of dextrose, 15-30 g of dextrose, oral, PRN **OR** glucagon (GLUCAGEN) injection 1 mg, 1 mg, intramuscular, PRN **OR** dextrose in water injection 12.5 g, 25 mL, intravenous, PRN, Arjun Dawkins MD  •  diazePAM (VALIUM) tablet 5 mg, 5 mg, oral, q6h PRN, Arjun Dawkins MD  •  diphenhydrAMINE (BENADRYL) capsule 25 mg, 25 mg, oral, q6h PRN, Arjun Dawkins MD  •  docusate sodium (COLACE) capsule 100 mg, 100 mg, oral, BID, Arjun Dawkins MD, 100 mg at 02/01/22 2022  •  ergocalciferol (VITAMIN D2) capsule 50,000 Units, 50,000 Units, oral, Weekly, Arjun Dawkins MD, 50,000 Units at 01/29/22 0932  •  ferric gluconate (FERRLECIT) 125 mg in sodium chloride 0.9 % 100 mL IVPB, 125 mg, intravenous, Daily, Arjun Dawkins MD, Stopped at 02/01/22 1657  •  gabapentin (NEURONTIN) capsule 600 mg, 600 mg, oral, q8h SALIMA, Arjun Dawkins MD, 600 mg at 02/01/22 2112  •  heparin (porcine) 5,000 unit/mL injection 5,000 Units, 5,000 Units, subcutaneous, q8h [facility], Arjun Dawkins MD, 5,000 Units at 02/01/22 2112  •  insulin aspart U-100 (NovoLOG) pen 1-10 Units, 1-10 Units, subcutaneous, TID with meals, Arjun Dawkins MD, 1 Units at 01/31/22 1736  •  levothyroxine (SYNTHROID) tablet 150 mcg, 150 mcg, oral, Daily (6a), Arjun Dawkins MD, 150 mcg at 02/01/22 0553  •  losartan (COZAAR) tablet 25 mg, 25 mg, oral, Daily with dinner, Arjun Dawkins MD, 25 mg at 02/01/22 1652  •  magnesium chloride tablet,delayed release (DR/EC) 128 mg, 128 mg, oral, BID, Arjun Dawkins MD, 128 mg at 02/01/22 2021  •  [Provider Managed Hold] metFORMIN (GLUCOPHAGE) tablet 500 mg, 500 mg, oral, BID with meals, Arjun Dawkins MD, 500 mg at 01/28/22 0804  •  metoprolol succinate XL (TOPROL-XL) 24 hr ER tablet 100 mg, 100 mg, oral, Nightly, Arjun Dawkins MD, 100 mg at 02/01/22 2030  •  ondansetron ODT (ZOFRAN-ODT) disintegrating tablet 4 mg, 4 mg, oral, q8h PRN, Arjun Dawkins MD  •  oxyCODONE (ROXICODONE)  immediate release tablet 10 mg, 10 mg, oral, q4h PRN, 10 mg at 02/01/22 1214 **AND** oxyCODONE (ROXICODONE) immediate release tablet 5 mg, 5 mg, oral, q4h PRN, Arjun Dawkins MD, 5 mg at 02/01/22 2022  •  polyethylene glycol (MIRALAX) 17 gram packet 17 g, 17 g, oral, Daily with dinner, Arjun Dawkins MD, 17 g at 01/31/22 1617  •  pravastatin (PRAVACHOL) tablet 40 mg, 40 mg, oral, Daily (6p), Arjun Dawkins MD, 40 mg at 02/01/22 1652  •  senna (SENOKOT) tablet 2 tablet, 2 tablet, oral, Daily, Arjun Dawkins MD, 2 tablet at 01/31/22 1255  •  sodium bicarbonate tablet 650 mg, 650 mg, oral, BID, Arjun Dawkins MD, 650 mg at 02/01/22 2021       Labs / Radiology    Lab Results   Component Value Date    WBC 7.10 01/31/2022    HGB 9.2 (L) 01/31/2022    HCT 29.1 (L) 01/31/2022    MCV 84.8 01/31/2022     01/31/2022     Lab Results   Component Value Date    GLUCOSE 132 (H) 01/31/2022    CALCIUM 7.8 (L) 01/31/2022     01/31/2022    K 3.9 01/31/2022    CO2 26 01/31/2022    CL 99 01/31/2022    BUN 7 (L) 01/31/2022    CREATININE 0.8 01/31/2022     Assessment and Plan    ASSESSMENT PLAN:  1. 76-year-old right-handed white male with chronic conditions significant for hypertension, hypothyroidism, thyroid cancer status post thyroidectomy, atrial fibrillation, coronary artery disease, hyperlipidemia, type 2 diabetes mellitus, metastatic thyroid carcinoma with metastasis to the L3 vertebral body status post stereotactic radiation in May 2020, who had progressively worsening midline low back pain with radiation to the right knee. MRI lumbar spine obtained on 11/22/21 showing extension of the L3 mass, moderate-severe foraminal narrowing on the right secondary to retropulsion of the inferior endplate in the setting of ligament and joint hypertrophy. CT demonstrates significant bony erosion of the L3 vertebral body with a new oblique fracture. PET scan on 12/7/21 showed increased activity of the L3 lesion without further  metastatic lesions appreciated. He was initially referred to [facility] however there has been some delay with getting an appointment.  He denied any bowel or bladder incontinence or weakness and follows up with oncologist Dr. Casey who did not recommend any chemotherapy at this time but surgical intervention as the primary treatment given the suspected isolated area of progression at the L3 vertebral body.  Per neurosurgery notes he had preserved strength except 2 beats ankle clonus on the left.   He was recommended surgical resection by neurosurgery as well as oncology.  He was admitted to [facility] on 1/20/22 and underwent a stage I L2-L4 posterior spinal fusion with instrumentation, ORIF of L3 pathologic fracture, and stage II L3 lateral corpectomy with expandable cage placement with neurosurgeon Dr Colt Heller on 1/20/22 at [facility].  He is allowed weightbearing as tolerated on both lower extremities and was given an LSO brace for use when out of bed.  Postoperative course was significant for urinary retention and he had an indwelling Lewis catheter.  The Lewis catheter was removed 2 days back and per patient, he is starting to void now.  I mentioned to him we will monitor post void residuals to see if he is emptying his bladder completely or not.  He has some paresthesias/hyperesthesias in his right thigh especially lateral aspect and some right hip proximal weakness and per neurosurgery notes that is not uncommon given the nature of his anterior lumbar surgery.  He is on subcutaneous Heparin and SCDs for DVT prophylaxis.  I discussed his recent clinical course with patient and with his wife at bedside.  On 1/27/22, hemoglobin was 9.5, WBC count 10.09, platelets are 304, BUN was 17, and creatinine was 1.0.  He has been needing assistance for mobility, transfers, self-care. He is transferred to [facility] on 1/27/22 for further rehabilitation care.      2. DVT prophylaxis - on  subcutaneous Heparin.  On SCDs.  Check doppler. Platelets 323 on 1/28/2022.  Platelets 338 on 1/31/2022.     3.  Neurosurgery - status post L2-L4 posterior spinal fusion with instrumentation, ORIF of L3 pathologic fracture, L3 lateral corpectomy with expandable cage placement, for L3 pathologic fracture, from metastatic thyroid cancer, multiple medical problems - continue PT, OT, psychology.  Follow falls precautions, cardiac precautions, monitor pulse oximetry in therapy.  Follow spinal precautions.  LSO brace when out of bed.  He has follow-up appointment with neurosurgery on 2/2/2022.     4. GI - On Protonix for GI prophylaxis. Continue Colace, Senokot, MiraLAX, Dulcolax.  On Zofran ODT for nausea.  On Maalox PRN.     5.  - Postoperative course was significant for urinary retention and he had an indwelling Lewis catheter.  The Lewis catheter was removed 2 days back and per patient, he is starting to void now.  I mentioned to him we will monitor post void residuals to see if he is emptying his bladder completely or not.       6.  Diabetes mellitus - on Jardiance.  On Metformin.  On sliding-scale NovoLog coverage.  On hypoglycemic protocol.     7. Pain - on Tylenol.  On Neurontin.  On PRN Oxycodone.  On Valium PRN for spasms.     8. Hematology -hemoglobin 9.2, WBC 8.01 on 1/28/2022.  Hemoglobin 9.2, WBC 7.10 on 1/31/2022.     9.  Endocrinology - history of thyroid cancer status post thyroidectomy.  On Synthroid.     10. CVS - history of hypertension, atrial fibrillation, coronary artery disease - on Toprol-XL.     11.  Hyperlipidemia - on Pravachol.     12.  Oncology - history of metastatic thyroid cancer status post thyroidectomy.  He had L2-L4 posterior spinal fusion with instrumentation, ORIF of L3 pathologic fracture, L3 lateral corpectomy with expandable cage placement, for L3 pathologic fracture, from metastatic thyroid cancer.  He will follow up with oncology and neurosurgery.     13.  Skin - monitor  healing of incisions on right flank and on posterior lumbar spine.  Dermal defense will be consulted.     14.  F/E/N - on sodium bicarbonate.     15. Rehabilitation medicine - Continue comprehensive rehabilitation care. Continue PT, OT, psychology.  We will follow falls precautions, cardiac precautions, monitor pulse oximetry in therapy and follow aspiration precautions.  We will follow spinal precautions.  LSO brace when out of bed.Tolerating therapies per endurance.  Reports pain medications are helping.  Had a bowel movement.  Voiding well.Inspected incisions which are stable.  Requiring minimal assistance for transfers with physical therapy.  Able to ambulate 50 feet with front wheel walker with minimal assistance with physical therapy.Discussed follow-up appointment with neurosurgery with him for this Wednesday.  Working on upper extremity strengthening and range of motion exercises.  Able to don and doff LSO brace.  Discussed spinal precautions with him.Discussed follow-up appointment with neurosurgery with him for this Wednesday.  Working on upper extremity strengthening and range of motion exercises.  Able to don and doff LSO brace.  Discussed spinal precautions with him.     16. Reviewed labs today. BUN 12, creatinine 0.8 on 1/28/2022.  BUN 7, creatinine 0.8 on 1/31/2022.           James Hidalgo MD  2/1/2022

## 2022-02-03 ENCOUNTER — APPOINTMENT (INPATIENT)
Dept: OCCUPATIONAL THERAPY | Facility: REHABILITATION | Age: 77
DRG: 949 | End: 2022-02-03
Payer: MEDICARE

## 2022-02-03 ENCOUNTER — APPOINTMENT (INPATIENT)
Dept: PHYSICAL THERAPY | Facility: REHABILITATION | Age: 77
DRG: 949 | End: 2022-02-03
Payer: MEDICARE

## 2022-02-03 ENCOUNTER — APPOINTMENT (OUTPATIENT)
Dept: PSYCHOLOGY | Facility: CLINIC | Age: 77
End: 2022-02-03
Payer: MEDICARE

## 2022-02-03 LAB
ANION GAP SERPL CALC-SCNC: 6 MEQ/L (ref 3–15)
BUN SERPL-MCNC: 8 MG/DL (ref 8–20)
CALCIUM SERPL-MCNC: 8.1 MG/DL (ref 8.9–10.3)
CHLORIDE SERPL-SCNC: 100 MEQ/L (ref 98–109)
CO2 SERPL-SCNC: 29 MEQ/L (ref 22–32)
CREAT SERPL-MCNC: 0.8 MG/DL (ref 0.8–1.3)
GFR SERPL CREATININE-BSD FRML MDRD: >60 ML/MIN/1.73M*2
GLUCOSE BLD-MCNC: 127 MG/DL (ref 70–99)
GLUCOSE BLD-MCNC: 136 MG/DL (ref 70–99)
GLUCOSE BLD-MCNC: 141 MG/DL (ref 70–99)
GLUCOSE SERPL-MCNC: 132 MG/DL (ref 70–99)
MAGNESIUM SERPL-MCNC: 1.9 MG/DL (ref 1.8–2.5)
POCT TEST: ABNORMAL
POTASSIUM SERPL-SCNC: 4.3 MEQ/L (ref 3.6–5.1)
SODIUM SERPL-SCNC: 135 MEQ/L (ref 136–144)

## 2022-02-03 PROCEDURE — 36415 COLL VENOUS BLD VENIPUNCTURE: CPT | Performed by: INTERNAL MEDICINE

## 2022-02-03 PROCEDURE — 97535 SELF CARE MNGMENT TRAINING: CPT | Mod: GO

## 2022-02-03 PROCEDURE — 97530 THERAPEUTIC ACTIVITIES: CPT | Mod: GP,CQ

## 2022-02-03 PROCEDURE — 12800001 HC ROOM AND CARE SEMIPRIVATE REHAB-BRAIN INJ

## 2022-02-03 PROCEDURE — 63600000 HC DRUGS/DETAIL CODE: Performed by: INTERNAL MEDICINE

## 2022-02-03 PROCEDURE — 97116 GAIT TRAINING THERAPY: CPT | Mod: GP,CQ

## 2022-02-03 PROCEDURE — 25800000 HC PHARMACY IV SOLUTIONS: Performed by: INTERNAL MEDICINE

## 2022-02-03 PROCEDURE — 90853 GROUP PSYCHOTHERAPY: CPT | Performed by: PSYCHOLOGIST

## 2022-02-03 PROCEDURE — 97530 THERAPEUTIC ACTIVITIES: CPT | Mod: GO

## 2022-02-03 PROCEDURE — 97112 NEUROMUSCULAR REEDUCATION: CPT | Mod: GP

## 2022-02-03 PROCEDURE — 80048 BASIC METABOLIC PNL TOTAL CA: CPT | Performed by: INTERNAL MEDICINE

## 2022-02-03 PROCEDURE — 63700000 HC SELF-ADMINISTRABLE DRUG: Performed by: INTERNAL MEDICINE

## 2022-02-03 PROCEDURE — 97116 GAIT TRAINING THERAPY: CPT | Mod: GP

## 2022-02-03 PROCEDURE — 83735 ASSAY OF MAGNESIUM: CPT | Performed by: INTERNAL MEDICINE

## 2022-02-03 RX ORDER — SIMETHICONE 80 MG
80 TABLET,CHEWABLE ORAL 4 TIMES DAILY
Status: DISCONTINUED | OUTPATIENT
Start: 2022-02-03 | End: 2022-02-08 | Stop reason: HOSPADM

## 2022-02-03 RX ADMIN — SODIUM CHLORIDE 125 MG: 9 INJECTION, SOLUTION INTRAVENOUS at 17:56

## 2022-02-03 RX ADMIN — HEPARIN SODIUM 5000 UNITS: 5000 INJECTION, SOLUTION INTRAVENOUS; SUBCUTANEOUS at 06:09

## 2022-02-03 RX ADMIN — LOSARTAN POTASSIUM 25 MG: 25 TABLET, FILM COATED ORAL at 17:51

## 2022-02-03 RX ADMIN — MAGNESIUM 64 MG (MAGNESIUM CHLORIDE) TABLET,DELAYED RELEASE 128 MG: at 21:52

## 2022-02-03 RX ADMIN — PRAVASTATIN SODIUM 40 MG: 20 TABLET ORAL at 17:51

## 2022-02-03 RX ADMIN — SIMETHICONE 80 MG: 80 TABLET, CHEWABLE ORAL at 11:51

## 2022-02-03 RX ADMIN — ACETAMINOPHEN 650 MG: 325 TABLET, FILM COATED ORAL at 06:09

## 2022-02-03 RX ADMIN — METFORMIN HYDROCHLORIDE 500 MG: 500 TABLET ORAL at 08:24

## 2022-02-03 RX ADMIN — OXYCODONE HYDROCHLORIDE 5 MG: 5 TABLET ORAL at 08:21

## 2022-02-03 RX ADMIN — HEPARIN SODIUM 5000 UNITS: 5000 INJECTION, SOLUTION INTRAVENOUS; SUBCUTANEOUS at 15:12

## 2022-02-03 RX ADMIN — PROBIOTIC PRODUCT - TAB 500 MG: TAB at 21:51

## 2022-02-03 RX ADMIN — POLYETHYLENE GLYCOL 3350 17 G: 17 POWDER, FOR SOLUTION ORAL at 17:51

## 2022-02-03 RX ADMIN — DOCUSATE SODIUM 100 MG: 100 CAPSULE, LIQUID FILLED ORAL at 08:24

## 2022-02-03 RX ADMIN — SIMETHICONE 80 MG: 80 TABLET, CHEWABLE ORAL at 21:53

## 2022-02-03 RX ADMIN — GABAPENTIN 600 MG: 300 CAPSULE ORAL at 06:09

## 2022-02-03 RX ADMIN — PROBIOTIC PRODUCT - TAB 500 MG: TAB at 11:51

## 2022-02-03 RX ADMIN — LEVOTHYROXINE SODIUM 150 MCG: 150 TABLET ORAL at 06:09

## 2022-02-03 RX ADMIN — HEPARIN SODIUM 5000 UNITS: 5000 INJECTION, SOLUTION INTRAVENOUS; SUBCUTANEOUS at 21:53

## 2022-02-03 RX ADMIN — SENNOSIDES 2 TABLET: 8.6 TABLET, FILM COATED ORAL at 11:51

## 2022-02-03 RX ADMIN — DOCUSATE SODIUM 100 MG: 100 CAPSULE, LIQUID FILLED ORAL at 21:53

## 2022-02-03 RX ADMIN — ACETAMINOPHEN 650 MG: 325 TABLET, FILM COATED ORAL at 21:52

## 2022-02-03 RX ADMIN — GABAPENTIN 600 MG: 300 CAPSULE ORAL at 21:52

## 2022-02-03 RX ADMIN — SODIUM BICARBONATE 650 MG TABLET 650 MG: at 08:24

## 2022-02-03 RX ADMIN — MAGNESIUM 64 MG (MAGNESIUM CHLORIDE) TABLET,DELAYED RELEASE 128 MG: at 08:24

## 2022-02-03 RX ADMIN — OXYCODONE HYDROCHLORIDE 5 MG: 5 TABLET ORAL at 21:53

## 2022-02-03 RX ADMIN — SIMETHICONE 80 MG: 80 TABLET, CHEWABLE ORAL at 17:51

## 2022-02-03 RX ADMIN — GABAPENTIN 600 MG: 300 CAPSULE ORAL at 15:12

## 2022-02-03 RX ADMIN — METOPROLOL SUCCINATE 100 MG: 100 TABLET, EXTENDED RELEASE ORAL at 21:52

## 2022-02-03 RX ADMIN — ASPIRIN 81 MG: 81 TABLET, COATED ORAL at 08:24

## 2022-02-03 NOTE — PROGRESS NOTES
Subjective    PPatient seen and examined on rounds.  Chart reviewed.  Events overnight noted.  History reviewed briefly with patient.    CC:  Deficits in mobility, transfers, self-care status post L2-L4 posterior spinal fusion with instrumentation, ORIF of L3 pathologic fracture, L3 lateral corpectomy with expandable cage placement, for L3 pathologic fracture, from metastatic thyroid cancer, multiple medical problems.    HPI:  Mr. Matt Williamson is a 76-year-old right-handed white male with chronic conditions significant for hypertension, hypothyroidism, thyroid cancer status post thyroidectomy, atrial fibrillation, coronary artery disease, hyperlipidemia, type 2 diabetes mellitus, metastatic thyroid carcinoma with metastasis to the L3 vertebral body status post stereotactic radiation in May 2020, who had progressively worsening midline low back pain with radiation to the right knee. MRI lumbar spine obtained on 11/22/21 showing extension of the L3 mass, moderate-severe foraminal narrowing on the right secondary to retropulsion of the inferior endplate in the setting of ligament and joint hypertrophy. CT demonstrates significant bony erosion of the L3 vertebral body with a new oblique fracture. PET scan on 12/7/21 showed increased activity of the L3 lesion without further metastatic lesions appreciated. He was initially referred to [facility] however there has been some delay with getting an appointment.  He denied any bowel or bladder incontinence or weakness and follows up with oncologist Dr. Casey who did not recommend any chemotherapy at this time but surgical intervention as the primary treatment given the suspected isolated area of progression at the L3 vertebral body.  Per neurosurgery notes he had preserved strength except 2 beats ankle clonus on the left.   He was recommended surgical resection by neurosurgery as well as oncology.  He was admitted to [facility] on 1/20/22 and underwent a stage I  "L2-L4 posterior spinal fusion with instrumentation, ORIF of L3 pathologic fracture, and stage II L3 lateral corpectomy with expandable cage placement with neurosurgeon Dr Colt Heller on 1/20/22 at [facility].  He is allowed weightbearing as tolerated on both lower extremities and was given an LSO brace for use when out of bed.  Postoperative course was significant for urinary retention and he had an indwelling Lewis catheter.  The Lewis catheter was removed 2 days back and per patient, he is starting to void now.  I mentioned to him we will monitor post void residuals to see if he is emptying his bladder completely or not.  He has some paresthesias/hyperesthesias in his right thigh especially lateral aspect and some right hip proximal weakness and per neurosurgery notes that is not uncommon given the nature of his anterior lumbar surgery.  He is on subcutaneous Heparin and SCDs for DVT prophylaxis.  I discussed his recent clinical course with patient and with his wife at bedside.  On 1/27/22, hemoglobin was 9.5, WBC count 10.09, platelets are 304, BUN was 17, and creatinine was 1.0.  He has been needing assistance for mobility, transfers, self-care. He is transferred to [place] [facility] on 1/27/22 for further rehabilitation care.     SUBJ: Able to ambulate 150 feet with minimal assistance with straight cane with physical therapy.  Requiring close supervision to minimal assistance for transfers with physical therapy.    ROS: Denies chest pain or shortness of breath. Other ROS negative. Past, family, social history is unchanged.      Physical Exam      Blood pressure (!) 156/69, pulse 91, temperature 36.8 °C (98.3 °F), temperature source Oral, resp. rate 18, height 1.676 m (5' 6\"), weight 75 kg (165 lb 6 oz), SpO2 98 %.  Body mass index is 26.69 kg/m².    General Appearance: Not in acute distress  Head/Ear/Nose/Throat: Normocephalic; Atraumatic.   Eye: EOMI; PERRL.   Neck: No JVD; No Bruits.   Respiratory: " Decreased breath sounds at bases.   Cardiovascular: RRR; Normal S1, S2.   Gastrointestinal: Soft; bowel sounds present but somewhat hypoactive.  Extremities: Bilateral lower extremity edema noted.    Musculoskeletal: Functional active range of motion in both upper extremities.  Some limitation of active range of motion in right hip limited by pain.    Neurological: AAO ×3. Speech is fluent. Cranial nerve examination does not reveal any gross facial asymmetry. Strength testing shows about 4+/5 strength in both upper extremities.  Right hip flexion is less than 3/5.  Right quadriceps is 4/5 with the right thigh supported.  Right ankle dorsiflexion, plantar flexion and right EHL are 4+/5.  Left hip flexion is 4/5.  Left quadriceps is 4+/5.  Left ankle dorsiflexion, plantar flexion and left EHL are 4+/5.  He is grossly able to localize touch and position sense in great toes, but does report some hyperesthesia in the right thigh lateral aspect.  Deep tendon reflexes are hypoactive and symmetric bilaterally.  Plantars are flexor.  Coordination is functional upper extremities.    Neurologically stable.  Behavior/Emotional: Appropriate; Cooperative.   Skin: No obvious rashes noted.  Healing incision noted on right flank.  Healing incision also noted on posterior lumbar spine.  Mepilex border dressing in place on incisions.      Current Facility-Administered Medications:   •  acetaminophen (TYLENOL) tablet 650 mg, 650 mg, oral, q4h PRN, Arjun Dawkins MD, 650 mg at 02/02/22 1350  •  alum-mag hydroxide-simeth (MAALOX) 200-200-20 mg/5 mL suspension 30 mL, 30 mL, oral, q4h PRN, Arjun Dawkins MD  •  [START ON 2/3/2022] aspirin enteric coated tablet 81 mg, 81 mg, oral, Daily, Arjun Dawkins MD  •  bisacodyL (DULCOLAX) 10 mg suppository 10 mg, 10 mg, rectal, Daily PRN, Arjun Dawkins MD  •  glucose chewable tablet 16-32 g of dextrose, 16-32 g of dextrose, oral, PRN, 16 g of dextrose at 01/29/22 1721 **OR** dextrose 40 % oral gel  15-30 g of dextrose, 15-30 g of dextrose, oral, PRN **OR** glucagon (GLUCAGEN) injection 1 mg, 1 mg, intramuscular, PRN **OR** dextrose in water injection 12.5 g, 25 mL, intravenous, PRN, Arjun Dawkins MD  •  diazePAM (VALIUM) tablet 5 mg, 5 mg, oral, q6h PRN, Arjun Dawkins MD  •  diphenhydrAMINE (BENADRYL) capsule 25 mg, 25 mg, oral, q6h PRN, Arjun Dawkins MD  •  docusate sodium (COLACE) capsule 100 mg, 100 mg, oral, BID, Arjun Dawkins MD, 100 mg at 02/01/22 2022  •  ergocalciferol (VITAMIN D2) capsule 50,000 Units, 50,000 Units, oral, Weekly, Arjun Dawkins MD, 50,000 Units at 01/29/22 0932  •  ferric gluconate (FERRLECIT) 125 mg in sodium chloride 0.9 % 100 mL IVPB, 125 mg, intravenous, Daily, Arjun Dawkins MD, Last Rate: 100 mL/hr at 02/02/22 1827, 125 mg at 02/02/22 1827  •  gabapentin (NEURONTIN) capsule 600 mg, 600 mg, oral, q8h SALIMA, Arjun Dawkins MD, 600 mg at 02/02/22 1350  •  heparin (porcine) 5,000 unit/mL injection 5,000 Units, 5,000 Units, subcutaneous, q8h [facility], Arjun Dawkins MD, 5,000 Units at 02/02/22 1350  •  insulin aspart U-100 (NovoLOG) pen 1-10 Units, 1-10 Units, subcutaneous, TID with meals, Arjun Dawkins MD, 1 Units at 02/02/22 1817  •  levothyroxine (SYNTHROID) tablet 150 mcg, 150 mcg, oral, Daily (6a), Arjun Dawkins MD, 150 mcg at 02/02/22 0547  •  losartan (COZAAR) tablet 25 mg, 25 mg, oral, Daily with dinner, Arujn Dawkins MD, 25 mg at 02/02/22 1811  •  magnesium chloride tablet,delayed release (DR/EC) 128 mg, 128 mg, oral, BID, Arjun Dawkins MD, 128 mg at 02/02/22 0851  •  metFORMIN (GLUCOPHAGE) tablet 500 mg, 500 mg, oral, BID with meals, Arjun Dawkins MD, 500 mg at 02/02/22 1811  •  metoprolol succinate XL (TOPROL-XL) 24 hr ER tablet 100 mg, 100 mg, oral, Nightly, Arjun Dawkins MD, 100 mg at 02/01/22 2030  •  ondansetron ODT (ZOFRAN-ODT) disintegrating tablet 4 mg, 4 mg, oral, q8h PRN, Arjun Dawkins MD  •  oxyCODONE (ROXICODONE) immediate release tablet 10 mg, 10 mg, oral,  q4h PRN, 10 mg at 02/02/22 0400 **AND** oxyCODONE (ROXICODONE) immediate release tablet 5 mg, 5 mg, oral, q4h PRN, Arjun Dawkins MD, 5 mg at 02/02/22 1035  •  polyethylene glycol (MIRALAX) 17 gram packet 17 g, 17 g, oral, Daily with dinner, Arjun Dawkins MD, 17 g at 02/02/22 1812  •  pravastatin (PRAVACHOL) tablet 40 mg, 40 mg, oral, Daily (6p), Arjun Dawkins MD, 40 mg at 02/02/22 1811  •  senna (SENOKOT) tablet 2 tablet, 2 tablet, oral, Daily, Arjun Dawkins MD, 2 tablet at 02/02/22 1350  •  sodium bicarbonate tablet 650 mg, 650 mg, oral, BID, Arjun Dawkins MD, 650 mg at 02/02/22 0851       Labs / Radiology    Lab Results   Component Value Date    WBC 7.10 01/31/2022    HGB 9.2 (L) 01/31/2022    HCT 29.1 (L) 01/31/2022    MCV 84.8 01/31/2022     01/31/2022     Lab Results   Component Value Date    GLUCOSE 132 (H) 01/31/2022    CALCIUM 7.8 (L) 01/31/2022     01/31/2022    K 3.9 01/31/2022    CO2 26 01/31/2022    CL 99 01/31/2022    BUN 7 (L) 01/31/2022    CREATININE 0.8 01/31/2022     Assessment and Plan    ASSESSMENT PLAN:  1. 76-year-old right-handed white male with chronic conditions significant for hypertension, hypothyroidism, thyroid cancer status post thyroidectomy, atrial fibrillation, coronary artery disease, hyperlipidemia, type 2 diabetes mellitus, metastatic thyroid carcinoma with metastasis to the L3 vertebral body status post stereotactic radiation in May 2020, who had progressively worsening midline low back pain with radiation to the right knee. MRI lumbar spine obtained on 11/22/21 showing extension of the L3 mass, moderate-severe foraminal narrowing on the right secondary to retropulsion of the inferior endplate in the setting of ligament and joint hypertrophy. CT demonstrates significant bony erosion of the L3 vertebral body with a new oblique fracture. PET scan on 12/7/21 showed increased activity of the L3 lesion without further metastatic lesions appreciated. He was initially  referred to [facility] however there has been some delay with getting an appointment.  He denied any bowel or bladder incontinence or weakness and follows up with oncologist Dr. Casey who did not recommend any chemotherapy at this time but surgical intervention as the primary treatment given the suspected isolated area of progression at the L3 vertebral body.  Per neurosurgery notes he had preserved strength except 2 beats ankle clonus on the left.   He was recommended surgical resection by neurosurgery as well as oncology.  He was admitted to [facility] on 1/20/22 and underwent a stage I L2-L4 posterior spinal fusion with instrumentation, ORIF of L3 pathologic fracture, and stage II L3 lateral corpectomy with expandable cage placement with neurosurgeon Dr Colt Heller on 1/20/22 at [facility].  He is allowed weightbearing as tolerated on both lower extremities and was given an LSO brace for use when out of bed.  Postoperative course was significant for urinary retention and he had an indwelling Lewis catheter.  The Lewis catheter was removed 2 days back and per patient, he is starting to void now.  I mentioned to him we will monitor post void residuals to see if he is emptying his bladder completely or not.  He has some paresthesias/hyperesthesias in his right thigh especially lateral aspect and some right hip proximal weakness and per neurosurgery notes that is not uncommon given the nature of his anterior lumbar surgery.  He is on subcutaneous Heparin and SCDs for DVT prophylaxis.  I discussed his recent clinical course with patient and with his wife at bedside.  On 1/27/22, hemoglobin was 9.5, WBC count 10.09, platelets are 304, BUN was 17, and creatinine was 1.0.  He has been needing assistance for mobility, transfers, self-care. He is transferred to [place] [facility] on 1/27/22 for further rehabilitation care.      2. DVT prophylaxis - on subcutaneous Heparin.  On SCDs.  Check doppler.  Platelets 323 on 1/28/2022.  Platelets 338 on 1/31/2022.     3.  Neurosurgery - status post L2-L4 posterior spinal fusion with instrumentation, ORIF of L3 pathologic fracture, L3 lateral corpectomy with expandable cage placement, for L3 pathologic fracture, from metastatic thyroid cancer, multiple medical problems - continue PT, OT, psychology.  Follow falls precautions, cardiac precautions, monitor pulse oximetry in therapy.  Follow spinal precautions.  LSO brace when out of bed.  He has follow-up appointment with neurosurgery on 2/2/2022.     4. GI - On Protonix for GI prophylaxis. Continue Colace, Senokot, MiraLAX, Dulcolax.  On Zofran ODT for nausea.  On Maalox PRN.     5.  - Postoperative course was significant for urinary retention and he had an indwelling Lewis catheter.  The Lewis catheter was removed 2 days back and per patient, he is starting to void now.  I mentioned to him we will monitor post void residuals to see if he is emptying his bladder completely or not.       6.  Diabetes mellitus - on Jardiance.  On Metformin.  On sliding-scale NovoLog coverage.  On hypoglycemic protocol.     7. Pain - on Tylenol.  On Neurontin.  On PRN Oxycodone.  On Valium PRN for spasms.     8. Hematology -hemoglobin 9.2, WBC 8.01 on 1/28/2022.  Hemoglobin 9.2, WBC 7.10 on 1/31/2022.     9.  Endocrinology - history of thyroid cancer status post thyroidectomy.  On Synthroid.     10. CVS - history of hypertension, atrial fibrillation, coronary artery disease - on Toprol-XL.     11.  Hyperlipidemia - on Pravachol.     12.  Oncology - history of metastatic thyroid cancer status post thyroidectomy.  He had L2-L4 posterior spinal fusion with instrumentation, ORIF of L3 pathologic fracture, L3 lateral corpectomy with expandable cage placement, for L3 pathologic fracture, from metastatic thyroid cancer.  He will follow up with oncology and neurosurgery.     13.  Skin - monitor healing of incisions on right flank and on posterior  lumbar spine.  Dermal defense will be consulted.     14.  F/E/N - on sodium bicarbonate.     15. Rehabilitation medicine - Continue comprehensive rehabilitation care. Continue PT, OT, psychology.  We will follow falls precautions, cardiac precautions, monitor pulse oximetry in therapy and follow aspiration precautions.  We will follow spinal precautions.  LSO brace when out of bed.Tolerating therapies per endurance.  Reports pain medications are helping.  Had a bowel movement.  Voiding well.Inspected incisions which are stable.  Requiring minimal assistance for transfers with physical therapy.  Able to ambulate 50 feet with front wheel walker with minimal assistance with physical therapy.Discussed follow-up appointment with neurosurgery with him for this Wednesday.  Working on upper extremity strengthening and range of motion exercises.  Able to don and doff LSO brace.  Discussed spinal precautions with him.Able to ambulate 150 feet with minimal assistance with straight cane with physical therapy.  Requiring close supervision to minimal assistance for transfers with physical therapy.     16. Reviewed labs today. BUN 12, creatinine 0.8 on 1/28/2022.  BUN 7, creatinine 0.8 on 1/31/2022.           James Hidalgo MD  2/2/2022

## 2022-02-03 NOTE — PROGRESS NOTES
Patient: Matt Williamson  Location: [facility] [address]  MRN: [id number]  Today's date: 2/3/2022    History of Present Illness  Marco A is a 76 y.o. male admitted on 1/27/2022 with Lumbar disc disease with radiculopathy [M51.16]  S/P lumbar fusion [Z98.1]. Principal problem is Lumbar disc disease with radiculopathy.    Patient is a 76-year-old male with a history of atrial fibrillation, CAD, hypertension, hypothyroidism, hyperlipidemia, thyroid cancer, type 2 diabetes developed significant mid to low back pain with walking and standing.  He has metastatic thyroid carcinoma with metastasis to the L3 body status post radiation in May 2020.  Recent PET scan showed increased activity at the L3 level without further metastatic lesions.  He was evaluated by neurosurgery who recommended surgical resection.  He was taken to the OR on 1/20 and underwent a stage I L2-L4 PSF with instrumentation, ORIF of L3 pathologic fracture, stage II L3 lateral corpectomy with expandable cage placement.  There has been no significant postoperative complications.  His pain is fairly well controlled.  An LSO has been ordered.    1/28 Pt placed on Med hold after OT eval  and during PT eval 2/2 - metabolic ketone acidosis with dehydration    Past Medical History  Marco A has a past medical history of Atrial fibrillation with rapid ventricular response (CMS/HCC) (10/25/2019), BPH (benign prostatic hyperplasia), Coronary artery disease, Hypertension, Hypothyroidism, Mixed hyperlipidemia, Thyroid cancer (CMS/HCC), Type 2 diabetes mellitus without complication (CMS/HCC), and Vertigo.      PT Vitals    Date/Time Pulse HR Source BP BP Location BP Method Pt Position [facility]   02/03/22 1332 88 Monitor 122/58 Left upper arm Automatic Sitting BS      PT Pain    Date/Time Pain Type Location Rating: Rest Rating: Activity Interventions [facility]   02/03/22 1332 Pain Assessment -- 0 -- -- BS   02/03/22 1428 Pain Reassessment back -- 5 position adjusted  BS          Prior Living Environment      Most Recent Value   People in Home spouse   Current Living Arrangements home   Home Accessibility stairs to enter home (Group)   Living Environment Comment pt lives in 2  in [place]. 3 steps to enter with R rail, bedroom and shower stall on first floor, full flight B/L rails to shower stall and bedroom, typically sleeps on second floor. has a bed and shower stall on first floor   Number of Stairs, Main Entrance 3   Stair Railings, Main Entrance railing on right side (ascending)   Location, Patient Bedroom second floor, must negotiate stairs to access   Patient Bedroom Access Comment has bedroom on first floor if needed   Location, Bathroom second floor, must negotiate stairs to access   Bathroom Access Comment has bathroom on first floor   Number of Stairs, Within Home, Primary 12   Stair Railings, Within Home, Primary railings on both sides of stairs          Prior Level of Function      Most Recent Value   Dominant Hand right   Ambulation assistive equipment   Transferring assistive equipment   Toileting independent   Bathing independent   Dressing independent   Eating independent   Prior Level of Function Comment Pt reports he was independent for all mobility using SPC, has shower chair at home.   Assistive Device Currently Used at Home cane, straight, shower chair           IRF PT Evaluation and Treatment - 02/03/22 1332        PT Time Calculation    Start Time 1330     Stop Time 1430     Time Calculation (min) 60 min        Session Details    Document Type daily treatment/progress note     Mode of Treatment physical therapy;individual therapy        General Information    General Observations of Patient Pt received via direct handoff from OT, agreeable to session.        Sit to Stand Transfer    Ward, Sit to Stand Transfer close supervision     Verbal Cues safety     Assistive Device walker, front-wheeled;gait belt     Comment from w/c with Cl S for  "safety        Stand to Sit Transfer    Young, Stand to Sit Transfer close supervision     Verbal Cues safety     Assistive Device walker, front-wheeled;gait belt     Comment to w/c with Cl S for safety        Stand Pivot Transfer    Young, Stand Pivot/Stand Step Transfer close supervision     Verbal Cues safety     Assistive Device walker, front-wheeled;gait belt     Comment Via amb approach to w/c        Car Transfer    Transfer Technique sit-stand;stand-sit     Young, Car Transfer close supervision     Verbal Cues safety     Assistive Device walker, front-wheeled;gait belt     Comment Via amb approach with Cl S for safety.        Gait Training    Young, Gait close supervision     Assistive Device walker, front-wheeled     Distance in Feet 200 feet     Pattern (Gait) step-through     Deviations/Abnormal Patterns (Gait) gait speed decreased     Advanced Gait Activity step over obstacle     Comment (Gait/Stairs) (1) 200' x1 using RW with Cl S for safety. (2) 200' x1 using R SPC with steadying A for balance/safety. Improved SPC sequencing noted.        Curb Negotiation    Young close supervision     Assistive Device walker, front-wheeled;gait belt     Curb Height 6 inches     Comment Up/down 6\" + 2\" curb using RW with Cl S for safety. Cues for improved positioning to increase safety.        Sloped Surface Gait Skills    Young close supervision     Assistive Device walker, front-wheeled;gait belt     Distance in Feet 10 feet     Comment Up/down 5' indoor ramp using RW with Cl S for safety.        Step Over Obstacle    Young touching/steadying assist     Assistive Device cane, straight;gait belt     Comment 12' x4 fwd stepping (over 6\" hurdles) using R SPC with primarily steadying A. Pt demo 1 episode of R foot catch with mild LOB requiring Min A to recover.        Balance    Balance Interventions standing;dynamic     Comment, Balance (1) 15' x4 obstacle negotation " "(stepping onto over foam and 6\" block, weaving through cones ~2 ft apart) using SPC with steadying A for balance. (2) x1' A/P, 2 x1' R/L rocker board with intermittent UE support on // bars and steadying-Min A for balance. Increased unsteadiness noted R/L. (3) x3 min ball toss while standing on rocker board in A/P plane, steadying-Min A for balance.        Daily Progress Summary (PT)    Symptoms Noted During/After Treatment increased pain     Daily Outcome Statement Session focused on gait/elevation training and dynamic balance. Pt cont to demo improving stability during gait training, progressing to Cl S with RW and steadying A using SPC. He progressed to Cl S during elevations with BUE support. He was challeneged by dynamic balance activities on rocker board, especially in medial/lateral plane. Cont to progress LE strengthening and endurance as able.                           IRF PT Goals      Most Recent Value   Bed Mobility Goal 1    Activity/Assistive Device bed mobility activities, all at 01/28/2022 0902   [place] supervision required at 01/28/2022 0902   Time Frame short-term goal (STG), 1 week at 01/28/2022 0902   Progress/Outcome goal ongoing, good progress toward goal at 02/01/2022 0830   Bed Mobility Goal 2    Activity/Assistive Device bed mobility activities, all at 01/28/2022 0902   Amelia modified independence at 01/28/2022 0902   Time Frame long-term goal (LTG), 21 days or less at 01/28/2022 0902   Progress/Outcome goal ongoing at 02/01/2022 0830   Transfer Goal 1    Activity/Assistive Device sit-to-stand/stand-to-sit, stand pivot, walker, front-wheeled at 02/01/2022 0830   Amelia supervision required at 02/01/2022 0830   Time Frame short-term goal (STG), 3 - 5 days at 02/01/2022 0830   Progress/Outcome goal met, goal revised this date at 02/01/2022 0830   Transfer Goal 2    Activity/Assistive Device sit-to-stand/stand-to-sit, stand pivot at 01/28/2022 0902   Amelia modified " independence at 01/28/2022 0902   Time Frame long-term goal (LTG), 21 days or less at 01/28/2022 0902   Progress/Outcome goal ongoing, good progress toward goal at 02/01/2022 0830   Gait/Walking Locomotion Goal 1    Activity/Assistive Device gait (walking locomotion), walker, front-wheeled at 02/01/2022 0830   Distance 150 feet at 02/01/2022 0830   [place] supervision required at 02/01/2022 0830   Time Frame short-term goal (STG), 5 - 7 days at 02/01/2022 0830   Progress/Outcome goal met, goal revised this date at 02/01/2022 0830   Gait/Walking Locomotion Goal 2    Activity/Assistive Device gait (walking locomotion) at 01/28/2022 0902   Distance 150 feet at 01/28/2022 0902   [place] modified independence at 01/28/2022 0902   Time Frame long-term goal (LTG), 21 days or less at 01/28/2022 0902   Progress/Outcome goal ongoing at 02/01/2022 0830   Stairs Goal 1    Activity/Assistive Device stairs, all skills at 01/28/2022 0902   Number of Stairs 12 at 01/28/2022 0902   [place] minimum assist (75% or more patient effort) at 01/28/2022 0902   Time Frame short-term goal (STG), 3 - 5 days at 01/28/2022 0902   Progress/Outcome goal ongoing, good progress toward goal at 02/01/2022 0830   Stairs Goal 2    Activity/Assistive Device stairs, all skills at 01/28/2022 0902   Number of Stairs 12 at 01/28/2022 0902   [place] modified independence at 01/28/2022 0902   Time Frame long-term goal (LTG), 21 days or less at 01/28/2022 0902   Progress/Outcome goal ongoing at 02/01/2022 0830

## 2022-02-03 NOTE — PLAN OF CARE
Plan of Care Review  Plan of Care Reviewed With: patient  Progress: improving  Outcome Summary: Pt. AAOx4, cooperative with care. He asks appropriate questions about his care. At times, he is frustrated about being here and the complications that have come with it (BP changes, Blood sugar levels, bowels, etc.). Continent of bowel and bladder. Last PM, PVR was 390. He declined catheterization, education provided. He then voided aroun 2300 and PVR was low. He stated he felt constipated around 1900, then had a small bowel movement and passed flatus and felt better. Bowel sounds X4, soft, nontender. He also reported right ankle pain. Upon assessment, no redness or warmth. Edema +1. He also reported back pain managed by tylenol, oxycodone and pillow support. His mood improved throughout the shift. Right forearm IV flushed without difficulty. LSO OOB. Refused SCDs and turns overnight. Call bell within reach.

## 2022-02-03 NOTE — PROGRESS NOTES
[facility] Progress Note          Patient was seen and examined at bedside.    Subjective:     Stable, pleasant, no new complaints or concerns, slept well, abdomen distended, his bowel sounds are active, add mylicon, diet modification per dietitian,   he is focusing on his PT/OT and is making progression. Reviewed his am BMP, benign, his electrolytes are balanced.  Abdominal bloating, hold metformin, his BG levels are fine now, cont accu checks with ISS, provide one dose of soap suds enema this afternoon after his therapy. Provide probiotic to help his IBS.   Objective   Vital signs in last 24 hours:  Temp:  [36.8 °C (98.3 °F)-37.2 °C (99 °F)] 37.2 °C (98.9 °F)  Heart Rate:  [75-91] 84  Resp:  [16-18] 18  BP: ()/(53-71) 96/53      Intake/Output Summary (Last 24 hours) at 2/3/2022 0948  Last data filed at 2/2/2022 2326  Gross per 24 hour   Intake 125 ml   Output 500 ml   Net -375 ml     Intake/Output this shift:  No intake/output data recorded.   Review of Systems:  All other systems reviewed and negative except as noted in the HPI.   Objective      Labs  reviewed his labs thoroughly   Lab Results   Component Value Date    WBC 7.10 01/31/2022    HGB 9.2 (L) 01/31/2022    HCT 29.1 (L) 01/31/2022    MCV 84.8 01/31/2022     01/31/2022     Lab Results   Component Value Date    GLUCOSE 132 (H) 02/03/2022    CALCIUM 8.1 (L) 02/03/2022     (L) 02/03/2022    K 4.3 02/03/2022    CO2 29 02/03/2022     02/03/2022    BUN 8 02/03/2022    CREATININE 0.8 02/03/2022       Imaging  OSH imaging study reports reviewed   B/l LE venous compression U/S 1/28/22:  No evidence of deep vein thrombus (DVT) in either lower extremity.      Full Code    Physical Exam:  Head/Ear/Nose/Throat: normocephalic; atraumatic; moisture mouth mm, no oropharyngeal thrush noted.   Eyes: anicteric sclera, EOMI; PERRL.   Neck : supple, no JVD, no carotid bruits appeciated.   Respiratory: no evidence of labored  breathing, lung sounds CTA b/l, good aeration bibasilar area, no w/r/c.   Cardiovascular: RRR; normal S1, S2; no m/r/g; no S3 or S4.   Gastrointestinal: soft; NT; BS normal; mildly distended; no CVAT b/l.   Genitourinary: no silva.   Extremities : b/l LE pitting edema .   Neurological: AO x 3, fluent speeches, following commands, CNS II-XII grossly intact; no focal neurologic deficits.   Behavior/Emotional: in NAD, appropriate; cooperative.   Skin: sacral area skin lesions.     Plan of care was discussed with patient, RN, and PMR attending     Assessment   CC:Thyroid cancer with progressive lumbar metastatic lesions and L3 pathologic fracture complicated with myeloradiculopathy, s/p surgical intervention and ORIF, neurogenic B/B, ADL and ambulatory dysfunction      76 y.o. male with PMH of HTN, dyslipidemia, CAD, type 2 DM, hypothyroidism, thyroid cancer metastatic to L3 vertebral body s/p radiation stereotactic body radiation 5/2020, seen by the neurosurgery service for surgical discussion regarding growth of his L3 vertebral body metastatic lesion. He has had progressive worsening midline-line low back pain with radiation to the right knee. An MRI lumbar spine was obtained 11/22/2021 showing extension of the L3 mass. He was sent for a PET scan 12/7/2021 showing increased activity of the L3 lesion without further metastatic lesions appreciated. He was referred to [facility] however there has been some delay with getting an appointment.      He has had persistent midline low back pain with walking and standing which has progressively worsened. He does not have any weakness or bowel/bladder incontinence. He is following with oncology (Dr. Casey) who does not recommend any chemotherapy at this time but surgical intervention as the primary treatment given the suspected isolated area of progression at the L3 vertebral body.     He was admitted to [facility] on 1/20/22 and underwent elective :  Stage 1: L2-L4 PLSF with  instrumentation, ORIF L3 pathologic fracture  Stage 2: L3 lateral corpectomy with expandable cage placement  Post op ileus, s/p aggressive bowel program, resolved, tolerated diet. Functionally he has ADL and ambulatory dysfunction, requiring inpt acute rehab, transferred to [facility] on 1/27/22.            Ileus (CMS/HCC)  Resolved after aggressive bowel program, start diet  Tolerating diet, cont bowel regimen.     Acquired hypothyroidism  Assessment & Plan  Status post thyroidectomy  Continue levothyroxine        Malignant neoplasm of vertebral column, excluding sacrum and coccyx (CMS/HCC)  Assessment & Plan  Metastatic thyroid ca S/p L3 pathologic fracture s/p right lateral L3 corpectomy and placement of an expandable cage, followed by L2-4 posterolateral instrumented fusion  Continue postop management per neurosurgery.  Drain was removed  DVT prophylaxis, Lewis, pain management per neurosurgery  Rehab as inpt at [facility]      Paroxysmal atrial fibrillation (CMS/[facility])  Assessment & Plan  Currently in sinus rhythm  Patient is not on long-term anticoagulation  He takes Rythmol as needed for palpitations     Type 2 diabetes mellitus without complication (CMS/[facility])  Assessment & Plan  Patient on Jardiance, Trulicity and metformin as outpatient  Cont inpt Accu-Cheks with sliding scale insulin  Hemoglobin A1c 6.9  Continue Jardiance and restarted metformin and trulicity  Metformin should be changed to twice daily or extended release once daily      Metastasis from thyroid cancer (CMS/HCC)  Assessment & Plan  Follows up with medical oncologist  and radiation oncologist  Status post radiation     Mixed hyperlipidemia  Assessment & Plan  Continue statin     Essential hypertension  Assessment & Plan  Stable  Continue metoprolol     Atherosclerosis of coronary artery  Assessment & Plan  Status post LAD stenting 17 years ago  Patient follows up with Dr. Ji  Continue metoprolol, statin  Restart aspirin once  cleared by neurosurgery     #VitD deficiency with 25 OH VitD low at 12, loading with ergocaociferol 50,000 units weekly    #Anemia with AVI, provide ferrous sulfate oral supplement    # Euglycemic DKA, hold jardiance, IVF and po hydration, replacement K, Mag, insulin replacement     Billing code: [id number]  Diagnoses:  Patient Active Problem List   Diagnosis   • BPH (benign prostatic hyperplasia)   • Atherosclerosis of coronary artery   • Cataract   • Essential hypertension   • Former tobacco use   • Mixed hyperlipidemia   • Metastasis from thyroid cancer (CMS/HCC)   • Type 2 diabetes mellitus without complication (CMS/HCC)   • Cyst of thyroid   • Esophageal reflux   • Paroxysmal atrial fibrillation (CMS/HCC)   • Pre-op evaluation   • Preop cardiovascular exam   • Cancer associated pain   • Low back pain with right-sided sciatica   • Malignant neoplasm of vertebral column, excluding sacrum and coccyx (CMS/HCC)   • Acquired hypothyroidism   • Ileus (CMS/HCC)   • Leg swelling   • Lumbar disc disease with radiculopathy   • S/P lumbar fusion   • Vitamin D deficiency   • AVI (iron deficiency anemia)        Arjun Dawkins MD  2/3/2022

## 2022-02-03 NOTE — PROGRESS NOTES
Patient: Matt Williamson  Location: [facility]  MRN: [id number]  Today's date: 2/3/2022    History of Present Illness  Marco A is a 76 y.o. male admitted on 1/27/2022 with Lumbar disc disease with radiculopathy [M51.16]  S/P lumbar fusion [Z98.1]. Principal problem is Lumbar disc disease with radiculopathy.    Patient is a 76-year-old male with a history of atrial fibrillation, CAD, hypertension, hypothyroidism, hyperlipidemia, thyroid cancer, type 2 diabetes developed significant mid to low back pain with walking and standing.  He has metastatic thyroid carcinoma with metastasis to the L3 body status post radiation in May 2020.  Recent PET scan showed increased activity at the L3 level without further metastatic lesions.  He was evaluated by neurosurgery who recommended surgical resection.  He was taken to the OR on 1/20 and underwent a stage I L2-L4 PSF with instrumentation, ORIF of L3 pathologic fracture, stage II L3 lateral corpectomy with expandable cage placement.  There has been no significant postoperative complications.  His pain is fairly well controlled.  An LSO has been ordered.    1/28 Pt placed on Med hold after OT eval  and during PT eval 2/2 - metabolic ketone acidosis with dehydration    Past Medical History  Marco A has a past medical history of Atrial fibrillation with rapid ventricular response (CMS/HCC) (10/25/2019), BPH (benign prostatic hyperplasia), Coronary artery disease, Hypertension, Hypothyroidism, Mixed hyperlipidemia, Thyroid cancer (CMS/HCC), Type 2 diabetes mellitus without complication (CMS/HCC), and Vertigo.      OT Vitals    Date/Time Pulse BP [facility]   02/03/22 1303 82 132/95 VMS      OT Pain    Date/Time Pain Type Rating: Rest [facility]   02/03/22 1303 Pain Assessment 2 VMS      pt with no increased pain end of session     Prior Living Environment      Most Recent Value   People in Home spouse   Current Living Arrangements home   Home Accessibility stairs to enter  home (Group)   Living Environment Comment pt lives in 2  in [place]. 3 steps to enter with R rail, bedroom and shower stall on first floor, full flight B/L rails to shower stall and bedroom, typically sleeps on second floor. has a bed and shower stall on first floor   Number of Stairs, Main Entrance 3   Stair Railings, Main Entrance railing on right side (ascending)   Location, Patient Bedroom second floor, must negotiate stairs to access   Patient Bedroom Access Comment has bedroom on first floor if needed   Location, Bathroom second floor, must negotiate stairs to access   Bathroom Access Comment has bathroom on first floor   Number of Stairs, Within Home, Primary 12   Stair Railings, Within Home, Primary railings on both sides of stairs          Prior Level of Function      Most Recent Value   Dominant Hand right   Ambulation assistive equipment   Transferring assistive equipment   Toileting independent   Bathing independent   Dressing independent   Eating independent   Prior Level of Function Comment Pt reports he was independent for all mobility using SPC, has shower chair at home.   Assistive Device Currently Used at Home cane, straight, shower chair          Occupational Profile      Most Recent Value   Successful Occupations Retired restaraunt owner   Occupational History/Life Experiences Enjoys Playing Basketball. +    Environmental Supports and Barriers lives with spouse   Patient Goals 1/29/22 PSFS  cleaning 3/10, doing wash 4/10, bowling 1/10, shooting baskets 3/10, and running on treadmill 4/10. Total score: 30%           IRF OT Evaluation and Treatment - 02/03/22 1302        OT Time Calculation    Start Time 1300     Stop Time 1330     Time Calculation (min) 30 min        Session Details    Document Type daily treatment/progress note     Mode of Treatment occupational therapy;individual therapy        General Information    General Observations of Patient End of session direct handoff  end of session to PT stated pt reported R anterior ankle discomfort for PT to assess        Transfers    Comment gait belt donned for all tx and ambulation        Sit to Stand Transfer    [place], Sit to Stand Transfer close supervision     Verbal Cues safety     Assistive Device walker, front-wheeled        Stand to Sit Transfer    [place], Stand to Sit Transfer close supervision     Verbal Cues safety     Assistive Device walker, front-wheeled        Stand Pivot Transfer    [place], Stand Pivot/Stand Step Transfer close supervision     Verbal Cues safety     Assistive Device walker, front-wheeled        Therapeutic Interventions    Comment, Therapeutic Intervention Provided pt with information from falls prevention presentation about safety and planning for d/c home. provided pt with handout        Daily Progress Summary (OT)    Daily Outcome Statement Pt with good participation during session with planning for d/c home. review of fall prevention education for home. Pt having shower chair at home. Pt would continue to benefit from skilled OT services. Continue with POC                      Education Documentation  Group Therapy Education, taught by Melissa Hdz, OT at 2/3/2022  3:20 PM.  Learner: Patient  Readiness: Acceptance  Method: Explanation, Handout  Response: Verbalizes Understanding  Comment: Reviewd information from fall prevention class.. Home safety recs discussed for fall prevention. Handout issued for carryover of instruction to maximize safety in home environment.          IRF OT Goals      Most Recent Value   Transfer Goal 1    Activity/Assistive Device toilet at 01/28/2022 0722   [place] --  [CS] at 02/01/2022 0724   Time Frame short-term goal (STG), 5 - 7 days at 02/01/2022 0724   Progress/Outcome goal revised this date at 02/01/2022 0724   Transfer Goal 2    Activity/Assistive Device toilet at 01/28/2022 0722   [place] modified independence at 01/28/2022 0722    Time Frame long-term goal (LTG), 14 days or less at 01/28/2022 0722   Progress/Outcome goal ongoing at 02/01/2022 0724   Transfer Goal 3    Activity/Assistive Device shower at 01/28/2022 0722   [place] supervision required at 02/01/2022 0724   Time Frame short-term goal (STG), 5 - 7 days at 02/01/2022 0724   Progress/Outcome goal revised this date at 02/01/2022 0724   Transfer Goal 4    Activity/Assistive Device shower at 01/28/2022 0722   [place] modified independence at 01/28/2022 0722   Time Frame long-term goal (LTG), 14 days or less at 01/28/2022 0722   Progress/Outcome goal ongoing at 02/01/2022 0724   Bathing Goal 1    [place] supervision required at 02/01/2022 0724   Time Frame short-term goal (STG), 5 - 7 days at 02/01/2022 0724   Progress/Outcome goal revised this date at 02/01/2022 0724   Bathing Goal 2    [place] modified independence at 01/28/2022 0722   Time Frame long-term goal (LTG), 14 days or less at 01/28/2022 0722   Progress/Outcome goal ongoing at 02/01/2022 0724   UB Dressing Goal 1    [place] supervision required at 02/01/2022 0724   Time Frame short-term goal (STG), 5 - 7 days at 02/01/2022 0724   Progress/Outcome goal revised this date at 02/01/2022 0724   UB Dressing Goal 2    [place] modified independence at 01/28/2022 0722   Time Frame long-term goal (LTG), 14 days or less at 01/28/2022 0722   Progress/Outcome goal ongoing at 02/01/2022 0724   LB Dressing Goal 1    [place] supervision required at 02/01/2022 0724   Time Frame short-term goal (STG), 5 - 7 days at 01/28/2022 0722   Progress/Outcome goal revised this date at 02/01/2022 0724   LB Dressing Goal 2    [place] modified independence at 01/28/2022 0722   Time Frame long-term goal (LTG), 14 days or less at 01/28/2022 0722   Progress/Outcome goal ongoing at 02/01/2022 0724   Grooming Goal 1    [place] supervision required at 02/01/2022 0724   Time Frame short-term goal (STG), 5 - 7 days  at 02/01/2022 0724   Strategies/Barriers in stance at 01/28/2022 0722   Progress/Outcome goal revised this date at 02/01/2022 0724   Grooming Goal 2    [place] modified independence at 01/28/2022 0722   Time Frame long-term goal (LTG), 14 days or less at 01/28/2022 0722   Progress/Outcome goal ongoing at 02/01/2022 0724   Toileting Goal 1    [place] supervision required at 02/01/2022 0724   Time Frame short-term goal (STG), 5 - 7 days at 02/01/2022 0724   Progress/Outcome goal revised this date at 02/01/2022 0724   Toileting Goal 2    [place] modified independence at 01/28/2022 0722   Time Frame long-term goal (LTG), 14 days or less at 01/28/2022 0722   Progress/Outcome goal ongoing at 02/01/2022 0724

## 2022-02-03 NOTE — PLAN OF CARE
Plan of Care Review  Plan of Care Reviewed With: patient  Progress: improving  Outcome Summary: Pt c/o abdominal pain and discomfort. Abdomen is distended, pt states he feels full of gas. MD was at bedside during discussion. Mylicon ordered, metformin put on hold and soap suds enema ordered. Pt had a moderate BM late morning and passed a good amount of gas, expressed relief from discomfort, but still wanted enema. Enema was administered approximately 16:00 but still without results as of 18:40. Pt is discouraged and frustrated. IV iron hung late around 18:00. Peripheral IV in R forearm patent, dressing clean, dry, intact, no signs of phlebitis, infiltration; pt denies discomfort. Hemoglobin holding steady at 9.2 as of 1/31.

## 2022-02-03 NOTE — PROGRESS NOTES
Group note 30 min    Pt attended group, focusing on pain mgmt.  Pt indicates a reluctance to use narcotics.  Tylenol provides some relief. Group discussed nonmedication interventions, eg music, tv, limit setting, as well as the timing of medication use.

## 2022-02-03 NOTE — PROGRESS NOTES
Patient: Matt Williamson  Location: [facility]  MRN: [id number]  Today's date: 2/3/2022    History of Present Illness  Marco A is a 76 y.o. male admitted on 1/27/2022 with Lumbar disc disease with radiculopathy [M51.16]  S/P lumbar fusion [Z98.1]. Principal problem is Lumbar disc disease with radiculopathy.    Patient is a 76-year-old male with a history of atrial fibrillation, CAD, hypertension, hypothyroidism, hyperlipidemia, thyroid cancer, type 2 diabetes developed significant mid to low back pain with walking and standing.  He has metastatic thyroid carcinoma with metastasis to the L3 body status post radiation in May 2020.  Recent PET scan showed increased activity at the L3 level without further metastatic lesions.  He was evaluated by neurosurgery who recommended surgical resection.  He was taken to the OR on 1/20 and underwent a stage I L2-L4 PSF with instrumentation, ORIF of L3 pathologic fracture, stage II L3 lateral corpectomy with expandable cage placement.  There has been no significant postoperative complications.  His pain is fairly well controlled.  An LSO has been ordered.    1/28 Pt placed on Med hold after OT eval  and during PT eval 2/2 - metabolic ketone acidosis with dehydration    Past Medical History  Marco A has a past medical history of Atrial fibrillation with rapid ventricular response (CMS/HCC) (10/25/2019), BPH (benign prostatic hyperplasia), Coronary artery disease, Hypertension, Hypothyroidism, Mixed hyperlipidemia, Thyroid cancer (CMS/HCC), Type 2 diabetes mellitus without complication (CMS/HCC), and Vertigo.      PT Vitals    Date/Time Pulse HR Source BP BP Location BP Method Pt Position Observations [facility]   02/03/22 1000 80 Monitor 81/49 Left upper arm Automatic Sitting Pt denies c/o dizziness or lightheadedness [place]   02/03/22 1029 -- -- 88/48 Left upper arm Manual Sitting -- [place]      PT Pain    Date/Time Pain Type Location Rating: Rest Radiation to Description  Who   02/03/22 1000 Pain Assessment back 2 leg, left aching [place]   02/03/22 1029 Pain Reassessment back 2 -- aching [place]          Prior Living Environment      Most Recent Value   People in Home spouse   Current Living Arrangements home   Home Accessibility stairs to enter home (Group)   Living Environment Comment pt lives in 2  in [place]. 3 steps to enter with R rail, bedroom and shower stall on first floor, full flight B/L rails to shower stall and bedroom, typically sleeps on second floor. has a bed and shower stall on first floor   Number of Stairs, Main Entrance 3   Stair Railings, Main Entrance railing on right side (ascending)   Location, Patient Bedroom second floor, must negotiate stairs to access   Patient Bedroom Access Comment has bedroom on first floor if needed   Location, Bathroom second floor, must negotiate stairs to access   Bathroom Access Comment has bathroom on first floor   Number of Stairs, Within Home, Primary 12   Stair Railings, Within Home, Primary railings on both sides of stairs          Prior Level of Function      Most Recent Value   Dominant Hand right   Ambulation assistive equipment   Transferring assistive equipment   Toileting independent   Bathing independent   Dressing independent   Eating independent   Prior Level of Function Comment Pt reports he was independent for all mobility using SPC, has shower chair at home.   Assistive Device Currently Used at Home cane, straight, shower chair           IRF PT Evaluation and Treatment - 02/03/22 1000        PT Time Calculation    Start Time 1000     Stop Time 1030     Time Calculation (min) 30 min        Session Details    Document Type daily treatment/progress note     Mode of Treatment individual therapy;physical therapy        General Information    General Observations of Patient Pt received in WC immediately following OT, agreeable to participate        Transfers    Transfers stand pivot transfer     Maintains Weight-Bearing  Status (Transfers) able to maintain     Comment gait belt donned for all transfers        Sit to Stand Transfer    [place], Sit to Stand Transfer close supervision     Verbal Cues safety;hand placement     Assistive Device walker, front-wheeled     Comment from WC with minor cues for hand placement        Stand to Sit Transfer    [place], Stand to Sit Transfer close supervision     Verbal Cues safety;hand placement     Assistive Device walker, front-wheeled     Comment to WC with touching assist        Stand Pivot Transfer    [place], Stand Pivot/Stand Step Transfer close supervision     Verbal Cues hand placement;safety;technique     Comment to/from WC via ambulatory approach, cues for walker management while pivoting provided        Gait Training    [place], Gait touching/steadying assist     Assistive Device walker, front-wheeled     Distance in Feet 150 feet     Pattern (Gait) step-through     Deviations/Abnormal Patterns (Gait) gait speed decreased     Maintains Weight-bearing Status (Gait) able to maintain     Comment (Gait/Stairs) 80' x2 with RW and touch assist, RW utilized instead of SPC today for safety 2/2 decreased BP.        Lower Extremity (Therapeutic Exercise)    Exercise Position/Type seated;AROM (active range of motion);static isometric contraction;standing     General Exercise bilateral;ankle pumps;heel raises;LAQ (long arc quad);hip aDduction;marching while standing;hip aBduction     Comment 1. heel/toe raises, LAQ (3 sec isometric hold) 3. hip ADD isometric 4. standing marches and 5. standing hip ABD with RW for support        Daily Progress Summary (PT)    Daily Outcome Statement Pt presenting with low BP at beginning and T/O session, pt reports no c/o sx's. Tolerated seated/standing therex with no change in BP. Pt ambulating 80' and 150' with RW at touch assist level. Benefits from cueing to initiate heel strike for improved foot clearance, especially on LLE during gait  training. Transfers performed with touch assist, pt demonstrates adequate control but benefits from cues for safety and walker management while pivoting to sit.                           IRF PT Goals      Most Recent Value   Bed Mobility Goal 1    Activity/Assistive Device bed mobility activities, all at 01/28/2022 0902   [place] supervision required at 01/28/2022 0902   Time Frame short-term goal (STG), 1 week at 01/28/2022 0902   Progress/Outcome goal ongoing, good progress toward goal at 02/01/2022 0830   Bed Mobility Goal 2    Activity/Assistive Device bed mobility activities, all at 01/28/2022 0902   [place] modified independence at 01/28/2022 0902   Time Frame long-term goal (LTG), 21 days or less at 01/28/2022 0902   Progress/Outcome goal ongoing at 02/01/2022 0830   Transfer Goal 1    Activity/Assistive Device sit-to-stand/stand-to-sit, stand pivot, walker, front-wheeled at 02/01/2022 0830   [place] supervision required at 02/01/2022 0830   Time Frame short-term goal (STG), 3 - 5 days at 02/01/2022 0830   Progress/Outcome goal met, goal revised this date at 02/01/2022 0830   Transfer Goal 2    Activity/Assistive Device sit-to-stand/stand-to-sit, stand pivot at 01/28/2022 0902   [place] modified independence at 01/28/2022 0902   Time Frame long-term goal (LTG), 21 days or less at 01/28/2022 0902   Progress/Outcome goal ongoing, good progress toward goal at 02/01/2022 0830   Gait/Walking Locomotion Goal 1    Activity/Assistive Device gait (walking locomotion), walker, front-wheeled at 02/01/2022 0830   Distance 150 feet at 02/01/2022 0830   [place] supervision required at 02/01/2022 0830   Time Frame short-term goal (STG), 5 - 7 days at 02/01/2022 0830   Progress/Outcome goal met, goal revised this date at 02/01/2022 0830   Gait/Walking Locomotion Goal 2    Activity/Assistive Device gait (walking locomotion) at 01/28/2022 0902   Distance 150 feet at 01/28/2022 0902   [place]  modified independence at 01/28/2022 0902   Time Frame long-term goal (LTG), 21 days or less at 01/28/2022 0902   Progress/Outcome goal ongoing at 02/01/2022 0830   Stairs Goal 1    Activity/Assistive Device stairs, all skills at 01/28/2022 0902   Number of Stairs 12 at 01/28/2022 0902   [place] minimum assist (75% or more patient effort) at 01/28/2022 0902   Time Frame short-term goal (STG), 3 - 5 days at 01/28/2022 0902   Progress/Outcome goal ongoing, good progress toward goal at 02/01/2022 0830   Stairs Goal 2    Activity/Assistive Device stairs, all skills at 01/28/2022 0902   Number of Stairs 12 at 01/28/2022 0902   [place] modified independence at 01/28/2022 0902   Time Frame long-term goal (LTG), 21 days or less at 01/28/2022 0902   Progress/Outcome goal ongoing at 02/01/2022 0830

## 2022-02-03 NOTE — PROGRESS NOTES
Patient: Matt Williamson  Location: [facility]  MRN: [id number]  Today's date: 2/3/2022    History of Present Illness  Marco A is a 76 y.o. male admitted on 1/27/2022 with Lumbar disc disease with radiculopathy [M51.16]  S/P lumbar fusion [Z98.1]. Principal problem is Lumbar disc disease with radiculopathy.    Patient is a 76-year-old male with a history of atrial fibrillation, CAD, hypertension, hypothyroidism, hyperlipidemia, thyroid cancer, type 2 diabetes developed significant mid to low back pain with walking and standing.  He has metastatic thyroid carcinoma with metastasis to the L3 body status post radiation in May 2020.  Recent PET scan showed increased activity at the L3 level without further metastatic lesions.  He was evaluated by neurosurgery who recommended surgical resection.  He was taken to the OR on 1/20 and underwent a stage I L2-L4 PSF with instrumentation, ORIF of L3 pathologic fracture, stage II L3 lateral corpectomy with expandable cage placement.  There has been no significant postoperative complications.  His pain is fairly well controlled.  An LSO has been ordered.    1/28 Pt placed on Med hold after OT eval  and during PT eval 2/2 - metabolic ketone acidosis with dehydration    Past Medical History  Marco A has a past medical history of Atrial fibrillation with rapid ventricular response (CMS/HCC) (10/25/2019), BPH (benign prostatic hyperplasia), Coronary artery disease, Hypertension, Hypothyroidism, Mixed hyperlipidemia, Thyroid cancer (CMS/HCC), Type 2 diabetes mellitus without complication (CMS/HCC), and Vertigo.      OT Vitals    Date/Time Pulse HR Source BP BP Location BP Method Pt Position Observations Who   02/03/22 0939 84 Monitor 96/53 Left upper arm Automatic Sitting -- [facility]   02/03/22 1000 80 Monitor 81/49 Left upper arm Automatic Sitting Pt denies c/o dizziness or lightheadedness [place]      OT Pain    Date/Time Pain Type Location Rating: Rest Radiation to  Description [facility]   02/03/22 0939 Pain Assessment leg 2 -- -- [facility]   02/03/22 1000 Pain Assessment back 2 leg, left aching [place]      no increased pain end of session     Prior Living Environment      Most Recent Value   People in Home spouse   Current Living Arrangements home   Home Accessibility stairs to enter home (Group)   Living Environment Comment pt lives in 2  in [place]. 3 steps to enter with R rail, bedroom and shower stall on first floor, full flight B/L rails to shower stall and bedroom, typically sleeps on second floor. has a bed and shower stall on first floor   Number of Stairs, Main Entrance 3   Stair Railings, Main Entrance railing on right side (ascending)   Location, Patient Bedroom second floor, must negotiate stairs to access   Patient Bedroom Access Comment has bedroom on first floor if needed   Location, Bathroom second floor, must negotiate stairs to access   Bathroom Access Comment has bathroom on first floor   Number of Stairs, Within Home, Primary 12   Stair Railings, Within Home, Primary railings on both sides of stairs          Prior Level of Function      Most Recent Value   Dominant Hand right   Ambulation assistive equipment   Transferring assistive equipment   Toileting independent   Bathing independent   Dressing independent   Eating independent   Prior Level of Function Comment Pt reports he was independent for all mobility using SPC, has shower chair at home.   Assistive Device Currently Used at Home cane, straight, shower chair          Occupational Profile      Most Recent Value   Successful Occupations Retired restaraunt owner   Occupational History/Life Experiences Enjoys Playing Basketball. +    Environmental Supports and Barriers lives with spouse   Patient Goals 1/29/22 PSFS  cleaning 3/10, doing wash 4/10, bowling 1/10, shooting baskets 3/10, and running on treadmill 4/10. Total score: 30%           IRF OT Evaluation and Treatment - 02/03/22 0905        OT Time  Calculation    Start Time 0900     Stop Time 1000     Time Calculation (min) 60 min        Session Details    Document Type daily treatment/progress note     Mode of Treatment occupational therapy;individual therapy        General Information    General Observations of Patient Pt toileting at beginnning of session        Transfers    Comment gait belt donned during therapy session        Sit to Stand Transfer    [place], Sit to Stand Transfer close supervision     Verbal Cues safety     Assistive Device walker, front-wheeled        Stand to Sit Transfer    [place], Stand to Sit Transfer close supervision     Verbal Cues safety     Assistive Device walker, front-wheeled        Stand Pivot Transfer    [place], Stand Pivot/Stand Step Transfer close supervision     Verbal Cues safety     Assistive Device walker, front-wheeled     Comment via amb tx        Toilet Transfer    [place], Toilet Transfer close supervision     Verbal Cues safety     Assistive Device raised toilet seat;grab bars/safety frame;walker, front-wheeled        Safety Issues, Functional Mobility    Comment, Safety Issues/Impairments (Mobility) Ot functional ambulation In room for ADL.  with RW x2 trials for 5 mins each with standing rest breaks as needed with CS        Balance    Comment, Balance Standing balance with mirror to anterior with RW marching unilateral stance. Then lifting 1 UE at a time. moving object around back maintaining spinal precatuions        Therapeutic Interventions    Comment, Therapeutic Intervention Pt expressing frustration about toileting and roommate, theraputic listening provided        Upper Body Dressing    Self-Performance don;orthosis application;threads left arm, shirt;threads right arm, shirt;pulls shirt down/adjusts     [place] Assistance obtains clothes     [place] close supervision     Comment Pt able to don/doff LSO, donning pull over shirt        Lower Body Dressing    [place] Assistance  anti-embolic stocking application     [place], Footwear moderate assist (50-74% patient effort)     Comment 2/2 toileting therapist assisted with threading b/l LE. Pt able to hike pants. Using LHSH to don shoes        Grooming    Self-Performance washes, rinses and dries face;washes, rinses and dries hands;brushes/[place] hair;oral care (brushing teeth, cleaning dentures)     [place], Oral Hygiene supervision     Comment sitting and standing at sink        Toileting    [place] close supervision     Adaptive Equipment raised toilet seat;grab bar/safety frame   walker       Daily Progress Summary (OT)    Daily Outcome Statement Pt noted with frustrations during session. But agreeable for session. Pt requesting dressing. Pt improving balance and endurance with functional tx and ADL. Pt would continue to benefit from skilled OT services to increase indep, decrease caregiver burden and increase quality of life. Continue with POC                           IRF OT Goals      Most Recent Value   Transfer Goal 1    Activity/Assistive Device toilet at 01/28/2022 0722   [place] --  [CS] at 02/01/2022 0724   Time Frame short-term goal (STG), 5 - 7 days at 02/01/2022 0724   Progress/Outcome goal revised this date at 02/01/2022 0724   Transfer Goal 2    Activity/Assistive Device toilet at 01/28/2022 0722   [place] modified independence at 01/28/2022 0722   Time Frame long-term goal (LTG), 14 days or less at 01/28/2022 0722   Progress/Outcome goal ongoing at 02/01/2022 0724   Transfer Goal 3    Activity/Assistive Device shower at 01/28/2022 0722   [place] supervision required at 02/01/2022 0724   Time Frame short-term goal (STG), 5 - 7 days at 02/01/2022 0724   Progress/Outcome goal revised this date at 02/01/2022 0724   Transfer Goal 4    Activity/Assistive Device shower at 01/28/2022 0722   [place] modified independence at 01/28/2022 0722   Time Frame long-term goal (LTG), 14 days or less at  01/28/2022 0722   Progress/Outcome goal ongoing at 02/01/2022 0724   Bathing Goal 1    [place] supervision required at 02/01/2022 0724   Time Frame short-term goal (STG), 5 - 7 days at 02/01/2022 0724   Progress/Outcome goal revised this date at 02/01/2022 0724   Bathing Goal 2    [place] modified independence at 01/28/2022 0722   Time Frame long-term goal (LTG), 14 days or less at 01/28/2022 0722   Progress/Outcome goal ongoing at 02/01/2022 0724   UB Dressing Goal 1    [place] supervision required at 02/01/2022 0724   Time Frame short-term goal (STG), 5 - 7 days at 02/01/2022 0724   Progress/Outcome goal revised this date at 02/01/2022 0724   UB Dressing Goal 2    [place] modified independence at 01/28/2022 0722   Time Frame long-term goal (LTG), 14 days or less at 01/28/2022 0722   Progress/Outcome goal ongoing at 02/01/2022 0724   LB Dressing Goal 1    [place] supervision required at 02/01/2022 0724   Time Frame short-term goal (STG), 5 - 7 days at 01/28/2022 0722   Progress/Outcome goal revised this date at 02/01/2022 0724   LB Dressing Goal 2    [place] modified independence at 01/28/2022 0722   Time Frame long-term goal (LTG), 14 days or less at 01/28/2022 0722   Progress/Outcome goal ongoing at 02/01/2022 0724   Grooming Goal 1    [place] supervision required at 02/01/2022 0724   Time Frame short-term goal (STG), 5 - 7 days at 02/01/2022 0724   Strategies/Barriers in stance at 01/28/2022 0722   Progress/Outcome goal revised this date at 02/01/2022 0724   Grooming Goal 2    [place] modified independence at 01/28/2022 0722   Time Frame long-term goal (LTG), 14 days or less at 01/28/2022 0722   Progress/Outcome goal ongoing at 02/01/2022 0724   Toileting Goal 1    [place] supervision required at 02/01/2022 0724   Time Frame short-term goal (STG), 5 - 7 days at 02/01/2022 0724   Progress/Outcome goal revised this date at 02/01/2022 0724   Toileting Goal 2     [place] modified independence at 01/28/2022 0722   Time Frame long-term goal (LTG), 14 days or less at 01/28/2022 0722   Progress/Outcome goal ongoing at 02/01/2022 0724

## 2022-02-04 ENCOUNTER — OFFICE VISIT (OUTPATIENT)
Dept: NEUROSURGERY | Facility: CLINIC | Age: 77
End: 2022-02-04
Payer: MEDICARE

## 2022-02-04 ENCOUNTER — APPOINTMENT (INPATIENT)
Dept: PHYSICAL THERAPY | Facility: REHABILITATION | Age: 77
DRG: 949 | End: 2022-02-04
Payer: MEDICARE

## 2022-02-04 ENCOUNTER — APPOINTMENT (INPATIENT)
Dept: OCCUPATIONAL THERAPY | Facility: REHABILITATION | Age: 77
DRG: 949 | End: 2022-02-04
Payer: MEDICARE

## 2022-02-04 VITALS
SYSTOLIC BLOOD PRESSURE: 100 MMHG | WEIGHT: 165 LBS | BODY MASS INDEX: 26.52 KG/M2 | HEIGHT: 66 IN | DIASTOLIC BLOOD PRESSURE: 60 MMHG | TEMPERATURE: 97 F | RESPIRATION RATE: 20 BRPM | OXYGEN SATURATION: 98 % | HEART RATE: 77 BPM

## 2022-02-04 DIAGNOSIS — Z98.1 S/P LUMBAR FUSION: Primary | ICD-10-CM

## 2022-02-04 LAB
GLUCOSE BLD-MCNC: 115 MG/DL (ref 70–99)
GLUCOSE BLD-MCNC: 115 MG/DL (ref 70–99)
GLUCOSE BLD-MCNC: 209 MG/DL (ref 70–99)
POCT TEST: ABNORMAL

## 2022-02-04 PROCEDURE — 97530 THERAPEUTIC ACTIVITIES: CPT | Mod: GO

## 2022-02-04 PROCEDURE — 99024 POSTOP FOLLOW-UP VISIT: CPT | Performed by: NEUROLOGICAL SURGERY

## 2022-02-04 PROCEDURE — 97116 GAIT TRAINING THERAPY: CPT | Mod: GP

## 2022-02-04 PROCEDURE — 63600000 HC DRUGS/DETAIL CODE: Performed by: INTERNAL MEDICINE

## 2022-02-04 PROCEDURE — 63700000 HC SELF-ADMINISTRABLE DRUG: Performed by: INTERNAL MEDICINE

## 2022-02-04 PROCEDURE — 97535 SELF CARE MNGMENT TRAINING: CPT | Mod: GO

## 2022-02-04 PROCEDURE — 25800000 HC PHARMACY IV SOLUTIONS: Performed by: INTERNAL MEDICINE

## 2022-02-04 PROCEDURE — 97110 THERAPEUTIC EXERCISES: CPT | Mod: GP

## 2022-02-04 PROCEDURE — 12800001 HC ROOM AND CARE SEMIPRIVATE REHAB-BRAIN INJ

## 2022-02-04 PROCEDURE — 97112 NEUROMUSCULAR REEDUCATION: CPT | Mod: GP

## 2022-02-04 RX ORDER — OXYCODONE HYDROCHLORIDE 5 MG/1
5 TABLET ORAL EVERY 4 HOURS PRN
Status: DISCONTINUED | OUTPATIENT
Start: 2022-02-04 | End: 2022-02-08 | Stop reason: HOSPADM

## 2022-02-04 RX ADMIN — GABAPENTIN 600 MG: 300 CAPSULE ORAL at 12:05

## 2022-02-04 RX ADMIN — PROBIOTIC PRODUCT - TAB 500 MG: TAB at 08:32

## 2022-02-04 RX ADMIN — SIMETHICONE 80 MG: 80 TABLET, CHEWABLE ORAL at 08:33

## 2022-02-04 RX ADMIN — SENNOSIDES 2 TABLET: 8.6 TABLET, FILM COATED ORAL at 12:04

## 2022-02-04 RX ADMIN — DOCUSATE SODIUM 100 MG: 100 CAPSULE, LIQUID FILLED ORAL at 08:32

## 2022-02-04 RX ADMIN — MAGNESIUM 64 MG (MAGNESIUM CHLORIDE) TABLET,DELAYED RELEASE 128 MG: at 08:33

## 2022-02-04 RX ADMIN — OXYCODONE HYDROCHLORIDE 10 MG: 5 TABLET ORAL at 06:50

## 2022-02-04 RX ADMIN — PRAVASTATIN SODIUM 40 MG: 20 TABLET ORAL at 17:27

## 2022-02-04 RX ADMIN — ACETAMINOPHEN 650 MG: 325 TABLET, FILM COATED ORAL at 06:50

## 2022-02-04 RX ADMIN — INSULIN ASPART 2 UNITS: 100 INJECTION, SOLUTION INTRAVENOUS; SUBCUTANEOUS at 12:08

## 2022-02-04 RX ADMIN — HEPARIN SODIUM 5000 UNITS: 5000 INJECTION, SOLUTION INTRAVENOUS; SUBCUTANEOUS at 21:26

## 2022-02-04 RX ADMIN — SODIUM CHLORIDE 125 MG: 9 INJECTION, SOLUTION INTRAVENOUS at 16:23

## 2022-02-04 RX ADMIN — OXYCODONE HYDROCHLORIDE 10 MG: 5 TABLET ORAL at 14:22

## 2022-02-04 RX ADMIN — ACETAMINOPHEN 650 MG: 325 TABLET, FILM COATED ORAL at 21:22

## 2022-02-04 RX ADMIN — LOSARTAN POTASSIUM 25 MG: 25 TABLET, FILM COATED ORAL at 16:18

## 2022-02-04 RX ADMIN — GABAPENTIN 600 MG: 300 CAPSULE ORAL at 05:22

## 2022-02-04 RX ADMIN — MAGNESIUM 64 MG (MAGNESIUM CHLORIDE) TABLET,DELAYED RELEASE 128 MG: at 21:21

## 2022-02-04 RX ADMIN — DOCUSATE SODIUM 100 MG: 100 CAPSULE, LIQUID FILLED ORAL at 21:22

## 2022-02-04 RX ADMIN — LEVOTHYROXINE SODIUM 150 MCG: 150 TABLET ORAL at 05:22

## 2022-02-04 RX ADMIN — DIAZEPAM 5 MG: 5 TABLET ORAL at 06:50

## 2022-02-04 RX ADMIN — GABAPENTIN 600 MG: 300 CAPSULE ORAL at 21:22

## 2022-02-04 RX ADMIN — ASPIRIN 81 MG: 81 TABLET, COATED ORAL at 08:32

## 2022-02-04 RX ADMIN — SIMETHICONE 80 MG: 80 TABLET, CHEWABLE ORAL at 16:17

## 2022-02-04 RX ADMIN — HEPARIN SODIUM 5000 UNITS: 5000 INJECTION, SOLUTION INTRAVENOUS; SUBCUTANEOUS at 12:05

## 2022-02-04 RX ADMIN — POLYETHYLENE GLYCOL 3350 17 G: 17 POWDER, FOR SOLUTION ORAL at 16:17

## 2022-02-04 RX ADMIN — SIMETHICONE 80 MG: 80 TABLET, CHEWABLE ORAL at 12:04

## 2022-02-04 RX ADMIN — PROBIOTIC PRODUCT - TAB 500 MG: TAB at 21:22

## 2022-02-04 RX ADMIN — SIMETHICONE 80 MG: 80 TABLET, CHEWABLE ORAL at 21:22

## 2022-02-04 RX ADMIN — HEPARIN SODIUM 5000 UNITS: 5000 INJECTION, SOLUTION INTRAVENOUS; SUBCUTANEOUS at 05:22

## 2022-02-04 NOTE — PLAN OF CARE
Plan of Care Review  Plan of Care Reviewed With: patient  Progress: improving  Outcome Summary: Patient AAOx4, continent of bowel and bladder.  Abdomen continues to be slightly distended.  Patient passing gas and does have active bowel sounds. Pain managed with oxy and valium.  IV in RFA still present for iron.

## 2022-02-04 NOTE — PROGRESS NOTES
Subjective    PPatient seen and examined on rounds.  Chart reviewed.  Events overnight noted.  History reviewed briefly with patient.    CC:  Deficits in mobility, transfers, self-care status post L2-L4 posterior spinal fusion with instrumentation, ORIF of L3 pathologic fracture, L3 lateral corpectomy with expandable cage placement, for L3 pathologic fracture, from metastatic thyroid cancer, multiple medical problems.    HPI:  Mr. Matt Williamson is a 76-year-old right-handed white male with chronic conditions significant for hypertension, hypothyroidism, thyroid cancer status post thyroidectomy, atrial fibrillation, coronary artery disease, hyperlipidemia, type 2 diabetes mellitus, metastatic thyroid carcinoma with metastasis to the L3 vertebral body status post stereotactic radiation in May 2020, who had progressively worsening midline low back pain with radiation to the right knee. MRI lumbar spine obtained on 11/22/21 showing extension of the L3 mass, moderate-severe foraminal narrowing on the right secondary to retropulsion of the inferior endplate in the setting of ligament and joint hypertrophy. CT demonstrates significant bony erosion of the L3 vertebral body with a new oblique fracture. PET scan on 12/7/21 showed increased activity of the L3 lesion without further metastatic lesions appreciated. He was initially referred to [facility] however there has been some delay with getting an appointment.  He denied any bowel or bladder incontinence or weakness and follows up with oncologist Dr. Casey who did not recommend any chemotherapy at this time but surgical intervention as the primary treatment given the suspected isolated area of progression at the L3 vertebral body.  Per neurosurgery notes he had preserved strength except 2 beats ankle clonus on the left.   He was recommended surgical resection by neurosurgery as well as oncology.  He was admitted to [facility] on 1/20/22 and underwent a stage I  "L2-L4 posterior spinal fusion with instrumentation, ORIF of L3 pathologic fracture, and stage II L3 lateral corpectomy with expandable cage placement with neurosurgeon Dr Colt Heller on 1/20/22 at [facility].  He is allowed weightbearing as tolerated on both lower extremities and was given an LSO brace for use when out of bed.  Postoperative course was significant for urinary retention and he had an indwelling Lewis catheter.  The Lewis catheter was removed 2 days back and per patient, he is starting to void now.  I mentioned to him we will monitor post void residuals to see if he is emptying his bladder completely or not.  He has some paresthesias/hyperesthesias in his right thigh especially lateral aspect and some right hip proximal weakness and per neurosurgery notes that is not uncommon given the nature of his anterior lumbar surgery.  He is on subcutaneous Heparin and SCDs for DVT prophylaxis.  I discussed his recent clinical course with patient and with his wife at bedside.  On 1/27/22, hemoglobin was 9.5, WBC count 10.09, platelets are 304, BUN was 17, and creatinine was 1.0.  He has been needing assistance for mobility, transfers, self-care. He is transferred to [facility] on 1/27/22 for further rehabilitation care.     SUBJ: He had neurosurgery appointment today that went well.  Noted input from neurosurgery.  Denies increased pain.  Discussed with patient.    ROS: Denies chest pain or shortness of breath. Other ROS negative. Past, family, social history is unchanged.      Physical Exam      Blood pressure (!) 142/68, pulse 83, temperature 36.8 °C (98.2 °F), temperature source Oral, resp. rate 20, height 1.676 m (5' 6\"), weight 75 kg (165 lb 6 oz), SpO2 99 %.  Body mass index is 26.69 kg/m².    General Appearance: Not in acute distress  Head/Ear/Nose/Throat: Normocephalic; Atraumatic.   Eye: EOMI; PERRL.   Neck: No JVD; No Bruits.   Respiratory: Decreased breath sounds at bases. "   Cardiovascular: RRR; Normal S1, S2.   Gastrointestinal: Soft; bowel sounds present but somewhat hypoactive.  Extremities: Bilateral lower extremity edema noted.    Musculoskeletal: Functional active range of motion in both upper extremities.  Some limitation of active range of motion in right hip limited by pain.    Neurological: AAO ×3. Speech is fluent. Cranial nerve examination does not reveal any gross facial asymmetry. Strength testing shows about 4+/5 strength in both upper extremities.  Right hip flexion is less than 3/5.  Right quadriceps is 4/5 with the right thigh supported.  Right ankle dorsiflexion, plantar flexion and right EHL are 4+/5.  Left hip flexion is 4/5.  Left quadriceps is 4+/5.  Left ankle dorsiflexion, plantar flexion and left EHL are 4+/5.  He is grossly able to localize touch and position sense in great toes, but does report some hyperesthesia in the right thigh lateral aspect.  Deep tendon reflexes are hypoactive and symmetric bilaterally.  Plantars are flexor.  Coordination is functional upper extremities.    Neurologically stable.  Behavior/Emotional: Appropriate; Cooperative.   Skin: No obvious rashes noted.  Healing incision noted on right flank.  Healing incision also noted on posterior lumbar spine.  Mepilex border dressing in place on incisions.      Current Facility-Administered Medications:   •  acetaminophen (TYLENOL) tablet 650 mg, 650 mg, oral, q4h PRN, Arjun Dawkins MD, 650 mg at 02/04/22 0650  •  alum-mag hydroxide-simeth (MAALOX) 200-200-20 mg/5 mL suspension 30 mL, 30 mL, oral, q4h PRN, Arjun Dawkins MD  •  aspirin enteric coated tablet 81 mg, 81 mg, oral, Daily, Arjun Dawkins MD, 81 mg at 02/04/22 0832  •  bisacodyL (DULCOLAX) 10 mg suppository 10 mg, 10 mg, rectal, Daily PRN, Arjun Dawkins MD  •  glucose chewable tablet 16-32 g of dextrose, 16-32 g of dextrose, oral, PRN, 16 g of dextrose at 01/29/22 1721 **OR** dextrose 40 % oral gel 15-30 g of dextrose, 15-30 g  of dextrose, oral, PRN **OR** glucagon (GLUCAGEN) injection 1 mg, 1 mg, intramuscular, PRN **OR** dextrose in water injection 12.5 g, 25 mL, intravenous, PRN, Arjun Dawkins MD  •  diazePAM (VALIUM) tablet 5 mg, 5 mg, oral, q6h PRN, Arjun Dawkins MD, 5 mg at 02/04/22 0650  •  diphenhydrAMINE (BENADRYL) capsule 25 mg, 25 mg, oral, q6h PRN, Arjun Dawkins MD  •  docusate sodium (COLACE) capsule 100 mg, 100 mg, oral, BID, Arjun Dawkins MD, 100 mg at 02/04/22 0832  •  ergocalciferol (VITAMIN D2) capsule 50,000 Units, 50,000 Units, oral, Weekly, Arjnu Dawkins MD, 50,000 Units at 01/29/22 0932  •  ferric gluconate (FERRLECIT) 125 mg in sodium chloride 0.9 % 100 mL IVPB, 125 mg, intravenous, Daily, Arjun Dawkins MD, Stopped at 02/03/22 1930  •  gabapentin (NEURONTIN) capsule 600 mg, 600 mg, oral, q8h SALIMA, Arjun Dawkins MD, 600 mg at 02/04/22 1205  •  heparin (porcine) 5,000 unit/mL injection 5,000 Units, 5,000 Units, subcutaneous, q8h SALIMA, Arjun Dawkins MD, 5,000 Units at 02/04/22 1205  •  insulin aspart U-100 (NovoLOG) pen 1-10 Units, 1-10 Units, subcutaneous, TID with meals, Arjun Dawkins MD, 2 Units at 02/04/22 1208  •  lactobacillus acidophilus complex (BACID) 1 billion cell- 250 mg tablet 500 mg, 500 mg, oral, BID, Arjun Dawkins MD, 500 mg at 02/04/22 0832  •  levothyroxine (SYNTHROID) tablet 150 mcg, 150 mcg, oral, Daily (6a), Arjun Dawkins MD, 150 mcg at 02/04/22 0522  •  losartan (COZAAR) tablet 25 mg, 25 mg, oral, Daily with dinner, Arjun Dawkins MD, 25 mg at 02/03/22 1751  •  magnesium chloride tablet,delayed release (DR/EC) 128 mg, 128 mg, oral, BID, Arjun Dawkins MD, 128 mg at 02/04/22 0833  •  [Provider Managed Hold] metFORMIN (GLUCOPHAGE) tablet 500 mg, 500 mg, oral, BID with meals, Arjun Dawkins MD, 500 mg at 02/03/22 0824  •  metoprolol succinate XL (TOPROL-XL) 24 hr ER tablet 100 mg, 100 mg, oral, Nightly, Arjun Dawkins MD, 100 mg at 02/03/22 2152  •  ondansetron ODT (ZOFRAN-ODT)  disintegrating tablet 4 mg, 4 mg, oral, q8h PRN, Arjun Dawkins MD  •  oxyCODONE (ROXICODONE) immediate release tablet 10 mg, 10 mg, oral, q4h PRN, 10 mg at 02/04/22 1422 **AND** oxyCODONE (ROXICODONE) immediate release tablet 5 mg, 5 mg, oral, q4h PRN, Arjun Dawkins MD, 5 mg at 02/03/22 2153  •  polyethylene glycol (MIRALAX) 17 gram packet 17 g, 17 g, oral, Daily with dinner, Arjun Dawkins MD, 17 g at 02/03/22 1751  •  pravastatin (PRAVACHOL) tablet 40 mg, 40 mg, oral, Daily (6p), Arjun Dawkins MD, 40 mg at 02/03/22 1751  •  senna (SENOKOT) tablet 2 tablet, 2 tablet, oral, Daily, Arjun Dawkins MD, 2 tablet at 02/04/22 1204  •  simethicone (MYLICON) chewable tablet 80 mg, 80 mg, oral, QID, Arjun Dawkins MD, 80 mg at 02/04/22 1204       Labs / Radiology    Lab Results   Component Value Date    WBC 7.10 01/31/2022    HGB 9.2 (L) 01/31/2022    HCT 29.1 (L) 01/31/2022    MCV 84.8 01/31/2022     01/31/2022     Lab Results   Component Value Date    GLUCOSE 132 (H) 02/03/2022    CALCIUM 8.1 (L) 02/03/2022     (L) 02/03/2022    K 4.3 02/03/2022    CO2 29 02/03/2022     02/03/2022    BUN 8 02/03/2022    CREATININE 0.8 02/03/2022     Assessment and Plan    ASSESSMENT PLAN:  1. 76-year-old right-handed white male with chronic conditions significant for hypertension, hypothyroidism, thyroid cancer status post thyroidectomy, atrial fibrillation, coronary artery disease, hyperlipidemia, type 2 diabetes mellitus, metastatic thyroid carcinoma with metastasis to the L3 vertebral body status post stereotactic radiation in May 2020, who had progressively worsening midline low back pain with radiation to the right knee. MRI lumbar spine obtained on 11/22/21 showing extension of the L3 mass, moderate-severe foraminal narrowing on the right secondary to retropulsion of the inferior endplate in the setting of ligament and joint hypertrophy. CT demonstrates significant bony erosion of the L3 vertebral body with a  new oblique fracture. PET scan on 12/7/21 showed increased activity of the L3 lesion without further metastatic lesions appreciated. He was initially referred to [facility] however there has been some delay with getting an appointment.  He denied any bowel or bladder incontinence or weakness and follows up with oncologist Dr. Casey who did not recommend any chemotherapy at this time but surgical intervention as the primary treatment given the suspected isolated area of progression at the L3 vertebral body.  Per neurosurgery notes he had preserved strength except 2 beats ankle clonus on the left.   He was recommended surgical resection by neurosurgery as well as oncology.  He was admitted to [facility] on 1/20/22 and underwent a stage I L2-L4 posterior spinal fusion with instrumentation, ORIF of L3 pathologic fracture, and stage II L3 lateral corpectomy with expandable cage placement with neurosurgeon Dr Colt Heller on 1/20/22 at [facility].  He is allowed weightbearing as tolerated on both lower extremities and was given an LSO brace for use when out of bed.  Postoperative course was significant for urinary retention and he had an indwelling Lewis catheter.  The Lewis catheter was removed 2 days back and per patient, he is starting to void now.  I mentioned to him we will monitor post void residuals to see if he is emptying his bladder completely or not.  He has some paresthesias/hyperesthesias in his right thigh especially lateral aspect and some right hip proximal weakness and per neurosurgery notes that is not uncommon given the nature of his anterior lumbar surgery.  He is on subcutaneous Heparin and SCDs for DVT prophylaxis.  I discussed his recent clinical course with patient and with his wife at bedside.  On 1/27/22, hemoglobin was 9.5, WBC count 10.09, platelets are 304, BUN was 17, and creatinine was 1.0.  He has been needing assistance for mobility, transfers, self-care. He is transferred to  [facility] on 1/27/22 for further rehabilitation care.      2. DVT prophylaxis - on subcutaneous Heparin.  On SCDs.  Check doppler. Platelets 323 on 1/28/2022.  Platelets 338 on 1/31/2022.     3.  Neurosurgery - status post L2-L4 posterior spinal fusion with instrumentation, ORIF of L3 pathologic fracture, L3 lateral corpectomy with expandable cage placement, for L3 pathologic fracture, from metastatic thyroid cancer, multiple medical problems - continue PT, OT, psychology.  Follow falls precautions, cardiac precautions, monitor pulse oximetry in therapy.  Follow spinal precautions.  LSO brace when out of bed.  He has follow-up appointment with neurosurgery on 2/2/2022.     4. GI - On Protonix for GI prophylaxis. Continue Colace, Senokot, MiraLAX, Dulcolax.  On Zofran ODT for nausea.  On Maalox PRN.     5.  - Postoperative course was significant for urinary retention and he had an indwelling Lewis catheter.  The Lewis catheter was removed 2 days back and per patient, he is starting to void now.  I mentioned to him we will monitor post void residuals to see if he is emptying his bladder completely or not.       6.  Diabetes mellitus - on Jardiance.  On Metformin.  On sliding-scale NovoLog coverage.  On hypoglycemic protocol.     7. Pain - on Tylenol.  On Neurontin.  On PRN Oxycodone.  On Valium PRN for spasms.     8. Hematology -hemoglobin 9.2, WBC 8.01 on 1/28/2022.  Hemoglobin 9.2, WBC 7.10 on 1/31/2022.     9.  Endocrinology - history of thyroid cancer status post thyroidectomy.  On Synthroid.     10. CVS - history of hypertension, atrial fibrillation, coronary artery disease - on Toprol-XL.     11.  Hyperlipidemia - on Pravachol.     12.  Oncology - history of metastatic thyroid cancer status post thyroidectomy.  He had L2-L4 posterior spinal fusion with instrumentation, ORIF of L3 pathologic fracture, L3 lateral corpectomy with expandable cage placement, for L3 pathologic fracture, from  metastatic thyroid cancer.  He will follow up with oncology and neurosurgery.     13.  Skin - monitor healing of incisions on right flank and on posterior lumbar spine.  Dermal defense will be consulted.     14.  F/E/N - on sodium bicarbonate.     15. Rehabilitation medicine - Continue comprehensive rehabilitation care. Continue PT, OT, psychology.  We will follow falls precautions, cardiac precautions, monitor pulse oximetry in therapy and follow aspiration precautions.  We will follow spinal precautions.  LSO brace when out of bed.Tolerating therapies per endurance.  Reports pain medications are helping.  Had a bowel movement.  Voiding well.Inspected incisions which are stable.  Requiring minimal assistance for transfers with physical therapy.  Able to ambulate 50 feet with front wheel walker with minimal assistance with physical therapy.Discussed follow-up appointment with neurosurgery with him for this Wednesday.  Working on upper extremity strengthening and range of motion exercises.  Able to don and doff LSO brace.  Discussed spinal precautions with him.Able to ambulate 150 feet with minimal assistance with straight cane with physical therapy.  Requiring close supervision to minimal assistance for transfers with physical therapy.Requiring close supervision for transfers with occupational therapy.  Requiring close supervision for toilet with occupational therapy.  Signed handicap application placard per patient and OT request.He had neurosurgery appointment today that went well.  Noted input from neurosurgery.  Denies increased pain.  Discussed with patient.     16. Reviewed labs today. BUN 12, creatinine 0.8 on 1/28/2022.  BUN 7, creatinine 0.8 on 1/31/2022.  BUN 8, creatinine 0.8 on 2/3/2022.           James Hidalgo MD  2/4/2022

## 2022-02-04 NOTE — PROGRESS NOTES
Patient: Matt Williamson  Location: [facility]  MRN: [id number]  Today's date: 2/4/2022    History of Present Illness  Marco A is a 76 y.o. male admitted on 1/27/2022 with Lumbar disc disease with radiculopathy [M51.16]  S/P lumbar fusion [Z98.1]. Principal problem is Lumbar disc disease with radiculopathy.    Patient is a 76-year-old male with a history of atrial fibrillation, CAD, hypertension, hypothyroidism, hyperlipidemia, thyroid cancer, type 2 diabetes developed significant mid to low back pain with walking and standing.  He has metastatic thyroid carcinoma with metastasis to the L3 body status post radiation in May 2020.  Recent PET scan showed increased activity at the L3 level without further metastatic lesions.  He was evaluated by neurosurgery who recommended surgical resection.  He was taken to the OR on 1/20 and underwent a stage I L2-L4 PSF with instrumentation, ORIF of L3 pathologic fracture, stage II L3 lateral corpectomy with expandable cage placement.  There has been no significant postoperative complications.  His pain is fairly well controlled.  An LSO has been ordered.    1/28 Pt placed on Med hold after OT eval  and during PT eval 2/2 - metabolic ketone acidosis with dehydration    Past Medical History  Marco A has a past medical history of Atrial fibrillation with rapid ventricular response (CMS/HCC) (10/25/2019), BPH (benign prostatic hyperplasia), Coronary artery disease, Hypertension, Hypothyroidism, Mixed hyperlipidemia, Thyroid cancer (CMS/HCC), Type 2 diabetes mellitus without complication (CMS/HCC), and Vertigo.      PT Vitals    Date/Time Pulse HR Source BP BP Location BP Method Pt Position [facility]   02/04/22 1440 76 Monitor 129/63 Left upper arm Automatic Sitting BS      PT Pain    Date/Time Pain Type Location Rating: Rest Rating: Activity Interventions [facility]   02/04/22 1440 Pain Assessment back 2 - mild pain 2 - mild pain position adjusted BS            02/04/22  9059 Pain Reassessment back 2 - mild pain -- -- BS          Prior Living Environment      Most Recent Value   People in Home spouse   Current Living Arrangements home   Home Accessibility stairs to enter home (Group)   Living Environment Comment pt lives in 2  in [place]. 3 steps to enter with R rail, bedroom and shower stall on first floor, full flight B/L rails to shower stall and bedroom, typically sleeps on second floor. has a bed and shower stall on first floor   Number of Stairs, Main Entrance 3   Stair Railings, Main Entrance railing on right side (ascending)   Location, Patient Bedroom second floor, must negotiate stairs to access   Patient Bedroom Access Comment has bedroom on first floor if needed   Location, Bathroom second floor, must negotiate stairs to access   Bathroom Access Comment has bathroom on first floor   Number of Stairs, Within Home, Primary 12   Stair Railings, Within Home, Primary railings on both sides of stairs          Prior Level of Function      Most Recent Value   Dominant Hand right   Ambulation assistive equipment   Transferring assistive equipment   Toileting independent   Bathing independent   Dressing independent   Eating independent   Prior Level of Function Comment Pt reports he was independent for all mobility using SPC, has shower chair at home.   Assistive Device Currently Used at Home cane, straight, shower chair           IRF PT Evaluation and Treatment - 02/04/22 1432        PT Time Calculation    Start Time 1430     Stop Time 1600     Time Calculation (min) 90 min        Session Details    Document Type daily treatment/progress note     Mode of Treatment physical therapy;individual therapy        General Information    General Observations of Patient Pt received in restroom at start of session.        Sit to Stand Transfer    [place], Sit to Stand Transfer supervision     Verbal Cues safety     Assistive Device walker, front-wheeled;gait belt     Comment  "from w/c with S for safety        Stand to Sit Transfer    [place], Stand to Sit Transfer supervision     Verbal Cues safety     Assistive Device walker, front-wheeled;gait belt     Comment to w/c with S for safety        Stand Pivot Transfer    [place], Stand Pivot/Stand Step Transfer supervision     Verbal Cues safety     Assistive Device walker, front-wheeled;gait belt     Comment via amb approach to w/c        Gait Training    [place], Gait supervision     Assistive Device walker, front-wheeled;gait belt     Distance in Feet 200 feet     Pattern (Gait) step-through     Deviations/Abnormal Patterns (Gait) gait speed decreased     Comment (Gait/Stairs) (1) 200' x1 using RW with S for safety. Increased time to complete. (2) 75' x1, 125' x1 using R SPC with steadying A-Cl S for safety/balance.        Stairs Training    [place], Stairs close supervision     Assistive Device railing;gait belt     Handrail Location (Stairs) right side (ascending);left side (descending)     Number of Stairs 12     Stair Height 6 inches     Ascending Stairs Technique step-to-step;step-over-step     Descending Stairs Technique step-to-step;step-over-step     Comment Up/down 12 (6\") stairs using R HR with Cl S for safety. Pt able to safely complete step-over-step during ascent, mild instability noted during step-over-step during descent - educated on completion of step-to during descent for improved stability/safety.        Safety Issues, Functional Mobility    Safety Issues Affecting Function (Mobility) judgment;problem-solving;safety precautions follow-through/compliance;safety precaution awareness     Comment, Safety Issues/Impairments (Mobility) Pt impulsively attempting to transfer/ambulate without non-skid footwear and \"kick\" shoes along from commode to w/c. PT prevented pt from completing activity and educated on safety concerns/heightened fall risk if attempted.        Motor Skills    Motor Control/Coordination " Interventions stepping/walking        Advanced Stepping/Walking Interventions    Stepping/Walking Interventions side stepping;backward walking     Backward Walking (Stepping/Walking Interventions) 10' x4 without UE support, steadying A for balance     Side Stepping (Stepping/Walking Interventions) 10' x4 R/L without UE support, steadying A for balance.        Lower Extremity (Therapeutic Exercise)    Exercise Position/Type standing     General Exercise bilateral;partial squats;hip extension;heel raises;knee flexion     Reps and Sets 2x10     Comment Standing with BUE support on RW and Cl S for safety. (1) 2x10 mini-squats (2) 2x10 hip ext (3) 2x10 hamstring curls (4) x20 heel raises.        Aerobic Exercise    Type recumbent elliptical      Time Performed 10     Comment NuStep L3, BLE only x10 min        Spinal Orthosis Management    Type (Spinal Orthosis) LSO (lumbar sacral orthosis)     Fabrication Comment (Spinal Orthosis) LSO donned throughout session.        Daily Progress Summary (PT)    Symptoms Noted During/After Treatment none     Daily Outcome Statement Continued to focus on gait training with RW and SPC this session. Pt progressed to supervision with use of RW, however benefitted from cues for increased [place]/speed. He cont to demo improving stability using SPC, however required intermittent steadying A for balance/safety. Cont to progress dynamic balance and functional mobility using SPC vs RW.                           IRF PT Goals      Most Recent Value   Bed Mobility Goal 1    Activity/Assistive Device bed mobility activities, all at 01/28/2022 0902   [place] supervision required at 01/28/2022 0902   Time Frame short-term goal (STG), 1 week at 01/28/2022 0902   Progress/Outcome goal ongoing, good progress toward goal at 02/01/2022 0830   Bed Mobility Goal 2    Activity/Assistive Device bed mobility activities, all at 01/28/2022 0902   [place] modified independence at 01/28/2022 0902    Time Frame long-term goal (LTG), 21 days or less at 01/28/2022 0902   Progress/Outcome goal ongoing at 02/01/2022 0830   Transfer Goal 1    Activity/Assistive Device sit-to-stand/stand-to-sit, stand pivot, walker, front-wheeled at 02/01/2022 0830   [place] supervision required at 02/01/2022 0830   Time Frame short-term goal (STG), 3 - 5 days at 02/01/2022 0830   Progress/Outcome goal met, goal revised this date at 02/01/2022 0830   Transfer Goal 2    Activity/Assistive Device sit-to-stand/stand-to-sit, stand pivot at 01/28/2022 0902   [place] modified independence at 01/28/2022 0902   Time Frame long-term goal (LTG), 21 days or less at 01/28/2022 0902   Progress/Outcome goal ongoing, good progress toward goal at 02/01/2022 0830   Gait/Walking Locomotion Goal 1    Activity/Assistive Device gait (walking locomotion), walker, front-wheeled at 02/01/2022 0830   Distance 150 feet at 02/01/2022 0830   [place] supervision required at 02/01/2022 0830   Time Frame short-term goal (STG), 5 - 7 days at 02/01/2022 0830   Progress/Outcome goal met, goal revised this date at 02/01/2022 0830   Gait/Walking Locomotion Goal 2    Activity/Assistive Device gait (walking locomotion) at 01/28/2022 0902   Distance 150 feet at 01/28/2022 0902   [place] modified independence at 01/28/2022 0902   Time Frame long-term goal (LTG), 21 days or less at 01/28/2022 0902   Progress/Outcome goal ongoing at 02/01/2022 0830   Stairs Goal 1    Activity/Assistive Device stairs, all skills at 01/28/2022 0902   Number of Stairs 12 at 01/28/2022 0902   [place] minimum assist (75% or more patient effort) at 01/28/2022 0902   Time Frame short-term goal (STG), 3 - 5 days at 01/28/2022 0902   Progress/Outcome goal ongoing, good progress toward goal at 02/01/2022 0830   Stairs Goal 2    Activity/Assistive Device stairs, all skills at 01/28/2022 0902   Number of Stairs 12 at 01/28/2022 0902   [place] modified independence at  01/28/2022 0902   Time Frame long-term goal (LTG), 21 days or less at 01/28/2022 0902   Progress/Outcome goal ongoing at 02/01/2022 0830

## 2022-02-04 NOTE — PLAN OF CARE
"  Problem: Rehabilitation (IRF) Plan of Care  Goal: Plan of Care Review  Outcome: Progressing  Pt alert and oriented X3. Abdomin remains distended but soft.  Concerned about needing to move bowels and feeling large amount of \"gas\" in  bowels. OOB to the bathroom, small to moderate BM of hard pellets. Medicated with oxycodone for back pain with sleep. Bed alarm on, call bell within reach.    "

## 2022-02-04 NOTE — PROGRESS NOTES
[facility] Progress Note          Patient was seen and examined at bedside.    Subjective:   2/3/22  Stable, pleasant, no new complaints or concerns, slept well, abdomen distended, his bowel sounds are active, add mylicon, diet modification per dietitian,   he is focusing on his PT/OT and is making progression. Reviewed his am BMP, benign, his electrolytes are balanced.  Abdominal bloating, hold metformin, his BG levels are fine now, cont accu checks with ISS, provide one dose of soap suds enema this afternoon after his therapy. Provide probiotic to help his IBS.     2/4/22  He feels much better this morning, stated with left lateral decubitus lying position plus medical regimen, he has passed a lot of stomach gasses, he has had good BM. His PT/OT is progressing well.      Objective   Vital signs in last 24 hours:  Temp:  [36.8 °C (98.3 °F)-37.8 °C (100 °F)] 36.8 °C (98.3 °F)  Heart Rate:  [78-92] 78  Resp:  [18] 18  BP: (122-156)/(58-95) 136/68    No intake or output data in the 24 hours ending 02/04/22 1057  Intake/Output this shift:  No intake/output data recorded.   Review of Systems:  All other systems reviewed and negative except as noted in the HPI.   Objective      Labs  reviewed his labs thoroughly   Lab Results   Component Value Date    WBC 7.10 01/31/2022    HGB 9.2 (L) 01/31/2022    HCT 29.1 (L) 01/31/2022    MCV 84.8 01/31/2022     01/31/2022     Lab Results   Component Value Date    GLUCOSE 132 (H) 02/03/2022    CALCIUM 8.1 (L) 02/03/2022     (L) 02/03/2022    K 4.3 02/03/2022    CO2 29 02/03/2022     02/03/2022    BUN 8 02/03/2022    CREATININE 0.8 02/03/2022       Imaging  OSH imaging study reports reviewed   B/l LE venous compression U/S 1/28/22:  No evidence of deep vein thrombus (DVT) in either lower extremity.      Full Code    Physical Exam:  Head/Ear/Nose/Throat: normocephalic; atraumatic; moisture mouth mm, no oropharyngeal thrush noted.   Eyes:  anicteric sclera, EOMI; PERRL.   Neck : supple, no JVD, no carotid bruits appeciated.   Respiratory: no evidence of labored breathing, lung sounds CTA b/l, good aeration bibasilar area, no w/r/c.   Cardiovascular: RRR; normal S1, S2; no m/r/g; no S3 or S4.   Gastrointestinal: soft; NT; BS normal; mildly distended; no CVAT b/l.   Genitourinary: no silva.   Extremities : b/l LE pitting edema .   Neurological: AO x 3, fluent speeches, following commands, CNS II-XII grossly intact; no focal neurologic deficits.   Behavior/Emotional: in NAD, appropriate; cooperative.   Skin: sacral area skin lesions.     Plan of care was discussed with patient, RN, and PMR attending     Assessment   CC:Thyroid cancer with progressive lumbar metastatic lesions and L3 pathologic fracture complicated with myeloradiculopathy, s/p surgical intervention and ORIF, neurogenic B/B, ADL and ambulatory dysfunction      76 y.o. male with PMH of HTN, dyslipidemia, CAD, type 2 DM, hypothyroidism, thyroid cancer metastatic to L3 vertebral body s/p radiation stereotactic body radiation 5/2020, seen by the neurosurgery service for surgical discussion regarding growth of his L3 vertebral body metastatic lesion. He has had progressive worsening midline-line low back pain with radiation to the right knee. An MRI lumbar spine was obtained 11/22/2021 showing extension of the L3 mass. He was sent for a PET scan 12/7/2021 showing increased activity of the L3 lesion without further metastatic lesions appreciated. He was referred to [facility] however there has been some delay with getting an appointment.      He has had persistent midline low back pain with walking and standing which has progressively worsened. He does not have any weakness or bowel/bladder incontinence. He is following with oncology (Dr. Casey) who does not recommend any chemotherapy at this time but surgical intervention as the primary treatment given the suspected isolated area of  progression at the L3 vertebral body.     He was admitted to [facility] on 1/20/22 and underwent elective :  Stage 1: L2-L4 PLSF with instrumentation, ORIF L3 pathologic fracture  Stage 2: L3 lateral corpectomy with expandable cage placement  Post op ileus, s/p aggressive bowel program, resolved, tolerated diet. Functionally he has ADL and ambulatory dysfunction, requiring inpt acute rehab, transferred to [facility] on 1/27/22.            Ileus (CMS/HCC)  Resolved after aggressive bowel program, start diet  Tolerating diet, cont bowel regimen.     Acquired hypothyroidism  Assessment & Plan  Status post thyroidectomy  Continue levothyroxine        Malignant neoplasm of vertebral column, excluding sacrum and coccyx (CMS/HCC)  Assessment & Plan  Metastatic thyroid ca S/p L3 pathologic fracture s/p right lateral L3 corpectomy and placement of an expandable cage, followed by L2-4 posterolateral instrumented fusion  Continue postop management per neurosurgery.  Drain was removed  DVT prophylaxis, [place], pain management per neurosurgery  Rehab as inpt at [facility]      Paroxysmal atrial fibrillation (CMS/HCC)  Assessment & Plan  Currently in sinus rhythm  Patient is not on long-term anticoagulation  He takes Rythmol as needed for palpitations     Type 2 diabetes mellitus without complication (CMS/HCC)  Assessment & Plan  Patient on Jardiance, Trulicity and metformin as outpatient  Cont inpt Accu-Cheks with sliding scale insulin  Hemoglobin A1c 6.9  Continue Jardiance and restarted metformin and trulicity  Metformin should be changed to twice daily or extended release once daily      Metastasis from thyroid cancer (CMS/HCC)  Assessment & Plan  Follows up with medical oncologist  and radiation oncologist  Status post radiation     Mixed hyperlipidemia  Assessment & Plan  Continue statin     Essential hypertension  Assessment & Plan  Stable  Continue metoprolol     Atherosclerosis of coronary artery  Assessment &  Plan  Status post LAD stenting 17 years ago  Patient follows up with Dr. Ji  Continue metoprolol, statin  Restart aspirin once cleared by neurosurgery     #VitD deficiency with 25 OH VitD low at 12, loading with ergocaociferol 50,000 units weekly    #Anemia with AVI, provide ferrous sulfate oral supplement    # Euglycemic DKA, hold jardiance, IVF and po hydration, replacement K, Mag, insulin replacement     Billing code: [id number]  Diagnoses:  Patient Active Problem List   Diagnosis   • BPH (benign prostatic hyperplasia)   • Atherosclerosis of coronary artery   • Cataract   • Essential hypertension   • Former tobacco use   • Mixed hyperlipidemia   • Metastasis from thyroid cancer (CMS/HCC)   • Type 2 diabetes mellitus without complication (CMS/HCC)   • Cyst of thyroid   • Esophageal reflux   • Paroxysmal atrial fibrillation (CMS/HCC)   • Pre-op evaluation   • Preop cardiovascular exam   • Cancer associated pain   • Low back pain with right-sided sciatica   • Malignant neoplasm of vertebral column, excluding sacrum and coccyx (CMS/HCC)   • Acquired hypothyroidism   • Ileus (CMS/HCC)   • Leg swelling   • Lumbar disc disease with radiculopathy   • S/P lumbar fusion   • Vitamin D deficiency   • AVI (iron deficiency anemia)        Arjun Dawkins MD  2/4/2022

## 2022-02-04 NOTE — PROGRESS NOTES
22      Re: Matt Williamson  : 1945      Chief Complaint:  Surgical follow up    History of Present Illness:   Matt Williamson is a 76 y.o. male who presents for surgical follow up. The patient underwent right lateral L3 corpectomy and placement of an expandable cage, followed by L2-4 posterolateral instrumented fusion 2022. Patient's postoperative course was complicated by an ileus which resolved prior to discharge to [facility] on postop day 7.  Since last being seen the patient states he has been recovering well at rehab. He feels he is much stronger, and eager to go home. He denies weakness, paresthesias, saddle anesthesia, fevers, and bowel/bladder changes. He denies abdominal pain and distension. Patient reports 5/10 low back pain, which responds well to medication. This morning, he experienced 10/10 pain shooting from his left hip to his left knee. It resolved with medication. He continues to wear LSO brace when out of bed.     Medical History:  has a past medical history of Atrial fibrillation with rapid ventricular response (CMS/HCC) (10/25/2019), BPH (benign prostatic hyperplasia), Coronary artery disease, Hypertension, Hypothyroidism, Mixed hyperlipidemia, Thyroid cancer (CMS/HCC), Type 2 diabetes mellitus without complication (CMS/HCC), and Vertigo.    Surgical History:  has a past surgical history that includes Cardiac catheterization (2005); Coronary angioplasty with stent (2005); Cardiac catheterization (2005); Thyroidectomy (); Foot surgery (); Anal fissurectomy (); Gastrocutaneous fistula closure; Upper gastrointestinal endoscopy; Eye surgery (Left, Cataract); and Lumbar spine surgery (2022).    Family History: family history includes Breast cancer in his biological mother; Cancer in his biological mother; Emphysema in his biological father; Lung cancer in his biological father.    Social History:   Social History     Socioeconomic History   • Marital  status:      Spouse name: None   • Number of children: None   • Years of education: None   • Highest education level: None   Occupational History   • Occupation: Retired   Tobacco Use   • Smoking status: Former Smoker     Packs/day: 2.00     Years: 20.00     Pack years: 40.00     Types: Cigarettes     Quit date:      Years since quittin.1   • Smokeless tobacco: Never Used   Vaping Use   • Vaping Use: Never used   Substance and Sexual Activity   • Alcohol use: Yes     Comment: Social   • Drug use: Never   • Sexual activity: Not Currently     Partners: Female   Other Topics Concern   • None   Social History Narrative    He lives with his wife.  He has 2 children who live in [place] in [place].  He reports he is independent in everything.  He reports he he likes playing sports including basketball and likes to model trains..  He reports he is a .  He denies a history of mental health services.     Social Determinants of Health     Financial Resource Strain: Not on file   Food Insecurity: No Food Insecurity   • Worried About Running Out of Food in the Last Year: Never true   • Ran Out of Food in the Last Year: Never true   Transportation Needs: Not on file   Physical Activity: Not on file   Stress: No Stress Concern Present   • Feeling of Stress : Not at all   Social Connections: Not on file   Intimate Partner Violence: Not on file   Housing Stability: Not on file        Allergies:   Allergies   Allergen Reactions   • Penicillins Rash     Childhood allergy       Medications:   No current facility-administered medications for this visit.     No current outpatient medications on file.     Facility-Administered Medications Ordered in Other Visits   Medication Dose Route Frequency Provider Last Rate Last Admin   • acetaminophen (TYLENOL) tablet 650 mg  650 mg oral q4h PRN Arjun Dawkins MD   650 mg at 22 0650   • alum-mag hydroxide-simeth (MAALOX) 200-200-20 mg/5 mL suspension 30 mL  30  mL oral q4h PRN Arjun Dawkins MD       • aspirin enteric coated tablet 81 mg  81 mg oral Daily Arjun Dawkins MD   81 mg at 02/04/22 0832   • bisacodyL (DULCOLAX) 10 mg suppository 10 mg  10 mg rectal Daily PRN Arjun Dawkins MD       • glucose chewable tablet 16-32 g of dextrose  16-32 g of dextrose oral PRN Arjun Dawkins MD   16 g of dextrose at 01/29/22 1721    Or   • dextrose 40 % oral gel 15-30 g of dextrose  15-30 g of dextrose oral PRN Arjun Dawkins MD        Or   • glucagon (GLUCAGEN) injection 1 mg  1 mg intramuscular PRN Arjun Dawkins MD        Or   • dextrose in water injection 12.5 g  25 mL intravenous PRN Arjun Dawkins MD       • diazePAM (VALIUM) tablet 5 mg  5 mg oral q6h PRN Arjun Dawkins MD   5 mg at 02/04/22 0650   • diphenhydrAMINE (BENADRYL) capsule 25 mg  25 mg oral q6h PRN Arjun Dawkins MD       • docusate sodium (COLACE) capsule 100 mg  100 mg oral BID Arjun Dawkins MD   100 mg at 02/04/22 0832   • ergocalciferol (VITAMIN D2) capsule 50,000 Units  50,000 Units oral Weekly Arjun Dawkins MD   50,000 Units at 01/29/22 0932   • ferric gluconate (FERRLECIT) 125 mg in sodium chloride 0.9 % 100 mL IVPB  125 mg intravenous Daily Arjun Dawkins MD   Stopped at 02/03/22 1930   • gabapentin (NEURONTIN) capsule 600 mg  600 mg oral q8h [facility] Arjun Dawkins MD   600 mg at 02/04/22 1205   • heparin (porcine) 5,000 unit/mL injection 5,000 Units  5,000 Units subcutaneous q8h [facility] Arjun Dawkins MD   5,000 Units at 02/04/22 1205   • insulin aspart U-100 (NovoLOG) pen 1-10 Units  1-10 Units subcutaneous TID with meals Arjun Dawkins MD   2 Units at 02/04/22 1208   • lactobacillus acidophilus complex (BACID) 1 billion cell- 250 mg tablet 500 mg  500 mg oral BID Arjun Dawkins MD   500 mg at 02/04/22 0832   • levothyroxine (SYNTHROID) tablet 150 mcg  150 mcg oral Daily (6a) Arjun Dawkins MD   150 mcg at 02/04/22 0522   • losartan (COZAAR) tablet 25 mg  25 mg oral Daily with dinner Arjun Dawkins MD   25 mg  "at 02/03/22 1751   • magnesium chloride tablet,delayed release (DR/EC) 128 mg  128 mg oral BID Arjun Dawkins MD   128 mg at 02/04/22 0833   • [Provider Managed Hold] metFORMIN (GLUCOPHAGE) tablet 500 mg  500 mg oral BID with meals Arjun Dawkins MD   500 mg at 02/03/22 0824   • metoprolol succinate XL (TOPROL-XL) 24 hr ER tablet 100 mg  100 mg oral Nightly Arjun Dawkins MD   100 mg at 02/03/22 2152   • ondansetron ODT (ZOFRAN-ODT) disintegrating tablet 4 mg  4 mg oral q8h PRN Arjun Dawkins MD       • oxyCODONE (ROXICODONE) immediate release tablet 10 mg  10 mg oral q4h PRN Arjun Dawkins MD   10 mg at 02/04/22 0650    And   • oxyCODONE (ROXICODONE) immediate release tablet 5 mg  5 mg oral q4h PRN Arjun Dawkins MD   5 mg at 02/03/22 2153   • polyethylene glycol (MIRALAX) 17 gram packet 17 g  17 g oral Daily with dinner Arjun Dawkins MD   17 g at 02/03/22 1751   • pravastatin (PRAVACHOL) tablet 40 mg  40 mg oral Daily (6p) Arjun Dawkins MD   40 mg at 02/03/22 1751   • senna (SENOKOT) tablet 2 tablet  2 tablet oral Daily Arjun Dawkins MD   2 tablet at 02/04/22 1204   • simethicone (MYLICON) chewable tablet 80 mg  80 mg oral QID Arjun Dawkins MD   80 mg at 02/04/22 1204       Review of Systems: A 14 point review of systems was performed and aside from what is mentioned above is otherwise negative.    Vital Signs:  Vitals:    02/04/22 1251   BP: 100/60   Pulse: 77   Resp: 20   Temp: 36.1 °C (97 °F)   TempSrc: Temporal   SpO2: 98%   Weight: 74.8 kg (165 lb)   Height: 1.676 m (5' 6\")       Physical Exam:  Well appearing male in no acute distress.    The patient is awake, alert, oriented x 3, with fluent speech, appropriate attention span, concentration and fund of knowledge.  Remote and recent memory are normal.    Cranial nerve examination reveals full visual fields to confrontation, pupils are equal round reactive to light, extra occular muscles are intact, there is no facial asymmetry and tongue protrudes " midline.    On motor examination, the patient is 5/5 throughout all 4 extremities without pronator drift.  There is no dysmetria on finger to nose testing.  Romberg's sign is negative and gait is stable.    Sensation in intact and equal to light and sharp touch throughout all 4 extremities.    He has 2+ reflexes throughout, without Maharaj's sign or ankle clonus.    His surgical site is clean, dry, and intact without erythema, warmth, or discharge. Steri-strips in place.    Data Review:  No new imaging.     Assessment and Plan:      In summary, Matt Williamson is a 76 y.o. male s/p  right lateral L3 corpectomy, ORIF of the L3 pathological fracture, and placement of an expandable cage, followed by L2-4 posterolateral instrumented fusion 1/20/2022. Patient's postoperative course was complicated by an ileus which resolved prior to discharge to [facility] on postop day 7.  Patient is recovering well from surgery with improvements in pain and strength. He is now full strength on exam with well healing incisions. He will continue to wear his LSO brace when out of bed and follow spinal precautions. He will continue multimodal pain and bowel regimen. Patient will return to clinic 3 months from surgery with lumbar xrays.    Abena BELTRE PA-C, am scribing for, and in the presence of, Dr. Colt Heller.

## 2022-02-04 NOTE — PROGRESS NOTES
Patient: Matt Williamson  Location: [facility] [address]  MRN: [id number]  Today's date: 2/4/2022    History of Present Illness  Marco A is a 76 y.o. male admitted on 1/27/2022 with Lumbar disc disease with radiculopathy [M51.16]  S/P lumbar fusion [Z98.1]. Principal problem is Lumbar disc disease with radiculopathy.    Patient is a 76-year-old male with a history of atrial fibrillation, CAD, hypertension, hypothyroidism, hyperlipidemia, thyroid cancer, type 2 diabetes developed significant mid to low back pain with walking and standing.  He has metastatic thyroid carcinoma with metastasis to the L3 body status post radiation in May 2020.  Recent PET scan showed increased activity at the L3 level without further metastatic lesions.  He was evaluated by neurosurgery who recommended surgical resection.  He was taken to the OR on 1/20 and underwent a stage I L2-L4 PSF with instrumentation, ORIF of L3 pathologic fracture, stage II L3 lateral corpectomy with expandable cage placement.  There has been no significant postoperative complications.  His pain is fairly well controlled.  An LSO has been ordered.    1/28 Pt placed on Med hold after OT eval  and during PT eval 2/2 - metabolic ketone acidosis with dehydration    Past Medical History  Marco A has a past medical history of Atrial fibrillation with rapid ventricular response (CMS/HCC) (10/25/2019), BPH (benign prostatic hyperplasia), Coronary artery disease, Hypertension, Hypothyroidism, Mixed hyperlipidemia, Thyroid cancer (CMS/HCC), Type 2 diabetes mellitus without complication (CMS/HCC), and Vertigo.      OT Vitals    Date/Time Pulse HR Source BP BP Location BP Method Pt Position [facility]   02/04/22 0730 78 Monitor 136/68 Right upper arm Automatic Lying       OT Pain    Date/Time Pain Type Side/Orientation Location Rating: Rest Interventions [facility]   02/04/22 0730 Pain Assessment left leg 5 diversional activity provided    02/04/22 0735 Pain Reassessment  -- leg 5 -- HJS   02/04/22 0857 Pain Reassessment left leg 3 position adjusted LC          Prior Living Environment      Most Recent Value   People in Home spouse   Current Living Arrangements home   Home Accessibility stairs to enter home (Group)   Living Environment Comment pt lives in 2  in [place]. 3 steps to enter with R rail, bedroom and shower stall on first floor, full flight B/L rails to shower stall and bedroom, typically sleeps on second floor. has a bed and shower stall on first floor   Number of Stairs, Main Entrance 3   Stair Railings, Main Entrance railing on right side (ascending)   Location, Patient Bedroom second floor, must negotiate stairs to access   Patient Bedroom Access Comment has bedroom on first floor if needed   Location, Bathroom second floor, must negotiate stairs to access   Bathroom Access Comment has bathroom on first floor   Number of Stairs, Within Home, Primary 12   Stair Railings, Within Home, Primary railings on both sides of stairs          Prior Level of Function      Most Recent Value   Dominant Hand right   Ambulation assistive equipment   Transferring assistive equipment   Toileting independent   Bathing independent   Dressing independent   Eating independent   Prior Level of Function Comment Pt reports he was independent for all mobility using SPC, has shower chair at home.   Assistive Device Currently Used at Home cane, straight, shower chair          Occupational Profile      Most Recent Value   Successful Occupations Retired restaraunt owner   Occupational History/Life Experiences Enjoys Playing Basketball. +    Environmental Supports and Barriers lives with spouse   Patient Goals 1/29/22 PSFS  cleaning 3/10, doing wash 4/10, bowling 1/10, shooting baskets 3/10, and running on treadmill 4/10. Total score: 30%           IRF OT Evaluation and Treatment - 02/04/22 0729        OT Time Calculation    Start Time 0728     Stop Time 0858     Time Calculation  (min) 90 min        Session Details    Document Type daily treatment/progress note     Mode of Treatment occupational therapy;individual therapy        General Information    General Observations of Patient Pt rec'd lying in bed agreeable to OT session. Initial start of session slow moving 2* pain + fatigue.        Bed Mobility    Comment (Bed Mobility) OT: cl S for supine to sit EOB c use of log roll technique        Transfers    Transfers shower transfer     Comment gait belt donned for all txfer + functional amb        Sit to Stand Transfer    [place], Sit to Stand Transfer close supervision     Verbal Cues safety     Assistive Device walker, front-wheeled;gait belt     Comment from EOB + w/c        Stand to Sit Transfer    [place], Stand to Sit Transfer close supervision     Verbal Cues safety     Assistive Device walker, front-wheeled;gait belt     Comment to EOB + w/c c cl S for safety        Stand Pivot Transfer    [place], Stand Pivot/Stand Step Transfer close supervision     Verbal Cues safety     Assistive Device walker, front-wheeled;gait belt     Comment vai amb approach to/from EOB + w/c        Toilet Transfer    [place], Toilet Transfer close supervision     Verbal Cues safety     Assistive Device grab bars/safety frame;gait belt;raised toilet seat     Comment via amb approach c RW + gait belt, pt completed in stance voiding of bladder        Shower Transfer    Transfer Technique stand pivot     [place], Shower Transfer close supervision     Verbal Cues safety     Assistive Device tub bench;grab bars/tub rail;gait belt;walker, front-wheeled     Comment via amb approach into barreir free stall c RW, gait belt, + tub bench. LSO donned during txfer + removed when pt sitting on tub bench.        Safety Issues, Functional Mobility    Comment, Safety Issues/Impairments (Mobility) OT: short HHDs to/from bathoom + while obtaining clothes c RW + gait belt - cl S for safety         Balance    Comment, Balance 1. cl S during seated bathing in full wet shower + UB/LB dressing sitting on EOB and in stance c unilateral support on RW. 2. in stance at sink side during grooming c unilateral support on RW ~5min. No LOB noted t/o        Therapeutic Interventions    Comment, Therapeutic Intervention Educated pt on drapping technique for item retreival/transport to obtain clothes from drawer/closet in room. Pt c good carryover - able to complete c cl S. Recommend item retrieval/transport c use of reacher/walker-bag to ensure carry over.        Bathing    Self-Performance chest;left arm;right arm;abdomen;front perineal area;buttocks;left upper leg;right upper leg;left lower leg, including foot;right lower leg, including foot     [place] close supervision     Position supported sitting     Setup Assistance adaptive equipment setup;obtain supplies     Adaptive Equipment grab bar/tub rail;hand-held shower spray hose;long-handled sponge;tub bench     Comment pt participated in full wet shower in barrier free stall - increased time to complete shower 2* drain in shower being clogged. Maintance made aware - arrived s/p shower to assist. Pt able to cleanse entire body sitting on tub bench c use of LH sponge for BLEs + lateral WS to cleanse buttocks. Educated pt on importance of remaining seated foe full shower to adhere to spinal/brace precautions + wear schedule        Upper Body Dressing    Self-Performance obtains clothes;orthosis application;threads left arm, shirt;threads right arm, shirt;pulls shirt over head/around back;pulls shirt down/adjusts     [place] close supervision     Position supported sitting     Adaptive Equipment none     Comment pt able to doff/don LSO + overhead t-shirt. Pt able to obtain personal clothes c use of drapping technique        Lower Body Dressing    Self-Performance obtains clothes;threads left leg, underpants;threads right leg, underpants;pulls underpants up or  down;threads left leg, pants/shorts;threads right leg, pants/shorts;pulls pants/shorts up or down;dons/doffs left sock;dons/doffs right sock;dons/doffs left shoe;dons/doffs right shoe     [place] close supervision     Position edge of bed sitting;supported standing;unsupported standing     Adaptive Equipment long-handled shoe horn;reacher     [place], Footwear close supervision     Comment pt able to thread LB garments seated on EOB + in stance c unilateral support on RW. Spoke c RN about TEDs stockings - did not don during session per RN. Pt able to don B socks + shoes c use of AD.        Grooming    Self-Performance washes, rinses and dries face;washes, rinses and dries hands;brushes/mancilla hair;oral care (brushing teeth, cleaning dentures);applies deodorant     [place] close supervision     Position supported standing;sink side     Adaptive Equipment none     [place], Oral Hygiene close supervision     Comment pt able to complete grooming in stance at sink c unilateral support on RW        Toileting    [place] close supervision     Position supported standing     Adaptive Equipment grab bar/safety frame;raised toilet seat;other (see comments)   RW    Comment (+) void of bladder in stance c cl S - pt able to manage clothes in stance c unilateral support on grab bar        Daily Progress Summary (OT)    Daily Outcome Statement OT session focusing on full wet shower in barrier free stall - pt incision + IV site covered prior to completing. Pt remained seated for shower to adhere to precautions. Overall pt requiring cl S for functional amb/txfers c use of gait belt, RW, + LSO donned. Continue OT to focus on increasing functional independence.                      Education Documentation  Safe ADL Techniques, taught by Sofy Daley, TOAN at 2/4/2022  9:12 AM.  Learner: Patient  Readiness: Acceptance  Method: Explanation  Response: Verbalizes Understanding, Demonstrated Understanding, Needs  Reinforcement  Comment: Educated pt on safe item retrieval/transport c use of RW + drapping tech - good carry over noted in session. Recommend continued education in other settings.          IRF OT Goals      Most Recent Value   Transfer Goal 1    Activity/Assistive Device toilet at 01/28/2022 0722   [place] --  [CS] at 02/01/2022 0724   Time Frame short-term goal (STG), 5 - 7 days at 02/01/2022 0724   Progress/Outcome goal revised this date at 02/01/2022 0724   Transfer Goal 2    Activity/Assistive Device toilet at 01/28/2022 0722   [place] modified independence at 01/28/2022 0722   Time Frame long-term goal (LTG), 14 days or less at 01/28/2022 0722   Progress/Outcome goal ongoing at 02/01/2022 0724   Transfer Goal 3    Activity/Assistive Device shower at 01/28/2022 0722   [place] supervision required at 02/01/2022 0724   Time Frame short-term goal (STG), 5 - 7 days at 02/01/2022 0724   Progress/Outcome goal revised this date at 02/01/2022 0724   Transfer Goal 4    Activity/Assistive Device shower at 01/28/2022 0722   [place] modified independence at 01/28/2022 0722   Time Frame long-term goal (LTG), 14 days or less at 01/28/2022 0722   Progress/Outcome goal ongoing at 02/01/2022 0724   Bathing Goal 1    [place] supervision required at 02/01/2022 0724   Time Frame short-term goal (STG), 5 - 7 days at 02/01/2022 0724   Progress/Outcome goal revised this date at 02/01/2022 0724   Bathing Goal 2    [place] modified independence at 01/28/2022 0722   Time Frame long-term goal (LTG), 14 days or less at 01/28/2022 0722   Progress/Outcome goal ongoing at 02/01/2022 0724   UB Dressing Goal 1    [place] supervision required at 02/01/2022 0724   Time Frame short-term goal (STG), 5 - 7 days at 02/01/2022 0724   Progress/Outcome goal revised this date at 02/01/2022 0724   UB Dressing Goal 2    [place] modified independence at 01/28/2022 0722   Time Frame long-term goal (LTG), 14 days or  less at 01/28/2022 0722   Progress/Outcome goal ongoing at 02/01/2022 0724   LB Dressing Goal 1    [place] supervision required at 02/01/2022 0724   Time Frame short-term goal (STG), 5 - 7 days at 01/28/2022 0722   Progress/Outcome goal revised this date at 02/01/2022 0724   LB Dressing Goal 2    [place] modified independence at 01/28/2022 0722   Time Frame long-term goal (LTG), 14 days or less at 01/28/2022 0722   Progress/Outcome goal ongoing at 02/01/2022 0724   Grooming Goal 1    [place] supervision required at 02/01/2022 0724   Time Frame short-term goal (STG), 5 - 7 days at 02/01/2022 0724   Strategies/Barriers in stance at 01/28/2022 0722   Progress/Outcome goal revised this date at 02/01/2022 0724   Grooming Goal 2    [place] modified independence at 01/28/2022 0722   Time Frame long-term goal (LTG), 14 days or less at 01/28/2022 0722   Progress/Outcome goal ongoing at 02/01/2022 0724   Toileting Goal 1    [place] supervision required at 02/01/2022 0724   Time Frame short-term goal (STG), 5 - 7 days at 02/01/2022 0724   Progress/Outcome goal revised this date at 02/01/2022 0724   Toileting Goal 2    [place] modified independence at 01/28/2022 0722   Time Frame long-term goal (LTG), 14 days or less at 01/28/2022 0722   Progress/Outcome goal ongoing at 02/01/2022 0724

## 2022-02-04 NOTE — LETTER
February 15, 2022     Raj Arreola, DO  [address] [address]    Patient: Matt Williamson  YOB: 1945  Date of Visit: 2022      Dear Dr. Arreola:    Thank you for referring Matt Williamson to me for evaluation. Below are my notes for this consultation.    If you have questions, please do not hesitate to call me. I look forward to following your patient along with you.         Sincerely,        Colt Heller MD        CC: Geoffrey P Tremblay, MD Douglas Leon Fraker, MD Sandra Urtishak, MD Gregory J Ochsner, MD Jada, Ajit S, MD  2/15/2022  1:05 PM  Signed  22      Re: Matt Williamson  : 1945      Chief Complaint:  Surgical follow up    History of Present Illness:   Matt Williamson is a 76 y.o. male who presents for surgical follow up. The patient underwent right lateral L3 corpectomy and placement of an expandable cage, followed by L2-4 posterolateral instrumented fusion 2022. Patient's postoperative course was complicated by an ileus which resolved prior to discharge to [facility] on postop day 7.  Since last being seen the patient states he has been recovering well at rehab. He feels he is much stronger, and eager to go home. He denies weakness, paresthesias, saddle anesthesia, fevers, and bowel/bladder changes. He denies abdominal pain and distension. Patient reports 5/10 low back pain, which responds well to medication. This morning, he experienced 10/10 pain shooting from his left hip to his left knee. It resolved with medication. He continues to wear LSO brace when out of bed.     Medical History:  has a past medical history of Atrial fibrillation with rapid ventricular response (CMS/HCC) (10/25/2019), BPH (benign prostatic hyperplasia), Coronary artery disease, Hypertension, Hypothyroidism, Mixed hyperlipidemia, Thyroid cancer (CMS/HCC), Type 2 diabetes mellitus without complication (CMS/HCC), and Vertigo.    Surgical History:  has a past surgical  history that includes Cardiac catheterization (2005); Coronary angioplasty with stent (2005); Cardiac catheterization (2005); Thyroidectomy (); Foot surgery (); Anal fissurectomy (); Gastrocutaneous fistula closure; Upper gastrointestinal endoscopy; Eye surgery (Left, Cataract); and Lumbar spine surgery (2022).    Family History: family history includes Breast cancer in his biological mother; Cancer in his biological mother; Emphysema in his biological father; Lung cancer in his biological father.    Social History:   Social History     Socioeconomic History   • Marital status:      Spouse name: None   • Number of children: None   • Years of education: None   • Highest education level: None   Occupational History   • Occupation: Retired   Tobacco Use   • Smoking status: Former Smoker     Packs/day: 2.00     Years: 20.00     Pack years: 40.00     Types: Cigarettes     Quit date:      Years since quittin.1   • Smokeless tobacco: Never Used   Vaping Use   • Vaping Use: Never used   Substance and Sexual Activity   • Alcohol use: Yes     Comment: Social   • Drug use: Never   • Sexual activity: Not Currently     Partners: Female   Other Topics Concern   • None   Social History Narrative    He lives with his wife.  He has 2 children who live in [place] in [place].  He reports he is independent in everything.  He reports he he likes playing sports including basketball and likes to model trains..  He reports he is a .  He denies a history of mental health services.     Social Determinants of Health     Financial Resource Strain: Not on file   Food Insecurity: No Food Insecurity   • Worried About Running Out of Food in the Last Year: Never true   • Ran Out of Food in the Last Year: Never true   Transportation Needs: Not on file   Physical Activity: Not on file   Stress: No Stress Concern Present   • Feeling of Stress : Not at all   Social Connections: Not on  file   Intimate Partner Violence: Not on file   Housing Stability: Not on file        Allergies:   Allergies   Allergen Reactions   • Penicillins Rash     Childhood allergy       Medications:   No current facility-administered medications for this visit.     No current outpatient medications on file.     Facility-Administered Medications Ordered in Other Visits   Medication Dose Route Frequency Provider Last Rate Last Admin   • acetaminophen (TYLENOL) tablet 650 mg  650 mg oral q4h PRN Arjun Dawkins MD   650 mg at 02/04/22 0650   • alum-mag hydroxide-simeth (MAALOX) 200-200-20 mg/5 mL suspension 30 mL  30 mL oral q4h PRN Arjun Dawkins MD       • aspirin enteric coated tablet 81 mg  81 mg oral Daily Arjun Dawkins MD   81 mg at 02/04/22 0832   • bisacodyL (DULCOLAX) 10 mg suppository 10 mg  10 mg rectal Daily PRN Arjun Dawkins MD       • glucose chewable tablet 16-32 g of dextrose  16-32 g of dextrose oral PRN Arjun Dawkins MD   16 g of dextrose at 01/29/22 1721    Or   • dextrose 40 % oral gel 15-30 g of dextrose  15-30 g of dextrose oral PRN Arjun Dawkins MD        Or   • glucagon (GLUCAGEN) injection 1 mg  1 mg intramuscular PRN Arjun Dawkins MD        Or   • dextrose in water injection 12.5 g  25 mL intravenous PRN Arjun Dawkins MD       • diazePAM (VALIUM) tablet 5 mg  5 mg oral q6h PRN Arjun Dawkins MD   5 mg at 02/04/22 0650   • diphenhydrAMINE (BENADRYL) capsule 25 mg  25 mg oral q6h PRN Arjun Dawkins MD       • docusate sodium (COLACE) capsule 100 mg  100 mg oral BID Arjun Dawkins MD   100 mg at 02/04/22 0832   • ergocalciferol (VITAMIN D2) capsule 50,000 Units  50,000 Units oral Weekly Arjun Dawkins MD   50,000 Units at 01/29/22 0932   • ferric gluconate (FERRLECIT) 125 mg in sodium chloride 0.9 % 100 mL IVPB  125 mg intravenous Daily Arjun Dawkins MD   Stopped at 02/03/22 1930   • gabapentin (NEURONTIN) capsule 600 mg  600 mg oral q8h SALIMA Arjun Dawkins MD   600 mg at 02/04/22 1205   • heparin  (porcine) 5,000 unit/mL injection 5,000 Units  5,000 Units subcutaneous q8h SALIMA Arjun Dawkins MD   5,000 Units at 02/04/22 1205   • insulin aspart U-100 (NovoLOG) pen 1-10 Units  1-10 Units subcutaneous TID with meals Arjun Dawkins MD   2 Units at 02/04/22 1208   • lactobacillus acidophilus complex (BACID) 1 billion cell- 250 mg tablet 500 mg  500 mg oral BID Arjun Dawkins MD   500 mg at 02/04/22 0832   • levothyroxine (SYNTHROID) tablet 150 mcg  150 mcg oral Daily (6a) Arjun Dawkins MD   150 mcg at 02/04/22 0522   • losartan (COZAAR) tablet 25 mg  25 mg oral Daily with dinner Arjun Dawkins MD   25 mg at 02/03/22 1751   • magnesium chloride tablet,delayed release (DR/EC) 128 mg  128 mg oral BID Arjun Dawkins MD   128 mg at 02/04/22 0833   • [Provider Managed Hold] metFORMIN (GLUCOPHAGE) tablet 500 mg  500 mg oral BID with meals Arjun Dawkins MD   500 mg at 02/03/22 0824   • metoprolol succinate XL (TOPROL-XL) 24 hr ER tablet 100 mg  100 mg oral Nightly Arjun Dawkins MD   100 mg at 02/03/22 2152   • ondansetron ODT (ZOFRAN-ODT) disintegrating tablet 4 mg  4 mg oral q8h PRN Arjun Dawkins MD       • oxyCODONE (ROXICODONE) immediate release tablet 10 mg  10 mg oral q4h PRN Arjun Dawkins MD   10 mg at 02/04/22 0650    And   • oxyCODONE (ROXICODONE) immediate release tablet 5 mg  5 mg oral q4h PRN Arjun Dawkins MD   5 mg at 02/03/22 2153   • polyethylene glycol (MIRALAX) 17 gram packet 17 g  17 g oral Daily with dinner Arjun Dawkins MD   17 g at 02/03/22 1751   • pravastatin (PRAVACHOL) tablet 40 mg  40 mg oral Daily (6p) Arjun Dawkins MD   40 mg at 02/03/22 1751   • senna (SENOKOT) tablet 2 tablet  2 tablet oral Daily Arjun Dawkins MD   2 tablet at 02/04/22 1204   • simethicone (MYLICON) chewable tablet 80 mg  80 mg oral QID Arjun Dawkins MD   80 mg at 02/04/22 1204       Review of Systems: A 14 point review of systems was performed and aside from what is mentioned above is otherwise negative.    Vital  "Signs:  Vitals:    02/04/22 1251   BP: 100/60   Pulse: 77   Resp: 20   Temp: 36.1 °C (97 °F)   TempSrc: Temporal   SpO2: 98%   Weight: 74.8 kg (165 lb)   Height: 1.676 m (5' 6\")       Physical Exam:  Well appearing male in no acute distress.    The patient is awake, alert, oriented x 3, with fluent speech, appropriate attention span, concentration and fund of knowledge.  Remote and recent memory are normal.    Cranial nerve examination reveals full visual fields to confrontation, pupils are equal round reactive to light, extra occular muscles are intact, there is no facial asymmetry and tongue protrudes midline.    On motor examination, the patient is 5/5 throughout all 4 extremities without pronator drift.  There is no dysmetria on finger to nose testing.  Romberg's sign is negative and gait is stable.    Sensation in intact and equal to light and sharp touch throughout all 4 extremities.    He has 2+ reflexes throughout, without Maharaj's sign or ankle clonus.    His surgical site is clean, dry, and intact without erythema, warmth, or discharge. Steri-strips in place.    Data Review:  No new imaging.     Assessment and Plan:      In summary, Matt Williamson is a 76 y.o. male s/p  right lateral L3 corpectomy, ORIF of the L3 pathological fracture, and placement of an expandable cage, followed by L2-4 posterolateral instrumented fusion 1/20/2022. Patient's postoperative course was complicated by an ileus which resolved prior to discharge to [facility] on postop day 7.  Patient is recovering well from surgery with improvements in pain and strength. He is now full strength on exam with well healing incisions. He will continue to wear his LSO brace when out of bed and follow spinal precautions. He will continue multimodal pain and bowel regimen. Patient will return to clinic 3 months from surgery with lumbar xrays.    Abena BELTRE PA-C am scribing for, and in the presence of, Dr. Colt Heller.                  "

## 2022-02-04 NOTE — PROGRESS NOTES
Subjective    PPatient seen and examined on rounds.  Chart reviewed.  Events overnight noted.  History reviewed briefly with patient.    CC:  Deficits in mobility, transfers, self-care status post L2-L4 posterior spinal fusion with instrumentation, ORIF of L3 pathologic fracture, L3 lateral corpectomy with expandable cage placement, for L3 pathologic fracture, from metastatic thyroid cancer, multiple medical problems.    HPI:  Mr. Matt Williamson is a 76-year-old right-handed white male with chronic conditions significant for hypertension, hypothyroidism, thyroid cancer status post thyroidectomy, atrial fibrillation, coronary artery disease, hyperlipidemia, type 2 diabetes mellitus, metastatic thyroid carcinoma with metastasis to the L3 vertebral body status post stereotactic radiation in May 2020, who had progressively worsening midline low back pain with radiation to the right knee. MRI lumbar spine obtained on 11/22/21 showing extension of the L3 mass, moderate-severe foraminal narrowing on the right secondary to retropulsion of the inferior endplate in the setting of ligament and joint hypertrophy. CT demonstrates significant bony erosion of the L3 vertebral body with a new oblique fracture. PET scan on 12/7/21 showed increased activity of the L3 lesion without further metastatic lesions appreciated. He was initially referred to [facility] however there has been some delay with getting an appointment.  He denied any bowel or bladder incontinence or weakness and follows up with oncologist Dr. Casey who did not recommend any chemotherapy at this time but surgical intervention as the primary treatment given the suspected isolated area of progression at the L3 vertebral body.  Per neurosurgery notes he had preserved strength except 2 beats ankle clonus on the left.   He was recommended surgical resection by neurosurgery as well as oncology.  He was admitted to [facility] on 1/20/22 and underwent a stage I  "L2-L4 posterior spinal fusion with instrumentation, ORIF of L3 pathologic fracture, and stage II L3 lateral corpectomy with expandable cage placement with neurosurgeon Dr Colt Heller on 1/20/22 at [facility].  He is allowed weightbearing as tolerated on both lower extremities and was given an LSO brace for use when out of bed.  Postoperative course was significant for urinary retention and he had an indwelling Lewis catheter.  The Lewis catheter was removed 2 days back and per patient, he is starting to void now.  I mentioned to him we will monitor post void residuals to see if he is emptying his bladder completely or not.  He has some paresthesias/hyperesthesias in his right thigh especially lateral aspect and some right hip proximal weakness and per neurosurgery notes that is not uncommon given the nature of his anterior lumbar surgery.  He is on subcutaneous Heparin and SCDs for DVT prophylaxis.  I discussed his recent clinical course with patient and with his wife at bedside.  On 1/27/22, hemoglobin was 9.5, WBC count 10.09, platelets are 304, BUN was 17, and creatinine was 1.0.  He has been needing assistance for mobility, transfers, self-care. He is transferred to [place] [facility] on 1/27/22 for further rehabilitation care.     SUBJ: Requiring close supervision for transfers with occupational therapy.  Requiring close supervision for toilet with occupational therapy.  Signed handicap application placard per patient and OT request.    ROS: Denies chest pain or shortness of breath. Other ROS negative. Past, family, social history is unchanged.      Physical Exam      Blood pressure (!) 122/58, pulse 88, temperature 37.2 °C (98.9 °F), temperature source Oral, resp. rate 18, height 1.676 m (5' 6\"), weight 75 kg (165 lb 6 oz), SpO2 96 %.  Body mass index is 26.69 kg/m².    General Appearance: Not in acute distress  Head/Ear/Nose/Throat: Normocephalic; Atraumatic.   Eye: EOMI; PERRL.   Neck: No JVD; No " Bruits.   Respiratory: Decreased breath sounds at bases.   Cardiovascular: RRR; Normal S1, S2.   Gastrointestinal: Soft; bowel sounds present but somewhat hypoactive.  Extremities: Bilateral lower extremity edema noted.    Musculoskeletal: Functional active range of motion in both upper extremities.  Some limitation of active range of motion in right hip limited by pain.    Neurological: AAO ×3. Speech is fluent. Cranial nerve examination does not reveal any gross facial asymmetry. Strength testing shows about 4+/5 strength in both upper extremities.  Right hip flexion is less than 3/5.  Right quadriceps is 4/5 with the right thigh supported.  Right ankle dorsiflexion, plantar flexion and right EHL are 4+/5.  Left hip flexion is 4/5.  Left quadriceps is 4+/5.  Left ankle dorsiflexion, plantar flexion and left EHL are 4+/5.  He is grossly able to localize touch and position sense in great toes, but does report some hyperesthesia in the right thigh lateral aspect.  Deep tendon reflexes are hypoactive and symmetric bilaterally.  Plantars are flexor.  Coordination is functional upper extremities.    Neurologically stable.  Behavior/Emotional: Appropriate; Cooperative.   Skin: No obvious rashes noted.  Healing incision noted on right flank.  Healing incision also noted on posterior lumbar spine.  Mepilex border dressing in place on incisions.      Current Facility-Administered Medications:   •  acetaminophen (TYLENOL) tablet 650 mg, 650 mg, oral, q4h PRN, Arjun Dawkins MD, 650 mg at 02/03/22 0609  •  alum-mag hydroxide-simeth (MAALOX) 200-200-20 mg/5 mL suspension 30 mL, 30 mL, oral, q4h PRN, Arjun Dawkins MD  •  aspirin enteric coated tablet 81 mg, 81 mg, oral, Daily, Arjun Dawkins MD, 81 mg at 02/03/22 0824  •  bisacodyL (DULCOLAX) 10 mg suppository 10 mg, 10 mg, rectal, Daily PRN, Arjun Dawkins MD  •  glucose chewable tablet 16-32 g of dextrose, 16-32 g of dextrose, oral, PRN, 16 g of dextrose at 01/29/22 8372  **OR** dextrose 40 % oral gel 15-30 g of dextrose, 15-30 g of dextrose, oral, PRN **OR** glucagon (GLUCAGEN) injection 1 mg, 1 mg, intramuscular, PRN **OR** dextrose in water injection 12.5 g, 25 mL, intravenous, PRN, Arjun Dawkins MD  •  diazePAM (VALIUM) tablet 5 mg, 5 mg, oral, q6h PRN, Arjun Dawkins MD  •  diphenhydrAMINE (BENADRYL) capsule 25 mg, 25 mg, oral, q6h PRN, Arjun Dawkins MD  •  docusate sodium (COLACE) capsule 100 mg, 100 mg, oral, BID, Arjun Dawkins MD, 100 mg at 02/03/22 0824  •  ergocalciferol (VITAMIN D2) capsule 50,000 Units, 50,000 Units, oral, Weekly, Arjun Dawkins MD, 50,000 Units at 01/29/22 0932  •  ferric gluconate (FERRLECIT) 125 mg in sodium chloride 0.9 % 100 mL IVPB, 125 mg, intravenous, Daily, Arjun Dawkins MD, Last Rate: 100 mL/hr at 02/03/22 1756, 125 mg at 02/03/22 1756  •  gabapentin (NEURONTIN) capsule 600 mg, 600 mg, oral, q8h SALIMA, Arjun Dawkins MD, 600 mg at 02/03/22 1512  •  heparin (porcine) 5,000 unit/mL injection 5,000 Units, 5,000 Units, subcutaneous, q8h [facility], Arjun Dawkins MD, 5,000 Units at 02/03/22 1512  •  insulin aspart U-100 (NovoLOG) pen 1-10 Units, 1-10 Units, subcutaneous, TID with meals, Arjun Dawkins MD, 1 Units at 02/02/22 1817  •  lactobacillus acidophilus complex (BACID) 1 billion cell- 250 mg tablet 500 mg, 500 mg, oral, BID, Arjun Dawkins MD, 500 mg at 02/03/22 1151  •  levothyroxine (SYNTHROID) tablet 150 mcg, 150 mcg, oral, Daily (6a), Arjun Dawkins MD, 150 mcg at 02/03/22 0609  •  losartan (COZAAR) tablet 25 mg, 25 mg, oral, Daily with dinner, Arjun Dawkins MD, 25 mg at 02/03/22 1751  •  magnesium chloride tablet,delayed release (DR/EC) 128 mg, 128 mg, oral, BID, Arjun Dawkins MD, 128 mg at 02/03/22 0824  •  [Provider Managed Hold] metFORMIN (GLUCOPHAGE) tablet 500 mg, 500 mg, oral, BID with meals, Arjun Dawkins MD, 500 mg at 02/03/22 0824  •  metoprolol succinate XL (TOPROL-XL) 24 hr ER tablet 100 mg, 100 mg, oral, Nightly, Arjun Dawkins,  MD, 100 mg at 02/02/22 2031  •  ondansetron ODT (ZOFRAN-ODT) disintegrating tablet 4 mg, 4 mg, oral, q8h PRN, Arjun Dawkins MD  •  oxyCODONE (ROXICODONE) immediate release tablet 10 mg, 10 mg, oral, q4h PRN, 10 mg at 02/02/22 2152 **AND** oxyCODONE (ROXICODONE) immediate release tablet 5 mg, 5 mg, oral, q4h PRN, Arjun Dawkins MD, 5 mg at 02/03/22 0821  •  polyethylene glycol (MIRALAX) 17 gram packet 17 g, 17 g, oral, Daily with dinner, Arjun Dawkins MD, 17 g at 02/03/22 1751  •  pravastatin (PRAVACHOL) tablet 40 mg, 40 mg, oral, Daily (6p), Arjun Dawkins MD, 40 mg at 02/03/22 1751  •  senna (SENOKOT) tablet 2 tablet, 2 tablet, oral, Daily, Arjun Dawkins MD, 2 tablet at 02/03/22 1151  •  simethicone (MYLICON) chewable tablet 80 mg, 80 mg, oral, QID, Arjun Dawkins MD, 80 mg at 02/03/22 1751       Labs / Radiology    Lab Results   Component Value Date    WBC 7.10 01/31/2022    HGB 9.2 (L) 01/31/2022    HCT 29.1 (L) 01/31/2022    MCV 84.8 01/31/2022     01/31/2022     Lab Results   Component Value Date    GLUCOSE 132 (H) 02/03/2022    CALCIUM 8.1 (L) 02/03/2022     (L) 02/03/2022    K 4.3 02/03/2022    CO2 29 02/03/2022     02/03/2022    BUN 8 02/03/2022    CREATININE 0.8 02/03/2022     Assessment and Plan    ASSESSMENT PLAN:  1. 76-year-old right-handed white male with chronic conditions significant for hypertension, hypothyroidism, thyroid cancer status post thyroidectomy, atrial fibrillation, coronary artery disease, hyperlipidemia, type 2 diabetes mellitus, metastatic thyroid carcinoma with metastasis to the L3 vertebral body status post stereotactic radiation in May 2020, who had progressively worsening midline low back pain with radiation to the right knee. MRI lumbar spine obtained on 11/22/21 showing extension of the L3 mass, moderate-severe foraminal narrowing on the right secondary to retropulsion of the inferior endplate in the setting of ligament and joint hypertrophy. CT  demonstrates significant bony erosion of the L3 vertebral body with a new oblique fracture. PET scan on 12/7/21 showed increased activity of the L3 lesion without further metastatic lesions appreciated. He was initially referred to [facility] however there has been some delay with getting an appointment.  He denied any bowel or bladder incontinence or weakness and follows up with oncologist Dr. Casey who did not recommend any chemotherapy at this time but surgical intervention as the primary treatment given the suspected isolated area of progression at the L3 vertebral body.  Per neurosurgery notes he had preserved strength except 2 beats ankle clonus on the left.   He was recommended surgical resection by neurosurgery as well as oncology.  He was admitted to [facility] on 1/20/22 and underwent a stage I L2-L4 posterior spinal fusion with instrumentation, ORIF of L3 pathologic fracture, and stage II L3 lateral corpectomy with expandable cage placement with neurosurgeon Dr Colt Heller on 1/20/22 at [facility].  He is allowed weightbearing as tolerated on both lower extremities and was given an LSO brace for use when out of bed.  Postoperative course was significant for urinary retention and he had an indwelling Lewis catheter.  The Lewis catheter was removed 2 days back and per patient, he is starting to void now.  I mentioned to him we will monitor post void residuals to see if he is emptying his bladder completely or not.  He has some paresthesias/hyperesthesias in his right thigh especially lateral aspect and some right hip proximal weakness and per neurosurgery notes that is not uncommon given the nature of his anterior lumbar surgery.  He is on subcutaneous Heparin and SCDs for DVT prophylaxis.  I discussed his recent clinical course with patient and with his wife at bedside.  On 1/27/22, hemoglobin was 9.5, WBC count 10.09, platelets are 304, BUN was 17, and creatinine was 1.0.  He has been  needing assistance for mobility, transfers, self-care. He is transferred to [place] rehabilitation on 1/27/22 for further rehabilitation care.      2. DVT prophylaxis - on subcutaneous Heparin.  On SCDs.  Check doppler. Platelets 323 on 1/28/2022.  Platelets 338 on 1/31/2022.     3.  Neurosurgery - status post L2-L4 posterior spinal fusion with instrumentation, ORIF of L3 pathologic fracture, L3 lateral corpectomy with expandable cage placement, for L3 pathologic fracture, from metastatic thyroid cancer, multiple medical problems - continue PT, OT, psychology.  Follow falls precautions, cardiac precautions, monitor pulse oximetry in therapy.  Follow spinal precautions.  LSO brace when out of bed.  He has follow-up appointment with neurosurgery on 2/2/2022.     4. GI - On Protonix for GI prophylaxis. Continue Colace, Senokot, MiraLAX, Dulcolax.  On Zofran ODT for nausea.  On Maalox PRN.     5.  - Postoperative course was significant for urinary retention and he had an indwelling Lewis catheter.  The Lewis catheter was removed 2 days back and per patient, he is starting to void now.  I mentioned to him we will monitor post void residuals to see if he is emptying his bladder completely or not.       6.  Diabetes mellitus - on Jardiance.  On Metformin.  On sliding-scale NovoLog coverage.  On hypoglycemic protocol.     7. Pain - on Tylenol.  On Neurontin.  On PRN Oxycodone.  On Valium PRN for spasms.     8. Hematology -hemoglobin 9.2, WBC 8.01 on 1/28/2022.  Hemoglobin 9.2, WBC 7.10 on 1/31/2022.     9.  Endocrinology - history of thyroid cancer status post thyroidectomy.  On Synthroid.     10. CVS - history of hypertension, atrial fibrillation, coronary artery disease - on Toprol-XL.     11.  Hyperlipidemia - on Pravachol.     12.  Oncology - history of metastatic thyroid cancer status post thyroidectomy.  He had L2-L4 posterior spinal fusion with instrumentation, ORIF of L3 pathologic fracture, L3 lateral  corpectomy with expandable cage placement, for L3 pathologic fracture, from metastatic thyroid cancer.  He will follow up with oncology and neurosurgery.     13.  Skin - monitor healing of incisions on right flank and on posterior lumbar spine.  Dermal defense will be consulted.     14.  F/E/N - on sodium bicarbonate.     15. Rehabilitation medicine - Continue comprehensive rehabilitation care. Continue PT, OT, psychology.  We will follow falls precautions, cardiac precautions, monitor pulse oximetry in therapy and follow aspiration precautions.  We will follow spinal precautions.  LSO brace when out of bed.Tolerating therapies per endurance.  Reports pain medications are helping.  Had a bowel movement.  Voiding well.Inspected incisions which are stable.  Requiring minimal assistance for transfers with physical therapy.  Able to ambulate 50 feet with front wheel walker with minimal assistance with physical therapy.Discussed follow-up appointment with neurosurgery with him for this Wednesday.  Working on upper extremity strengthening and range of motion exercises.  Able to don and doff LSO brace.  Discussed spinal precautions with him.Able to ambulate 150 feet with minimal assistance with straight cane with physical therapy.  Requiring close supervision to minimal assistance for transfers with physical therapy.Requiring close supervision for transfers with occupational therapy.  Requiring close supervision for toilet with occupational therapy.  Signed handicap application placard per patient and OT request.     16. Reviewed labs today. BUN 12, creatinine 0.8 on 1/28/2022.  BUN 7, creatinine 0.8 on 1/31/2022.  BUN 8, creatinine 0.8 on 2/3/2022.           James Hidalgo MD  2/3/2022

## 2022-02-05 ENCOUNTER — APPOINTMENT (INPATIENT)
Dept: OCCUPATIONAL THERAPY | Facility: REHABILITATION | Age: 77
DRG: 949 | End: 2022-02-05
Payer: MEDICARE

## 2022-02-05 LAB
GLUCOSE BLD-MCNC: 123 MG/DL (ref 70–99)
GLUCOSE BLD-MCNC: 138 MG/DL (ref 70–99)
GLUCOSE BLD-MCNC: 156 MG/DL (ref 70–99)
POCT TEST: ABNORMAL

## 2022-02-05 PROCEDURE — 63700000 HC SELF-ADMINISTRABLE DRUG: Performed by: STUDENT IN AN ORGANIZED HEALTH CARE EDUCATION/TRAINING PROGRAM

## 2022-02-05 PROCEDURE — 12800001 HC ROOM AND CARE SEMIPRIVATE REHAB-BRAIN INJ

## 2022-02-05 PROCEDURE — 97530 THERAPEUTIC ACTIVITIES: CPT | Mod: GO

## 2022-02-05 PROCEDURE — 63600000 HC DRUGS/DETAIL CODE: Performed by: INTERNAL MEDICINE

## 2022-02-05 PROCEDURE — 97110 THERAPEUTIC EXERCISES: CPT | Mod: GO

## 2022-02-05 PROCEDURE — 63700000 HC SELF-ADMINISTRABLE DRUG: Performed by: INTERNAL MEDICINE

## 2022-02-05 PROCEDURE — 25800000 HC PHARMACY IV SOLUTIONS: Performed by: INTERNAL MEDICINE

## 2022-02-05 RX ORDER — SYRING-NEEDL,DISP,INSUL,0.3 ML 29 G X1/2"
296 SYRINGE, EMPTY DISPOSABLE MISCELLANEOUS ONCE
Status: COMPLETED | OUTPATIENT
Start: 2022-02-05 | End: 2022-02-05

## 2022-02-05 RX ORDER — OXYCODONE HYDROCHLORIDE 5 MG/1
10 TABLET ORAL EVERY 4 HOURS PRN
Status: DISCONTINUED | OUTPATIENT
Start: 2022-02-05 | End: 2022-02-08 | Stop reason: HOSPADM

## 2022-02-05 RX ADMIN — HEPARIN SODIUM 5000 UNITS: 5000 INJECTION, SOLUTION INTRAVENOUS; SUBCUTANEOUS at 15:24

## 2022-02-05 RX ADMIN — GABAPENTIN 600 MG: 300 CAPSULE ORAL at 15:25

## 2022-02-05 RX ADMIN — METOPROLOL SUCCINATE 100 MG: 100 TABLET, EXTENDED RELEASE ORAL at 21:41

## 2022-02-05 RX ADMIN — HEPARIN SODIUM 5000 UNITS: 5000 INJECTION, SOLUTION INTRAVENOUS; SUBCUTANEOUS at 21:41

## 2022-02-05 RX ADMIN — OXYCODONE HYDROCHLORIDE 10 MG: 5 TABLET ORAL at 06:59

## 2022-02-05 RX ADMIN — DOCUSATE SODIUM 100 MG: 100 CAPSULE, LIQUID FILLED ORAL at 08:31

## 2022-02-05 RX ADMIN — DOCUSATE SODIUM 100 MG: 100 CAPSULE, LIQUID FILLED ORAL at 21:43

## 2022-02-05 RX ADMIN — PROBIOTIC PRODUCT - TAB 500 MG: TAB at 21:42

## 2022-02-05 RX ADMIN — PRAVASTATIN SODIUM 40 MG: 20 TABLET ORAL at 17:19

## 2022-02-05 RX ADMIN — ERGOCALCIFEROL 50000 UNITS: 1.25 CAPSULE ORAL at 08:32

## 2022-02-05 RX ADMIN — MAGNESIUM 64 MG (MAGNESIUM CHLORIDE) TABLET,DELAYED RELEASE 128 MG: at 21:42

## 2022-02-05 RX ADMIN — SIMETHICONE 80 MG: 80 TABLET, CHEWABLE ORAL at 08:32

## 2022-02-05 RX ADMIN — SENNOSIDES 2 TABLET: 8.6 TABLET, FILM COATED ORAL at 12:32

## 2022-02-05 RX ADMIN — HEPARIN SODIUM 5000 UNITS: 5000 INJECTION, SOLUTION INTRAVENOUS; SUBCUTANEOUS at 06:21

## 2022-02-05 RX ADMIN — SIMETHICONE 80 MG: 80 TABLET, CHEWABLE ORAL at 16:16

## 2022-02-05 RX ADMIN — LOSARTAN POTASSIUM 25 MG: 25 TABLET, FILM COATED ORAL at 17:19

## 2022-02-05 RX ADMIN — GABAPENTIN 600 MG: 300 CAPSULE ORAL at 21:42

## 2022-02-05 RX ADMIN — PROBIOTIC PRODUCT - TAB 500 MG: TAB at 08:31

## 2022-02-05 RX ADMIN — SIMETHICONE 80 MG: 80 TABLET, CHEWABLE ORAL at 21:43

## 2022-02-05 RX ADMIN — SIMETHICONE 80 MG: 80 TABLET, CHEWABLE ORAL at 12:32

## 2022-02-05 RX ADMIN — MAGNESIUM CITRATE 296 ML: 1.75 LIQUID ORAL at 15:25

## 2022-02-05 RX ADMIN — LEVOTHYROXINE SODIUM 150 MCG: 150 TABLET ORAL at 06:21

## 2022-02-05 RX ADMIN — ASPIRIN 81 MG: 81 TABLET, COATED ORAL at 08:32

## 2022-02-05 RX ADMIN — SODIUM CHLORIDE 125 MG: 9 INJECTION, SOLUTION INTRAVENOUS at 16:16

## 2022-02-05 RX ADMIN — MAGNESIUM 64 MG (MAGNESIUM CHLORIDE) TABLET,DELAYED RELEASE 128 MG: at 08:31

## 2022-02-05 RX ADMIN — GABAPENTIN 600 MG: 300 CAPSULE ORAL at 06:21

## 2022-02-05 RX ADMIN — POLYETHYLENE GLYCOL 3350 17 G: 17 POWDER, FOR SOLUTION ORAL at 08:29

## 2022-02-05 NOTE — PLAN OF CARE
Problem: Rehabilitation ([facility]) Plan of Care  Goal: Plan of Care Review  Outcome: Progressing   Pt alert and oriented X3. LSO on OOB, ambulating with walker. Continent of bowel and bladder. Pt is concerned about not moving bowels, no c/o GI pain or distress. ZKG=620. SCDS on. Bed alarm on, call bell within reach.

## 2022-02-05 NOTE — PROGRESS NOTES
Patient: Matt Williamson  Location: [facility]  MRN: [id number]  Today's date: 2/5/2022    History of Present Illness  Marco A is a 76 y.o. male admitted on 1/27/2022 with Lumbar disc disease with radiculopathy [M51.16]  S/P lumbar fusion [Z98.1]. Principal problem is Lumbar disc disease with radiculopathy.    Patient is a 76-year-old male with a history of atrial fibrillation, CAD, hypertension, hypothyroidism, hyperlipidemia, thyroid cancer, type 2 diabetes developed significant mid to low back pain with walking and standing.  He has metastatic thyroid carcinoma with metastasis to the L3 body status post radiation in May 2020.  Recent PET scan showed increased activity at the L3 level without further metastatic lesions.  He was evaluated by neurosurgery who recommended surgical resection.  He was taken to the OR on 1/20 and underwent a stage I L2-L4 PSF with instrumentation, ORIF of L3 pathologic fracture, stage II L3 lateral corpectomy with expandable cage placement.  There has been no significant postoperative complications.  His pain is fairly well controlled.  An LSO has been ordered.    1/28 Pt placed on Med hold after OT eval  and during PT eval 2/2 - metabolic ketone acidosis with dehydration    Past Medical History  Marco A has a past medical history of Atrial fibrillation with rapid ventricular response (CMS/HCC) (10/25/2019), BPH (benign prostatic hyperplasia), Coronary artery disease, Hypertension, Hypothyroidism, Mixed hyperlipidemia, Thyroid cancer (CMS/HCC), Type 2 diabetes mellitus without complication (CMS/HCC), and Vertigo.      OT Vitals    Date/Time Pulse HR Source BP BP Location BP Method Pt Position [facility]   02/05/22 1003 93 Monitor 113/62 Left upper arm Automatic Sitting EF      OT Pain    Date/Time Pain Type Location Rating: Rest Rating: Activity Interventions [facility]   02/05/22 1003 Pain Assessment back 2 - mild pain 6 - moderate-severe pain diversional activity provided EF    02/05/22 1059 Post Activity back 2 - mild pain -- position adjusted EF          Prior Living Environment      Most Recent Value   People in Home spouse   Current Living Arrangements home   Home Accessibility stairs to enter home (Group)   Living Environment Comment pt lives in 2  in [place]. 3 steps to enter with R rail, bedroom and shower stall on first floor, full flight B/L rails to shower stall and bedroom, typically sleeps on second floor. has a bed and shower stall on first floor   Number of Stairs, Main Entrance 3   Stair Railings, Main Entrance railing on right side (ascending)   Location, Patient Bedroom second floor, must negotiate stairs to access   Patient Bedroom Access Comment has bedroom on first floor if needed   Location, Bathroom second floor, must negotiate stairs to access   Bathroom Access Comment has bathroom on first floor   Number of Stairs, Within Home, Primary 12   Stair Railings, Within Home, Primary railings on both sides of stairs          Prior Level of Function      Most Recent Value   Dominant Hand right   Ambulation assistive equipment   Transferring assistive equipment   Toileting independent   Bathing independent   Dressing independent   Eating independent   Prior Level of Function Comment Pt reports he was independent for all mobility using SPC, has shower chair at home.   Assistive Device Currently Used at Home cane, straight, shower chair          Occupational Profile      Most Recent Value   Successful Occupations Retired restaraunt owner   Occupational History/Life Experiences Enjoys Playing Basketball. +    Environmental Supports and Barriers lives with spouse   Patient Goals 1/29/22 PSFS  cleaning 3/10, doing wash 4/10, bowling 1/10, shooting baskets 3/10, and running on treadmill 4/10. Total score: 30%           IRF OT Evaluation and Treatment - 02/05/22 1002        OT Time Calculation    Start Time 1000     Stop Time 1100     Time Calculation (min) 60 min   "      Session Details    Document Type daily treatment/progress note     Mode of Treatment individual therapy;occupational therapy        General Information    General Observations of Patient pt received in w/c in therapy gym        Stand Pivot Transfer    [place], Stand Pivot/Stand Step Transfer supervision     Verbal Cues safety     Assistive Device walker, front-wheeled     Comment via ambulatory approach        Safety Issues, Functional Mobility    Comment, Safety Issues/Impairments (Mobility) OT: Pt ambulates long HHD with RW at S level.        Balance    Comment, Balance S in staggered stance when completing UE therex        Therapeutic Interventions    Comment, Therapeutic Intervention 1. Pt practices item retreival from various surface heights throughout therapy gym. Pt with good adherence to spinal precautions when reaching to various surface heights. 2.To work on proximal LE strength needed for safe ADL completion, pt completes UE therex in staggered stance on 6\" block. In stance pt performs alternating UE therex with 3# free weight.        Upper Extremity (Therapeutic Exercise)    Exercise Position/Type standing;resistive exercises     General Exercise bilateral;bicep curl   chest press, shoulder press    Weight/Resistance 3 pounds;free weight     Reps and Sets 2x15     Comment Pt completes UE therex in staggered stance alternating UE and LE resting on block throughout.        Aerobic Exercise    Type arm bike     Time Performed 3 minutes     Comment pt completes arm bike for 3 minutes with moderate resistance seated in w/c.        Daily Progress Summary (OT)    Daily Outcome Statement OT session with focus on item retrieval and dynamic standing balance. Pt tolerated session well and demonstrated good adherence to spinal precautions throughout. Recommend to continue with higher level balance and funcitonal endurance.                          IRF OT Goals      Most Recent Value   Transfer Goal 1  "   Activity/Assistive Device toilet at 01/28/2022 0722   [place] --  [CS] at 02/01/2022 0724   Time Frame short-term goal (STG), 5 - 7 days at 02/01/2022 0724   Progress/Outcome goal revised this date at 02/01/2022 0724   Transfer Goal 2    Activity/Assistive Device toilet at 01/28/2022 0722   [place] modified independence at 01/28/2022 0722   Time Frame long-term goal (LTG), 14 days or less at 01/28/2022 0722   Progress/Outcome goal ongoing at 02/01/2022 0724   Transfer Goal 3    Activity/Assistive Device shower at 01/28/2022 0722   [place] supervision required at 02/01/2022 0724   Time Frame short-term goal (STG), 5 - 7 days at 02/01/2022 0724   Progress/Outcome goal revised this date at 02/01/2022 0724   Transfer Goal 4    Activity/Assistive Device shower at 01/28/2022 0722   [place] modified independence at 01/28/2022 0722   Time Frame long-term goal (LTG), 14 days or less at 01/28/2022 0722   Progress/Outcome goal ongoing at 02/01/2022 0724   Bathing Goal 1    [place] supervision required at 02/01/2022 0724   Time Frame short-term goal (STG), 5 - 7 days at 02/01/2022 0724   Progress/Outcome goal revised this date at 02/01/2022 0724   Bathing Goal 2    [place] modified independence at 01/28/2022 0722   Time Frame long-term goal (LTG), 14 days or less at 01/28/2022 0722   Progress/Outcome goal ongoing at 02/01/2022 0724   UB Dressing Goal 1    [place] supervision required at 02/01/2022 0724   Time Frame short-term goal (STG), 5 - 7 days at 02/01/2022 0724   Progress/Outcome goal revised this date at 02/01/2022 0724   UB Dressing Goal 2    [place] modified independence at 01/28/2022 0722   Time Frame long-term goal (LTG), 14 days or less at 01/28/2022 0722   Progress/Outcome goal ongoing at 02/01/2022 0724   LB Dressing Goal 1    [place] supervision required at 02/01/2022 0724   Time Frame short-term goal (STG), 5 - 7 days at 01/28/2022 0722   Progress/Outcome goal  revised this date at 02/01/2022 0724   LB Dressing Goal 2    [place] modified independence at 01/28/2022 0722   Time Frame long-term goal (LTG), 14 days or less at 01/28/2022 0722   Progress/Outcome goal ongoing at 02/01/2022 0724   Grooming Goal 1    [place] supervision required at 02/01/2022 0724   Time Frame short-term goal (STG), 5 - 7 days at 02/01/2022 0724   Strategies/Barriers in stance at 01/28/2022 0722   Progress/Outcome goal revised this date at 02/01/2022 0724   Grooming Goal 2    [place] modified independence at 01/28/2022 0722   Time Frame long-term goal (LTG), 14 days or less at 01/28/2022 0722   Progress/Outcome goal ongoing at 02/01/2022 0724   Toileting Goal 1    [place] supervision required at 02/01/2022 0724   Time Frame short-term goal (STG), 5 - 7 days at 02/01/2022 0724   Progress/Outcome goal revised this date at 02/01/2022 0724   Toileting Goal 2    [place] modified independence at 01/28/2022 0722   Time Frame long-term goal (LTG), 14 days or less at 01/28/2022 0722   Progress/Outcome goal ongoing at 02/01/2022 0724

## 2022-02-05 NOTE — NURSING NOTE
Pt concerned about not moving bowels, LBM 2/3. Dinnertime Miralax administered with breakfast per pt request. Pt also wants mag citrate later if no BM. Will contact house doc for order if necessary.

## 2022-02-06 ENCOUNTER — APPOINTMENT (INPATIENT)
Dept: PHYSICAL THERAPY | Facility: REHABILITATION | Age: 77
DRG: 949 | End: 2022-02-06
Payer: MEDICARE

## 2022-02-06 LAB
GLUCOSE BLD-MCNC: 117 MG/DL (ref 70–99)
GLUCOSE BLD-MCNC: 136 MG/DL (ref 70–99)
GLUCOSE BLD-MCNC: 140 MG/DL (ref 70–99)
POCT TEST: ABNORMAL

## 2022-02-06 PROCEDURE — 12800001 HC ROOM AND CARE SEMIPRIVATE REHAB-BRAIN INJ

## 2022-02-06 PROCEDURE — 63600000 HC DRUGS/DETAIL CODE: Performed by: INTERNAL MEDICINE

## 2022-02-06 PROCEDURE — 63700000 HC SELF-ADMINISTRABLE DRUG: Performed by: STUDENT IN AN ORGANIZED HEALTH CARE EDUCATION/TRAINING PROGRAM

## 2022-02-06 PROCEDURE — 63700000 HC SELF-ADMINISTRABLE DRUG: Performed by: INTERNAL MEDICINE

## 2022-02-06 PROCEDURE — 97116 GAIT TRAINING THERAPY: CPT | Mod: GP

## 2022-02-06 PROCEDURE — 25800000 HC PHARMACY IV SOLUTIONS: Performed by: INTERNAL MEDICINE

## 2022-02-06 PROCEDURE — 97530 THERAPEUTIC ACTIVITIES: CPT | Mod: GP

## 2022-02-06 RX ORDER — METFORMIN HYDROCHLORIDE 500 MG/1
500 TABLET, EXTENDED RELEASE ORAL
Status: DISCONTINUED | OUTPATIENT
Start: 2022-02-07 | End: 2022-02-08 | Stop reason: HOSPADM

## 2022-02-06 RX ADMIN — GABAPENTIN 600 MG: 300 CAPSULE ORAL at 05:52

## 2022-02-06 RX ADMIN — ACETAMINOPHEN 650 MG: 325 TABLET, FILM COATED ORAL at 13:17

## 2022-02-06 RX ADMIN — GABAPENTIN 600 MG: 300 CAPSULE ORAL at 13:17

## 2022-02-06 RX ADMIN — PROBIOTIC PRODUCT - TAB 500 MG: TAB at 20:54

## 2022-02-06 RX ADMIN — SODIUM CHLORIDE 125 MG: 9 INJECTION, SOLUTION INTRAVENOUS at 17:39

## 2022-02-06 RX ADMIN — OXYCODONE HYDROCHLORIDE 5 MG: 5 TABLET ORAL at 13:17

## 2022-02-06 RX ADMIN — SIMETHICONE 80 MG: 80 TABLET, CHEWABLE ORAL at 07:53

## 2022-02-06 RX ADMIN — PRAVASTATIN SODIUM 40 MG: 20 TABLET ORAL at 17:37

## 2022-02-06 RX ADMIN — DIAZEPAM 5 MG: 5 TABLET ORAL at 22:12

## 2022-02-06 RX ADMIN — MAGNESIUM 64 MG (MAGNESIUM CHLORIDE) TABLET,DELAYED RELEASE 128 MG: at 20:54

## 2022-02-06 RX ADMIN — GABAPENTIN 600 MG: 300 CAPSULE ORAL at 21:00

## 2022-02-06 RX ADMIN — OXYCODONE HYDROCHLORIDE 10 MG: 5 TABLET ORAL at 07:53

## 2022-02-06 RX ADMIN — MAGNESIUM 64 MG (MAGNESIUM CHLORIDE) TABLET,DELAYED RELEASE 128 MG: at 07:53

## 2022-02-06 RX ADMIN — LOSARTAN POTASSIUM 25 MG: 25 TABLET, FILM COATED ORAL at 17:37

## 2022-02-06 RX ADMIN — METOPROLOL SUCCINATE 100 MG: 100 TABLET, EXTENDED RELEASE ORAL at 20:54

## 2022-02-06 RX ADMIN — SIMETHICONE 80 MG: 80 TABLET, CHEWABLE ORAL at 20:54

## 2022-02-06 RX ADMIN — HEPARIN SODIUM 5000 UNITS: 5000 INJECTION, SOLUTION INTRAVENOUS; SUBCUTANEOUS at 05:52

## 2022-02-06 RX ADMIN — HEPARIN SODIUM 5000 UNITS: 5000 INJECTION, SOLUTION INTRAVENOUS; SUBCUTANEOUS at 22:12

## 2022-02-06 RX ADMIN — ASPIRIN 81 MG: 81 TABLET, COATED ORAL at 07:53

## 2022-02-06 RX ADMIN — PROBIOTIC PRODUCT - TAB 500 MG: TAB at 07:53

## 2022-02-06 RX ADMIN — ACETAMINOPHEN 650 MG: 325 TABLET, FILM COATED ORAL at 22:12

## 2022-02-06 RX ADMIN — HEPARIN SODIUM 5000 UNITS: 5000 INJECTION, SOLUTION INTRAVENOUS; SUBCUTANEOUS at 13:18

## 2022-02-06 RX ADMIN — LEVOTHYROXINE SODIUM 150 MCG: 150 TABLET ORAL at 05:52

## 2022-02-06 NOTE — PLAN OF CARE
Problem: Rehabilitation ([facility]) Plan of Care  Goal: Plan of Care Review  Outcome: Progressing  Flowsheets (Taken 2/6/2022 0153)  Outcome Summary: pt recieved 2300. sleeping. PT uses call bell approritely. Plcaed in reach. Bed alarm in place.

## 2022-02-06 NOTE — PLAN OF CARE
Plan of Care Review  Plan of Care Reviewed With: patient  Progress: improving  Outcome Summary: Per report, pt had large bowel movement overnight. Liquid BM this am; bowel meds will be held today. Pt is receiving IV iron therapy through tomorrow; peripheral IV will be checked this am for patency. Upon am rounds, pt c/o 6/10 pain in L thigh and lower back. Pain management plan was discussed.

## 2022-02-06 NOTE — PROGRESS NOTES
Subjective    PPatient seen and examined on rounds.  Chart reviewed.  Events overnight noted.  History reviewed briefly with patient.    CC:  Deficits in mobility, transfers, self-care status post L2-L4 posterior spinal fusion with instrumentation, ORIF of L3 pathologic fracture, L3 lateral corpectomy with expandable cage placement, for L3 pathologic fracture, from metastatic thyroid cancer, multiple medical problems.    HPI:  Mr. Matt Williamson is a 76-year-old right-handed white male with chronic conditions significant for hypertension, hypothyroidism, thyroid cancer status post thyroidectomy, atrial fibrillation, coronary artery disease, hyperlipidemia, type 2 diabetes mellitus, metastatic thyroid carcinoma with metastasis to the L3 vertebral body status post stereotactic radiation in May 2020, who had progressively worsening midline low back pain with radiation to the right knee. MRI lumbar spine obtained on 11/22/21 showing extension of the L3 mass, moderate-severe foraminal narrowing on the right secondary to retropulsion of the inferior endplate in the setting of ligament and joint hypertrophy. CT demonstrates significant bony erosion of the L3 vertebral body with a new oblique fracture. PET scan on 12/7/21 showed increased activity of the L3 lesion without further metastatic lesions appreciated. He was initially referred to [facility] however there has been some delay with getting an appointment.  He denied any bowel or bladder incontinence or weakness and follows up with oncologist Dr. Casey who did not recommend any chemotherapy at this time but surgical intervention as the primary treatment given the suspected isolated area of progression at the L3 vertebral body.  Per neurosurgery notes he had preserved strength except 2 beats ankle clonus on the left.   He was recommended surgical resection by neurosurgery as well as oncology.  He was admitted to [facility] on 1/20/22 and underwent a stage I  "L2-L4 posterior spinal fusion with instrumentation, ORIF of L3 pathologic fracture, and stage II L3 lateral corpectomy with expandable cage placement with neurosurgeon Dr Colt Heller on 1/20/22 at [facility].  He is allowed weightbearing as tolerated on both lower extremities and was given an LSO brace for use when out of bed.  Postoperative course was significant for urinary retention and he had an indwelling Lewis catheter.  The Lewis catheter was removed 2 days back and per patient, he is starting to void now.  I mentioned to him we will monitor post void residuals to see if he is emptying his bladder completely or not.  He has some paresthesias/hyperesthesias in his right thigh especially lateral aspect and some right hip proximal weakness and per neurosurgery notes that is not uncommon given the nature of his anterior lumbar surgery.  He is on subcutaneous Heparin and SCDs for DVT prophylaxis.  I discussed his recent clinical course with patient and with his wife at bedside.  On 1/27/22, hemoglobin was 9.5, WBC count 10.09, platelets are 304, BUN was 17, and creatinine was 1.0.  He has been needing assistance for mobility, transfers, self-care. He is transferred to [facility] on 1/27/22 for further rehabilitation care.     SUBJ: Discussed neurosurgery appointment yesterday with him.  He was told to continue back brace.  He had some constipation issues.  Agrees to be on a full liquid diet for now.    ROS: Denies chest pain or shortness of breath. Other ROS negative. Past, family, social history is unchanged.      Physical Exam      Blood pressure (!) 148/68, pulse (!) 117, temperature 36.9 °C (98.4 °F), temperature source Oral, resp. rate 18, height 1.676 m (5' 6\"), weight 75 kg (165 lb 6 oz), SpO2 98 %.  Body mass index is 26.69 kg/m².    General Appearance: Not in acute distress  Head/Ear/Nose/Throat: Normocephalic; Atraumatic.   Eye: EOMI; PERRL.   Neck: No JVD; No Bruits.   Respiratory: " Decreased breath sounds at bases.   Cardiovascular: RRR; Normal S1, S2.   Gastrointestinal: Soft; bowel sounds present but somewhat hypoactive.  Extremities: Bilateral lower extremity edema noted.    Musculoskeletal: Functional active range of motion in both upper extremities.  Some limitation of active range of motion in right hip limited by pain.    Neurological: AAO ×3. Speech is fluent. Cranial nerve examination does not reveal any gross facial asymmetry. Strength testing shows about 4+/5 strength in both upper extremities.  Right hip flexion is less than 3/5.  Right quadriceps is 4/5 with the right thigh supported.  Right ankle dorsiflexion, plantar flexion and right EHL are 4+/5.  Left hip flexion is 4/5.  Left quadriceps is 4+/5.  Left ankle dorsiflexion, plantar flexion and left EHL are 4+/5.  He is grossly able to localize touch and position sense in great toes, but does report some hyperesthesia in the right thigh lateral aspect.  Deep tendon reflexes are hypoactive and symmetric bilaterally.  Plantars are flexor.  Coordination is functional upper extremities.    Neurologically stable.  Behavior/Emotional: Appropriate; Cooperative.   Skin: No obvious rashes noted.  Healing incision noted on right flank.  Healing incision also noted on posterior lumbar spine.  Mepilex border dressing in place on incisions.      Current Facility-Administered Medications:   •  acetaminophen (TYLENOL) tablet 650 mg, 650 mg, oral, q4h PRN, Arjun Dawkins MD, 650 mg at 02/04/22 2122  •  alum-mag hydroxide-simeth (MAALOX) 200-200-20 mg/5 mL suspension 30 mL, 30 mL, oral, q4h PRN, Arjun Dawkins MD  •  aspirin enteric coated tablet 81 mg, 81 mg, oral, Daily, Arjun Dawkins MD, 81 mg at 02/05/22 0832  •  bisacodyL (DULCOLAX) 10 mg suppository 10 mg, 10 mg, rectal, Daily PRN, Arjun Dawkins MD  •  glucose chewable tablet 16-32 g of dextrose, 16-32 g of dextrose, oral, PRN, 16 g of dextrose at 01/29/22 1721 **OR** dextrose 40 % oral  gel 15-30 g of dextrose, 15-30 g of dextrose, oral, PRN **OR** glucagon (GLUCAGEN) injection 1 mg, 1 mg, intramuscular, PRN **OR** dextrose in water injection 12.5 g, 25 mL, intravenous, PRN, Arjun Dawkins MD  •  diazePAM (VALIUM) tablet 5 mg, 5 mg, oral, q6h PRN, Arjun Dawkins MD, 5 mg at 02/04/22 0650  •  diphenhydrAMINE (BENADRYL) capsule 25 mg, 25 mg, oral, q6h PRN, Arjun Dawkins MD  •  docusate sodium (COLACE) capsule 100 mg, 100 mg, oral, BID, Arjun Dawkins MD, 100 mg at 02/05/22 2143  •  ergocalciferol (VITAMIN D2) capsule 50,000 Units, 50,000 Units, oral, Weekly, Arjun Dawkins MD, 50,000 Units at 02/05/22 0832  •  ferric gluconate (FERRLECIT) 125 mg in sodium chloride 0.9 % 100 mL IVPB, 125 mg, intravenous, Daily, Arjun Dawkins MD, Stopped at 02/05/22 1725  •  gabapentin (NEURONTIN) capsule 600 mg, 600 mg, oral, q8h SALIMA, Arjun Dawkins MD, 600 mg at 02/05/22 2142  •  heparin (porcine) 5,000 unit/mL injection 5,000 Units, 5,000 Units, subcutaneous, q8h SALIMA, Arjun Dawkins MD, 5,000 Units at 02/05/22 2141  •  insulin aspart U-100 (NovoLOG) pen 1-10 Units, 1-10 Units, subcutaneous, TID with meals, Arjun Dawkins MD, 2 Units at 02/04/22 1208  •  lactobacillus acidophilus complex (BACID) 1 billion cell- 250 mg tablet 500 mg, 500 mg, oral, BID, Arjun Dawkins MD, 500 mg at 02/05/22 2142  •  levothyroxine (SYNTHROID) tablet 150 mcg, 150 mcg, oral, Daily (6a), Arjun Dawkins MD, 150 mcg at 02/05/22 0621  •  losartan (COZAAR) tablet 25 mg, 25 mg, oral, Daily with dinner, Arjun Dawkins MD, 25 mg at 02/05/22 1719  •  magnesium chloride tablet,delayed release (DR/EC) 128 mg, 128 mg, oral, BID, Arjun Dawkins MD, 128 mg at 02/05/22 2142  •  [Provider Managed Hold] metFORMIN (GLUCOPHAGE) tablet 500 mg, 500 mg, oral, BID with meals, Arjun Dawkins MD, 500 mg at 02/03/22 0824  •  metoprolol succinate XL (TOPROL-XL) 24 hr ER tablet 100 mg, 100 mg, oral, Nightly, Arjun Dawkins MD, 100 mg at 02/05/22 2141  •   ondansetron ODT (ZOFRAN-ODT) disintegrating tablet 4 mg, 4 mg, oral, q8h PRN, Arjun Dawkins MD  •  oxyCODONE (ROXICODONE) immediate release tablet 10 mg, 10 mg, oral, q4h PRN, Pietro Umana DO, 10 mg at 02/05/22 0659  •  oxyCODONE (ROXICODONE) immediate release tablet 5 mg, 5 mg, oral, q4h PRN, Pietro Umana DO  •  polyethylene glycol (MIRALAX) 17 gram packet 17 g, 17 g, oral, Daily with dinner, Arjun Dawkins MD, 17 g at 02/05/22 0829  •  pravastatin (PRAVACHOL) tablet 40 mg, 40 mg, oral, Daily (6p), Arjun Dawkins MD, 40 mg at 02/05/22 1719  •  senna (SENOKOT) tablet 2 tablet, 2 tablet, oral, Daily, Arjun Dawkins MD, 2 tablet at 02/05/22 1232  •  simethicone (MYLICON) chewable tablet 80 mg, 80 mg, oral, QID, Arjun Dawkins MD, 80 mg at 02/05/22 2143       Labs / Radiology    Lab Results   Component Value Date    WBC 7.10 01/31/2022    HGB 9.2 (L) 01/31/2022    HCT 29.1 (L) 01/31/2022    MCV 84.8 01/31/2022     01/31/2022     Lab Results   Component Value Date    GLUCOSE 132 (H) 02/03/2022    CALCIUM 8.1 (L) 02/03/2022     (L) 02/03/2022    K 4.3 02/03/2022    CO2 29 02/03/2022     02/03/2022    BUN 8 02/03/2022    CREATININE 0.8 02/03/2022     Assessment and Plan    ASSESSMENT PLAN:  1. 76-year-old right-handed white male with chronic conditions significant for hypertension, hypothyroidism, thyroid cancer status post thyroidectomy, atrial fibrillation, coronary artery disease, hyperlipidemia, type 2 diabetes mellitus, metastatic thyroid carcinoma with metastasis to the L3 vertebral body status post stereotactic radiation in May 2020, who had progressively worsening midline low back pain with radiation to the right knee. MRI lumbar spine obtained on 11/22/21 showing extension of the L3 mass, moderate-severe foraminal narrowing on the right secondary to retropulsion of the inferior endplate in the setting of ligament and joint hypertrophy. CT demonstrates significant bony erosion of the L3  vertebral body with a new oblique fracture. PET scan on 12/7/21 showed increased activity of the L3 lesion without further metastatic lesions appreciated. He was initially referred to [facility] however there has been some delay with getting an appointment.  He denied any bowel or bladder incontinence or weakness and follows up with oncologist Dr. Casey who did not recommend any chemotherapy at this time but surgical intervention as the primary treatment given the suspected isolated area of progression at the L3 vertebral body.  Per neurosurgery notes he had preserved strength except 2 beats ankle clonus on the left.   He was recommended surgical resection by neurosurgery as well as oncology.  He was admitted to [facility] on 1/20/22 and underwent a stage I L2-L4 posterior spinal fusion with instrumentation, ORIF of L3 pathologic fracture, and stage II L3 lateral corpectomy with expandable cage placement with neurosurgeon Dr Colt Heller on 1/20/22 at [facility].  He is allowed weightbearing as tolerated on both lower extremities and was given an LSO brace for use when out of bed.  Postoperative course was significant for urinary retention and he had an indwelling Lewis catheter.  The Lewis catheter was removed 2 days back and per patient, he is starting to void now.  I mentioned to him we will monitor post void residuals to see if he is emptying his bladder completely or not.  He has some paresthesias/hyperesthesias in his right thigh especially lateral aspect and some right hip proximal weakness and per neurosurgery notes that is not uncommon given the nature of his anterior lumbar surgery.  He is on subcutaneous Heparin and SCDs for DVT prophylaxis.  I discussed his recent clinical course with patient and with his wife at bedside.  On 1/27/22, hemoglobin was 9.5, WBC count 10.09, platelets are 304, BUN was 17, and creatinine was 1.0.  He has been needing assistance for mobility, transfers, self-care.  He is transferred to [facility] on 1/27/22 for further rehabilitation care.      2. DVT prophylaxis - on subcutaneous Heparin.  On SCDs.  Check doppler. Platelets 323 on 1/28/2022.  Platelets 338 on 1/31/2022.     3.  Neurosurgery - status post L2-L4 posterior spinal fusion with instrumentation, ORIF of L3 pathologic fracture, L3 lateral corpectomy with expandable cage placement, for L3 pathologic fracture, from metastatic thyroid cancer, multiple medical problems - continue PT, OT, psychology.  Follow falls precautions, cardiac precautions, monitor pulse oximetry in therapy.  Follow spinal precautions.  LSO brace when out of bed.  Discussed neurosurgery appointment yesterday with him.  He was told to continue back brace.     4. GI - On Protonix for GI prophylaxis. Continue Colace, Senokot, MiraLAX, Dulcolax.  On Zofran ODT for nausea.  On Maalox PRN.  He had some constipation issues.  Agrees to be on a full liquid diet for now.     5.  - Postoperative course was significant for urinary retention and he had an indwelling Lewis catheter.  The Lewis catheter was removed 2 days back and per patient, he is starting to void now.  I mentioned to him we will monitor post void residuals to see if he is emptying his bladder completely or not.       6.  Diabetes mellitus - on Jardiance.  On Metformin.  On sliding-scale NovoLog coverage.  On hypoglycemic protocol.     7. Pain - on Tylenol.  On Neurontin.  On PRN Oxycodone.  On Valium PRN for spasms.     8. Hematology -hemoglobin 9.2, WBC 8.01 on 1/28/2022.  Hemoglobin 9.2, WBC 7.10 on 1/31/2022.     9.  Endocrinology - history of thyroid cancer status post thyroidectomy.  On Synthroid.     10. CVS - history of hypertension, atrial fibrillation, coronary artery disease - on Toprol-XL.     11.  Hyperlipidemia - on Pravachol.     12.  Oncology - history of metastatic thyroid cancer status post thyroidectomy.  He had L2-L4 posterior spinal fusion with  instrumentation, ORIF of L3 pathologic fracture, L3 lateral corpectomy with expandable cage placement, for L3 pathologic fracture, from metastatic thyroid cancer.  He will follow up with oncology and neurosurgery.     13.  Skin - monitor healing of incisions on right flank and on posterior lumbar spine.  Dermal defense will be consulted.     14.  F/E/N - on sodium bicarbonate.     15. Rehabilitation medicine - Continue comprehensive rehabilitation care. Continue PT, OT, psychology.  We will follow falls precautions, cardiac precautions, monitor pulse oximetry in therapy and follow aspiration precautions.  We will follow spinal precautions.  LSO brace when out of bed.Tolerating therapies per endurance.  Reports pain medications are helping.  Had a bowel movement.  Voiding well.Inspected incisions which are stable.  Requiring minimal assistance for transfers with physical therapy.  Able to ambulate 50 feet with front wheel walker with minimal assistance with physical therapy.Discussed follow-up appointment with neurosurgery with him for this Wednesday.  Working on upper extremity strengthening and range of motion exercises.  Able to don and doff LSO brace.  Discussed spinal precautions with him.Able to ambulate 150 feet with minimal assistance with straight cane with physical therapy.  Requiring close supervision to minimal assistance for transfers with physical therapy.Requiring close supervision for transfers with occupational therapy.  Requiring close supervision for toilet with occupational therapy.  Signed handicap application placard per patient and OT request.He had neurosurgery appointment today that went well.  Noted input from neurosurgery.  Denies increased pain.  Discussed with patient.Discussed neurosurgery appointment yesterday with him.  He was told to continue back brace.  He had some constipation issues.  Agrees to be on a full liquid diet for now.     16. Reviewed labs today. BUN 12, creatinine 0.8  on 1/28/2022.  BUN 7, creatinine 0.8 on 1/31/2022.  BUN 8, creatinine 0.8 on 2/3/2022.           James Hidalgo MD  2/5/2022

## 2022-02-06 NOTE — PROGRESS NOTES
[facility] Progress Note          Patient was seen and examined at bedside.    Subjective:     He feels well this morning, his BM is fine. His PT/OT is progressing well. Resume metformin  mg daily tomorrow and jardiance 10 mg daily in 2 days  .      Objective   Vital signs in last 24 hours:  Temp:  [36.4 °C (97.6 °F)-36.9 °C (98.4 °F)] 36.4 °C (97.6 °F)  Heart Rate:  [] 86  Resp:  [18] 18  BP: (103-148)/(55-68) 103/55    No intake or output data in the 24 hours ending 02/06/22 1037  Intake/Output this shift:  No intake/output data recorded.   Review of Systems:  All other systems reviewed and negative except as noted in the HPI.   Objective      Labs  reviewed his labs thoroughly   Lab Results   Component Value Date    WBC 7.10 01/31/2022    HGB 9.2 (L) 01/31/2022    HCT 29.1 (L) 01/31/2022    MCV 84.8 01/31/2022     01/31/2022     Lab Results   Component Value Date    GLUCOSE 132 (H) 02/03/2022    CALCIUM 8.1 (L) 02/03/2022     (L) 02/03/2022    K 4.3 02/03/2022    CO2 29 02/03/2022     02/03/2022    BUN 8 02/03/2022    CREATININE 0.8 02/03/2022       Imaging  OSH imaging study reports reviewed   B/l LE venous compression U/S 1/28/22:  No evidence of deep vein thrombus (DVT) in either lower extremity.      Full Code    Physical Exam:  Head/Ear/Nose/Throat: normocephalic; atraumatic; moisture mouth mm, no oropharyngeal thrush noted.   Eyes: anicteric sclera, EOMI; PERRL.   Neck : supple, no JVD, no carotid bruits appeciated.   Respiratory: no evidence of labored breathing, lung sounds CTA b/l, good aeration bibasilar area, no w/r/c.   Cardiovascular: RRR; normal S1, S2; no m/r/g; no S3 or S4.   Gastrointestinal: soft; NT; BS normal; mildly distended; no CVAT b/l.   Genitourinary: no silva.   Extremities : b/l LE pitting edema .   Neurological: AO x 3, fluent speeches, following commands, CNS II-XII grossly intact; no focal neurologic deficits.    Behavior/Emotional: in NAD, appropriate; cooperative.   Skin: sacral area skin lesions.     Plan of care was discussed with patient, RN, and PMR attending     Assessment   CC:Thyroid cancer with progressive lumbar metastatic lesions and L3 pathologic fracture complicated with myeloradiculopathy, s/p surgical intervention and ORIF, neurogenic B/B, ADL and ambulatory dysfunction      76 y.o. male with PMH of HTN, dyslipidemia, CAD, type 2 DM, hypothyroidism, thyroid cancer metastatic to L3 vertebral body s/p radiation stereotactic body radiation 5/2020, seen by the neurosurgery service for surgical discussion regarding growth of his L3 vertebral body metastatic lesion. He has had progressive worsening midline-line low back pain with radiation to the right knee. An MRI lumbar spine was obtained 11/22/2021 showing extension of the L3 mass. He was sent for a PET scan 12/7/2021 showing increased activity of the L3 lesion without further metastatic lesions appreciated. He was referred to [facility] however there has been some delay with getting an appointment.      He has had persistent midline low back pain with walking and standing which has progressively worsened. He does not have any weakness or bowel/bladder incontinence. He is following with oncology (Dr. Casey) who does not recommend any chemotherapy at this time but surgical intervention as the primary treatment given the suspected isolated area of progression at the L3 vertebral body.     He was admitted to [facility] on 1/20/22 and underwent elective :  Stage 1: L2-L4 PLSF with instrumentation, ORIF L3 pathologic fracture  Stage 2: L3 lateral corpectomy with expandable cage placement  Post op ileus, s/p aggressive bowel program, resolved, tolerated diet. Functionally he has ADL and ambulatory dysfunction, requiring inpt acute rehab, transferred to [facility] on 1/27/22.            Ileus (CMS/HCC)  Resolved after aggressive bowel program, start diet  Tolerating diet,  cont bowel regimen.     Acquired hypothyroidism  Assessment & Plan  Status post thyroidectomy  Continue levothyroxine        Malignant neoplasm of vertebral column, excluding sacrum and coccyx (CMS/HCC)  Assessment & Plan  Metastatic thyroid ca S/p L3 pathologic fracture s/p right lateral L3 corpectomy and placement of an expandable cage, followed by L2-4 posterolateral instrumented fusion  Continue postop management per neurosurgery.  Drain was removed  DVT prophylaxis, Lewis, pain management per neurosurgery  Rehab as inpt at [place] [facility]      Paroxysmal atrial fibrillation (CMS/HCC)  Assessment & Plan  Currently in sinus rhythm  Patient is not on long-term anticoagulation  He takes Rythmol as needed for palpitations     Type 2 diabetes mellitus without complication (CMS/HCC)  Assessment & Plan  Patient on Jardiance, Trulicity and metformin as outpatient  Cont inpt Accu-Cheks with sliding scale insulin  Hemoglobin A1c 6.9  Continue Jardiance and restarted metformin and trulicity  Metformin should be changed to twice daily or extended release once daily      Metastasis from thyroid cancer (CMS/HCC)  Assessment & Plan  Follows up with medical oncologist  and radiation oncologist  Status post radiation     Mixed hyperlipidemia  Assessment & Plan  Continue statin     Essential hypertension  Assessment & Plan  Stable  Continue metoprolol     Atherosclerosis of coronary artery  Assessment & Plan  Status post LAD stenting 17 years ago  Patient follows up with Dr. Ji  Continue metoprolol, statin  Restart aspirin once cleared by neurosurgery     #VitD deficiency with 25 OH VitD low at 12, loading with ergocaociferol 50,000 units weekly    #Anemia with AVI, provide ferrous sulfate oral supplement    # Euglycemic DKA, hold jardiance, IVF and po hydration, replacement K, Mag, insulin replacement     Billing code: [id number]  Diagnoses:  Patient Active Problem List   Diagnosis   • BPH (benign prostatic  hyperplasia)   • Atherosclerosis of coronary artery   • Cataract   • Essential hypertension   • Former tobacco use   • Mixed hyperlipidemia   • Metastasis from thyroid cancer (CMS/HCC)   • Type 2 diabetes mellitus without complication (CMS/HCC)   • Cyst of thyroid   • Esophageal reflux   • Paroxysmal atrial fibrillation (CMS/HCC)   • Pre-op evaluation   • Preop cardiovascular exam   • Cancer associated pain   • Low back pain with right-sided sciatica   • Malignant neoplasm of vertebral column, excluding sacrum and coccyx (CMS/HCC)   • Acquired hypothyroidism   • Ileus (CMS/HCC)   • Leg swelling   • Lumbar disc disease with radiculopathy   • S/P lumbar fusion   • Vitamin D deficiency   • AVI (iron deficiency anemia)        Arjun Dawkins MD  2/6/2022

## 2022-02-06 NOTE — PROGRESS NOTES
Subjective    PPatient seen and examined on rounds.  Chart reviewed.  Events overnight noted.  History reviewed briefly with patient.    CC:  Deficits in mobility, transfers, self-care status post L2-L4 posterior spinal fusion with instrumentation, ORIF of L3 pathologic fracture, L3 lateral corpectomy with expandable cage placement, for L3 pathologic fracture, from metastatic thyroid cancer, multiple medical problems.    HPI:  Mr. Matt Williamson is a 76-year-old right-handed white male with chronic conditions significant for hypertension, hypothyroidism, thyroid cancer status post thyroidectomy, atrial fibrillation, coronary artery disease, hyperlipidemia, type 2 diabetes mellitus, metastatic thyroid carcinoma with metastasis to the L3 vertebral body status post stereotactic radiation in May 2020, who had progressively worsening midline low back pain with radiation to the right knee. MRI lumbar spine obtained on 11/22/21 showing extension of the L3 mass, moderate-severe foraminal narrowing on the right secondary to retropulsion of the inferior endplate in the setting of ligament and joint hypertrophy. CT demonstrates significant bony erosion of the L3 vertebral body with a new oblique fracture. PET scan on 12/7/21 showed increased activity of the L3 lesion without further metastatic lesions appreciated. He was initially referred to [facility] however there has been some delay with getting an appointment.  He denied any bowel or bladder incontinence or weakness and follows up with oncologist Dr. Casey who did not recommend any chemotherapy at this time but surgical intervention as the primary treatment given the suspected isolated area of progression at the L3 vertebral body.  Per neurosurgery notes he had preserved strength except 2 beats ankle clonus on the left.   He was recommended surgical resection by neurosurgery as well as oncology.  He was admitted to [facility] on 1/20/22 and underwent a stage I  "L2-L4 posterior spinal fusion with instrumentation, ORIF of L3 pathologic fracture, and stage II L3 lateral corpectomy with expandable cage placement with neurosurgeon Dr Colt Heller on 1/20/22 at [facility].  He is allowed weightbearing as tolerated on both lower extremities and was given an LSO brace for use when out of bed.  Postoperative course was significant for urinary retention and he had an indwelling Lewis catheter.  The Lewis catheter was removed 2 days back and per patient, he is starting to void now.  I mentioned to him we will monitor post void residuals to see if he is emptying his bladder completely or not.  He has some paresthesias/hyperesthesias in his right thigh especially lateral aspect and some right hip proximal weakness and per neurosurgery notes that is not uncommon given the nature of his anterior lumbar surgery.  He is on subcutaneous Heparin and SCDs for DVT prophylaxis.  I discussed his recent clinical course with patient and with his wife at bedside.  On 1/27/22, hemoglobin was 9.5, WBC count 10.09, platelets are 304, BUN was 17, and creatinine was 1.0.  He has been needing assistance for mobility, transfers, self-care. He is transferred to [facility] on 1/27/22 for further rehabilitation care.     SUBJ: He is on full liquid diet now.  Handicap parking placard application signed for him per his request today.  Internist adjusting antidiabetic medications.  He feels comfortable with discharge plans to home next week.    ROS: Denies chest pain or shortness of breath. Other ROS negative. Past, family, social history is unchanged.      Physical Exam      Blood pressure (!) 110/55, pulse 73, temperature 36.4 °C (97.5 °F), temperature source Oral, resp. rate 18, height 1.676 m (5' 6\"), weight 75 kg (165 lb 6 oz), SpO2 100 %.  Body mass index is 26.69 kg/m².    General Appearance: Not in acute distress  Head/Ear/Nose/Throat: Normocephalic; Atraumatic.   Eye: EOMI; PERRL. "   Neck: No JVD; No Bruits.   Respiratory: Decreased breath sounds at bases.   Cardiovascular: RRR; Normal S1, S2.   Gastrointestinal: Soft; bowel sounds present but somewhat hypoactive.  Extremities: Bilateral lower extremity edema noted.    Musculoskeletal: Functional active range of motion in both upper extremities.  Some limitation of active range of motion in right hip limited by pain.    Neurological: AAO ×3. Speech is fluent. Cranial nerve examination does not reveal any gross facial asymmetry. Strength testing shows about 4+/5 strength in both upper extremities.  Right hip flexion is less than 3/5.  Right quadriceps is 4/5 with the right thigh supported.  Right ankle dorsiflexion, plantar flexion and right EHL are 4+/5.  Left hip flexion is 4/5.  Left quadriceps is 4+/5.  Left ankle dorsiflexion, plantar flexion and left EHL are 4+/5.  He is grossly able to localize touch and position sense in great toes, but does report some hyperesthesia in the right thigh lateral aspect.  Deep tendon reflexes are hypoactive and symmetric bilaterally.  Plantars are flexor.  Coordination is functional upper extremities.    Neurologically stable.  Behavior/Emotional: Appropriate; Cooperative.   Skin: No obvious rashes noted.  Healing incision noted on right flank.  Healing incision also noted on posterior lumbar spine.  Mepilex border dressing in place on incisions.      Current Facility-Administered Medications:   •  acetaminophen (TYLENOL) tablet 650 mg, 650 mg, oral, q4h PRN, Arjun Dawkins MD, 650 mg at 02/06/22 1317  •  alum-mag hydroxide-simeth (MAALOX) 200-200-20 mg/5 mL suspension 30 mL, 30 mL, oral, q4h PRN, Arjun Dawkins MD  •  aspirin enteric coated tablet 81 mg, 81 mg, oral, Daily, Arjun Dawkins MD, 81 mg at 02/06/22 7343  •  bisacodyL (DULCOLAX) 10 mg suppository 10 mg, 10 mg, rectal, Daily PRN, Arjun Dawkins MD  •  glucose chewable tablet 16-32 g of dextrose, 16-32 g of dextrose, oral, PRN, 16 g of dextrose  at 01/29/22 1721 **OR** dextrose 40 % oral gel 15-30 g of dextrose, 15-30 g of dextrose, oral, PRN **OR** glucagon (GLUCAGEN) injection 1 mg, 1 mg, intramuscular, PRN **OR** dextrose in water injection 12.5 g, 25 mL, intravenous, PRN, Arjun Dawkins MD  •  diazePAM (VALIUM) tablet 5 mg, 5 mg, oral, q6h PRN, Arjun Dawkins MD, 5 mg at 02/04/22 0650  •  diphenhydrAMINE (BENADRYL) capsule 25 mg, 25 mg, oral, q6h PRN, Arjun Dawkins MD  •  docusate sodium (COLACE) capsule 100 mg, 100 mg, oral, BID, Arjun Dawkins MD, 100 mg at 02/05/22 2143  •  [START ON 2/8/2022] empagliflozin (JARDIANCE) tablet 10 mg, 10 mg, oral, Daily, Arjun Dawkins MD  •  ergocalciferol (VITAMIN D2) capsule 50,000 Units, 50,000 Units, oral, Weekly, Arjun Dawkins MD, 50,000 Units at 02/05/22 0832  •  ferric gluconate (FERRLECIT) 125 mg in sodium chloride 0.9 % 100 mL IVPB, 125 mg, intravenous, Daily, Arjun Dawkins MD, Stopped at 02/05/22 1725  •  gabapentin (NEURONTIN) capsule 600 mg, 600 mg, oral, q8h [facility], Arjun Dawkins MD, 600 mg at 02/06/22 1317  •  heparin (porcine) 5,000 unit/mL injection 5,000 Units, 5,000 Units, subcutaneous, q8h [facility], Arjun Dawkins MD, 5,000 Units at 02/06/22 1318  •  insulin aspart U-100 (NovoLOG) pen 1-10 Units, 1-10 Units, subcutaneous, TID with meals, Arjun Dawkins MD, 2 Units at 02/04/22 1208  •  lactobacillus acidophilus complex (BACID) 1 billion cell- 250 mg tablet 500 mg, 500 mg, oral, BID, Arjun Dawkins MD, 500 mg at 02/06/22 0753  •  levothyroxine (SYNTHROID) tablet 150 mcg, 150 mcg, oral, Daily (6a), Arjun Dawkins MD, 150 mcg at 02/06/22 0552  •  losartan (COZAAR) tablet 25 mg, 25 mg, oral, Daily with dinner, Arjun Dawkins MD, 25 mg at 02/05/22 1719  •  magnesium chloride tablet,delayed release (DR/EC) 128 mg, 128 mg, oral, BID, Arjun Dawkins MD, 128 mg at 02/06/22 0753  •  [START ON 2/7/2022] metFORMIN XR (GLUCOPHAGE-XR) 24 hr ER tablet 500 mg, 500 mg, oral, Daily with breakfast, Arjun Dawkins MD  •   metoprolol succinate XL (TOPROL-XL) 24 hr ER tablet 100 mg, 100 mg, oral, Nightly, Arjun Dawkins MD, 100 mg at 02/05/22 2141  •  ondansetron ODT (ZOFRAN-ODT) disintegrating tablet 4 mg, 4 mg, oral, q8h PRN, Arjun Dawkins MD  •  oxyCODONE (ROXICODONE) immediate release tablet 10 mg, 10 mg, oral, q4h PRN, Pietro Umana DO, 10 mg at 02/06/22 0753  •  oxyCODONE (ROXICODONE) immediate release tablet 5 mg, 5 mg, oral, q4h PRN, Pietro Umana DO, 5 mg at 02/06/22 1317  •  polyethylene glycol (MIRALAX) 17 gram packet 17 g, 17 g, oral, Daily with dinner, Arjun Dawkins MD, 17 g at 02/05/22 0829  •  pravastatin (PRAVACHOL) tablet 40 mg, 40 mg, oral, Daily (6p), Arjun Dawkins MD, 40 mg at 02/05/22 1719  •  senna (SENOKOT) tablet 2 tablet, 2 tablet, oral, Daily, Arjun Dawkins MD, 2 tablet at 02/05/22 1232  •  simethicone (MYLICON) chewable tablet 80 mg, 80 mg, oral, QID, Arjun Dawkins MD, 80 mg at 02/06/22 0753       Labs / Radiology    Lab Results   Component Value Date    WBC 7.10 01/31/2022    HGB 9.2 (L) 01/31/2022    HCT 29.1 (L) 01/31/2022    MCV 84.8 01/31/2022     01/31/2022     Lab Results   Component Value Date    GLUCOSE 132 (H) 02/03/2022    CALCIUM 8.1 (L) 02/03/2022     (L) 02/03/2022    K 4.3 02/03/2022    CO2 29 02/03/2022     02/03/2022    BUN 8 02/03/2022    CREATININE 0.8 02/03/2022     Assessment and Plan    ASSESSMENT PLAN:  1. 76-year-old right-handed white male with chronic conditions significant for hypertension, hypothyroidism, thyroid cancer status post thyroidectomy, atrial fibrillation, coronary artery disease, hyperlipidemia, type 2 diabetes mellitus, metastatic thyroid carcinoma with metastasis to the L3 vertebral body status post stereotactic radiation in May 2020, who had progressively worsening midline low back pain with radiation to the right knee. MRI lumbar spine obtained on 11/22/21 showing extension of the L3 mass, moderate-severe foraminal narrowing on the  right secondary to retropulsion of the inferior endplate in the setting of ligament and joint hypertrophy. CT demonstrates significant bony erosion of the L3 vertebral body with a new oblique fracture. PET scan on 12/7/21 showed increased activity of the L3 lesion without further metastatic lesions appreciated. He was initially referred to [facility] however there has been some delay with getting an appointment.  He denied any bowel or bladder incontinence or weakness and follows up with oncologist Dr. Casey who did not recommend any chemotherapy at this time but surgical intervention as the primary treatment given the suspected isolated area of progression at the L3 vertebral body.  Per neurosurgery notes he had preserved strength except 2 beats ankle clonus on the left.   He was recommended surgical resection by neurosurgery as well as oncology.  He was admitted to [facility] on 1/20/22 and underwent a stage I L2-L4 posterior spinal fusion with instrumentation, ORIF of L3 pathologic fracture, and stage II L3 lateral corpectomy with expandable cage placement with neurosurgeon Dr Colt Heller on 1/20/22 at [facility].  He is allowed weightbearing as tolerated on both lower extremities and was given an LSO brace for use when out of bed.  Postoperative course was significant for urinary retention and he had an indwelling Lewis catheter.  The Lewis catheter was removed 2 days back and per patient, he is starting to void now.  I mentioned to him we will monitor post void residuals to see if he is emptying his bladder completely or not.  He has some paresthesias/hyperesthesias in his right thigh especially lateral aspect and some right hip proximal weakness and per neurosurgery notes that is not uncommon given the nature of his anterior lumbar surgery.  He is on subcutaneous Heparin and SCDs for DVT prophylaxis.  I discussed his recent clinical course with patient and with his wife at bedside.  On 1/27/22,  hemoglobin was 9.5, WBC count 10.09, platelets are 304, BUN was 17, and creatinine was 1.0.  He has been needing assistance for mobility, transfers, self-care. He is transferred to [facility] on 1/27/22 for further rehabilitation care.      2. DVT prophylaxis - on subcutaneous Heparin.  On SCDs.  Check doppler. Platelets 323 on 1/28/2022.  Platelets 338 on 1/31/2022.     3.  Neurosurgery - status post L2-L4 posterior spinal fusion with instrumentation, ORIF of L3 pathologic fracture, L3 lateral corpectomy with expandable cage placement, for L3 pathologic fracture, from metastatic thyroid cancer, multiple medical problems - continue PT, OT, psychology.  Follow falls precautions, cardiac precautions, monitor pulse oximetry in therapy.  Follow spinal precautions.  LSO brace when out of bed.  Discussed neurosurgery appointment yesterday with him.  He was told to continue back brace.     4. GI - On Protonix for GI prophylaxis. Continue Colace, Senokot, MiraLAX, Dulcolax.  On Zofran ODT for nausea.  On Maalox PRN.  He had some constipation issues.  Agrees to be on a full liquid diet for now.     5.  - Postoperative course was significant for urinary retention and he had an indwelling Lewis catheter.  The Lewis catheter was removed 2 days back and per patient, he is starting to void now.  I mentioned to him we will monitor post void residuals to see if he is emptying his bladder completely or not.       6.  Diabetes mellitus - on Jardiance.  On Metformin.  On sliding-scale NovoLog coverage.  On hypoglycemic protocol.     7. Pain - on Tylenol.  On Neurontin.  On PRN Oxycodone.  On Valium PRN for spasms.     8. Hematology -hemoglobin 9.2, WBC 8.01 on 1/28/2022.  Hemoglobin 9.2, WBC 7.10 on 1/31/2022.     9.  Endocrinology - history of thyroid cancer status post thyroidectomy.  On Synthroid.     10. CVS - history of hypertension, atrial fibrillation, coronary artery disease - on Toprol-XL.     11.   Hyperlipidemia - on Pravachol.     12.  Oncology - history of metastatic thyroid cancer status post thyroidectomy.  He had L2-L4 posterior spinal fusion with instrumentation, ORIF of L3 pathologic fracture, L3 lateral corpectomy with expandable cage placement, for L3 pathologic fracture, from metastatic thyroid cancer.  He will follow up with oncology and neurosurgery.     13.  Skin - monitor healing of incisions on right flank and on posterior lumbar spine.  Dermal defense will be consulted.     14.  F/E/N - on sodium bicarbonate.     15. Rehabilitation medicine - Continue comprehensive rehabilitation care. Continue PT, OT, psychology.  We will follow falls precautions, cardiac precautions, monitor pulse oximetry in therapy and follow aspiration precautions.  We will follow spinal precautions.  LSO brace when out of bed.Tolerating therapies per endurance.  Reports pain medications are helping.  Had a bowel movement.  Voiding well.Inspected incisions which are stable.  Requiring minimal assistance for transfers with physical therapy.  Able to ambulate 50 feet with front wheel walker with minimal assistance with physical therapy.Discussed follow-up appointment with neurosurgery with him for this Wednesday.  Working on upper extremity strengthening and range of motion exercises.  Able to don and doff LSO brace.  Discussed spinal precautions with him.Able to ambulate 150 feet with minimal assistance with straight cane with physical therapy.  Requiring close supervision to minimal assistance for transfers with physical therapy.Requiring close supervision for transfers with occupational therapy.  Requiring close supervision for toilet with occupational therapy.  Signed handicap application placard per patient and OT request.He had neurosurgery appointment today that went well.  Noted input from neurosurgery.  Denies increased pain.  Discussed with patient.Discussed neurosurgery appointment yesterday with him.  He was  told to continue back brace.  He had some constipation issues.  Agrees to be on a full liquid diet for now.He is on full liquid diet now.  Handicap parking placard application signed for him per his request today.  Internist adjusting antidiabetic medications.  He feels comfortable with discharge plans to home next week.     16. Reviewed labs today. BUN 12, creatinine 0.8 on 1/28/2022.  BUN 7, creatinine 0.8 on 1/31/2022.  BUN 8, creatinine 0.8 on 2/3/2022.            James Hidalgo MD  2/6/2022

## 2022-02-06 NOTE — PROGRESS NOTES
Patient: Matt Williamson  Location: [facility]  MRN: [id number]  Today's date: 2/6/2022    History of Present Illness  Marco A is a 76 y.o. male admitted on 1/27/2022 with Lumbar disc disease with radiculopathy [M51.16]  S/P lumbar fusion [Z98.1]. Principal problem is Lumbar disc disease with radiculopathy.    Patient is a 76-year-old male with a history of atrial fibrillation, CAD, hypertension, hypothyroidism, hyperlipidemia, thyroid cancer, type 2 diabetes developed significant mid to low back pain with walking and standing.  He has metastatic thyroid carcinoma with metastasis to the L3 body status post radiation in May 2020.  Recent PET scan showed increased activity at the L3 level without further metastatic lesions.  He was evaluated by neurosurgery who recommended surgical resection.  He was taken to the OR on 1/20 and underwent a stage I L2-L4 PSF with instrumentation, ORIF of L3 pathologic fracture, stage II L3 lateral corpectomy with expandable cage placement.  There has been no significant postoperative complications.  His pain is fairly well controlled.  An LSO has been ordered.    1/28 Pt placed on Med hold after OT eval  and during PT eval 2/2 - metabolic ketone acidosis with dehydration    Past Medical History  Marco A has a past medical history of Atrial fibrillation with rapid ventricular response (CMS/HCC) (10/25/2019), BPH (benign prostatic hyperplasia), Coronary artery disease, Hypertension, Hypothyroidism, Mixed hyperlipidemia, Thyroid cancer (CMS/HCC), Type 2 diabetes mellitus without complication (CMS/HCC), and Vertigo.      PT Vitals    Date/Time Pulse HR Source BP BP Location BP Method Pt Position Observations [facility]   02/06/22 1334 77 Monitor 133/63 Left upper arm Automatic Sitting Start of session ANANYA   02/06/22 1430 77 Monitor 150/72 Left upper arm Automatic Sitting End of session ANANYA      PT Pain    Date/Time Pain Type Side/Orientation Location Rating: Rest Rating:  Activity Interventions [facility]   02/06/22 1334 Pain Assessment -- -- 0 -- -- ANANYA   02/06/22 1430 Post Activity left;lower back -- 3 position adjusted ANANYA          Prior Living Environment      Most Recent Value   People in Home spouse   Current Living Arrangements home   Home Accessibility stairs to enter home (Group)   Living Environment Comment pt lives in 2  in [place]. 3 steps to enter with R rail, bedroom and shower stall on first floor, full flight B/L rails to shower stall and bedroom, typically sleeps on second floor. has a bed and shower stall on first floor   Number of Stairs, Main Entrance 3   Stair Railings, Main Entrance railing on right side (ascending)   Location, Patient Bedroom second floor, must negotiate stairs to access   Patient Bedroom Access Comment has bedroom on first floor if needed   Location, Bathroom second floor, must negotiate stairs to access   Bathroom Access Comment has bathroom on first floor   Number of Stairs, Within Home, Primary 12   Stair Railings, Within Home, Primary railings on both sides of stairs          Prior Level of Function      Most Recent Value   Dominant Hand right   Ambulation assistive equipment   Transferring assistive equipment   Toileting independent   Bathing independent   Dressing independent   Eating independent   Prior Level of Function Comment Pt reports he was independent for all mobility using SPC, has shower chair at home.   Assistive Device Currently Used at Home cane, straight, shower chair           IRF PT Evaluation and Treatment - 02/06/22 1334        PT Time Calculation    Start Time 1330     Stop Time 1430     Time Calculation (min) 60 min        Session Details    Document Type daily treatment/progress note     Mode of Treatment physical therapy;individual therapy        General Information    Patient Profile Reviewed yes     General Observations of Patient Pt received in w/c, pleasant and cooperative        Sit to Stand Transfer     [place], Sit to Stand Transfer supervision     Assistive Device walker, front-wheeled     Comment Also performed without AD with S for safety        Stand to Sit Transfer    [place], Stand to Sit Transfer supervision     Verbal Cues safety     Assistive Device walker, front-wheeled     Comment Also performed without AD with S for safety        Stand Pivot Transfer    [place]enson, Stand Pivot/Stand Step Transfer supervision     Verbal Cues safety     Assistive Device walker, front-wheeled     Comment Also performed without AD with S for safety, via ambulation        Gait Training    [place], Gait supervision     Assistive Device walker, front-wheeled     Distance in Feet 200 feet     Pattern (Gait) step-through     Deviations/Abnormal Patterns (Gait) gait speed decreased     Comment (Gait/Stairs) 200' without AD close s.        Curb Negotiation    [place] close supervision     Assistive Device walker, front-wheeled     Curb Height 6 inches     Comment Perforemd with RW and close s for safety        Sloped Surface Gait Skills    [place] close supervision     Assistive Device none;walker, front-wheeled     Distance in Feet 30 feet     Comment Up/down 30' std grade ramp with RW on first trial with close S. Pt with good attention of transition to carpeting. Second trial without AD and close S for safety.        Stairs Training    [place]enson, Stairs close supervision     Assistive Device railing     Handrail Location (Stairs) right side (ascending);left side (descending)     Number of Stairs 12     Stair Height 6 inches     Ascending Stairs Technique step-over-step     Descending Stairs Technique step-over-step     Comment Close S for safety.        Timed Up and Go Test    Trial One: Timed Up and Go Test 12.23     Trial Two: Timed Up and Go Test 11.89     Trial Three: Timed Up and Go Test 11.22     Mean of 3 Trials: Timed Up and Go Test 11.78     Comment, Timed Up and Go Test No use of RW      Results, Timed Up and Go Test (Balance) Drastic improvement of tug without AD with close S.        Daily Progress Summary (PT)    Symptoms Noted During/After Treatment none     Progress Toward Functional Goals (PT) progressing toward functional goals as expected     Daily Outcome Statement Pt with great improvement in TUG balance test without use of AD with close S. Also negotiated std grade ramp without AD with close S. Pt reports planning on going home using RW that was ordered for him.                           IRF PT Goals      Most Recent Value   Bed Mobility Goal 1    Activity/Assistive Device bed mobility activities, all at 01/28/2022 0902   [place] supervision required at 01/28/2022 0902   Time Frame short-term goal (STG), 1 week at 01/28/2022 0902   Progress/Outcome goal ongoing, good progress toward goal at 02/01/2022 0830   Bed Mobility Goal 2    Activity/Assistive Device bed mobility activities, all at 01/28/2022 0902   [place] modified independence at 01/28/2022 0902   Time Frame long-term goal (LTG), 21 days or less at 01/28/2022 0902   Progress/Outcome goal ongoing at 02/01/2022 0830   Transfer Goal 1    Activity/Assistive Device sit-to-stand/stand-to-sit, stand pivot, walker, front-wheeled at 02/01/2022 0830   [place] supervision required at 02/01/2022 0830   Time Frame short-term goal (STG), 3 - 5 days at 02/01/2022 0830   Progress/Outcome goal met, goal revised this date at 02/01/2022 0830   Transfer Goal 2    Activity/Assistive Device sit-to-stand/stand-to-sit, stand pivot at 01/28/2022 0902   [place] modified independence at 01/28/2022 0902   Time Frame long-term goal (LTG), 21 days or less at 01/28/2022 0902   Progress/Outcome goal ongoing, good progress toward goal at 02/01/2022 0830   Gait/Walking Locomotion Goal 1    Activity/Assistive Device gait (walking locomotion), walker, front-wheeled at 02/01/2022 0830   Distance 150 feet at 02/01/2022 0830   [place]  supervision required at 02/01/2022 0830   Time Frame short-term goal (STG), 5 - 7 days at 02/01/2022 0830   Progress/Outcome goal met, goal revised this date at 02/01/2022 0830   Gait/Walking Locomotion Goal 2    Activity/Assistive Device gait (walking locomotion) at 01/28/2022 0902   Distance 150 feet at 01/28/2022 0902   [place] modified independence at 01/28/2022 0902   Time Frame long-term goal (LTG), 21 days or less at 01/28/2022 0902   Progress/Outcome goal ongoing at 02/01/2022 0830   Stairs Goal 1    Activity/Assistive Device stairs, all skills at 01/28/2022 0902   Number of Stairs 12 at 01/28/2022 0902   [place] minimum assist (75% or more patient effort) at 01/28/2022 0902   Time Frame short-term goal (STG), 3 - 5 days at 01/28/2022 0902   Progress/Outcome goal ongoing, good progress toward goal at 02/01/2022 0830   Stairs Goal 2    Activity/Assistive Device stairs, all skills at 01/28/2022 0902   Number of Stairs 12 at 01/28/2022 0902   [place] modified independence at 01/28/2022 0902   Time Frame long-term goal (LTG), 21 days or less at 01/28/2022 0902   Progress/Outcome goal ongoing at 02/01/2022 0830

## 2022-02-07 ENCOUNTER — APPOINTMENT (INPATIENT)
Dept: OCCUPATIONAL THERAPY | Facility: REHABILITATION | Age: 77
DRG: 949 | End: 2022-02-07
Payer: MEDICARE

## 2022-02-07 ENCOUNTER — APPOINTMENT (INPATIENT)
Dept: PHYSICAL THERAPY | Facility: REHABILITATION | Age: 77
DRG: 949 | End: 2022-02-07
Payer: MEDICARE

## 2022-02-07 LAB
ALBUMIN SERPL-MCNC: 2.4 G/DL (ref 3.4–5)
ALP SERPL-CCNC: 73 IU/L (ref 35–126)
ALT SERPL-CCNC: 10 IU/L (ref 16–63)
ANION GAP SERPL CALC-SCNC: 8 MEQ/L (ref 3–15)
AST SERPL-CCNC: 14 IU/L (ref 15–41)
BILIRUB DIRECT SERPL-MCNC: 0.1 MG/DL
BILIRUB SERPL-MCNC: 0.2 MG/DL (ref 0.3–1.2)
BUN SERPL-MCNC: 6 MG/DL (ref 8–20)
CALCIUM SERPL-MCNC: 8.3 MG/DL (ref 8.9–10.3)
CHLORIDE SERPL-SCNC: 99 MEQ/L (ref 98–109)
CO2 SERPL-SCNC: 28 MEQ/L (ref 22–32)
CREAT SERPL-MCNC: 0.8 MG/DL (ref 0.8–1.3)
ERYTHROCYTE [DISTWIDTH] IN BLOOD BY AUTOMATED COUNT: 17.2 % (ref 11.6–14.4)
GFR SERPL CREATININE-BSD FRML MDRD: >60 ML/MIN/1.73M*2
GLUCOSE BLD-MCNC: 121 MG/DL (ref 70–99)
GLUCOSE BLD-MCNC: 132 MG/DL (ref 70–99)
GLUCOSE BLD-MCNC: 148 MG/DL (ref 70–99)
GLUCOSE SERPL-MCNC: 118 MG/DL (ref 70–99)
HCT VFR BLDCO AUTO: 31.9 % (ref 40.1–51)
HGB BLD-MCNC: 9.7 G/DL (ref 13.7–17.5)
MAGNESIUM SERPL-MCNC: 2 MG/DL (ref 1.8–2.5)
MCH RBC QN AUTO: 27.2 PG (ref 28–33.2)
MCHC RBC AUTO-ENTMCNC: 30.4 G/DL (ref 32.2–36.5)
MCV RBC AUTO: 89.4 FL (ref 83–98)
PDW BLD AUTO: 8.8 FL (ref 9.4–12.4)
PLATELET # BLD AUTO: 394 K/UL (ref 150–350)
POCT TEST: ABNORMAL
POTASSIUM SERPL-SCNC: 4.3 MEQ/L (ref 3.6–5.1)
PROT SERPL-MCNC: 5.3 G/DL (ref 6–8.2)
RBC # BLD AUTO: 3.57 M/UL (ref 4.5–5.8)
SODIUM SERPL-SCNC: 135 MEQ/L (ref 136–144)
WBC # BLD AUTO: 4.38 K/UL (ref 3.8–10.5)

## 2022-02-07 PROCEDURE — 97530 THERAPEUTIC ACTIVITIES: CPT | Mod: GP

## 2022-02-07 PROCEDURE — 85027 COMPLETE CBC AUTOMATED: CPT | Performed by: INTERNAL MEDICINE

## 2022-02-07 PROCEDURE — 63600000 HC DRUGS/DETAIL CODE: Performed by: INTERNAL MEDICINE

## 2022-02-07 PROCEDURE — 80076 HEPATIC FUNCTION PANEL: CPT | Performed by: INTERNAL MEDICINE

## 2022-02-07 PROCEDURE — 80048 BASIC METABOLIC PNL TOTAL CA: CPT | Performed by: INTERNAL MEDICINE

## 2022-02-07 PROCEDURE — 83735 ASSAY OF MAGNESIUM: CPT | Performed by: INTERNAL MEDICINE

## 2022-02-07 PROCEDURE — 12800001 HC ROOM AND CARE SEMIPRIVATE REHAB-BRAIN INJ

## 2022-02-07 PROCEDURE — 63700000 HC SELF-ADMINISTRABLE DRUG: Performed by: STUDENT IN AN ORGANIZED HEALTH CARE EDUCATION/TRAINING PROGRAM

## 2022-02-07 PROCEDURE — 97110 THERAPEUTIC EXERCISES: CPT | Mod: GP

## 2022-02-07 PROCEDURE — 97110 THERAPEUTIC EXERCISES: CPT | Mod: GO

## 2022-02-07 PROCEDURE — 63700000 HC SELF-ADMINISTRABLE DRUG: Performed by: INTERNAL MEDICINE

## 2022-02-07 PROCEDURE — 97530 THERAPEUTIC ACTIVITIES: CPT | Mod: GO

## 2022-02-07 PROCEDURE — 97535 SELF CARE MNGMENT TRAINING: CPT | Mod: GO

## 2022-02-07 PROCEDURE — 36415 COLL VENOUS BLD VENIPUNCTURE: CPT | Performed by: INTERNAL MEDICINE

## 2022-02-07 PROCEDURE — 97116 GAIT TRAINING THERAPY: CPT | Mod: GP

## 2022-02-07 RX ORDER — LOSARTAN POTASSIUM 50 MG/1
50 TABLET ORAL
Qty: 30 TABLET | Refills: 0 | Status: SHIPPED | OUTPATIENT
Start: 2022-02-07 | End: 2022-04-12

## 2022-02-07 RX ORDER — DOCUSATE SODIUM 100 MG/1
100 CAPSULE, LIQUID FILLED ORAL 2 TIMES DAILY
Qty: 60 CAPSULE | Refills: 0 | COMMUNITY
Start: 2022-02-07 | End: 2022-04-12

## 2022-02-07 RX ORDER — POLYETHYLENE GLYCOL 3350 17 G/17G
17 POWDER, FOR SOLUTION ORAL
Qty: 30 PACKET | Refills: 0 | COMMUNITY
Start: 2022-02-07 | End: 2025-03-26

## 2022-02-07 RX ORDER — PRAVASTATIN SODIUM 40 MG/1
40 TABLET ORAL
Qty: 30 TABLET | Refills: 0 | Status: SHIPPED | OUTPATIENT
Start: 2022-02-07 | End: 2022-12-14

## 2022-02-07 RX ORDER — LOSARTAN POTASSIUM 25 MG/1
25 TABLET ORAL
Qty: 30 TABLET | Refills: 0 | Status: CANCELLED | OUTPATIENT
Start: 2022-02-07 | End: 2022-03-09

## 2022-02-07 RX ORDER — METFORMIN HYDROCHLORIDE 500 MG/1
500 TABLET, EXTENDED RELEASE ORAL
Qty: 30 TABLET | Refills: 0 | Status: SHIPPED | OUTPATIENT
Start: 2022-02-08 | End: 2022-10-20 | Stop reason: SDUPTHER

## 2022-02-07 RX ORDER — ENOXAPARIN SODIUM 100 MG/ML
40 INJECTION SUBCUTANEOUS
Qty: 12 ML | Refills: 0 | Status: SHIPPED | OUTPATIENT
Start: 2022-02-07 | End: 2022-03-09

## 2022-02-07 RX ORDER — LOSARTAN POTASSIUM 50 MG/1
50 TABLET ORAL
Status: DISCONTINUED | OUTPATIENT
Start: 2022-02-07 | End: 2022-02-08 | Stop reason: HOSPADM

## 2022-02-07 RX ORDER — ERGOCALCIFEROL 1.25 MG/1
50000 CAPSULE ORAL WEEKLY
Qty: 12 CAPSULE | Refills: 0 | Status: SHIPPED | OUTPATIENT
Start: 2022-02-12 | End: 2023-01-16

## 2022-02-07 RX ORDER — ACETAMINOPHEN 325 MG/1
650 TABLET ORAL EVERY 6 HOURS PRN
COMMUNITY
Start: 2022-02-07 | End: 2022-03-09

## 2022-02-07 RX ORDER — GABAPENTIN 300 MG/1
600 CAPSULE ORAL EVERY 8 HOURS
Qty: 180 CAPSULE | Refills: 0 | Status: SHIPPED | OUTPATIENT
Start: 2022-02-07 | End: 2022-04-12 | Stop reason: SDUPTHER

## 2022-02-07 RX ORDER — BISACODYL 10 MG/1
10 SUPPOSITORY RECTAL DAILY PRN
Qty: 12 SUPPOSITORY | Refills: 0 | COMMUNITY
Start: 2022-02-07 | End: 2022-04-12

## 2022-02-07 RX ORDER — SENNOSIDES 8.6 MG/1
2 TABLET ORAL DAILY
Qty: 60 TABLET | Refills: 0 | COMMUNITY
Start: 2022-02-07 | End: 2022-04-12

## 2022-02-07 RX ORDER — LEVOTHYROXINE SODIUM 150 UG/1
150 TABLET ORAL
Qty: 30 TABLET | Refills: 1 | Status: SHIPPED | OUTPATIENT
Start: 2022-02-07 | End: 2024-09-25 | Stop reason: SDUPTHER

## 2022-02-07 RX ORDER — ENOXAPARIN SODIUM 100 MG/ML
40 INJECTION SUBCUTANEOUS
Status: DISCONTINUED | OUTPATIENT
Start: 2022-02-07 | End: 2022-02-08 | Stop reason: HOSPADM

## 2022-02-07 RX ORDER — METOPROLOL SUCCINATE 100 MG/1
100 TABLET, EXTENDED RELEASE ORAL NIGHTLY
Qty: 30 TABLET | Refills: 0 | Status: SHIPPED | OUTPATIENT
Start: 2022-02-07 | End: 2022-10-24

## 2022-02-07 RX ORDER — MAGNESIUM CHLORIDE 64 MG
128 TABLET, DELAYED RELEASE (ENTERIC COATED) ORAL DAILY
Status: DISCONTINUED | OUTPATIENT
Start: 2022-02-08 | End: 2022-02-08 | Stop reason: HOSPADM

## 2022-02-07 RX ORDER — MAGNESIUM CHLORIDE 64 MG
128 TABLET, DELAYED RELEASE (ENTERIC COATED) ORAL DAILY
Qty: 60 TABLET | Refills: 0 | COMMUNITY
Start: 2022-02-08 | End: 2023-01-16

## 2022-02-07 RX ORDER — OXYCODONE HYDROCHLORIDE 5 MG/1
5 TABLET ORAL EVERY 6 HOURS PRN
Qty: 20 TABLET | Refills: 0 | Status: SHIPPED | OUTPATIENT
Start: 2022-02-07 | End: 2022-02-12

## 2022-02-07 RX ADMIN — GABAPENTIN 600 MG: 300 CAPSULE ORAL at 22:04

## 2022-02-07 RX ADMIN — SIMETHICONE 80 MG: 80 TABLET, CHEWABLE ORAL at 12:27

## 2022-02-07 RX ADMIN — PROBIOTIC PRODUCT - TAB 500 MG: TAB at 08:06

## 2022-02-07 RX ADMIN — HEPARIN SODIUM 5000 UNITS: 5000 INJECTION, SOLUTION INTRAVENOUS; SUBCUTANEOUS at 06:10

## 2022-02-07 RX ADMIN — ENOXAPARIN SODIUM 40 MG: 40 INJECTION SUBCUTANEOUS at 17:33

## 2022-02-07 RX ADMIN — ASPIRIN 81 MG: 81 TABLET, COATED ORAL at 08:06

## 2022-02-07 RX ADMIN — PROBIOTIC PRODUCT - TAB 500 MG: TAB at 20:58

## 2022-02-07 RX ADMIN — MAGNESIUM 64 MG (MAGNESIUM CHLORIDE) TABLET,DELAYED RELEASE 128 MG: at 08:06

## 2022-02-07 RX ADMIN — METOPROLOL SUCCINATE 100 MG: 100 TABLET, EXTENDED RELEASE ORAL at 20:58

## 2022-02-07 RX ADMIN — LEVOTHYROXINE SODIUM 150 MCG: 150 TABLET ORAL at 06:10

## 2022-02-07 RX ADMIN — PRAVASTATIN SODIUM 40 MG: 20 TABLET ORAL at 17:35

## 2022-02-07 RX ADMIN — METFORMIN HYDROCHLORIDE 500 MG: 500 TABLET, EXTENDED RELEASE ORAL at 08:06

## 2022-02-07 RX ADMIN — LOSARTAN POTASSIUM 50 MG: 50 TABLET, FILM COATED ORAL at 16:06

## 2022-02-07 RX ADMIN — GABAPENTIN 600 MG: 300 CAPSULE ORAL at 06:10

## 2022-02-07 RX ADMIN — SIMETHICONE 80 MG: 80 TABLET, CHEWABLE ORAL at 16:05

## 2022-02-07 RX ADMIN — OXYCODONE HYDROCHLORIDE 10 MG: 5 TABLET ORAL at 02:23

## 2022-02-07 RX ADMIN — OXYCODONE HYDROCHLORIDE 10 MG: 5 TABLET ORAL at 11:03

## 2022-02-07 RX ADMIN — SIMETHICONE 80 MG: 80 TABLET, CHEWABLE ORAL at 08:06

## 2022-02-07 RX ADMIN — SIMETHICONE 80 MG: 80 TABLET, CHEWABLE ORAL at 20:59

## 2022-02-07 RX ADMIN — DOCUSATE SODIUM 100 MG: 100 CAPSULE, LIQUID FILLED ORAL at 08:06

## 2022-02-07 RX ADMIN — GABAPENTIN 600 MG: 300 CAPSULE ORAL at 14:38

## 2022-02-07 ASSESSMENT — COGNITIVE AND FUNCTIONAL STATUS - GENERAL: AFFECT: WFL

## 2022-02-07 NOTE — PLAN OF CARE
Plan of Care Review  Plan of Care Reviewed With: patient  Progress: improving  Outcome Summary: Pt appeared to have slept well despite one incident of spilled urinal. Emotional support offered, incident normalized, pt was somewhat receptive.

## 2022-02-07 NOTE — PROGRESS NOTES
[facility] Progress Note          Patient was seen and examined at bedside.    Subjective:     He feels well this morning, LLE shooting pain vs muscle pain, which might be related to his lying position, will watch when the day goes with his rehab therapy, cont his current pain management,  his BM is fine. His PT/OT is progressing well. Will resume metformin  mg daily and jardiance 10 mg daily . Plan to d/c home tomorrow, will prepare for it .      Objective   Vital signs in last 24 hours:  Temp:  [36.2 °C (97.2 °F)-36.8 °C (98.2 °F)] 36.2 °C (97.2 °F)  Heart Rate:  [73-84] 79  Resp:  [18] 18  BP: (110-163)/(55-81) 163/74    No intake or output data in the 24 hours ending 02/07/22 1017  Intake/Output this shift:  No intake/output data recorded.   Review of Systems:  All other systems reviewed and negative except as noted in the HPI.   Objective      Labs  reviewed his labs thoroughly   Lab Results   Component Value Date    WBC 4.38 02/07/2022    HGB 9.7 (L) 02/07/2022    HCT 31.9 (L) 02/07/2022    MCV 89.4 02/07/2022     (H) 02/07/2022     Lab Results   Component Value Date    GLUCOSE 118 (H) 02/07/2022    CALCIUM 8.3 (L) 02/07/2022     (L) 02/07/2022    K 4.3 02/07/2022    CO2 28 02/07/2022    CL 99 02/07/2022    BUN 6 (L) 02/07/2022    CREATININE 0.8 02/07/2022       Imaging  OSH imaging study reports reviewed   B/l LE venous compression U/S 1/28/22:  No evidence of deep vein thrombus (DVT) in either lower extremity.      Full Code    Physical Exam:  Head/Ear/Nose/Throat: normocephalic; atraumatic; moisture mouth mm, no oropharyngeal thrush noted.   Eyes: anicteric sclera, EOMI; PERRL.   Neck : supple, no JVD, no carotid bruits appeciated.   Respiratory: no evidence of labored breathing, lung sounds CTA b/l, good aeration bibasilar area, no w/r/c.   Cardiovascular: RRR; normal S1, S2; no m/r/g; no S3 or S4.   Gastrointestinal: soft; NT; BS normal; mildly distended; no  CVAT b/l.   Genitourinary: no silva.   Extremities : b/l LE pitting edema .   Neurological: AO x 3, fluent speeches, following commands, CNS II-XII grossly intact; no focal neurologic deficits.   Behavior/Emotional: in NAD, appropriate; cooperative.   Skin: sacral area skin lesions.     Plan of care was discussed with patient, RN, and PMR attending     Assessment   CC:Thyroid cancer with progressive lumbar metastatic lesions and L3 pathologic fracture complicated with myeloradiculopathy, s/p surgical intervention and ORIF, neurogenic B/B, ADL and ambulatory dysfunction      76 y.o. male with PMH of HTN, dyslipidemia, CAD, type 2 DM, hypothyroidism, thyroid cancer metastatic to L3 vertebral body s/p radiation stereotactic body radiation 5/2020, seen by the neurosurgery service for surgical discussion regarding growth of his L3 vertebral body metastatic lesion. He has had progressive worsening midline-line low back pain with radiation to the right knee. An MRI lumbar spine was obtained 11/22/2021 showing extension of the L3 mass. He was sent for a PET scan 12/7/2021 showing increased activity of the L3 lesion without further metastatic lesions appreciated. He was referred to [facility] however there has been some delay with getting an appointment.      He has had persistent midline low back pain with walking and standing which has progressively worsened. He does not have any weakness or bowel/bladder incontinence. He is following with oncology (Dr. Casey) who does not recommend any chemotherapy at this time but surgical intervention as the primary treatment given the suspected isolated area of progression at the L3 vertebral body.     He was admitted to [facility] on 1/20/22 and underwent elective :  Stage 1: L2-L4 PLSF with instrumentation, ORIF L3 pathologic fracture  Stage 2: L3 lateral corpectomy with expandable cage placement  Post op ileus, s/p aggressive bowel program, resolved, tolerated diet. Functionally he  has ADL and ambulatory dysfunction, requiring inpt acute rehab, transferred to [facility] on 1/27/22.            Ileus (CMS/HCC)  Resolved after aggressive bowel program, start diet  Tolerating diet, cont bowel regimen.     Acquired hypothyroidism  Assessment & Plan  Status post thyroidectomy  Continue levothyroxine        Malignant neoplasm of vertebral column, excluding sacrum and coccyx (CMS/HCC)  Assessment & Plan  Metastatic thyroid ca S/p L3 pathologic fracture s/p right lateral L3 corpectomy and placement of an expandable cage, followed by L2-4 posterolateral instrumented fusion  Continue postop management per neurosurgery.  Drain was removed  DVT prophylaxis, Lewis, pain management per neurosurgery  Rehab as inpt at [place] [facility]      Paroxysmal atrial fibrillation (CMS/HCC)  Assessment & Plan  Currently in sinus rhythm  Patient is not on long-term anticoagulation  He takes Rythmol as needed for palpitations     Type 2 diabetes mellitus without complication (CMS/HCC)  Assessment & Plan  Patient on Jardiance, Trulicity and metformin as outpatient  Cont inpt Accu-Cheks with sliding scale insulin  Hemoglobin A1c 6.9  Continue Jardiance and restarted metformin and trulicity  Metformin should be changed to twice daily or extended release once daily      Metastasis from thyroid cancer (CMS/HCC)  Assessment & Plan  Follows up with medical oncologist  and radiation oncologist  Status post radiation     Mixed hyperlipidemia  Assessment & Plan  Continue statin     Essential hypertension  Assessment & Plan  Stable  Continue metoprolol     Atherosclerosis of coronary artery  Assessment & Plan  Status post LAD stenting 17 years ago  Patient follows up with Dr. Ji  Continue metoprolol, statin  Restart aspirin once cleared by neurosurgery     #VitD deficiency with 25 OH VitD low at 12, loading with ergocaociferol 50,000 units weekly    #Anemia with AVI, provide ferrous sulfate oral supplement    # Euglycemic  DKA, hold jardiance, IVF and po hydration, replacement K, Mag, insulin replacement     Billing code: [id number]  Diagnoses:  Patient Active Problem List   Diagnosis   • BPH (benign prostatic hyperplasia)   • Atherosclerosis of coronary artery   • Cataract   • Essential hypertension   • Former tobacco use   • Mixed hyperlipidemia   • Metastasis from thyroid cancer (CMS/HCC)   • Type 2 diabetes mellitus without complication (CMS/HCC)   • Cyst of thyroid   • Esophageal reflux   • Paroxysmal atrial fibrillation (CMS/HCC)   • Pre-op evaluation   • Preop cardiovascular exam   • Cancer associated pain   • Low back pain with right-sided sciatica   • Malignant neoplasm of vertebral column, excluding sacrum and coccyx (CMS/HCC)   • Acquired hypothyroidism   • Ileus (CMS/HCC)   • Leg swelling   • Lumbar disc disease with radiculopathy   • S/P lumbar fusion   • Vitamin D deficiency   • AVI (iron deficiency anemia)        Arujn Dawkins MD  2/7/2022

## 2022-02-07 NOTE — PROGRESS NOTES
Patient: Matt Williamson  Location: [facility]  MRN: [id number]  Today's date: 2/7/2022    History of Present Illness  Marco A is a 76 y.o. male admitted on 1/27/2022 with Lumbar disc disease with radiculopathy [M51.16]  S/P lumbar fusion [Z98.1]. Principal problem is Lumbar disc disease with radiculopathy.    Patient is a 76-year-old male with a history of atrial fibrillation, CAD, hypertension, hypothyroidism, hyperlipidemia, thyroid cancer, type 2 diabetes developed significant mid to low back pain with walking and standing.  He has metastatic thyroid carcinoma with metastasis to the L3 body status post radiation in May 2020.  Recent PET scan showed increased activity at the L3 level without further metastatic lesions.  He was evaluated by neurosurgery who recommended surgical resection.  He was taken to the OR on 1/20 and underwent a stage I L2-L4 PSF with instrumentation, ORIF of L3 pathologic fracture, stage II L3 lateral corpectomy with expandable cage placement.  There has been no significant postoperative complications.  His pain is fairly well controlled.  An LSO has been ordered.    1/28 Pt placed on Med hold after OT eval  and during PT eval 2/2 - metabolic ketone acidosis with dehydration    Past Medical History  Marco A has a past medical history of Atrial fibrillation with rapid ventricular response (CMS/HCC) (10/25/2019), BPH (benign prostatic hyperplasia), Coronary artery disease, Hypertension, Hypothyroidism, Mixed hyperlipidemia, Thyroid cancer (CMS/HCC), Type 2 diabetes mellitus without complication (CMS/HCC), and Vertigo.      OT Pain    Date/Time Pain Type Rating: Rest Who   02/07/22 1135 Pain Assessment 5 VMS      no increase pain end of session     Prior Living Environment      Most Recent Value   People in Home spouse   Current Living Arrangements home   Home Accessibility stairs to enter home (Group)   Living Environment Comment pt lives in 2  in [place]. 3  steps to enter with R rail, bedroom and shower stall on first floor, full flight B/L rails to shower stall and bedroom, typically sleeps on second floor. has a bed and shower stall on first floor   Number of Stairs, Main Entrance 3   Stair Railings, Main Entrance railing on right side (ascending)   Location, Patient Bedroom second floor, must negotiate stairs to access   Patient Bedroom Access Comment has bedroom on first floor if needed   Location, Bathroom second floor, must negotiate stairs to access   Bathroom Access Comment has bathroom on first floor   Number of Stairs, Within Home, Primary 12   Stair Railings, Within Home, Primary railings on both sides of stairs          Prior Level of Function      Most Recent Value   Dominant Hand right   Ambulation assistive equipment   Transferring assistive equipment   Toileting independent   Bathing independent   Dressing independent   Eating independent   Prior Level of Function Comment Pt reports he was independent for all mobility using SPC, has shower chair at home.   Assistive Device Currently Used at Home cane, straight, shower chair          Occupational Profile      Most Recent Value   Successful Occupations Retired restaraunt owner   Occupational History/Life Experiences Enjoys Playing Basketball. +    Environmental Supports and Barriers lives with spouse   Patient Goals 2/7/22 PSFS  cleaning 7/10, doing wash 9/10, bowling 0/10, shooting baskets 1/10, and running on treadmill 3/10. Total score: 40%. 1/29/22 PSFS  cleaning 3/10, doing wash 4/10, bowling 1/10, shooting baskets 3/10, and running on treadmill 4/10. Total score: 30%. Noted increased insight into abilities and how spinal and lifting precautions impact function           IRF OT Evaluation and Treatment - 02/07/22 1135        OT Time Calculation    Start Time 1130     Stop Time 1200     Time Calculation (min) 30 min        Session Details    Document Type discharge evaluation     Mode of  Treatment occupational therapy;individual therapy        Sit to Stand Transfer    [place], Sit to Stand Transfer modified independence     Assistive Device walker, front-wheeled        Stand to Sit Transfer    [place], Stand to Sit Transfer modified independence     Assistive Device walker, front-wheeled        Stand Pivot Transfer    [place], Stand Pivot/Stand Step Transfer moderate assist (50-74% patient effort)     Assistive Device walker, front-wheeled        Safety Issues, Functional Mobility    Comment, Safety Issues/Impairments (Mobility) Ot functional ambulation for 8 mins with standing rest breaks while discussing safety for home        Therapeutic Interventions    Comment, Therapeutic Intervention Reivew of falls prevention for home and home safety use of AE and DME at home        Upper Extremity (Therapeutic Exercise)    Comment review of UE HEP with handout        Daily Progress Summary (OT)    Daily Outcome Statement Pt with good participation during session, plan for d/c home tomorrow mod I                      Education Documentation  Strengthening Exercise, taught by Melissa Hdz, OT at 2/7/2022  3:41 PM.  Learner: Patient  Readiness: Acceptance  Method: Explanation, Demonstration, Handout  Response: Verbalizes Understanding, Demonstrated Understanding  Comment: Therapy HEP    Instructions, taught by Melissa Hdz, OT at 2/7/2022  3:41 PM.  Learner: Patient  Readiness: Acceptance  Method: Explanation, Demonstration, Handout  Response: Verbalizes Understanding, Demonstrated Understanding  Comment: Therapy HEP    Purpose and Goal, taught by Melissa Hdz, OT at 2/7/2022  3:41 PM.  Learner: Patient  Readiness: Acceptance  Method: Explanation, Demonstration, Handout  Response: Verbalizes Understanding, Demonstrated Understanding  Comment: Therapy HEP          IRF OT Goals      Most Recent Value   Transfer Goal 1    Activity/Assistive Device toilet at  01/28/2022 0722   [place] --  [CS] at 02/01/2022 0724   Time Frame short-term goal (STG), 5 - 7 days at 02/01/2022 0724   Progress/Outcome goal revised this date at 02/01/2022 0724   Transfer Goal 2    Activity/Assistive Device toilet at 01/28/2022 0722   [place] modified independence at 01/28/2022 0722   Time Frame long-term goal (LTG), 14 days or less at 01/28/2022 0722   Progress/Outcome goal ongoing at 02/01/2022 0724   Transfer Goal 3    Activity/Assistive Device shower at 01/28/2022 0722   [place] supervision required at 02/01/2022 0724   Time Frame short-term goal (STG), 5 - 7 days at 02/01/2022 0724   Progress/Outcome goal revised this date at 02/01/2022 0724   Transfer Goal 4    Activity/Assistive Device shower at 01/28/2022 0722   [place] modified independence at 01/28/2022 0722   Time Frame long-term goal (LTG), 14 days or less at 01/28/2022 0722   Progress/Outcome goal ongoing at 02/01/2022 0724   Bathing Goal 1    [place] supervision required at 02/01/2022 0724   Time Frame short-term goal (STG), 5 - 7 days at 02/01/2022 0724   Progress/Outcome goal revised this date at 02/01/2022 0724   Bathing Goal 2    [place] modified independence at 01/28/2022 0722   Time Frame long-term goal (LTG), 14 days or less at 01/28/2022 0722   Progress/Outcome goal ongoing at 02/01/2022 0724   UB Dressing Goal 1    [place] supervision required at 02/01/2022 0724   Time Frame short-term goal (STG), 5 - 7 days at 02/01/2022 0724   Progress/Outcome goal revised this date at 02/01/2022 0724   UB Dressing Goal 2    [place] modified independence at 01/28/2022 0722   Time Frame long-term goal (LTG), 14 days or less at 01/28/2022 0722   Progress/Outcome goal ongoing at 02/01/2022 0724   LB Dressing Goal 1    [place] supervision required at 02/01/2022 0724   Time Frame short-term goal (STG), 5 - 7 days at 01/28/2022 0722   Progress/Outcome goal revised this date at 02/01/2022 0724   LB  Dressing Goal 2    [place] modified independence at 01/28/2022 0722   Time Frame long-term goal (LTG), 14 days or less at 01/28/2022 0722   Progress/Outcome goal ongoing at 02/01/2022 0724   Grooming Goal 1    [place] supervision required at 02/01/2022 0724   Time Frame short-term goal (STG), 5 - 7 days at 02/01/2022 0724   Strategies/Barriers in stance at 01/28/2022 0722   Progress/Outcome goal revised this date at 02/01/2022 0724   Grooming Goal 2    [place] modified independence at 01/28/2022 0722   Time Frame long-term goal (LTG), 14 days or less at 01/28/2022 0722   Progress/Outcome goal ongoing at 02/01/2022 0724   Toileting Goal 1    [place] supervision required at 02/01/2022 0724   Time Frame short-term goal (STG), 5 - 7 days at 02/01/2022 0724   Progress/Outcome goal revised this date at 02/01/2022 0724   Toileting Goal 2    [place] modified independence at 01/28/2022 0722   Time Frame long-term goal (LTG), 14 days or less at 01/28/2022 0722   Progress/Outcome goal ongoing at 02/01/2022 0724

## 2022-02-07 NOTE — DISCHARGE INSTR - ACTIVITY
Occupational Therapy     Toilet Transfers: Marco A is independent with rolling walker    Shower/Tub Transfers: Marco A is independent with rolling walker and shower chair    Upper Body Dressing: Marco A is independent with rolling walker to gather clothing, remember to don your LSO back brace    Lower Body Dressing: Marco A is independent with rolling walker and use of adaptive equipment    Bathing: Marco A is independent while sitting on shower chair, for first shower I would have someone nearby     Toileting: Marco A is independent with rolling walker    Grooming: Marco A is independent with rolling walker standing at sink    Household Mobility/Household Activity: Marco A is independent with rolling walker    Driving: Driving is unsafe and not recommended at this time. Consult your physician for approval before resuming driving. Handicap Placard initiated.    Upper Extremity Management: Home exercise program provided    Vision: No visual complaints    Entered by: Cindy Hdz MS OTR/L on: 2/7/22    Physical Therapy:      Bed mobility: Independent for bed mobility via log roll technique.     Transfers: Modified independent for sit-to-stand and stand pivot transfers using rolling walker.     Ambulation: Modified independent using rolling walker. Can progress gait training with single point cane with outpatient PT.     Elevations: Modified independent using unilateral-bilateral hand rails for support. Can complete step-over-step during ascent, recommend step-to during descent for safety.      Entered by: Ilia Graves PT, DPT on: 2/7/22     Additional     Precautions: Spinal precautions - no bending, lifting or twisting.     Braces/Prosthesis:  LSO to be worn when out of bed.    Durable Medical Equipment: Patient was issued a rolling walker from [employer].  Please call [phone] with any issues or questions.      Home Exercise Program: Please complete 1x/day, 5-7x/week. Complete with bilateral upper extremity support on stable surface in front  (I.e. walker or countertop) and chair behind for seated rest breaks as needed. Recommend supervision of another person when completing for safety.

## 2022-02-07 NOTE — PLAN OF CARE
Problem: Rehabilitation (IRF) Plan of Care  Goal: Plan of Care Review  2/7/2022 1129 by Alisia Corona RN  Outcome: Progressing   Plan of Care Review  Plan of Care Reviewed With: patient  Progress: improving  Outcome Summary: Meds reviewed with pt

## 2022-02-07 NOTE — PROGRESS NOTES
Patient: Matt Williamson  Location: [facility]  MRN: [id number]  Today's date: 2/7/2022    History of Present Illness  Marco A is a 76 y.o. male admitted on 1/27/2022 with Lumbar disc disease with radiculopathy [M51.16]  S/P lumbar fusion [Z98.1]. Principal problem is Lumbar disc disease with radiculopathy.    Patient is a 76-year-old male with a history of atrial fibrillation, CAD, hypertension, hypothyroidism, hyperlipidemia, thyroid cancer, type 2 diabetes developed significant mid to low back pain with walking and standing.  He has metastatic thyroid carcinoma with metastasis to the L3 body status post radiation in May 2020.  Recent PET scan showed increased activity at the L3 level without further metastatic lesions.  He was evaluated by neurosurgery who recommended surgical resection.  He was taken to the OR on 1/20 and underwent a stage I L2-L4 PSF with instrumentation, ORIF of L3 pathologic fracture, stage II L3 lateral corpectomy with expandable cage placement.  There has been no significant postoperative complications.  His pain is fairly well controlled.  An LSO has been ordered.    1/28 Pt placed on Med hold after OT eval  and during PT eval 2/2 - metabolic ketone acidosis with dehydration    Past Medical History  Marco A has a past medical history of Atrial fibrillation with rapid ventricular response (CMS/HCC) (10/25/2019), BPH (benign prostatic hyperplasia), Coronary artery disease, Hypertension, Hypothyroidism, Mixed hyperlipidemia, Thyroid cancer (CMS/HCC), Type 2 diabetes mellitus without complication (CMS/HCC), and Vertigo.     02/07/22 0739   Pain/Comfort/Sleep   Pain Charting Type Pain Assessment   (0-10) Pain Rating: Rest 8   Pain Body Location back   Pain Management Interventions premedicated for activity   Vital Signs   Heart Rate 79   BP (!) 163/74   BP Location Left upper arm   BP Method Automatic   Patient Position Lying     Pt reported 5/10 pain end of session          Prior Living Environment      Most Recent Value   People in Home spouse   Current Living Arrangements home   Home Accessibility stairs to enter home (Group)   Living Environment Comment pt lives in 2  in [place]. 3 steps to enter with R rail, bedroom and shower stall on first floor, full flight B/L rails to shower stall and bedroom, typically sleeps on second floor. has a bed and shower stall on first floor   Number of Stairs, Main Entrance 3   Stair Railings, Main Entrance railing on right side (ascending)   Location, Patient Bedroom second floor, must negotiate stairs to access   Patient Bedroom Access Comment has bedroom on first floor if needed   Location, Bathroom second floor, must negotiate stairs to access   Bathroom Access Comment has bathroom on first floor   Number of Stairs, Within Home, Primary 12   Stair Railings, Within Home, Primary railings on both sides of stairs          Prior Level of Function      Most Recent Value   Dominant Hand right   Ambulation assistive equipment   Transferring assistive equipment   Toileting independent   Bathing independent   Dressing independent   Eating independent   Prior Level of Function Comment Pt reports he was independent for all mobility using SPC, has shower chair at home.   Assistive Device Currently Used at Home cane, straight, shower chair          Occupational Profile      Most Recent Value   Successful Occupations Retired restaraunt owner   Occupational History/Life Experiences Enjoys Playing Basketball. +    Environmental Supports and Barriers lives with spouse   Patient Goals 2/7/22 PSFS  cleaning 7/10, doing wash 9/10, bowling 0/10, shooting baskets 1/10, and running on treadmill 3/10. Total score: 40%. 1/29/22 PSFS  cleaning 3/10, doing wash 4/10, bowling 1/10, shooting baskets 3/10, and running on treadmill 4/10. Total score: 30%. Noted increased insight into abilities and how spinal and lifting precautions impact function            02/07/22 0746   OT Time Calculation   Start Time 0730   Stop Time 0830   Time Calculation (min) 60 min   Session Details   Document Type discharge evaluation   Mode of Treatment occupational therapy;individual therapy   Vision Assessment/Intervention   Visual Impairment/Limitations WFL   Range of Motion (ROM)   Comment: Range of Motion UE WFL   Hand  Strength Testing   Comment, Left Hand WFL   Comment, Right Hand WFL   Bed Mobility   Comment (Bed Mobility) mod I with HOB raised   Transfers   Comment gait belt worn for all tx   Sit to Stand Transfer   [place], Sit to Stand Transfer modified independence   Assistive Device walker, front-wheeled   Stand to Sit Transfer   [place], Stand to Sit Transfer modified independence   Assistive Device walker, front-wheeled   Stand Pivot Transfer   [place], Stand Pivot/Stand Step Transfer modified independence   Assistive Device walker, front-wheeled   Comment via amb tx   Toilet Transfer   [place], Toilet Transfer modified independence   Assistive Device walker, front-wheeled;raised toilet seat;grab bars/safety frame   Comment via amb tx   Shower Transfer   [place], Shower Transfer modified independence   Assistive Device shower chair;grab bars/tub rail;walker, front-wheeled   Comment via amb tx   Safety Issues, Functional Mobility   Comment, Safety Issues/Impairments (Mobility) OT functional ambulation woth RW mod I   Bathing   Self-Performance chest;left arm;right arm;abdomen;front perineal area;buttocks;left upper leg;right upper leg;left lower leg, including foot;right lower leg, including foot   [place] modified independence   Position supported sitting   Adaptive Equipment grab bar/tub rail;shower chair;long-handled sponge;hand-held shower spray hose   Upper Body Dressing   Self-Performance obtains clothes;don;threads left arm, shirt;threads right arm, shirt;pulls shirt over head/around back;pulls shirt down/adjusts;orthosis  application   [place] modified independence   Position supported sitting   Comment gathering clothing with RW via amb tx, Pt able to don/ doff LSO   Lower Body Dressing   Self-Performance obtains clothes;don;threads left leg, underpants;threads right leg, underpants;pulls underpants up or down;threads left leg, pants/shorts;threads right leg, pants/shorts;dons/doffs left sock;pulls pants/shorts up or down;dons/doffs right sock;dons/doffs left shoe;dons/doffs right shoe   [place] modified independence   Adaptive Equipment long-handled shoe horn;dressing stick;reacher   [place], Footwear modified independence   Comment pt able to perform figure 4 position today. Pt able to gather clothing at amb level   Grooming   [place] modified independence   [place], Oral Hygiene modified independence   Comment in stance at sink   Toileting   [place] modified independence   Adaptive Equipment raised toilet seat;grab bar/safety frame   Spinal Orthosis Management   Type (Spinal Orthosis) LSO (lumbar sacral orthosis)   Fabrication Comment (Spinal Orthosis) LSO donned throughout session. Pt able to don EOB, pt able to remove for showering. Pt able to don mod I   Transfer Goal 1   Progress/Outcome goal met   Transfer Goal 2   Progress/Outcome goal met   Transfer Goal 3   Progress/Outcome goal met   Transfer Goal 4   Progress/Outcome goal met   Bathing Goal 1   Progress/Outcome goal met   Bathing Goal 2   Strategies/Barriers goal met   UB Dressing Goal 1   Progress/Outcome goal met   UB Dressing Goal 2   Progress/Outcome goal met   LB Dressing Goal 1   Progress/Outcome goal met   LB Dressing Goal 2   Progress/Outcome goal met   Grooming Goal 1   Progress/Outcome goal met   Grooming Goal 2   Progress/Outcome goal met   Toileting Goal 1   Progress/Outcome goal met   Toileting Goal 2   Progress/Outcome goal met   Discharge Summary (OT)   Outcomes Achieved Upon Discharge (OT) all goals met within established  timeframes   Discharge Summary Statement (OT) Marco A is a 76 year old male who presented to [facility] s/p lumbar sx. Marco A has progressed to mod I level with use of AE and DME for functional transfers and for ADLS. No family training was performed as pt is mod I. Pt has shower chair at home. Plan to continue with OP PT at this time. Pt plans to d/c home tomorrow with wife. Pt educated not to drive until cleared. Parking placard initiated                 IRF OT Goals      Most Recent Value   Transfer Goal 1    Activity/Assistive Device toilet at 01/28/2022 0722   [place] --  [CS] at 02/01/2022 0724   Time Frame short-term goal (STG), 5 - 7 days at 02/01/2022 0724   Progress/Outcome goal met at 02/07/2022 0746   Transfer Goal 2    Activity/Assistive Device toilet at 01/28/2022 0722   [place] modified independence at 01/28/2022 0722   Time Frame long-term goal (LTG), 14 days or less at 01/28/2022 0722   Progress/Outcome goal met at 02/07/2022 0746   Transfer Goal 3    Activity/Assistive Device shower at 01/28/2022 0722   [place] supervision required at 02/01/2022 0724   Time Frame short-term goal (STG), 5 - 7 days at 02/01/2022 0724   Progress/Outcome goal met at 02/07/2022 0746   Transfer Goal 4    Activity/Assistive Device shower at 01/28/2022 0722   [place] modified independence at 01/28/2022 0722   Time Frame long-term goal (LTG), 14 days or less at 01/28/2022 0722   Progress/Outcome goal met at 02/07/2022 0746   Bathing Goal 1    [place] supervision required at 02/01/2022 0724   Time Frame short-term goal (STG), 5 - 7 days at 02/01/2022 0724   Progress/Outcome goal met at 02/07/2022 0746   Bathing Goal 2    [place] modified independence at 01/28/2022 0722   Time Frame long-term goal (LTG), 14 days or less at 01/28/2022 0722   Strategies/Barriers goal met at 02/07/2022 0746   Progress/Outcome goal ongoing at 02/01/2022 0724   UB Dressing Goal 1    [place] supervision required at 02/01/2022  0724   Time Frame short-term goal (STG), 5 - 7 days at 02/01/2022 0724   Progress/Outcome goal met at 02/07/2022 0746   UB Dressing Goal 2    [place] modified independence at 01/28/2022 0722   Time Frame long-term goal (LTG), 14 days or less at 01/28/2022 0722   Progress/Outcome goal met at 02/07/2022 0746   LB Dressing Goal 1    [place] supervision required at 02/01/2022 0724   Time Frame short-term goal (STG), 5 - 7 days at 01/28/2022 0722   Progress/Outcome goal met at 02/07/2022 0746   LB Dressing Goal 2    [place] modified independence at 01/28/2022 0722   Time Frame long-term goal (LTG), 14 days or less at 01/28/2022 0722   Progress/Outcome goal met at 02/07/2022 0746   Grooming Goal 1    [place] supervision required at 02/01/2022 0724   Time Frame short-term goal (STG), 5 - 7 days at 02/01/2022 0724   Strategies/Barriers in stance at 01/28/2022 0722   Progress/Outcome goal met at 02/07/2022 0746   Grooming Goal 2    [place] modified independence at 01/28/2022 0722   Time Frame long-term goal (LTG), 14 days or less at 01/28/2022 0722   Progress/Outcome goal met at 02/07/2022 0746   Toileting Goal 1    [place] supervision required at 02/01/2022 0724   Time Frame short-term goal (STG), 5 - 7 days at 02/01/2022 0724   Progress/Outcome goal met at 02/07/2022 0746   Toileting Goal 2    [place] modified independence at 01/28/2022 0722   Time Frame long-term goal (LTG), 14 days or less at 01/28/2022 0722   Progress/Outcome goal met at 02/07/2022 0746

## 2022-02-07 NOTE — PROGRESS NOTES
Patient: Matt Williamson  Location: [facility]  MRN: [id number]  Today's date: 2/7/2022    History of Present Illness  Marco A is a 76 y.o. male admitted on 1/27/2022 with Lumbar disc disease with radiculopathy [M51.16]  S/P lumbar fusion [Z98.1]. Principal problem is Lumbar disc disease with radiculopathy.    Patient is a 76-year-old male with a history of atrial fibrillation, CAD, hypertension, hypothyroidism, hyperlipidemia, thyroid cancer, type 2 diabetes developed significant mid to low back pain with walking and standing.  He has metastatic thyroid carcinoma with metastasis to the L3 body status post radiation in May 2020.  Recent PET scan showed increased activity at the L3 level without further metastatic lesions.  He was evaluated by neurosurgery who recommended surgical resection.  He was taken to the OR on 1/20 and underwent a stage I L2-L4 PSF with instrumentation, ORIF of L3 pathologic fracture, stage II L3 lateral corpectomy with expandable cage placement.  There has been no significant postoperative complications.  His pain is fairly well controlled.  An LSO has been ordered.    1/28 Pt placed on Med hold after OT eval  and during PT eval 2/2 - metabolic ketone acidosis with dehydration    Past Medical History  Marco A has a past medical history of Atrial fibrillation with rapid ventricular response (CMS/HCC) (10/25/2019), BPH (benign prostatic hyperplasia), Coronary artery disease, Hypertension, Hypothyroidism, Mixed hyperlipidemia, Thyroid cancer (CMS/HCC), Type 2 diabetes mellitus without complication (CMS/HCC), and Vertigo.      PT Vitals    Date/Time Pulse HR Source BP BP Location BP Method Pt Position [facility]   02/07/22 1302 85 Monitor 124/56 Left upper arm Automatic Sitting BS      PT Pain    Date/Time Pain Type Side/Orientation Location Rating: Rest Rating: Activity [facility]   02/07/22 1302 Pain Assessment left leg 2 - mild pain 2 - mild pain BS   02/07/22 1428 Pain Reassessment  left leg 2 - mild pain 2 - mild pain BS          Prior Living Environment      Most Recent Value   People in Home spouse   Current Living Arrangements home   Home Accessibility stairs to enter home (Group)   Living Environment Comment pt lives in 2  in [place]. 3 steps to enter with R rail, bedroom and shower stall on first floor, full flight B/L rails to shower stall and bedroom, typically sleeps on second floor. has a bed and shower stall on first floor   Number of Stairs, Main Entrance 3   Stair Railings, Main Entrance railing on right side (ascending)   Location, Patient Bedroom second floor, must negotiate stairs to access   Patient Bedroom Access Comment has bedroom on first floor if needed   Location, Bathroom second floor, must negotiate stairs to access   Bathroom Access Comment has bathroom on first floor   Number of Stairs, Within Home, Primary 12   Stair Railings, Within Home, Primary railings on both sides of stairs          Prior Level of Function      Most Recent Value   Dominant Hand right   Ambulation assistive equipment   Transferring assistive equipment   Toileting independent   Bathing independent   Dressing independent   Eating independent   Prior Level of Function Comment Pt reports he was independent for all mobility using SPC, has shower chair at home.   Assistive Device Currently Used at Home cane, straight, shower chair           IRF PT Evaluation and Treatment - 02/07/22 1302        PT Time Calculation    Start Time 1300     Stop Time 1430     Time Calculation (min) 90 min        Session Details    Document Type discharge evaluation     Mode of Treatment physical therapy;individual therapy        General Information    Patient Profile Reviewed yes     General Observations of Patient Pleasant and cooperative.     Existing Precautions/Restrictions fall;spinal;brace worn when out of bed;cardiac;aspiration        Cognition/Psychosocial    Affect/Mental Status (Cognition) WFL      Orientation Status (Cognition) oriented x 4        Sensory Assessment (Somatosensory)    Left LE Sensory Assessment light touch awareness;light touch localization;proprioception;intact     Right LE Sensory Assessment light touch awareness;light touch localization;proprioception;intact        Range of Motion (ROM)    Left Lower Extremity (ROM) left LE ROM is WFL     Right Lower Extremity (ROM) right LE ROM is WFL        Strength (Manual Muscle Testing)    Hip, Left (Strength) Flex 4+/5, Ext 5/5, IR/ER 5/5, ABD 4+/5, ADD 5/5     Knee, Left (Strength) Ext 5/5, Flex 5/5     Ankle, Left (Strength) DF/PF/Inv/Ev 5/5     Hip, Right (Strength) Flex 4+/5, IR/ER 5/5, ABD 4+/5, ADD 5/5, Ext 5/5     Knee, Right (Strength) Ext 5/5, Flex 5/5     Ankle, Right (Strength) DF/PF/Inv/Ev 5/5        Bed Mobility    [place], Roll Left modified independence     [place], Roll Right modified independence     [place], Supine to Sit modified independence     [place], Sit to Supine modified independence     Assistive Device none     Comment (Bed Mobility) Mod I for rolling R/L and supine <> sit via log roll technique.        Bed to Chair Transfer    [place], Bed to Chair modified independence     Verbal Cues safety     Assistive Device walker, front-wheeled     Comment SPT via amb approach from EOB <> w/c Mod I        Chair to Bed Transfer    [place], Chair to Bed modified independence     Verbal Cues safety     Assistive Device walker, front-wheeled;gait belt     Comment SPT via amb approach from EOB <> w/c Mod I        Sit to Stand Transfer    [place], Sit to Stand Transfer modified independence     Verbal Cues safety     Assistive Device walker, front-wheeled;gait belt     Comment from w/c Mod I for safety considerations        Stand to Sit Transfer    [place], Stand to Sit Transfer modified independence     Verbal Cues safety     Assistive Device walker, front-wheeled;gait belt     Comment to w/c Mod  "I for safety considerations        Stand Pivot Transfer    [place], Stand Pivot/Stand Step Transfer modified independence     Verbal Cues safety     Assistive Device walker, front-wheeled;gait belt     Comment Via amb approach to w/c        Car Transfer    Transfer Technique sit-stand;stand-sit     [place], Car Transfer modified independence     Verbal Cues safety     Assistive Device walker, front-wheeled;gait belt     Comment SPT via amb approach using RW Mod I        Gait Training    [place], Gait modified independence     Assistive Device walker, front-wheeled;gait belt     Distance in Feet 200 feet     Pattern (Gait) step-through     Deviations/Abnormal Patterns (Gait) gait speed decreased     [place], Picking Up Object modified independence   using RW and reacher    Comment (Gait/Stairs) (1) 200' x1, 100' x1, 75' x1 using RW Mod I for safety considerations. (2) 150' x1 using R SPC with Cl S for safety.        Curb Negotiation    [place] modified independence     Assistive Device walker, front-wheeled;gait belt     Curb Height 6 inches     Comment Up/down 6\" + 2\" curb using RW Mod I for safety considerations.        Sloped Surface Gait Skills    [place] modified independence     Assistive Device gait belt;walker, front-wheeled     Distance in Feet 10 feet     Comment Up/down 5' indoor ramp using RW Mod I for safety considerations.        10 Meter Walk Test (Self-Selected Velocity)    Trial One: Ten Meter Walk Test (sec) 5.94 seconds     Trial Two: Ten Meter Walk Test (sec) 7.13 seconds     Trial Three: Ten Meter Walk Test (sec) 6.06 seconds     Assistive Device walker, front-wheeled     Trial One: Gait Speed (m/s) 1.01 m/s     Trial Two: Gait Speed (m/s) 0.84 m/s     Trial Three: Gait Speed (m/s) 0.99 m/s     Average Gait Speed (m/s): Three Trials 0.95 m/s        Stairs Training    [place], Stairs modified independence     Assistive Device railing     Handrail Location " "(Stairs) right side (ascending);left side (descending)     Number of Stairs 12     Stair Height 7 inches     Ascending Stairs Technique step-over-step;step-to-step     Descending Stairs Technique step-to-step     Comment Up/down 12 (7\") stairs using R HR Mod I for safety considerations. Educated pt on completing step-to-step during descent for safety, he expressed understanding.        Balance    Static Sitting Balance WFL     Dynamic Sitting Balance WFL     Static Standing Balance WFL;supported     Dynamic Standing Balance WFL;supported        Motor Skills    Coordination WFL;lower extremity;heel to shin     Functional Endurance Good     Muscle Tone lower extremity(s);WNL        Postural Deviations    Comment, Postural Deviations WFL        Lower Extremity (Therapeutic Exercise)    Exercise Position/Type standing     General Exercise bilateral;heel raises;knee flexion;hip aBduction;hip extension;marching while standing;partial squats     Reps and Sets 2x10     Comment HEP handout issued and reviewed: 2x10 mini-squats, heel raises, marches, hip ABD, hip ext, hamstring curls. Pt educated on completing with stable surface anterior (i.e. coutertop, RW) for UE support and chair posterior for seated rest breaks as needed. Educated on completion with supervision of another person for safety. Pt expressed understanding of all safety recommendations.        Spinal Orthosis Management    Type (Spinal Orthosis) LSO (lumbar sacral orthosis)     Fabrication Comment (Spinal Orthosis) LSO donned throughout session     Wearing Schedule (Spinal Orthosis) wear when out of bed only        Discharge Summary (PT)    Outcomes Achieved/Progress Made Upon Discharge (PT) all goals met within established timeframes     Transfer to Another Level of Care or Facility (PT) recommend continued therapy following discharge;recommend therapy via outpatient clinic     Discharge Summary Statement (PT) Pt is a 75 y/o male admitted s/p stage I L2-L4 " PSF with instrumentation, ORIF of L3 pathologic fracture, stage II L3 lateral corpectomy with expandable cage placement on 1/20/22. Pt has met all PT goals established during time at IRF. He is completing bed mobility Mod I via log roll technique. He is completing STS and SPT using RW Mod I. He is ambulating up to 200’ using RW Mod I. He is completing 12 stairs with unilateral UE support Mod I. His gait speed improved to 0.95 m/s per 10 MWT, indicating community ambulator level. His TUG improved to 11.78 seconds, which places him below fall risk cutoff. Pt was issued HEP and demonstrated proper completion of all exercises. He was issued RW from [employer], which was sized appropriately. Pt would benefit from cont skilled PT services via outpatient PT to continue to address deficits and maximize functional independence/safety. Plan for d/c home with family support 2/8/22.                      Education Documentation  Mobility, taught by Ilia Graves, PT at 2/7/2022  4:06 PM.  Learner: Patient  Readiness: Acceptance  Method: Explanation, Handout  Response: Demonstrated Understanding, Verbalizes Understanding  Comment: Pt issued HEP for strengthening. Able to demonstrate proper technique and expressed understanding of PT safety recommendations.          IRF PT Goals      Most Recent Value   Bed Mobility Goal 1    Activity/Assistive Device bed mobility activities, all at 01/28/2022 0902   Will supervision required at 01/28/2022 0902   Time Frame short-term goal (STG), 1 week at 01/28/2022 0902   Progress/Outcome goal met at 02/07/2022 1302   Bed Mobility Goal 2    Activity/Assistive Device bed mobility activities, all at 01/28/2022 0902   Will modified independence at 01/28/2022 0902   Time Frame long-term goal (LTG), 21 days or less at 01/28/2022 0902   Progress/Outcome goal met at 02/07/2022 1302   Transfer Goal 1    Activity/Assistive Device sit-to-stand/stand-to-sit, stand pivot, walker, front-wheeled  at 02/01/2022 0830   [place] supervision required at 02/01/2022 0830   Time Frame short-term goal (STG), 3 - 5 days at 02/01/2022 0830   Progress/Outcome goal met at 02/07/2022 1302   Transfer Goal 2    Activity/Assistive Device sit-to-stand/stand-to-sit, stand pivot at 01/28/2022 0902   [place] modified independence at 01/28/2022 0902   Time Frame long-term goal (LTG), 21 days or less at 01/28/2022 0902   Progress/Outcome goal met at 02/07/2022 1302   Gait/Walking Locomotion Goal 1    Activity/Assistive Device gait (walking locomotion), walker, front-wheeled at 02/01/2022 0830   Distance 150 feet at 02/01/2022 0830   [place] supervision required at 02/01/2022 0830   Time Frame short-term goal (STG), 5 - 7 days at 02/01/2022 0830   Progress/Outcome goal met at 02/07/2022 1302   Gait/Walking Locomotion Goal 2    Activity/Assistive Device gait (walking locomotion) at 01/28/2022 0902   Distance 150 feet at 01/28/2022 0902   [place] modified independence at 01/28/2022 0902   Time Frame long-term goal (LTG), 21 days or less at 01/28/2022 0902   Progress/Outcome goal met at 02/07/2022 1302   Stairs Goal 1    Activity/Assistive Device stairs, all skills at 01/28/2022 0902   Number of Stairs 12 at 01/28/2022 0902   [place] minimum assist (75% or more patient effort) at 01/28/2022 0902   Time Frame short-term goal (STG), 3 - 5 days at 01/28/2022 0902   Progress/Outcome goal met at 02/07/2022 1302   Stairs Goal 2    Activity/Assistive Device stairs, all skills at 01/28/2022 0902   Number of Stairs 12 at 01/28/2022 0902   [place] modified independence at 01/28/2022 0902   Time Frame long-term goal (LTG), 21 days or less at 01/28/2022 0902   Progress/Outcome goal met at 02/07/2022 1302

## 2022-02-07 NOTE — PLAN OF CARE
Problem: Rehabilitation (IRF) Plan of Care  Goal: Plan of Care Review  Outcome: Met  Flowsheets  Taken 2/7/2022 1608 by Ilia Graves PT  Progress: improving  Outcome Summary: Pt met all PT goals established during time at IRF. Pt would benefit from cont skilled PT services via outpatient PT to cont to address deficits and maximize functional independence/safety.  Taken 2/7/2022 1127 by Alisia Corona, RN  Plan of Care Reviewed With: patient

## 2022-02-07 NOTE — DISCHARGE INSTRUCTIONS
Please follow up with your endocrinologist for your jardiance and metformin treatment instructions, your had euglycemic ketoacidosis at this admission, which has been corrected, make sure to take your metformin and jardiance correctly per your endocrinologist's instructions.

## 2022-02-07 NOTE — PLAN OF CARE
Plan of Care Review  Plan of Care Reviewed With: patient  Progress: improving  Outcome Summary: Meds reviewed with pt

## 2022-02-07 NOTE — PROGRESS NOTES
02/07/22 1000   Discharge Needs Assessment   Patient/Family Anticipates Transition to home with family   Patient/Family Anticipated Services at Transition outpatient care   Transportation Concerns car, none   Transportation Anticipated family or friend will provide   Equipment Needed After Discharge walker, front-wheeled;shower chair   Anticipated Discharge Disposition home with outpatient services   Offered/Gave Vendor List yes   Current Discharge Risk physical impairment   Discharge Planning   Type of Outpatient Services physical therapy   Discharge Transportation   Does the patient need discharge transport arranged? Yes   Has discharge transport been arranged? Yes   Discharge Transportation Vendor private vehicle   What day is the transport expected? 02/08/22   What time is the transport expected? 1000   Plan   Plan Pts wife will attend RN training tonight to learn lovanox inj. Pt will d/c tomorrow at 10am and his wife will pick him up. Pt will attend OP PT at [facility], [place] and pt/wife have OP PT script.   Patient/Family in Agreement with Plan yes     Pt will d/c tomorrow and is happy about his progress and will continue OP services.

## 2022-02-08 VITALS
BODY MASS INDEX: 26.58 KG/M2 | SYSTOLIC BLOOD PRESSURE: 103 MMHG | HEIGHT: 66 IN | OXYGEN SATURATION: 98 % | HEART RATE: 85 BPM | DIASTOLIC BLOOD PRESSURE: 56 MMHG | RESPIRATION RATE: 18 BRPM | WEIGHT: 165.38 LBS | TEMPERATURE: 98.7 F

## 2022-02-08 LAB
GLUCOSE BLD-MCNC: 117 MG/DL (ref 70–99)
POCT TEST: ABNORMAL

## 2022-02-08 PROCEDURE — 63700000 HC SELF-ADMINISTRABLE DRUG: Performed by: INTERNAL MEDICINE

## 2022-02-08 PROCEDURE — 63700000 HC SELF-ADMINISTRABLE DRUG: Performed by: STUDENT IN AN ORGANIZED HEALTH CARE EDUCATION/TRAINING PROGRAM

## 2022-02-08 RX ADMIN — PROBIOTIC PRODUCT - TAB 500 MG: TAB at 08:32

## 2022-02-08 RX ADMIN — OXYCODONE HYDROCHLORIDE 10 MG: 5 TABLET ORAL at 10:16

## 2022-02-08 RX ADMIN — MAGNESIUM 64 MG (MAGNESIUM CHLORIDE) TABLET,DELAYED RELEASE 128 MG: at 08:33

## 2022-02-08 RX ADMIN — ASPIRIN 81 MG: 81 TABLET, COATED ORAL at 08:33

## 2022-02-08 RX ADMIN — METFORMIN HYDROCHLORIDE 500 MG: 500 TABLET, EXTENDED RELEASE ORAL at 08:34

## 2022-02-08 RX ADMIN — DOCUSATE SODIUM 100 MG: 100 CAPSULE, LIQUID FILLED ORAL at 08:34

## 2022-02-08 RX ADMIN — EMPAGLIFLOZIN 10 MG: 10 TABLET, FILM COATED ORAL at 08:34

## 2022-02-08 RX ADMIN — GABAPENTIN 600 MG: 300 CAPSULE ORAL at 05:02

## 2022-02-08 RX ADMIN — OXYCODONE HYDROCHLORIDE 10 MG: 5 TABLET ORAL at 04:44

## 2022-02-08 RX ADMIN — SIMETHICONE 80 MG: 80 TABLET, CHEWABLE ORAL at 08:33

## 2022-02-08 RX ADMIN — LEVOTHYROXINE SODIUM 150 MCG: 150 TABLET ORAL at 05:02

## 2022-02-08 NOTE — PROGRESS NOTES
Subjective    PPatient seen and examined on rounds.  Chart reviewed.  Events overnight noted.  History reviewed briefly with patient.    CC:  Deficits in mobility, transfers, self-care status post L2-L4 posterior spinal fusion with instrumentation, ORIF of L3 pathologic fracture, L3 lateral corpectomy with expandable cage placement, for L3 pathologic fracture, from metastatic thyroid cancer, multiple medical problems.    HPI:  Mr. Matt Williamson is a 76-year-old right-handed white male with chronic conditions significant for hypertension, hypothyroidism, thyroid cancer status post thyroidectomy, atrial fibrillation, coronary artery disease, hyperlipidemia, type 2 diabetes mellitus, metastatic thyroid carcinoma with metastasis to the L3 vertebral body status post stereotactic radiation in May 2020, who had progressively worsening midline low back pain with radiation to the right knee. MRI lumbar spine obtained on 11/22/21 showing extension of the L3 mass, moderate-severe foraminal narrowing on the right secondary to retropulsion of the inferior endplate in the setting of ligament and joint hypertrophy. CT demonstrates significant bony erosion of the L3 vertebral body with a new oblique fracture. PET scan on 12/7/21 showed increased activity of the L3 lesion without further metastatic lesions appreciated. He was initially referred to [facility] however there has been some delay with getting an appointment.  He denied any bowel or bladder incontinence or weakness and follows up with oncologist Dr. Casey who did not recommend any chemotherapy at this time but surgical intervention as the primary treatment given the suspected isolated area of progression at the L3 vertebral body.  Per neurosurgery notes he had preserved strength except 2 beats ankle clonus on the left.   He was recommended surgical resection by neurosurgery as well as oncology.  He was admitted to [facility] on 1/20/22 and underwent a stage I  "L2-L4 posterior spinal fusion with instrumentation, ORIF of L3 pathologic fracture, and stage II L3 lateral corpectomy with expandable cage placement with neurosurgeon Dr Colt Heller on 1/20/22 at [facility].  He is allowed weightbearing as tolerated on both lower extremities and was given an LSO brace for use when out of bed.  Postoperative course was significant for urinary retention and he had an indwelling Lewis catheter.  The Lewis catheter was removed 2 days back and per patient, he is starting to void now.  I mentioned to him we will monitor post void residuals to see if he is emptying his bladder completely or not.  He has some paresthesias/hyperesthesias in his right thigh especially lateral aspect and some right hip proximal weakness and per neurosurgery notes that is not uncommon given the nature of his anterior lumbar surgery.  He is on subcutaneous Heparin and SCDs for DVT prophylaxis.  I discussed his recent clinical course with patient and with his wife at bedside.  On 1/27/22, hemoglobin was 9.5, WBC count 10.09, platelets are 304, BUN was 17, and creatinine was 1.0.  He has been needing assistance for mobility, transfers, self-care. He is transferred to [place] [facility] on 1/27/22 for further rehabilitation care.     SUBJ: Feels fine. For discharge today. No C/O today. Appreciative of care here.  Discussed follow-up instructions with him.    ROS: Denies chest pain or shortness of breath. Other ROS negative. Past, family, social history is unchanged.      Physical Exam      Blood pressure (!) 103/56, pulse 85, temperature 37.1 °C (98.7 °F), temperature source Oral, resp. rate 18, height 1.676 m (5' 6\"), weight 75 kg (165 lb 6 oz), SpO2 98 %.  Body mass index is 26.69 kg/m².    General Appearance: Not in acute distress  Head/Ear/Nose/Throat: Normocephalic; Atraumatic.   Eye: EOMI; PERRL.   Neck: No JVD; No Bruits.   Respiratory: Decreased breath sounds at bases.   Cardiovascular: RRR; Normal " S1, S2.   Gastrointestinal: Soft; bowel sounds present but somewhat hypoactive.  Extremities: Bilateral lower extremity edema noted.    Musculoskeletal: Functional active range of motion in both upper extremities.  Some limitation of active range of motion in right hip limited by pain.    Neurological: AAO ×3. Speech is fluent. Cranial nerve examination does not reveal any gross facial asymmetry. Strength testing shows about 4+/5 strength in both upper extremities.  Right hip flexion is less than 3/5.  Right quadriceps is 4/5 with the right thigh supported.  Right ankle dorsiflexion, plantar flexion and right EHL are 4+/5.  Left hip flexion is 4/5.  Left quadriceps is 4+/5.  Left ankle dorsiflexion, plantar flexion and left EHL are 4+/5.  He is grossly able to localize touch and position sense in great toes, but does report some hyperesthesia in the right thigh lateral aspect.  Deep tendon reflexes are hypoactive and symmetric bilaterally.  Plantars are flexor.  Coordination is functional upper extremities.    Neurologically unchanged.  Behavior/Emotional: Appropriate; Cooperative.   Skin: No obvious rashes noted.  Healing incision noted on right flank.  Healing incision also noted on posterior lumbar spine.        Current Facility-Administered Medications:   •  acetaminophen (TYLENOL) tablet 650 mg, 650 mg, oral, q4h PRN, Arjun Dawkins MD, 650 mg at 02/06/22 2212  •  alum-mag hydroxide-simeth (MAALOX) 200-200-20 mg/5 mL suspension 30 mL, 30 mL, oral, q4h PRN, Arjun Dawkins MD  •  aspirin enteric coated tablet 81 mg, 81 mg, oral, Daily, Arjun Dawkins MD, 81 mg at 02/08/22 0833  •  bisacodyL (DULCOLAX) 10 mg suppository 10 mg, 10 mg, rectal, Daily PRN, Arjun Dawkins MD  •  glucose chewable tablet 16-32 g of dextrose, 16-32 g of dextrose, oral, PRN, 16 g of dextrose at 01/29/22 1721 **OR** dextrose 40 % oral gel 15-30 g of dextrose, 15-30 g of dextrose, oral, PRN **OR** glucagon (GLUCAGEN) injection 1 mg, 1 mg,  intramuscular, PRN **OR** dextrose in water injection 12.5 g, 25 mL, intravenous, PRN, Arjun Dawkins MD  •  diazePAM (VALIUM) tablet 5 mg, 5 mg, oral, q6h PRN, Arjun Dawkins MD, 5 mg at 02/06/22 2212  •  diphenhydrAMINE (BENADRYL) capsule 25 mg, 25 mg, oral, q6h PRN, Arjun Dawkins MD  •  docusate sodium (COLACE) capsule 100 mg, 100 mg, oral, BID, Arjun Dawkins MD, 100 mg at 02/08/22 0834  •  empagliflozin (JARDIANCE) tablet 10 mg, 10 mg, oral, Daily, Arjun Dawkins MD, 10 mg at 02/08/22 0834  •  enoxaparin (LOVENOX) syringe 40 mg, 40 mg, subcutaneous, Daily (6p), Arjun Dawkins MD, 40 mg at 02/07/22 1733  •  ergocalciferol (VITAMIN D2) capsule 50,000 Units, 50,000 Units, oral, Weekly, Arjun Dawkins MD, 50,000 Units at 02/05/22 0832  •  gabapentin (NEURONTIN) capsule 600 mg, 600 mg, oral, q8h SALIMA, Arjun Dawkins MD, 600 mg at 02/08/22 0502  •  lactobacillus acidophilus complex (BACID) 1 billion cell- 250 mg tablet 500 mg, 500 mg, oral, BID, Arjun Dawkins MD, 500 mg at 02/08/22 0832  •  levothyroxine (SYNTHROID) tablet 150 mcg, 150 mcg, oral, Daily (6a), Arjun Dawkins MD, 150 mcg at 02/08/22 0502  •  losartan (COZAAR) tablet 50 mg, 50 mg, oral, Daily with dinner, Arjun Dawkins MD, 50 mg at 02/07/22 1606  •  magnesium chloride tablet,delayed release (DR/EC) 128 mg, 128 mg, oral, Daily, Arjun Dawkins MD, 128 mg at 02/08/22 0833  •  metFORMIN XR (GLUCOPHAGE-XR) 24 hr ER tablet 500 mg, 500 mg, oral, Daily with breakfast, Arjun Dawkins MD, 500 mg at 02/08/22 0834  •  metoprolol succinate XL (TOPROL-XL) 24 hr ER tablet 100 mg, 100 mg, oral, Nightly, Arjun Dawkins MD, 100 mg at 02/07/22 2058  •  ondansetron ODT (ZOFRAN-ODT) disintegrating tablet 4 mg, 4 mg, oral, q8h PRN, Arjun Dawkins MD  •  oxyCODONE (ROXICODONE) immediate release tablet 10 mg, 10 mg, oral, q4h PRN, Pietro Umana DO, 10 mg at 02/08/22 0444  •  oxyCODONE (ROXICODONE) immediate release tablet 5 mg, 5 mg, oral, q4h PRN, Pietro Umana DO, 5  mg at 02/06/22 1317  •  polyethylene glycol (MIRALAX) 17 gram packet 17 g, 17 g, oral, Daily with dinner, Arjun Dawkins MD, 17 g at 02/05/22 0829  •  pravastatin (PRAVACHOL) tablet 40 mg, 40 mg, oral, Daily (6p), Arjun Dawkins MD, 40 mg at 02/07/22 1735  •  senna (SENOKOT) tablet 2 tablet, 2 tablet, oral, Daily, Arjun Dawkins MD, 2 tablet at 02/05/22 1232  •  simethicone (MYLICON) chewable tablet 80 mg, 80 mg, oral, QID, Arjun Dawkins MD, 80 mg at 02/08/22 0833       Labs / Radiology    Lab Results   Component Value Date    WBC 4.38 02/07/2022    HGB 9.7 (L) 02/07/2022    HCT 31.9 (L) 02/07/2022    MCV 89.4 02/07/2022     (H) 02/07/2022     Lab Results   Component Value Date    GLUCOSE 118 (H) 02/07/2022    CALCIUM 8.3 (L) 02/07/2022     (L) 02/07/2022    K 4.3 02/07/2022    CO2 28 02/07/2022    CL 99 02/07/2022    BUN 6 (L) 02/07/2022    CREATININE 0.8 02/07/2022     Assessment and Plan    ASSESSMENT PLAN:  1. 76-year-old right-handed white male with chronic conditions significant for hypertension, hypothyroidism, thyroid cancer status post thyroidectomy, atrial fibrillation, coronary artery disease, hyperlipidemia, type 2 diabetes mellitus, metastatic thyroid carcinoma with metastasis to the L3 vertebral body status post stereotactic radiation in May 2020, who had progressively worsening midline low back pain with radiation to the right knee. MRI lumbar spine obtained on 11/22/21 showing extension of the L3 mass, moderate-severe foraminal narrowing on the right secondary to retropulsion of the inferior endplate in the setting of ligament and joint hypertrophy. CT demonstrates significant bony erosion of the L3 vertebral body with a new oblique fracture. PET scan on 12/7/21 showed increased activity of the L3 lesion without further metastatic lesions appreciated. He was initially referred to [facility] however there has been some delay with getting an appointment.  He denied any bowel or  bladder incontinence or weakness and follows up with oncologist Dr. Casey who did not recommend any chemotherapy at this time but surgical intervention as the primary treatment given the suspected isolated area of progression at the L3 vertebral body.  Per neurosurgery notes he had preserved strength except 2 beats ankle clonus on the left.   He was recommended surgical resection by neurosurgery as well as oncology.  He was admitted to [facility] on 1/20/22 and underwent a stage I L2-L4 posterior spinal fusion with instrumentation, ORIF of L3 pathologic fracture, and stage II L3 lateral corpectomy with expandable cage placement with neurosurgeon Dr Colt Heller on 1/20/22 at [facility].  He is allowed weightbearing as tolerated on both lower extremities and was given an LSO brace for use when out of bed.  Postoperative course was significant for urinary retention and he had an indwelling Lewis catheter.  The Lewis catheter was removed 2 days back and per patient, he is starting to void now.  I mentioned to him we will monitor post void residuals to see if he is emptying his bladder completely or not.  He has some paresthesias/hyperesthesias in his right thigh especially lateral aspect and some right hip proximal weakness and per neurosurgery notes that is not uncommon given the nature of his anterior lumbar surgery.  He is on subcutaneous Heparin and SCDs for DVT prophylaxis.  I discussed his recent clinical course with patient and with his wife at bedside.  On 1/27/22, hemoglobin was 9.5, WBC count 10.09, platelets are 304, BUN was 17, and creatinine was 1.0.  He has been needing assistance for mobility, transfers, self-care. He is transferred to [place] [facility] on 1/27/22 for further rehabilitation care.      2. DVT prophylaxis - on subcutaneous Heparin.  On SCDs.  Check doppler. Platelets 323 on 1/28/2022.  Platelets 338 on 1/31/2022.  Platelets 394 on 2/7/2022.     3.  Neurosurgery - status post  L2-L4 posterior spinal fusion with instrumentation, ORIF of L3 pathologic fracture, L3 lateral corpectomy with expandable cage placement, for L3 pathologic fracture, from metastatic thyroid cancer, multiple medical problems - continue PT, OT, psychology.  Follow falls precautions, cardiac precautions, monitor pulse oximetry in therapy.  Follow spinal precautions.  LSO brace when out of bed.  Discussed neurosurgery appointment yesterday with him.  He was told to continue back brace.     4. GI - On Protonix for GI prophylaxis. Continue Colace, Senokot, MiraLAX, Dulcolax.  On Zofran ODT for nausea.  On Maalox PRN.  He had some constipation issues.  Agrees to be on a full liquid diet for now.     5.  - Postoperative course was significant for urinary retention and he had an indwelling Lewis catheter.  The Lewis catheter was removed 2 days back and per patient, he is starting to void now.  I mentioned to him we will monitor post void residuals to see if he is emptying his bladder completely or not.       6.  Diabetes mellitus - on Jardiance.  On Metformin.  On sliding-scale NovoLog coverage.  On hypoglycemic protocol.     7. Pain - on Tylenol.  On Neurontin.  On PRN Oxycodone.  On Valium PRN for spasms.     8. Hematology -hemoglobin 9.2, WBC 8.01 on 1/28/2022.  Hemoglobin 9.2, WBC 7.10 on 1/31/2022.  Hemoglobin 9.7, WBC 4.38 on 2/7/2022.     9.  Endocrinology - history of thyroid cancer status post thyroidectomy.  On Synthroid.     10. CVS - history of hypertension, atrial fibrillation, coronary artery disease - on Toprol-XL.     11.  Hyperlipidemia - on Pravachol.     12.  Oncology - history of metastatic thyroid cancer status post thyroidectomy.  He had L2-L4 posterior spinal fusion with instrumentation, ORIF of L3 pathologic fracture, L3 lateral corpectomy with expandable cage placement, for L3 pathologic fracture, from metastatic thyroid cancer.  He will follow up with oncology and neurosurgery.     13.  Skin  - monitor healing of incisions on right flank and on posterior lumbar spine.  Dermal defense will be consulted.     14.  F/E/N - on sodium bicarbonate.     15. Rehabilitation medicine - Continue comprehensive rehabilitation care. Continue PT, OT, psychology.  We will follow falls precautions, cardiac precautions, monitor pulse oximetry in therapy and follow aspiration precautions.  We will follow spinal precautions.  LSO brace when out of bed.Tolerating therapies per endurance.  Reports pain medications are helping.  Had a bowel movement.  Voiding well.Inspected incisions which are stable.  Requiring minimal assistance for transfers with physical therapy.  Able to ambulate 50 feet with front wheel walker with minimal assistance with physical therapy.Discussed follow-up appointment with neurosurgery with him for this Wednesday.  Working on upper extremity strengthening and range of motion exercises.  Able to don and doff LSO brace.  Discussed spinal precautions with him.Able to ambulate 150 feet with minimal assistance with straight cane with physical therapy.  Requiring close supervision to minimal assistance for transfers with physical therapy.Requiring close supervision for transfers with occupational therapy.  Requiring close supervision for toilet with occupational therapy.  Signed handicap application placard per patient and OT request.He had neurosurgery appointment today that went well.  Noted input from neurosurgery.  Denies increased pain.  Discussed with patient.Discussed neurosurgery appointment yesterday with him.  He was told to continue back brace.  He had some constipation issues.  Agrees to be on a full liquid diet for now.He is on full liquid diet now.  Handicap parking placard application signed for him per his request today.  Internist adjusting antidiabetic medications.  He feels comfortable with discharge plans to home next week.Performance day today.  Feels comfortable with discharge plan to  home for tomorrow.  Offered to put him back on regular diet but he wants to continue with full liquid diet for now.  Discussed follow-up instructions with him.Feels fine. For discharge today. No C/O today. Appreciative of care here.  Discussed with him no driving till cleared by neurosurgeon and to check duration of use of back brace with neurosurgeons office.  Discussed follow-up instructions with him.     16. Reviewed labs today. BUN 12, creatinine 0.8 on 1/28/2022.  BUN 7, creatinine 0.8 on 1/31/2022.  BUN 8, creatinine 0.8 on 2/3/2022.  BUN 6, creatinine 0.8 on 2/7/2022.     17.  Discharge to home today, 2/8/2022. Discussed discharge plans. Discharge summary will be completed. Follow up with PCP, neurosurgeon, oncologist, cardiologist, endocrinologist, urologist as needed and other appropriate physicians. Further therapies set up after discharge. Discharge instructions on discharge form.        James Hidalgo MD  2/8/2022

## 2022-02-08 NOTE — PROGRESS NOTES
[facility] Progress Note          Patient was seen and examined at bedside.    Subjective:   2/7/22  He feels well this morning, LLE shooting pain vs muscle pain, which might be related to his lying position, will watch when the day goes with his rehab therapy, cont his current pain management,  his BM is fine. His PT/OT is progressing well. Will resume metformin  mg daily and jardiance 10 mg daily . Plan to d/c home tomorrow, will prepare for it .      2/8/22  Clinically he remains stable, pleasant, in NAD; He has finished his inpt acute rehab with good functional recovery, plan to d/c home today, provided with no new complaints or concerns, no O/N acute events, reviewed his recent labs, benign, spent 35 min to prepare for his d/c home today.    Objective   Vital signs in last 24 hours:  Temp:  [37.1 °C (98.7 °F)-37.2 °C (98.9 °F)] 37.1 °C (98.7 °F)  Heart Rate:  [85-87] 85  Resp:  [18] 18  BP: (103-124)/(55-56) 103/56    No intake or output data in the 24 hours ending 02/08/22 0914  Intake/Output this shift:  No intake/output data recorded.   Review of Systems:  All other systems reviewed and negative except as noted in the HPI.   Objective      Labs  reviewed his labs thoroughly   Lab Results   Component Value Date    WBC 4.38 02/07/2022    HGB 9.7 (L) 02/07/2022    HCT 31.9 (L) 02/07/2022    MCV 89.4 02/07/2022     (H) 02/07/2022     Lab Results   Component Value Date    GLUCOSE 118 (H) 02/07/2022    CALCIUM 8.3 (L) 02/07/2022     (L) 02/07/2022    K 4.3 02/07/2022    CO2 28 02/07/2022    CL 99 02/07/2022    BUN 6 (L) 02/07/2022    CREATININE 0.8 02/07/2022       Imaging  OSH imaging study reports reviewed   B/l LE venous compression U/S 1/28/22:  No evidence of deep vein thrombus (DVT) in either lower extremity.      Full Code    Physical Exam:  Head/Ear/Nose/Throat: normocephalic; atraumatic; moisture mouth mm, no oropharyngeal thrush noted.   Eyes: anicteric  sclera, EOMI; PERRL.   Neck : supple, no JVD, no carotid bruits appeciated.   Respiratory: no evidence of labored breathing, lung sounds CTA b/l, good aeration bibasilar area, no w/r/c.   Cardiovascular: RRR; normal S1, S2; no m/r/g; no S3 or S4.   Gastrointestinal: soft; NT; BS normal; mildly distended; no CVAT b/l.   Genitourinary: no silva.   Extremities : b/l LE pitting edema .   Neurological: AO x 3, fluent speeches, following commands, CNS II-XII grossly intact; no focal neurologic deficits.   Behavior/Emotional: in NAD, appropriate; cooperative.   Skin: sacral area skin lesions.     Plan of care was discussed with patient, RN, and PMR attending     Assessment   CC:Thyroid cancer with progressive lumbar metastatic lesions and L3 pathologic fracture complicated with myeloradiculopathy, s/p surgical intervention and ORIF, neurogenic B/B, ADL and ambulatory dysfunction      76 y.o. male with PMH of HTN, dyslipidemia, CAD, type 2 DM, hypothyroidism, thyroid cancer metastatic to L3 vertebral body s/p radiation stereotactic body radiation 5/2020, seen by the neurosurgery service for surgical discussion regarding growth of his L3 vertebral body metastatic lesion. He has had progressive worsening midline-line low back pain with radiation to the right knee. An MRI lumbar spine was obtained 11/22/2021 showing extension of the L3 mass. He was sent for a PET scan 12/7/2021 showing increased activity of the L3 lesion without further metastatic lesions appreciated. He was referred to [facility] however there has been some delay with getting an appointment.      He has had persistent midline low back pain with walking and standing which has progressively worsened. He does not have any weakness or bowel/bladder incontinence. He is following with oncology (Dr. Casey) who does not recommend any chemotherapy at this time but surgical intervention as the primary treatment given the suspected isolated area of progression  at the L3 vertebral body.     He was admitted to [facility] on 1/20/22 and underwent elective :  Stage 1: L2-L4 PLSF with instrumentation, ORIF L3 pathologic fracture  Stage 2: L3 lateral corpectomy with expandable cage placement  Post op ileus, s/p aggressive bowel program, resolved, tolerated diet. Functionally he has ADL and ambulatory dysfunction, requiring inpt acute rehab, transferred to [facility] on 1/27/22.            Ileus (CMS/HCC)  Resolved after aggressive bowel program, start diet  Tolerating diet, cont bowel regimen.     Acquired hypothyroidism  Assessment & Plan  Status post thyroidectomy  Continue levothyroxine        Malignant neoplasm of vertebral column, excluding sacrum and coccyx (CMS/HCC)  Assessment & Plan  Metastatic thyroid ca S/p L3 pathologic fracture s/p right lateral L3 corpectomy and placement of an expandable cage, followed by L2-4 posterolateral instrumented fusion  Continue postop management per neurosurgery.  Drain was removed  DVT prophylaxis, Lewis, pain management per neurosurgery  Rehab as inpt at [place] [facility]      Paroxysmal atrial fibrillation (CMS/[facility])  Assessment & Plan  Currently in sinus rhythm  Patient is not on long-term anticoagulation  He takes Rythmol as needed for palpitations     Type 2 diabetes mellitus without complication (CMS/HCC)  Assessment & Plan  Patient on Jardiance, Trulicity and metformin as outpatient  Cont inpt Accu-Cheks with sliding scale insulin  Hemoglobin A1c 6.9  Continue Jardiance and restarted metformin and trulicity  Metformin should be changed to twice daily or extended release once daily      Metastasis from thyroid cancer (CMS/HCC)  Assessment & Plan  Follows up with medical oncologist  and radiation oncologist  Status post radiation     Mixed hyperlipidemia  Assessment & Plan  Continue statin     Essential hypertension  Assessment & Plan  Stable  Continue metoprolol     Atherosclerosis of coronary artery  Assessment & Plan  Status post LAD  stenting 17 years ago  Patient follows up with Dr. Ji  Continue metoprolol, statin  Restart aspirin once cleared by neurosurgery     #VitD deficiency with 25 OH VitD low at 12, loading with ergocaociferol 50,000 units weekly    #Anemia with AVI, provide ferrous sulfate oral supplement    # Euglycemic DKA, hold jardiance, IVF and po hydration, replacement K, Mag, insulin replacement     Billing code: [id number]  Diagnoses:  Patient Active Problem List   Diagnosis   • BPH (benign prostatic hyperplasia)   • Atherosclerosis of coronary artery   • Cataract   • Essential hypertension   • Former tobacco use   • Mixed hyperlipidemia   • Metastasis from thyroid cancer (CMS/HCC)   • Type 2 diabetes mellitus without complication (CMS/HCC)   • Cyst of thyroid   • Esophageal reflux   • Paroxysmal atrial fibrillation (CMS/HCC)   • Pre-op evaluation   • Preop cardiovascular exam   • Cancer associated pain   • Low back pain with right-sided sciatica   • Malignant neoplasm of vertebral column, excluding sacrum and coccyx (CMS/HCC)   • Acquired hypothyroidism   • Ileus (CMS/HCC)   • Leg swelling   • Lumbar disc disease with radiculopathy   • S/P lumbar fusion   • Vitamin D deficiency   • AVI (iron deficiency anemia)        Arjun Dawkins MD  2/8/2022

## 2022-02-08 NOTE — PROGRESS NOTES
Subjective    PPatient seen and examined on rounds.  Chart reviewed.  Events overnight noted.  History reviewed briefly with patient.    CC:  Deficits in mobility, transfers, self-care status post L2-L4 posterior spinal fusion with instrumentation, ORIF of L3 pathologic fracture, L3 lateral corpectomy with expandable cage placement, for L3 pathologic fracture, from metastatic thyroid cancer, multiple medical problems.    HPI:  Mr. Matt Williamson is a 76-year-old right-handed white male with chronic conditions significant for hypertension, hypothyroidism, thyroid cancer status post thyroidectomy, atrial fibrillation, coronary artery disease, hyperlipidemia, type 2 diabetes mellitus, metastatic thyroid carcinoma with metastasis to the L3 vertebral body status post stereotactic radiation in May 2020, who had progressively worsening midline low back pain with radiation to the right knee. MRI lumbar spine obtained on 11/22/21 showing extension of the L3 mass, moderate-severe foraminal narrowing on the right secondary to retropulsion of the inferior endplate in the setting of ligament and joint hypertrophy. CT demonstrates significant bony erosion of the L3 vertebral body with a new oblique fracture. PET scan on 12/7/21 showed increased activity of the L3 lesion without further metastatic lesions appreciated. He was initially referred to [facility] however there has been some delay with getting an appointment.  He denied any bowel or bladder incontinence or weakness and follows up with oncologist Dr. Casey who did not recommend any chemotherapy at this time but surgical intervention as the primary treatment given the suspected isolated area of progression at the L3 vertebral body.  Per neurosurgery notes he had preserved strength except 2 beats ankle clonus on the left.   He was recommended surgical resection by neurosurgery as well as oncology.  He was admitted to [facility] on 1/20/22 and underwent a stage I  "L2-L4 posterior spinal fusion with instrumentation, ORIF of L3 pathologic fracture, and stage II L3 lateral corpectomy with expandable cage placement with neurosurgeon Dr Colt Heller on 1/20/22 at [facility].  He is allowed weightbearing as tolerated on both lower extremities and was given an LSO brace for use when out of bed.  Postoperative course was significant for urinary retention and he had an indwelling Lewis catheter.  The Lewis catheter was removed 2 days back and per patient, he is starting to void now.  I mentioned to him we will monitor post void residuals to see if he is emptying his bladder completely or not.  He has some paresthesias/hyperesthesias in his right thigh especially lateral aspect and some right hip proximal weakness and per neurosurgery notes that is not uncommon given the nature of his anterior lumbar surgery.  He is on subcutaneous Heparin and SCDs for DVT prophylaxis.  I discussed his recent clinical course with patient and with his wife at bedside.  On 1/27/22, hemoglobin was 9.5, WBC count 10.09, platelets are 304, BUN was 17, and creatinine was 1.0.  He has been needing assistance for mobility, transfers, self-care. He is transferred to [place] [facility] on 1/27/22 for further rehabilitation care.     SUBJ: Performance day today.  Feels comfortable with discharge plan to home for tomorrow.  Offered to put him back on regular diet but he wants to continue with full liquid diet for now.  Discussed follow-up instructions with him.    ROS: Denies chest pain or shortness of breath. Other ROS negative. Past, family, social history is unchanged.      Physical Exam      Blood pressure (!) 107/55, pulse 87, temperature 37.1 °C (98.7 °F), temperature source Oral, resp. rate 18, height 1.676 m (5' 6\"), weight 75 kg (165 lb 6 oz), SpO2 98 %.  Body mass index is 26.69 kg/m².    General Appearance: Not in acute distress  Head/Ear/Nose/Throat: Normocephalic; Atraumatic.   Eye: EOMI; " PERRL.   Neck: No JVD; No Bruits.   Respiratory: Decreased breath sounds at bases.   Cardiovascular: RRR; Normal S1, S2.   Gastrointestinal: Soft; bowel sounds present but somewhat hypoactive.  Extremities: Bilateral lower extremity edema noted.    Musculoskeletal: Functional active range of motion in both upper extremities.  Some limitation of active range of motion in right hip limited by pain.    Neurological: AAO ×3. Speech is fluent. Cranial nerve examination does not reveal any gross facial asymmetry. Strength testing shows about 4+/5 strength in both upper extremities.  Right hip flexion is less than 3/5.  Right quadriceps is 4/5 with the right thigh supported.  Right ankle dorsiflexion, plantar flexion and right EHL are 4+/5.  Left hip flexion is 4/5.  Left quadriceps is 4+/5.  Left ankle dorsiflexion, plantar flexion and left EHL are 4+/5.  He is grossly able to localize touch and position sense in great toes, but does report some hyperesthesia in the right thigh lateral aspect.  Deep tendon reflexes are hypoactive and symmetric bilaterally.  Plantars are flexor.  Coordination is functional upper extremities.    Neurologically stable.  Behavior/Emotional: Appropriate; Cooperative.   Skin: No obvious rashes noted.  Healing incision noted on right flank.  Healing incision also noted on posterior lumbar spine.  Mepilex border dressing in place on incisions.      Current Facility-Administered Medications:   •  acetaminophen (TYLENOL) tablet 650 mg, 650 mg, oral, q4h PRN, Arjun Dawkins MD, 650 mg at 02/06/22 2212  •  alum-mag hydroxide-simeth (MAALOX) 200-200-20 mg/5 mL suspension 30 mL, 30 mL, oral, q4h PRN, Arjun Dawkins MD  •  aspirin enteric coated tablet 81 mg, 81 mg, oral, Daily, Arjun Dawkins MD, 81 mg at 02/07/22 0806  •  bisacodyL (DULCOLAX) 10 mg suppository 10 mg, 10 mg, rectal, Daily PRN, Arjun Dawkins MD  •  glucose chewable tablet 16-32 g of dextrose, 16-32 g of dextrose, oral, PRN, 16 g of  dextrose at 01/29/22 1721 **OR** dextrose 40 % oral gel 15-30 g of dextrose, 15-30 g of dextrose, oral, PRN **OR** glucagon (GLUCAGEN) injection 1 mg, 1 mg, intramuscular, PRN **OR** dextrose in water injection 12.5 g, 25 mL, intravenous, PRN, Arjun Dawkins MD  •  diazePAM (VALIUM) tablet 5 mg, 5 mg, oral, q6h PRN, Arjun Dawkins MD, 5 mg at 02/06/22 2212  •  diphenhydrAMINE (BENADRYL) capsule 25 mg, 25 mg, oral, q6h PRN, Arjun Dawkins MD  •  docusate sodium (COLACE) capsule 100 mg, 100 mg, oral, BID, Arjun Dawkins MD, 100 mg at 02/07/22 0806  •  [START ON 2/8/2022] empagliflozin (JARDIANCE) tablet 10 mg, 10 mg, oral, Daily, Arjun Dawkins MD  •  enoxaparin (LOVENOX) syringe 40 mg, 40 mg, subcutaneous, Daily (6p), Arjun Dawkins MD, 40 mg at 02/07/22 1733  •  ergocalciferol (VITAMIN D2) capsule 50,000 Units, 50,000 Units, oral, Weekly, Arjun Dawkins MD, 50,000 Units at 02/05/22 0832  •  gabapentin (NEURONTIN) capsule 600 mg, 600 mg, oral, q8h SALIMA, Arjun Dawkins MD, 600 mg at 02/07/22 1438  •  lactobacillus acidophilus complex (BACID) 1 billion cell- 250 mg tablet 500 mg, 500 mg, oral, BID, Arjun Dawkins MD, 500 mg at 02/07/22 0806  •  levothyroxine (SYNTHROID) tablet 150 mcg, 150 mcg, oral, Daily (6a), Arjun Dawkins MD, 150 mcg at 02/07/22 0610  •  losartan (COZAAR) tablet 50 mg, 50 mg, oral, Daily with dinner, Arjun Dawkins MD, 50 mg at 02/07/22 1606  •  [START ON 2/8/2022] magnesium chloride tablet,delayed release (DR/EC) 128 mg, 128 mg, oral, Daily, Arjun Dawkins MD  •  metFORMIN XR (GLUCOPHAGE-XR) 24 hr ER tablet 500 mg, 500 mg, oral, Daily with breakfast, Arjun Dawkins MD, 500 mg at 02/07/22 0806  •  metoprolol succinate XL (TOPROL-XL) 24 hr ER tablet 100 mg, 100 mg, oral, Nightly, Arjun Dawkins MD, 100 mg at 02/06/22 2054  •  ondansetron ODT (ZOFRAN-ODT) disintegrating tablet 4 mg, 4 mg, oral, q8h PRN, Arjun Dawkins MD  •  oxyCODONE (ROXICODONE) immediate release tablet 10 mg, 10 mg, oral, q4h PRN,  Pietro Umana DO, 10 mg at 02/07/22 1103  •  oxyCODONE (ROXICODONE) immediate release tablet 5 mg, 5 mg, oral, q4h PRN, Pietro Umana DO, 5 mg at 02/06/22 1317  •  polyethylene glycol (MIRALAX) 17 gram packet 17 g, 17 g, oral, Daily with dinner, Arjun Dawkins MD, 17 g at 02/05/22 0829  •  pravastatin (PRAVACHOL) tablet 40 mg, 40 mg, oral, Daily (6p), Arjun Dawkins MD, 40 mg at 02/07/22 1735  •  senna (SENOKOT) tablet 2 tablet, 2 tablet, oral, Daily, Arjun Dakwins MD, 2 tablet at 02/05/22 1232  •  simethicone (MYLICON) chewable tablet 80 mg, 80 mg, oral, QID, Arjun Dawkins MD, 80 mg at 02/07/22 1605       Labs / Radiology    Lab Results   Component Value Date    WBC 4.38 02/07/2022    HGB 9.7 (L) 02/07/2022    HCT 31.9 (L) 02/07/2022    MCV 89.4 02/07/2022     (H) 02/07/2022     Lab Results   Component Value Date    GLUCOSE 118 (H) 02/07/2022    CALCIUM 8.3 (L) 02/07/2022     (L) 02/07/2022    K 4.3 02/07/2022    CO2 28 02/07/2022    CL 99 02/07/2022    BUN 6 (L) 02/07/2022    CREATININE 0.8 02/07/2022     Assessment and Plan    ASSESSMENT PLAN:  1. 76-year-old right-handed white male with chronic conditions significant for hypertension, hypothyroidism, thyroid cancer status post thyroidectomy, atrial fibrillation, coronary artery disease, hyperlipidemia, type 2 diabetes mellitus, metastatic thyroid carcinoma with metastasis to the L3 vertebral body status post stereotactic radiation in May 2020, who had progressively worsening midline low back pain with radiation to the right knee. MRI lumbar spine obtained on 11/22/21 showing extension of the L3 mass, moderate-severe foraminal narrowing on the right secondary to retropulsion of the inferior endplate in the setting of ligament and joint hypertrophy. CT demonstrates significant bony erosion of the L3 vertebral body with a new oblique fracture. PET scan on 12/7/21 showed increased activity of the L3 lesion without further metastatic lesions  appreciated. He was initially referred to [facility] however there has been some delay with getting an appointment.  He denied any bowel or bladder incontinence or weakness and follows up with oncologist Dr. Casey who did not recommend any chemotherapy at this time but surgical intervention as the primary treatment given the suspected isolated area of progression at the L3 vertebral body.  Per neurosurgery notes he had preserved strength except 2 beats ankle clonus on the left.   He was recommended surgical resection by neurosurgery as well as oncology.  He was admitted to [facility] on 1/20/22 and underwent a stage I L2-L4 posterior spinal fusion with instrumentation, ORIF of L3 pathologic fracture, and stage II L3 lateral corpectomy with expandable cage placement with neurosurgeon Dr Colt Heller on 1/20/22 at [facility].  He is allowed weightbearing as tolerated on both lower extremities and was given an LSO brace for use when out of bed.  Postoperative course was significant for urinary retention and he had an indwelling Lewis catheter.  The Lewis catheter was removed 2 days back and per patient, he is starting to void now.  I mentioned to him we will monitor post void residuals to see if he is emptying his bladder completely or not.  He has some paresthesias/hyperesthesias in his right thigh especially lateral aspect and some right hip proximal weakness and per neurosurgery notes that is not uncommon given the nature of his anterior lumbar surgery.  He is on subcutaneous Heparin and SCDs for DVT prophylaxis.  I discussed his recent clinical course with patient and with his wife at bedside.  On 1/27/22, hemoglobin was 9.5, WBC count 10.09, platelets are 304, BUN was 17, and creatinine was 1.0.  He has been needing assistance for mobility, transfers, self-care. He is transferred to [place] [facility] on 1/27/22 for further rehabilitation care.      2. DVT prophylaxis - on subcutaneous Heparin.   On SCDs.  Check doppler. Platelets 323 on 1/28/2022.  Platelets 338 on 1/31/2022.  Platelets 394 on 2/7/2022.     3.  Neurosurgery - status post L2-L4 posterior spinal fusion with instrumentation, ORIF of L3 pathologic fracture, L3 lateral corpectomy with expandable cage placement, for L3 pathologic fracture, from metastatic thyroid cancer, multiple medical problems - continue PT, OT, psychology.  Follow falls precautions, cardiac precautions, monitor pulse oximetry in therapy.  Follow spinal precautions.  LSO brace when out of bed.  Discussed neurosurgery appointment yesterday with him.  He was told to continue back brace.     4. GI - On Protonix for GI prophylaxis. Continue Colace, Senokot, MiraLAX, Dulcolax.  On Zofran ODT for nausea.  On Maalox PRN.  He had some constipation issues.  Agrees to be on a full liquid diet for now.     5.  - Postoperative course was significant for urinary retention and he had an indwelling Lewis catheter.  The Lewis catheter was removed 2 days back and per patient, he is starting to void now.  I mentioned to him we will monitor post void residuals to see if he is emptying his bladder completely or not.       6.  Diabetes mellitus - on Jardiance.  On Metformin.  On sliding-scale NovoLog coverage.  On hypoglycemic protocol.     7. Pain - on Tylenol.  On Neurontin.  On PRN Oxycodone.  On Valium PRN for spasms.     8. Hematology -hemoglobin 9.2, WBC 8.01 on 1/28/2022.  Hemoglobin 9.2, WBC 7.10 on 1/31/2022.  Hemoglobin 9.7, WBC 4.38 on 2/7/2022.     9.  Endocrinology - history of thyroid cancer status post thyroidectomy.  On Synthroid.     10. CVS - history of hypertension, atrial fibrillation, coronary artery disease - on Toprol-XL.     11.  Hyperlipidemia - on Pravachol.     12.  Oncology - history of metastatic thyroid cancer status post thyroidectomy.  He had L2-L4 posterior spinal fusion with instrumentation, ORIF of L3 pathologic fracture, L3 lateral corpectomy with expandable  cage placement, for L3 pathologic fracture, from metastatic thyroid cancer.  He will follow up with oncology and neurosurgery.     13.  Skin - monitor healing of incisions on right flank and on posterior lumbar spine.  Dermal defense will be consulted.     14.  F/E/N - on sodium bicarbonate.     15. Rehabilitation medicine - Continue comprehensive rehabilitation care. Continue PT, OT, psychology.  We will follow falls precautions, cardiac precautions, monitor pulse oximetry in therapy and follow aspiration precautions.  We will follow spinal precautions.  LSO brace when out of bed.Tolerating therapies per endurance.  Reports pain medications are helping.  Had a bowel movement.  Voiding well.Inspected incisions which are stable.  Requiring minimal assistance for transfers with physical therapy.  Able to ambulate 50 feet with front wheel walker with minimal assistance with physical therapy.Discussed follow-up appointment with neurosurgery with him for this Wednesday.  Working on upper extremity strengthening and range of motion exercises.  Able to don and doff LSO brace.  Discussed spinal precautions with him.Able to ambulate 150 feet with minimal assistance with straight cane with physical therapy.  Requiring close supervision to minimal assistance for transfers with physical therapy.Requiring close supervision for transfers with occupational therapy.  Requiring close supervision for toilet with occupational therapy.  Signed handicap application placard per patient and OT request.He had neurosurgery appointment today that went well.  Noted input from neurosurgery.  Denies increased pain.  Discussed with patient.Discussed neurosurgery appointment yesterday with him.  He was told to continue back brace.  He had some constipation issues.  Agrees to be on a full liquid diet for now.He is on full liquid diet now.  Handicap parking placard application signed for him per his request today.  Internist adjusting antidiabetic  medications.  He feels comfortable with discharge plans to home next week.Performance day today.  Feels comfortable with discharge plan to home for tomorrow.  Offered to put him back on regular diet but he wants to continue with full liquid diet for now.  Discussed follow-up instructions with him.     16. Reviewed labs today. BUN 12, creatinine 0.8 on 1/28/2022.  BUN 7, creatinine 0.8 on 1/31/2022.  BUN 8, creatinine 0.8 on 2/3/2022.  BUN 6, creatinine 0.8 on 2/7/2022.            James Hidalgo MD  2/7/2022

## 2022-02-08 NOTE — PLAN OF CARE
Plan of Care Review  Plan of Care Reviewed With: patient  Progress: improving  Outcome Summary: D/C instructions reviewed with pt

## 2022-02-08 NOTE — DISCHARGE SUMMARY
Rehab Discharge Summary      Admitting Provider: James Hidalgo MD  Discharge Provider: James Hidalgo MD  Primary Care Physician at Discharge: Armand Arreolaen PETERSON [phone]     Admission Date: 1/27/2022     Discharge Date: 2/8/2022    Discharge Disposition  Home      Code Status at Discharge: Full Code    Discharge Medications     Medication List      START taking these medications    acetaminophen 325 mg tablet  Commonly known as: TYLENOL  Take 2 tablets (650 mg total) by mouth every 6 (six) hours as needed for mild pain.  Dose: 650 mg     docusate sodium 100 mg capsule  Commonly known as: COLACE  Take 1 capsule (100 mg total) by mouth 2 (two) times a day.  Dose: 100 mg     enoxaparin 40 mg/0.4 mL syringe  Commonly known as: LOVENOX  Inject 0.4 mL (40 mg total) under the skin daily.  Dose: 40 mg     ergocalciferol 50,000 unit(1250 mcg) capsule  Commonly known as: VITAMIN D2  Start taking on: February 12, 2022  Take 1 capsule (50,000 Units total) by mouth once a week.  Dose: 50,000 Units     losartan 50 mg tablet  Commonly known as: COZAAR  Take 1 tablet (50 mg total) by mouth daily with dinner.  Dose: 50 mg     magnesium chloride 64 mg tablet,delayed release (DR/EC)  Take 2 tablets (128 mg total) by mouth daily.  Dose: 128 mg     metFORMIN  mg 24 hr tablet  Commonly known as: GLUCOPHAGE-XR  Take 1 tablet (500 mg total) by mouth daily with breakfast.  Dose: 500 mg  Replaces: metFORMIN 500 mg tablet     oxyCODONE 5 mg immediate release tablet  Commonly known as: ROXICODONE  Take 1 tablet (5 mg total) by mouth every 6 (six) hours as needed for severe pain for up to 5 days.  Dose: 5 mg     senna 8.6 mg tablet  Commonly known as: SENOKOT  Take 2 tablets by mouth daily.  Dose: 2 tablet        CHANGE how you take these medications    bisacodyL 10 mg suppository  Commonly known as: DULCOLAX  Insert 1 suppository (10 mg total) into the rectum daily as needed for constipation for up to 10 days.  Dose:  10 mg  What changed:   · when to take this  · reasons to take this     gabapentin 300 mg capsule  Commonly known as: NEURONTIN  Take 2 capsules (600 mg total) by mouth every 8 (eight) hours.  Dose: 600 mg  What changed: when to take this     metoprolol succinate  mg 24 hr tablet  Commonly known as: TOPROL-XL  Take 1 tablet (100 mg total) by mouth nightly.  Dose: 100 mg  What changed: when to take this     polyethylene glycol 17 gram packet  Commonly known as: MIRALAX  Take 17 g by mouth daily with dinner.  Dose: 17 g  What changed: when to take this     pravastatin 40 mg tablet  Commonly known as: PRAVACHOL  Take 1 tablet (40 mg total) by mouth daily.  Dose: 40 mg  What changed: when to take this        CONTINUE taking these medications    aspirin 81 mg enteric coated tablet  Take 81 mg by mouth daily.  Dose: 81 mg     JARDIANCE 10 mg tablet  TAKE 1 TABLET(10 MG) BY MOUTH DAILY  Generic drug: empagliflozin     sennosides-docusate sodium 8.6-50 mg  Commonly known as: SENOKOT-S  Take 1 tablet by mouth 2 (two) times a day.  Dose: 1 tablet     SYNTHROID 150 mcg tablet  Take 1 tablet (150 mcg total) by mouth once daily.  Dose: 150 mcg  Generic drug: levothyroxine     TRULICITY 0.75 mg/0.5 mL pen injector  once a week. Every Tuesday  Generic drug: dulaglutide        STOP taking these medications    glipiZIDE 5 mg tablet  Commonly known as: GLUCOTROL     metFORMIN 500 mg tablet  Commonly known as: GLUCOPHAGE  Replaced by: metFORMIN  mg 24 hr tablet     pantoprazole 40 mg EC tablet  Commonly known as: PROTONIX     propafenone 150 mg tablet  Commonly known as: RYTHMOL              Reason for Hospitalization    Deficits in mobility, transfers, self-care status post L2-L4 posterior spinal fusion with instrumentation, ORIF of L3 pathologic fracture, L3 lateral corpectomy with expandable cage placement, for L3 pathologic fracture, from metastatic thyroid cancer, multiple medical problems.    History of Present  Illness    Mr. Matt Williamson is a 76-year-old right-handed white male with chronic conditions significant for hypertension, hypothyroidism, thyroid cancer status post thyroidectomy, atrial fibrillation, coronary artery disease, hyperlipidemia, type 2 diabetes mellitus, metastatic thyroid carcinoma with metastasis to the L3 vertebral body status post stereotactic radiation in May 2020, who had progressively worsening midline low back pain with radiation to the right knee. MRI lumbar spine obtained on 11/22/21 showing extension of the L3 mass, moderate-severe foraminal narrowing on the right secondary to retropulsion of the inferior endplate in the setting of ligament and joint hypertrophy. CT demonstrates significant bony erosion of the L3 vertebral body with a new oblique fracture. PET scan on 12/7/21 showed increased activity of the L3 lesion without further metastatic lesions appreciated. He was initially referred to [facility] however there has been some delay with getting an appointment.  He denied any bowel or bladder incontinence or weakness and follows up with oncologist Dr. Casey who did not recommend any chemotherapy at this time but surgical intervention as the primary treatment given the suspected isolated area of progression at the L3 vertebral body.  Per neurosurgery notes he had preserved strength except 2 beats ankle clonus on the left.   He was recommended surgical resection by neurosurgery as well as oncology.  He was admitted to [facility] on 1/20/22 and underwent a stage I L2-L4 posterior spinal fusion with instrumentation, ORIF of L3 pathologic fracture, and stage II L3 lateral corpectomy with expandable cage placement with neurosurgeon Dr Colt Heller on 1/20/22 at [facility].  He is allowed weightbearing as tolerated on both lower extremities and was given an LSO brace for use when out of bed.  Postoperative course was significant for urinary retention and he had an indwelling Lewis  catheter.  The Lewis catheter was removed 2 days back and per patient, he is starting to void now.  I mentioned to him we will monitor post void residuals to see if he is emptying his bladder completely or not.  He has some paresthesias/hyperesthesias in his right thigh especially lateral aspect and some right hip proximal weakness and per neurosurgery notes that is not uncommon given the nature of his anterior lumbar surgery.  He is on subcutaneous Heparin and SCDs for DVT prophylaxis.  I discussed his recent clinical course with patient and with his wife at bedside.  On 1/27/22, hemoglobin was 9.5, WBC count 10.09, platelets are 304, BUN was 17, and creatinine was 1.0.  He has been needing assistance for mobility, transfers, self-care. He is transferred to [facility] on 1/27/22 for further rehabilitation care.     Pertinent Physical Findings on Admission    The patient had stable vital signs.  General Appearance: Not in acute distress  Head/Ear/Nose/Throat: Normocephalic; Atraumatic.   Eye: EOMI; PERRL.   Neck: No JVD; No Bruits.   Respiratory: Decreased breath sounds at bases.   Cardiovascular: RRR; Normal S1, S2.   Gastrointestinal: Soft; bowel sounds present but somewhat hypoactive.  Extremities: Bilateral lower extremity edema noted.    Musculoskeletal: Functional active range of motion in both upper extremities.  Some limitation of active range of motion in right hip limited by pain.    Neurological: AAO ×3. Speech is fluent. Cranial nerve examination does not reveal any gross facial asymmetry. Strength testing shows about 4+/5 strength in both upper extremities.  Right hip flexion is less than 3/5.  Right quadriceps is 4/5 with the right thigh supported.  Right ankle dorsiflexion, plantar flexion and right EHL are 4+/5.  Left hip flexion is 4/5.  Left quadriceps is 4+/5.  Left ankle dorsiflexion, plantar flexion and left EHL are 4+/5.  He is grossly able to localize touch and position sense in  great toes, but does report some hyperesthesia in the right thigh lateral aspect.  Deep tendon reflexes are hypoactive and symmetric bilaterally.  Plantars are flexor.  Coordination is functional upper extremities.    Neurologically unchanged.  Behavior/Emotional: Appropriate; Cooperative.   Skin: No obvious rashes noted.  Healing incision noted on right flank.  Healing incision also noted on posterior lumbar spine.      Hospital Course    1. The patient was admitted to [facility] for a comprehensive inpatient rehabilitation program to include physical therapy, occupational therapy, rehabilitation nursing, case management evaluation. Dr. Dawkins was consulted to follow from an internal medicine standpoint.     2. DVT prophylaxis - on subcutaneous Heparin.  On SCDs.  Check doppler. Platelets 323 on 1/28/2022.  Platelets 338 on 1/31/2022.  Platelets 394 on 2/7/2022.     3.  Neurosurgery - status post L2-L4 posterior spinal fusion with instrumentation, ORIF of L3 pathologic fracture, L3 lateral corpectomy with expandable cage placement, for L3 pathologic fracture, from metastatic thyroid cancer, multiple medical problems - continue PT, OT, psychology.  Follow falls precautions, cardiac precautions, monitor pulse oximetry in therapy.  Follow spinal precautions.  LSO brace when out of bed.  Discussed neurosurgery appointment yesterday with him.  He was told to continue back brace.     4. GI - On Protonix for GI prophylaxis. Continue Colace, Senokot, MiraLAX, Dulcolax.  On Zofran ODT for nausea.  On Maalox PRN.  He had some constipation issues.  Agrees to be on a full liquid diet for now.     5.  - Postoperative course was significant for urinary retention and he had an indwelling Lewis catheter.  The Lewis catheter was removed 2 days back and per patient, he is starting to void now.  I mentioned to him we will monitor post void residuals to see if he is emptying his bladder completely or not.       6.   Diabetes mellitus - on Jardiance.  On Metformin.  On sliding-scale NovoLog coverage.  On hypoglycemic protocol.     7. Pain - on Tylenol.  On Neurontin.  On PRN Oxycodone.  On Valium PRN for spasms.     8. Hematology -hemoglobin 9.2, WBC 8.01 on 1/28/2022.  Hemoglobin 9.2, WBC 7.10 on 1/31/2022.  Hemoglobin 9.7, WBC 4.38 on 2/7/2022.     9.  Endocrinology - history of thyroid cancer status post thyroidectomy.  On Synthroid.     10. CVS - history of hypertension, atrial fibrillation, coronary artery disease - on Toprol-XL.     11.  Hyperlipidemia - on Pravachol.     12.  Oncology - history of metastatic thyroid cancer status post thyroidectomy.  He had L2-L4 posterior spinal fusion with instrumentation, ORIF of L3 pathologic fracture, L3 lateral corpectomy with expandable cage placement, for L3 pathologic fracture, from metastatic thyroid cancer.  He will follow up with oncology and neurosurgery.     13.  Skin - monitor healing of incisions on right flank and on posterior lumbar spine.  Dermal defense will be consulted.     14.  F/E/N - on sodium bicarbonate.     15. Rehabilitation medicine - Continue comprehensive rehabilitation care. Continue PT, OT, psychology.  We will follow falls precautions, cardiac precautions, monitor pulse oximetry in therapy and follow aspiration precautions.  We will follow spinal precautions.  LSO brace when out of bed.Tolerating therapies per endurance.  Reports pain medications are helping.  Had a bowel movement.  Voiding well.Inspected incisions which are stable.  Requiring minimal assistance for transfers with physical therapy.  Able to ambulate 50 feet with front wheel walker with minimal assistance with physical therapy.Discussed follow-up appointment with neurosurgery with him for this Wednesday.  Working on upper extremity strengthening and range of motion exercises.  Able to don and doff LSO brace.  Discussed spinal precautions with him.Able to ambulate 150 feet with minimal  assistance with straight cane with physical therapy.  Requiring close supervision to minimal assistance for transfers with physical therapy.Requiring close supervision for transfers with occupational therapy.  Requiring close supervision for toilet with occupational therapy.  Signed handicap application placard per patient and OT request.He had neurosurgery appointment today that went well.  Noted input from neurosurgery.  Denies increased pain.  Discussed with patient.Discussed neurosurgery appointment yesterday with him.  He was told to continue back brace.  He had some constipation issues.  Agrees to be on a full liquid diet for now.He is on full liquid diet now.  Handicap parking placard application signed for him per his request today.  Internist adjusting antidiabetic medications.  He feels comfortable with discharge plans to home next week.Performance day today.  Feels comfortable with discharge plan to home for tomorrow.  Offered to put him back on regular diet but he wants to continue with full liquid diet for now.  Discussed follow-up instructions with him.Feels fine. For discharge today. No C/O today. Appreciative of care here.  Discussed with him no driving till cleared by neurosurgeon and to check duration of use of back brace with neurosurgeons office.  Discussed follow-up instructions with him.     16. Reviewed labs today. BUN 12, creatinine 0.8 on 1/28/2022.  BUN 7, creatinine 0.8 on 1/31/2022.  BUN 8, creatinine 0.8 on 2/3/2022.  BUN 6, creatinine 0.8 on 2/7/2022.     17.  Discharge to home today, 2/8/2022. Discussed discharge plans. Discharge summary will be completed. Follow up with PCP, neurosurgeon, oncologist, cardiologist, endocrinologist, urologist as needed and other appropriate physicians. Further therapies set up after discharge. Discharge instructions on discharge form.    18. Otherwise he tolerated therapies well and made good progress in his functional status. He had no further medical  complications and was subsequently discharged to home. He will need close follow-up with his PCP, neurosurgeon, oncologist, cardiologist, endocrinologist, urologist as needed and other appropriate physicians and should continue further therapies after discharge from [facility].

## 2022-02-09 ENCOUNTER — PATIENT OUTREACH (OUTPATIENT)
Dept: CASE MANAGEMENT | Facility: CLINIC | Age: 77
End: 2022-02-09
Payer: MEDICARE

## 2022-02-11 NOTE — PROGRESS NOTES
NAME: Matt Williamson    MRN: [id number]    YOB: 1945    Event Review:    Initial TCM Patient Outreach Date: 02/08/22          Discharge Diagnosis: Lumbar disc disease with radiculopathy       Discharging Facility: [facility]  Date of Admission: 01/20/22  Date of Discharge: 01/27/22    Discharging Facilty SNF/Rehab: [facility]  Date of Admission: 01/27/22  Date of Discharge: 02/08/22           Two unsuccessful outreach calls were made within 2 business days of discharge fulfilling Medicare's requirement for TCM if patient is seen in the office within two weeks.                                                                               Kait Mcintyre, RN  [phone]

## 2022-02-25 ENCOUNTER — TELEPHONE (OUTPATIENT)
Dept: NEUROSURGERY | Facility: CLINIC | Age: 77
End: 2022-02-25
Payer: MEDICARE

## 2022-02-25 RX ORDER — OXYCODONE HYDROCHLORIDE 5 MG/1
5 TABLET ORAL EVERY 8 HOURS PRN
Qty: 30 TABLET | Refills: 0 | Status: SHIPPED | OUTPATIENT
Start: 2022-02-25 | End: 2022-04-12

## 2022-02-25 NOTE — TELEPHONE ENCOUNTER
Patient called stating that he is having a low grade fever 99.1-99.9 and also is having some burning pain while urinating. And wanted to know if there was anything they can do since he is post-op. Patient states that the oxycodone helps him with the pain at hs and would like a refill Dr. Bob is the last to prescribe this at the rehab.

## 2022-02-25 NOTE — PROGRESS NOTES
Patient calls today to report a temperature of 99.9 along with some pain with urination. His endocrinologist has ordered him a UA which he will be getting done today, with follow up with his endocrinologist. He will keep us informed of the results.    He states he is only taking Oxycodone at bedtime at this point. He has trouble getting comfortable in bed. PDMP reviewed. Refill sent to pharmacy.     Patient began outpatient PT this week which he will continue.

## 2022-03-23 ENCOUNTER — TELEPHONE (OUTPATIENT)
Dept: NEUROSURGERY | Facility: CLINIC | Age: 77
End: 2022-03-23
Payer: MEDICARE

## 2022-03-23 NOTE — TELEPHONE ENCOUNTER
Marco A called stating that since his surgery he has had a ball/balloon like bulge near the incision site that doesn't cause pain but has been there and was inquiring how long that might be there for. Per kay patients April appt can be moved up. Please assist the patient in moving appt up sooner.

## 2022-03-29 ENCOUNTER — OFFICE VISIT (OUTPATIENT)
Dept: NEUROSURGERY | Facility: CLINIC | Age: 77
End: 2022-03-29
Payer: MEDICARE

## 2022-03-29 ENCOUNTER — HOSPITAL ENCOUNTER (OUTPATIENT)
Dept: RADIOLOGY | Facility: HOSPITAL | Age: 77
Discharge: HOME | End: 2022-03-29
Attending: PHYSICIAN ASSISTANT
Payer: MEDICARE

## 2022-03-29 VITALS
OXYGEN SATURATION: 99 % | HEART RATE: 85 BPM | WEIGHT: 165 LBS | RESPIRATION RATE: 16 BRPM | SYSTOLIC BLOOD PRESSURE: 120 MMHG | DIASTOLIC BLOOD PRESSURE: 60 MMHG | TEMPERATURE: 97 F | HEIGHT: 66 IN | BODY MASS INDEX: 26.52 KG/M2

## 2022-03-29 DIAGNOSIS — Z98.1 S/P LUMBAR FUSION: ICD-10-CM

## 2022-03-29 DIAGNOSIS — Z98.1 S/P LUMBAR FUSION: Primary | ICD-10-CM

## 2022-03-29 DIAGNOSIS — K43.2 INCISIONAL HERNIA, WITHOUT OBSTRUCTION OR GANGRENE: ICD-10-CM

## 2022-03-29 PROCEDURE — 99024 POSTOP FOLLOW-UP VISIT: CPT | Performed by: NEUROLOGICAL SURGERY

## 2022-03-29 PROCEDURE — 72100 X-RAY EXAM L-S SPINE 2/3 VWS: CPT

## 2022-03-29 RX ORDER — KETOROLAC TROMETHAMINE 5 MG/ML
SOLUTION OPHTHALMIC
COMMUNITY
Start: 2022-02-17 | End: 2023-01-16

## 2022-03-29 RX ORDER — PREDNISOLONE ACETATE 10 MG/ML
SUSPENSION/ DROPS OPHTHALMIC
COMMUNITY
Start: 2022-02-17 | End: 2023-01-16

## 2022-03-29 NOTE — PROGRESS NOTES
22      Re: Matt Williamson  : 1945      Chief Complaint:  Surgical follow up    History of Present Illness:   Matt Williamson is a 76 y.o. male who presents for surgical follow up. He underwent right lateral L3 corpectomy and placement of an expandable cage, followed by L2-4 posterolateral instrumented fusion 2022 for a pathologic fracture at L3 from metastatic thyroid CA lesion. His postoperative course was complicated by an ileus which resolved prior to discharge to [facility] on postop day 7 without intervention.     Since last being seen, Mr. Williamson is now home and overall doing very well. His pre-op back pain has resolved and he is walking daily without any significant discomfort. He wears his LSO brace if he will be on his feet for extended periods of time. He continues to complain of some weakness in the right hip with some position dependent pain in the right groin. The pain is most noticble when the hip is in a flexed position. He continues with home PT.     He denies paresthesias, saddle anesthesia, fevers, and bowel/bladder changes. He denies abdominal pain and distension although he has had a protuberance in the right upper abdomen that fluctuates in size since surgery. He has noticed it has increased in size recently prompting in to come into the office a few weeks early. The areas is not tender. He is having normal BM's.     Medical History:  has a past medical history of Atrial fibrillation with rapid ventricular response (CMS/HCC) (10/25/2019), BPH (benign prostatic hyperplasia), Coronary artery disease, Hypertension, Hypothyroidism, Mixed hyperlipidemia, Thyroid cancer (CMS/HCC), Type 2 diabetes mellitus without complication (CMS/HCC), and Vertigo.    Surgical History:  has a past surgical history that includes Cardiac catheterization (2005); Coronary angioplasty with stent (2005); Cardiac catheterization (2005); Thyroidectomy (); Foot surgery (); Anal  fissurectomy (); Gastrocutaneous fistula closure; Upper gastrointestinal endoscopy; Eye surgery (Left, Cataract); and Lumbar spine surgery (2022).    Family History: family history includes Breast cancer in his biological mother; Cancer in his biological mother; Emphysema in his biological father; Lung cancer in his biological father.    Social History:   Social History     Socioeconomic History   • Marital status:      Spouse name: None   • Number of children: None   • Years of education: None   • Highest education level: None   Occupational History   • Occupation: Retired   Tobacco Use   • Smoking status: Former Smoker     Packs/day: 2.00     Years: 20.00     Pack years: 40.00     Types: Cigarettes     Quit date:      Years since quittin.2   • Smokeless tobacco: Never Used   Vaping Use   • Vaping Use: Never used   Substance and Sexual Activity   • Alcohol use: Yes     Comment: Social   • Drug use: Never   • Sexual activity: Not Currently     Partners: Female   Social History Narrative    He lives with his wife.  He has 2 children who live in [place] in [place].  He reports he is independent in everything.  He reports he he likes playing sports including basketball and likes to model trains..  He reports he is a .  He denies a history of mental health services.     Social Determinants of Health     Food Insecurity: No Food Insecurity   • Worried About Running Out of Food in the Last Year: Never true   • Ran Out of Food in the Last Year: Never true   Stress: No Stress Concern Present   • Feeling of Stress : Not at all        Allergies:   Allergies   Allergen Reactions   • Penicillins Rash     Childhood allergy       Medications:   Current Outpatient Medications   Medication Sig Dispense Refill   • aspirin 81 mg enteric coated tablet Take 81 mg by mouth daily.     • ergocalciferol (VITAMIN D2) 50,000 unit(1250 mcg) capsule Take 1 capsule (50,000 Units total) by mouth once a  week. 12 capsule 0   • gabapentin (NEURONTIN) 300 mg capsule Take 2 capsules (600 mg total) by mouth every 8 (eight) hours. 180 capsule 0   • ketorolac (ACULAR) 0.5 % ophthalmic solution INSTILL 1 DROP IN RIGHT EYE TWICE DAILY     • losartan (COZAAR) 50 mg tablet Take 1 tablet (50 mg total) by mouth daily with dinner. 30 tablet 0   • magnesium chloride 64 mg tablet,delayed release (DR/EC) Take 2 tablets (128 mg total) by mouth daily. 60 tablet 0   • metFORMIN XR (GLUCOPHAGE-XR) 500 mg 24 hr tablet Take 1 tablet (500 mg total) by mouth daily with breakfast. 30 tablet 0   • metoprolol succinate XL (TOPROL-XL) 100 mg 24 hr tablet Take 1 tablet (100 mg total) by mouth nightly. 30 tablet 0   • pravastatin (PRAVACHOL) 40 mg tablet Take 1 tablet (40 mg total) by mouth daily. 30 tablet 0   • prednisoLONE acetate (PRED FORTE) 1 % ophthalmic suspension SHAKE LIQUID AND INSTILL 1 DROP IN RIGHT EYE TWICE DAILY     • SYNTHROID 150 mcg tablet Take 1 tablet (150 mcg total) by mouth once daily. 30 tablet 1   • TRULICITY 0.75 mg/0.5 mL pen injector once a week. Every Tuesday      • bisacodyL (DULCOLAX) 10 mg suppository Insert 1 suppository (10 mg total) into the rectum daily as needed for constipation for up to 10 days. 12 suppository 0   • docusate sodium (COLACE) 100 mg capsule Take 1 capsule (100 mg total) by mouth 2 (two) times a day. 60 capsule 0   • JARDIANCE 10 mg tablet TAKE 1 TABLET(10 MG) BY MOUTH DAILY (Patient not taking: Reported on 3/29/2022) 90 tablet 0   • oxyCODONE (ROXICODONE) 5 mg immediate release tablet Take 1 tablet (5 mg total) by mouth every 8 (eight) hours as needed for severe pain. (Patient not taking: Reported on 3/29/2022) 30 tablet 0   • polyethylene glycol (MIRALAX) 17 gram packet Take 17 g by mouth daily with dinner. 30 packet 0   • senna (SENOKOT) 8.6 mg tablet Take 2 tablets by mouth daily. 60 tablet 0   • sennosides-docusate sodium 8.6-50 mg Take 1 tablet by mouth 2 (two) times a day. 60 tablet 0  "    No current facility-administered medications for this visit.       Review of Systems: A 14 point review of systems was performed and aside from what is mentioned above is otherwise negative.    Vital Signs:  Vitals:    03/29/22 1443   BP: 120/60   Pulse: 85   Resp: 16   Temp: 36.1 °C (97 °F)   TempSrc: Temporal   SpO2: 99%   Weight: 74.8 kg (165 lb)   Height: 1.676 m (5' 6\")       Physical Exam:      General Appearance: Appears comfortable   Head: Normocephalic, atraumatic.   Eyes:  ENMT: No conjunctival injection. Symmetrical lids  Atraumatic external nose and ears. Moist MM.   Neck: Supple, no nuchal rigidity.   Respiratory: Good respiratory effort.   Cardiovascular: Regular rate.   Abdomen: Soft and non-tender. Right sided anterolateral hernia defect. Non-tender to palpation. Soft and supple.     Extremities: No edema.   Skin: Dry, no rashes.      Neurologic examination:  Mental status:  The patient is alert, attentive, and oriented. Speech is clear and fluent with good repetition, comprehension, and naming.  Remote and recent memory are normal as well as fund of knowledge.     Cranial nerves:  CN II:  Pupils are equal and briskly reactive to light.   CN III, IV, VI: Extra-occular muscles are intact  CN V: Facial sensation is intact and equal in V1-V3 distributions bilaterally.   CN VII: Face is symmetric with normal eye closure and smile.  CN VIII: Hearing is normal to rubbing fingers  CN IX, X: Palate elevates symmetrically. Phonation is normal.  CN XI: Head turning and shoulder shrug are intact  CN XII: Tongue is midline with normal movements and no atrophy.     Motor:  There is no pronator drift. Muscle bulk and tone are normal.        Deltoid Biceps Triceps Wrist ext Hand  Finger Spread Hip flexion Knee ext Dorsi-  flexion EHL Plantar Flexion   R 5 5 5 5 5 5 5 5 5 5 5   L 5 5 5 5 5 5 5 5 5 5 5         Reflexes:  There is no Maharaj's sign or ankle clonus.     Sensory:  Intact light touch,and position " sense, throughout all 4 extremities.      Coordination:  There is no dysmetria on finger-to-nose testing.     Gait:  Steady gait       Data Review:  Lumbar x-ray 3/29/2022    IMPRESSION: Stable postsurgical changes from L2 through L4.     COMMENT: 2 views of the lumbar spine were performed in the erect position.     Comparison is made to a CT scan of the lumbar spine from 01/20/2022 and  preoperative plain films of the lumbar spine from 12/03/2021.     As seen on the CT scan, corpectomy has been performed at L3 with a metallic  device extending from the inferior endplate of L2 to the superior endplate of  L4. Bilateral pedicle screws and vertical stabilizing rods are again seen from  L2 through L4. The hardware appears intact and appears similar to the prior  study.     There is moderate disc space narrowing again seen at L1-2. Mild endplate  spurring is again seen.     There are mild degenerative changes of lower dorsal spine. The sacroiliac joints  are symmetrical.     The visualized abdominal bowel gas pattern appears unremarkable.     There is dense calcification of the abdominal aorta again noted.    Assessment and Plan:      In summary, Matt Williamson is a pleasant 76 y.o. male s/p right lateral L3 corpectomy, ORIF of the L3 pathological fracture, and placement of an expandable cage, followed by L2-4 posterolateral instrumented fusion 1/20/2022 related to metastatic thyroid CA.     He presents today for evaluation of a right sided anterolateral abdominal wall defect resulting in, what appears to be, a hernia. Fortunately, there is no tenderness to the area and he is having normal BM's. We have ordered a CT of the abdomen/pelvis with referral to general surgery for evaluation.     He is doing very will in regards to his back pain with significant improvement compared to pre-op pain. He does complain of some hip-flexion weakness although he is full strength on confrontational testing. He has some trouble getting  in and out of the car. Given his persistent pain, we have ordered an MRI L-spine with and without contrast for evaluation. X-rays today show intact surgical hardware with maintained alignment.     He will continue with the LSO brace when walking for extended periods of time. We will plan to see him back at 6 months post-op with scoliosis films at that time. He will call in the interval with any questions or concerns. He agrees with the plan as stated.     IBruno PA-C, am scribing for, and in the presence of, Dr. Colt Heller.

## 2022-03-29 NOTE — LETTER
2022     Raj Arreola, DO  [address] [facility] [address]  [place] [address]    Patient: Matt Williamson  YOB: 1945  Date of Visit: 3/29/2022      Dear Dr. Arreola:    Thank you for referring Matt Williamson to me for evaluation. Below are my notes for this consultation.    If you have questions, please do not hesitate to call me. I look forward to following your patient along with you.         Sincerely,        Colt Heller MD        CC: Colt Ulloa MD  3/30/2022  4:59 PM  Signed  22      Re: Matt Williamson  : 1945      Chief Complaint:  Surgical follow up    History of Present Illness:   Matt Williamson is a 76 y.o. male who presents for surgical follow up. He underwent right lateral L3 corpectomy and placement of an expandable cage, followed by L2-4 posterolateral instrumented fusion 2022 for a pathologic fracture at L3 from metastatic thyroid CA lesion. His postoperative course was complicated by an ileus which resolved prior to discharge to [facility] on postop day 7 without intervention.     Since last being seen, Mr. Williamson is now home and overall doing very well. His pre-op back pain has resolved and he is walking daily without any significant discomfort. He wears his LSO brace if he will be on his feet for extended periods of time. He continues to complain of some weakness in the right hip with some position dependent pain in the right groin. The pain is most noticble when the hip is in a flexed position. He continues with home PT.     He denies paresthesias, saddle anesthesia, fevers, and bowel/bladder changes. He denies abdominal pain and distension although he has had a protuberance in the right upper abdomen that fluctuates in size since surgery. He has noticed it has increased in size recently prompting in to come into the office a few weeks early. The areas is not tender. He is having normal BM's.     Medical History:  has a past medical  history of Atrial fibrillation with rapid ventricular response (CMS/HCC) (10/25/2019), BPH (benign prostatic hyperplasia), Coronary artery disease, Hypertension, Hypothyroidism, Mixed hyperlipidemia, Thyroid cancer (CMS/HCC), Type 2 diabetes mellitus without complication (CMS/HCC), and Vertigo.    Surgical History:  has a past surgical history that includes Cardiac catheterization (2005); Coronary angioplasty with stent (2005); Cardiac catheterization (2005); Thyroidectomy (); Foot surgery (); Anal fissurectomy (); Gastrocutaneous fistula closure; Upper gastrointestinal endoscopy; Eye surgery (Left, Cataract); and Lumbar spine surgery (2022).    Family History: family history includes Breast cancer in his biological mother; Cancer in his biological mother; Emphysema in his biological father; Lung cancer in his biological father.    Social History:   Social History     Socioeconomic History   • Marital status:      Spouse name: None   • Number of children: None   • Years of education: None   • Highest education level: None   Occupational History   • Occupation: Retired   Tobacco Use   • Smoking status: Former Smoker     Packs/day: 2.00     Years: 20.00     Pack years: 40.00     Types: Cigarettes     Quit date:      Years since quittin.2   • Smokeless tobacco: Never Used   Vaping Use   • Vaping Use: Never used   Substance and Sexual Activity   • Alcohol use: Yes     Comment: Social   • Drug use: Never   • Sexual activity: Not Currently     Partners: Female   Social History Narrative    He lives with his wife.  He has 2 children who live in [place] in [place].  He reports he is independent in everything.  He reports he he likes playing sports including basketball and likes to model trains..  He reports he is a .  He denies a history of mental health services.     Social Determinants of Health     Food Insecurity: No Food Insecurity   • Worried About  Running Out of Food in the Last Year: Never true   • Ran Out of Food in the Last Year: Never true   Stress: No Stress Concern Present   • Feeling of Stress : Not at all        Allergies:   Allergies   Allergen Reactions   • Penicillins Rash     Childhood allergy       Medications:   Current Outpatient Medications   Medication Sig Dispense Refill   • aspirin 81 mg enteric coated tablet Take 81 mg by mouth daily.     • ergocalciferol (VITAMIN D2) 50,000 unit(1250 mcg) capsule Take 1 capsule (50,000 Units total) by mouth once a week. 12 capsule 0   • gabapentin (NEURONTIN) 300 mg capsule Take 2 capsules (600 mg total) by mouth every 8 (eight) hours. 180 capsule 0   • ketorolac (ACULAR) 0.5 % ophthalmic solution INSTILL 1 DROP IN RIGHT EYE TWICE DAILY     • losartan (COZAAR) 50 mg tablet Take 1 tablet (50 mg total) by mouth daily with dinner. 30 tablet 0   • magnesium chloride 64 mg tablet,delayed release (DR/EC) Take 2 tablets (128 mg total) by mouth daily. 60 tablet 0   • metFORMIN XR (GLUCOPHAGE-XR) 500 mg 24 hr tablet Take 1 tablet (500 mg total) by mouth daily with breakfast. 30 tablet 0   • metoprolol succinate XL (TOPROL-XL) 100 mg 24 hr tablet Take 1 tablet (100 mg total) by mouth nightly. 30 tablet 0   • pravastatin (PRAVACHOL) 40 mg tablet Take 1 tablet (40 mg total) by mouth daily. 30 tablet 0   • prednisoLONE acetate (PRED FORTE) 1 % ophthalmic suspension SHAKE LIQUID AND INSTILL 1 DROP IN RIGHT EYE TWICE DAILY     • SYNTHROID 150 mcg tablet Take 1 tablet (150 mcg total) by mouth once daily. 30 tablet 1   • TRULICITY 0.75 mg/0.5 mL pen injector once a week. Every Tuesday      • bisacodyL (DULCOLAX) 10 mg suppository Insert 1 suppository (10 mg total) into the rectum daily as needed for constipation for up to 10 days. 12 suppository 0   • docusate sodium (COLACE) 100 mg capsule Take 1 capsule (100 mg total) by mouth 2 (two) times a day. 60 capsule 0   • JARDIANCE 10 mg tablet TAKE 1 TABLET(10 MG) BY MOUTH  "DAILY (Patient not taking: Reported on 3/29/2022) 90 tablet 0   • oxyCODONE (ROXICODONE) 5 mg immediate release tablet Take 1 tablet (5 mg total) by mouth every 8 (eight) hours as needed for severe pain. (Patient not taking: Reported on 3/29/2022) 30 tablet 0   • polyethylene glycol (MIRALAX) 17 gram packet Take 17 g by mouth daily with dinner. 30 packet 0   • senna (SENOKOT) 8.6 mg tablet Take 2 tablets by mouth daily. 60 tablet 0   • sennosides-docusate sodium 8.6-50 mg Take 1 tablet by mouth 2 (two) times a day. 60 tablet 0     No current facility-administered medications for this visit.       Review of Systems: A 14 point review of systems was performed and aside from what is mentioned above is otherwise negative.    Vital Signs:  Vitals:    03/29/22 1443   BP: 120/60   Pulse: 85   Resp: 16   Temp: 36.1 °C (97 °F)   TempSrc: Temporal   SpO2: 99%   Weight: 74.8 kg (165 lb)   Height: 1.676 m (5' 6\")       Physical Exam:      General Appearance: Appears comfortable   Head: Normocephalic, atraumatic.   Eyes:  ENMT: No conjunctival injection. Symmetrical lids  Atraumatic external nose and ears. Moist MM.   Neck: Supple, no nuchal rigidity.   Respiratory: Good respiratory effort.   Cardiovascular: Regular rate.   Abdomen: Soft and non-tender. Right sided anterolateral hernia defect. Non-tender to palpation. Soft and supple.     Extremities: No edema.   Skin: Dry, no rashes.      Neurologic examination:  Mental status:  The patient is alert, attentive, and oriented. Speech is clear and fluent with good repetition, comprehension, and naming.  Remote and recent memory are normal as well as fund of knowledge.     Cranial nerves:  CN II:  Pupils are equal and briskly reactive to light.   CN III, IV, VI: Extra-occular muscles are intact  CN V: Facial sensation is intact and equal in V1-V3 distributions bilaterally.   CN VII: Face is symmetric with normal eye closure and smile.  CN VIII: Hearing is normal to rubbing " fingers  CN IX, X: Palate elevates symmetrically. Phonation is normal.  CN XI: Head turning and shoulder shrug are intact  CN XII: Tongue is midline with normal movements and no atrophy.     Motor:  There is no pronator drift. Muscle bulk and tone are normal.        Deltoid Biceps Triceps Wrist ext Hand  Finger Spread Hip flexion Knee ext Dorsi-  flexion EHL Plantar Flexion   R 5 5 5 5 5 5 5 5 5 5 5   L 5 5 5 5 5 5 5 5 5 5 5         Reflexes:  There is no Maharaj's sign or ankle clonus.     Sensory:  Intact light touch,and position sense, throughout all 4 extremities.      Coordination:  There is no dysmetria on finger-to-nose testing.     Gait:  Steady gait       Data Review:  Lumbar x-ray 3/29/2022    IMPRESSION: Stable postsurgical changes from L2 through L4.     COMMENT: 2 views of the lumbar spine were performed in the erect position.     Comparison is made to a CT scan of the lumbar spine from 01/20/2022 and  preoperative plain films of the lumbar spine from 12/03/2021.     As seen on the CT scan, corpectomy has been performed at L3 with a metallic  device extending from the inferior endplate of L2 to the superior endplate of  L4. Bilateral pedicle screws and vertical stabilizing rods are again seen from  L2 through L4. The hardware appears intact and appears similar to the prior  study.     There is moderate disc space narrowing again seen at L1-2. Mild endplate  spurring is again seen.     There are mild degenerative changes of lower dorsal spine. The sacroiliac joints  are symmetrical.     The visualized abdominal bowel gas pattern appears unremarkable.     There is dense calcification of the abdominal aorta again noted.    Assessment and Plan:      In summary, Matt Williamson is a pleasant 76 y.o. male s/p right lateral L3 corpectomy, ORIF of the L3 pathological fracture, and placement of an expandable cage, followed by L2-4 posterolateral instrumented fusion 1/20/2022 related to metastatic thyroid CA.      He presents today for evaluation of a right sided anterolateral abdominal wall defect resulting in, what appears to be, a hernia. Fortunately, there is no tenderness to the area and he is having normal BM's. We have ordered a CT of the abdomen/pelvis with referral to general surgery for evaluation.     He is doing very will in regards to his back pain with significant improvement compared to pre-op pain. He does complain of some hip-flexion weakness although he is full strength on confrontational testing. He has some trouble getting in and out of the car. Given his persistent pain, we have ordered an MRI L-spine with and without contrast for evaluation. X-rays today show intact surgical hardware with maintained alignment.     He will continue with the LSO brace when walking for extended periods of time. We will plan to see him back at 6 months post-op with scoliosis films at that time. He will call in the interval with any questions or concerns. He agrees with the plan as stated.     Bruno BELTRE PA-C, am scribing for, and in the presence of, Dr. Colt Heller.

## 2022-04-12 RX ORDER — GABAPENTIN 300 MG/1
600 CAPSULE ORAL EVERY 8 HOURS
Qty: 180 CAPSULE | Refills: 0 | Status: SHIPPED | OUTPATIENT
Start: 2022-04-12 | End: 2022-08-18 | Stop reason: SDUPTHER

## 2022-04-12 NOTE — TELEPHONE ENCOUNTER
Pt wants to know if he should continue taking gabapentin?    [phone]    Requested Prescriptions     Pending Prescriptions Disp Refills   • gabapentin (NEURONTIN) 300 mg capsule 180 capsule 0     Sig: Take 2 capsules (600 mg total) by mouth every 8 (eight) hours.         [id number] [place], [place] - [address] [place] [address] AT [address] [address]  Phone: [phone] Fax: [phone]

## 2022-04-14 ENCOUNTER — HOSPITAL ENCOUNTER (OUTPATIENT)
Dept: RADIOLOGY | Facility: CLINIC | Age: 77
Discharge: HOME | End: 2022-04-14
Attending: PHYSICIAN ASSISTANT
Payer: MEDICARE

## 2022-04-14 DIAGNOSIS — K43.2 INCISIONAL HERNIA, WITHOUT OBSTRUCTION OR GANGRENE: ICD-10-CM

## 2022-04-14 DIAGNOSIS — Z98.1 S/P LUMBAR FUSION: ICD-10-CM

## 2022-04-14 PROCEDURE — G1004 CDSM NDSC: HCPCS

## 2022-04-14 RX ORDER — GADOBUTROL 604.72 MG/ML
8 INJECTION INTRAVENOUS ONCE
Status: COMPLETED | OUTPATIENT
Start: 2022-04-14 | End: 2022-04-14

## 2022-04-14 RX ADMIN — GADOBUTROL 8 ML: 604.72 INJECTION INTRAVENOUS at 12:09

## 2022-04-20 DIAGNOSIS — M54.2 CERVICALGIA: Primary | ICD-10-CM

## 2022-04-20 NOTE — PROGRESS NOTES
Patient had MRI lumbar spine completed for ongoing pain limited hip flexor weakness on the right. There is no new tumor growth or compression of the thecal sac. There is foraminal narrowing on the right at L3-L4 where the exiting right L3 nerve root may be compressed. This appears similar to his 11/22/2021 MRI. On confrontational testing, he is full strength. We will continue to follow his symptoms and he will let us know if the symptoms progress. At this time, there would be no further neurosurgical intervention recommended. He is continuing in PT and feels his symptoms are gradually improving. He also followed up with general surgery regarding his right sided anterolateral abdominal wall defect. This is likely due to nerve injury which can be seen with lateral surgeries. No surgical intervention recommended.    We will be seeing Mr. Williamson back 7/20/2022 but he knows to call the office sooner with any questions or concerns.

## 2022-04-28 ENCOUNTER — TELEPHONE (OUTPATIENT)
Dept: INTERNAL MEDICINE | Facility: CLINIC | Age: 77
End: 2022-04-28
Payer: MEDICARE

## 2022-04-28 NOTE — TELEPHONE ENCOUNTER
Pt is experiencing a sore throat, head congestion, body aches, headaches, and a cough. Pt cannot be scheduled for telemedicine as he lives in [place] and also recently had back surgery and is not yet driving .  Pt is fully vaccinated for Covid and does not have a fever.  Pt is asking for medication.  Pt uses [facility].  Please advise.

## 2022-04-29 NOTE — TELEPHONE ENCOUNTER
Pt calling to check on previous call and he now has a fever of 100 and he did a home covid test and it was negative

## 2022-05-01 RX ORDER — AZITHROMYCIN 250 MG/1
TABLET, FILM COATED ORAL
Qty: 6 TABLET | Refills: 0 | Status: SHIPPED | OUTPATIENT
Start: 2022-05-01 | End: 2022-10-11 | Stop reason: ALTCHOICE

## 2022-05-14 ENCOUNTER — TELEPHONE (OUTPATIENT)
Dept: INTERNAL MEDICINE | Facility: CLINIC | Age: 77
End: 2022-05-14
Payer: MEDICARE

## 2022-06-21 ENCOUNTER — DOCUMENTATION (OUTPATIENT)
Dept: INTERNAL MEDICINE | Facility: CLINIC | Age: 77
End: 2022-06-21
Payer: MEDICARE

## 2022-06-21 NOTE — PROGRESS NOTES
Coding Clarification ([facility]) - [facility]  Note       DATE: 2022    RE: Matt Williamson  : 1945      Under the direction of Raj Arreola DO, the following adjustments were made to this patients Problem List:        Specified Code: E11.40 Code Description: Type 2 diabetes mellitus with diabetic neuropathy, unspecified   Clarification Type EMR System Encounter Type Date of Service Provider   Code Specificity Epic Progress Notes 2022 Travis Arboleda DO   Rationale: Type 2 diabetes mellitus and Neuropathy was found in Assessment of Progress Notes on 2022, dignosed by Travis Arboleda DO  Higher specificity for E11.9 from Problem List is available to be E11.40       Original Code: E11.9

## 2022-07-20 ENCOUNTER — HOSPITAL ENCOUNTER (OUTPATIENT)
Dept: RADIOLOGY | Facility: HOSPITAL | Age: 77
Discharge: HOME | End: 2022-07-20
Attending: PHYSICIAN ASSISTANT
Payer: MEDICARE

## 2022-07-20 ENCOUNTER — OFFICE VISIT (OUTPATIENT)
Dept: NEUROSURGERY | Facility: CLINIC | Age: 77
End: 2022-07-20
Payer: MEDICARE

## 2022-07-20 VITALS
DIASTOLIC BLOOD PRESSURE: 82 MMHG | HEIGHT: 66 IN | TEMPERATURE: 97.3 F | OXYGEN SATURATION: 99 % | BODY MASS INDEX: 26.52 KG/M2 | RESPIRATION RATE: 16 BRPM | HEART RATE: 75 BPM | SYSTOLIC BLOOD PRESSURE: 126 MMHG | WEIGHT: 165 LBS

## 2022-07-20 DIAGNOSIS — Z98.1 S/P LUMBAR FUSION: ICD-10-CM

## 2022-07-20 DIAGNOSIS — M54.12 RADICULITIS, CERVICAL: Primary | ICD-10-CM

## 2022-07-20 DIAGNOSIS — Z09 ONCOLOGY FOLLOW-UP ENCOUNTER: ICD-10-CM

## 2022-07-20 PROCEDURE — G8754 DIAS BP LESS 90: HCPCS | Performed by: NEUROLOGICAL SURGERY

## 2022-07-20 PROCEDURE — G8752 SYS BP LESS 140: HCPCS | Performed by: NEUROLOGICAL SURGERY

## 2022-07-20 PROCEDURE — 72082 X-RAY EXAM ENTIRE SPI 2/3 VW: CPT

## 2022-07-20 PROCEDURE — 99213 OFFICE O/P EST LOW 20 MIN: CPT | Performed by: NEUROLOGICAL SURGERY

## 2022-07-20 NOTE — PROGRESS NOTES
22      Re: Matt Williamson  : 1945      Chief Complaint:  Surgical follow up    History of Present Illness:   Matt Williamson is a 76 y.o. male who presents for surgical follow up. He underwent right lateral L3 corpectomy and placement of an expandable cage, followed by L2-4 posterolateral instrumented fusion 2022 for a pathologic fracture at L3 from metastatic thyroid CA lesion. His postoperative course was complicated by an ileus which resolved prior to discharge to [facility] on postop day 7 without intervention. He has since been discharged home.    Since last being seen, Mr. Williamson reports overall he continues to do very well and is back to work. He continues with right sided lumbosacral pain which radiates into the right hip which radiates into the anterolateral thigh. This is a different pain compaired to her pre-operative symptoms. His symptoms are exacerbated with postural activities and relieved with recumbent position to some degree. He reports he has not been utilizing his LSO brace since this past .     He notes numbness right 3rd and 4th as well as his left second digit. He denies additional paresthesias, saddle anesthesia, fevers, and bowel/bladder changes. He denies abdominal pain and distension although he has had a protuberance in the right upper abdomen that has improved.      Medical History:  has a past medical history of Atrial fibrillation with rapid ventricular response (CMS/HCC) (10/25/2019), BPH (benign prostatic hyperplasia), Coronary artery disease, Hypertension, Hypothyroidism, Mixed hyperlipidemia, Thyroid cancer (CMS/HCC), Type 2 diabetes mellitus without complication (CMS/HCC), and Vertigo.    Surgical History:  has a past surgical history that includes Cardiac catheterization (2005); Coronary angioplasty with stent (2005); Cardiac catheterization (2005); Thyroidectomy (); Foot surgery (); Anal fissurectomy (); Gastrocutaneous fistula  closure; Upper gastrointestinal endoscopy; Eye surgery (Left, Cataract); and Lumbar spine surgery (2022).    Family History: family history includes Breast cancer in his biological mother; Cancer in his biological mother; Emphysema in his biological father; Lung cancer in his biological father.    Social History:   Social History     Socioeconomic History   • Marital status:      Spouse name: None   • Number of children: None   • Years of education: None   • Highest education level: None   Occupational History   • Occupation: Retired   Tobacco Use   • Smoking status: Former Smoker     Packs/day: 2.00     Years: 20.00     Pack years: 40.00     Types: Cigarettes     Quit date:      Years since quittin.5   • Smokeless tobacco: Never Used   Vaping Use   • Vaping Use: Never used   Substance and Sexual Activity   • Alcohol use: Yes     Comment: Social   • Drug use: Never   • Sexual activity: Not Currently     Partners: Female   Social History Narrative    He lives with his wife.  He has 2 children who live in [place] in [place].  He reports he is independent in everything.  He reports he he likes playing sports including basketball and likes to model trains..  He reports he is a .  He denies a history of mental health services.     Social Determinants of Health     Food Insecurity: No Food Insecurity   • Worried About Running Out of Food in the Last Year: Never true   • Ran Out of Food in the Last Year: Never true   Stress: No Stress Concern Present   • Feeling of Stress : Not at all        Allergies:   Allergies   Allergen Reactions   • Penicillins Rash     Childhood allergy       Medications:   Current Outpatient Medications   Medication Sig Dispense Refill   • aspirin 81 mg enteric coated tablet Take 81 mg by mouth daily.     • azithromycin (ZITHROMAX) 250 mg tablet Take 2 tablets the first day, then 1 tablet daily for 4 days. 6 tablet 0   • ergocalciferol (VITAMIN D2) 50,000  "unit(1250 mcg) capsule Take 1 capsule (50,000 Units total) by mouth once a week. 12 capsule 0   • gabapentin (NEURONTIN) 300 mg capsule Take 2 capsules (600 mg total) by mouth every 8 (eight) hours. 180 capsule 0   • ketorolac (ACULAR) 0.5 % ophthalmic solution INSTILL 1 DROP IN RIGHT EYE TWICE DAILY     • magnesium chloride 64 mg tablet,delayed release (DR/EC) Take 2 tablets (128 mg total) by mouth daily. 60 tablet 0   • metFORMIN XR (GLUCOPHAGE-XR) 500 mg 24 hr tablet Take 1 tablet (500 mg total) by mouth daily with breakfast. 30 tablet 0   • metoprolol succinate XL (TOPROL-XL) 100 mg 24 hr tablet Take 1 tablet (100 mg total) by mouth nightly. 30 tablet 0   • polyethylene glycol (MIRALAX) 17 gram packet Take 17 g by mouth daily with dinner. 30 packet 0   • pravastatin (PRAVACHOL) 40 mg tablet Take 1 tablet (40 mg total) by mouth daily. 30 tablet 0   • prednisoLONE acetate (PRED FORTE) 1 % ophthalmic suspension SHAKE LIQUID AND INSTILL 1 DROP IN RIGHT EYE TWICE DAILY     • SYNTHROID 150 mcg tablet Take 1 tablet (150 mcg total) by mouth once daily. 30 tablet 1   • TRULICITY 0.75 mg/0.5 mL pen injector once a week. Every Tuesday        No current facility-administered medications for this visit.       Review of Systems: A 14 point review of systems was performed and aside from what is mentioned above is otherwise negative.    Vital Signs:  Vitals:    07/20/22 1136   BP: 126/82   Pulse: 75   Resp: 16   Temp: 36.3 °C (97.3 °F)   TempSrc: Temporal   SpO2: 99%   Weight: 74.8 kg (165 lb)   Height: 1.676 m (5' 6\")       Physical Exam:      General Appearance: Appears comfortable   Head: Normocephalic, atraumatic.   Eyes:  ENMT: No conjunctival injection. Symmetrical lids  Atraumatic external nose and ears. Moist MM.   Neck: Supple, no nuchal rigidity.   Respiratory: Good respiratory effort.   Cardiovascular: Regular rate.   Abdomen: Soft and non-tender. Right sided anterolateral hernia defect. Non-tender to palpation. Soft " and supple.     Extremities: No edema.   Skin: Dry, no rashes.      Neurologic examination:  Mental status:  The patient is alert, attentive, and oriented. Speech is clear and fluent with good repetition, comprehension, and naming.  Remote and recent memory are normal as well as fund of knowledge.     Cranial nerves:  CN II:  Pupils are equal and briskly reactive to light.   CN III, IV, VI: Extra-occular muscles are intact  CN V: Facial sensation is intact and equal in V1-V3 distributions bilaterally.   CN VII: Face is symmetric with normal eye closure and smile.  CN VIII: Hearing is normal to rubbing fingers  CN IX, X: Palate elevates symmetrically. Phonation is normal.  CN XI: Head turning and shoulder shrug are intact  CN XII: Tongue is midline with normal movements and no atrophy.     Motor:  There is no pronator drift. Muscle bulk and tone are normal.        Deltoid Biceps Triceps Wrist ext Hand  Finger Spread Hip flexion Knee ext Dorsi-  flexion EHL Plantar Flexion   R 5 5 5 5 5 5 5 5 5 5 5   L 5 5 5 5 5 5 5 5 5 5 5         Reflexes:  There is no Maharaj's sign or ankle clonus.     Sensory:  Intact light touch,and position sense, throughout all 4 extremities.      Coordination:  There is no dysmetria on finger-to-nose testing.     Gait:  Steady gait       Data Review:    MRI LUMBAR SPINE WITH AND WITHOUT CONTRAST 4/14/22  Narrative: STUDY:   MRI examination of the lumbar spine performed without and with  intravenous contrast following the [facility] standard postoperative  protocol. 8 ml of Gadavist were administered intravenously without incident.    CLINICAL HISTORY: 76-year-old male with prior metastatic thyroid carcinoma and  mid lumbar fusion, now with lumbago.    COMPARISON: Lumbar CT dated 1/20/2022 and lumbar MRI dated 11/22/2021.  Impression: IMPRESSION:  1. Unchanged appearance status post L3 corpectomy and L2-3 and L3-4 discectomy  with metallic strut placement and posterior fixation  using pedicular screws and  interconnecting rods bilaterally at L2-L4. The dorsal soft tissues show evolving  postoperative fluid with surrounding enhancing granulation tissue and edema.  2. No gross residual/recurrent or new metastatic tumor.  3. Unchanged small right extraforaminal disc protrusion at L4-5 that impinges  the right L4 dorsal root ganglion and proximal postganglionic nerve root.  4. Unchanged multilevel spondylosis with neural foraminal stenoses as described,  worst at L3-4 on the right where the exiting right L3 nerve root remains  impinged  5. No high-grade central spinal stenosis at any level.    COMMENT:    Postoperative changes: See impression.    Lumbosacral alignment: Anatomic.  Vertebral bodies: Otherwise normal in height and signal.  Intervertebral discs: Otherwise unchanged moderate loss of height and signal  at L1-2 and mild loss at L4-5.    Lumbosacral central spinal canal: Unchanged mild acquired compromise at L3-4.  Conus and cauda equina: Normal in position, caliber, and signal.    Axial images:  T12-L1: Normal.  L1-2: Dorsolateral disc bulging and facet hypertrophy causing unchanged  mild, left greater than right, neural foraminal stenosis.  L2-3: Dorsolateral endplate and facet hypertrophy causing unchanged mild to  moderate, right greater than left, neural foraminal stenosis.  L3-4: Dorsolateral endplate and facet hypertrophy causing unchanged moderate  right and mild-to-moderate left neural foraminal and moderate bilateral  subarticular spinal stenoses. See impression.  L4-5: See impression. In addition, an underlying posterior disc bulge and  facet hypertrophy causes unchanged moderate right. The left neural foraminal and  mild bilateral subarticular spinal stenoses.  L5-S1: Dorsolateral disc bulging and facet hypertrophy causing unchanged  mild to moderate, left greater than right, neural foraminal stenosis.    Visualized extraspinal soft tissues: See impression.    CT ABDOMEN  PELVIS WITHOUT IV CONTRAST 4/14/22  Narrative: CLINICAL HISTORY: Right upper quadrant pain.  K43.2: Incisional hernia without obstruction or gangrene    Comparison: CT dated 1/25/2022, 9/19/2018    COMMENT: Multidetector CT of the abdomen and pelvis was performed without  intravenous contrast.  Oral contrast was not administered. Reformatted axial,  sagittal and coronal images were reviewed. Dose reduction techniques such as  automated exposure control were used in this study where applicable.    The visualized lower chest is unremarkable.    There is borderline hepatomegaly. Questionable hepatic steatosis.    The gallbladder, spleen, pancreas and adrenal glands are unremarkable.    There are multiple punctate nonobstructing calculi in both kidneys. No  hydronephrosis.    No aortic aneurysm. Marked atherosclerosis.    There is been interval resolution of previously noted colonic distention. No  findings of large or small bowel obstruction. Colonic diverticulosis without  evidence of acute diverticulitis. Normal appendix.    No large volume ascites, intra-abdominal abscess or pneumoperitoneum. No  lymphadenopathy by size criteria.    The urinary bladder is unremarkable. The prostate is enlarged.    Stable degenerative and postsurgical changes of the lumbar spine. Mild bilateral  hip osteoarthritis.  Impression: IMPRESSION:  No acute abnormality in the abdomen or pelvis.  Possible hepatic steatosis.    Lumbar x-ray 3/29/2022  IMPRESSION: Stable postsurgical changes from L2 through L4.     COMMENT: 2 views of the lumbar spine were performed in the erect position.     Comparison is made to a CT scan of the lumbar spine from 01/20/2022 and  preoperative plain films of the lumbar spine from 12/03/2021.     As seen on the CT scan, corpectomy has been performed at L3 with a metallic  device extending from the inferior endplate of L2 to the superior endplate of  L4. Bilateral pedicle screws and vertical stabilizing rods are  again seen from  L2 through L4. The hardware appears intact and appears similar to the prior  study.     There is moderate disc space narrowing again seen at L1-2. Mild endplate  spurring is again seen.     There are mild degenerative changes of lower dorsal spine. The sacroiliac joints  are symmetrical.     The visualized abdominal bowel gas pattern appears unremarkable.     There is dense calcification of the abdominal aorta again noted.    Assessment and Plan:      In summary, Matt Williamson is a pleasant 76 y.o. male s/p right lateral L3 corpectomy, ORIF of the L3 pathological fracture, and placement of an expandable cage, followed by L2-4 posterolateral instrumented fusion 1/20/2022 related to metastatic thyroid CA.     He is doing very will in regards to his back pain with significant improvement compared to pre-op pain. He continues with some hip-flexion weakness although he is full strength on confrontational testing.  Given his persistent pain, we have ordered an MRI L-spine which fails to disclose residual stenosis.  His symptoms continue to improve, and there is no intervention needed at this time.      I look forward to seeing him return follow-up consultation in 6 months to reevaluate his progress.     The patient was counseled length regarding natural history of his condition. He was asked to continue with lifestyle modification, core strengthening, avoidance of excessive bending, twisting, overhead lifting. We have instructed him to continue with physical therapy for lower back with gentle stretching and strengthening.   In the interim should his symptomatology evolve or worsen, I have asked he return sooner for prompt reevaluation.          MD PATT Desir, Mary Shin PA-C, am scribing for, and in the presence of, Dr. Colt Heller.     25 minutes were spent with the patient on examination, review of past medical history, and prior imaging, and coordinating care.  Patient seen earlier today.  Signature timestamp does not reflect patient encounter time.   More than 50% of the time is spent counseling the patient and family and coordinating care.

## 2022-07-20 NOTE — LETTER
2022     Raj Arreola, DO  [address] [facility] [address]  [facility] [address]    Patient: Matt Williamson  YOB: 1945  Date of Visit: 2022      Dear Dr. Arreola:    Thank you for referring Matt Williamson to me for evaluation. Below are my notes for this consultation.    If you have questions, please do not hesitate to call me. I look forward to following your patient along with you.         Sincerely,        Colt Heller MD        CC: MD Pina Cormier, Colt EVANS MD  2022 12:37 PM  Signed  22      Re: Matt Williamson  : 1945      Chief Complaint:  Surgical follow up    History of Present Illness:   Matt Williamson is a 76 y.o. male who presents for surgical follow up. He underwent right lateral L3 corpectomy and placement of an expandable cage, followed by L2-4 posterolateral instrumented fusion 2022 for a pathologic fracture at L3 from metastatic thyroid CA lesion. His postoperative course was complicated by an ileus which resolved prior to discharge to [facility] on postop day 7 without intervention. He has since been discharged home.    Since last being seen, Mr. Williamson reports overall he continues to do very well and is back to work. He continues with right sided lumbosacral pain which radiates into the right hip which radiates into the anterolateral thigh. This is a different pain compaired to her pre-operative symptoms. His symptoms are exacerbated with postural activities and relieved with recumbent position to some degree. He reports he has not been utilizing his LSO brace since this past .     He notes numbness right 3rd and 4th as well as his left second digit. He denies additional paresthesias, saddle anesthesia, fevers, and bowel/bladder changes. He denies abdominal pain and distension although he has had a protuberance in the right upper abdomen that has improved.      Medical History:  has a past medical history of Atrial fibrillation with rapid  ventricular response (CMS/HCC) (10/25/2019), BPH (benign prostatic hyperplasia), Coronary artery disease, Hypertension, Hypothyroidism, Mixed hyperlipidemia, Thyroid cancer (CMS/HCC), Type 2 diabetes mellitus without complication (CMS/HCC), and Vertigo.    Surgical History:  has a past surgical history that includes Cardiac catheterization (2005); Coronary angioplasty with stent (2005); Cardiac catheterization (2005); Thyroidectomy (); Foot surgery (); Anal fissurectomy (); Gastrocutaneous fistula closure; Upper gastrointestinal endoscopy; Eye surgery (Left, Cataract); and Lumbar spine surgery (2022).    Family History: family history includes Breast cancer in his biological mother; Cancer in his biological mother; Emphysema in his biological father; Lung cancer in his biological father.    Social History:   Social History     Socioeconomic History   • Marital status:      Spouse name: None   • Number of children: None   • Years of education: None   • Highest education level: None   Occupational History   • Occupation: Retired   Tobacco Use   • Smoking status: Former Smoker     Packs/day: 2.00     Years: 20.00     Pack years: 40.00     Types: Cigarettes     Quit date:      Years since quittin.5   • Smokeless tobacco: Never Used   Vaping Use   • Vaping Use: Never used   Substance and Sexual Activity   • Alcohol use: Yes     Comment: Social   • Drug use: Never   • Sexual activity: Not Currently     Partners: Female   Social History Narrative    He lives with his wife.  He has 2 children who live in [place] in [place].  He reports he is independent in everything.  He reports he he likes playing sports including basketball and likes to model trains..  He reports he is a .  He denies a history of mental health services.     Social Determinants of Health     Food Insecurity: No Food Insecurity   • Worried About Running Out of Food in the Last Year: Never  true   • Ran Out of Food in the Last Year: Never true   Stress: No Stress Concern Present   • Feeling of Stress : Not at all        Allergies:   Allergies   Allergen Reactions   • Penicillins Rash     Childhood allergy       Medications:   Current Outpatient Medications   Medication Sig Dispense Refill   • aspirin 81 mg enteric coated tablet Take 81 mg by mouth daily.     • azithromycin (ZITHROMAX) 250 mg tablet Take 2 tablets the first day, then 1 tablet daily for 4 days. 6 tablet 0   • ergocalciferol (VITAMIN D2) 50,000 unit(1250 mcg) capsule Take 1 capsule (50,000 Units total) by mouth once a week. 12 capsule 0   • gabapentin (NEURONTIN) 300 mg capsule Take 2 capsules (600 mg total) by mouth every 8 (eight) hours. 180 capsule 0   • ketorolac (ACULAR) 0.5 % ophthalmic solution INSTILL 1 DROP IN RIGHT EYE TWICE DAILY     • magnesium chloride 64 mg tablet,delayed release (DR/EC) Take 2 tablets (128 mg total) by mouth daily. 60 tablet 0   • metFORMIN XR (GLUCOPHAGE-XR) 500 mg 24 hr tablet Take 1 tablet (500 mg total) by mouth daily with breakfast. 30 tablet 0   • metoprolol succinate XL (TOPROL-XL) 100 mg 24 hr tablet Take 1 tablet (100 mg total) by mouth nightly. 30 tablet 0   • polyethylene glycol (MIRALAX) 17 gram packet Take 17 g by mouth daily with dinner. 30 packet 0   • pravastatin (PRAVACHOL) 40 mg tablet Take 1 tablet (40 mg total) by mouth daily. 30 tablet 0   • prednisoLONE acetate (PRED FORTE) 1 % ophthalmic suspension SHAKE LIQUID AND INSTILL 1 DROP IN RIGHT EYE TWICE DAILY     • SYNTHROID 150 mcg tablet Take 1 tablet (150 mcg total) by mouth once daily. 30 tablet 1   • TRULICITY 0.75 mg/0.5 mL pen injector once a week. Every Tuesday        No current facility-administered medications for this visit.       Review of Systems: A 14 point review of systems was performed and aside from what is mentioned above is otherwise negative.    Vital Signs:  Vitals:    07/20/22 1136   BP: 126/82   Pulse: 75   Resp: 16  "  Temp: 36.3 °C (97.3 °F)   TempSrc: Temporal   SpO2: 99%   Weight: 74.8 kg (165 lb)   Height: 1.676 m (5' 6\")       Physical Exam:      General Appearance: Appears comfortable   Head: Normocephalic, atraumatic.   Eyes:  ENMT: No conjunctival injection. Symmetrical lids  Atraumatic external nose and ears. Moist MM.   Neck: Supple, no nuchal rigidity.   Respiratory: Good respiratory effort.   Cardiovascular: Regular rate.   Abdomen: Soft and non-tender. Right sided anterolateral hernia defect. Non-tender to palpation. Soft and supple.     Extremities: No edema.   Skin: Dry, no rashes.      Neurologic examination:  Mental status:  The patient is alert, attentive, and oriented. Speech is clear and fluent with good repetition, comprehension, and naming.  Remote and recent memory are normal as well as fund of knowledge.     Cranial nerves:  CN II:  Pupils are equal and briskly reactive to light.   CN III, IV, VI: Extra-occular muscles are intact  CN V: Facial sensation is intact and equal in V1-V3 distributions bilaterally.   CN VII: Face is symmetric with normal eye closure and smile.  CN VIII: Hearing is normal to rubbing fingers  CN IX, X: Palate elevates symmetrically. Phonation is normal.  CN XI: Head turning and shoulder shrug are intact  CN XII: Tongue is midline with normal movements and no atrophy.     Motor:  There is no pronator drift. Muscle bulk and tone are normal.        Deltoid Biceps Triceps Wrist ext Hand  Finger Spread Hip flexion Knee ext Dorsi-  flexion EHL Plantar Flexion   R 5 5 5 5 5 5 5 5 5 5 5   L 5 5 5 5 5 5 5 5 5 5 5         Reflexes:  There is no Maharaj's sign or ankle clonus.     Sensory:  Intact light touch,and position sense, throughout all 4 extremities.      Coordination:  There is no dysmetria on finger-to-nose testing.     Gait:  Steady gait       Data Review:    MRI LUMBAR SPINE WITH AND WITHOUT CONTRAST 4/14/22  Narrative: STUDY:   MRI examination of the lumbar spine performed " without and with  intravenous contrast following the [facility] standard postoperative  protocol. 8 ml of Gadavist were administered intravenously without incident.    CLINICAL HISTORY: 76-year-old male with prior metastatic thyroid carcinoma and  mid lumbar fusion, now with lumbago.    COMPARISON: Lumbar CT dated 1/20/2022 and lumbar MRI dated 11/22/2021.  Impression: IMPRESSION:  1. Unchanged appearance status post L3 corpectomy and L2-3 and L3-4 discectomy  with metallic strut placement and posterior fixation using pedicular screws and  interconnecting rods bilaterally at L2-L4. The dorsal soft tissues show evolving  postoperative fluid with surrounding enhancing granulation tissue and edema.  2. No gross residual/recurrent or new metastatic tumor.  3. Unchanged small right extraforaminal disc protrusion at L4-5 that impinges  the right L4 dorsal root ganglion and proximal postganglionic nerve root.  4. Unchanged multilevel spondylosis with neural foraminal stenoses as described,  worst at L3-4 on the right where the exiting right L3 nerve root remains  impinged  5. No high-grade central spinal stenosis at any level.    COMMENT:    Postoperative changes: See impression.    Lumbosacral alignment: Anatomic.  Vertebral bodies: Otherwise normal in height and signal.  Intervertebral discs: Otherwise unchanged moderate loss of height and signal  at L1-2 and mild loss at L4-5.    Lumbosacral central spinal canal: Unchanged mild acquired compromise at L3-4.  Conus and cauda equina: Normal in position, caliber, and signal.    Axial images:  T12-L1: Normal.  L1-2: Dorsolateral disc bulging and facet hypertrophy causing unchanged  mild, left greater than right, neural foraminal stenosis.  L2-3: Dorsolateral endplate and facet hypertrophy causing unchanged mild to  moderate, right greater than left, neural foraminal stenosis.  L3-4: Dorsolateral endplate and facet hypertrophy causing unchanged moderate  right and  mild-to-moderate left neural foraminal and moderate bilateral  subarticular spinal stenoses. See impression.  L4-5: See impression. In addition, an underlying posterior disc bulge and  facet hypertrophy causes unchanged moderate right. The left neural foraminal and  mild bilateral subarticular spinal stenoses.  L5-S1: Dorsolateral disc bulging and facet hypertrophy causing unchanged  mild to moderate, left greater than right, neural foraminal stenosis.    Visualized extraspinal soft tissues: See impression.    CT ABDOMEN PELVIS WITHOUT IV CONTRAST 4/14/22  Narrative: CLINICAL HISTORY: Right upper quadrant pain.  K43.2: Incisional hernia without obstruction or gangrene    Comparison: CT dated 1/25/2022, 9/19/2018    COMMENT: Multidetector CT of the abdomen and pelvis was performed without  intravenous contrast.  Oral contrast was not administered. Reformatted axial,  sagittal and coronal images were reviewed. Dose reduction techniques such as  automated exposure control were used in this study where applicable.    The visualized lower chest is unremarkable.    There is borderline hepatomegaly. Questionable hepatic steatosis.    The gallbladder, spleen, pancreas and adrenal glands are unremarkable.    There are multiple punctate nonobstructing calculi in both kidneys. No  hydronephrosis.    No aortic aneurysm. Marked atherosclerosis.    There is been interval resolution of previously noted colonic distention. No  findings of large or small bowel obstruction. Colonic diverticulosis without  evidence of acute diverticulitis. Normal appendix.    No large volume ascites, intra-abdominal abscess or pneumoperitoneum. No  lymphadenopathy by size criteria.    The urinary bladder is unremarkable. The prostate is enlarged.    Stable degenerative and postsurgical changes of the lumbar spine. Mild bilateral  hip osteoarthritis.  Impression: IMPRESSION:  No acute abnormality in the abdomen or pelvis.  Possible hepatic  steatosis.    Lumbar x-ray 3/29/2022  IMPRESSION: Stable postsurgical changes from L2 through L4.     COMMENT: 2 views of the lumbar spine were performed in the erect position.     Comparison is made to a CT scan of the lumbar spine from 01/20/2022 and  preoperative plain films of the lumbar spine from 12/03/2021.     As seen on the CT scan, corpectomy has been performed at L3 with a metallic  device extending from the inferior endplate of L2 to the superior endplate of  L4. Bilateral pedicle screws and vertical stabilizing rods are again seen from  L2 through L4. The hardware appears intact and appears similar to the prior  study.     There is moderate disc space narrowing again seen at L1-2. Mild endplate  spurring is again seen.     There are mild degenerative changes of lower dorsal spine. The sacroiliac joints  are symmetrical.     The visualized abdominal bowel gas pattern appears unremarkable.     There is dense calcification of the abdominal aorta again noted.    Assessment and Plan:      In summary, Matt Williamson is a pleasant 76 y.o. male s/p right lateral L3 corpectomy, ORIF of the L3 pathological fracture, and placement of an expandable cage, followed by L2-4 posterolateral instrumented fusion 1/20/2022 related to metastatic thyroid CA.     He is doing very will in regards to his back pain with significant improvement compared to pre-op pain. He continues with some hip-flexion weakness although he is full strength on confrontational testing.  Given his persistent pain, we have ordered an MRI L-spine which fails to disclose residual stenosis.  His symptoms continue to improve, and there is no intervention needed at this time.      I look forward to seeing him return follow-up consultation in 6 months to reevaluate his progress.     The patient was counseled length regarding natural history of his condition. He was asked to continue with lifestyle modification, core strengthening, avoidance of excessive  bending, twisting, overhead lifting. We have instructed him to continue with physical therapy for lower back with gentle stretching and strengthening.   In the interim should his symptomatology evolve or worsen, I have asked he return sooner for prompt reevaluation.          Colt Heller MD        I, Mary Shin PA-C, am scribing for, and in the presence of, Dr. Colt Heller.     25 minutes were spent with the patient on examination, review of past medical history, and prior imaging, and coordinating care.  Patient seen earlier today. Signature timestamp does not reflect patient encounter time.   More than 50% of the time is spent counseling the patient and family and coordinating care.

## 2022-08-18 RX ORDER — GABAPENTIN 300 MG/1
600 CAPSULE ORAL EVERY 8 HOURS
Qty: 180 CAPSULE | Refills: 0 | Status: SHIPPED | OUTPATIENT
Start: 2022-08-18 | End: 2022-10-28

## 2022-08-18 NOTE — TELEPHONE ENCOUNTER
Requested Prescriptions     Pending Prescriptions Disp Refills   • gabapentin (NEURONTIN) 300 mg capsule 180 capsule 0     Sig: Take 2 capsules (600 mg total) by mouth every 8 (eight) hours.         [id number] - [place], [place] - [address] [place] [address] AT [address] [address]  Phone: [phone] Fax: [phone]    [id number] - [place], [place] - [address] [place] [id number]  Phone: [phone] Fax: [phone]

## 2022-09-19 ENCOUNTER — TELEPHONE (OUTPATIENT)
Dept: INTERNAL MEDICINE | Facility: CLINIC | Age: 77
End: 2022-09-19
Payer: MEDICARE

## 2022-09-19 NOTE — TELEPHONE ENCOUNTER
Spoke with patient, requesting appointment with Dr. Arreola. I found a time 10/11/2022 @ 11 am Tuesday in . Please schedule him for that time for a routine follow up. (if can't do 11 just put him in at 11:20. Thank you!    He is aware of time, just need him on the schedule.

## 2022-10-11 ENCOUNTER — OFFICE VISIT (OUTPATIENT)
Dept: INTERNAL MEDICINE | Facility: CLINIC | Age: 77
End: 2022-10-11
Payer: MEDICARE

## 2022-10-11 VITALS
TEMPERATURE: 98.2 F | WEIGHT: 179 LBS | SYSTOLIC BLOOD PRESSURE: 110 MMHG | RESPIRATION RATE: 16 BRPM | DIASTOLIC BLOOD PRESSURE: 72 MMHG | HEART RATE: 78 BPM | OXYGEN SATURATION: 98 % | HEIGHT: 66 IN | BODY MASS INDEX: 28.77 KG/M2

## 2022-10-11 DIAGNOSIS — I10 ESSENTIAL HYPERTENSION: ICD-10-CM

## 2022-10-11 DIAGNOSIS — E78.2 MIXED HYPERLIPIDEMIA: ICD-10-CM

## 2022-10-11 DIAGNOSIS — K59.09 OTHER CONSTIPATION: ICD-10-CM

## 2022-10-11 DIAGNOSIS — N40.0 BENIGN PROSTATIC HYPERPLASIA WITHOUT LOWER URINARY TRACT SYMPTOMS: ICD-10-CM

## 2022-10-11 DIAGNOSIS — E03.9 ACQUIRED HYPOTHYROIDISM: ICD-10-CM

## 2022-10-11 DIAGNOSIS — K63.5 POLYP OF COLON, UNSPECIFIED PART OF COLON, UNSPECIFIED TYPE: Primary | ICD-10-CM

## 2022-10-11 DIAGNOSIS — C73 THYROID CANCER (CMS/HCC): ICD-10-CM

## 2022-10-11 DIAGNOSIS — I48.0 PAROXYSMAL ATRIAL FIBRILLATION (CMS/HCC): ICD-10-CM

## 2022-10-11 DIAGNOSIS — N64.4 BREAST PAIN, LEFT: ICD-10-CM

## 2022-10-11 DIAGNOSIS — E11.40 TYPE 2 DIABETES MELLITUS WITH DIABETIC NEUROPATHY, WITHOUT LONG-TERM CURRENT USE OF INSULIN (CMS/HCC): ICD-10-CM

## 2022-10-11 DIAGNOSIS — Z98.1 S/P LUMBAR FUSION: ICD-10-CM

## 2022-10-11 DIAGNOSIS — Z23 NEED FOR IMMUNIZATION AGAINST INFLUENZA: ICD-10-CM

## 2022-10-11 PROBLEM — K56.7 ILEUS (CMS/HCC): Status: RESOLVED | Noted: 2022-01-25 | Resolved: 2022-10-11

## 2022-10-11 LAB
ALBUMIN SERPL-MCNC: 3.6 G/DL (ref 3.4–5)
ALP SERPL-CCNC: 66 IU/L (ref 35–126)
ALT SERPL-CCNC: 19 IU/L (ref 16–63)
ANION GAP SERPL CALC-SCNC: 14 MEQ/L (ref 3–15)
AST SERPL-CCNC: 17 IU/L (ref 15–41)
BASOPHILS # BLD: 0.07 K/UL (ref 0.01–0.1)
BASOPHILS NFR BLD: 0.9 %
BILIRUB SERPL-MCNC: 0.7 MG/DL (ref 0.3–1.2)
BUN SERPL-MCNC: 12 MG/DL (ref 8–20)
CALCIUM SERPL-MCNC: 9 MG/DL (ref 8.9–10.3)
CHLORIDE SERPL-SCNC: 94 MEQ/L (ref 98–109)
CHOLEST SERPL-MCNC: 136 MG/DL
CO2 SERPL-SCNC: 25 MEQ/L (ref 22–32)
CREAT SERPL-MCNC: 1.1 MG/DL (ref 0.8–1.3)
DIFFERENTIAL METHOD BLD: NORMAL
EOSINOPHIL # BLD: 0.25 K/UL (ref 0.04–0.54)
EOSINOPHIL NFR BLD: 3.2 %
ERYTHROCYTE [DISTWIDTH] IN BLOOD BY AUTOMATED COUNT: 13.2 % (ref 11.6–14.4)
FT4I SERPL CALC-MCNC: 6.06 (ref 2.1–3.8)
GFR SERPL CREATININE-BSD FRML MDRD: >60 ML/MIN/1.73M*2
GLUCOSE SERPL-MCNC: 187 MG/DL (ref 70–99)
HCT VFR BLDCO AUTO: 44.9 % (ref 40.1–51)
HDLC SERPL-MCNC: 58 MG/DL
HDLC SERPL: 2.3 {RATIO}
HGB BLD-MCNC: 14.6 G/DL (ref 13.7–17.5)
IMM GRANULOCYTES # BLD AUTO: 0.03 K/UL (ref 0–0.08)
IMM GRANULOCYTES NFR BLD AUTO: 0.4 %
LDLC SERPL CALC-MCNC: 58 MG/DL
LYMPHOCYTES # BLD: 1.78 K/UL (ref 1.2–3.5)
LYMPHOCYTES NFR BLD: 22.6 %
MCH RBC QN AUTO: 28.6 PG (ref 28–33.2)
MCHC RBC AUTO-ENTMCNC: 32.5 G/DL (ref 32.2–36.5)
MCV RBC AUTO: 87.9 FL (ref 83–98)
MONOCYTES # BLD: 0.67 K/UL (ref 0.3–1)
MONOCYTES NFR BLD: 8.5 %
NEUTROPHILS # BLD: 5.08 K/UL (ref 1.7–7)
NEUTS SEG NFR BLD: 64.4 %
NONHDLC SERPL-MCNC: 78 MG/DL
NRBC BLD-RTO: 0 %
PDW BLD AUTO: 9.8 FL (ref 9.4–12.4)
PLATELET # BLD AUTO: 240 K/UL (ref 150–350)
POTASSIUM SERPL-SCNC: 4.8 MEQ/L (ref 3.6–5.1)
PROT SERPL-MCNC: 6.5 G/DL (ref 6–8.2)
PSA SERPL-MCNC: 7.7 NG/ML
RBC # BLD AUTO: 5.11 M/UL (ref 4.5–5.8)
SODIUM SERPL-SCNC: 133 MEQ/L (ref 136–144)
T4 SERPL-MCNC: 12.5 UG/DL (ref 5.9–12.2)
TRIGL SERPL-MCNC: 101 MG/DL (ref 30–149)
TSH SERPL DL<=0.05 MIU/L-ACNC: 0.12 MIU/L (ref 0.34–5.6)
WBC # BLD AUTO: 7.88 K/UL (ref 3.8–10.5)

## 2022-10-11 PROCEDURE — 80053 COMPREHEN METABOLIC PANEL: CPT | Performed by: INTERNAL MEDICINE

## 2022-10-11 PROCEDURE — 83036 HEMOGLOBIN GLYCOSYLATED A1C: CPT | Performed by: INTERNAL MEDICINE

## 2022-10-11 PROCEDURE — 80061 LIPID PANEL: CPT | Performed by: INTERNAL MEDICINE

## 2022-10-11 PROCEDURE — G8754 DIAS BP LESS 90: HCPCS | Performed by: INTERNAL MEDICINE

## 2022-10-11 PROCEDURE — G0008 ADMIN INFLUENZA VIRUS VAC: HCPCS | Performed by: INTERNAL MEDICINE

## 2022-10-11 PROCEDURE — 90694 VACC AIIV4 NO PRSRV 0.5ML IM: CPT | Performed by: INTERNAL MEDICINE

## 2022-10-11 PROCEDURE — 84443 ASSAY THYROID STIM HORMONE: CPT | Performed by: INTERNAL MEDICINE

## 2022-10-11 PROCEDURE — 99214 OFFICE O/P EST MOD 30 MIN: CPT | Mod: 25 | Performed by: INTERNAL MEDICINE

## 2022-10-11 PROCEDURE — 85025 COMPLETE CBC W/AUTO DIFF WBC: CPT | Performed by: INTERNAL MEDICINE

## 2022-10-11 PROCEDURE — G8752 SYS BP LESS 140: HCPCS | Performed by: INTERNAL MEDICINE

## 2022-10-11 PROCEDURE — 84153 ASSAY OF PSA TOTAL: CPT | Performed by: INTERNAL MEDICINE

## 2022-10-11 PROCEDURE — 84436 ASSAY OF TOTAL THYROXINE: CPT | Performed by: INTERNAL MEDICINE

## 2022-10-11 RX ORDER — DULAGLUTIDE 1.5 MG/.5ML
INJECTION, SOLUTION SUBCUTANEOUS
COMMUNITY
Start: 2022-10-10 | End: 2022-10-20 | Stop reason: ALTCHOICE

## 2022-10-11 ASSESSMENT — ENCOUNTER SYMPTOMS
MUSCULOSKELETAL NEGATIVE: 1
PSYCHIATRIC NEGATIVE: 1
EYES NEGATIVE: 1
ALLERGIC/IMMUNOLOGIC NEGATIVE: 1
NEUROLOGICAL NEGATIVE: 1
RESPIRATORY NEGATIVE: 1
GASTROINTESTINAL NEGATIVE: 1
CARDIOVASCULAR NEGATIVE: 1
CONSTITUTIONAL NEGATIVE: 1
ENDOCRINE NEGATIVE: 1

## 2022-10-11 ASSESSMENT — PATIENT HEALTH QUESTIONNAIRE - PHQ9: SUM OF ALL RESPONSES TO PHQ9 QUESTIONS 1 & 2: 0

## 2022-10-11 ASSESSMENT — PAIN SCALES - GENERAL: PAINLEVEL: 0-NO PAIN

## 2022-10-11 NOTE — ASSESSMENT & PLAN NOTE
Ho inc psa  Needs repeat and see gu  He sees  From [facility] in [place]  Last biopsy was 2019

## 2022-10-11 NOTE — PROGRESS NOTES
Patient ID: Matt Williamson is a 77 y.o. male.      Problem List Items Addressed This Visit     Acquired hypothyroidism     Patient would continue, with  current dose of levothyroxine.  We will continue to monitor the thyroid function.  Patient has no abrupt changes in weight, or temperature sensitivity.         Relevant Orders    TSH 3rd Generation (Completed)    T4 (Completed)    BPH (benign prostatic hyperplasia)     Ho inc psa  Needs repeat and see gu  He sees  From [facility] in [place]  Last biopsy was 2019         Relevant Orders    PSA (Completed)    Ambulatory referral to Urology    Colon polyp - Primary     2021 Dr Gonzalez repeat 3 years         Essential hypertension     Blood pressure today is controlled, continue with the same medications.  No symptoms of chest pain, pressure, palpitations or shortness of breath.  The patient will continue with low-salt, with healthy diet and exercise.         Mixed hyperlipidemia     The patient's hyperlipidemia is currently controlled and no changes were made in terms of the plan.  Would continue with healthy diet and exercise and medications as prescribed.  The patient denies any muscle pain, and understands the importance of compliance.         Relevant Orders    Lipid panel (Completed)    Other constipation     Check fit  Take miralax daily  If sx persist Dr Gonzalez         Relevant Orders    FIT    Paroxysmal atrial fibrillation (CMS/HCC)     Asa metoprolol  Clinically nsr         S/P lumbar fusion     Continue with conservative treatment  See Dr Boyer         Thyroid cancer (CMS/HCC)     Has appt with Dr Lerma 12/2022         Type 2 diabetes mellitus with diabetic neuropathy, unspecified (CMS/HCC)     Trulicity, mf  See Dr Lerma  Labs pending         Relevant Medications    TRULICITY 1.5 mg/0.5 mL pen injector    Other Relevant Orders    CBC and Differential (Completed)    Comprehensive metabolic panel (Completed)    Urinalysis with Reflex Culture (ED and  Outpatient only)    Hemoglobin A1c (Completed)   Other Visit Diagnoses     Breast pain, left        Relevant Orders    BI DIAGNOSTIC MAMMOGRAM LEFT (TOMOSYNTHESIS)    ULTRASOUND BREAST LIMITED LEFT    Ambulatory referral to Breast Surgery    Need for immunization against influenza              Patient Active Problem List   Diagnosis    BPH (benign prostatic hyperplasia)    Atherosclerosis of coronary artery    Cataract    Essential hypertension    Former tobacco use    Mixed hyperlipidemia    Type 2 diabetes mellitus with diabetic neuropathy, unspecified (CMS/HCC)    Thyroid cancer (CMS/HCC)    Esophageal reflux    Paroxysmal atrial fibrillation (CMS/HCC)    Pre-op evaluation    Preop cardiovascular exam    Cancer associated pain    Low back pain with right-sided sciatica    Malignant neoplasm of vertebral column, excluding sacrum and coccyx (CMS/HCC)    Acquired hypothyroidism    Leg swelling    Lumbar disc disease with radiculopathy    S/P lumbar fusion    Vitamin D deficiency    AVI (iron deficiency anemia)    Colon polyp    Other constipation    Pain of left breast       Patient's Medications   New Prescriptions    No medications on file   Previous Medications    ASPIRIN 81 MG ENTERIC COATED TABLET    Take 81 mg by mouth daily.    ERGOCALCIFEROL (VITAMIN D2) 50,000 UNIT(1250 MCG) CAPSULE    Take 1 capsule (50,000 Units total) by mouth once a week.    GABAPENTIN (NEURONTIN) 300 MG CAPSULE    Take 2 capsules (600 mg total) by mouth every 8 (eight) hours.    KETOROLAC (ACULAR) 0.5 % OPHTHALMIC SOLUTION    INSTILL 1 DROP IN RIGHT EYE TWICE DAILY    MAGNESIUM CHLORIDE 64 MG TABLET,DELAYED RELEASE (DR/EC)    Take 2 tablets (128 mg total) by mouth daily.    METFORMIN XR (GLUCOPHAGE-XR) 500 MG 24 HR TABLET    Take 1 tablet (500 mg total) by mouth daily with breakfast.    METOPROLOL SUCCINATE XL (TOPROL-XL) 100 MG 24 HR TABLET    Take 1 tablet (100 mg total) by mouth nightly.    POLYETHYLENE  GLYCOL (MIRALAX) 17 GRAM PACKET    Take 17 g by mouth daily with dinner.    PRAVASTATIN (PRAVACHOL) 40 MG TABLET    Take 1 tablet (40 mg total) by mouth daily.    PREDNISOLONE ACETATE (PRED FORTE) 1 % OPHTHALMIC SUSPENSION    SHAKE LIQUID AND INSTILL 1 DROP IN RIGHT EYE TWICE DAILY    SYNTHROID 150 MCG TABLET    Take 1 tablet (150 mcg total) by mouth once daily.    TRULICITY 1.5 MG/0.5 ML PEN INJECTOR       Modified Medications    No medications on file   Discontinued Medications    AZITHROMYCIN (ZITHROMAX) 250 MG TABLET    Take 2 tablets the first day, then 1 tablet daily for 4 days.    TRULICITY 0.75 MG/0.5 ML PEN INJECTOR    once a week. Every Tuesday      New Prescriptions    No medications on file       Allergies   Allergen Reactions    Penicillins Rash     Childhood allergy       Social History     Tobacco Use    Smoking status: Former     Packs/day: 2.00     Years: 20.00     Pack years: 40.00     Types: Cigarettes     Quit date:      Years since quittin.8    Smokeless tobacco: Never   Substance Use Topics    Alcohol use: Yes     Comment: Social       Family History   Problem Relation Age of Onset    Cancer Biological Mother     Breast cancer Biological Mother     Emphysema Biological Father     Lung cancer Biological Father        Subjective   The patient presents the office today for follow-up regarding his medical issues.    Still with lbp  Can go down right hip  Ache in right hip worse with standing    PAF currently stable on medications without any bleeding, chest pain, palpitations, increased shortness of breath or edema.  Rates are currently controlled.    Hyperlipidemia reviewed today and patient is compliant with healthy diet and exercise.  Discussed goals of treatment for hyperlipidemia to prevent complications.  The patient has no muscle pain, abdominal pain or other intolerances to medication.    The patient will continue on treatment for hypertension including medications,  "limiting salt, healthy diet and exercise.  The patient denied any shortness of breath, chest pain or palpitations.  HPI  I reviewed his  medications, recent labs, and correspondence.     Review of Systems   Constitutional: Negative.    HENT: Negative.    Eyes: Negative.    Respiratory: Negative.    Cardiovascular: Negative.    Gastrointestinal: Negative.    Endocrine: Negative.    Genitourinary: Negative.    Musculoskeletal: Negative.    Allergic/Immunologic: Negative.    Neurological: Negative.    Psychiatric/Behavioral: Negative.        Visit Vitals  /72 (BP Location: Left upper arm, Patient Position: Sitting)   Pulse 78   Temp 36.8 °C (98.2 °F) (Temporal)   Resp 16   Ht 1.676 m (5' 6\")   Wt 81.2 kg (179 lb)   SpO2 98%   BMI 28.89 kg/m²         Physical Exam  Constitutional:       Appearance: He is well-developed and well-nourished.   HENT:      Head: Normocephalic and atraumatic.      Right Ear: External ear normal.      Left Ear: External ear normal.      Nose: Nose normal.      Mouth/Throat:      Mouth: Oropharynx is clear and moist.   Eyes:      Extraocular Movements: EOM normal.      Conjunctiva/sclera: Conjunctivae normal.      Pupils: Pupils are equal, round, and reactive to light.   Neck:      Thyroid: No thyromegaly.   Cardiovascular:      Rate and Rhythm: Normal rate and regular rhythm.      Pulses: Intact distal pulses.      Heart sounds: Normal heart sounds.   Pulmonary:      Effort: Pulmonary effort is normal.      Breath sounds: Normal breath sounds.   Abdominal:      General: Bowel sounds are normal.      Palpations: Abdomen is soft.   Musculoskeletal:         General: Normal range of motion.      Cervical back: Normal range of motion.   Lymphadenopathy:      Cervical: No cervical adenopathy.   Skin:     General: Skin is warm and dry.   Neurological:      Mental Status: He is alert and oriented to person, place, and time.   Psychiatric:         Mood and Affect: Mood and affect normal.      "

## 2022-10-12 LAB
EST. AVERAGE GLUCOSE BLD GHB EST-MCNC: 223 MG/DL
HBA1C MFR BLD HPLC: 9.4 %

## 2022-10-13 NOTE — TELEPHONE ENCOUNTER
New Patient / No Consult    Referring Doctor:           PCP, Dr Arreola's office  Aimee from their office called to request ASAP appointment for the patient  (direct referral)      Recent Imaging:  · MRI L spine 8/11/2019, done at [facility] in [place], scanned by PCP office, advised PCP office patient would need to bring disc to appointment  · No Xrays, Dexa, EMG      Reason for visit:      Severe pain in lower back. Vertebral lesion at L3, concern for metastasis    Onset of symptoms:      July      Physical Therapy:      no    Pain Management:  no    Previous Surgery:      no    Additional Comments:    · Bone scan ordered by PCP office  · Pain Meds from PCP       Insurance:  Medicare, [employer]  Active in Epic    Ote[facility]la Pregnancy And Lactation Text: This medication is Pregnancy Category C and it isn't known if it is safe during pregnancy. It is unknown if it is excreted in breast milk.

## 2022-10-19 ENCOUNTER — TELEPHONE (OUTPATIENT)
Dept: INTERNAL MEDICINE | Facility: CLINIC | Age: 77
End: 2022-10-19

## 2022-10-20 RX ORDER — DULAGLUTIDE 3 MG/.5ML
3 INJECTION, SOLUTION SUBCUTANEOUS WEEKLY
Qty: 6 ML | Refills: 1 | Status: SHIPPED | OUTPATIENT
Start: 2022-10-20 | End: 2024-09-25 | Stop reason: SDUPTHER

## 2022-10-20 RX ORDER — METFORMIN HYDROCHLORIDE 500 MG/1
500 TABLET, EXTENDED RELEASE ORAL 2 TIMES DAILY WITH MEALS
Qty: 180 TABLET | Refills: 1 | Status: SHIPPED | OUTPATIENT
Start: 2022-10-20 | End: 2024-11-30 | Stop reason: SDUPTHER

## 2022-10-20 NOTE — ASSESSMENT & PLAN NOTE
Patient would continue, with  current dose of levothyroxine.  We will continue to monitor the thyroid function.  Patient has no abrupt changes in weight, or temperature sensitivity.

## 2022-10-20 NOTE — TELEPHONE ENCOUNTER
Please send the letter I did today regarding this patient and  any requested prescriptions for studies or labs.  Thank you.  Raj Arreola    Schedule visit for 3 months

## 2022-10-24 RX ORDER — METOPROLOL SUCCINATE 100 MG/1
TABLET, EXTENDED RELEASE ORAL
Qty: 180 TABLET | Refills: 0 | Status: SHIPPED | OUTPATIENT
Start: 2022-10-24 | End: 2023-04-17

## 2022-10-24 NOTE — TELEPHONE ENCOUNTER
Medicine Refill Request    Last Office: 10/11/22  Last Consult Visit: 11/20/2020  Last Telemedicine Visit: 10/2/2020 Raj Arreola, DO    Next Appointment: Visit date not found      Current Outpatient Medications:     gabapentin (NEURONTIN) 300 mg capsule, Take 2 capsules (600 mg total) by mouth every 8 (eight) hours., Disp: 180 capsule, Rfl: 0    aspirin 81 mg enteric coated tablet, Take 81 mg by mouth daily., Disp: , Rfl:     dulaglutide (TRULICITY) 3 mg/0.5 mL pen injector, Inject 3 mg under the skin once a week. Do not prime., Disp: 6 mL, Rfl: 1    ergocalciferol (VITAMIN D2) 50,000 unit(1250 mcg) capsule, Take 1 capsule (50,000 Units total) by mouth once a week., Disp: 12 capsule, Rfl: 0    ketorolac (ACULAR) 0.5 % ophthalmic solution, INSTILL 1 DROP IN RIGHT EYE TWICE DAILY, Disp: , Rfl:     magnesium chloride 64 mg tablet,delayed release (DR/EC), Take 2 tablets (128 mg total) by mouth daily., Disp: 60 tablet, Rfl: 0    metFORMIN XR (GLUCOPHAGE-XR) 500 mg 24 hr tablet, Take 1 tablet (500 mg total) by mouth 2 (two) times a day with meals., Disp: 180 tablet, Rfl: 1    metoprolol succinate XL (TOPROL-XL) 100 mg 24 hr tablet, Take 1 tablet (100 mg total) by mouth nightly., Disp: 30 tablet, Rfl: 0    polyethylene glycol (MIRALAX) 17 gram packet, Take 17 g by mouth daily with dinner., Disp: 30 packet, Rfl: 0    pravastatin (PRAVACHOL) 40 mg tablet, Take 1 tablet (40 mg total) by mouth daily., Disp: 30 tablet, Rfl: 0    prednisoLONE acetate (PRED FORTE) 1 % ophthalmic suspension, SHAKE LIQUID AND INSTILL 1 DROP IN RIGHT EYE TWICE DAILY, Disp: , Rfl:     SYNTHROID 150 mcg tablet, Take 1 tablet (150 mcg total) by mouth once daily., Disp: 30 tablet, Rfl: 1      BP Readings from Last 3 Encounters:   10/11/22 110/72   07/20/22 126/82   03/29/22 120/60       Recent Lab results:  Lab Results   Component Value Date    CHOL 136 10/11/2022   ,   Lab Results   Component Value Date    HDL 58 10/11/2022   ,   Lab  Results   Component Value Date    LDLCALC 58 10/11/2022   ,   Lab Results   Component Value Date    TRIG 101 10/11/2022        Lab Results   Component Value Date    GLUCOSE 187 (H) 10/11/2022   ,   Lab Results   Component Value Date    HGBA1C 9.4 (H) 10/11/2022         Lab Results   Component Value Date    CREATININE 1.1 10/11/2022       Lab Results   Component Value Date    TSH 0.12 (L) 10/11/2022

## 2022-10-28 RX ORDER — GABAPENTIN 300 MG/1
CAPSULE ORAL
Qty: 180 CAPSULE | Refills: 0 | Status: SHIPPED | OUTPATIENT
Start: 2022-10-28 | End: 2023-01-24 | Stop reason: SDUPTHER

## 2022-11-16 ENCOUNTER — TELEPHONE (OUTPATIENT)
Dept: INTERNAL MEDICINE | Facility: CLINIC | Age: 77
End: 2022-11-16

## 2022-12-21 RX ORDER — PROPAFENONE HYDROCHLORIDE 150 MG/1
150 TABLET, COATED ORAL AS NEEDED
Qty: 45 TABLET | Refills: 0 | Status: SHIPPED | OUTPATIENT
Start: 2022-12-21 | End: 2024-01-17

## 2022-12-21 NOTE — TELEPHONE ENCOUNTER
[facility] Refill Request      MA Notes:      Nurse Notes: Pt called in to request refill of propafenone 150 mg prn. He only has a couple of pills left. He was last seen for OV 1/18/2022. Another provider discontinued the propafenone on 2/8/2022. Thanks.      Last Office Visit: 3/22/2021  Last Telemedicine Visit: 5/7/2020 Minor Ji MD    Next Office Visit: Visit date not found  Next Telemedicine Visit: Visit date not found     Most Recent BP Readings:  BP Readings from Last 3 Encounters:   10/11/22 110/72   07/20/22 126/82   03/29/22 120/60       Most recent Lab results:  Lab Results   Component Value Date    CHOL 136 10/11/2022    HDL 58 10/11/2022    TRIG 101 10/11/2022    NONHDLCALC 78 10/11/2022       Lab Results   Component Value Date    AST 17 10/11/2022    ALT 19 10/11/2022       Lab Results   Component Value Date     (L) 10/11/2022    K 4.8 10/11/2022    BUN 12 10/11/2022    CREATININE 1.1 10/11/2022       Current Medications:    Current Outpatient Medications:   •  gabapentin (NEURONTIN) 300 mg capsule, TAKE 1 CAPSULE(300 MG) BY MOUTH TWICE DAILY, Disp: 180 capsule, Rfl: 0  •  aspirin 81 mg enteric coated tablet, Take 81 mg by mouth daily., Disp: , Rfl:   •  dulaglutide (TRULICITY) 3 mg/0.5 mL pen injector, Inject 3 mg under the skin once a week. Do not prime., Disp: 6 mL, Rfl: 1  •  ergocalciferol (VITAMIN D2) 50,000 unit(1250 mcg) capsule, Take 1 capsule (50,000 Units total) by mouth once a week., Disp: 12 capsule, Rfl: 0  •  ketorolac (ACULAR) 0.5 % ophthalmic solution, INSTILL 1 DROP IN RIGHT EYE TWICE DAILY, Disp: , Rfl:   •  magnesium chloride 64 mg tablet,delayed release (DR/EC), Take 2 tablets (128 mg total) by mouth daily., Disp: 60 tablet, Rfl: 0  •  metFORMIN XR (GLUCOPHAGE-XR) 500 mg 24 hr tablet, Take 1 tablet (500 mg total) by mouth 2 (two) times a day with meals., Disp: 180 tablet, Rfl: 1  •  metoprolol succinate XL (TOPROL-XL) 100 mg 24 hr  tablet, TAKE 1 TABLET(100 MG) BY MOUTH TWICE DAILY, Disp: 180 tablet, Rfl: 0  •  polyethylene glycol (MIRALAX) 17 gram packet, Take 17 g by mouth daily with dinner., Disp: 30 packet, Rfl: 0  •  pravastatin (PRAVACHOL) 40 mg tablet, TAKE 1 TABLET(40 MG) BY MOUTH DAILY, Disp: 90 tablet, Rfl: 3  •  prednisoLONE acetate (PRED FORTE) 1 % ophthalmic suspension, SHAKE LIQUID AND INSTILL 1 DROP IN RIGHT EYE TWICE DAILY, Disp: , Rfl:   •  SYNTHROID 150 mcg tablet, Take 1 tablet (150 mcg total) by mouth once daily., Disp: 30 tablet, Rfl: 1

## 2023-01-16 ENCOUNTER — HOSPITAL ENCOUNTER (OUTPATIENT)
Dept: RADIOLOGY | Facility: CLINIC | Age: 78
Discharge: HOME | End: 2023-01-16
Attending: PHYSICIAN ASSISTANT
Payer: MEDICARE

## 2023-01-16 ENCOUNTER — HOSPITAL ENCOUNTER (OUTPATIENT)
Dept: RADIOLOGY | Facility: HOSPITAL | Age: 78
Discharge: HOME | End: 2023-01-16
Attending: PHYSICIAN ASSISTANT
Payer: MEDICARE

## 2023-01-16 ENCOUNTER — OFFICE VISIT (OUTPATIENT)
Dept: NEUROSURGERY | Facility: CLINIC | Age: 78
End: 2023-01-16
Payer: MEDICARE

## 2023-01-16 VITALS
TEMPERATURE: 96.3 F | WEIGHT: 175 LBS | SYSTOLIC BLOOD PRESSURE: 143 MMHG | HEART RATE: 87 BPM | HEIGHT: 66 IN | BODY MASS INDEX: 28.12 KG/M2 | DIASTOLIC BLOOD PRESSURE: 100 MMHG | OXYGEN SATURATION: 98 % | RESPIRATION RATE: 16 BRPM

## 2023-01-16 DIAGNOSIS — G89.29 CHRONIC RIGHT SHOULDER PAIN: ICD-10-CM

## 2023-01-16 DIAGNOSIS — M54.12 RADICULITIS, CERVICAL: ICD-10-CM

## 2023-01-16 DIAGNOSIS — Z09 ONCOLOGY FOLLOW-UP ENCOUNTER: ICD-10-CM

## 2023-01-16 DIAGNOSIS — M54.41 LOW BACK PAIN WITH RIGHT-SIDED SCIATICA, UNSPECIFIED BACK PAIN LATERALITY, UNSPECIFIED CHRONICITY: Primary | ICD-10-CM

## 2023-01-16 DIAGNOSIS — M48.02 CERVICAL STENOSIS OF SPINAL CANAL: ICD-10-CM

## 2023-01-16 DIAGNOSIS — M25.511 CHRONIC RIGHT SHOULDER PAIN: ICD-10-CM

## 2023-01-16 PROCEDURE — 72082 X-RAY EXAM ENTIRE SPI 2/3 VW: CPT

## 2023-01-16 PROCEDURE — 99213 OFFICE O/P EST LOW 20 MIN: CPT | Performed by: NEUROLOGICAL SURGERY

## 2023-01-16 PROCEDURE — G8753 SYS BP > OR = 140: HCPCS | Performed by: NEUROLOGICAL SURGERY

## 2023-01-16 PROCEDURE — G8755 DIAS BP > OR = 90: HCPCS | Performed by: NEUROLOGICAL SURGERY

## 2023-01-16 RX ORDER — GADOBUTROL 604.72 MG/ML
7.9 INJECTION INTRAVENOUS ONCE
Status: COMPLETED | OUTPATIENT
Start: 2023-01-16 | End: 2023-01-16

## 2023-01-16 RX ADMIN — GADOBUTROL 7.9 ML: 604.72 INJECTION INTRAVENOUS at 12:54

## 2023-01-16 NOTE — PROGRESS NOTES
23      Re: Matt Williamson  : 1945      Chief Complaint:  Surgical follow up    History of Present Illness:   Matt Williamson is a 77 y.o. male who presents for surgical follow up. He underwent right lateral L3 corpectomy and placement of an expandable cage, followed by L2-4 posterolateral instrumented fusion 2022 for a pathologic fracture at L3 from metastatic thyroid CA lesion. His postoperative course was complicated by an ileus which resolved prior to discharge to [facility] on postop day 7 without intervention. He has since been discharged home.      Since last being seen, Mr. Williamson reports overall he continues to do very well. He does report right sided back pain pain when standing or walking for any amount of time. The pain will reach 3/10 and resolves with sitting. He has no pain with sitting or laying. He reports his pain is significantly improved compared to his pre-operative state.     He continues to report numbness right 3rd and 4th as well as his left second digit at times. He denies additional paresthesias, saddle anesthesia, fevers, and bowel/bladder changes. He denies abdominal pain and distension. He followed up with general surgery for a right sided abdominal wall protuberance following his XLIF. No intervention was recommended and it has since resolved. His gait feels steady and he has not had any falls.     Medical History:  has a past medical history of Atrial fibrillation with rapid ventricular response (CMS/HCC) (10/25/2019), BPH (benign prostatic hyperplasia), Coronary artery disease, Hypertension, Hypothyroidism, Mixed hyperlipidemia, Thyroid cancer (CMS/HCC), Type 2 diabetes mellitus without complication (CMS/HCC), and Vertigo.    Surgical History:  has a past surgical history that includes Cardiac catheterization (2005); Coronary angioplasty with stent (2005); Cardiac catheterization (2005); Thyroidectomy (); Foot surgery (); Anal fissurectomy  (); Gastrocutaneous fistula closure; Upper gastrointestinal endoscopy; Eye surgery (Left, Cataract); and Lumbar spine surgery (2022).    Family History: family history includes Breast cancer in his biological mother; Cancer in his biological mother; Emphysema in his biological father; Lung cancer in his biological father.    Social History:   Social History     Socioeconomic History   • Marital status:      Spouse name: None   • Number of children: None   • Years of education: None   • Highest education level: None   Occupational History   • Occupation: Retired   Tobacco Use   • Smoking status: Former     Packs/day: 2.00     Years: 20.00     Pack years: 40.00     Types: Cigarettes     Quit date:      Years since quittin.0   • Smokeless tobacco: Never   Vaping Use   • Vaping Use: Never used   Substance and Sexual Activity   • Alcohol use: Yes     Comment: Social   • Drug use: Never   • Sexual activity: Not Currently     Partners: Female   Social History Narrative    He lives with his wife.  He has 2 children who live in [place] in [place].  He reports he is independent in everything.  He reports he he likes playing sports including basketball and likes to model trains..  He reports he is a .  He denies a history of mental health services.     Social Determinants of Health     Food Insecurity: No Food Insecurity   • Worried About Running Out of Food in the Last Year: Never true   • Ran Out of Food in the Last Year: Never true   Stress: No Stress Concern Present   • Feeling of Stress : Not at all        Allergies:   Allergies   Allergen Reactions   • Penicillins Rash     Childhood allergy       Medications:   Current Outpatient Medications   Medication Sig Dispense Refill   • aspirin 81 mg enteric coated tablet Take 81 mg by mouth daily.     • dulaglutide (TRULICITY) 3 mg/0.5 mL pen injector Inject 3 mg under the skin once a week. Do not prime. 6 mL 1   • gabapentin (NEURONTIN)  "300 mg capsule TAKE 1 CAPSULE(300 MG) BY MOUTH TWICE DAILY 180 capsule 0   • metFORMIN XR (GLUCOPHAGE-XR) 500 mg 24 hr tablet Take 1 tablet (500 mg total) by mouth 2 (two) times a day with meals. 180 tablet 1   • metoprolol succinate XL (TOPROL-XL) 100 mg 24 hr tablet TAKE 1 TABLET(100 MG) BY MOUTH TWICE DAILY 180 tablet 0   • polyethylene glycol (MIRALAX) 17 gram packet Take 17 g by mouth daily with dinner. 30 packet 0   • pravastatin (PRAVACHOL) 40 mg tablet TAKE 1 TABLET(40 MG) BY MOUTH DAILY 90 tablet 3   • propafenone (RYTHMOL) 150 mg tablet Take 1 tablet (150 mg total) by mouth as needed (Palpitations). 45 tablet 0   • SYNTHROID 150 mcg tablet Take 1 tablet (150 mcg total) by mouth once daily. 30 tablet 1   • ergocalciferol (VITAMIN D2) 50,000 unit(1250 mcg) capsule Take 1 capsule (50,000 Units total) by mouth once a week. 12 capsule 0   • ketorolac (ACULAR) 0.5 % ophthalmic solution INSTILL 1 DROP IN RIGHT EYE TWICE DAILY     • magnesium chloride 64 mg tablet,delayed release (DR/EC) Take 2 tablets (128 mg total) by mouth daily. 60 tablet 0   • prednisoLONE acetate (PRED FORTE) 1 % ophthalmic suspension SHAKE LIQUID AND INSTILL 1 DROP IN RIGHT EYE TWICE DAILY       No current facility-administered medications for this visit.       Review of Systems: A 14 point review of systems was performed and aside from what is mentioned above is otherwise negative.    Vital Signs:  Vitals:    01/16/23 1513   BP: (!) 143/100   Pulse: 87   Resp: 16   Temp: (!) 35.7 °C (96.3 °F)   TempSrc: Temporal   SpO2: 98%   Weight: 79.4 kg (175 lb)   Height: 1.676 m (5' 6\")       Physical Exam:      General Appearance: Appears comfortable   Head: Normocephalic, atraumatic.   Eyes:  ENMT: No conjunctival injection. Symmetrical lids  Atraumatic external nose and ears. Moist MM.   Neck: Supple, no nuchal rigidity.   Respiratory: Good respiratory effort.   Cardiovascular: Regular rate.   Abdomen: Soft and non-tender. No right sided " protuberance.      Extremities: No edema.   Skin: Dry, no rashes. Incisions are well healed.       Neurologic examination:  Mental status:  The patient is alert, attentive, and oriented. Speech is clear and fluent     Cranial nerves:  CN II-XII are intact to clear speech, symmetric facial movement, EOMI, PERRL, tongue midline       Motor:  There is no pronator drift. Muscle bulk and tone are normal.        Deltoid Biceps Triceps Wrist ext Hand  Finger Spread Hip flexion Knee ext Dorsi-  flexion EHL Plantar Flexion   R 5 5 5 5 5 5 5 5 5 5 5   L 5 5 5 5 5 5 5 5 5 5 5         Reflexes:  There is no Maharaj's sign or ankle clonus.     Sensory:  Intact light touch throughout all 4 extremities.      Coordination:  There is no dysmetria on finger-to-nose testing.     Gait:  Steady gait       Data Review:    Scoliosis x-rays 1/16/2022  Intact hardware and maintained alignment    Cervical MRI 1/16/2022  IMPRESSION:  Multilevel spondylosis of the cervical spine as discussed below, noting  high-grade central canal stenosis predominantly at C4-C5 and C5-C6, as well as  areas of high-grade neural foraminal narrowing.     COMMENT:  Cervicothoracic alignment: Mild degenerative anterolisthesis at C3-C4 and C6-C7.     Vertebral bodies: Maintained in height and signal.     Vertebral Discs: Disc desiccation throughout with areas of loss of height of  varying degrees (most advanced at C4-C5 and C5-C6).     Visualized spinal cord and contents of the posterior fossa: Within normal  limits.     C2-3: Bilateral degenerative facet hypertrophy (left greater than right) with  concomitant uncovertebral hypertrophy. There is small central disc herniation  protrusion type indenting the ventral thecal sac and resulting in mild central  canal stenosis. There is moderate left and mild right neural foraminal  narrowing.     C3-4: Bilateral degenerative facet hypertrophy (right greater than left). There  is bilateral uncovertebral hypertrophy and  broad-based disc osteophyte complex  resulting in severe right and mild to moderate left neural foraminal narrowing.  Concomitant mild to moderate central canal stenosis.     C4-5: Broad-based disc osteophyte complex with bilateral uncovertebral and less  so facet hypertrophy resulting in effacement of the thecal sac and slight  flattening of the ventral cord and overall moderate to severe central canal  stenosis. There is concomitant severe right and moderate left neural foraminal  narrowing.     C5-6: Broad-based disc osteophyte complex with bilateral uncovertebral  hypertrophy resulting in effacement of the thecal sac and abutment as well as  flattening of the ventral cord and overall severe central canal stenosis. There  is concomitant moderate to severe right and moderate left neural foraminal  narrowing.     C6-7: Broad-based disc bulge with mild bilateral uncovertebral hypertrophy. No  significant central canal stenosis or neural foraminal narrowing.     C7-T1: No disc herniation, central canal stenosis, or neural foraminal  narrowing.     In accordance with [place] Act [place],  the patient will receive a letter notifying them  to follow up with their physician.    Lumbar MRI with and without contrast 1/16/2022  IMPRESSION:  Stable appearance of the lumbar spine status post L3 corpectomy and strut graft,  without evidence of new metastatic disease in the lumbar spine. Stable  multilevel spondylosis as detailed below (no evidence of high-grade central  canal stenosis).        COMMENT:     Alignment and lordosis:  Mild scoliotic curvature. There is otherwise normal  lumbar lordosis without listhesis.     Vertebral body height and signal:  Redemonstration of T1 hypointense enhancing  diffuse vertebral body metastasis status post L3 corpectomy and strut graft with  associated susceptibility artifact. Remaining vertebral bodies demonstrate  normal signal. No vertebral body height loss. Posterior L2-L4 fusion  with  transverse pedicle screws and bridging rods and associated susceptibility  artifact also unchanged.     Intervertebral discs: Disc desiccation throughout with areas of mild height loss  (noting relative preservation of height at L5-S1), largely unchanged.     Conus medullaris:  Terminates at L1 with normal signal and morphology of the  partially imaged distal thoracic cord. No suspicious enhancement to suggest  leptomeningeal spread of tumor.     L1-L2:  Mild broad-based disc bulge without central canal stenosis or neural  foraminal narrowing.     L2-L3:  Mild ligamentum flavum infolding and mild degenerative facet hypertrophy  as well as mild residual broad-based disc bulge resulting in mild bilateral  neural foraminal narrowing. No significant central canal stenosis.     L3-L4:  Degenerative facet hypertrophy and ligamentum flavum infolding, and in  conjunction with residual broad-based disc bulge with bilateral foraminal  extension (right greater than left), again results in bilateral subarticular  recess narrowing, mild central canal stenosis, as well as moderate right and  mild to moderate left neural foraminal narrowing (again noting that the  broad-based disc bulge abuts the exiting right L3 nerve root). Overall  appearance is largely unchanged.     L4-L5:  Degenerative facet hypertrophy and ligamentum flavum infolding, and in  conjunction with broad-based disc bulge with bilateral foraminal extension,  again results in bilateral subarticular recess narrowing, mild central canal  stenosis, as well as moderate right and mild to moderate left neural foraminal  narrowing, with the disc bulge abutting the exiting right L4 nerve root, all  largely unchanged.     L5-S1:  Degenerative facet hypertrophy resulting in mild bilateral neural  foraminal narrowing. No disc herniation or central canal stenosis.     Degenerative changes of the partially imaged sacroiliac joints.    Assessment and Plan:      In summary,  Matt Williamson is a pleasant 77 y.o. male s/p right lateral L3 corpectomy, ORIF of the L3 pathological fracture, and placement of an expandable cage, followed by L2-4 posterolateral instrumented fusion 1/20/2022. Surgical pathology consistent with metastatic thyroid CA.     He is doing very will in regards to his back pain with significant improvement compared to pre-operative pain. He is full strength on exam without hoffmans or ankle clonus.      Lumbar MRI does not show any central stenosis and there is no pathologic enhancement to indicate recurrent metastatic disease. Cervical MRI reveals multilevel spondylosis with central stenosis and spinal cord effacement most pronounced at C4-C5 and C5-C6. There is no T2 signal change. He has severe foraminal stenosis right > left at these levels. Fortunately, he has relatively mild symptoms of myelopathy. His mJOA score of 16 indicating mild myelopathy. No neurosurgical intervention would be recommended at this time but we would like to monitor his stenosis with a repeat MRI in 1 year.     Overall, Mr. Williamson has done very well following his surgery. We will plan to see him back in 1 year with repeat scoliosis x-rays. He will call in the interval with any questions or concerns, specifically should he develop any weakness, increased numbness, gait instability, changes in bowel/bladder habits or any other new neurologic symptoms. He agrees with the plan as stated. Of note, his blood pressure was elevated today and he was encouraged to follow-up with his PCP.           Colt Heller MD        I, Bruno Tierney PA-C, am scribing for, and in the presence of, Dr. Colt Heller.     25 minutes were spent with the patient on examination, review of past medical history, and prior imaging, and coordinating care.  Patient seen earlier today. Signature timestamp does not reflect patient encounter time.   More than 50% of the time is spent counseling the patient and family and  coordinating care.

## 2023-01-16 NOTE — LETTER
2023     Raj Arreola, DO  [address] [facility] [address]  [facility] [address]    Patient: Matt Williamson  YOB: 1945  Date of Visit: 2023      Dear Dr. Arreola:    Thank you for referring Matt Williamson to me for evaluation. Below are my notes for this consultation.    If you have questions, please do not hesitate to call me. I look forward to following your patient along with you.         Sincerely,        Colt Heller MD        CC: No Recipients  Colt Heller MD  2023 12:47 PM  Signed  23      Re: Matt Williamson  : 1945      Chief Complaint:  Surgical follow up    History of Present Illness:   Matt Williamson is a 77 y.o. male who presents for surgical follow up. He underwent right lateral L3 corpectomy and placement of an expandable cage, followed by L2-4 posterolateral instrumented fusion 2022 for a pathologic fracture at L3 from metastatic thyroid CA lesion. His postoperative course was complicated by an ileus which resolved prior to discharge to [facility] on postop day 7 without intervention. He has since been discharged home.      Since last being seen, Mr. Williamson reports overall he continues to do very well. He does report right sided back pain pain when standing or walking for any amount of time. The pain will reach 3/10 and resolves with sitting. He has no pain with sitting or laying. He reports his pain is significantly improved compared to his pre-operative state.     He continues to report numbness right 3rd and 4th as well as his left second digit at times. He denies additional paresthesias, saddle anesthesia, fevers, and bowel/bladder changes. He denies abdominal pain and distension. He followed up with general surgery for a right sided abdominal wall protuberance following his XLIF. No intervention was recommended and it has since resolved. His gait feels steady and he has not had any falls.     Medical History:  has a past medical history of  Atrial fibrillation with rapid ventricular response (CMS/HCC) (10/25/2019), BPH (benign prostatic hyperplasia), Coronary artery disease, Hypertension, Hypothyroidism, Mixed hyperlipidemia, Thyroid cancer (CMS/HCC), Type 2 diabetes mellitus without complication (CMS/HCC), and Vertigo.    Surgical History:  has a past surgical history that includes Cardiac catheterization (2005); Coronary angioplasty with stent (2005); Cardiac catheterization (2005); Thyroidectomy (); Foot surgery (); Anal fissurectomy (); Gastrocutaneous fistula closure; Upper gastrointestinal endoscopy; Eye surgery (Left, Cataract); and Lumbar spine surgery (2022).    Family History: family history includes Breast cancer in his biological mother; Cancer in his biological mother; Emphysema in his biological father; Lung cancer in his biological father.    Social History:   Social History     Socioeconomic History   • Marital status:      Spouse name: None   • Number of children: None   • Years of education: None   • Highest education level: None   Occupational History   • Occupation: Retired   Tobacco Use   • Smoking status: Former     Packs/day: 2.00     Years: 20.00     Pack years: 40.00     Types: Cigarettes     Quit date:      Years since quittin.0   • Smokeless tobacco: Never   Vaping Use   • Vaping Use: Never used   Substance and Sexual Activity   • Alcohol use: Yes     Comment: Social   • Drug use: Never   • Sexual activity: Not Currently     Partners: Female   Social History Narrative    He lives with his wife.  He has 2 children who live in [place] in [place].  He reports he is independent in everything.  He reports he he likes playing sports including basketball and likes to model trains..  He reports he is a .  He denies a history of mental health services.     Social Determinants of Health     Food Insecurity: No Food Insecurity   • Worried About Running Out of Food in the  Last Year: Never true   • Ran Out of Food in the Last Year: Never true   Stress: No Stress Concern Present   • Feeling of Stress : Not at all        Allergies:   Allergies   Allergen Reactions   • Penicillins Rash     Childhood allergy       Medications:   Current Outpatient Medications   Medication Sig Dispense Refill   • aspirin 81 mg enteric coated tablet Take 81 mg by mouth daily.     • dulaglutide (TRULICITY) 3 mg/0.5 mL pen injector Inject 3 mg under the skin once a week. Do not prime. 6 mL 1   • gabapentin (NEURONTIN) 300 mg capsule TAKE 1 CAPSULE(300 MG) BY MOUTH TWICE DAILY 180 capsule 0   • metFORMIN XR (GLUCOPHAGE-XR) 500 mg 24 hr tablet Take 1 tablet (500 mg total) by mouth 2 (two) times a day with meals. 180 tablet 1   • metoprolol succinate XL (TOPROL-XL) 100 mg 24 hr tablet TAKE 1 TABLET(100 MG) BY MOUTH TWICE DAILY 180 tablet 0   • polyethylene glycol (MIRALAX) 17 gram packet Take 17 g by mouth daily with dinner. 30 packet 0   • pravastatin (PRAVACHOL) 40 mg tablet TAKE 1 TABLET(40 MG) BY MOUTH DAILY 90 tablet 3   • propafenone (RYTHMOL) 150 mg tablet Take 1 tablet (150 mg total) by mouth as needed (Palpitations). 45 tablet 0   • SYNTHROID 150 mcg tablet Take 1 tablet (150 mcg total) by mouth once daily. 30 tablet 1   • ergocalciferol (VITAMIN D2) 50,000 unit(1250 mcg) capsule Take 1 capsule (50,000 Units total) by mouth once a week. 12 capsule 0   • ketorolac (ACULAR) 0.5 % ophthalmic solution INSTILL 1 DROP IN RIGHT EYE TWICE DAILY     • magnesium chloride 64 mg tablet,delayed release (DR/EC) Take 2 tablets (128 mg total) by mouth daily. 60 tablet 0   • prednisoLONE acetate (PRED FORTE) 1 % ophthalmic suspension SHAKE LIQUID AND INSTILL 1 DROP IN RIGHT EYE TWICE DAILY       No current facility-administered medications for this visit.       Review of Systems: A 14 point review of systems was performed and aside from what is mentioned above is otherwise negative.    Vital Signs:  Vitals:    01/16/23  "1513   BP: (!) 143/100   Pulse: 87   Resp: 16   Temp: (!) 35.7 °C (96.3 °F)   TempSrc: Temporal   SpO2: 98%   Weight: 79.4 kg (175 lb)   Height: 1.676 m (5' 6\")       Physical Exam:      General Appearance: Appears comfortable   Head: Normocephalic, atraumatic.   Eyes:  ENMT: No conjunctival injection. Symmetrical lids  Atraumatic external nose and ears. Moist MM.   Neck: Supple, no nuchal rigidity.   Respiratory: Good respiratory effort.   Cardiovascular: Regular rate.   Abdomen: Soft and non-tender. No right sided protuberance.      Extremities: No edema.   Skin: Dry, no rashes. Incisions are well healed.       Neurologic examination:  Mental status:  The patient is alert, attentive, and oriented. Speech is clear and fluent     Cranial nerves:  CN II-XII are intact to clear speech, symmetric facial movement, EOMI, PERRL, tongue midline       Motor:  There is no pronator drift. Muscle bulk and tone are normal.        Deltoid Biceps Triceps Wrist ext Hand  Finger Spread Hip flexion Knee ext Dorsi-  flexion EHL Plantar Flexion   R 5 5 5 5 5 5 5 5 5 5 5   L 5 5 5 5 5 5 5 5 5 5 5         Reflexes:  There is no Maharaj's sign or ankle clonus.     Sensory:  Intact light touch throughout all 4 extremities.      Coordination:  There is no dysmetria on finger-to-nose testing.     Gait:  Steady gait       Data Review:    Scoliosis x-rays 1/16/2022  Intact hardware and maintained alignment    Cervical MRI 1/16/2022  IMPRESSION:  Multilevel spondylosis of the cervical spine as discussed below, noting  high-grade central canal stenosis predominantly at C4-C5 and C5-C6, as well as  areas of high-grade neural foraminal narrowing.     COMMENT:  Cervicothoracic alignment: Mild degenerative anterolisthesis at C3-C4 and C6-C7.     Vertebral bodies: Maintained in height and signal.     Vertebral Discs: Disc desiccation throughout with areas of loss of height of  varying degrees (most advanced at C4-C5 and C5-C6).     Visualized " spinal cord and contents of the posterior fossa: Within normal  limits.     C2-3: Bilateral degenerative facet hypertrophy (left greater than right) with  concomitant uncovertebral hypertrophy. There is small central disc herniation  protrusion type indenting the ventral thecal sac and resulting in mild central  canal stenosis. There is moderate left and mild right neural foraminal  narrowing.     C3-4: Bilateral degenerative facet hypertrophy (right greater than left). There  is bilateral uncovertebral hypertrophy and broad-based disc osteophyte complex  resulting in severe right and mild to moderate left neural foraminal narrowing.  Concomitant mild to moderate central canal stenosis.     C4-5: Broad-based disc osteophyte complex with bilateral uncovertebral and less  so facet hypertrophy resulting in effacement of the thecal sac and slight  flattening of the ventral cord and overall moderate to severe central canal  stenosis. There is concomitant severe right and moderate left neural foraminal  narrowing.     C5-6: Broad-based disc osteophyte complex with bilateral uncovertebral  hypertrophy resulting in effacement of the thecal sac and abutment as well as  flattening of the ventral cord and overall severe central canal stenosis. There  is concomitant moderate to severe right and moderate left neural foraminal  narrowing.     C6-7: Broad-based disc bulge with mild bilateral uncovertebral hypertrophy. No  significant central canal stenosis or neural foraminal narrowing.     C7-T1: No disc herniation, central canal stenosis, or neural foraminal  narrowing.     In accordance with [place] Act [place],  the patient will receive a letter notifying them  to follow up with their physician.    Lumbar MRI with and without contrast 1/16/2022  IMPRESSION:  Stable appearance of the lumbar spine status post L3 corpectomy and strut graft,  without evidence of new metastatic disease in the lumbar spine. Stable  multilevel spondylosis as  detailed below (no evidence of high-grade central  canal stenosis).        COMMENT:     Alignment and lordosis:  Mild scoliotic curvature. There is otherwise normal  lumbar lordosis without listhesis.     Vertebral body height and signal:  Redemonstration of T1 hypointense enhancing  diffuse vertebral body metastasis status post L3 corpectomy and strut graft with  associated susceptibility artifact. Remaining vertebral bodies demonstrate  normal signal. No vertebral body height loss. Posterior L2-L4 fusion with  transverse pedicle screws and bridging rods and associated susceptibility  artifact also unchanged.     Intervertebral discs: Disc desiccation throughout with areas of mild height loss  (noting relative preservation of height at L5-S1), largely unchanged.     Conus medullaris:  Terminates at L1 with normal signal and morphology of the  partially imaged distal thoracic cord. No suspicious enhancement to suggest  leptomeningeal spread of tumor.     L1-L2:  Mild broad-based disc bulge without central canal stenosis or neural  foraminal narrowing.     L2-L3:  Mild ligamentum flavum infolding and mild degenerative facet hypertrophy  as well as mild residual broad-based disc bulge resulting in mild bilateral  neural foraminal narrowing. No significant central canal stenosis.     L3-L4:  Degenerative facet hypertrophy and ligamentum flavum infolding, and in  conjunction with residual broad-based disc bulge with bilateral foraminal  extension (right greater than left), again results in bilateral subarticular  recess narrowing, mild central canal stenosis, as well as moderate right and  mild to moderate left neural foraminal narrowing (again noting that the  broad-based disc bulge abuts the exiting right L3 nerve root). Overall  appearance is largely unchanged.     L4-L5:  Degenerative facet hypertrophy and ligamentum flavum infolding, and in  conjunction with broad-based disc bulge with bilateral foraminal  extension,  again results in bilateral subarticular recess narrowing, mild central canal  stenosis, as well as moderate right and mild to moderate left neural foraminal  narrowing, with the disc bulge abutting the exiting right L4 nerve root, all  largely unchanged.     L5-S1:  Degenerative facet hypertrophy resulting in mild bilateral neural  foraminal narrowing. No disc herniation or central canal stenosis.     Degenerative changes of the partially imaged sacroiliac joints.    Assessment and Plan:      In summary, Matt Williamson is a pleasant 77 y.o. male s/p right lateral L3 corpectomy, ORIF of the L3 pathological fracture, and placement of an expandable cage, followed by L2-4 posterolateral instrumented fusion 1/20/2022. Surgical pathology consistent with metastatic thyroid CA.     He is doing very will in regards to his back pain with significant improvement compared to pre-operative pain. He is full strength on exam without hoffmans or ankle clonus.      Lumbar MRI does not show any central stenosis and there is no pathologic enhancement to indicate recurrent metastatic disease. Cervical MRI reveals multilevel spondylosis with central stenosis and spinal cord effacement most pronounced at C4-C5 and C5-C6. There is no T2 signal change. He has severe foraminal stenosis right > left at these levels. Fortunately, he has relatively mild symptoms of myelopathy. His mJOA score of 16 indicating mild myelopathy. No neurosurgical intervention would be recommended at this time but we would like to monitor his stenosis with a repeat MRI in 1 year.     Overall, Mr. Williamson has done very well following his surgery. We will plan to see him back in 1 year with repeat scoliosis x-rays. He will call in the interval with any questions or concerns, specifically should he develop any weakness, increased numbness, gait instability, changes in bowel/bladder habits or any other new neurologic symptoms. He agrees with the plan as  stated. Of note, his blood pressure was elevated today and he was encouraged to follow-up with his PCP.           MD PATT Desir, Bruno Tierney PA-C, am scribing for, and in the presence of, Dr. Colt Heller.     25 minutes were spent with the patient on examination, review of past medical history, and prior imaging, and coordinating care.  Patient seen earlier today. Signature timestamp does not reflect patient encounter time.   More than 50% of the time is spent counseling the patient and family and coordinating care.

## 2023-01-24 RX ORDER — GABAPENTIN 300 MG/1
CAPSULE ORAL
Qty: 180 CAPSULE | Refills: 0 | Status: SHIPPED | OUTPATIENT
Start: 2023-01-24 | End: 2024-11-21 | Stop reason: SDUPTHER

## 2023-01-24 NOTE — TELEPHONE ENCOUNTER
Requested Prescriptions     Pending Prescriptions Disp Refills   • gabapentin (NEURONTIN) 300 mg capsule 180 capsule 0     Sig: TAKE 1 CAPSULE  TWICE A DAY         [id number] - [place], [place] - [address] [place] [address]  [address] [facility] [address]  Phone: [phone] Fax: [phone]    [id number] - [place], [place] - [address] [place] [id number]  Phone: [phone] Fax: [phone]

## 2023-03-16 NOTE — TELEPHONE ENCOUNTER
Patient schedule for 3:20 p.m. today.  
Patient would like to get tested for COVID.  He has a stuffy nose and has been running a low fever () for several days on and off.    
None

## 2023-04-17 RX ORDER — METOPROLOL SUCCINATE 100 MG/1
TABLET, EXTENDED RELEASE ORAL
Qty: 180 TABLET | Refills: 1 | Status: SHIPPED | OUTPATIENT
Start: 2023-04-17 | End: 2024-04-25

## 2023-04-17 NOTE — TELEPHONE ENCOUNTER
Medicine Refill Request    Last Office: 10/11/2022  Last Consult Visit: 11/20/2020  Last Telemedicine Visit: 10/2/2020 Raj Arreola, DO    Next Appointment: 1/17/2024      Current Outpatient Medications:   •  aspirin 81 mg enteric coated tablet, Take 81 mg by mouth daily., Disp: , Rfl:   •  dulaglutide (TRULICITY) 3 mg/0.5 mL pen injector, Inject 3 mg under the skin once a week. Do not prime., Disp: 6 mL, Rfl: 1  •  gabapentin (NEURONTIN) 300 mg capsule, TAKE 1 CAPSULE  TWICE A DAY, Disp: 180 capsule, Rfl: 0  •  metFORMIN XR (GLUCOPHAGE-XR) 500 mg 24 hr tablet, Take 1 tablet (500 mg total) by mouth 2 (two) times a day with meals., Disp: 180 tablet, Rfl: 1  •  metoprolol succinate XL (TOPROL-XL) 100 mg 24 hr tablet, TAKE 1 TABLET(100 MG) BY MOUTH TWICE DAILY, Disp: 180 tablet, Rfl: 0  •  polyethylene glycol (MIRALAX) 17 gram packet, Take 17 g by mouth daily with dinner., Disp: 30 packet, Rfl: 0  •  pravastatin (PRAVACHOL) 40 mg tablet, TAKE 1 TABLET(40 MG) BY MOUTH DAILY, Disp: 90 tablet, Rfl: 3  •  propafenone (RYTHMOL) 150 mg tablet, Take 1 tablet (150 mg total) by mouth as needed (Palpitations)., Disp: 45 tablet, Rfl: 0  •  SYNTHROID 150 mcg tablet, Take 1 tablet (150 mcg total) by mouth once daily., Disp: 30 tablet, Rfl: 1      BP Readings from Last 3 Encounters:   01/16/23 (!) 143/100   10/11/22 110/72   07/20/22 126/82       Recent Lab results:  Lab Results   Component Value Date    CHOL 136 10/11/2022   ,   Lab Results   Component Value Date    HDL 58 10/11/2022   ,   Lab Results   Component Value Date    LDLCALC 58 10/11/2022   ,   Lab Results   Component Value Date    TRIG 101 10/11/2022        Lab Results   Component Value Date    GLUCOSE 187 (H) 10/11/2022   ,   Lab Results   Component Value Date    HGBA1C 9.4 (H) 10/11/2022         Lab Results   Component Value Date    CREATININE 1.1 10/11/2022       Lab Results   Component Value Date    TSH 0.12 (L) 10/11/2022

## 2023-08-28 DIAGNOSIS — M12.811 ROTATOR CUFF ARTHROPATHY OF RIGHT SHOULDER: ICD-10-CM

## 2023-08-28 DIAGNOSIS — G89.29 CHRONIC RIGHT SHOULDER PAIN: Primary | ICD-10-CM

## 2023-08-28 DIAGNOSIS — M25.511 CHRONIC RIGHT SHOULDER PAIN: Primary | ICD-10-CM

## 2023-08-28 NOTE — PROGRESS NOTES
History of rotator cuff pathology and now with worsening right shoulder pain. Hx of suspicious lesion previously. Updated MRI ordered and follow-up with order.

## 2023-10-04 NOTE — TELEPHONE ENCOUNTER
Medicine Refill Request    Last Office Visit: 2/6/2019  Last Telemedicine Visit: 10/2/2020 Raj Arreola,     Next Office Visit: Visit date not found  Next Telemedicine Visit: Visit date not found         Current Outpatient Medications:   •  aspirin 81 mg enteric coated tablet, Take 81 mg by mouth daily., Disp: , Rfl:   •  gabapentin (NEURONTIN) 300 mg capsule, Take 1 capsule (300 mg total) by mouth 3 (three) times a day. (Patient taking differently: Take 300 mg by mouth 2 (two) times a day.  ), Disp: 270 capsule, Rfl: 3  •  glipiZIDE (GLUCOTROL) 5 mg tablet, Take 1 tablet by mouth as needed. , Disp: , Rfl:   •  JARDIANCE 10 mg tablet, TAKE 1 TABLET(10 MG) BY MOUTH DAILY, Disp: 90 tablet, Rfl: 0  •  metFORMIN (GLUCOPHAGE) 500 mg tablet, TAKE 1 TABLET(500 MG) BY MOUTH FOUR TIMES DAILY, Disp: 120 tablet, Rfl: 0  •  metoprolol succinate XL (TOPROL-XL) 100 mg 24 hr tablet, TAKE 1 TABLET BY MOUTH TWICE DAILY (Patient taking differently: 100 mg 2 (two) times a day.  ), Disp: 180 tablet, Rfl: 3  •  pantoprazole (PROTONIX) 40 mg EC tablet, TAKE 1 TABLET(40 MG) BY MOUTH DAILY, Disp: 90 tablet, Rfl: 0  •  pravastatin (PRAVACHOL) 40 mg tablet, TAKE 1 TABLET(40 MG) BY MOUTH DAILY (Patient taking differently: 40 mg daily.  ), Disp: 90 tablet, Rfl: 3  •  propafenone (RYTHMOL) 150 mg tablet, Take 1 tablet by mouth as needed (Palpitations).  , Disp: , Rfl:   •  SYNTHROID 150 mcg tablet, TAKE 1 TABLET BY MOUTH EVERY DAY (Patient taking differently: 150 mcg daily.  ), Disp: 90 tablet, Rfl: 1  •  TRULICITY 0.75 mg/0.5 mL pen injector, once a week. Every Tuesday , Disp: , Rfl:       BP Readings from Last 3 Encounters:   06/22/21 (!) 140/92   05/19/21 136/78   05/06/21 138/83       Recent Lab results:  Lab Results   Component Value Date    CHOL 129 01/29/2020   ,   Lab Results   Component Value Date    HDL 47 01/29/2020   ,   Lab Results   Component Value Date    LDLCALC 67 01/29/2020   ,   Lab Results   Component Value Date     TRIG 69 01/29/2020        Lab Results   Component Value Date    GLUCOSE 98 11/20/2020   ,   Lab Results   Component Value Date    HGBA1C 6.8 (H) 11/20/2020         Lab Results   Component Value Date    CREATININE 1.08 11/20/2020       Lab Results   Component Value Date    TSH 0.76 11/20/2020            Topical Metronidazole Pregnancy And Lactation Text: This medication is Pregnancy Category B and considered safe during pregnancy.  It is also considered safe to use while breastfeeding.

## 2023-10-24 ENCOUNTER — TELEPHONE (OUTPATIENT)
Dept: INTERNAL MEDICINE | Facility: CLINIC | Age: 78
End: 2023-10-24
Payer: MEDICARE

## 2023-10-24 NOTE — TELEPHONE ENCOUNTER
[facility] Appointment Request   Provider: Dr. Arreola  Appointment Type: OV  Reason for Visit: f/u  Available Day and Time: Nov 17  Best Contact Number: [phone]    Pt will only be in the state on Nov 17, asking to be seen by Dr. Arreola

## 2023-10-24 NOTE — TELEPHONE ENCOUNTER
Spoke with pt to let him know  Does not work Fridays offered NP pt said it can wait until  Next opening so scheduled in feb

## 2024-01-02 ENCOUNTER — TRANSCRIBE ORDERS (OUTPATIENT)
Dept: NEUROSURGERY | Facility: CLINIC | Age: 79
End: 2024-01-02
Payer: MEDICARE

## 2024-01-02 DIAGNOSIS — M54.12 RADICULITIS, CERVICAL: ICD-10-CM

## 2024-01-02 DIAGNOSIS — M54.41 LOW BACK PAIN WITH RIGHT-SIDED SCIATICA, UNSPECIFIED BACK PAIN LATERALITY, UNSPECIFIED CHRONICITY: ICD-10-CM

## 2024-01-02 DIAGNOSIS — Z98.1 S/P LUMBAR FUSION: Primary | ICD-10-CM

## 2024-01-02 DIAGNOSIS — M48.02 CERVICAL STENOSIS OF SPINAL CANAL: ICD-10-CM

## 2024-01-05 ENCOUNTER — TELEPHONE (OUTPATIENT)
Dept: INTERNAL MEDICINE | Facility: CLINIC | Age: 79
End: 2024-01-05

## 2024-01-05 DIAGNOSIS — E78.2 MIXED HYPERLIPIDEMIA: ICD-10-CM

## 2024-01-05 DIAGNOSIS — E03.9 ACQUIRED HYPOTHYROIDISM: ICD-10-CM

## 2024-01-05 DIAGNOSIS — Z12.9 CANCER SCREENING: ICD-10-CM

## 2024-01-05 DIAGNOSIS — E11.40 TYPE 2 DIABETES MELLITUS WITH DIABETIC NEUROPATHY, WITHOUT LONG-TERM CURRENT USE OF INSULIN (CMS/HCC): Primary | ICD-10-CM

## 2024-01-05 NOTE — TELEPHONE ENCOUNTER
Pt pharmacy calling pt 2 nd insurance no longer covers dulaglutide (TRULICITY) 3 mg/0.5 mL pen injector  the only alternative the will cover is Bydureon  may need pa but even if get's dined his insurance caps price of med of at 7$ other wise if pt stays on dulaglutide (TRULICITY) 3 mg/0.5 mL pen injector it will cost 450$

## 2024-01-17 ENCOUNTER — OFFICE VISIT (OUTPATIENT)
Dept: NEUROSURGERY | Facility: CLINIC | Age: 79
End: 2024-01-17
Payer: MEDICARE

## 2024-01-17 ENCOUNTER — HOSPITAL ENCOUNTER (OUTPATIENT)
Dept: RADIOLOGY | Facility: CLINIC | Age: 79
Discharge: HOME | End: 2024-01-17
Attending: NEUROLOGICAL SURGERY
Payer: MEDICARE

## 2024-01-17 VITALS
OXYGEN SATURATION: 99 % | HEIGHT: 65 IN | SYSTOLIC BLOOD PRESSURE: 150 MMHG | RESPIRATION RATE: 13 BRPM | TEMPERATURE: 96.2 F | BODY MASS INDEX: 29.16 KG/M2 | DIASTOLIC BLOOD PRESSURE: 80 MMHG | WEIGHT: 175 LBS | HEART RATE: 81 BPM

## 2024-01-17 DIAGNOSIS — M54.12 RADICULITIS, CERVICAL: ICD-10-CM

## 2024-01-17 DIAGNOSIS — Z98.1 S/P LUMBAR FUSION: ICD-10-CM

## 2024-01-17 DIAGNOSIS — M48.02 CERVICAL STENOSIS OF SPINAL CANAL: ICD-10-CM

## 2024-01-17 DIAGNOSIS — M54.41 LOW BACK PAIN WITH RIGHT-SIDED SCIATICA, UNSPECIFIED BACK PAIN LATERALITY, UNSPECIFIED CHRONICITY: Primary | ICD-10-CM

## 2024-01-17 DIAGNOSIS — C79.9 METASTASIS FROM THYROID CANCER (CMS/HCC): ICD-10-CM

## 2024-01-17 DIAGNOSIS — M54.41 LOW BACK PAIN WITH RIGHT-SIDED SCIATICA, UNSPECIFIED BACK PAIN LATERALITY, UNSPECIFIED CHRONICITY: ICD-10-CM

## 2024-01-17 DIAGNOSIS — C73 METASTASIS FROM THYROID CANCER (CMS/HCC): ICD-10-CM

## 2024-01-17 PROCEDURE — G8753 SYS BP > OR = 140: HCPCS | Performed by: NEUROLOGICAL SURGERY

## 2024-01-17 PROCEDURE — 72082 X-RAY EXAM ENTIRE SPI 2/3 VW: CPT

## 2024-01-17 PROCEDURE — G8754 DIAS BP LESS 90: HCPCS | Performed by: NEUROLOGICAL SURGERY

## 2024-01-17 PROCEDURE — 99213 OFFICE O/P EST LOW 20 MIN: CPT | Performed by: NEUROLOGICAL SURGERY

## 2024-01-17 NOTE — LETTER
2024     Raj Arreola, DO  [address] [facility] [address]  [facility] [address]    Patient: Matt Williamson  YOB: 1945  Date of Visit: 2024      Dear Dr. Arreola:    Thank you for referring Matt Williamson to me for evaluation. Below are my notes for this consultation.    If you have questions, please do not hesitate to call me. I look forward to following your patient along with you.         Sincerely,        Colt Heller MD        CC: No Recipients    Colt Heller MD  2024  2:37 PM  Signed  24      Re: Matt Williamson  : 1945      Chief Complaint:  Surgical follow up    History of Present Illness:   Matt Williamson is a 78 y.o. male who presents for surgical follow up. He underwent right lateral L3 corpectomy and placement of an expandable cage, followed by L2-4 posterolateral instrumented fusion 2022 for a pathologic fracture at L3 from metastatic thyroid CA lesion. His postoperative course was complicated by an ileus which resolved prior to discharge to [facility] on postop day 7 without intervention. He has since been discharged home.      Since last being seen, Mr. Williamson reports overall he continues to do very well. He does report right sided back pain pain when standing or walking for any amount of time. The pain will reach 3/10 and resolves with sitting. He has no pain with sitting or laying. He reports his pain is significantly improved compared to his pre-operative state.     He continues to report numbness right 3rd and 4th as well as his left second digit at times. He denies additional paresthesias, saddle anesthesia, fevers, and bowel/bladder changes. He denies abdominal pain and distension. He followed up with general surgery for a right sided abdominal wall protuberance following his XLIF. No intervention was recommended and it has since resolved. His gait feels steady and he has not had any falls.     Medical History:  has a past medical history of  Atrial fibrillation with rapid ventricular response (CMS/HCC) (10/25/2019), BPH (benign prostatic hyperplasia), Coronary artery disease, Hypertension, Hypothyroidism, Mixed hyperlipidemia, Thyroid cancer (CMS/HCC), Type 2 diabetes mellitus without complication (CMS/HCC), and Vertigo.    Surgical History:  has a past surgical history that includes Cardiac catheterization (2005); Coronary angioplasty with stent (2005); Cardiac catheterization (2005); Thyroidectomy (); Foot surgery (); Anal fissurectomy (); Gastrocutaneous fistula closure; Upper gastrointestinal endoscopy; Eye surgery (Left, Cataract); and Lumbar spine surgery (2022).    Family History: family history includes Breast cancer in his biological mother; Cancer in his biological mother; Emphysema in his biological father; Lung cancer in his biological father.    Social History:   Social History     Socioeconomic History   • Marital status:      Spouse name: None   • Number of children: None   • Years of education: None   • Highest education level: None   Occupational History   • Occupation: Retired   Tobacco Use   • Smoking status: Former     Packs/day: 2.00     Years: 20.00     Additional pack years: 0.00     Total pack years: 40.00     Types: Cigarettes     Quit date:      Years since quittin.0   • Smokeless tobacco: Never   Vaping Use   • Vaping Use: Never used   Substance and Sexual Activity   • Alcohol use: Yes     Comment: Social   • Drug use: Never   • Sexual activity: Not Currently     Partners: Female   Social History Narrative    He lives with his wife.  He has 2 children who live in [place] in [place].  He reports he is independent in everything.  He reports he he likes playing sports including basketball and likes to model trains..  He reports he is a .  He denies a history of mental health services.     Social Determinants of Health     Food Insecurity: No Food Insecurity  "(1/28/2022)    Hunger Vital Sign    • Worried About Running Out of Food in the Last Year: Never true    • Ran Out of Food in the Last Year: Never true   Transportation Needs: Unknown (4/17/2020)    PRAPARE - Transportation    • Lack of Transportation (Medical): Patient declined    • Lack of Transportation (Non-Medical): Patient declined   Stress: No Stress Concern Present (1/27/2022)    [employer] [place] [employer] - Occupational Stress Questionnaire    • Feeling of Stress : Not at all        Allergies:   Allergies   Allergen Reactions   • Penicillins Rash     Childhood allergy       Medications:   Current Outpatient Medications   Medication Sig Dispense Refill   • aspirin 81 mg enteric coated tablet Take 81 mg by mouth daily.     • dulaglutide (TRULICITY) 3 mg/0.5 mL pen injector Inject 3 mg under the skin once a week. Do not prime. 6 mL 1   • gabapentin (NEURONTIN) 300 mg capsule TAKE 1 CAPSULE  TWICE A  capsule 0   • metFORMIN XR (GLUCOPHAGE-XR) 500 mg 24 hr tablet Take 1 tablet (500 mg total) by mouth 2 (two) times a day with meals. 180 tablet 1   • metoprolol succinate XL (TOPROL-XL) 100 mg 24 hr tablet TAKE 1 TABLET(100 MG) BY MOUTH TWICE DAILY 180 tablet 1   • polyethylene glycol (MIRALAX) 17 gram packet Take 17 g by mouth daily with dinner. 30 packet 0   • pravastatin (PRAVACHOL) 40 mg tablet TAKE 1 TABLET(40 MG) BY MOUTH DAILY 90 tablet 1   • SYNTHROID 150 mcg tablet Take 1 tablet (150 mcg total) by mouth once daily. 30 tablet 1     No current facility-administered medications for this visit.       Review of Systems: A 14 point review of systems was performed and aside from what is mentioned above is otherwise negative.    Vital Signs:  Vitals:    01/17/24 1403   BP: (!) 150/80   Pulse: 81   Resp: 13   Temp: (!) 35.7 °C (96.2 °F)   TempSrc: Temporal   SpO2: 99%   Weight: 79.4 kg (175 lb)   Height: 1.651 m (5' 5\")       Physical Exam:      General Appearance: Appears comfortable   Head: " Normocephalic, atraumatic.   Eyes:  ENMT: No conjunctival injection. Symmetrical lids  Atraumatic external nose and ears. Moist MM.   Neck: Supple, no nuchal rigidity.   Respiratory: Good respiratory effort.   Cardiovascular: Regular rate.   Abdomen: Soft and non-tender. No right sided protuberance.      Extremities: No edema.   Skin: Dry, no rashes. Incisions are well healed.       Neurologic examination:  Mental status:  The patient is alert, attentive, and oriented. Speech is clear and fluent     Cranial nerves:  CN II-XII are intact to clear speech, symmetric facial movement, EOMI, PERRL, tongue midline       Motor:  There is no pronator drift. Muscle bulk and tone are normal.        Deltoid Biceps Triceps Wrist ext Hand  Finger Spread Hip flexion Knee ext Dorsi-  flexion EHL Plantar Flexion   R 5 5 5 5 5 5 5 5 5 5 5   L 5 5 5 5 5 5 5 5 5 5 5         Reflexes:  There is no Maharaj's sign or ankle clonus.     Sensory:  Intact light touch throughout all 4 extremities.      Coordination:  There is no dysmetria on finger-to-nose testing.     Gait:  Steady gait       Data Review:    Cervical MRI 1/17/2024            Scoliosis x-rays 1/17/2024  Intact hardware and maintained alignment        Cervical MRI 1/16/2022  IMPRESSION:  Multilevel spondylosis of the cervical spine as discussed below, noting  high-grade central canal stenosis predominantly at C4-C5 and C5-C6, as well as  areas of high-grade neural foraminal narrowing.     COMMENT:  Cervicothoracic alignment: Mild degenerative anterolisthesis at C3-C4 and C6-C7.     Vertebral bodies: Maintained in height and signal.     Vertebral Discs: Disc desiccation throughout with areas of loss of height of  varying degrees (most advanced at C4-C5 and C5-C6).     Visualized spinal cord and contents of the posterior fossa: Within normal  limits.     C2-3: Bilateral degenerative facet hypertrophy (left greater than right) with  concomitant uncovertebral hypertrophy. There  is small central disc herniation  protrusion type indenting the ventral thecal sac and resulting in mild central  canal stenosis. There is moderate left and mild right neural foraminal  narrowing.     C3-4: Bilateral degenerative facet hypertrophy (right greater than left). There  is bilateral uncovertebral hypertrophy and broad-based disc osteophyte complex  resulting in severe right and mild to moderate left neural foraminal narrowing.  Concomitant mild to moderate central canal stenosis.     C4-5: Broad-based disc osteophyte complex with bilateral uncovertebral and less  so facet hypertrophy resulting in effacement of the thecal sac and slight  flattening of the ventral cord and overall moderate to severe central canal  stenosis. There is concomitant severe right and moderate left neural foraminal  narrowing.     C5-6: Broad-based disc osteophyte complex with bilateral uncovertebral  hypertrophy resulting in effacement of the thecal sac and abutment as well as  flattening of the ventral cord and overall severe central canal stenosis. There  is concomitant moderate to severe right and moderate left neural foraminal  narrowing.     C6-7: Broad-based disc bulge with mild bilateral uncovertebral hypertrophy. No  significant central canal stenosis or neural foraminal narrowing.     C7-T1: No disc herniation, central canal stenosis, or neural foraminal  narrowing.     In accordance with [place] Act [place],  the patient will receive a letter notifying them  to follow up with their physician.    Lumbar MRI with and without contrast 1/16/2022  IMPRESSION:  Stable appearance of the lumbar spine status post L3 corpectomy and strut graft,  without evidence of new metastatic disease in the lumbar spine. Stable  multilevel spondylosis as detailed below (no evidence of high-grade central  canal stenosis).        COMMENT:     Alignment and lordosis:  Mild scoliotic curvature. There is otherwise normal  lumbar lordosis without  listhesis.     Vertebral body height and signal:  Redemonstration of T1 hypointense enhancing  diffuse vertebral body metastasis status post L3 corpectomy and strut graft with  associated susceptibility artifact. Remaining vertebral bodies demonstrate  normal signal. No vertebral body height loss. Posterior L2-L4 fusion with  transverse pedicle screws and bridging rods and associated susceptibility  artifact also unchanged.     Intervertebral discs: Disc desiccation throughout with areas of mild height loss  (noting relative preservation of height at L5-S1), largely unchanged.     Conus medullaris:  Terminates at L1 with normal signal and morphology of the  partially imaged distal thoracic cord. No suspicious enhancement to suggest  leptomeningeal spread of tumor.     L1-L2:  Mild broad-based disc bulge without central canal stenosis or neural  foraminal narrowing.     L2-L3:  Mild ligamentum flavum infolding and mild degenerative facet hypertrophy  as well as mild residual broad-based disc bulge resulting in mild bilateral  neural foraminal narrowing. No significant central canal stenosis.     L3-L4:  Degenerative facet hypertrophy and ligamentum flavum infolding, and in  conjunction with residual broad-based disc bulge with bilateral foraminal  extension (right greater than left), again results in bilateral subarticular  recess narrowing, mild central canal stenosis, as well as moderate right and  mild to moderate left neural foraminal narrowing (again noting that the  broad-based disc bulge abuts the exiting right L3 nerve root). Overall  appearance is largely unchanged.     L4-L5:  Degenerative facet hypertrophy and ligamentum flavum infolding, and in  conjunction with broad-based disc bulge with bilateral foraminal extension,  again results in bilateral subarticular recess narrowing, mild central canal  stenosis, as well as moderate right and mild to moderate left neural foraminal  narrowing, with the disc  bulge abutting the exiting right L4 nerve root, all  largely unchanged.     L5-S1:  Degenerative facet hypertrophy resulting in mild bilateral neural  foraminal narrowing. No disc herniation or central canal stenosis.     Degenerative changes of the partially imaged sacroiliac joints.    Assessment and Plan:      In summary, Matt Williamson is a pleasant 78 y.o. male s/p right lateral L3 corpectomy, ORIF of the L3 pathological fracture, and placement of an expandable cage, followed by L2-4 posterolateral instrumented fusion 1/20/2022. Surgical pathology consistent with metastatic thyroid CA.     He is doing very will in regards to his back pain with significant improvement compared to pre-operative pain. He is full strength on exam without hoffmans or ankle clonus.      Cervical MRI reveals multilevel spondylosis with central stenosis and spinal cord effacement most pronounced at C4-C5 and C5-C6. There is no T2 signal change. He has severe foraminal stenosis right > left at these levels. Fortunately, he has relatively mild symptoms of myelopathy. His mJOA score of 16 indicating mild myelopathy. No neurosurgical intervention would be recommended at this time but we would like to monitor his stenosis with a repeat MRI in 1 year.     Overall, Mr. Williamson has done very well following his surgery. We will plan to see him back in 1 year with repeat scoliosis x-rays. He will call in the interval with any questions or concerns, specifically should he develop any weakness, increased numbness, gait instability, changes in bowel/bladder habits or any other new neurologic symptoms. He agrees with the plan as stated. Of note, his blood pressure was elevated today and he was encouraged to follow-up with his PCP.     We have ordered a Lumbar MRI with contrast to be completed now and again in 1 year.       Colt Heller MD        I, Bruno Tierney PA-C, am scribing for, and in the presence of, Dr. Colt Heller.     25 minutes were  spent with the patient on examination, review of past medical history, and prior imaging, and coordinating care.  Patient seen earlier today. Signature timestamp does not reflect patient encounter time.

## 2024-01-17 NOTE — PROGRESS NOTES
24      Re: Matt Williamson  : 1945      Chief Complaint:  Surgical follow up    History of Present Illness:   Matt Williamson is a 78 y.o. male who presents for surgical follow up. He underwent right lateral L3 corpectomy and placement of an expandable cage, followed by L2-4 posterolateral instrumented fusion 2022 for a pathologic fracture at L3 from metastatic thyroid CA lesion. His postoperative course was complicated by an ileus which resolved prior to discharge to [facility] on postop day 7 without intervention. He has since been discharged home.      Since last being seen, Mr. Williamson reports overall he continues to do very well. He does report right sided back pain pain when standing or walking for any amount of time. The pain will reach 3/10 and resolves with sitting. He has no pain with sitting or laying. He reports his pain is significantly improved compared to his pre-operative state.     He continues to report numbness right 3rd and 4th as well as his left second digit at times. He denies additional paresthesias, saddle anesthesia, fevers, and bowel/bladder changes. He denies abdominal pain and distension. He followed up with general surgery for a right sided abdominal wall protuberance following his XLIF. No intervention was recommended and it has since resolved. His gait feels steady and he has not had any falls.     Medical History:  has a past medical history of Atrial fibrillation with rapid ventricular response (CMS/HCC) (10/25/2019), BPH (benign prostatic hyperplasia), Coronary artery disease, Hypertension, Hypothyroidism, Mixed hyperlipidemia, Thyroid cancer (CMS/HCC), Type 2 diabetes mellitus without complication (CMS/HCC), and Vertigo.    Surgical History:  has a past surgical history that includes Cardiac catheterization (2005); Coronary angioplasty with stent (2005); Cardiac catheterization (2005); Thyroidectomy (); Foot surgery (); Anal fissurectomy  (); Gastrocutaneous fistula closure; Upper gastrointestinal endoscopy; Eye surgery (Left, Cataract); and Lumbar spine surgery (2022).    Family History: family history includes Breast cancer in his biological mother; Cancer in his biological mother; Emphysema in his biological father; Lung cancer in his biological father.    Social History:   Social History     Socioeconomic History   • Marital status:      Spouse name: None   • Number of children: None   • Years of education: None   • Highest education level: None   Occupational History   • Occupation: Retired   Tobacco Use   • Smoking status: Former     Packs/day: 2.00     Years: 20.00     Additional pack years: 0.00     Total pack years: 40.00     Types: Cigarettes     Quit date:      Years since quittin.0   • Smokeless tobacco: Never   Vaping Use   • Vaping Use: Never used   Substance and Sexual Activity   • Alcohol use: Yes     Comment: Social   • Drug use: Never   • Sexual activity: Not Currently     Partners: Female   Social History Narrative    He lives with his wife.  He has 2 children who live in [place] in [place].  He reports he is independent in everything.  He reports he he likes playing sports including basketball and likes to model trains..  He reports he is a .  He denies a history of mental health services.     Social Determinants of Health     Food Insecurity: No Food Insecurity (2022)    Hunger Vital Sign    • Worried About Running Out of Food in the Last Year: Never true    • Ran Out of Food in the Last Year: Never true   Transportation Needs: Unknown (2020)    PRAPARE - Transportation    • Lack of Transportation (Medical): Patient declined    • Lack of Transportation (Non-Medical): Patient declined   Stress: No Stress Concern Present (2022)    [employer] - Occupational Stress Questionnaire    • Feeling of Stress : Not at all        Allergies:   Allergies  "  Allergen Reactions   • Penicillins Rash     Childhood allergy       Medications:   Current Outpatient Medications   Medication Sig Dispense Refill   • aspirin 81 mg enteric coated tablet Take 81 mg by mouth daily.     • dulaglutide (TRULICITY) 3 mg/0.5 mL pen injector Inject 3 mg under the skin once a week. Do not prime. 6 mL 1   • gabapentin (NEURONTIN) 300 mg capsule TAKE 1 CAPSULE  TWICE A  capsule 0   • metFORMIN XR (GLUCOPHAGE-XR) 500 mg 24 hr tablet Take 1 tablet (500 mg total) by mouth 2 (two) times a day with meals. 180 tablet 1   • metoprolol succinate XL (TOPROL-XL) 100 mg 24 hr tablet TAKE 1 TABLET(100 MG) BY MOUTH TWICE DAILY 180 tablet 1   • polyethylene glycol (MIRALAX) 17 gram packet Take 17 g by mouth daily with dinner. 30 packet 0   • pravastatin (PRAVACHOL) 40 mg tablet TAKE 1 TABLET(40 MG) BY MOUTH DAILY 90 tablet 1   • SYNTHROID 150 mcg tablet Take 1 tablet (150 mcg total) by mouth once daily. 30 tablet 1     No current facility-administered medications for this visit.       Review of Systems: A 14 point review of systems was performed and aside from what is mentioned above is otherwise negative.    Vital Signs:  Vitals:    01/17/24 1403   BP: (!) 150/80   Pulse: 81   Resp: 13   Temp: (!) 35.7 °C (96.2 °F)   TempSrc: Temporal   SpO2: 99%   Weight: 79.4 kg (175 lb)   Height: 1.651 m (5' 5\")       Physical Exam:      General Appearance: Appears comfortable   Head: Normocephalic, atraumatic.   Eyes:  ENMT: No conjunctival injection. Symmetrical lids  Atraumatic external nose and ears. Moist MM.   Neck: Supple, no nuchal rigidity.   Respiratory: Good respiratory effort.   Cardiovascular: Regular rate.   Abdomen: Soft and non-tender. No right sided protuberance.      Extremities: No edema.   Skin: Dry, no rashes. Incisions are well healed.       Neurologic examination:  Mental status:  The patient is alert, attentive, and oriented. Speech is clear and fluent     Cranial nerves:  CN II-XII are " intact to clear speech, symmetric facial movement, EOMI, PERRL, tongue midline       Motor:  There is no pronator drift. Muscle bulk and tone are normal.        Deltoid Biceps Triceps Wrist ext Hand  Finger Spread Hip flexion Knee ext Dorsi-  flexion EHL Plantar Flexion   R 5 5 5 5 5 5 5 5 5 5 5   L 5 5 5 5 5 5 5 5 5 5 5         Reflexes:  There is no Maharaj's sign or ankle clonus.     Sensory:  Intact light touch throughout all 4 extremities.      Coordination:  There is no dysmetria on finger-to-nose testing.     Gait:  Steady gait       Data Review:    Cervical MRI 1/17/2024            Scoliosis x-rays 1/17/2024  Intact hardware and maintained alignment        Cervical MRI 1/16/2022  IMPRESSION:  Multilevel spondylosis of the cervical spine as discussed below, noting  high-grade central canal stenosis predominantly at C4-C5 and C5-C6, as well as  areas of high-grade neural foraminal narrowing.     COMMENT:  Cervicothoracic alignment: Mild degenerative anterolisthesis at C3-C4 and C6-C7.     Vertebral bodies: Maintained in height and signal.     Vertebral Discs: Disc desiccation throughout with areas of loss of height of  varying degrees (most advanced at C4-C5 and C5-C6).     Visualized spinal cord and contents of the posterior fossa: Within normal  limits.     C2-3: Bilateral degenerative facet hypertrophy (left greater than right) with  concomitant uncovertebral hypertrophy. There is small central disc herniation  protrusion type indenting the ventral thecal sac and resulting in mild central  canal stenosis. There is moderate left and mild right neural foraminal  narrowing.     C3-4: Bilateral degenerative facet hypertrophy (right greater than left). There  is bilateral uncovertebral hypertrophy and broad-based disc osteophyte complex  resulting in severe right and mild to moderate left neural foraminal narrowing.  Concomitant mild to moderate central canal stenosis.     C4-5: Broad-based disc osteophyte  complex with bilateral uncovertebral and less  so facet hypertrophy resulting in effacement of the thecal sac and slight  flattening of the ventral cord and overall moderate to severe central canal  stenosis. There is concomitant severe right and moderate left neural foraminal  narrowing.     C5-6: Broad-based disc osteophyte complex with bilateral uncovertebral  hypertrophy resulting in effacement of the thecal sac and abutment as well as  flattening of the ventral cord and overall severe central canal stenosis. There  is concomitant moderate to severe right and moderate left neural foraminal  narrowing.     C6-7: Broad-based disc bulge with mild bilateral uncovertebral hypertrophy. No  significant central canal stenosis or neural foraminal narrowing.     C7-T1: No disc herniation, central canal stenosis, or neural foraminal  narrowing.     In accordance with [place] Act [place],  the patient will receive a letter notifying them  to follow up with their physician.    Lumbar MRI with and without contrast 1/16/2022  IMPRESSION:  Stable appearance of the lumbar spine status post L3 corpectomy and strut graft,  without evidence of new metastatic disease in the lumbar spine. Stable  multilevel spondylosis as detailed below (no evidence of high-grade central  canal stenosis).        COMMENT:     Alignment and lordosis:  Mild scoliotic curvature. There is otherwise normal  lumbar lordosis without listhesis.     Vertebral body height and signal:  Redemonstration of T1 hypointense enhancing  diffuse vertebral body metastasis status post L3 corpectomy and strut graft with  associated susceptibility artifact. Remaining vertebral bodies demonstrate  normal signal. No vertebral body height loss. Posterior L2-L4 fusion with  transverse pedicle screws and bridging rods and associated susceptibility  artifact also unchanged.     Intervertebral discs: Disc desiccation throughout with areas of mild height loss  (noting relative preservation  of height at L5-S1), largely unchanged.     Conus medullaris:  Terminates at L1 with normal signal and morphology of the  partially imaged distal thoracic cord. No suspicious enhancement to suggest  leptomeningeal spread of tumor.     L1-L2:  Mild broad-based disc bulge without central canal stenosis or neural  foraminal narrowing.     L2-L3:  Mild ligamentum flavum infolding and mild degenerative facet hypertrophy  as well as mild residual broad-based disc bulge resulting in mild bilateral  neural foraminal narrowing. No significant central canal stenosis.     L3-L4:  Degenerative facet hypertrophy and ligamentum flavum infolding, and in  conjunction with residual broad-based disc bulge with bilateral foraminal  extension (right greater than left), again results in bilateral subarticular  recess narrowing, mild central canal stenosis, as well as moderate right and  mild to moderate left neural foraminal narrowing (again noting that the  broad-based disc bulge abuts the exiting right L3 nerve root). Overall  appearance is largely unchanged.     L4-L5:  Degenerative facet hypertrophy and ligamentum flavum infolding, and in  conjunction with broad-based disc bulge with bilateral foraminal extension,  again results in bilateral subarticular recess narrowing, mild central canal  stenosis, as well as moderate right and mild to moderate left neural foraminal  narrowing, with the disc bulge abutting the exiting right L4 nerve root, all  largely unchanged.     L5-S1:  Degenerative facet hypertrophy resulting in mild bilateral neural  foraminal narrowing. No disc herniation or central canal stenosis.     Degenerative changes of the partially imaged sacroiliac joints.    Assessment and Plan:      In summary, Matt Headleytiny is a pleasant 78 y.o. male s/p right lateral L3 corpectomy, ORIF of the L3 pathological fracture, and placement of an expandable cage, followed by L2-4 posterolateral instrumented fusion 1/20/2022. Surgical  pathology consistent with metastatic thyroid CA.     He is doing very will in regards to his back pain with significant improvement compared to pre-operative pain. He is full strength on exam without hoffmans or ankle clonus.      Cervical MRI reveals multilevel spondylosis with central stenosis and spinal cord effacement most pronounced at C4-C5 and C5-C6. There is no T2 signal change. He has severe foraminal stenosis right > left at these levels. Fortunately, he has relatively mild symptoms of myelopathy. His mJOA score of 16 indicating mild myelopathy. No neurosurgical intervention would be recommended at this time but we would like to monitor his stenosis with a repeat MRI in 1 year.     Overall, Mr. Williamson has done very well following his surgery. We will plan to see him back in 1 year with repeat scoliosis x-rays. He will call in the interval with any questions or concerns, specifically should he develop any weakness, increased numbness, gait instability, changes in bowel/bladder habits or any other new neurologic symptoms. He agrees with the plan as stated. Of note, his blood pressure was elevated today and he was encouraged to follow-up with his PCP.     We have ordered a Lumbar MRI with contrast to be completed now and again in 1 year.       Colt Heller MD        I, Bruno Tierney PA-C, am scribing for, and in the presence of, Dr. Colt Heller.     25 minutes were spent with the patient on examination, review of past medical history, and prior imaging, and coordinating care.  Patient seen earlier today. Signature timestamp does not reflect patient encounter time.

## 2024-01-31 NOTE — PROGRESS NOTES
"     Cardiology  Office Progress Note         Reason for visit:   Chief Complaint   Patient presents with   • Follow-up       HPI     Matt Williamson is a 78 y.o. male who presents to the office for cardiovascular follow up and management of coronary artery disease s/p MARGOT, paroxysmal atrial fibrillation, hypertension and dyslipidemia.      He was last seen in the office on 1/18/22.  At that visit I cleared him for L2-L4 PSF and L3 lateral corpectomy with Dr. Colt Heller.  I made no changes to his medical regimen and asked him to follow up in 6 months.    He has not been back since January 2022.  Since that time he decreased his Toprol XL from 100mg BID to 100mg nightly due to \"taking too many pills.\"    His home BPs have been running 140s/80s.     He denies any chest pain, shortness of breath, edema, near syncope or syncope.    He has occasional palpitations.  He describes these as a skipped beat or flutter that lasts only seconds.    FLP 10/11/22:  , , HDL 58, LDL 58      Past Medical History:   Diagnosis Date   • Atrial fibrillation with rapid ventricular response (CMS/HCC) 10/25/2019    Hospitalized on 10/25/19 at [facility].    • BPH (benign prostatic hyperplasia)    • Coronary artery disease     LAD stent 2005   • Hypertension    • Hypothyroidism    • Mixed hyperlipidemia    • Thyroid cancer (CMS/HCC)    • Type 2 diabetes mellitus without complication (CMS/HCC)    • Vertigo        Past Surgical History:   Procedure Laterality Date   • ANAL FISSURECTOMY  1986   • CARDIAC CATHETERIZATION  11/21/2005    LM FOD. 70-80% proximal LAD.Mild disease CX, RCA and branches.    • CARDIAC CATHETERIZATION  11/28/2005    LM FOD. Pristine LAD stent. CX FOD. RCA mild disease.   • CORONARY ANGIOPLASTY WITH STENT PLACEMENT  11/22/2005    LM FOD. Moderate, diffuse disease LAD. Irregular CX w/ PLVB distal to PDA 99%. Mild disease RCA. Stent to distal LAD.   • EYE SURGERY Left Cataract   • FOOT SURGERY  "     For osteomyelitis.   • GASTROCUTANEOUS FISTULA CLOSURE     • LUMBAR SPINE SURGERY  2022    Stage 1: L2-L4 PSF with instrumentation, ORIF L3 pathologic fracture Stage 2: L3 lateral corpectomy with expandable cage placement   • THYROIDECTOMY     • UPPER GASTROINTESTINAL ENDOSCOPY         Social History     Tobacco Use   • Smoking status: Former     Packs/day: 2.00     Years: 20.00     Additional pack years: 0.00     Total pack years: 40.00     Types: Cigarettes     Quit date:      Years since quittin.1   • Smokeless tobacco: Never   Vaping Use   • Vaping Use: Never used   Substance Use Topics   • Alcohol use: Yes     Comment: Social   • Drug use: Never       Family History   Problem Relation Age of Onset   • Cancer Biological Mother    • Breast cancer Biological Mother    • Emphysema Biological Father    • Lung cancer Biological Father        Allergies:  Penicillins    Current Outpatient Medications   Medication Sig Dispense Refill   • aspirin 81 mg enteric coated tablet Take 81 mg by mouth daily.     • dulaglutide (TRULICITY) 3 mg/0.5 mL pen injector Inject 3 mg under the skin once a week. Do not prime. 6 mL 1   • gabapentin (NEURONTIN) 300 mg capsule TAKE 1 CAPSULE  TWICE A  capsule 0   • metFORMIN XR (GLUCOPHAGE-XR) 500 mg 24 hr tablet Take 1 tablet (500 mg total) by mouth 2 (two) times a day with meals. 180 tablet 1   • metoprolol succinate XL (TOPROL-XL) 100 mg 24 hr tablet TAKE 1 TABLET(100 MG) BY MOUTH TWICE DAILY 180 tablet 1   • polyethylene glycol (MIRALAX) 17 gram packet Take 17 g by mouth daily with dinner. 30 packet 0   • pravastatin (PRAVACHOL) 40 mg tablet TAKE 1 TABLET(40 MG) BY MOUTH DAILY 90 tablet 1   • SYNTHROID 150 mcg tablet Take 1 tablet (150 mcg total) by mouth once daily. 30 tablet 1     No current facility-administered medications for this visit.       Review of Systems   Constitutional: Positive for malaise/fatigue.   Cardiovascular: Positive for irregular  heartbeat and palpitations. Negative for chest pain, dyspnea on exertion, leg swelling, near-syncope, orthopnea and syncope.   Hematologic/Lymphatic: Does not bruise/bleed easily.   Neurological: Negative for dizziness and light-headedness.   All other systems reviewed and are negative.      Objective     Vitals:    02/06/24 1222   BP: (!) 156/88   Pulse: 85   SpO2: 99%       Wt Readings from Last 5 Encounters:   02/06/24 82.1 kg (181 lb)   01/17/24 79.4 kg (175 lb)   01/16/23 79.4 kg (175 lb)   10/11/22 81.2 kg (179 lb)   01/16/23 79.4 kg (175 lb)       Body mass index is 30.12 kg/m².    Physical Exam  Vitals reviewed.   Constitutional:       General: He is not in acute distress.     Appearance: Normal appearance. He is well-developed.   HENT:      Head: Normocephalic and atraumatic.   Eyes:      Conjunctiva/sclera: Conjunctivae normal.   Neck:      Vascular: No JVD.   Cardiovascular:      Rate and Rhythm: Normal rate and regular rhythm.      Pulses: Intact distal pulses.           Dorsalis pedis pulses are 1+ on the right side and 1+ on the left side.        Posterior tibial pulses are 1+ on the right side and 1+ on the left side.      Heart sounds: Normal heart sounds. No murmur heard.  Pulmonary:      Effort: Pulmonary effort is normal. No respiratory distress.      Breath sounds: Normal breath sounds. No wheezing or rales.   Chest:      Chest wall: No tenderness.   Abdominal:      General: Bowel sounds are normal. There is no distension.      Palpations: Abdomen is soft.      Tenderness: There is no abdominal tenderness.   Musculoskeletal:         General: Normal range of motion.      Cervical back: Normal range of motion and neck supple.      Right lower leg: No edema.      Left lower leg: No edema.   Skin:     General: Skin is warm and dry.      Findings: No rash.   Neurological:      General: No focal deficit present.      Mental Status: He is alert and oriented to person, place, and time.   Psychiatric:          Mood and Affect: Mood normal.         Behavior: Behavior normal.         Thought Content: Thought content normal.         Judgment: Judgment normal.          ECG: Within normal limits     Hematology  Lab Results   Component Value Date    WBC 7.88 10/11/2022    HGB 14.6 10/11/2022    HCT 44.9 10/11/2022     10/11/2022    INR 1.0 01/18/2022       Chemistries  Lab Results   Component Value Date     (L) 10/11/2022    K 4.8 10/11/2022    CL 94 (L) 10/11/2022    CREATININE 1.1 10/11/2022    BUN 12 10/11/2022    CO2 25 10/11/2022    GLUCOSE 187 (H) 10/11/2022    CALCIUM 9.0 10/11/2022    MG 2.0 02/07/2022    ALT 19 10/11/2022    AST 17 10/11/2022       Cholesterol  Lab Results   Component Value Date    CHOL 136 10/11/2022    TRIG 101 10/11/2022    HDL 58 10/11/2022    LDLCALC 58 10/11/2022    NONHDLCALC 78 10/11/2022       Endocrine  Lab Results   Component Value Date    TSH 0.12 (L) 10/11/2022    FREET4 1.46 01/18/2022    HGBA1C 9.4 (H) 10/11/2022       Cardiac Imaging    ECHOCARDIOGRAM - EXTERNAL RESULT                 Assessment/Plan     Paroxysmal atrial fibrillation (CMS/HCC)  The patient denies frequent palpitations and today's ECG shows normal rhythm.  He is tolerating his current medical regimen.  We will continue to follow him clinically and will consider periodic reevaluations of his heart rhythm    Atherosclerosis of coronary artery  The patient had LAD stenting approximately 19 years ago.  He has had no symptoms to suggest recurrent ischemic heart disease.  His ECG is essentially normal.  He will continue risk factor modification and we will see him on a regular basis.  I will consider stress testing in the near future for surveillance purposes.    Mixed hyperlipidemia  The patient's lipids are adequately controlled on current medication.  The patient is tolerating medication without side effects.  I will continue the current treatment.  The patient will be seeing his primary care physician  later today and he will undoubtedly have blood work performed as part of that evaluation.  Presumably, this will include a lipid profile.      No orders of the defined types were placed in this encounter.      There are no discontinued medications.    Orders Placed This Encounter   Procedures   • [facility] MUSE ECG 12 lead (clinic performed)     Scheduling Instructions:      PLEASE USE THIS ORDER FOR ECG'S PERFORMED IN PHYSICIAN OFFICES     Order Specific Question:   Release to patient     Answer:   Immediate [1]       Follow Up Plans:  Return in about 6 months (around 8/6/2024) for Recheck.          I, Amanda Mckeon, am scribing for, and in the presence of, Minor Ji MD.    IMinor MD, personally performed the services described in this documentation as scribed by Amanda Mckeon in my presence, and it is both accurate and complete.       Minor Ji MD  2/6/2024

## 2024-02-06 ENCOUNTER — OFFICE VISIT (OUTPATIENT)
Dept: INTERNAL MEDICINE | Facility: CLINIC | Age: 79
End: 2024-02-06
Payer: MEDICARE

## 2024-02-06 ENCOUNTER — OFFICE VISIT (OUTPATIENT)
Dept: CARDIOLOGY | Facility: CLINIC | Age: 79
End: 2024-02-06
Payer: MEDICARE

## 2024-02-06 VITALS
WEIGHT: 181 LBS | HEART RATE: 85 BPM | BODY MASS INDEX: 30.16 KG/M2 | HEIGHT: 65 IN | SYSTOLIC BLOOD PRESSURE: 156 MMHG | DIASTOLIC BLOOD PRESSURE: 88 MMHG | OXYGEN SATURATION: 99 %

## 2024-02-06 VITALS
DIASTOLIC BLOOD PRESSURE: 80 MMHG | OXYGEN SATURATION: 97 % | HEIGHT: 65 IN | SYSTOLIC BLOOD PRESSURE: 142 MMHG | TEMPERATURE: 98.4 F | WEIGHT: 181 LBS | RESPIRATION RATE: 16 BRPM | HEART RATE: 90 BPM | BODY MASS INDEX: 30.16 KG/M2

## 2024-02-06 DIAGNOSIS — I70.0 ATHEROSCLEROSIS OF AORTA (CMS/HCC): ICD-10-CM

## 2024-02-06 DIAGNOSIS — M81.8 OTHER OSTEOPOROSIS WITHOUT CURRENT PATHOLOGICAL FRACTURE: ICD-10-CM

## 2024-02-06 DIAGNOSIS — I48.0 PAROXYSMAL ATRIAL FIBRILLATION (CMS/HCC): ICD-10-CM

## 2024-02-06 DIAGNOSIS — Z12.9 CANCER SCREENING: ICD-10-CM

## 2024-02-06 DIAGNOSIS — N40.0 BENIGN PROSTATIC HYPERPLASIA WITHOUT LOWER URINARY TRACT SYMPTOMS: ICD-10-CM

## 2024-02-06 DIAGNOSIS — I10 ESSENTIAL HYPERTENSION: ICD-10-CM

## 2024-02-06 DIAGNOSIS — C41.2 MALIGNANT NEOPLASM OF VERTEBRAL COLUMN, EXCLUDING SACRUM AND COCCYX (CMS/HCC): ICD-10-CM

## 2024-02-06 DIAGNOSIS — R79.89 LOW VITAMIN D LEVEL: ICD-10-CM

## 2024-02-06 DIAGNOSIS — E11.40 TYPE 2 DIABETES MELLITUS WITH DIABETIC NEUROPATHY, WITHOUT LONG-TERM CURRENT USE OF INSULIN (CMS/HCC): ICD-10-CM

## 2024-02-06 DIAGNOSIS — C73 THYROID CANCER (CMS/HCC): Primary | ICD-10-CM

## 2024-02-06 DIAGNOSIS — E78.49 OTHER HYPERLIPIDEMIA: ICD-10-CM

## 2024-02-06 DIAGNOSIS — E78.2 MIXED HYPERLIPIDEMIA: ICD-10-CM

## 2024-02-06 DIAGNOSIS — R53.83 OTHER FATIGUE: ICD-10-CM

## 2024-02-06 DIAGNOSIS — I25.10 ATHEROSCLEROSIS OF NATIVE CORONARY ARTERY OF NATIVE HEART WITHOUT ANGINA PECTORIS: Primary | ICD-10-CM

## 2024-02-06 PROBLEM — Z01.810 PREOP CARDIOVASCULAR EXAM: Status: RESOLVED | Noted: 2021-06-21 | Resolved: 2024-02-06

## 2024-02-06 LAB
25(OH)D3 SERPL-MCNC: 10 NG/ML (ref 30–100)
ALBUMIN SERPL-MCNC: 4.4 G/DL (ref 3.5–5.7)
ALP SERPL-CCNC: 59 IU/L (ref 34–125)
ALT SERPL-CCNC: 18 IU/L (ref 7–52)
ANION GAP SERPL CALC-SCNC: 7 MEQ/L (ref 3–15)
AST SERPL-CCNC: 18 IU/L (ref 13–39)
ATRIAL RATE: 78
BASOPHILS # BLD: 0.08 K/UL (ref 0.01–0.1)
BASOPHILS NFR BLD: 0.9 %
BILIRUB SERPL-MCNC: 0.6 MG/DL (ref 0.3–1.2)
BILIRUB UR QL STRIP.AUTO: NEGATIVE MG/DL
BUN SERPL-MCNC: 12 MG/DL (ref 7–25)
CALCIUM SERPL-MCNC: 9.6 MG/DL (ref 8.6–10.3)
CHLORIDE SERPL-SCNC: 100 MEQ/L (ref 98–107)
CHOLEST SERPL-MCNC: 145 MG/DL
CLARITY UR REFRACT.AUTO: CLEAR
CO2 SERPL-SCNC: 29 MEQ/L (ref 21–31)
COLOR UR AUTO: COLORLESS
CREAT SERPL-MCNC: 1.2 MG/DL (ref 0.7–1.3)
CREAT UR-MCNC: 68.9 MG/DL
DIFFERENTIAL METHOD BLD: NORMAL
EGFRCR SERPLBLD CKD-EPI 2021: >60 ML/MIN/1.73M*2
EOSINOPHIL # BLD: 0.2 K/UL (ref 0.04–0.54)
EOSINOPHIL NFR BLD: 2.2 %
ERYTHROCYTE [DISTWIDTH] IN BLOOD BY AUTOMATED COUNT: 13.5 % (ref 11.6–14.4)
GLUCOSE SERPL-MCNC: 116 MG/DL (ref 70–99)
GLUCOSE UR STRIP.AUTO-MCNC: NEGATIVE MG/DL
HCT VFR BLD AUTO: 44.6 % (ref 40.1–51)
HDLC SERPL-MCNC: 56 MG/DL
HDLC SERPL: 2.6 {RATIO}
HGB BLD-MCNC: 15 G/DL (ref 13.7–17.5)
HGB UR QL STRIP.AUTO: NEGATIVE
IMM GRANULOCYTES # BLD AUTO: 0.02 K/UL (ref 0–0.08)
IMM GRANULOCYTES NFR BLD AUTO: 0.2 %
KETONES UR STRIP.AUTO-MCNC: NEGATIVE MG/DL
LDLC SERPL CALC-MCNC: 70 MG/DL
LEUKOCYTE ESTERASE UR QL STRIP.AUTO: NEGATIVE
LYMPHOCYTES # BLD: 2.11 K/UL (ref 1.2–3.5)
LYMPHOCYTES NFR BLD: 23.5 %
MCH RBC QN AUTO: 29.1 PG (ref 28–33.2)
MCHC RBC AUTO-ENTMCNC: 33.6 G/DL (ref 32.2–36.5)
MCV RBC AUTO: 86.4 FL (ref 83–98)
MICROALBUMIN UR-MCNC: <2 MG/L
MICROALBUMIN/CREAT UR: NORMAL MG/G{CREAT}
MONOCYTES # BLD: 1 K/UL (ref 0.3–1)
MONOCYTES NFR BLD: 11.1 %
NEUTROPHILS # BLD: 5.56 K/UL (ref 1.7–7)
NEUTS SEG NFR BLD: 62.1 %
NITRITE UR QL STRIP.AUTO: NEGATIVE
NONHDLC SERPL-MCNC: 89 MG/DL
NRBC BLD-RTO: 0 %
P AXIS: 54
PDW BLD AUTO: 10.7 FL (ref 9.4–12.4)
PH UR STRIP.AUTO: 6.5 [PH]
PLATELET # BLD AUTO: 261 K/UL (ref 150–350)
POTASSIUM SERPL-SCNC: 5.3 MEQ/L (ref 3.5–5.1)
PR INTERVAL: 182
PROT SERPL-MCNC: 7.1 G/DL (ref 6–8.2)
PROT UR QL STRIP.AUTO: NEGATIVE
PSA SERPL-MCNC: 12.49 NG/ML
QRS DURATION: 76
QT INTERVAL: 360
QTC CALCULATION(BAZETT): 410
R AXIS: 50
RBC # BLD AUTO: 5.16 M/UL (ref 4.5–5.8)
SODIUM SERPL-SCNC: 136 MEQ/L (ref 136–145)
SP GR UR REFRACT.AUTO: 1.01
T WAVE AXIS: 12
T4 FREE SERPL-MCNC: 1.48 NG/DL (ref 0.58–1.64)
TRIGL SERPL-MCNC: 94 MG/DL
TSH SERPL DL<=0.05 MIU/L-ACNC: 0.08 MIU/L (ref 0.34–5.6)
UROBILINOGEN UR STRIP-ACNC: 0.2 EU/DL
VENTRICULAR RATE: 78
WBC # BLD AUTO: 8.97 K/UL (ref 3.8–10.5)

## 2024-02-06 PROCEDURE — 82043 UR ALBUMIN QUANTITATIVE: CPT | Performed by: INTERNAL MEDICINE

## 2024-02-06 PROCEDURE — 81003 URINALYSIS AUTO W/O SCOPE: CPT | Performed by: INTERNAL MEDICINE

## 2024-02-06 PROCEDURE — 83036 HEMOGLOBIN GLYCOSYLATED A1C: CPT | Performed by: INTERNAL MEDICINE

## 2024-02-06 PROCEDURE — 85025 COMPLETE CBC W/AUTO DIFF WBC: CPT | Performed by: INTERNAL MEDICINE

## 2024-02-06 PROCEDURE — 93000 ELECTROCARDIOGRAM COMPLETE: CPT | Performed by: INTERNAL MEDICINE

## 2024-02-06 PROCEDURE — 99214 OFFICE O/P EST MOD 30 MIN: CPT | Performed by: INTERNAL MEDICINE

## 2024-02-06 PROCEDURE — 36415 COLL VENOUS BLD VENIPUNCTURE: CPT | Performed by: INTERNAL MEDICINE

## 2024-02-06 PROCEDURE — 80053 COMPREHEN METABOLIC PANEL: CPT | Performed by: INTERNAL MEDICINE

## 2024-02-06 PROCEDURE — 80061 LIPID PANEL: CPT | Performed by: INTERNAL MEDICINE

## 2024-02-06 PROCEDURE — 82570 ASSAY OF URINE CREATININE: CPT | Performed by: INTERNAL MEDICINE

## 2024-02-06 PROCEDURE — 84443 ASSAY THYROID STIM HORMONE: CPT | Performed by: INTERNAL MEDICINE

## 2024-02-06 PROCEDURE — G8754 DIAS BP LESS 90: HCPCS | Performed by: INTERNAL MEDICINE

## 2024-02-06 PROCEDURE — 82306 VITAMIN D 25 HYDROXY: CPT | Performed by: INTERNAL MEDICINE

## 2024-02-06 PROCEDURE — G8753 SYS BP > OR = 140: HCPCS | Performed by: INTERNAL MEDICINE

## 2024-02-06 PROCEDURE — 84153 ASSAY OF PSA TOTAL: CPT | Performed by: INTERNAL MEDICINE

## 2024-02-06 PROCEDURE — 84439 ASSAY OF FREE THYROXINE: CPT | Performed by: INTERNAL MEDICINE

## 2024-02-06 RX ORDER — LOSARTAN POTASSIUM 25 MG/1
25 TABLET ORAL DAILY
Qty: 90 TABLET | Refills: 1 | Status: SHIPPED | OUTPATIENT
Start: 2024-02-06 | End: 2024-08-12

## 2024-02-06 ASSESSMENT — ENCOUNTER SYMPTOMS
ALLERGIC/IMMUNOLOGIC NEGATIVE: 1
DIZZINESS: 0
PALPITATIONS: 1
IRREGULAR HEARTBEAT: 1
ENDOCRINE NEGATIVE: 1
NEAR-SYNCOPE: 0
RESPIRATORY NEGATIVE: 1
CONSTITUTIONAL NEGATIVE: 1
SYNCOPE: 0
GASTROINTESTINAL NEGATIVE: 1
ORTHOPNEA: 0
EYES NEGATIVE: 1
NEUROLOGICAL NEGATIVE: 1
BRUISES/BLEEDS EASILY: 0
CARDIOVASCULAR NEGATIVE: 1
LIGHT-HEADEDNESS: 0
PSYCHIATRIC NEGATIVE: 1
DYSPNEA ON EXERTION: 0
MUSCULOSKELETAL NEGATIVE: 1

## 2024-02-06 NOTE — PROGRESS NOTES
Patient ID: Matt Williamson is a 78 y.o. male.      Problem List Items Addressed This Visit     Atherosclerosis of aorta (CMS/HCC)     Modify risk factors         BPH (benign prostatic hyperplasia)     Elevated PSA  Prior biopsy 2019  Needs to see urology         Relevant Orders    Urinalysis with Reflex Culture (ED and Outpatient only) (Completed)    PSA (Completed)    Essential hypertension     Blood pressure today is controlled, continue with the same medications.  No symptoms of chest pain, pressure, palpitations or shortness of breath.  The patient will continue with low-salt, with healthy diet and exercise.         Relevant Medications    losartan (COZAAR) 25 mg tablet    Malignant neoplasm of vertebral column, excluding sacrum and coccyx (CMS/HCC)     History of metastatic thyroid cancer  Keep follow-up with Dr. Lerma, Dr. Heller  Needs follow-up MRI of the lumbar spine  Recent MRI cervical spine is stable         Paroxysmal atrial fibrillation (CMS/HCC)     Sees Dr. Ji  Metoprolol and aspirin         Relevant Medications    losartan (COZAAR) 25 mg tablet    Thyroid cancer (CMS/HCC) - Primary     Needs to have this levothyroxine adjusted by Dr. Lerma  Labs are pending         Type 2 diabetes mellitus with diabetic neuropathy, unspecified (CMS/HCC)     Labs are pending    Current meds are metformin, Trulicity         Relevant Orders    Hemoglobin A1c    Microalbumin/Creatinine Ur Random (Completed)   Other Visit Diagnoses     Low vitamin D level        Relevant Orders    Vitamin D 25 hydroxy (Completed)    Cancer screening        Relevant Orders    Urinalysis with Reflex Culture (ED and Outpatient only) (Completed)    Ambulatory referral to Gastroenterology    Other fatigue        Relevant Orders    CBC and Differential (Completed)    TSH 3rd Generation (Completed)    T4, free (Completed)    Other hyperlipidemia        Relevant Orders    Comprehensive metabolic panel (Completed)    Lipid panel (Completed)     Other osteoporosis without current pathological fracture        Relevant Orders    Vitamin D 25 hydroxy (Completed)          Patient Active Problem List   Diagnosis   • BPH (benign prostatic hyperplasia)   • Atherosclerosis of coronary artery   • Cataract   • Essential hypertension   • Former tobacco use   • Mixed hyperlipidemia   • Type 2 diabetes mellitus with diabetic neuropathy, unspecified (CMS/HCC)   • Thyroid cancer (CMS/HCC)   • Esophageal reflux   • Paroxysmal atrial fibrillation (CMS/HCC)   • Pre-op evaluation   • Cancer associated pain   • Low back pain with right-sided sciatica   • Malignant neoplasm of vertebral column, excluding sacrum and coccyx (CMS/HCC)   • Acquired hypothyroidism   • Leg swelling   • Lumbar disc disease with radiculopathy   • S/P lumbar fusion   • Vitamin D deficiency   • AVI (iron deficiency anemia)   • Colon polyp   • Other constipation   • Pain of left breast   • Atherosclerosis of aorta (CMS/HCC)       Patient's Medications   New Prescriptions    LOSARTAN (COZAAR) 25 MG TABLET    Take 1 tablet (25 mg total) by mouth daily.   Previous Medications    ASPIRIN 81 MG ENTERIC COATED TABLET    Take 81 mg by mouth daily.    DULAGLUTIDE (TRULICITY) 3 MG/0.5 ML PEN INJECTOR    Inject 3 mg under the skin once a week. Do not prime.    GABAPENTIN (NEURONTIN) 300 MG CAPSULE    TAKE 1 CAPSULE  TWICE A DAY    METFORMIN XR (GLUCOPHAGE-XR) 500 MG 24 HR TABLET    Take 1 tablet (500 mg total) by mouth 2 (two) times a day with meals.    METOPROLOL SUCCINATE XL (TOPROL-XL) 100 MG 24 HR TABLET    TAKE 1 TABLET(100 MG) BY MOUTH TWICE DAILY    POLYETHYLENE GLYCOL (MIRALAX) 17 GRAM PACKET    Take 17 g by mouth daily with dinner.    PRAVASTATIN (PRAVACHOL) 40 MG TABLET    TAKE 1 TABLET(40 MG) BY MOUTH DAILY    SYNTHROID 150 MCG TABLET    Take 1 tablet (150 mcg total) by mouth once daily.   Modified Medications    No medications on file   Discontinued Medications    No medications on file     New  Prescriptions    LOSARTAN (COZAAR) 25 MG TABLET    Take 1 tablet (25 mg total) by mouth daily.       Allergies   Allergen Reactions   • Penicillins Rash     Childhood allergy       Social History     Tobacco Use   • Smoking status: Former     Packs/day: 2.00     Years: 20.00     Additional pack years: 0.00     Total pack years: 40.00     Types: Cigarettes     Quit date:      Years since quittin.1   • Smokeless tobacco: Never   Substance Use Topics   • Alcohol use: Yes     Comment: Social       Family History   Problem Relation Age of Onset   • Cancer Biological Mother    • Breast cancer Biological Mother    • Emphysema Biological Father    • Lung cancer Biological Father        Subjective   The patient presents the office today for follow-up regarding his medical issues.    His blood pressure is elevated, he has no symptoms but his meds were adjusted.    He saw Dr. Heller he will follow his cervical MRIs yearly at this point.  He did well after surgery and needs an MRI of his lumbar spine.  Recent x-rays and studies reviewed.  He will follow-up with his MRI of the lumbar spine and get back to his neurosurgeon.  He has a history of a prior lumbar fusion.    Recent MRI of the cervical spine  1.  Redemonstrated multilevel cervical spondylosis with moderate to severe  central canal narrowing at C4-C5 and severe central canal narrowing at C5-C6.  2.  Severe right foraminal narrowing is also noted at C3-C4 and C4-C5.    History of thyroid cancer  Follows with Dr. Lerma  Repeat labs are pending    PAF currently stable on medications without any bleeding, chest pain, palpitations, increased shortness of breath or edema.  Rates are currently controlled.    The patient has known coronary artery disease and denies any chest pain, palpitations, syncope.  He is not used any nitroglycerin since his last visit.  He denies any bleeding, or excessive bruising.    Hyperlipidemia reviewed today and patient is compliant with  "healthy diet and exercise.  Discussed goals of treatment for hyperlipidemia to prevent complications.  The patient has no muscle pain, abdominal pain or other intolerances to medication.    He has an elevated PSA  Repeat is pending  He needs to keep his follow-up with urology  Last biopsy was 2019.    Continue with healthy diet for hepatic steatosis    He needs to see Dr. Gonzalez regarding follow-up colonoscopy and history of colon polyp.      Lab Results   Component Value Date    WBC 8.97 02/06/2024    HGB 15.0 02/06/2024    HCT 44.6 02/06/2024     02/06/2024    CHOL 145 02/06/2024    TRIG 94 02/06/2024    HDL 56 02/06/2024    ALT 18 02/06/2024    AST 18 02/06/2024     02/06/2024    K 5.3 (H) 02/06/2024     02/06/2024    CREATININE 1.2 02/06/2024    BUN 12 02/06/2024    CO2 29 02/06/2024    TSH 0.08 (L) 02/06/2024    PSA 12.49 (H) 02/06/2024    INR 1.0 01/18/2022    HGBA1C 9.4 (H) 10/11/2022    MICROALBUR <2.0 02/06/2024           HPI  I reviewed his  medications, recent labs, and correspondence.     Review of Systems   Constitutional: Negative.    HENT: Negative.    Eyes: Negative.    Respiratory: Negative.    Cardiovascular: Negative.    Gastrointestinal: Negative.    Endocrine: Negative.    Genitourinary: Negative.    Musculoskeletal: Negative.    Allergic/Immunologic: Negative.    Neurological: Negative.    Psychiatric/Behavioral: Negative.        Visit Vitals  BP (!) 142/80   Pulse 90   Temp 36.9 °C (98.4 °F) (Oral)   Resp 16   Ht 1.651 m (5' 5\")   Wt 82.1 kg (181 lb)   SpO2 97%   BMI 30.12 kg/m²         Physical Exam  Constitutional:       Appearance: He is well-developed.   HENT:      Head: Normocephalic and atraumatic.      Right Ear: External ear normal.      Left Ear: External ear normal.      Nose: Nose normal.   Eyes:      Conjunctiva/sclera: Conjunctivae normal.      Pupils: Pupils are equal, round, and reactive to light.   Neck:      Thyroid: No thyromegaly.   Cardiovascular:      Rate " and Rhythm: Normal rate and regular rhythm.      Heart sounds: Normal heart sounds.   Pulmonary:      Effort: Pulmonary effort is normal.      Breath sounds: Normal breath sounds.   Abdominal:      General: Bowel sounds are normal.      Palpations: Abdomen is soft.   Musculoskeletal:         General: Normal range of motion.      Cervical back: Normal range of motion.   Lymphadenopathy:      Cervical: No cervical adenopathy.   Skin:     General: Skin is warm and dry.   Neurological:      Mental Status: He is alert and oriented to person, place, and time.

## 2024-02-06 NOTE — LETTER
"February 6, 2024     Raj Arreola, DO  [address]  [facility] [address]    Patient: Matt Williamson  YOB: 1945  Date of Visit: 2/6/2024      Dear Dr. Arreola:    Thank you for referring Matt Williamson to me for evaluation. Below are my notes for this consultation.    If you have questions, please do not hesitate to call me. I look forward to following your patient along with you.         Sincerely,        Minor Ji MD        CC: No Recipients    Minor Ji MD  2/6/2024 12:58 PM  Signed       Cardiology  Office Progress Note         Reason for visit:   Chief Complaint   Patient presents with   • Follow-up       HPI     Matt Williamson is a 78 y.o. male who presents to the office for cardiovascular follow up and management of coronary artery disease s/p MARGOT, paroxysmal atrial fibrillation, hypertension and dyslipidemia.      He was last seen in the office on 1/18/22.  At that visit I cleared him for L2-L4 PSF and L3 lateral corpectomy with Dr. Colt Heller.  I made no changes to his medical regimen and asked him to follow up in 6 months.    He has not been back since January 2022.  Since that time he decreased his Toprol XL from 100mg BID to 100mg nightly due to \"taking too many pills.\"    His home BPs have been running 140s/80s.     He denies any chest pain, shortness of breath, edema, near syncope or syncope.    He has occasional palpitations.  He describes these as a skipped beat or flutter that lasts only seconds.    FLP 10/11/22:  , , HDL 58, LDL 58      Past Medical History:   Diagnosis Date   • Atrial fibrillation with rapid ventricular response (CMS/HCC) 10/25/2019    Hospitalized on 10/25/19 at [facility].    • BPH (benign prostatic hyperplasia)    • Coronary artery disease     LAD stent 2005   • Hypertension    • Hypothyroidism    • Mixed hyperlipidemia    • Thyroid cancer (CMS/HCC)    • Type 2 diabetes mellitus without " complication (CMS/HCC)    • Vertigo        Past Surgical History:   Procedure Laterality Date   • ANAL FISSURECTOMY     • CARDIAC CATHETERIZATION  2005    LM FOD. 70-80% proximal LAD.Mild disease CX, RCA and branches.    • CARDIAC CATHETERIZATION  2005    LM FOD. [place] LAD stent. CX FOD. RCA mild disease.   • CORONARY ANGIOPLASTY WITH STENT PLACEMENT  2005    LM FOD. Moderate, diffuse disease LAD. Irregular CX w/ PLVB distal to PDA 99%. Mild disease RCA. Stent to distal LAD.   • EYE SURGERY Left Cataract   • FOOT SURGERY      For osteomyelitis.   • GASTROCUTANEOUS FISTULA CLOSURE     • LUMBAR SPINE SURGERY  2022    Stage 1: L2-L4 PSF with instrumentation, ORIF L3 pathologic fracture Stage 2: L3 lateral corpectomy with expandable cage placement   • THYROIDECTOMY     • UPPER GASTROINTESTINAL ENDOSCOPY         Social History     Tobacco Use   • Smoking status: Former     Packs/day: 2.00     Years: 20.00     Additional pack years: 0.00     Total pack years: 40.00     Types: Cigarettes     Quit date:      Years since quittin.1   • Smokeless tobacco: Never   Vaping Use   • Vaping Use: Never used   Substance Use Topics   • Alcohol use: Yes     Comment: Social   • Drug use: Never       Family History   Problem Relation Age of Onset   • Cancer Biological Mother    • Breast cancer Biological Mother    • Emphysema Biological Father    • Lung cancer Biological Father        Allergies:  Penicillins    Current Outpatient Medications   Medication Sig Dispense Refill   • aspirin 81 mg enteric coated tablet Take 81 mg by mouth daily.     • dulaglutide (TRULICITY) 3 mg/0.5 mL pen injector Inject 3 mg under the skin once a week. Do not prime. 6 mL 1   • gabapentin (NEURONTIN) 300 mg capsule TAKE 1 CAPSULE  TWICE A  capsule 0   • metFORMIN XR (GLUCOPHAGE-XR) 500 mg 24 hr tablet Take 1 tablet (500 mg total) by mouth 2 (two) times a day with meals. 180 tablet 1   • metoprolol  succinate XL (TOPROL-XL) 100 mg 24 hr tablet TAKE 1 TABLET(100 MG) BY MOUTH TWICE DAILY 180 tablet 1   • polyethylene glycol (MIRALAX) 17 gram packet Take 17 g by mouth daily with dinner. 30 packet 0   • pravastatin (PRAVACHOL) 40 mg tablet TAKE 1 TABLET(40 MG) BY MOUTH DAILY 90 tablet 1   • SYNTHROID 150 mcg tablet Take 1 tablet (150 mcg total) by mouth once daily. 30 tablet 1     No current facility-administered medications for this visit.       Review of Systems   Constitutional: Positive for malaise/fatigue.   Cardiovascular: Positive for irregular heartbeat and palpitations. Negative for chest pain, dyspnea on exertion, leg swelling, near-syncope, orthopnea and syncope.   Hematologic/Lymphatic: Does not bruise/bleed easily.   Neurological: Negative for dizziness and light-headedness.   All other systems reviewed and are negative.      Objective     Vitals:    02/06/24 1222   BP: (!) 156/88   Pulse: 85   SpO2: 99%       Wt Readings from Last 5 Encounters:   02/06/24 82.1 kg (181 lb)   01/17/24 79.4 kg (175 lb)   01/16/23 79.4 kg (175 lb)   10/11/22 81.2 kg (179 lb)   01/16/23 79.4 kg (175 lb)       Body mass index is 30.12 kg/m².    Physical Exam  Vitals reviewed.   Constitutional:       General: He is not in acute distress.     Appearance: Normal appearance. He is well-developed.   HENT:      Head: Normocephalic and atraumatic.   Eyes:      Conjunctiva/sclera: Conjunctivae normal.   Neck:      Vascular: No JVD.   Cardiovascular:      Rate and Rhythm: Normal rate and regular rhythm.      Pulses: Intact distal pulses.           Dorsalis pedis pulses are 1+ on the right side and 1+ on the left side.        Posterior tibial pulses are 1+ on the right side and 1+ on the left side.      Heart sounds: Normal heart sounds. No murmur heard.  Pulmonary:      Effort: Pulmonary effort is normal. No respiratory distress.      Breath sounds: Normal breath sounds. No wheezing or rales.   Chest:      Chest wall: No tenderness.    Abdominal:      General: Bowel sounds are normal. There is no distension.      Palpations: Abdomen is soft.      Tenderness: There is no abdominal tenderness.   Musculoskeletal:         General: Normal range of motion.      Cervical back: Normal range of motion and neck supple.      Right lower leg: No edema.      Left lower leg: No edema.   Skin:     General: Skin is warm and dry.      Findings: No rash.   Neurological:      General: No focal deficit present.      Mental Status: He is alert and oriented to person, place, and time.   Psychiatric:         Mood and Affect: Mood normal.         Behavior: Behavior normal.         Thought Content: Thought content normal.         Judgment: Judgment normal.          ECG: Within normal limits     Hematology  Lab Results   Component Value Date    WBC 7.88 10/11/2022    HGB 14.6 10/11/2022    HCT 44.9 10/11/2022     10/11/2022    INR 1.0 01/18/2022       Chemistries  Lab Results   Component Value Date     (L) 10/11/2022    K 4.8 10/11/2022    CL 94 (L) 10/11/2022    CREATININE 1.1 10/11/2022    BUN 12 10/11/2022    CO2 25 10/11/2022    GLUCOSE 187 (H) 10/11/2022    CALCIUM 9.0 10/11/2022    MG 2.0 02/07/2022    ALT 19 10/11/2022    AST 17 10/11/2022       Cholesterol  Lab Results   Component Value Date    CHOL 136 10/11/2022    TRIG 101 10/11/2022    HDL 58 10/11/2022    LDLCALC 58 10/11/2022    NONHDLCALC 78 10/11/2022       Endocrine  Lab Results   Component Value Date    TSH 0.12 (L) 10/11/2022    FREET4 1.46 01/18/2022    HGBA1C 9.4 (H) 10/11/2022       Cardiac Imaging    ECHOCARDIOGRAM - EXTERNAL RESULT                 Assessment/Plan     Paroxysmal atrial fibrillation (CMS/HCC)  The patient denies frequent palpitations and today's ECG shows normal rhythm.  He is tolerating his current medical regimen.  We will continue to follow him clinically and will consider periodic reevaluations of his heart rhythm    Atherosclerosis of coronary artery  The patient  had LAD stenting approximately 19 years ago.  He has had no symptoms to suggest recurrent ischemic heart disease.  His ECG is essentially normal.  He will continue risk factor modification and we will see him on a regular basis.  I will consider stress testing in the near future for surveillance purposes.    Mixed hyperlipidemia  The patient's lipids are adequately controlled on current medication.  The patient is tolerating medication without side effects.  I will continue the current treatment.  The patient will be seeing his primary care physician later today and he will undoubtedly have blood work performed as part of that evaluation.  Presumably, this will include a lipid profile.      No orders of the defined types were placed in this encounter.      There are no discontinued medications.    Orders Placed This Encounter   Procedures   • [facility] MUSE ECG 12 lead (clinic performed)     Scheduling Instructions:      PLEASE USE THIS ORDER FOR ECG'S PERFORMED IN PHYSICIAN OFFICES     Order Specific Question:   Release to patient     Answer:   Immediate [1]       Follow Up Plans:  Return in about 6 months (around 8/6/2024) for Recheck.         IAmanda, am scribing for, and in the presence of, Minor Ji MD.    IMinor MD, personally performed the services described in this documentation as scribed by Amanda Mckeon in my presence, and it is both accurate and complete.       Minor Ji MD  2/6/2024

## 2024-02-06 NOTE — ASSESSMENT & PLAN NOTE
The patient denies frequent palpitations and today's ECG shows normal rhythm.  He is tolerating his current medical regimen.  We will continue to follow him clinically and will consider periodic reevaluations of his heart rhythm

## 2024-02-06 NOTE — ASSESSMENT & PLAN NOTE
The patient had LAD stenting approximately 19 years ago.  He has had no symptoms to suggest recurrent ischemic heart disease.  His ECG is essentially normal.  He will continue risk factor modification and we will see him on a regular basis.  I will consider stress testing in the near future for surveillance purposes.

## 2024-02-06 NOTE — ASSESSMENT & PLAN NOTE
The patient's lipids are adequately controlled on current medication.  The patient is tolerating medication without side effects.  I will continue the current treatment.  The patient will be seeing his primary care physician later today and he will undoubtedly have blood work performed as part of that evaluation.  Presumably, this will include a lipid profile.

## 2024-02-07 PROBLEM — I70.0 ATHEROSCLEROSIS OF AORTA (CMS/HCC): Status: ACTIVE | Noted: 2024-02-07

## 2024-02-07 LAB
EST. AVERAGE GLUCOSE BLD GHB EST-MCNC: 206 MG/DL
HBA1C MFR BLD: 8.8 %

## 2024-02-07 NOTE — ASSESSMENT & PLAN NOTE
History of metastatic thyroid cancer  Keep follow-up with Dr. Lerma, Dr. Heller  Needs follow-up MRI of the lumbar spine  Recent MRI cervical spine is stable

## 2024-02-07 NOTE — RESULT ENCOUNTER NOTE
Written by Raj Arreola DO on 2/7/2024 12:23 AM EST View Full Comments  Seen by patient Marco A Williamson on 2/7/2024  9:15 AM    Will forward to specialty

## 2024-02-08 RX ORDER — PRAVASTATIN SODIUM 40 MG/1
40 TABLET ORAL DAILY
Qty: 90 TABLET | Refills: 1 | Status: SHIPPED | OUTPATIENT
Start: 2024-02-08 | End: 2024-03-07

## 2024-02-08 NOTE — TELEPHONE ENCOUNTER
Last Office Visit: 02/06/2024  Last Telemedicine Visit: 10/2/2020 Raj Arreola DO    Next Office Visit: Visit date not found  Next Telemedicine Visit: Visit date not found     BP Readings from Last 3 Encounters:   02/06/24 (!) 142/80   02/06/24 (!) 156/88   01/17/24 (!) 150/80       Recent Lab results:  Lab Results   Component Value Date    CHOL 145 02/06/2024   ,   Lab Results   Component Value Date    HDL 56 02/06/2024   ,   Lab Results   Component Value Date    LDLCALC 70 02/06/2024   ,   Lab Results   Component Value Date    TRIG 94 02/06/2024        Lab Results   Component Value Date    GLUCOSE 116 (H) 02/06/2024   ,   Lab Results   Component Value Date    HGBA1C 8.8 (H) 02/06/2024         Lab Results   Component Value Date    CREATININE 1.2 02/06/2024       Lab Results   Component Value Date    TSH 0.08 (L) 02/06/2024

## 2024-04-22 ENCOUNTER — TELEPHONE (OUTPATIENT)
Dept: INTERNAL MEDICINE | Facility: CLINIC | Age: 79
End: 2024-04-22
Payer: MEDICARE

## 2024-04-22 RX ORDER — IBUPROFEN 800 MG/1
800 TABLET ORAL EVERY 6 HOURS PRN
Qty: 30 TABLET | Refills: 0 | Status: SHIPPED | OUTPATIENT
Start: 2024-04-22 | End: 2025-03-26

## 2024-04-22 NOTE — TELEPHONE ENCOUNTER
Spoke with patient  Right waist, buttocks, down right leg  Ibuprofen 800 mg was helping alternating with tylenol  Refill sent for ibuprofen 800 mg  If symptoms worsen he will go to local urgent care or ER for imaging  He returns to [place] on Wednesday and will call if he needs additional evaluation.

## 2024-04-22 NOTE — TELEPHONE ENCOUNTER
Pt calling he is on vacation and doing a lot of walking does not know what he did but he is experiencing back that affecting his right leg pain is bad pt has had sciatica in past but feels different pt in [place] and is asking if Could Rx something

## 2024-05-16 ENCOUNTER — HOSPITAL ENCOUNTER (OUTPATIENT)
Dept: RADIOLOGY | Facility: HOSPITAL | Age: 79
Discharge: HOME | End: 2024-05-16
Attending: PHYSICIAN ASSISTANT
Payer: MEDICARE

## 2024-05-16 DIAGNOSIS — M54.41 LOW BACK PAIN WITH RIGHT-SIDED SCIATICA, UNSPECIFIED BACK PAIN LATERALITY, UNSPECIFIED CHRONICITY: ICD-10-CM

## 2024-05-16 DIAGNOSIS — C73 METASTASIS FROM THYROID CANCER (CMS/HCC): ICD-10-CM

## 2024-05-16 DIAGNOSIS — C79.9 METASTASIS FROM THYROID CANCER (CMS/HCC): ICD-10-CM

## 2024-05-16 RX ORDER — GADOBUTROL 604.72 MG/ML
0.1 INJECTION INTRAVENOUS ONCE
Status: COMPLETED | OUTPATIENT
Start: 2024-05-16 | End: 2024-05-16

## 2024-05-16 RX ADMIN — GADOBUTROL 8.2 ML: 604.72 INJECTION INTRAVENOUS at 15:00

## 2024-05-17 ENCOUNTER — TELEPHONE (OUTPATIENT)
Dept: RADIATION ONCOLOGY | Facility: HOSPITAL | Age: 79
End: 2024-05-17
Payer: MEDICARE

## 2024-05-17 DIAGNOSIS — C73 THYROID CANCER (CMS/HCC): Primary | ICD-10-CM

## 2024-05-17 DIAGNOSIS — C41.2 MALIGNANT NEOPLASM OF VERTEBRAL COLUMN, EXCLUDING SACRUM AND COCCYX (CMS/HCC): Primary | ICD-10-CM

## 2024-05-17 NOTE — TELEPHONE ENCOUNTER
Pt called in and wanted to talk to DR [facility] about MRI results he had yesterday. I let him know that I would forward the message to DR ECHOLS to look at MRI and call him regarding results. Pt just wanted his opinion on it.

## 2024-05-17 NOTE — PROGRESS NOTES
Recent MRI lumbar spine which shows stable L3 disease with new lesions around 1 and T12.  These are both suspicious for metastatic deposits.  Patient has history of metastatic thyroid carcinoma status post stereotactic body radiation therapy to L3 followed by surgical resection.  Patient continues to have low back pain possibly little higher in the spine.  Patient in [place] contacted plan PET CT scan and blood work including thyroglobulin level for reassessment purposes.  Patient to call after studies.

## 2024-05-20 ENCOUNTER — TELEPHONE (OUTPATIENT)
Dept: RADIATION ONCOLOGY | Facility: HOSPITAL | Age: 79
End: 2024-05-20
Payer: MEDICARE

## 2024-05-20 NOTE — TELEPHONE ENCOUNTER
Pt called wanting his labs to be faxed to [facility].  Left pt a message that his bloodwork was sent to [facility] via [facility] - they can obtain there.

## 2024-05-22 ENCOUNTER — TELEPHONE (OUTPATIENT)
Dept: RADIATION ONCOLOGY | Facility: HOSPITAL | Age: 79
End: 2024-05-22
Payer: MEDICARE

## 2024-05-22 NOTE — TELEPHONE ENCOUNTER
Pt called in asking about a fax to send his lab results to us. Pt given fax number [phone] pt danica send fax of updated labs.

## 2024-05-29 ENCOUNTER — OFFICE VISIT (OUTPATIENT)
Dept: NEUROSURGERY | Facility: CLINIC | Age: 79
End: 2024-05-29
Payer: MEDICARE

## 2024-05-29 ENCOUNTER — HOSPITAL ENCOUNTER (OUTPATIENT)
Dept: RADIOLOGY | Facility: CLINIC | Age: 79
Discharge: HOME | End: 2024-05-29
Attending: RADIOLOGY
Payer: MEDICARE

## 2024-05-29 VITALS — HEIGHT: 65 IN | WEIGHT: 175 LBS | BODY MASS INDEX: 29.16 KG/M2

## 2024-05-29 VITALS
OXYGEN SATURATION: 99 % | BODY MASS INDEX: 29.16 KG/M2 | DIASTOLIC BLOOD PRESSURE: 82 MMHG | SYSTOLIC BLOOD PRESSURE: 130 MMHG | HEIGHT: 65 IN | HEART RATE: 85 BPM | TEMPERATURE: 97.9 F | RESPIRATION RATE: 16 BRPM | WEIGHT: 175 LBS

## 2024-05-29 DIAGNOSIS — C79.9 METASTASIS FROM THYROID CANCER (CMS/HCC): Primary | ICD-10-CM

## 2024-05-29 DIAGNOSIS — C73 METASTASIS FROM THYROID CANCER (CMS/HCC): Primary | ICD-10-CM

## 2024-05-29 DIAGNOSIS — C41.2 MALIGNANT NEOPLASM OF VERTEBRAL COLUMN, EXCLUDING SACRUM AND COCCYX (CMS/HCC): ICD-10-CM

## 2024-05-29 PROCEDURE — G8754 DIAS BP LESS 90: HCPCS | Performed by: NEUROLOGICAL SURGERY

## 2024-05-29 PROCEDURE — 99215 OFFICE O/P EST HI 40 MIN: CPT | Performed by: NEUROLOGICAL SURGERY

## 2024-05-29 PROCEDURE — G8752 SYS BP LESS 140: HCPCS | Performed by: NEUROLOGICAL SURGERY

## 2024-05-29 RX ORDER — FLUDEOXYGLUCOSE F18 300 MCI/ML
8.94 INJECTION INTRAVENOUS ONCE
Status: COMPLETED | OUTPATIENT
Start: 2024-05-29 | End: 2024-05-29

## 2024-05-29 RX ADMIN — FLUDEOXYGLUCOSE F18 8.94 MILLICURIE: 300 INJECTION INTRAVENOUS at 12:26

## 2024-05-31 ENCOUNTER — TELEPHONE (OUTPATIENT)
Dept: RADIATION ONCOLOGY | Facility: HOSPITAL | Age: 79
End: 2024-05-31
Payer: MEDICARE

## 2024-06-03 NOTE — TELEPHONE ENCOUNTER
Last Appointment:  5/7/2024  Future Appointments   Date Time Provider Department Center   10/23/2024  2:30 PM Letty Cespedes MD [facility]   10/23/2024  2:45 PM Letty Cespedes MD [facility]         Left message re:  Arrival 0630; NPO after midnight except meds, bring med list; need .  Blood thinners held.

## 2024-06-17 ENCOUNTER — DOCUMENTATION (OUTPATIENT)
Dept: RADIATION ONCOLOGY | Facility: HOSPITAL | Age: 79
End: 2024-06-17
Payer: MEDICARE

## 2024-06-17 NOTE — PROGRESS NOTES
Note:  Pt lives in [place], [place].       Hx Papillary Thyroid Cancer in 2012 - Total thyroidectomy with Dr. Lester at [place].  Rec'd  50.2 mCi I-131 5/10/12.    Mets Thyroid 3.6 cm osteolytic lesion to Rt aspect L 3 and 0.5 cm lesion to Lt humeral head.     EOT SBRT4/30/24 L3   Last Follow-up 5/6/21 with Dr. [facility].  Pt to have PET/CT and labs.    PET showed uptake in rectum.  Colonoscopy suggested.  Dr. Gonzalez 6/28/21 =  Diverticuli, ascending colon polyp, 2 transverse colon polyps, 2 splenic flexure polyps.     1/27/22 Stage 1: L2-L4 PSF with instrumentation, ORIF L3 pathologic fracture Stage 2: L3 lateral corpectomy with expandable cage placement  for isolated area of progression at the L3 vertebral body.  Dr. Casey did not recommend chemo at that time.      5/16/24 MRI Lumbar spine:  1.  Findings concerning for new metastases to the inferior aspects of the T12  and L1 vertebral bodies.  2.  Postsurgical changes from an L3 corpectomy and posterior fusion of L2-L4  with degenerative and discogenic disease of the lumbar spine, similar to the  prior, as above.    5/17/24 Pt requested MRI L-spine be reviewed by Dr. [facility] - ordered PET/CT and Thyroglobulin level.    5/20/24 TSH with reflex to FT4 = 0.45                Thyroglobulin antibodies = <1    5/29/24 Dr. Heller:  See in 1 year.  Repeat MRI Lumbar spine.        5/29/24 PET/CT:  1.   New lytic lesion in the right ischial tuberosity with corresponding low  level metabolic activity, suspicious for an osseous metastasis  2.  No significant radiotracer activity in the rectal lumbar spine corresponding  to the lesions described on the prior MRI.  3.  No other new foci of abnormal metabolic activity.

## 2024-06-18 ENCOUNTER — APPOINTMENT (OUTPATIENT)
Dept: LAB | Facility: HOSPITAL | Age: 79
End: 2024-06-18
Attending: RADIOLOGY
Payer: MEDICARE

## 2024-06-18 ENCOUNTER — HOSPITAL ENCOUNTER (OUTPATIENT)
Dept: RADIATION ONCOLOGY | Facility: HOSPITAL | Age: 79
Setting detail: RADIATION/ONCOLOGY SERIES
Discharge: HOME | End: 2024-06-18
Attending: RADIOLOGY
Payer: MEDICARE

## 2024-06-18 ENCOUNTER — DOCUMENTATION (OUTPATIENT)
Dept: RADIATION ONCOLOGY | Facility: HOSPITAL | Age: 79
End: 2024-06-18
Payer: MEDICARE

## 2024-06-18 VITALS
SYSTOLIC BLOOD PRESSURE: 158 MMHG | OXYGEN SATURATION: 99 % | DIASTOLIC BLOOD PRESSURE: 97 MMHG | BODY MASS INDEX: 30.05 KG/M2 | HEART RATE: 88 BPM | WEIGHT: 180.6 LBS

## 2024-06-18 DIAGNOSIS — C79.51 SECONDARY CANCER OF BONE (CMS/HCC): ICD-10-CM

## 2024-06-18 DIAGNOSIS — C73 THYROID CANCER (CMS/HCC): ICD-10-CM

## 2024-06-18 DIAGNOSIS — C79.51 SECONDARY CANCER OF BONE (CMS/HCC): Primary | ICD-10-CM

## 2024-06-18 LAB
THYROGLOB AB SERPL-ACNC: <0.9 U/ML
THYROGLOB SERPL-MCNC: 0.26 NG/ML (ref 1.15–130.77)

## 2024-06-18 PROCEDURE — 84432 ASSAY OF THYROGLOBULIN: CPT

## 2024-06-18 PROCEDURE — 36415 COLL VENOUS BLD VENIPUNCTURE: CPT

## 2024-06-18 PROCEDURE — 77332 RADIATION TREATMENT AID(S): CPT | Performed by: RADIOLOGY

## 2024-06-18 PROCEDURE — 86800 THYROGLOBULIN ANTIBODY: CPT

## 2024-06-18 RX ORDER — CHOLECALCIFEROL (VITAMIN D3) 50 MCG
2000 TABLET ORAL DAILY
COMMUNITY

## 2024-06-18 ASSESSMENT — ENCOUNTER SYMPTOMS
CONSTIPATION: 1
APPETITE CHANGE: 0
HEMATURIA: 0
PSYCHIATRIC NEGATIVE: 1
FATIGUE: 1
BACK PAIN: 1
NUMBNESS: 1
COUGH: 0
DIFFICULTY URINATING: 0
PALPITATIONS: 0
CARDIOVASCULAR NEGATIVE: 1
EYES NEGATIVE: 1
SHORTNESS OF BREATH: 0
UNEXPECTED WEIGHT CHANGE: 0

## 2024-06-18 ASSESSMENT — PAIN SCALES - GENERAL: PAINLEVEL: 2

## 2024-06-18 ASSESSMENT — PATIENT HEALTH QUESTIONNAIRE - PHQ9: SUM OF ALL RESPONSES TO PHQ9 QUESTIONS 1 & 2: 0

## 2024-06-18 NOTE — PROGRESS NOTES
TREATMENT PLAN OF CARE AND GOALS SKIN    Care Providers   Radiation Oncologist Dr. [facility]   Surgeon Dr. Heller   Radiation Oncology Nurse Cinthya Li RN BSN OCN    FARHAT Pat [phone]    Treatment Plan of Care   Chemotherapy, Hormonal, and Targeted therapies   Regimen Start date   Interval, Duration             Radiation Therapy   Treated area Start date Interval, Duration   Right hip              -Stay hydrated. Drink plenty of fluids.     -Moisturize your skin. Moisturizers provide a seal over your skin to keep water from escaping. Thicker moisturizers work best. Choose a moisturizer that's water-based and unscented. Ask your doctor or nurse to recommend specific products.    NEVER USE A MOISTURIZER 4 HOURS PRIOR TO YOUR RADIATION TREATMENT.  Apply after treatment and nightly.     -Limit bath time. Hot water and long showers or baths can dry your skin. Take short baths or showers, and use warm -- rather than hot -- water.     - -Wash with mild soap. Avoid harsh, drying soaps such as deodorant or antibacterial types.    - Avoid direct sun. UV rays are the highest from 10 am to 4 pm- and are stronger during spring and summer months. It is suggested to use:  UV Protective clothing, Sunscreen with Zinc, or stay in a shaded area.  Do not use tanning beds.           Vitamins/OTC medications to Avoid:  Please review list enclosed.    Fatigue:  Exercise/Conserve energy as needed.    Weekly skin checks:  The Nurse and Doctor will see you one time weekly for an On Treatment Visit (OTV).   Resources you may be interested in:  Music therapy, Art therapy, Staying Active, Nutrition, Complementary therapies (Reiki etc), Spiritual Care.     Contact Numbers:    Triage Line  [phone]    To schedule or cancel your radiation treatment appointments  [phone]    Reviewed by:    Cinthya Li RN  BSN OCN             Date completed:06/18/24   TREATMENT PLAN OF CARE AND GOALS SKIN

## 2024-06-18 NOTE — PROGRESS NOTES
Review of Systems   Constitutional:  Positive for fatigue. Negative for appetite change and unexpected weight change.   HENT:  Negative.     Eyes: Negative.    Respiratory:  Negative for cough and shortness of breath.    Cardiovascular: Negative.  Negative for palpitations (No Pacemaker/No loop/no defib).   Gastrointestinal:  Positive for constipation (intermittent - using Miralax/ pending Colonoscopy).   Genitourinary:  Negative for difficulty urinating (weak flow/ delayed start .  PSA 2/5/24 = 12/49  - Dr. Padron).    Musculoskeletal:  Positive for back pain (Positional/ across back/ takes Gabapentin and 800 mg Ibuprofen).   Skin: Negative.    Neurological:  Positive for numbness (right leg/spasms).   Psychiatric/Behavioral: Negative.     See Consult Review.

## 2024-06-18 NOTE — LETTER
June 18, 2024                                                          Patient: Marco A Williamson   YOB: 1945   Date of Visit: 6/18/2024       Dear Dr. Arreola:    The patient is seen at the [facility] RADIATION THERAPY today. Attached is my assessment and plan of care.  Thank you for the opportunity to share in Marco A Williamson's care.       Sincerely,        Gregory J. [facility]ner, MD      CC: No Recipients

## 2024-06-19 LAB
ARIA ZRC COURSE ID: NORMAL
ARIA ZRC COURSE ID: NORMAL
ARIA ZRC COURSE INTENT: NORMAL
ARIA ZRC COURSE INTENT: NORMAL
ARIA ZRC COURSE START DATE: NORMAL
ARIA ZRC COURSE START DATE: NORMAL
ARIA ZRC TREATMENT ELAPSED DAYS: NORMAL
ARIA ZRC TREATMENT ELAPSED DAYS: NORMAL
ARIA ZRP FRACTIONS TREATED TO DATE: NORMAL
ARIA ZRP FRACTIONS TREATED TO DATE: NORMAL
ARIA ZRP PLAN ID: NORMAL
ARIA ZRP PLAN ID: NORMAL
ARIA ZRP PLAN NAME: NORMAL
ARIA ZRP PLAN NAME: NORMAL
ARIA ZRP PRESCRIBED DOSE CGY: 3500
ARIA ZRP PRESCRIBED DOSE CGY: 3500
ARIA ZRP PRESCRIBED DOSE PER FRACTION: 7
ARIA ZRP PRESCRIBED DOSE PER FRACTION: 7
ARIA ZRR DOSAGE GIVEN TO DATE: 0
ARIA ZRR REFERENCE POINT ID: NORMAL
MLH ARIA ZRC TREATMENT DATES: NORMAL
MLH ARIA ZRC TREATMENT DATES: NORMAL

## 2024-07-01 ENCOUNTER — HOSPITAL ENCOUNTER (OUTPATIENT)
Dept: RADIATION ONCOLOGY | Facility: HOSPITAL | Age: 79
Setting detail: RADIATION/ONCOLOGY SERIES
Discharge: HOME | End: 2024-07-01
Attending: RADIOLOGY
Payer: MEDICARE

## 2024-07-01 LAB
ARIA ZRC COURSE ID: NORMAL
ARIA ZRC COURSE INTENT: NORMAL
ARIA ZRC COURSE START DATE: NORMAL
ARIA ZRC TREATMENT ELAPSED DAYS: NORMAL
ARIA ZRP FRACTIONS TREATED TO DATE: NORMAL
ARIA ZRP PLAN ID: NORMAL
ARIA ZRP PRESCRIBED DOSE CGY: 3000
ARIA ZRP PRESCRIBED DOSE PER FRACTION: 10
ARIA ZRR DOSAGE GIVEN TO DATE: 10
ARIA ZRR DOSAGE GIVEN TO DATE: 10
ARIA ZRR REFERENCE POINT ID: NORMAL
ARIA ZRR REFERENCE POINT ID: NORMAL
ARIA ZRR SESSION DOSAGE GIVEN: 10
ARIA ZRR SESSION DOSAGE GIVEN: 10
MLH ARIA ZRC TREATMENT DATES: NORMAL

## 2024-07-01 PROCEDURE — 77373 STRTCTC BDY RAD THER TX DLVR: CPT | Performed by: RADIOLOGY

## 2024-07-03 ENCOUNTER — RAD ONC OTV (OUTPATIENT)
Dept: RADIATION ONCOLOGY | Facility: HOSPITAL | Age: 79
End: 2024-07-03
Payer: MEDICARE

## 2024-07-03 ENCOUNTER — HOSPITAL ENCOUNTER (OUTPATIENT)
Dept: RADIATION ONCOLOGY | Facility: HOSPITAL | Age: 79
Setting detail: RADIATION/ONCOLOGY SERIES
Discharge: HOME | End: 2024-07-03
Attending: RADIOLOGY
Payer: MEDICARE

## 2024-07-03 VITALS
HEART RATE: 84 BPM | DIASTOLIC BLOOD PRESSURE: 79 MMHG | WEIGHT: 177.2 LBS | BODY MASS INDEX: 29.49 KG/M2 | OXYGEN SATURATION: 98 % | SYSTOLIC BLOOD PRESSURE: 134 MMHG

## 2024-07-03 DIAGNOSIS — C79.51 SECONDARY CANCER OF BONE (CMS/HCC): Primary | ICD-10-CM

## 2024-07-03 LAB
ARIA ZRC COURSE ID: NORMAL
ARIA ZRC COURSE INTENT: NORMAL
ARIA ZRC COURSE START DATE: NORMAL
ARIA ZRC TREATMENT ELAPSED DAYS: NORMAL
ARIA ZRP FRACTIONS TREATED TO DATE: NORMAL
ARIA ZRP PLAN ID: NORMAL
ARIA ZRP PRESCRIBED DOSE CGY: 3000
ARIA ZRP PRESCRIBED DOSE PER FRACTION: 10
ARIA ZRR DOSAGE GIVEN TO DATE: 20
ARIA ZRR DOSAGE GIVEN TO DATE: 20
ARIA ZRR REFERENCE POINT ID: NORMAL
ARIA ZRR REFERENCE POINT ID: NORMAL
ARIA ZRR SESSION DOSAGE GIVEN: 10
ARIA ZRR SESSION DOSAGE GIVEN: 10
MLH ARIA ZRC TREATMENT DATES: NORMAL

## 2024-07-03 PROCEDURE — 77336 RADIATION PHYSICS CONSULT: CPT | Performed by: RADIOLOGY

## 2024-07-03 PROCEDURE — 77373 STRTCTC BDY RAD THER TX DLVR: CPT | Performed by: RADIOLOGY

## 2024-07-03 ASSESSMENT — ENCOUNTER SYMPTOMS
HEMATOLOGIC/LYMPHATIC NEGATIVE: 1
EYES NEGATIVE: 1
FATIGUE: 1
GASTROINTESTINAL NEGATIVE: 1
ENDOCRINE NEGATIVE: 1
ALLERGIC/IMMUNOLOGIC NEGATIVE: 1
RESPIRATORY NEGATIVE: 1
CARDIOVASCULAR NEGATIVE: 1
OCCASIONAL FEELINGS OF UNSTEADINESS: 0
PSYCHIATRIC NEGATIVE: 1
NEUROLOGICAL NEGATIVE: 1
MUSCULOSKELETAL NEGATIVE: 1

## 2024-07-03 ASSESSMENT — PAIN SCALES - GENERAL: PAINLEVEL: 0-NO PAIN

## 2024-07-03 NOTE — PROGRESS NOTES
Subjective      Patient ID: Marco A Williamson is a 78 y.o. male.  1945   Diagnosis Plan   1. Secondary cancer of bone (CMS/HCC)           Diagnosis:  No diagnosis found.    HPI notes fatigue is otherwise without complaints.  Currently denies any buttock or back pain.    The following have been reviewed and updated as appropriate in this visit:        Review of Systems   Constitutional:  Positive for fatigue.   HENT: Negative.     Eyes: Negative.    Respiratory: Negative.     Cardiovascular: Negative.    Gastrointestinal: Negative.    Endocrine: Negative.    Genitourinary: Negative.    Musculoskeletal: Negative.    Skin: Negative.    Allergic/Immunologic: Negative.    Neurological: Negative.    Hematological: Negative.    Psychiatric/Behavioral: Negative.         Objective     Vitals:    07/03/24 1349   BP: 134/79   BP Location: Left upper arm   Patient Position: Sitting   Pulse: 84   SpO2: 98%   Weight: 80.4 kg (177 lb 3.2 oz)     Body mass index is 29.49 kg/m².    Physical Exam vital signs stable.  Skin clear.  Nontender to palpation right hip palpation.  Performance status 80.    Assessment/Plan continue stereotactic body radiation therapy right pelvic region as previously outlined.  Follow-up in couple weeks using telemedicine.  Follow-up with medical oncology.  Diagnoses and Orders Associated With This Visit:  There are no diagnoses linked to this encounter.

## 2024-07-08 ENCOUNTER — HOSPITAL ENCOUNTER (OUTPATIENT)
Dept: RADIATION ONCOLOGY | Facility: HOSPITAL | Age: 79
Setting detail: RADIATION/ONCOLOGY SERIES
Discharge: HOME | End: 2024-07-08
Attending: RADIOLOGY
Payer: MEDICARE

## 2024-07-08 ENCOUNTER — RAD ONC OTV (OUTPATIENT)
Dept: RADIATION ONCOLOGY | Facility: HOSPITAL | Age: 79
End: 2024-07-08
Payer: MEDICARE

## 2024-07-08 VITALS
HEART RATE: 83 BPM | SYSTOLIC BLOOD PRESSURE: 139 MMHG | BODY MASS INDEX: 29.72 KG/M2 | OXYGEN SATURATION: 100 % | DIASTOLIC BLOOD PRESSURE: 97 MMHG | WEIGHT: 178.6 LBS

## 2024-07-08 DIAGNOSIS — C41.2 MALIGNANT NEOPLASM OF VERTEBRAL COLUMN, EXCLUDING SACRUM AND COCCYX (CMS/HCC): Primary | ICD-10-CM

## 2024-07-08 LAB
ARIA ZRC COURSE ID: NORMAL
ARIA ZRC COURSE INTENT: NORMAL
ARIA ZRC COURSE START DATE: NORMAL
ARIA ZRC TREATMENT ELAPSED DAYS: NORMAL
ARIA ZRP FRACTIONS TREATED TO DATE: NORMAL
ARIA ZRP PLAN ID: NORMAL
ARIA ZRP PRESCRIBED DOSE CGY: 3000
ARIA ZRP PRESCRIBED DOSE PER FRACTION: 10
ARIA ZRR DOSAGE GIVEN TO DATE: 30
ARIA ZRR DOSAGE GIVEN TO DATE: 30
ARIA ZRR REFERENCE POINT ID: NORMAL
ARIA ZRR REFERENCE POINT ID: NORMAL
ARIA ZRR SESSION DOSAGE GIVEN: 10
ARIA ZRR SESSION DOSAGE GIVEN: 10
MLH ARIA ZRC TREATMENT DATES: NORMAL

## 2024-07-08 PROCEDURE — 77373 STRTCTC BDY RAD THER TX DLVR: CPT | Performed by: RADIOLOGY

## 2024-07-08 ASSESSMENT — PAIN SCALES - GENERAL: PAINLEVEL: 0-NO PAIN

## 2024-07-08 ASSESSMENT — ENCOUNTER SYMPTOMS
FATIGUE: 1
BACK PAIN: 1
PSYCHIATRIC NEGATIVE: 1
GASTROINTESTINAL NEGATIVE: 1
UNEXPECTED WEIGHT CHANGE: 0

## 2024-07-08 NOTE — PROGRESS NOTES
"    Subjective      Patient ID: Marco A Williamson is a 78 y.o. male.  1945      HPI  Overall Marco A is tolerated his radiation therapy quite well.  Today is his last day.  He has been relatively active but limiting any twisting maneuver especially when picking anything up of any significant weight.      The following have been reviewed and updated as appropriate in this visit:        Review of Systems   Constitutional:  Positive for fatigue. Negative for unexpected weight change.   Gastrointestinal: Negative.    Genitourinary: Negative.    Musculoskeletal:  Positive for back pain (\"baseline\").   Skin: Negative.    Psychiatric/Behavioral: Negative.         Objective     Vitals:    07/08/24 0746   BP: (!) 139/97   Patient Position: Sitting   Pulse: 83   SpO2: 100%   Weight: 81 kg (178 lb 9.6 oz)     Body mass index is 29.72 kg/m².    Physical Exam  Is unchanged    Assessment/Plan   Marco A's toleration therapy quite well without any significant side effects today.  He will continue to be careful about his physical activity to avoid pathologic fracture due to his bone metastasis.  We will see him in follow-up in 1 month.  He will call in the interim, as needed.    Thank you again for allowing us to participate in his care with you and the rest the team.      "

## 2024-07-10 LAB
ARIA ZRC COURSE ID: NORMAL
ARIA ZRC COURSE INTENT: NORMAL
ARIA ZRC COURSE START DATE: NORMAL
ARIA ZRC TREATMENT ELAPSED DAYS: NORMAL
ARIA ZRP FRACTIONS TREATED TO DATE: NORMAL
ARIA ZRP PLAN ID: NORMAL
ARIA ZRP PLAN NAME: NORMAL
ARIA ZRP PRESCRIBED DOSE CGY: 3000
ARIA ZRP PRESCRIBED DOSE PER FRACTION: 10
ARIA ZRR DOSAGE GIVEN TO DATE: 30
ARIA ZRR DOSAGE GIVEN TO DATE: 30
ARIA ZRR REFERENCE POINT ID: NORMAL
ARIA ZRR REFERENCE POINT ID: NORMAL
MLH ARIA ZRC TREATMENT DATES: NORMAL

## 2024-07-22 NOTE — PROGRESS NOTES
Cardiology  Office Progress Note         Reason for visit:   Chief Complaint   Patient presents with    Follow-up       HPI     Matt Williamson is a 79 y.o. male who presents to the office for cardiovascular follow up and management of coronary artery disease s/p MARGOT, paroxysmal atrial fibrillation, hypertension and dyslipidemia.     He was last seen in the office on 2/6/24. He denied frequent palpitations and ECG was normal rhythm. I made no changes to his medical regimen and asked him to follow up in 6 months.    Today he reports tolerating his medications without any adverse reactions. He denies any chest pain, shortness of breath, palpitations, edema, lightheadedness, dizziness or syncope.     He notes occasional left arm achiness when he get busy at work. He notes that this is infrequent and does not last for a long time. His last episode occurred about 4-5 weeks ago.     He is active at work. He also uses a rowing machine a couple times per week. At home he notes that his blood pressures ranges 120-30s/80s. He did not have a chance to take his medication yet this morning.     On recheck BP improved to 144/82.    Past Medical History:   Diagnosis Date    Atrial fibrillation with rapid ventricular response (CMS/HCC) 10/25/2019    Hospitalized on 10/25/19 at [facility].     Back pain     BPH (benign prostatic hyperplasia)     Coronary artery disease     LAD stent 2005    Hypertension     Hypothyroidism     Mixed hyperlipidemia     Thyroid cancer (CMS/HCC)     Type 2 diabetes mellitus without complication (CMS/HCC)     Vertigo        Past Surgical History:   Procedure Laterality Date    ANAL FISSURECTOMY  1986    CARDIAC CATHETERIZATION  11/21/2005    LM FOD. 70-80% proximal LAD.Mild disease CX, RCA and branches.     CARDIAC CATHETERIZATION  11/28/2005    LM FOD. Pristine LAD stent. CX FOD. RCA mild disease.    COLONOSCOPY      CORONARY ANGIOPLASTY WITH STENT PLACEMENT  11/22/2005    LM FOD.  Moderate, diffuse disease LAD. Irregular CX w/ PLVB distal to PDA 99%. Mild disease RCA. Stent to distal LAD.    EYE SURGERY Left Cataract    FOOT SURGERY      For osteomyelitis.    GASTROCUTANEOUS FISTULA CLOSURE      LUMBAR SPINE SURGERY  2022    Stage 1: L2-L4 PSF with instrumentation, ORIF L3 pathologic fracture Stage 2: L3 lateral corpectomy with expandable cage placement    THYROIDECTOMY  2012    UPPER GASTROINTESTINAL ENDOSCOPY         Social History     Tobacco Use    Smoking status: Former     Packs/day: 2.00     Years: 20.00     Additional pack years: 0.00     Total pack years: 40.00     Types: Cigarettes     Quit date:      Years since quittin.6    Smokeless tobacco: Never   Vaping Use    Vaping Use: Never used   Substance Use Topics    Alcohol use: Yes     Comment: Social    Drug use: Never       Family History   Problem Relation Age of Onset    Cancer Biological Mother     Breast cancer Biological Mother     Emphysema Biological Father     Lung cancer Biological Father        Allergies:  Penicillins    Current Outpatient Medications   Medication Sig Dispense Refill    aspirin 81 mg enteric coated tablet Take 81 mg by mouth daily.      dulaglutide (TRULICITY) 3 mg/0.5 mL pen injector Inject 3 mg under the skin once a week. Do not prime. 6 mL 1    gabapentin (NEURONTIN) 300 mg capsule TAKE 1 CAPSULE  TWICE A DAY (Patient taking differently: Take 300 mg by mouth daily.) 180 capsule 0    ibuprofen (MOTRIN) 800 mg tablet Take 1 tablet (800 mg total) by mouth every 6 (six) hours as needed (pain). Take w/ food 30 tablet 0    losartan (COZAAR) 25 mg tablet Take 1 tablet (25 mg total) by mouth daily. 90 tablet 1    metFORMIN XR (GLUCOPHAGE-XR) 500 mg 24 hr tablet Take 1 tablet (500 mg total) by mouth 2 (two) times a day with meals. (Patient taking differently: Take 500 mg by mouth daily with breakfast.) 180 tablet 1    metoprolol succinate XL (TOPROL-XL) 100 mg 24 hr tablet TAKE 1 TABLET(100  MG) BY MOUTH TWICE DAILY (Patient taking differently: Take 100 mg by mouth daily.) 180 tablet 3    polyethylene glycol (MIRALAX) 17 gram packet Take 17 g by mouth daily with dinner. 30 packet 0    pravastatin (PRAVACHOL) 40 mg tablet TAKE 1 TABLET(40 MG) BY MOUTH DAILY 90 tablet 1    SYNTHROID 150 mcg tablet Take 1 tablet (150 mcg total) by mouth once daily. 30 tablet 1    cholecalciferol, vitamin D3, (VITAMIN D3) 50 mcg (2,000 unit) tablet Take 2,000 Units by mouth daily.       No current facility-administered medications for this visit.       Review of Systems   Cardiovascular:  Negative for chest pain, dyspnea on exertion and irregular heartbeat.       Objective     Vitals:    08/06/24 1309   BP: (!) 152/90   Pulse: 86   SpO2: 97%       Wt Readings from Last 5 Encounters:   08/06/24 82.1 kg (181 lb)   07/08/24 81 kg (178 lb 9.6 oz)   07/03/24 80.4 kg (177 lb 3.2 oz)   06/18/24 81.9 kg (180 lb 9.6 oz)   05/29/24 79.4 kg (175 lb)       Body mass index is 30.12 kg/m².    Physical Exam  Vitals reviewed.   Constitutional:       General: He is not in acute distress.     Appearance: Normal appearance. He is well-developed.   HENT:      Head: Normocephalic and atraumatic.   Eyes:      Conjunctiva/sclera: Conjunctivae normal.   Neck:      Vascular: No JVD.   Cardiovascular:      Rate and Rhythm: Normal rate and regular rhythm.      Pulses: Intact distal pulses.           Dorsalis pedis pulses are 1+ on the right side and 1+ on the left side.        Posterior tibial pulses are 1+ on the right side and 1+ on the left side.      Heart sounds: Normal heart sounds. No murmur heard.  Pulmonary:      Effort: Pulmonary effort is normal. No respiratory distress.      Breath sounds: Normal breath sounds. No wheezing or rales.   Chest:      Chest wall: No tenderness.   Abdominal:      General: Bowel sounds are normal. There is no distension.      Palpations: Abdomen is soft.      Tenderness: There is no abdominal tenderness.    Musculoskeletal:         General: Normal range of motion.      Cervical back: Normal range of motion and neck supple.      Right lower leg: No edema.      Left lower leg: No edema.   Skin:     General: Skin is warm and dry.      Findings: No rash.   Neurological:      General: No focal deficit present.      Mental Status: He is alert and oriented to person, place, and time.   Psychiatric:         Mood and Affect: Mood normal.         Behavior: Behavior normal.         Thought Content: Thought content normal.         Judgment: Judgment normal.          ECG: Sinus rhythm, within normal limits    Hematology  Lab Results   Component Value Date    WBC 8.97 02/06/2024    HGB 15.0 02/06/2024    HCT 44.6 02/06/2024     02/06/2024    INR 1.0 01/18/2022       Chemistries  Lab Results   Component Value Date     02/06/2024    K 5.3 (H) 02/06/2024     02/06/2024    CREATININE 1.2 02/06/2024    BUN 12 02/06/2024    CO2 29 02/06/2024    GLUCOSE 116 (H) 02/06/2024    CALCIUM 9.6 02/06/2024    MG 2.0 02/07/2022    ALT 18 02/06/2024    AST 18 02/06/2024       Cholesterol  Lab Results   Component Value Date    CHOL 145 02/06/2024    TRIG 94 02/06/2024    HDL 56 02/06/2024    LDLCALC 70 02/06/2024    NONHDLCALC 89 02/06/2024       Endocrine  Lab Results   Component Value Date    TSH 0.08 (L) 02/06/2024    FREET4 1.48 02/06/2024    HGBA1C 8.8 (H) 02/06/2024       Cardiac Imaging    ECHOCARDIOGRAM - EXTERNAL RESULT                 Assessment/Plan     Malignant neoplasm of vertebral column, excluding sacrum and coccyx (CMS/HCC)  The patient has had recurrent metastatic thyroid cancer but it appears to be well-controlled at this point.  He will continue following with his usual providers.    Paroxysmal atrial fibrillation (CMS/HCC)  The patient remains symptom-free in this regard.  His ECG continues to show normal rhythm.  We will continue to follow him clinically and will consider periodic reevaluations of his heart  rhythm status.    Type 2 diabetes mellitus with diabetic neuropathy, unspecified (CMS/HCC)  The patient will continue following with his usual providers with regard to this diagnosis.    Atherosclerosis of coronary artery  The patient is status post LAD stenting 19 years ago.  He has had no symptoms to suggest recurrent ischemic heart disease.  His ECG is normal.  He will continue risk factor modification.    Essential hypertension  The patient's blood pressure is normal when he is compliant with his medications.  I made no changes today    Mixed hyperlipidemia  The patient's lipids are adequately controlled on current medication.  The patient is tolerating medication without side effects.  I will continue the current treatment.      No orders of the defined types were placed in this encounter.      There are no discontinued medications.    Orders Placed This Encounter   Procedures    [facility] MUSE ECG 12 lead (clinic performed)     Scheduling Instructions:      PLEASE USE THIS ORDER FOR ECG'S PERFORMED IN PHYSICIAN OFFICES     Order Specific Question:   Release to patient     Answer:   Immediate [1]       Follow Up Plans:  Return for Recheck.          Jana BELTRE, am scribing for, and in the presence of, Minor Ji MD.    Minor BELTRE MD, personally performed the services described in this documentation as scribed by Jana Garay in my presence, and it is both accurate and complete.       Minor Ji MD  8/6/2024

## 2024-08-06 ENCOUNTER — OFFICE VISIT (OUTPATIENT)
Dept: CARDIOLOGY | Facility: CLINIC | Age: 79
End: 2024-08-06
Payer: MEDICARE

## 2024-08-06 VITALS
HEART RATE: 86 BPM | BODY MASS INDEX: 30.16 KG/M2 | HEIGHT: 65 IN | SYSTOLIC BLOOD PRESSURE: 152 MMHG | WEIGHT: 181 LBS | OXYGEN SATURATION: 97 % | DIASTOLIC BLOOD PRESSURE: 90 MMHG

## 2024-08-06 DIAGNOSIS — E11.40 TYPE 2 DIABETES MELLITUS WITH DIABETIC NEUROPATHY, WITHOUT LONG-TERM CURRENT USE OF INSULIN (CMS/HCC): ICD-10-CM

## 2024-08-06 DIAGNOSIS — I48.0 PAROXYSMAL ATRIAL FIBRILLATION (CMS/HCC): ICD-10-CM

## 2024-08-06 DIAGNOSIS — I10 ESSENTIAL HYPERTENSION: ICD-10-CM

## 2024-08-06 DIAGNOSIS — I25.10 ATHEROSCLEROSIS OF NATIVE CORONARY ARTERY OF NATIVE HEART WITHOUT ANGINA PECTORIS: Primary | ICD-10-CM

## 2024-08-06 DIAGNOSIS — E78.2 MIXED HYPERLIPIDEMIA: ICD-10-CM

## 2024-08-06 DIAGNOSIS — C41.2 MALIGNANT NEOPLASM OF VERTEBRAL COLUMN, EXCLUDING SACRUM AND COCCYX (CMS/HCC): ICD-10-CM

## 2024-08-06 LAB
ATRIAL RATE: 85
P AXIS: 54
PR INTERVAL: 188
QRS DURATION: 76
QT INTERVAL: 352
QTC CALCULATION(BAZETT): 418
R AXIS: 27
T WAVE AXIS: 51
VENTRICULAR RATE: 85

## 2024-08-06 PROCEDURE — G8755 DIAS BP > OR = 90: HCPCS | Performed by: INTERNAL MEDICINE

## 2024-08-06 PROCEDURE — G8753 SYS BP > OR = 140: HCPCS | Performed by: INTERNAL MEDICINE

## 2024-08-06 PROCEDURE — 99214 OFFICE O/P EST MOD 30 MIN: CPT | Performed by: INTERNAL MEDICINE

## 2024-08-06 PROCEDURE — 93000 ELECTROCARDIOGRAM COMPLETE: CPT | Performed by: INTERNAL MEDICINE

## 2024-08-06 ASSESSMENT — ENCOUNTER SYMPTOMS
DYSPNEA ON EXERTION: 0
IRREGULAR HEARTBEAT: 0

## 2024-08-06 NOTE — LETTER
August 6, 2024     Raj Arreola, DO  [address] [facility] [address]  [facility] [address]    Patient: aMtt Williamson  YOB: 1945  Date of Visit: 8/6/2024      Dear Dr. Arreola:    Thank you for referring Matt Williamson to me for evaluation. Below are my notes for this consultation.    If you have questions, please do not hesitate to call me. I look forward to following your patient along with you.         Sincerely,        Minor Ji MD        CC: No Recipients    Minor Ji MD  8/6/2024  1:48 PM  Sign when Signing Visit       Cardiology  Office Progress Note         Reason for visit:   Chief Complaint   Patient presents with   • Follow-up       HPI    Matt Williamson is a 79 y.o. male who presents to the office for cardiovascular follow up and management of coronary artery disease s/p MARGOT, paroxysmal atrial fibrillation, hypertension and dyslipidemia.     He was last seen in the office on 2/6/24. He denied frequent palpitations and ECG was normal rhythm. I made no changes to his medical regimen and asked him to follow up in 6 months.    Today he reports tolerating his medications without any adverse reactions. He denies any chest pain, shortness of breath, palpitations, edema, lightheadedness, dizziness or syncope.     He notes occasional left arm achiness when he get busy at work. He notes that this is infrequent and does not last for a long time. His last episode occurred about 4-5 weeks ago.     He is active at work. He also uses a rowing machine a couple times per week. At home he notes that his blood pressures ranges 120-30s/80s. He did not have a chance to take his medication yet this morning.     On recheck BP improved to 144/82.    Past Medical History:   Diagnosis Date   • Atrial fibrillation with rapid ventricular response (CMS/HCC) 10/25/2019    Hospitalized on 10/25/19 at [facility].    • Back pain    • BPH (benign prostatic hyperplasia)    •  Coronary artery disease     LAD stent    • Hypertension    • Hypothyroidism    • Mixed hyperlipidemia    • Thyroid cancer (CMS/HCC)    • Type 2 diabetes mellitus without complication (CMS/HCC)    • Vertigo        Past Surgical History:   Procedure Laterality Date   • ANAL FISSURECTOMY     • CARDIAC CATHETERIZATION  2005    LM FOD. 70-80% proximal LAD.Mild disease CX, RCA and branches.    • CARDIAC CATHETERIZATION  2005    LM FOD. Pristine LAD stent. CX FOD. RCA mild disease.   • COLONOSCOPY     • CORONARY ANGIOPLASTY WITH STENT PLACEMENT  2005    LM FOD. Moderate, diffuse disease LAD. Irregular CX w/ PLVB distal to PDA 99%. Mild disease RCA. Stent to distal LAD.   • EYE SURGERY Left Cataract   • FOOT SURGERY      For osteomyelitis.   • GASTROCUTANEOUS FISTULA CLOSURE     • LUMBAR SPINE SURGERY  2022    Stage 1: L2-L4 PSF with instrumentation, ORIF L3 pathologic fracture Stage 2: L3 lateral corpectomy with expandable cage placement   • THYROIDECTOMY     • UPPER GASTROINTESTINAL ENDOSCOPY         Social History     Tobacco Use   • Smoking status: Former     Packs/day: 2.00     Years: 20.00     Additional pack years: 0.00     Total pack years: 40.00     Types: Cigarettes     Quit date:      Years since quittin.6   • Smokeless tobacco: Never   Vaping Use   • Vaping Use: Never used   Substance Use Topics   • Alcohol use: Yes     Comment: Social   • Drug use: Never       Family History   Problem Relation Age of Onset   • Cancer Biological Mother    • Breast cancer Biological Mother    • Emphysema Biological Father    • Lung cancer Biological Father        Allergies:  Penicillins    Current Outpatient Medications   Medication Sig Dispense Refill   • aspirin 81 mg enteric coated tablet Take 81 mg by mouth daily.     • dulaglutide (TRULICITY) 3 mg/0.5 mL pen injector Inject 3 mg under the skin once a week. Do not prime. 6 mL 1   • gabapentin (NEURONTIN) 300 mg capsule TAKE 1  CAPSULE  TWICE A DAY (Patient taking differently: Take 300 mg by mouth daily.) 180 capsule 0   • ibuprofen (MOTRIN) 800 mg tablet Take 1 tablet (800 mg total) by mouth every 6 (six) hours as needed (pain). Take w/ food 30 tablet 0   • losartan (COZAAR) 25 mg tablet Take 1 tablet (25 mg total) by mouth daily. 90 tablet 1   • metFORMIN XR (GLUCOPHAGE-XR) 500 mg 24 hr tablet Take 1 tablet (500 mg total) by mouth 2 (two) times a day with meals. (Patient taking differently: Take 500 mg by mouth daily with breakfast.) 180 tablet 1   • metoprolol succinate XL (TOPROL-XL) 100 mg 24 hr tablet TAKE 1 TABLET(100 MG) BY MOUTH TWICE DAILY (Patient taking differently: Take 100 mg by mouth daily.) 180 tablet 3   • polyethylene glycol (MIRALAX) 17 gram packet Take 17 g by mouth daily with dinner. 30 packet 0   • pravastatin (PRAVACHOL) 40 mg tablet TAKE 1 TABLET(40 MG) BY MOUTH DAILY 90 tablet 1   • SYNTHROID 150 mcg tablet Take 1 tablet (150 mcg total) by mouth once daily. 30 tablet 1   • cholecalciferol, vitamin D3, (VITAMIN D3) 50 mcg (2,000 unit) tablet Take 2,000 Units by mouth daily.       No current facility-administered medications for this visit.       Review of Systems   Cardiovascular:  Negative for chest pain, dyspnea on exertion and irregular heartbeat.       Objective    Vitals:    08/06/24 1309   BP: (!) 152/90   Pulse: 86   SpO2: 97%       Wt Readings from Last 5 Encounters:   08/06/24 82.1 kg (181 lb)   07/08/24 81 kg (178 lb 9.6 oz)   07/03/24 80.4 kg (177 lb 3.2 oz)   06/18/24 81.9 kg (180 lb 9.6 oz)   05/29/24 79.4 kg (175 lb)       Body mass index is 30.12 kg/m².    Physical Exam  Vitals reviewed.   Constitutional:       General: He is not in acute distress.     Appearance: Normal appearance. He is well-developed.   HENT:      Head: Normocephalic and atraumatic.   Eyes:      Conjunctiva/sclera: Conjunctivae normal.   Neck:      Vascular: No JVD.   Cardiovascular:      Rate and Rhythm: Normal rate and regular  rhythm.      Pulses: Intact distal pulses.           Dorsalis pedis pulses are 1+ on the right side and 1+ on the left side.        Posterior tibial pulses are 1+ on the right side and 1+ on the left side.      Heart sounds: Normal heart sounds. No murmur heard.  Pulmonary:      Effort: Pulmonary effort is normal. No respiratory distress.      Breath sounds: Normal breath sounds. No wheezing or rales.   Chest:      Chest wall: No tenderness.   Abdominal:      General: Bowel sounds are normal. There is no distension.      Palpations: Abdomen is soft.      Tenderness: There is no abdominal tenderness.   Musculoskeletal:         General: Normal range of motion.      Cervical back: Normal range of motion and neck supple.      Right lower leg: No edema.      Left lower leg: No edema.   Skin:     General: Skin is warm and dry.      Findings: No rash.   Neurological:      General: No focal deficit present.      Mental Status: He is alert and oriented to person, place, and time.   Psychiatric:         Mood and Affect: Mood normal.         Behavior: Behavior normal.         Thought Content: Thought content normal.         Judgment: Judgment normal.          ECG: Sinus rhythm, within normal limits    Hematology  Lab Results   Component Value Date    WBC 8.97 02/06/2024    HGB 15.0 02/06/2024    HCT 44.6 02/06/2024     02/06/2024    INR 1.0 01/18/2022       Chemistries  Lab Results   Component Value Date     02/06/2024    K 5.3 (H) 02/06/2024     02/06/2024    CREATININE 1.2 02/06/2024    BUN 12 02/06/2024    CO2 29 02/06/2024    GLUCOSE 116 (H) 02/06/2024    CALCIUM 9.6 02/06/2024    MG 2.0 02/07/2022    ALT 18 02/06/2024    AST 18 02/06/2024       Cholesterol  Lab Results   Component Value Date    CHOL 145 02/06/2024    TRIG 94 02/06/2024    HDL 56 02/06/2024    LDLCALC 70 02/06/2024    NONHDLCALC 89 02/06/2024       Endocrine  Lab Results   Component Value Date    TSH 0.08 (L) 02/06/2024    FREET4 1.48  02/06/2024    HGBA1C 8.8 (H) 02/06/2024       Cardiac Imaging    ECHOCARDIOGRAM - EXTERNAL RESULT                 Assessment/Plan    Malignant neoplasm of vertebral column, excluding sacrum and coccyx (CMS/HCC)  The patient has had recurrent metastatic thyroid cancer but it appears to be well-controlled at this point.  He will continue following with his usual providers.    Paroxysmal atrial fibrillation (CMS/HCC)  The patient remains symptom-free in this regard.  His ECG continues to show normal rhythm.  We will continue to follow him clinically and will consider periodic reevaluations of his heart rhythm status.    Type 2 diabetes mellitus with diabetic neuropathy, unspecified (CMS/HCC)  The patient will continue following with his usual providers with regard to this diagnosis.    Atherosclerosis of coronary artery  The patient is status post LAD stenting 19 years ago.  He has had no symptoms to suggest recurrent ischemic heart disease.  His ECG is normal.  He will continue risk factor modification.    Essential hypertension  The patient's blood pressure is normal when he is compliant with his medications.  I made no changes today    Mixed hyperlipidemia  The patient's lipids are adequately controlled on current medication.  The patient is tolerating medication without side effects.  I will continue the current treatment.      No orders of the defined types were placed in this encounter.      There are no discontinued medications.    Orders Placed This Encounter   Procedures   • [facility] MUSE ECG 12 lead (clinic performed)     Scheduling Instructions:      PLEASE USE THIS ORDER FOR ECG'S PERFORMED IN PHYSICIAN OFFICES     Order Specific Question:   Release to patient     Answer:   Immediate [1]       Follow Up Plans:  Return for Recheck.          Jana BELTRE, am scribing for, and in the presence of, Minor Ji MD.    Minor BETLRE MD, personally performed the services described in this  documentation as scribed by Jana Garay in my presence, and it is both accurate and complete.       Minor Ji MD  8/6/2024

## 2024-08-06 NOTE — ASSESSMENT & PLAN NOTE
The patient's blood pressure is normal when he is compliant with his medications.  I made no changes today

## 2024-08-06 NOTE — ASSESSMENT & PLAN NOTE
The patient remains symptom-free in this regard.  His ECG continues to show normal rhythm.  We will continue to follow him clinically and will consider periodic reevaluations of his heart rhythm status.

## 2024-08-06 NOTE — ASSESSMENT & PLAN NOTE
The patient is status post LAD stenting 19 years ago.  He has had no symptoms to suggest recurrent ischemic heart disease.  His ECG is normal.  He will continue risk factor modification.

## 2024-08-06 NOTE — ASSESSMENT & PLAN NOTE
The patient has had recurrent metastatic thyroid cancer but it appears to be well-controlled at this point.  He will continue following with his usual providers.

## 2024-08-15 ENCOUNTER — TELEPHONE (OUTPATIENT)
Dept: HEMATOLOGY/ONCOLOGY | Facility: CLINIC | Age: 79
End: 2024-08-15

## 2024-08-15 ENCOUNTER — OFFICE VISIT (OUTPATIENT)
Dept: HEMATOLOGY/ONCOLOGY | Facility: CLINIC | Age: 79
End: 2024-08-15
Attending: INTERNAL MEDICINE
Payer: MEDICARE

## 2024-08-15 VITALS
HEIGHT: 64 IN | SYSTOLIC BLOOD PRESSURE: 122 MMHG | HEART RATE: 81 BPM | WEIGHT: 181.4 LBS | DIASTOLIC BLOOD PRESSURE: 83 MMHG | BODY MASS INDEX: 30.97 KG/M2

## 2024-08-15 DIAGNOSIS — C73 THYROID CANCER (CMS/HCC): Primary | ICD-10-CM

## 2024-08-15 DIAGNOSIS — C79.51 SECONDARY CANCER OF BONE (CMS/HCC): ICD-10-CM

## 2024-08-15 DIAGNOSIS — M85.88 OTHER SPECIFIED DISORDERS OF BONE DENSITY AND STRUCTURE, OTHER SITE: ICD-10-CM

## 2024-08-15 DIAGNOSIS — M81.0 OSTEOPOROSIS, UNSPECIFIED OSTEOPOROSIS TYPE, UNSPECIFIED PATHOLOGICAL FRACTURE PRESENCE: ICD-10-CM

## 2024-08-15 PROCEDURE — G8754 DIAS BP LESS 90: HCPCS | Performed by: INTERNAL MEDICINE

## 2024-08-15 PROCEDURE — G8752 SYS BP LESS 140: HCPCS | Performed by: INTERNAL MEDICINE

## 2024-08-15 PROCEDURE — 99215 OFFICE O/P EST HI 40 MIN: CPT | Performed by: INTERNAL MEDICINE

## 2024-08-15 ASSESSMENT — ENCOUNTER SYMPTOMS
NEUROLOGICAL NEGATIVE: 1
HEMATOLOGIC/LYMPHATIC NEGATIVE: 1
GASTROINTESTINAL NEGATIVE: 1
RESPIRATORY NEGATIVE: 1
EYES NEGATIVE: 1
CARDIOVASCULAR NEGATIVE: 1
PSYCHIATRIC NEGATIVE: 1
BACK PAIN: 1

## 2024-08-15 ASSESSMENT — PAIN SCALES - GENERAL: PAINLEVEL: 0-NO PAIN

## 2024-08-15 NOTE — ASSESSMENT & PLAN NOTE
Patient has a history of papillary thyroid cancer first diagnosed in 2012.  He developed recurrence with bone metastasis to L3 in 2020.  This was treated initially with radiation and then subsequent surgery due to localized progression.  Patient received additional radiation to the lesion of the right ischium in July 2024.    I had a discussion with the patient.  He has recurrent thyroid cancer with bone involvement but does not have evidence of rapid disease progression.  He had recent radiation but prior to that had not needed treatment for almost 3 years.  To further characterize his thyroid cancer I recommended NSG testing on his previous biopsy to determine if he has any targetable mutations.  Discussed his case with Dr. [facility].  Plan for follow-up PET/CT for imaging in October or November.  If he has evidence of progressive disease at that time can consider systemic therapy such as lenvatinib but given the relatively slow progression of his disease the risks of that treatment may outweigh the benefits.  If follow-up imaging shows disease progression then would recommend systemic therapy.  Will plan on seeing him back after follow-up imaging in October or November.

## 2024-08-15 NOTE — ASSESSMENT & PLAN NOTE
Patient has history of bone metastases from thyroid cancer.  No lesions have been treated with radiation.  I recommended a DEXA scan to help determine if he may benefit from bone targeted therapy such as bisphosphonates or denosumab.  He was given a prescription for this testing today.

## 2024-08-15 NOTE — PROGRESS NOTES
Matt Williamson is a 79 y.o. male,   :  1945  REFERRING PHYSICIAN:   No referring provider defined for this encounter.  PRIMARY CARE PROVIDER:  Raj Arreola, DO    Encounter Diagnoses   Name Primary?    Thyroid cancer (CMS/HCC) Yes    Other specified disorders of bone density and structure, other site     Secondary cancer of bone (CMS/HCC)      Patient Active Problem List   Diagnosis    BPH (benign prostatic hyperplasia)    Atherosclerosis of coronary artery    Cataract    Essential hypertension    Former tobacco use    Mixed hyperlipidemia    Thyroid cancer (CMS/HCC)    Type 2 diabetes mellitus with diabetic neuropathy, unspecified (CMS/HCC)    Esophageal reflux    Paroxysmal atrial fibrillation (CMS/HCC)    Pre-op evaluation    Cancer associated pain    Low back pain with right-sided sciatica    Malignant neoplasm of vertebral column, excluding sacrum and coccyx (CMS/HCC)    Acquired hypothyroidism    Leg swelling    Lumbar disc disease with radiculopathy    S/P lumbar fusion    Vitamin D deficiency    AVI (iron deficiency anemia)    Colon polyp    Other constipation    Pain of left breast    Atherosclerosis of aorta (CMS/HCC)    Secondary cancer of bone (CMS/HCC)      Cancer Staging   Thyroid cancer (CMS/HCC)  Staging form: Thyroid - Differentiated, AJCC 8th Edition  - Pathologic stage from 2012: Stage II (pT3a, pN0a, cM0, Age at diagnosis: >= 55 years) - Signed by Stacie Casey MD on 2020    Treatment Plans       No treatment plans exist          Oncology History   Thyroid cancer (CMS/HCC)   2012 Initial Diagnosis    Thyroid cancer (CMS/HCC)  Papillary thyroid carcinoma, follicular variant     2012 Cancer Staged    Staging form: Thyroid - Differentiated, AJCC 8th Edition  - Pathologic stage from 2012: Stage II (pT3a, pN0a, cM0, Age at diagnosis: >= 55 years)     2012 Surgery    Total thyroidectomy and lymph node biopsy performed by Dr. Lester at the [facility]  [place]     3/28/2012 -  Radiation Therapy    Patient received postoperative I-131 treatment.  Treatment details are not available currently.     1/17/2020 Recurrence Distant    Biopsy of an enlarging L3 lesion showed metastatic carcinoma consistent with a thyroid primary.     5/1/2020 -  Radiation Therapy    Received a course of external beam radiation to L3 metastasis under direction of Dr. [facility].     1/20/2022 Surgery    Underwent resection of L3 vertebral tumor with Dr. Wright showing metastatic carcinoma consistent with thyroid origin     7/1/2024 - 7/8/2024 Radiation Therapy    Underwent radiation of a lytic lesion in the right ischium         History of Present Illness  HPI  Matt Williamson is seen today at the request of Dr. [facility] for   Encounter Diagnoses   Name Primary?    Thyroid cancer (CMS/HCC) Yes    Other specified disorders of bone density and structure, other site     Secondary cancer of bone (CMS/HCC)      Matt Williamson is an 79-year-old gentleman with a history of recurrent thyroid cancer with bone metastases.  I had previously seen him in 2021.  Following that visit he underwent resection of a lesion in the L3 vertebrae.  Pathology confirmed metastatic carcinoma consistent with thyroid primary.  Patient did well following that surgery.  In May of this year he had a follow-up MRI which suggested the possibility of new abnormalities in T12 and L1.  PET/CT was obtained for further evaluation which did not show any abnormality in the areas on the MRI but did show a new lytic lesion in the right ischial tuberosity.  Patient underwent radiation to that area in July.  He reports he tolerated the radiation well.  He presents today to discuss ongoing management of his thyroid cancer.  He continues to follow-up with his endocrinologist, Dr. Lerma for monitoring and adjustment of his Synthroid dose.      Review of Systems - Oncology  Review of Systems:  Nursing assessment reviewed. Pertinent positive  "and negative symptoms noted in HPI, all others negative.    Heart Rate:  [81] 81  BP: (122)/(83) 122/83  Visit Vitals  /83   Pulse 81   Ht 1.626 m (5' 4\")   Wt 82.3 kg (181 lb 6.4 oz)   BMI 31.14 kg/m²     Physical Exam  Constitutional:       General: He is not in acute distress.     Appearance: He is well-developed.   HENT:      Head: Normocephalic.      Mouth/Throat:      Pharynx: Oropharynx is clear. No oropharyngeal exudate.   Eyes:      General: No scleral icterus.     Pupils: Pupils are equal, round, and reactive to light.   Cardiovascular:      Rate and Rhythm: Normal rate and regular rhythm.   Pulmonary:      Effort: Pulmonary effort is normal.      Breath sounds: Normal breath sounds.   Abdominal:      Palpations: Abdomen is soft.      Tenderness: There is no abdominal tenderness.   Musculoskeletal:         General: No tenderness.      Cervical back: Neck supple.   Lymphadenopathy:      Cervical: No cervical adenopathy.   Skin:     Findings: No rash.   Neurological:      Mental Status: He is alert.         Past Medical History:   Diagnosis Date    Atrial fibrillation with rapid ventricular response (CMS/HCC) 10/25/2019    Hospitalized on 10/25/19 at [facility].     Back pain     BPH (benign prostatic hyperplasia)     Coronary artery disease     LAD stent 2005    Hypertension     Hypothyroidism     Mixed hyperlipidemia     Thyroid cancer (CMS/HCC)     Type 2 diabetes mellitus without complication (CMS/HCC)     Vertigo        Past Surgical History:   Procedure Laterality Date    ANAL FISSURECTOMY  1986    CARDIAC CATHETERIZATION  11/21/2005    LM FOD. 70-80% proximal LAD.Mild disease CX, RCA and branches.     CARDIAC CATHETERIZATION  11/28/2005    LM FOD. Pristine LAD stent. CX FOD. RCA mild disease.    COLONOSCOPY      CORONARY ANGIOPLASTY WITH STENT PLACEMENT  11/22/2005    LM FOD. Moderate, diffuse disease LAD. Irregular CX w/ PLVB distal to PDA 99%. Mild disease RCA. Stent to " distal LAD.    EYE SURGERY Left Cataract    FOOT SURGERY      For osteomyelitis.    GASTROCUTANEOUS FISTULA CLOSURE      LUMBAR SPINE SURGERY  2022    Stage 1: L2-L4 PSF with instrumentation, ORIF L3 pathologic fracture Stage 2: L3 lateral corpectomy with expandable cage placement    THYROIDECTOMY  2012    UPPER GASTROINTESTINAL ENDOSCOPY         Social History     Tobacco Use    Smoking status: Former     Packs/day: 2.00     Years: 20.00     Additional pack years: 0.00     Total pack years: 40.00     Types: Cigarettes     Quit date:      Years since quittin.6    Smokeless tobacco: Never   Vaping Use    Vaping Use: Never used   Substance Use Topics    Alcohol use: Yes     Comment: Social    Drug use: Never       Family History   Problem Relation Age of Onset    Cancer Biological Mother     Breast cancer Biological Mother     Emphysema Biological Father     Lung cancer Biological Father          Allergies  Penicillins    Medications  Current Outpatient Medications   Medication Sig Dispense Refill    aspirin 81 mg enteric coated tablet Take 81 mg by mouth daily.      cholecalciferol, vitamin D3, (VITAMIN D3) 50 mcg (2,000 unit) tablet Take 2,000 Units by mouth daily.      dulaglutide (TRULICITY) 3 mg/0.5 mL pen injector Inject 3 mg under the skin once a week. Do not prime. 6 mL 1    gabapentin (NEURONTIN) 300 mg capsule TAKE 1 CAPSULE  TWICE A DAY (Patient taking differently: Take 300 mg by mouth daily.) 180 capsule 0    losartan (COZAAR) 25 mg tablet TAKE 1 TABLET(25 MG) BY MOUTH DAILY 90 tablet 1    metoprolol succinate XL (TOPROL-XL) 100 mg 24 hr tablet TAKE 1 TABLET(100 MG) BY MOUTH TWICE DAILY (Patient taking differently: Take 100 mg by mouth daily.) 180 tablet 3    polyethylene glycol (MIRALAX) 17 gram packet Take 17 g by mouth daily with dinner. 30 packet 0    pravastatin (PRAVACHOL) 40 mg tablet TAKE 1 TABLET(40 MG) BY MOUTH DAILY 90 tablet 1    SYNTHROID 150 mcg tablet Take 1 tablet (150  mcg total) by mouth once daily. 30 tablet 1    ibuprofen (MOTRIN) 800 mg tablet Take 1 tablet (800 mg total) by mouth every 6 (six) hours as needed (pain). Take w/ food (Patient not taking: Reported on 8/15/2024) 30 tablet 0    metFORMIN XR (GLUCOPHAGE-XR) 500 mg 24 hr tablet Take 1 tablet (500 mg total) by mouth 2 (two) times a day with meals. (Patient taking differently: Take 500 mg by mouth daily with breakfast.) 180 tablet 1     No current facility-administered medications for this visit.          Laboratory    Recent Results (from the past 672 hour(s))   [facility] MUSE ECG 12 lead (clinic performed)    Collection Time: 08/06/24  1:11 PM   Result Value Ref Range    Ventricular rate 85     Atrial rate 85     WA Interval 188     QRS duration 76     QT Interval 352     QTC Calculation(Bazett) 418     P Axis 54     R Axis 27     T Wave [place] 51          Pathology  Pathology reviewed as per HPI      Radiology  I independently reviewed the images, tracings or specimen. Significant abnormals are radiology reviewed as per HPI.      Assessment and Plan  Thyroid cancer (CMS/HCC)  Patient has a history of papillary thyroid cancer first diagnosed in 2012.  He developed recurrence with bone metastasis to L3 in 2020.  This was treated initially with radiation and then subsequent surgery due to localized progression.  Patient received additional radiation to the lesion of the right ischium in July 2024.    I had a discussion with the patient.  He has recurrent thyroid cancer with bone involvement but does not have evidence of rapid disease progression.  He had recent radiation but prior to that had not needed treatment for almost 3 years.  To further characterize his thyroid cancer I recommended NSG testing on his previous biopsy to determine if he has any targetable mutations.  Discussed his case with Dr. [facility].  Plan for follow-up PET/CT for imaging in October or November.  If he has evidence of progressive disease at that  time can consider systemic therapy such as lenvatinib but given the relatively slow progression of his disease the risks of that treatment may outweigh the benefits.  If follow-up imaging shows disease progression then would recommend systemic therapy.  Will plan on seeing him back after follow-up imaging in October or November.    Secondary cancer of bone (CMS/HCC)  Patient has history of bone metastases from thyroid cancer.  No lesions have been treated with radiation.  I recommended a DEXA scan to help determine if he may benefit from bone targeted therapy such as bisphosphonates or denosumab.  He was given a prescription for this testing today.    I spent  50 minutes on this date of service performing the following activities: obtaining history, performing examination, entering orders, documenting, preparing for visit, obtaining / reviewing records, providing counseling and education, independently reviewing study/studies, communicating results, and coordinating care.      Stacie Casey MD

## 2024-08-15 NOTE — PROGRESS NOTES
Review of Systems   Constitutional:         Had follow up MRI of lumbar spine (routine since 1/22 ) - showed   Question about pet scan   Question : glucose level is always high- causes patient to worry about pancreas    Finished RAD on 7/8 - RAD to right hip    Back pain : lower back pain / positional  - chronic back pain        HENT:  Negative.     Eyes: Negative.    Respiratory: Negative.     Cardiovascular: Negative.    Gastrointestinal: Negative.    Genitourinary: Negative.     Musculoskeletal:  Positive for back pain.   Skin: Negative.    Neurological: Negative.    Hematological: Negative.    Psychiatric/Behavioral: Negative.

## 2024-08-28 LAB
CASE RPRT: NORMAL
CLINICAL INFO: NORMAL
IMMUNE STAIN STUDY: NORMAL
PATH REPORT ADDENDUM.SYNOPTIC DOC: NORMAL
PATH REPORT.ADDENDUM SPEC: NORMAL
PATH REPORT.FINAL DX SPEC: NORMAL
PATH REPORT.GROSS SPEC: NORMAL

## 2024-08-30 NOTE — TELEPHONE ENCOUNTER
RN had spoke to pathology- pathologist who had reviewed the block / original submission will be in Tuesday 9/3 and will  take a look at the block then.       RN did speak with Pathology , she reports pathologist reviewed blocks and it was felt there was not sufficient tumors on blocks to re-send out for any additional molecular testing. Will notify .

## 2024-09-04 ENCOUNTER — TELEPHONE (OUTPATIENT)
Dept: HEMATOLOGY/ONCOLOGY | Facility: CLINIC | Age: 79
End: 2024-09-04
Payer: MEDICARE

## 2024-09-04 NOTE — TELEPHONE ENCOUNTER
Pt called - states he needs his DEXA scan script to have the code M81.0 on it as it is not covered under medicare with current code M85.88.

## 2024-09-25 ENCOUNTER — TELEPHONE (OUTPATIENT)
Dept: RADIATION ONCOLOGY | Facility: HOSPITAL | Age: 79
End: 2024-09-25
Payer: MEDICARE

## 2024-09-25 RX ORDER — LEVOTHYROXINE SODIUM 150 UG/1
150 TABLET ORAL
Qty: 90 TABLET | Refills: 0 | Status: SHIPPED | OUTPATIENT
Start: 2024-09-25 | End: 2024-11-21 | Stop reason: SDUPTHER

## 2024-09-25 RX ORDER — PRAVASTATIN SODIUM 40 MG/1
40 TABLET ORAL DAILY
Qty: 90 TABLET | Refills: 0 | Status: SHIPPED | OUTPATIENT
Start: 2024-09-25 | End: 2024-11-04

## 2024-09-26 ENCOUNTER — DOCUMENTATION (OUTPATIENT)
Dept: RADIATION ONCOLOGY | Facility: HOSPITAL | Age: 79
End: 2024-09-26
Payer: MEDICARE

## 2024-09-26 DIAGNOSIS — C41.2 MALIGNANT NEOPLASM OF VERTEBRAL COLUMN, EXCLUDING SACRUM AND COCCYX (CMS/HCC): Primary | ICD-10-CM

## 2024-09-26 NOTE — RAD ONC COMPLETION NOTE
[facility] RADIATION THERAPY  [address] [place] [address]  Dept: [phone]  Loc: [phone]    Objective   Matt Williamson, 1945, has completed a course of radiation.  Matt Williamson was seen and treated in Radiation Oncology at [facility] RADIATION THERAPY.     Marco A Williamson, MRN: [id number]   Patient Active Problem List   Diagnosis    BPH (benign prostatic hyperplasia)    Atherosclerosis of coronary artery    Cataract    Essential hypertension    Former tobacco use    Mixed hyperlipidemia    Thyroid cancer (CMS/HCC)    Type 2 diabetes mellitus with diabetic neuropathy, unspecified (CMS/HCC)    Esophageal reflux    Paroxysmal atrial fibrillation (CMS/HCC)    Pre-op evaluation    Cancer associated pain    Low back pain with right-sided sciatica    Malignant neoplasm of vertebral column, excluding sacrum and coccyx (CMS/HCC)    Acquired hypothyroidism    Leg swelling    Lumbar disc disease with radiculopathy    S/P lumbar fusion    Vitamin D deficiency    AVI (iron deficiency anemia)    Colon polyp    Other constipation    Pain of left breast    Atherosclerosis of aorta (CMS/HCC)    Secondary cancer of bone (CMS/HCC)       Below you will find the details of the course of radiation therapy.  He tolerated the treatment well.      Treatment Intent: palliative.  Cancer Staging   Thyroid cancer (CMS/HCC)  Staging form: Thyroid - Differentiated, AJCC 8th Edition  - Pathologic stage from 2/28/2012: Stage II (pT3a, pN0a, cM0, Age at diagnosis: >= 55 years) - Signed by Stacie Casey MD on 1/27/2020       Performance Status at the End of Treatment: 80, Normal activity with effort; some signs or symptoms of disease..    LABS:  CMP  Lab Results   Component Value Date    ALBUMIN 4.4 02/06/2024    CO2 29 02/06/2024    BUN 12 02/06/2024    CREATININE 1.2 02/06/2024    GLUCOSE 116 (H) 02/06/2024    AST 18 02/06/2024    ALT 18 02/06/2024    EGFR >60.0 02/06/2024    EGFR >60.0  10/11/2022    EGFR >60.0 02/07/2022    EGFR >60.0 02/03/2022    EGFR >60.0 01/31/2022    EGFR >60.0 01/29/2022     CBC  Lab Results   Component Value Date    WBC 8.97 02/06/2024    HGB 15.0 02/06/2024    HCT 44.6 02/06/2024    MCV 86.4 02/06/2024     02/06/2024     10/11/2022     (H) 02/07/2022     01/31/2022     01/28/2022     01/27/2022     Tumor Marker  Lab Results   Component Value Date    PSA 12.49 (H) 02/06/2024        Treatment Plan Summary  Radiation Therapy  Radiation Treatments       Active   No active radiation treatments to show.     Historical   Plans   1_L3 SBRT   Most recent treatment: Dose planned: 700 cGy (fraction 5 of 5 on 5/7/2020)   Total: Dose planned: 3,500 cGy   Elapsed Days: 14 days as of [id number]      2_Rt Ischium   Most recent treatment: Dose planned: 1,000 cGy (fraction 3 of 3 on 7/8/2024)   Total: Dose planned: 3,000 cGy   Elapsed Days: 7 days as of 2024-07-08 @ 07:5849      1_L3 SBRT1   Most recent treatment: Dose planned: 700 cGy (fraction 0 of 5 on 6/19/2024)   Total: Dose planned: 3,500 cGy   Elapsed Days: : @ --      Reference Points   1_L3 SBRT   Most recent treatment: Dose given: -- (on 5/7/2020)   Total: Dose given: --   Elapsed Days: 14 days as of [id number]      CP L3 SBRT   Most recent treatment: Dose given: -- (on 5/7/2020)   Total: Dose given: --   Elapsed Days: 14 days as of [id number]      2_Rt Ischium   Most recent treatment: Dose given: 1,000 cGy (on 7/8/2024)   Total: Dose given: 3,000 cGy   Elapsed Days: 7 days as of 2024-07-08 @ 07:5849      2_calc   Most recent treatment: Dose given: 1,000 cGy (on 7/8/2024)   Total: Dose given: 3,000 cGy   Elapsed Days: 7 days as of 2024-07-08 @ 07:5849      1_L3 SBRT   Most recent treatment: Course completed on 6/19/2024   Total: Dose given: 0 cGy   Elapsed Days: : @ --      CP L3 SBRT   Most recent treatment: Course completed on 6/19/2024   Total: Dose given: 0 cGy   Elapsed Days: :  @ --                          Assessment/Plan see note from today.    Plan for Follow-up: We plan to see the patient back in in 6 months to monitor immediate post-therapy progress, which will be further documented.  The patient is also informed of the need for continuing follow-up through your office.    CMS Clinical Data Elements  End of Treatment  [facility] ONCBCN RADIATION COMPLETION FORM ADVANCED       9/26/2024 9:23 AM

## 2024-09-26 NOTE — PROGRESS NOTES
Contacted us regarding repeat scans.  Patient denies right hip pain notes intermittent discomfort in this area.  He does note chronic back pain unchanged.  I reviewed scans ordered repeat PET CT scan feeling this is probably the best follow-up scan needed at this time.  Patient to call results afterwards.  Logistic issues patient lives in [facility] [address].

## 2024-09-27 ENCOUNTER — TELEPHONE (OUTPATIENT)
Dept: INTERNAL MEDICINE | Facility: CLINIC | Age: 79
End: 2024-09-27

## 2024-09-27 RX ORDER — DULAGLUTIDE 3 MG/.5ML
3 INJECTION, SOLUTION SUBCUTANEOUS WEEKLY
Qty: 6 ML | Refills: 0 | Status: SHIPPED | OUTPATIENT
Start: 2024-09-27 | End: 2024-12-27

## 2024-09-30 ENCOUNTER — TELEPHONE (OUTPATIENT)
Dept: RADIATION ONCOLOGY | Facility: HOSPITAL | Age: 79
End: 2024-09-30
Payer: MEDICARE

## 2024-09-30 NOTE — TELEPHONE ENCOUNTER
Pt called to let us know that he is getting a pet scan at [facility] on 10/8and wanted to know if DR ECHOLS wanted to see him right after the scan. We will not necessarily get results same day so I will send message to juanis to schedule pt for a follow up later that week or the following week.

## 2024-10-03 ENCOUNTER — TELEPHONE (OUTPATIENT)
Dept: RADIATION ONCOLOGY | Facility: HOSPITAL | Age: 79
End: 2024-10-03
Payer: MEDICARE

## 2024-10-03 NOTE — TELEPHONE ENCOUNTER
Okay to call patient.  DEXA scan reviewed.  This shows osteopenia but no evidence of osteoporosis.  No pharmacologic treatment is needed.  Recommend he continue on vitamin D supplementation.  Would suggest a follow-up DEXA scan in 2 years.

## 2024-10-03 NOTE — TELEPHONE ENCOUNTER
Patient is requesting results of bone scan he had done about 3 weeks ago at [facility]. He said results were sent to Dr. Casey.

## 2024-10-03 NOTE — TELEPHONE ENCOUNTER
"Called pt at this time to inform Dr. Casey was able to look at his recent DEXA scan. Per Dr. Casey, \"This shows osteopenia but no evidence of osteoporosis. No pharmacologic treatment is needed. Recommend he continue on vitamin D supplementation. Would suggest a follow-up DEXA scan in 2 years.\"    Pt pleased with results and verbalized understanding of recommendations. No further questions at this time.   "

## 2024-10-08 ENCOUNTER — HOSPITAL ENCOUNTER (OUTPATIENT)
Dept: RADIOLOGY | Facility: CLINIC | Age: 79
Discharge: HOME | End: 2024-10-08
Attending: RADIOLOGY
Payer: MEDICARE

## 2024-10-08 VITALS — HEIGHT: 65 IN | BODY MASS INDEX: 29.16 KG/M2 | WEIGHT: 175 LBS

## 2024-10-08 DIAGNOSIS — C41.2 MALIGNANT NEOPLASM OF VERTEBRAL COLUMN, EXCLUDING SACRUM AND COCCYX (CMS/HCC): ICD-10-CM

## 2024-10-08 RX ORDER — FLUDEOXYGLUCOSE F18 300 MCI/ML
8.94 INJECTION INTRAVENOUS ONCE
Status: COMPLETED | OUTPATIENT
Start: 2024-10-08 | End: 2024-10-08

## 2024-10-08 RX ADMIN — FLUDEOXYGLUCOSE F18 8.94 MILLICURIE: 300 INJECTION INTRAVENOUS at 11:47

## 2024-10-10 ENCOUNTER — TELEPHONE (OUTPATIENT)
Dept: INTERNAL MEDICINE | Facility: CLINIC | Age: 79
End: 2024-10-10
Payer: MEDICARE

## 2024-10-10 ENCOUNTER — DOCUMENTATION (OUTPATIENT)
Dept: RADIATION ONCOLOGY | Facility: HOSPITAL | Age: 79
End: 2024-10-10
Payer: MEDICARE

## 2024-10-10 ENCOUNTER — HOSPITAL ENCOUNTER (OUTPATIENT)
Dept: RADIATION ONCOLOGY | Facility: HOSPITAL | Age: 79
Setting detail: RADIATION/ONCOLOGY SERIES
Discharge: HOME | End: 2024-10-10
Attending: RADIOLOGY
Payer: MEDICARE

## 2024-10-10 DIAGNOSIS — C73 THYROID CANCER (CMS/HCC): Primary | ICD-10-CM

## 2024-10-10 NOTE — PROGRESS NOTES
"{This telemedicine template is for use when a virtual \"Audio-Only\" or \"Video\" encounter is used to provide services typically performed during an in-person office visit.  It contains usual \"Standard\" visit elements, without objective / physical examination sections.    Location of Patient  Confirm patient is currently located in a state approved by [facility] for telemedicine.  If not, indicate that using the SmartList option, delete remainder of note template, use “39558” charge and close encounter.  If further consultation is provided to patient, document in a “Telephone” encounter.    Consent  Select either \"Audio-Only\" or \"Video\" consent as appropriate, based on mode of communication employed for this encounter.  Read consent to patient, then answer Yes/No SmartList.    Time Spent  Document time spent talking to patient:25556}    Verification of Patient Location:  The patient affirms they are currently located in the following state:[place]     Are you in your home or a private residence? Yes    Audio Only Telemedicine Visit    Request for Consent:  You and I are about to have a telemedicine check-in or visit. This is allowed because you are already my patient, and you have requested it.  This telemedicine visit will be billed to your health insurance or you, if you are self-insured.  You understand you will be responsible for any copayments or coinsurances that apply to your telemedicine visit.  Before starting our telemedicine visit, I am required to get your consent for this virtual check-in or visit by telemedicine. Do you consent?      Patient Response to Request for Consent: Yes    The following have been reviewed and updated as appropriate in this visit:          Visit Documentation:  Patient was contacted and agreed on telemedicine visit.  Patient has chronic low back pain unchanged.  Recent PET scan shows persistent activity right iliac crest and area of lytic lesion actually decrease in activity now 2.8 " compared to 3.1 SUV.  Small lung nodules noted we will continue to monitor.  Uptake in rectum patient due for repeat colonoscopy said he would arrange.  Otherwise plan repeat PET scan in 6 months and follow-up afterwards.  {Telehealth CPT codes and required encounter duration  86867 = 5-10 minutes  05789 = 11-20 minutes  85426 = 21-30 minutes :96314}    Time Spent:  I spent 12 minutes on this date of service performing the following activities: obtaining history.    {PRESS F3 TO ENLARGE - information will disappear when note is signed  :68278}

## 2024-10-10 NOTE — TELEPHONE ENCOUNTER
Request for Medical Advice  Patient PCP: Raj Arreola, DO    New or Existing Issue: Call back request  Question or Concern: Pt called to request the date of his last colonoscopy with Dr Tessa Gonzalez. Pt would like to schedule and doesn't remember when he had his last colonoscopy.     Please advise. Pt's# [phone]

## 2024-10-17 ENCOUNTER — TELEPHONE (OUTPATIENT)
Dept: INTERNAL MEDICINE | Facility: CLINIC | Age: 79
End: 2024-10-17
Payer: MEDICARE

## 2024-10-17 DIAGNOSIS — E03.9 ACQUIRED HYPOTHYROIDISM: ICD-10-CM

## 2024-10-17 DIAGNOSIS — E11.40 TYPE 2 DIABETES MELLITUS WITH DIABETIC NEUROPATHY, WITHOUT LONG-TERM CURRENT USE OF INSULIN (CMS/HCC): ICD-10-CM

## 2024-10-17 DIAGNOSIS — E78.2 MIXED HYPERLIPIDEMIA: Primary | ICD-10-CM

## 2024-10-17 DIAGNOSIS — I10 ESSENTIAL HYPERTENSION: ICD-10-CM

## 2024-10-17 RX ORDER — OMEPRAZOLE 40 MG/1
40 CAPSULE, DELAYED RELEASE ORAL
Qty: 90 CAPSULE | Refills: 1 | Status: SHIPPED | OUTPATIENT
Start: 2024-10-17 | End: 2025-04-15

## 2024-10-17 NOTE — TELEPHONE ENCOUNTER
Refill request -    Pt left message on voicemail -requesting   40 mg of omeprazole -    I did not see medication in pt meds profile (please advise )    Also pt has a upcoming visit & asks will labs be needed if so he can have drawn a  few days prior to viist .    Thank you

## 2024-11-17 NOTE — PROGRESS NOTES
Patient ID:  Matt Williamson is a 78 y.o. male.    Referring Physician:   No referring provider defined for this encounter.    Primary Care Provider:  Raj Arreola DO     Cancer Staging Information:  Cancer Staging   No matching staging information was found for the patient.      Problem List:  Patient Active Problem List    Diagnosis Date Noted    Secondary cancer of bone (CMS/HCC) 06/18/2024    Atherosclerosis of aorta (CMS/HCC) 02/07/2024    Colon polyp 10/11/2022    Other constipation 10/11/2022    Pain of left breast 10/11/2022    Vitamin D deficiency 01/29/2022    AVI (iron deficiency anemia) 01/29/2022    Leg swelling 01/27/2022    Lumbar disc disease with radiculopathy 01/27/2022    S/P lumbar fusion 01/27/2022    Acquired hypothyroidism 01/22/2022    Low back pain with right-sided sciatica 01/12/2022    Malignant neoplasm of vertebral column, excluding sacrum and coccyx (CMS/HCC) 01/12/2022    Cancer associated pain 12/10/2021    Pre-op evaluation 11/20/2020    Paroxysmal atrial fibrillation (CMS/HCC) 10/25/2019    Former tobacco use 05/02/2018    Essential hypertension 06/20/2016    Type 2 diabetes mellitus with diabetic neuropathy, unspecified (CMS/HCC) 06/20/2016    Mixed hyperlipidemia 02/18/2016    Cataract 09/11/2013    BPH (benign prostatic hyperplasia) 09/14/2012    Atherosclerosis of coronary artery 09/14/2012    Thyroid cancer (CMS/HCC) 01/15/2007    Esophageal reflux 01/15/2007       Chief Complaint  Chief Complaint   Patient presents with    Other     Consult       Subjective      History of Present Illness:  The following portions of the patient's history were reviewed and updated as appropriate: allergies, current medications, past family history, past medical history, past social history, and past surgical history.     HPI since last seen patient underwent corpectomy of L3 vertebral body by Dr. Heller in January 2022.  Pathology showed metastatic carcinoma consistent with thyroid origin.   Patient recently contacted me and stated he is having little more pain in the low back region.  He had been lifting heavy objects however which may explain the pain.  Regardless he underwent a repeat MRI scan lumbar spine which showed findings concerning for new metastatic disease to the inferior aspects of T12 and L1 vertebral bodies.  Postsurgical changes L3 corpectomy with posterior fusion L2-L4 and degenerative changes noted.  Repeat PET scan showed new lytic lesion right ischial tuberosity corresponding with low-level metabolic activity suspicious for osseous metastasis.  No significant radiotracer activity in the lumbar spine region corresponding to lesion seen on MRI scan.  No other foci of abnormal activity noted.  Patient does note pain in right hip acetabular region.  Recent blood work was done but thyroglobulin was not drawn.  Antibodies to thyroglobulin was undetectable.  Patient was recently noted to have elevated PSA of 12 follows up with urology.  No uptake noted in prostate on recent PET scan however.    Review of Systems:  Review of Systems   Constitutional:  Positive for fatigue.   Gastrointestinal:  Positive for constipation.   Genitourinary:  Negative for bladder incontinence, hematuria and nocturia.    Musculoskeletal:  Positive for back pain.   Neurological:  Positive for numbness.        Past Medical/Surgical History  Past Medical History:   Diagnosis Date    Atrial fibrillation with rapid ventricular response (CMS/HCC) 10/25/2019    Hospital[facility] on 10/25/19 at [facility].     Back pain     BPH (benign prostatic hyperplasia)     Coronary artery disease     LAD stent 2005    Hypertension     Hypothyroidism     Mixed hyperlipidemia     Thyroid cancer (CMS/HCC)     Type 2 diabetes mellitus without complication (CMS/HCC)     Vertigo      Past Surgical History:   Procedure Laterality Date    ANAL FISSURECTOMY  1986    CARDIAC CATHETERIZATION  11/21/2005    LM FOD. 70-80% proximal  LAD.Mild disease CX, RCA and branches.     CARDIAC CATHETERIZATION  11/28/2005    LM FOD. Pristine LAD stent. CX FOD. RCA mild disease.    COLONOSCOPY      CORONARY ANGIOPLASTY WITH STENT PLACEMENT  11/22/2005    LM FOD. Moderate, diffuse disease LAD. Irregular CX w/ PLVB distal to PDA 99%. Mild disease RCA. Stent to distal LAD.    EYE SURGERY Left Cataract    FOOT SURGERY  2009    For osteomyelitis.    GASTROCUTANEOUS FISTULA CLOSURE      LUMBAR SPINE SURGERY  01/20/2022    Stage 1: L2-L4 PSF with instrumentation, ORIF L3 pathologic fracture Stage 2: L3 lateral corpectomy with expandable cage placement    THYROIDECTOMY  2012    UPPER GASTROINTESTINAL ENDOSCOPY          Current Medications  Current Outpatient Medications   Medication Sig Dispense Refill    aspirin 81 mg enteric coated tablet Take 81 mg by mouth daily.      cholecalciferol, vitamin D3, (VITAMIN D3) 50 mcg (2,000 unit) tablet Take 2,000 Units by mouth daily.      dulaglutide (TRULICITY) 3 mg/0.5 mL pen injector Inject 3 mg under the skin once a week. Do not prime. 6 mL 1    gabapentin (NEURONTIN) 300 mg capsule TAKE 1 CAPSULE  TWICE A DAY (Patient taking differently: Take 300 mg by mouth daily.) 180 capsule 0    ibuprofen (MOTRIN) 800 mg tablet Take 1 tablet (800 mg total) by mouth every 6 (six) hours as needed (pain). Take w/ food 30 tablet 0    losartan (COZAAR) 25 mg tablet Take 1 tablet (25 mg total) by mouth daily. 90 tablet 1    metFORMIN XR (GLUCOPHAGE-XR) 500 mg 24 hr tablet Take 1 tablet (500 mg total) by mouth 2 (two) times a day with meals. (Patient taking differently: Take 500 mg by mouth daily with breakfast.) 180 tablet 1    metoprolol succinate XL (TOPROL-XL) 100 mg 24 hr tablet TAKE 1 TABLET(100 MG) BY MOUTH TWICE DAILY (Patient taking differently: Take 100 mg by mouth daily.) 180 tablet 3    pravastatin (PRAVACHOL) 40 mg tablet TAKE 1 TABLET(40 MG) BY MOUTH DAILY 90 tablet 1    SYNTHROID 150 mcg tablet Take 1 tablet (150 mcg total) by  mouth once daily. 30 tablet 1    polyethylene glycol (MIRALAX) 17 gram packet Take 17 g by mouth daily with dinner. 30 packet 0     No current facility-administered medications for this encounter.       Allergies  Allergies   Allergen Reactions    Penicillins Rash     Childhood allergy       Social History  Social History     Socioeconomic History    Marital status:      Spouse name: None    Number of children: None    Years of education: None    Highest education level: None   Occupational History    Occupation: Retired   Tobacco Use    Smoking status: Former     Packs/day: 2.00     Years: 20.00     Additional pack years: 0.00     Total pack years: 40.00     Types: Cigarettes     Quit date:      Years since quittin.4    Smokeless tobacco: Never   Vaping Use    Vaping Use: Never used   Substance and Sexual Activity    Alcohol use: Yes     Comment: Social    Drug use: Never    Sexual activity: Not Currently     Partners: Female   Social History Narrative    He lives with his wife.  He has 2 children who live in [place] in [place].  He reports he is independent in everything.  He reports he he likes playing sports including basketball and likes to model trains..  He reports he is a .  He denies a history of mental health services.     Social Determinants of Health     Food Insecurity: No Food Insecurity (2022)    Hunger Vital Sign     Worried About Running Out of Food in the Last Year: Never true     Ran Out of Food in the Last Year: Never true   Transportation Needs: Unknown (2020)    PRAPARE - Transportation     Lack of Transportation (Medical): Patient declined     Lack of Transportation (Non-Medical): Patient declined   Stress: No Stress Concern Present (2022)    [place] [employer] - Occupational Stress Questionnaire     Feeling of Stress : Not at all       Family History  Family History   Problem Relation Age of Onset    Cancer Biological Mother      Breast cancer Biological Mother     Emphysema Biological Father     Lung cancer Biological Father       Family Status   Relation Name Status    Bio Mother Shikha     Bio Father Ronnie     Bio Brother  Alive               Objective      Physical Exam:  Vital Signs for this encounter:  BSA: 1.94 meters squared  Visit Vitals  BP (!) 158/97 (Patient Position: Sitting)   Pulse 88   Wt 81.9 kg (180 lb 9.6 oz)   SpO2 99%   BMI 30.05 kg/m²         Physical Exam healthy appearing elderly male no apparent distress.  Lungs clear.  Back mild tenderness to palpation lumbar spine.  Tenderness palpation right hip region.  Neurologically grossly intact.  Abdomen soft nontender.  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  Patient now with evidence of lytic painful lesion right acetabulum.  Questionable areas that T12 and L1 unsure if significant.  Discussed with patient regarding stereotactic body radiation therapy to oligometastatic disease involving right acetabular region.  Discussed risks and benefits of treatment including possibility of damaging normal adjacent tissues.  Patient consented and went ahead with CT simulation planning.  Plan to deliver a total dose approximately 3000 cGy in 3-5 fractions over 2 to 3 weeks starting shortly.  Unfortunately patient's disease is not amenable to I-131.  Patient told to make appointment with  discussed new systemic therapies for thyroid cancer unsure if he is a candidate at this time.    Diagnoses and Orders Associated With This Visit:  Secondary cancer of bone (CMS/HCC) (Primary)  -     Thyroglobulin  -     Anti-thyroglobulin antibody    Thyroid cancer (CMS/HCC)      TREATMENT PLAN SUMMARY:    Radiation Therapy    Component Value Date    COURSEID C1 05/08/2020    PLANID 1_L3 SBRT 05/08/2020    PRESCRIBEDDO 7 05/08/2020    FRACTIONSTRE 5 of 5 05/08/2020       IV CHEMOTHERAPY:  [No matching plan found]            Rib Contusion    A rib contusion is a deep bruise on your rib area. Contusions are the result of a blunt trauma that causes bleeding and injury to the tissues under the skin. A rib contusion may involve bruising of the ribs and of the skin and muscles in the area. The skin overlying the contusion may turn blue, purple, or yellow. Minor injuries will give you a painless contusion, but more severe contusions may stay painful and swollen for a few weeks.     CAUSES  A contusion is usually caused by a blow, trauma, or direct force to an area of the body. This often occurs while playing contact sports.    SYMPTOMS  Swelling and redness of the injured area.  Discoloration of the injured area.  Tenderness and soreness of the injured area.  Pain with or without movement.    DIAGNOSIS  The diagnosis can be made by taking a medical history and performing a physical exam. An X-ray, CT scan, or MRI may be needed to determine if there were any associated injuries, such as broken bones (fractures) or internal injuries.    TREATMENT  Often, the best treatment for a rib contusion is rest. Icing or applying cold compresses to the injured area may help reduce swelling and inflammation. Deep breathing exercises may be recommended to reduce the risk of partial lung collapse and pneumonia. Over-the-counter or prescription medicines may also be recommended for pain control.    HOME CARE INSTRUCTIONS  Apply ice to the injured area:   Put ice in a plastic bag.   Place a towel between your skin and the bag.   Leave the ice on for 20 minutes, 2–3 times per day.   Take medicines only as directed by your health care provider.   Rest the injured area. Avoid strenuous activity and any activities or movements that cause pain. Be careful during activities and avoid bumping the injured area.  Perform deep-breathing exercises as directed by your health care provider.   Do not lift anything that is heavier than 5 lb (2.3 kg) until your health care provider approves.   Do not use any tobacco products, including cigarettes, chewing tobacco, or electronic cigarettes. If you need help quitting, ask your health care provider.     SEEK MEDICAL CARE IF:  You have increased bruising or swelling.   You have pain that is not controlled with treatment.   You have a fever.

## 2024-11-21 ENCOUNTER — OFFICE VISIT (OUTPATIENT)
Dept: INTERNAL MEDICINE | Facility: CLINIC | Age: 79
End: 2024-11-21
Payer: MEDICARE

## 2024-11-21 VITALS
TEMPERATURE: 98.4 F | BODY MASS INDEX: 30.22 KG/M2 | HEART RATE: 81 BPM | DIASTOLIC BLOOD PRESSURE: 58 MMHG | RESPIRATION RATE: 16 BRPM | HEIGHT: 64 IN | SYSTOLIC BLOOD PRESSURE: 130 MMHG | OXYGEN SATURATION: 95 % | WEIGHT: 177 LBS

## 2024-11-21 DIAGNOSIS — C79.51 SECONDARY CANCER OF BONE (CMS/HCC): ICD-10-CM

## 2024-11-21 DIAGNOSIS — R10.9 ABDOMINAL PAIN, UNSPECIFIED ABDOMINAL LOCATION: ICD-10-CM

## 2024-11-21 DIAGNOSIS — Z23 NEED FOR IMMUNIZATION AGAINST INFLUENZA: Primary | ICD-10-CM

## 2024-11-21 DIAGNOSIS — E11.40 TYPE 2 DIABETES MELLITUS WITH DIABETIC NEUROPATHY, WITHOUT LONG-TERM CURRENT USE OF INSULIN (CMS/HCC): ICD-10-CM

## 2024-11-21 DIAGNOSIS — C41.2 MALIGNANT NEOPLASM OF VERTEBRAL COLUMN, EXCLUDING SACRUM AND COCCYX (CMS/HCC): ICD-10-CM

## 2024-11-21 DIAGNOSIS — C73 THYROID CANCER (CMS/HCC): ICD-10-CM

## 2024-11-21 DIAGNOSIS — E03.9 ACQUIRED HYPOTHYROIDISM: ICD-10-CM

## 2024-11-21 DIAGNOSIS — R79.89 LOW VITAMIN D LEVEL: ICD-10-CM

## 2024-11-21 DIAGNOSIS — R06.83 SNORING: ICD-10-CM

## 2024-11-21 DIAGNOSIS — I25.10 ATHEROSCLEROSIS OF NATIVE CORONARY ARTERY OF NATIVE HEART WITHOUT ANGINA PECTORIS: ICD-10-CM

## 2024-11-21 DIAGNOSIS — K63.5 POLYP OF COLON, UNSPECIFIED PART OF COLON, UNSPECIFIED TYPE: ICD-10-CM

## 2024-11-21 DIAGNOSIS — Z98.1 S/P LUMBAR FUSION: ICD-10-CM

## 2024-11-21 DIAGNOSIS — E78.2 MIXED HYPERLIPIDEMIA: ICD-10-CM

## 2024-11-21 DIAGNOSIS — N40.0 BENIGN PROSTATIC HYPERPLASIA WITHOUT LOWER URINARY TRACT SYMPTOMS: ICD-10-CM

## 2024-11-21 DIAGNOSIS — I48.0 PAROXYSMAL ATRIAL FIBRILLATION (CMS/HCC): ICD-10-CM

## 2024-11-21 DIAGNOSIS — I10 ESSENTIAL HYPERTENSION: ICD-10-CM

## 2024-11-21 DIAGNOSIS — M51.16 LUMBAR DISC DISEASE WITH RADICULOPATHY: ICD-10-CM

## 2024-11-21 LAB
ALBUMIN SERPL-MCNC: 4.2 G/DL (ref 3.5–5.7)
ALP SERPL-CCNC: 56 IU/L (ref 34–125)
ALT SERPL-CCNC: 17 IU/L (ref 7–52)
ANION GAP SERPL CALC-SCNC: 7 MEQ/L (ref 3–15)
AST SERPL-CCNC: 18 IU/L (ref 13–39)
BASOPHILS # BLD: 0.07 K/UL (ref 0.01–0.1)
BASOPHILS NFR BLD: 0.9 %
BILIRUB SERPL-MCNC: 0.6 MG/DL (ref 0.3–1.2)
BUN SERPL-MCNC: 21 MG/DL (ref 7–25)
CALCIUM SERPL-MCNC: 9.2 MG/DL (ref 8.6–10.3)
CHLORIDE SERPL-SCNC: 102 MEQ/L (ref 98–107)
CHOLEST SERPL-MCNC: 157 MG/DL
CO2 SERPL-SCNC: 26 MEQ/L (ref 21–31)
CREAT SERPL-MCNC: 1.3 MG/DL (ref 0.7–1.3)
DIFFERENTIAL METHOD BLD: ABNORMAL
EGFRCR SERPLBLD CKD-EPI 2021: 55.2 ML/MIN/1.73M*2
EOSINOPHIL # BLD: 0.21 K/UL (ref 0.04–0.54)
EOSINOPHIL NFR BLD: 2.6 %
ERYTHROCYTE [DISTWIDTH] IN BLOOD BY AUTOMATED COUNT: 13.3 % (ref 11.6–14.4)
EST. AVERAGE GLUCOSE BLD GHB EST-MCNC: 203 MG/DL
GLUCOSE SERPL-MCNC: 184 MG/DL (ref 70–99)
HBA1C MFR BLD: 8.7 %
HCT VFR BLD AUTO: 41 % (ref 40.1–51)
HDLC SERPL-MCNC: 53 MG/DL
HDLC SERPL: 3 {RATIO}
HGB BLD-MCNC: 13.3 G/DL (ref 13.7–17.5)
IMM GRANULOCYTES # BLD AUTO: 0.03 K/UL (ref 0–0.08)
IMM GRANULOCYTES NFR BLD AUTO: 0.4 %
LDLC SERPL CALC-MCNC: 88 MG/DL
LIPASE SERPL-CCNC: 14 U/L (ref 11–82)
LYMPHOCYTES # BLD: 1.85 K/UL (ref 1.2–3.5)
LYMPHOCYTES NFR BLD: 22.7 %
MCH RBC QN AUTO: 29.4 PG (ref 28–33.2)
MCHC RBC AUTO-ENTMCNC: 32.4 G/DL (ref 32.2–36.5)
MCV RBC AUTO: 90.7 FL (ref 83–98)
MONOCYTES # BLD: 0.72 K/UL (ref 0.3–1)
MONOCYTES NFR BLD: 8.8 %
NEUTROPHILS # BLD: 5.28 K/UL (ref 1.7–7)
NEUTS SEG NFR BLD: 64.6 %
NONHDLC SERPL-MCNC: 104 MG/DL
NRBC BLD-RTO: 0 %
PLATELET # BLD AUTO: 225 K/UL (ref 150–350)
PMV BLD AUTO: 10 FL (ref 9.4–12.4)
POTASSIUM SERPL-SCNC: 5.3 MEQ/L (ref 3.5–5.1)
PROT SERPL-MCNC: 6.8 G/DL (ref 6–8.2)
PSA SERPL-MCNC: 8.64 NG/ML
RBC # BLD AUTO: 4.52 M/UL (ref 4.5–5.8)
SODIUM SERPL-SCNC: 135 MEQ/L (ref 136–145)
T4 FREE SERPL-MCNC: 1.76 NG/DL (ref 0.58–1.64)
TRIGL SERPL-MCNC: 78 MG/DL
TSH SERPL DL<=0.05 MIU/L-ACNC: 0.06 MIU/L (ref 0.34–5.6)
WBC # BLD AUTO: 8.16 K/UL (ref 3.8–10.5)

## 2024-11-21 PROCEDURE — 93000 ELECTROCARDIOGRAM COMPLETE: CPT | Performed by: INTERNAL MEDICINE

## 2024-11-21 PROCEDURE — 85025 COMPLETE CBC W/AUTO DIFF WBC: CPT | Performed by: INTERNAL MEDICINE

## 2024-11-21 PROCEDURE — 90662 IIV NO PRSV INCREASED AG IM: CPT | Performed by: INTERNAL MEDICINE

## 2024-11-21 PROCEDURE — G8754 DIAS BP LESS 90: HCPCS | Performed by: INTERNAL MEDICINE

## 2024-11-21 PROCEDURE — 84153 ASSAY OF PSA TOTAL: CPT | Performed by: INTERNAL MEDICINE

## 2024-11-21 PROCEDURE — 83036 HEMOGLOBIN GLYCOSYLATED A1C: CPT | Performed by: INTERNAL MEDICINE

## 2024-11-21 PROCEDURE — 99214 OFFICE O/P EST MOD 30 MIN: CPT | Mod: 25 | Performed by: INTERNAL MEDICINE

## 2024-11-21 PROCEDURE — G8752 SYS BP LESS 140: HCPCS | Performed by: INTERNAL MEDICINE

## 2024-11-21 PROCEDURE — 36415 COLL VENOUS BLD VENIPUNCTURE: CPT | Performed by: INTERNAL MEDICINE

## 2024-11-21 PROCEDURE — 83690 ASSAY OF LIPASE: CPT | Performed by: INTERNAL MEDICINE

## 2024-11-21 PROCEDURE — G0008 ADMIN INFLUENZA VIRUS VAC: HCPCS | Performed by: INTERNAL MEDICINE

## 2024-11-21 PROCEDURE — 80061 LIPID PANEL: CPT | Performed by: INTERNAL MEDICINE

## 2024-11-21 PROCEDURE — 80053 COMPREHEN METABOLIC PANEL: CPT | Performed by: INTERNAL MEDICINE

## 2024-11-21 PROCEDURE — 84443 ASSAY THYROID STIM HORMONE: CPT | Performed by: INTERNAL MEDICINE

## 2024-11-21 PROCEDURE — 84439 ASSAY OF FREE THYROXINE: CPT | Performed by: INTERNAL MEDICINE

## 2024-11-21 RX ORDER — LEVOTHYROXINE SODIUM 150 UG/1
150 TABLET ORAL
Qty: 90 TABLET | Refills: 3 | Status: SHIPPED | OUTPATIENT
Start: 2024-11-21 | End: 2025-03-26

## 2024-11-21 RX ORDER — GABAPENTIN 300 MG/1
300 CAPSULE ORAL DAILY
Qty: 90 CAPSULE | Refills: 3 | Status: SHIPPED | OUTPATIENT
Start: 2024-11-21 | End: 2025-11-21

## 2024-11-26 LAB — MLHC DIABETIC EYE EXAM (EXTERNAL): NORMAL

## 2024-11-30 RX ORDER — METFORMIN HYDROCHLORIDE 500 MG/1
1000 TABLET, EXTENDED RELEASE ORAL 2 TIMES DAILY WITH MEALS
Qty: 360 TABLET | Refills: 3 | Status: SHIPPED | OUTPATIENT
Start: 2024-11-30 | End: 2025-11-30

## 2024-12-27 RX ORDER — DULAGLUTIDE 3 MG/.5ML
3 INJECTION, SOLUTION SUBCUTANEOUS WEEKLY
Qty: 6 ML | Refills: 0 | Status: SHIPPED | OUTPATIENT
Start: 2024-12-27 | End: 2025-01-26

## 2024-12-27 NOTE — TELEPHONE ENCOUNTER
Last Office Visit: 11/21/2024  Next Office Visit: 3/26/2025      BP Readings from Last 3 Encounters:   11/21/24 (!) 130/58   08/15/24 122/83   08/06/24 (!) 152/90       Recent Lab results:  Lab Results   Component Value Date    CHOL 157 11/21/2024   ,   Lab Results   Component Value Date    HDL 53 11/21/2024   ,   Lab Results   Component Value Date    LDLCALC 88 11/21/2024   ,   Lab Results   Component Value Date    TRIG 78 11/21/2024        Lab Results   Component Value Date    GLUCOSE 184 (H) 11/21/2024   ,   Lab Results   Component Value Date    HGBA1C 8.7 (H) 11/21/2024         Lab Results   Component Value Date    CREATININE 1.3 11/21/2024       Lab Results   Component Value Date    TSH 0.06 (L) 11/21/2024

## 2025-01-02 ENCOUNTER — OFFICE VISIT (OUTPATIENT)
Dept: HEMATOLOGY/ONCOLOGY | Facility: CLINIC | Age: 80
End: 2025-01-02
Attending: INTERNAL MEDICINE
Payer: MEDICARE

## 2025-01-02 VITALS
DIASTOLIC BLOOD PRESSURE: 82 MMHG | BODY MASS INDEX: 30.73 KG/M2 | SYSTOLIC BLOOD PRESSURE: 137 MMHG | OXYGEN SATURATION: 98 % | WEIGHT: 179 LBS | HEART RATE: 86 BPM

## 2025-01-02 DIAGNOSIS — C73 THYROID CANCER (CMS/HCC): ICD-10-CM

## 2025-01-02 PROCEDURE — G8752 SYS BP LESS 140: HCPCS | Performed by: INTERNAL MEDICINE

## 2025-01-02 PROCEDURE — G8754 DIAS BP LESS 90: HCPCS | Performed by: INTERNAL MEDICINE

## 2025-01-02 PROCEDURE — 99214 OFFICE O/P EST MOD 30 MIN: CPT | Performed by: INTERNAL MEDICINE

## 2025-01-02 ASSESSMENT — ENCOUNTER SYMPTOMS
GASTROINTESTINAL NEGATIVE: 1
BACK PAIN: 1
RESPIRATORY NEGATIVE: 1
CONSTITUTIONAL NEGATIVE: 1
CARDIOVASCULAR NEGATIVE: 1
NUMBNESS: 1
EYES NEGATIVE: 1
PSYCHIATRIC NEGATIVE: 1
HEMATOLOGIC/LYMPHATIC NEGATIVE: 1
ARTHRALGIAS: 1

## 2025-01-02 ASSESSMENT — PAIN SCALES - GENERAL: PAINLEVEL_OUTOF10: 0-NO PAIN

## 2025-01-02 NOTE — PROGRESS NOTES
Matt Williamson is a 79 y.o. male,   :  1945    Encounter Diagnosis   Name Primary?    Thyroid cancer (CMS/HCC)         Cancer Staging   Thyroid cancer (CMS/HCC)  Staging form: Thyroid - Differentiated, AJCC 8th Edition  - Pathologic stage from 2012: Stage II (pT3a, pN0a, cM0, Age at diagnosis: >= 55 years) - Signed by Stacie Casey MD on 2020      Treatment Plans       No treatment plans exist            Oncology History   Thyroid cancer (CMS/HCC)   2012 Initial Diagnosis    Thyroid cancer (CMS/HCC)  Papillary thyroid carcinoma, follicular variant     2012 Cancer Staged    Staging form: Thyroid - Differentiated, AJCC 8th Edition  - Pathologic stage from 2012: Stage II (pT3a, pN0a, cM0, Age at diagnosis: >= 55 years)     2012 Surgery    Total thyroidectomy and lymph node biopsy performed by Dr. Lester at the [facility]     3/28/2012 -  Radiation Therapy    Patient received postoperative I-131 treatment.  Treatment details are not available currently.     2020 Recurrence Distant    Biopsy of an enlarging L3 lesion showed metastatic carcinoma consistent with a thyroid primary.     2020 -  Radiation Therapy    Received a course of external beam radiation to L3 metastasis under direction of Dr. [facility].     2022 Surgery    Underwent resection of L3 vertebral tumor with Dr. Wright showing metastatic carcinoma consistent with thyroid origin     2024 - 2024 Radiation Therapy    Underwent radiation of a lytic lesion in the right ischium         Patient Active Problem List   Diagnosis    BPH (benign prostatic hyperplasia)    Atherosclerosis of coronary artery    Cataract    Essential hypertension    Former tobacco use    Mixed hyperlipidemia    Thyroid cancer (CMS/HCC)    Type 2 diabetes mellitus with diabetic neuropathy, unspecified (CMS/HCC)    Esophageal reflux    Paroxysmal atrial fibrillation (CMS/HCC)    Pre-op evaluation    Cancer associated  pain    Low back pain with right-sided sciatica    Malignant neoplasm of vertebral column, excluding sacrum and coccyx (CMS/HCC)    Acquired hypothyroidism    Leg swelling    Lumbar disc disease with radiculopathy    S/P lumbar fusion    Vitamin D deficiency    AVI (iron deficiency anemia)    Colon polyp    Other constipation    Pain of left breast    Atherosclerosis of aorta (CMS/HCC)    Secondary cancer of bone (CMS/HCC)    Low vitamin D level       History of Present Illness  HPI  Matt Williamson is seen today in follow up.  He reports he has been feeling well.  He is not having any new areas of pain.  He reports some chronic back pain but has not noted a change in that symptom.  Since his last office visit he had a PET CT scan which showed stable findings without any evidence of progressive malignancy.  There was some increased uptake in the rectum.  Patient reports he is meeting with his cardiologist prior to scheduling colonoscopy.  He had a DEXA scan prior to his last visit which showed osteopenia but no evidence of osteoporosis.    Review of Systems - Oncology    Review of Systems:  Nursing assessment reviewed. Pertinent positive and negative symptoms noted in HPI, all others negative.    Heart Rate:  [86] 86  BP: (137)/(82) 137/82  Visit Vitals  /82 (BP Location: Left upper arm, Patient Position: Sitting)   Pulse 86   Wt 81.2 kg (179 lb)   SpO2 98%   BMI 30.73 kg/m²     Physical Exam  Constitutional:       General: He is not in acute distress.     Appearance: He is well-developed.   HENT:      Head: Normocephalic.      Mouth/Throat:      Pharynx: Oropharynx is clear. No oropharyngeal exudate.   Eyes:      General: No scleral icterus.     Pupils: Pupils are equal, round, and reactive to light.   Cardiovascular:      Rate and Rhythm: Normal rate and regular rhythm.   Pulmonary:      Effort: Pulmonary effort is normal.      Breath sounds: Normal breath sounds.   Abdominal:      Palpations: Abdomen is  soft.      Tenderness: There is no abdominal tenderness.   Musculoskeletal:         General: No tenderness.      Cervical back: Neck supple.   Lymphadenopathy:      Cervical: No cervical adenopathy.   Skin:     Findings: No rash.   Neurological:      Mental Status: He is alert.         Past Medical History[1]    Past Surgical History   Procedure Laterality Date    Anal fissurectomy      Cardiac catheterization  2005    LM FOD. 70-80% proximal LAD.Mild disease CX, RCA and branches.     Cardiac catheterization  2005    LM FOD. Pristine LAD stent. CX FOD. RCA mild disease.    cataract surgery right eye Right 2020    Performed by Oscar Hale MD at  OR    Colonoscopy      Coronary angioplasty with stent placement  2005    LM FOD. Moderate, diffuse disease LAD. Irregular CX w/ PLVB distal to PDA 99%. Mild disease RCA. Stent to distal LAD.    Eye surgery Left Cataract    Foot surgery      For osteomyelitis.    Gastrocutaneous fistula closure      Lumbar spine surgery  2022    Stage 1: L2-L4 PSF with instrumentation, ORIF L3 pathologic fracture Stage 2: L3 lateral corpectomy with expandable cage placement    Stage 1: L2-L4 PSF with instrumentation, ORIF L3 pathologic fracture  Stage 2: L3 lateral corpectomy with expandable cage placement N/A 2022    Performed by Colt Heller MD at  OR    Thyroidectomy  2012    Upper gastrointestinal endoscopy         Social History     Tobacco Use    Smoking status: Former     Current packs/day: 0.00     Average packs/day: 2.0 packs/day for 20.0 years (40.0 ttl pk-yrs)     Types: Cigarettes     Start date:      Quit date: 1980     Years since quittin.0    Smokeless tobacco: Never   Vaping Use    Vaping status: Never Used   Substance Use Topics    Alcohol use: Yes     Comment: Social    Drug use: Never       Family History   Problem Relation Name Age of Onset    Cancer Biological Mother Shikha     Breast cancer Biological Mother  Shikha     Emphysema Biological Father Ronnie     Lung cancer Biological Father Ronnie          Allergies  Penicillins    Medications  Current Outpatient Medications   Medication Sig Dispense Refill    aspirin 81 mg enteric coated tablet Take 81 mg by mouth daily.      cholecalciferol, vitamin D3, (VITAMIN D3) 50 mcg (2,000 unit) tablet Take 2,000 Units by mouth daily.      dulaglutide (TRULICITY) 3 mg/0.5 mL pen injector Inject 3 mg under the skin once a week. 6 mL 0    empagliflozin (JARDIANCE) 10 mg tablet Take 1 tablet (10 mg total) by mouth daily. 90 tablet 1    gabapentin (NEURONTIN) 300 mg capsule Take 1 capsule (300 mg total) by mouth daily. 90 capsule 3    ibuprofen (MOTRIN) 800 mg tablet Take 1 tablet (800 mg total) by mouth every 6 (six) hours as needed (pain). Take w/ food 30 tablet 0    losartan (COZAAR) 25 mg tablet TAKE 1 TABLET(25 MG) BY MOUTH DAILY 90 tablet 1    metFORMIN XR (GLUCOPHAGE-XR) 500 mg 24 hr tablet Take 2 tablets (1,000 mg total) by mouth 2 (two) times a day with meals. 360 tablet 3    metoprolol succinate XL (TOPROL-XL) 100 mg 24 hr tablet TAKE 1 TABLET(100 MG) BY MOUTH TWICE DAILY (Patient taking differently: Take 100 mg by mouth daily.) 180 tablet 3    omeprazole (PriLOSEC) 40 mg capsule Take 1 capsule (40 mg total) by mouth daily before breakfast. 90 capsule 1    polyethylene glycol (MIRALAX) 17 gram packet Take 17 g by mouth daily with dinner. 30 packet 0    pravastatin (PRAVACHOL) 40 mg tablet TAKE 1 TABLET(40 MG) BY MOUTH DAILY 90 tablet 0    SYNTHROID 150 mcg tablet Take 1 tablet (150 mcg total) by mouth once daily. 90 tablet 3     No current facility-administered medications for this visit.          Laboratory  No results found for this or any previous visit (from the past 3 weeks).      Radiology  I independently reviewed the images, tracings or specimen. Significant abnormals are PET/CT scan from October 2024 reviewed as per HPI.      Assessment and Plan  Thyroid cancer  (CMS/HCC)  Patient has a history of papillary thyroid cancer first diagnosed in 2012.  He developed recurrence with bone metastasis to L3 in 2020.  This was treated initially with radiation and then subsequent surgery due to localized progression.  Patient received additional radiation to the lesion of the right ischium in July 2024.    We reviewed his recent imaging from October.  There is no evidence of progressive thyroid cancer.  Plan is for ongoing monitoring with a follow-up PET/CT ordered by Dr. [facility] to be done in April or May.  If he has evidence of progressive disease at that time can consider systemic therapy such as lenvatinib but given the relatively slow progression of his disease the risks of that treatment may outweigh the benefits.  If follow-up imaging shows disease progression, then would consider repeat biopsy as there was insufficient sample to send for NGS testing from previous biopsy.  Will plan on seeing him back in 6 months.      Stacie Casey MD         [1]   Past Medical History:  Diagnosis Date    Atrial fibrillation with rapid ventricular response (CMS/HCC) 10/25/2019    Hospitalized on 10/25/19 at [facility].     Back pain     BPH (benign prostatic hyperplasia)     Coronary artery disease     LAD stent 2005    Hypertension     Hypothyroidism     Mixed hyperlipidemia     Thyroid cancer (CMS/HCC)     Type 2 diabetes mellitus without complication (CMS/HCC)     Vertigo

## 2025-01-02 NOTE — ASSESSMENT & PLAN NOTE
Patient has a history of papillary thyroid cancer first diagnosed in 2012.  He developed recurrence with bone metastasis to L3 in 2020.  This was treated initially with radiation and then subsequent surgery due to localized progression.  Patient received additional radiation to the lesion of the right ischium in July 2024.    We reviewed his recent imaging from October.  There is no evidence of progressive thyroid cancer.  Plan is for ongoing monitoring with a follow-up PET/CT ordered by Dr. [facility] to be done in April or May.  If he has evidence of progressive disease at that time can consider systemic therapy such as lenvatinib but given the relatively slow progression of his disease the risks of that treatment may outweigh the benefits.  If follow-up imaging shows disease progression, then would consider repeat biopsy as there was insufficient sample to send for NGS testing from previous biopsy.  Will plan on seeing him back in 6 months.

## 2025-01-02 NOTE — PROGRESS NOTES
Review of Systems   Constitutional: Negative.    HENT:  Negative.     Eyes: Negative.    Respiratory: Negative.     Cardiovascular: Negative.    Gastrointestinal: Negative.    Genitourinary: Negative.     Musculoskeletal:  Positive for arthralgias and back pain.   Skin: Negative.    Neurological:  Positive for numbness (hands and fingers).   Hematological: Negative.    Psychiatric/Behavioral: Negative.

## 2025-02-04 ENCOUNTER — TELEPHONE (OUTPATIENT)
Dept: INTERNAL MEDICINE | Facility: CLINIC | Age: 80
End: 2025-02-04
Payer: MEDICARE

## 2025-02-04 RX ORDER — AZITHROMYCIN 250 MG/1
TABLET, FILM COATED ORAL
Qty: 6 TABLET | Refills: 0 | Status: SHIPPED | OUTPATIENT
Start: 2025-02-04

## 2025-02-04 NOTE — TELEPHONE ENCOUNTER
Incoming call from pt via voice mail-   Outgoing return call to pot to get more specific medical symptoms -  Pt c/o - fever  @ 101.0  (taken otc tylenol)  Pt stated covid/flu  test taken  - resulted negative    Other symptoms include -chronic cough w/ mucus build up - but unable to bring up the mucus -  Sore throat  all symptoms started  last 24 hours.

## 2025-02-18 ENCOUNTER — TELEPHONE (OUTPATIENT)
Dept: CARDIOLOGY | Facility: CLINIC | Age: 80
End: 2025-02-18
Payer: MEDICARE

## 2025-02-18 NOTE — TELEPHONE ENCOUNTER
The patient is scheduled for a Colonoscopy on 03/03/25 with dr. Gonzalez.  I called and left the patient a message asking for a call back to schedule the clearance.  GT pt.  Please scheduled a clearance when they patient returns call.  Thank you

## 2025-02-25 ENCOUNTER — TELEPHONE (OUTPATIENT)
Dept: INTERNAL MEDICINE | Facility: CLINIC | Age: 80
End: 2025-02-25
Payer: MEDICARE

## 2025-02-25 NOTE — TELEPHONE ENCOUNTER
Spoke with cardiology office  Patient scheduled for Friday  Patient made aware he should keep current date of colonoscopy.

## 2025-02-25 NOTE — TELEPHONE ENCOUNTER
PCP reached out to see if pt can get in. I put a hold on appt unitl pcp talks to patient further. Not sure if still having proc'd

## 2025-02-25 NOTE — TELEPHONE ENCOUNTER
Pt was scheduled for a colonoscopy on 03/03/25.  Pt came down with Covid and had to cancel his cardiac clearance appointment and now he cannot get rescheduled with the cardiologist until after 03/03/25.  If pt reschedules the colonoscopy it will be in May.  Pt wants to know from Dr. Arreola if it is ok to wait that long as he was getting this done as a results of a finding on an MRI ordered by Dr. Arroela.

## 2025-02-25 NOTE — TELEPHONE ENCOUNTER
His hemoglobin was lower, now at 13.3.  He should try to have this study done soon rather than later.    Raj Arreola

## 2025-02-25 NOTE — TELEPHONE ENCOUNTER
Post documentation:     Spoke with patient earlier he stated he had covid about 3 weeks ago. Lingering cough currently but better. He feels he could wait until May for a colonoscopy but wanted to make sure he didn't need one ASAP per Dr. Arreola. Secure chat sent to PCP to advise further.     Spoke with Mark in cardiology and an appt is on hold currently for Friday for cardio if PCP feels it needs to be done asap.

## 2025-03-12 ENCOUNTER — TELEPHONE (OUTPATIENT)
Dept: INTERNAL MEDICINE | Facility: CLINIC | Age: 80
End: 2025-03-12
Payer: MEDICARE

## 2025-03-12 DIAGNOSIS — E11.40 TYPE 2 DIABETES MELLITUS WITH DIABETIC NEUROPATHY, WITHOUT LONG-TERM CURRENT USE OF INSULIN (CMS/HCC): Primary | ICD-10-CM

## 2025-03-12 RX ORDER — DULAGLUTIDE 3 MG/.5ML
3 INJECTION, SOLUTION SUBCUTANEOUS
Qty: 6 ML | Refills: 3 | Status: SHIPPED | OUTPATIENT
Start: 2025-03-12

## 2025-03-12 NOTE — TELEPHONE ENCOUNTER
Pt LVM on clinical line asking if  will take over Rx he's TRULICITY mg/0.5 mL pen injector  pt no longer seeing his endo I do see med was D/C by Norma 9/27/24 pharmacy is [facility] in [place]

## 2025-03-12 NOTE — TELEPHONE ENCOUNTER
Spoke with patient and advised we would send trulicity to pharmacy. Discussed that he recently went to urgent care and had a cxr which showed an infiltrate and also it was recommended to do follow up diagnostic 3-4 weeks. Patient would like to complete this on the same day he has his appointment with Dr. Arreola.     Advised I would discuss with Dr. Arreola and notify patient once ordered.

## 2025-03-26 ENCOUNTER — OFFICE VISIT (OUTPATIENT)
Dept: INTERNAL MEDICINE | Facility: CLINIC | Age: 80
End: 2025-03-26
Payer: MEDICARE

## 2025-03-26 ENCOUNTER — TELEPHONE (OUTPATIENT)
Dept: INTERNAL MEDICINE | Facility: CLINIC | Age: 80
End: 2025-03-26

## 2025-03-26 VITALS
DIASTOLIC BLOOD PRESSURE: 80 MMHG | TEMPERATURE: 97 F | RESPIRATION RATE: 18 BRPM | OXYGEN SATURATION: 98 % | HEIGHT: 65 IN | SYSTOLIC BLOOD PRESSURE: 128 MMHG | BODY MASS INDEX: 28.99 KG/M2 | HEART RATE: 81 BPM | WEIGHT: 174 LBS

## 2025-03-26 DIAGNOSIS — C73 THYROID CANCER (CMS/HCC): ICD-10-CM

## 2025-03-26 DIAGNOSIS — R91.1 LUNG NODULE: ICD-10-CM

## 2025-03-26 DIAGNOSIS — E11.40 TYPE 2 DIABETES MELLITUS WITH DIABETIC NEUROPATHY, WITHOUT LONG-TERM CURRENT USE OF INSULIN (CMS/HCC): ICD-10-CM

## 2025-03-26 DIAGNOSIS — C79.51 SECONDARY CANCER OF BONE (CMS/HCC): ICD-10-CM

## 2025-03-26 DIAGNOSIS — R53.83 OTHER FATIGUE: ICD-10-CM

## 2025-03-26 DIAGNOSIS — E78.2 MIXED HYPERLIPIDEMIA: ICD-10-CM

## 2025-03-26 DIAGNOSIS — I10 ESSENTIAL HYPERTENSION: Primary | ICD-10-CM

## 2025-03-26 DIAGNOSIS — E03.9 ACQUIRED HYPOTHYROIDISM: ICD-10-CM

## 2025-03-26 DIAGNOSIS — D64.9 ANEMIA, UNSPECIFIED TYPE: ICD-10-CM

## 2025-03-26 DIAGNOSIS — C41.2 MALIGNANT NEOPLASM OF VERTEBRAL COLUMN, EXCLUDING SACRUM AND COCCYX (CMS/HCC): ICD-10-CM

## 2025-03-26 DIAGNOSIS — Z87.891 FORMER TOBACCO USE: ICD-10-CM

## 2025-03-26 DIAGNOSIS — K63.5 POLYP OF COLON, UNSPECIFIED PART OF COLON, UNSPECIFIED TYPE: ICD-10-CM

## 2025-03-26 DIAGNOSIS — R97.20 INCREASING PROSTATE SPECIFIC ANTIGEN LEVEL: ICD-10-CM

## 2025-03-26 DIAGNOSIS — I48.0 PAROXYSMAL ATRIAL FIBRILLATION (CMS/HCC): ICD-10-CM

## 2025-03-26 LAB
ALBUMIN SERPL-MCNC: 4.3 G/DL (ref 3.5–5.7)
ALP SERPL-CCNC: 49 IU/L (ref 34–125)
ALT SERPL-CCNC: 13 IU/L (ref 7–52)
ANION GAP SERPL CALC-SCNC: 10 MEQ/L (ref 3–15)
AST SERPL-CCNC: 14 IU/L (ref 13–39)
BASOPHILS # BLD: 0.08 K/UL (ref 0.01–0.1)
BASOPHILS NFR BLD: 0.9 %
BILIRUB SERPL-MCNC: 0.5 MG/DL (ref 0.3–1.2)
BUN SERPL-MCNC: 19 MG/DL (ref 7–25)
CALCIUM SERPL-MCNC: 9.6 MG/DL (ref 8.6–10.3)
CHLORIDE SERPL-SCNC: 101 MEQ/L (ref 98–107)
CHOLEST SERPL-MCNC: 153 MG/DL
CO2 SERPL-SCNC: 27 MEQ/L (ref 21–31)
CREAT SERPL-MCNC: 1.2 MG/DL (ref 0.7–1.3)
CREAT UR-MCNC: 76.5 MG/DL
DIFFERENTIAL METHOD BLD: ABNORMAL
EGFRCR SERPLBLD CKD-EPI 2021: >60 ML/MIN/1.73M*2
EOSINOPHIL # BLD: 0.29 K/UL (ref 0.04–0.54)
EOSINOPHIL NFR BLD: 3.2 %
ERYTHROCYTE [DISTWIDTH] IN BLOOD BY AUTOMATED COUNT: 14.9 % (ref 11.6–14.4)
FERRITIN SERPL-MCNC: 67 NG/ML (ref 24–250)
FOLATE SERPL-MCNC: >20 NG/ML
GLUCOSE SERPL-MCNC: 157 MG/DL (ref 70–99)
HCT VFR BLD AUTO: 43.9 % (ref 40.1–51)
HDLC SERPL-MCNC: 53 MG/DL
HDLC SERPL: 2.9 {RATIO}
HGB BLD-MCNC: 14.3 G/DL (ref 13.7–17.5)
IMM GRANULOCYTES # BLD AUTO: 0.04 K/UL (ref 0–0.08)
IMM GRANULOCYTES NFR BLD AUTO: 0.4 %
LDLC SERPL CALC-MCNC: 77 MG/DL
LYMPHOCYTES # BLD: 1.86 K/UL (ref 1.2–3.5)
LYMPHOCYTES NFR BLD: 20.6 %
MCH RBC QN AUTO: 28.8 PG (ref 28–33.2)
MCHC RBC AUTO-ENTMCNC: 32.6 G/DL (ref 32.2–36.5)
MCV RBC AUTO: 88.3 FL (ref 83–98)
MICROALBUMIN UR-MCNC: 4.2 MG/L
MICROALBUMIN/CREAT UR: 5.5 UG/MG
MONOCYTES # BLD: 0.81 K/UL (ref 0.3–1)
MONOCYTES NFR BLD: 9 %
NEUTROPHILS # BLD: 5.96 K/UL (ref 1.7–7)
NEUTS SEG NFR BLD: 65.9 %
NONHDLC SERPL-MCNC: 100 MG/DL
NRBC BLD-RTO: 0 %
PLAT MORPH BLD: NORMAL
PLATELET # BLD AUTO: 234 K/UL (ref 150–350)
PLATELET # BLD EST: ABNORMAL 10*3/UL
PMV BLD AUTO: 10.5 FL (ref 9.4–12.4)
POTASSIUM SERPL-SCNC: 4.9 MEQ/L (ref 3.5–5.1)
PROT SERPL-MCNC: 7.1 G/DL (ref 6–8.2)
PSA SERPL-MCNC: 10.02 NG/ML
RBC # BLD AUTO: 4.97 M/UL (ref 4.5–5.8)
RBC MORPH BLD: NORMAL
SODIUM SERPL-SCNC: 138 MEQ/L (ref 136–145)
T4 FREE SERPL-MCNC: 1.51 NG/DL (ref 0.58–1.64)
TRIGL SERPL-MCNC: 116 MG/DL
TSH SERPL DL<=0.05 MIU/L-ACNC: 0.03 MIU/L (ref 0.34–5.6)
VIT B12 SERPL-MCNC: 344 PG/ML (ref 180–914)
WBC # BLD AUTO: 9.04 K/UL (ref 3.8–10.5)

## 2025-03-26 PROCEDURE — 84153 ASSAY OF PSA TOTAL: CPT | Performed by: INTERNAL MEDICINE

## 2025-03-26 PROCEDURE — 80061 LIPID PANEL: CPT | Performed by: INTERNAL MEDICINE

## 2025-03-26 PROCEDURE — 84439 ASSAY OF FREE THYROXINE: CPT | Performed by: INTERNAL MEDICINE

## 2025-03-26 PROCEDURE — 82043 UR ALBUMIN QUANTITATIVE: CPT | Performed by: INTERNAL MEDICINE

## 2025-03-26 PROCEDURE — 36415 COLL VENOUS BLD VENIPUNCTURE: CPT | Performed by: INTERNAL MEDICINE

## 2025-03-26 PROCEDURE — 82746 ASSAY OF FOLIC ACID SERUM: CPT | Performed by: INTERNAL MEDICINE

## 2025-03-26 PROCEDURE — 83036 HEMOGLOBIN GLYCOSYLATED A1C: CPT | Performed by: INTERNAL MEDICINE

## 2025-03-26 PROCEDURE — 82728 ASSAY OF FERRITIN: CPT | Performed by: INTERNAL MEDICINE

## 2025-03-26 PROCEDURE — 99214 OFFICE O/P EST MOD 30 MIN: CPT | Performed by: INTERNAL MEDICINE

## 2025-03-26 PROCEDURE — 84443 ASSAY THYROID STIM HORMONE: CPT | Performed by: INTERNAL MEDICINE

## 2025-03-26 PROCEDURE — 82607 VITAMIN B-12: CPT | Mod: GZ | Performed by: INTERNAL MEDICINE

## 2025-03-26 PROCEDURE — G8752 SYS BP LESS 140: HCPCS | Performed by: INTERNAL MEDICINE

## 2025-03-26 PROCEDURE — G8754 DIAS BP LESS 90: HCPCS | Performed by: INTERNAL MEDICINE

## 2025-03-26 PROCEDURE — 85025 COMPLETE CBC W/AUTO DIFF WBC: CPT | Performed by: INTERNAL MEDICINE

## 2025-03-26 PROCEDURE — 80053 COMPREHEN METABOLIC PANEL: CPT | Performed by: INTERNAL MEDICINE

## 2025-03-26 RX ORDER — METOPROLOL SUCCINATE 100 MG/1
100 TABLET, EXTENDED RELEASE ORAL DAILY
Qty: 90 TABLET | Refills: 3 | Status: SHIPPED | OUTPATIENT
Start: 2025-03-26 | End: 2025-03-26 | Stop reason: SDUPTHER

## 2025-03-26 RX ORDER — METOPROLOL SUCCINATE 100 MG/1
100 TABLET, EXTENDED RELEASE ORAL DAILY
Qty: 90 TABLET | Refills: 3 | Status: SHIPPED | OUTPATIENT
Start: 2025-03-26

## 2025-03-26 ASSESSMENT — ENCOUNTER SYMPTOMS
ALLERGIC/IMMUNOLOGIC NEGATIVE: 1
NEUROLOGICAL NEGATIVE: 1
PSYCHIATRIC NEGATIVE: 1
MUSCULOSKELETAL NEGATIVE: 1
RESPIRATORY NEGATIVE: 1
GASTROINTESTINAL NEGATIVE: 1
EYES NEGATIVE: 1
CARDIOVASCULAR NEGATIVE: 1
CONSTITUTIONAL NEGATIVE: 1
ENDOCRINE NEGATIVE: 1

## 2025-03-26 NOTE — ASSESSMENT & PLAN NOTE
Lab Results   Component Value Date    HGBA1C 8.7 (H) 11/21/2024     I discussed with the patient and the initial treatment for diabetes including regular exercise, and weight loss.  It is important to treat diabetes to prevent  complications.  Diabetes could lead to heart disease, stroke, visual problems including blindness and kidney failure.  The patient was encouraged to see ophthalmology and podiatry at least on a yearly basis.    Follows with endocrinology    Orders:    Hemoglobin A1c    Microalbumin/Creatinine Ur Random

## 2025-03-26 NOTE — TELEPHONE ENCOUNTER
Pharmacy needs a call for clarification on instruction once a day or twice a day for metoprolol.     Kayla Sánchez. [phone]

## 2025-03-26 NOTE — ASSESSMENT & PLAN NOTE
Getting colonoscopy    Colonoscopy with Dr. Tessa Bianchi, with polyps getting follow-up colonoscopy        Orders:    CBC and Differential

## 2025-03-26 NOTE — ASSESSMENT & PLAN NOTE
The patient's hyperlipidemia is currently controlled and no changes were made in terms of the plan.  Would continue with healthy diet and exercise and medications as prescribed.  The patient denies any muscle pain, and understands the importance of compliance.    Orders:    Comprehensive metabolic panel    Lipid panel

## 2025-03-26 NOTE — PROGRESS NOTES
"Subjective      Patient ID: Marco A Williamson is a 79 y.o. male.  1945      HPI    Recent events, was that he had COVID and an abnormal x-ray and is slowly recovering.  Because of this he had to have his colonoscopy canceled and it is reschedule with [facility].  He has a history of hypothyroidism colon polyps, hypertension, low vitamin D, hyperlipidemia and PAF.    He has been following with hematology oncology, and radiation oncology for his metastatic thyroid cancer.    Meds continue with aspirin, losartan and metoprolol.       The following have been reviewed and updated as appropriate in this visit:   Allergies  Meds       Review of Systems   Constitutional: Negative.    HENT: Negative.     Eyes: Negative.    Respiratory: Negative.     Cardiovascular: Negative.    Gastrointestinal: Negative.    Endocrine: Negative.    Genitourinary: Negative.    Musculoskeletal: Negative.    Allergic/Immunologic: Negative.    Neurological: Negative.    Psychiatric/Behavioral: Negative.         Objective     Vitals:    03/26/25 1118   BP: 128/80   Pulse: 81   Resp: 18   Temp: 36.1 °C (97 °F)   SpO2: 98%   Weight: 78.9 kg (174 lb)   Height: 1.651 m (5' 5\")     Body mass index is 28.96 kg/m².    Physical Exam  Constitutional:       Appearance: He is well-developed.   HENT:      Head: Normocephalic and atraumatic.      Right Ear: External ear normal.      Left Ear: External ear normal.      Nose: Nose normal.   Eyes:      Conjunctiva/sclera: Conjunctivae normal.      Pupils: Pupils are equal, round, and reactive to light.   Neck:      Thyroid: No thyromegaly.   Cardiovascular:      Rate and Rhythm: Normal rate and regular rhythm.      Heart sounds: Normal heart sounds.   Pulmonary:      Effort: Pulmonary effort is normal.      Breath sounds: Normal breath sounds.   Abdominal:      General: Bowel sounds are normal.      Palpations: Abdomen is soft.   Musculoskeletal:         General: Normal range of motion.      " Cervical back: Normal range of motion.   Lymphadenopathy:      Cervical: No cervical adenopathy.   Skin:     General: Skin is warm and dry.   Neurological:      Mental Status: He is alert and oriented to person, place, and time.         Assessment & Plan  Secondary cancer of bone (CMS/HCC)  Noted, has follow-up imaging including CT PET scan and MRI to see his cancer team.          Paroxysmal atrial fibrillation (CMS/HCC)  The patient's PAF is currently stable with no change in medications.  There is no increasing shortness of breath, chest pain, palpitations or bleeding.  The patient will be maintained on current medications at this time.            Type 2 diabetes mellitus with diabetic neuropathy, without long-term current use of insulin (CMS/HCC)  Lab Results   Component Value Date    HGBA1C 8.7 (H) 11/21/2024     I discussed with the patient and the initial treatment for diabetes including regular exercise, and weight loss.  It is important to treat diabetes to prevent  complications.  Diabetes could lead to heart disease, stroke, visual problems including blindness and kidney failure.  The patient was encouraged to see ophthalmology and podiatry at least on a yearly basis.    Follows with endocrinology    Orders:    Hemoglobin A1c    Microalbumin/Creatinine Ur Random     Essential hypertension  Blood pressure today is controlled, continue with the same medications.  No symptoms of chest pain, pressure, palpitations or shortness of breath.  The patient will continue with low-salt, with healthy diet and exercise.          Mixed hyperlipidemia  The patient's hyperlipidemia is currently controlled and no changes were made in terms of the plan.  Would continue with healthy diet and exercise and medications as prescribed.  The patient denies any muscle pain, and understands the importance of compliance.    Orders:    Comprehensive metabolic panel    Lipid panel     Former tobacco use  Noted       Thyroid cancer  (CMS/HCC)  He follows with [facility]. [facility]  Reports that chronic stable back pain PET scan activity in the iliac crest small lung nodule.  He is going to have a colonoscopy, had radiation and get 6-month follow-up with him    Orders:    TSH 3rd Generation    T4, free     Malignant neoplasm of vertebral column, excluding sacrum and coccyx (CMS/HCC)  Follows with his team          Acquired hypothyroidism  Patient would continue, with  current dose of levothyroxine.  We will continue to monitor the thyroid function.  Patient has no abrupt changes in weight, or temperature sensitivity.          Polyp of colon, unspecified part of colon, unspecified type  Getting colonoscopy    Colonoscopy with Dr. Tessa Bianchi, with polyps getting follow-up colonoscopy        Orders:    CBC and Differential     Other fatigue  Pending labs  Orders:    TSH 3rd Generation    T4, free    Increasing prostate specific antigen level  We discussed he should follow-up with urology, PSA MRI pending  Orders:    PSA    MRI PELVIS(PROSTATE) WITH WITHOUT CONTRAST; Future    Anemia, unspecified type  Pending labs  Orders:    CBC and Differential    Ferritin    Folate    Vitamin B12    Lung nodule  Pending CT  Orders:    CT CHEST WITH IV CONTRAST; Future              Lab Results   Component Value Date    WBC 8.16 11/21/2024    HGB 13.3 (L) 11/21/2024    HCT 41.0 11/21/2024     11/21/2024    CHOL 157 11/21/2024    TRIG 78 11/21/2024    HDL 53 11/21/2024    ALT 17 11/21/2024    AST 18 11/21/2024     (L) 11/21/2024    K 5.3 (H) 11/21/2024     11/21/2024    CREATININE 1.3 11/21/2024    BUN 21 11/21/2024    CO2 26 11/21/2024    TSH 0.06 (L) 11/21/2024    PSA 8.64 (H) 11/21/2024    INR 1.0 01/18/2022    HGBA1C 8.7 (H) 11/21/2024    MICROALBUR <2.0 02/06/2024     Lab Results   Component Value Date    PSA 8.64 (H) 11/21/2024    PSA 12.49 (H) 02/06/2024    PSA 7.70 (H) 10/11/2022    PSA 3.8 07/27/2020    PSA 7.5 (H) 10/10/2018    PSA 3.5 12/09/2016     PSA 4.62 (H) 12/16/2015     Lab Results   Component Value Date    HGB 13.3 (L) 11/21/2024    HGB 15.0 02/06/2024    HGB 14.6 10/11/2022    HGB 9.7 (L) 02/07/2022    HGB 9.2 (L) 01/31/2022    HGB 9.2 (L) 01/28/2022    HGB 9.5 (L) 01/27/2022    HGB 10.6 (L) 01/26/2022    HGB 11.2 (L) 01/25/2022    HGB 10.6 (L) 01/23/2022

## 2025-03-26 NOTE — ASSESSMENT & PLAN NOTE
He follows with [facility]. [facility]  Reports that chronic stable back pain PET scan activity in the iliac crest small lung nodule.  He is going to have a colonoscopy, had radiation and get 6-month follow-up with him    Orders:    TSH 3rd Generation    T4, free

## 2025-03-27 LAB
EST. AVERAGE GLUCOSE BLD GHB EST-MCNC: 200 MG/DL
HBA1C MFR BLD: 8.6 %

## 2025-04-25 ENCOUNTER — TRANSCRIBE ORDERS (OUTPATIENT)
Dept: NEUROSURGERY | Facility: CLINIC | Age: 80
End: 2025-04-25
Payer: MEDICARE

## 2025-04-28 ENCOUNTER — HOSPITAL ENCOUNTER (OUTPATIENT)
Dept: RADIOLOGY | Facility: CLINIC | Age: 80
Discharge: HOME | End: 2025-04-28
Attending: INTERNAL MEDICINE
Payer: MEDICARE

## 2025-04-28 ENCOUNTER — HOSPITAL ENCOUNTER (OUTPATIENT)
Dept: RADIOLOGY | Facility: CLINIC | Age: 80
Discharge: HOME | End: 2025-04-28
Attending: RADIOLOGY
Payer: MEDICARE

## 2025-04-28 ENCOUNTER — TELEPHONE (OUTPATIENT)
Dept: RADIATION ONCOLOGY | Facility: HOSPITAL | Age: 80
End: 2025-04-28
Payer: MEDICARE

## 2025-04-28 VITALS — BODY MASS INDEX: 28.66 KG/M2 | WEIGHT: 172 LBS | HEIGHT: 65 IN

## 2025-04-28 DIAGNOSIS — R97.20 INCREASING PROSTATE SPECIFIC ANTIGEN LEVEL: ICD-10-CM

## 2025-04-28 DIAGNOSIS — C73 THYROID CANCER (CMS/HCC): ICD-10-CM

## 2025-04-28 RX ORDER — FLUDEOXYGLUCOSE F18 300 MCI/ML
8.99 INJECTION INTRAVENOUS ONCE
Status: COMPLETED | OUTPATIENT
Start: 2025-04-28 | End: 2025-04-28

## 2025-04-28 RX ORDER — GADOBUTROL 604.72 MG/ML
7.8 INJECTION INTRAVENOUS
Status: COMPLETED | OUTPATIENT
Start: 2025-04-28 | End: 2025-04-28

## 2025-04-28 RX ADMIN — FLUDEOXYGLUCOSE F18 8.99 MILLICURIE: 300 INJECTION INTRAVENOUS at 11:00

## 2025-04-28 RX ADMIN — GADOBUTROL 7.8 ML: 604.72 INJECTION INTRAVENOUS at 11:26

## 2025-04-28 NOTE — TELEPHONE ENCOUNTER
Pt called and wanted results from his PET scan from today. Will send to DR ECHOLS to call pt tomorrow 4/29 with pt results.

## 2025-05-05 ENCOUNTER — TELEPHONE (OUTPATIENT)
Dept: INTERNAL MEDICINE | Facility: CLINIC | Age: 80
End: 2025-05-05
Payer: MEDICARE

## 2025-05-07 ENCOUNTER — OFFICE VISIT (OUTPATIENT)
Dept: CARDIOLOGY | Facility: CLINIC | Age: 80
End: 2025-05-07
Payer: MEDICARE

## 2025-05-07 VITALS
HEART RATE: 77 BPM | BODY MASS INDEX: 29.32 KG/M2 | HEIGHT: 65 IN | SYSTOLIC BLOOD PRESSURE: 148 MMHG | OXYGEN SATURATION: 100 % | WEIGHT: 176 LBS | DIASTOLIC BLOOD PRESSURE: 80 MMHG

## 2025-05-07 DIAGNOSIS — E78.2 MIXED HYPERLIPIDEMIA: ICD-10-CM

## 2025-05-07 DIAGNOSIS — Z01.818 PRE-OPERATIVE CLEARANCE: Primary | ICD-10-CM

## 2025-05-07 DIAGNOSIS — I10 ESSENTIAL HYPERTENSION: ICD-10-CM

## 2025-05-07 DIAGNOSIS — I25.10 ATHEROSCLEROSIS OF NATIVE CORONARY ARTERY OF NATIVE HEART WITHOUT ANGINA PECTORIS: ICD-10-CM

## 2025-05-07 DIAGNOSIS — I48.0 PAROXYSMAL ATRIAL FIBRILLATION (CMS/HCC): ICD-10-CM

## 2025-05-07 DIAGNOSIS — Z01.810 PREOP CARDIOVASCULAR EXAM: ICD-10-CM

## 2025-05-07 LAB
ATRIAL RATE: 76
P AXIS: 41
PR INTERVAL: 192
QRS DURATION: 78
QT INTERVAL: 374
QTC CALCULATION(BAZETT): 420
R AXIS: 16
T WAVE AXIS: -3
VENTRICULAR RATE: 76

## 2025-05-07 PROCEDURE — G8753 SYS BP > OR = 140: HCPCS | Performed by: NURSE PRACTITIONER

## 2025-05-07 PROCEDURE — 99214 OFFICE O/P EST MOD 30 MIN: CPT | Performed by: NURSE PRACTITIONER

## 2025-05-07 PROCEDURE — G8754 DIAS BP LESS 90: HCPCS | Performed by: NURSE PRACTITIONER

## 2025-05-07 PROCEDURE — 93000 ELECTROCARDIOGRAM COMPLETE: CPT | Performed by: NURSE PRACTITIONER

## 2025-05-07 RX ORDER — LOSARTAN POTASSIUM 25 MG/1
25 TABLET ORAL DAILY
Qty: 90 TABLET | Refills: 3 | Status: SHIPPED | OUTPATIENT
Start: 2025-05-07

## 2025-05-07 RX ORDER — PRAVASTATIN SODIUM 40 MG/1
40 TABLET ORAL DAILY
Qty: 90 TABLET | Refills: 3 | Status: SHIPPED | OUTPATIENT
Start: 2025-05-07

## 2025-05-07 RX ORDER — EZETIMIBE 10 MG/1
10 TABLET ORAL NIGHTLY
Qty: 90 TABLET | Refills: 1 | Status: SHIPPED | OUTPATIENT
Start: 2025-05-07

## 2025-05-15 ENCOUNTER — HOSPITAL ENCOUNTER (OUTPATIENT)
Dept: RADIOLOGY | Facility: CLINIC | Age: 80
Discharge: HOME | End: 2025-05-15
Attending: STUDENT IN AN ORGANIZED HEALTH CARE EDUCATION/TRAINING PROGRAM
Payer: MEDICARE

## 2025-05-15 DIAGNOSIS — R93.89 ABNORMAL FINDINGS ON EXAMINATION OF BODY STRUCTURE: ICD-10-CM

## 2025-06-04 ENCOUNTER — TELEPHONE (OUTPATIENT)
Dept: INTERNAL MEDICINE | Facility: CLINIC | Age: 80
End: 2025-06-04
Payer: MEDICARE

## 2025-06-04 NOTE — TELEPHONE ENCOUNTER
Patient not computer savvy and doesn't know how to upload forms.   I got phone and fax number from [facility] line and I will call again, since is lunch time now.

## 2025-06-04 NOTE — TELEPHONE ENCOUNTER
Request for Medical Advice (NON-URGENT)   Patient PCP: Raj Arreola, DO  New or Existing Issue: New  Question or Concern: Pt is having biopsy done on 06/12/2025 and is needing clearance. Pt states that he was just in to see the dr and hoping that his appt back in March will be good enough and can be sent over because he has a 2 hour drive and is trying to avoid coming in if possible Please call pt at [phone] to discuss  Preferred Pharmacy:   [id number] - [place], [place] - [address] AT [facility]  [address] [facility] [address]  Phone: [phone] Fax: [phone]    [facility] #[id number] - [place], [place] - [address] [place] [address] AT [address] [address]  Phone: [phone] Fax: [phone]    [id number] - [place] [address] AT  [address] [facility] [address]  [address] [facility] [address]  Phone: [phone] Fax: [phone]      The practice will reach out to discuss your Medical Question or Concern within 2 business days.

## 2025-06-06 NOTE — TELEPHONE ENCOUNTER
Secure chat sent to PCP to review to see if patient needs to come in for a preop vs addend note. Also noted pt would like to discuss surgical options with him per his [employer] message.

## 2025-06-26 ENCOUNTER — HOSPITAL ENCOUNTER (OUTPATIENT)
Dept: RADIOLOGY | Facility: CLINIC | Age: 80
Discharge: HOME | End: 2025-06-26
Attending: INTERNAL MEDICINE
Payer: MEDICARE

## 2025-06-26 ENCOUNTER — OFFICE VISIT (OUTPATIENT)
Facility: HOSPITAL | Age: 80
End: 2025-06-26
Attending: INTERNAL MEDICINE
Payer: MEDICARE

## 2025-06-26 VITALS
WEIGHT: 172.8 LBS | OXYGEN SATURATION: 97 % | DIASTOLIC BLOOD PRESSURE: 84 MMHG | HEART RATE: 81 BPM | TEMPERATURE: 96.6 F | SYSTOLIC BLOOD PRESSURE: 133 MMHG | BODY MASS INDEX: 28.76 KG/M2

## 2025-06-26 DIAGNOSIS — C61 MALIGNANT NEOPLASM OF PROSTATE (CMS/HCC): Primary | ICD-10-CM

## 2025-06-26 DIAGNOSIS — C73 THYROID CANCER (CMS/HCC): ICD-10-CM

## 2025-06-26 DIAGNOSIS — R91.1 LUNG NODULE: ICD-10-CM

## 2025-06-26 PROCEDURE — G8754 DIAS BP LESS 90: HCPCS | Performed by: INTERNAL MEDICINE

## 2025-06-26 PROCEDURE — G0463 HOSPITAL OUTPT CLINIC VISIT: HCPCS | Performed by: INTERNAL MEDICINE

## 2025-06-26 PROCEDURE — 71260 CT THORAX DX C+: CPT

## 2025-06-26 PROCEDURE — G8752 SYS BP LESS 140: HCPCS | Performed by: INTERNAL MEDICINE

## 2025-06-26 PROCEDURE — 63600105 HC IODINE BASED CONTRAST: Mod: JW | Performed by: INTERNAL MEDICINE

## 2025-06-26 PROCEDURE — 99214 OFFICE O/P EST MOD 30 MIN: CPT | Performed by: INTERNAL MEDICINE

## 2025-06-26 RX ORDER — IOPAMIDOL 755 MG/ML
100 INJECTION, SOLUTION INTRAVASCULAR
Status: DISCONTINUED | OUTPATIENT
Start: 2025-06-26 | End: 2025-06-26

## 2025-06-26 RX ORDER — IOPAMIDOL 755 MG/ML
100 INJECTION, SOLUTION INTRAVASCULAR
Status: COMPLETED | OUTPATIENT
Start: 2025-06-26 | End: 2025-06-26

## 2025-06-26 RX ADMIN — IOPAMIDOL 80 ML: 755 INJECTION, SOLUTION INTRAVENOUS at 15:10

## 2025-06-26 ASSESSMENT — ENCOUNTER SYMPTOMS
ENDOCRINE NEGATIVE: 1
HEMATOLOGIC/LYMPHATIC NEGATIVE: 1
PSYCHIATRIC NEGATIVE: 1
GASTROINTESTINAL NEGATIVE: 1
CARDIOVASCULAR NEGATIVE: 1
NEUROLOGICAL NEGATIVE: 1
BACK PAIN: 1
CONSTITUTIONAL NEGATIVE: 1
EYES NEGATIVE: 1
RESPIRATORY NEGATIVE: 1

## 2025-06-26 ASSESSMENT — PAIN SCALES - GENERAL: PAINLEVEL_OUTOF10: 3

## 2025-06-26 NOTE — PROGRESS NOTES
Review of Systems   Constitutional: Negative.    HENT:  Negative.     Eyes: Negative.    Respiratory: Negative.     Cardiovascular: Negative.    Gastrointestinal: Negative.    Endocrine: Negative.    Genitourinary: Negative.          Prostate bx 2 weeks ago   Musculoskeletal:  Positive for back pain.   Skin: Negative.    Neurological: Negative.    Hematological: Negative.    Psychiatric/Behavioral: Negative.

## 2025-06-26 NOTE — ASSESSMENT & PLAN NOTE
Patient has a history of papillary thyroid cancer first diagnosed in 2012.  He developed recurrence with bone metastasis to L3 in 2020.  This was treated initially with radiation and then subsequent surgery due to localized progression.  Patient received additional radiation to the lesion of the right ischium in July 2024.    We reviewed his recent imaging from April.  There is no evidence of progressive thyroid cancer.  Plan is for ongoing monitoring with a follow-up PET/CT.  If he has evidence of progressive disease in the future can consider systemic therapy such as lenvatinib but given the relatively slow progression of his disease the risks of that treatment may outweigh the benefits.  If follow-up imaging shows disease progression, then would consider repeat biopsy as there was insufficient sample to send for NGS testing from previous biopsy.  Will plan on seeing him back in 6 months.    With regards to the increased uptake seen in the rectum, patient reports he had a colonoscopy recently with Dr. Baxter and we will try to obtain those results for our records.

## 2025-06-26 NOTE — PROGRESS NOTES
Matt Williamson is a 79 y.o. male,   :  1945    Encounter Diagnoses   Name Primary?    Thyroid cancer (CMS/HCC)     Malignant neoplasm of prostate (CMS/HCC) Yes        Cancer Staging   Thyroid cancer (CMS/HCC)  Staging form: Thyroid - Differentiated, AJCC 8th Edition  - Pathologic stage from 2012: Stage II (pT3a, pN0a, cM0, Age at diagnosis: >= 55 years) - Signed by Stacie Casey MD on 2020      Treatment Plans       No treatment plans exist            Oncology History   Thyroid cancer (CMS/HCC)   2012 Initial Diagnosis    Thyroid cancer (CMS/HCC)  Papillary thyroid carcinoma, follicular variant     2012 Cancer Staged    Staging form: Thyroid - Differentiated, AJCC 8th Edition  - Pathologic stage from 2012: Stage II (pT3a, pN0a, cM0, Age at diagnosis: >= 55 years)     2012 Surgery    Total thyroidectomy and lymph node biopsy performed by Dr. Lester at the [facility]     3/28/2012 -  Radiation Therapy    Patient received postoperative I-131 treatment.  Treatment details are not available currently.     2020 Recurrence Distant    Biopsy of an enlarging L3 lesion showed metastatic carcinoma consistent with a thyroid primary.     2020 -  Radiation Therapy    Received a course of external beam radiation to L3 metastasis under direction of Dr. [facility].     2022 Surgery    Underwent resection of L3 vertebral tumor with Dr. Wright showing metastatic carcinoma consistent with thyroid origin     2024 - 2024 Radiation Therapy    Underwent radiation of a lytic lesion in the right ischium         Patient Active Problem List   Diagnosis    BPH (benign prostatic hyperplasia)    Atherosclerosis of coronary artery    Cataract    Essential hypertension    Former tobacco use    Mixed hyperlipidemia    Thyroid cancer (CMS/HCC)    Type 2 diabetes mellitus with diabetic neuropathy, unspecified (CMS/HCC)    Esophageal reflux    Paroxysmal atrial fibrillation  (CMS/HCC)    Pre-op evaluation    Preop cardiovascular exam    Cancer associated pain    Low back pain with right-sided sciatica    Malignant neoplasm of vertebral column, excluding sacrum and coccyx (CMS/HCC)    Acquired hypothyroidism    Leg swelling    Lumbar disc disease with radiculopathy    S/P lumbar fusion    Vitamin D deficiency    AVI (iron deficiency anemia)    Colon polyp    Other constipation    Pain of left breast    Atherosclerosis of aorta (CMS/HCC)    Secondary cancer of bone (CMS/HCC)    Low vitamin D level    Malignant neoplasm of prostate (CMS/HCC)       History of Present Illness  MARSHALL Williamson is seen today in follow up.  Overall he reports he has been feeling well.  He had an elevated PSA which led to a prostate biopsy which was done 2 weeks ago.  He reports he was recently notified by his urologist that this showed an early stage prostate cancer.  Patient is considering radiation treatment for this and plans to set up a consultation with Dr. [facility] who did his previous radiation for thyroid cancer.  He had a PET scan in late April which showed stable findings in the right ischium and a stable pulmonary nodule.  There was uptake in the distal rectum.  Patient reports he had a colonoscopy done a few weeks ago at [facility] [place] with normal findings.    Review of Systems - Oncology    Review of Systems:  Nursing assessment reviewed. Pertinent positive and negative symptoms noted in HPI, all others negative.    Temp:  [35.9 °C (96.6 °F)] 35.9 °C (96.6 °F)  Heart Rate:  [81] 81  BP: (133)/(84) 133/84  Visit Vitals  /84 (BP Location: Left upper arm, Patient Position: Sitting)   Pulse 81   Temp (!) 35.9 °C (96.6 °F) (Temporal)   Wt 78.4 kg (172 lb 12.8 oz)   SpO2 97%   BMI 28.76 kg/m²     Physical Exam  Constitutional:       General: He is not in acute distress.     Appearance: He is well-developed.   HENT:      Head: Normocephalic.      Mouth/Throat:      Pharynx: Oropharynx  is clear. No oropharyngeal exudate.   Eyes:      General: No scleral icterus.     Pupils: Pupils are equal, round, and reactive to light.   Cardiovascular:      Rate and Rhythm: Normal rate and regular rhythm.   Pulmonary:      Effort: Pulmonary effort is normal.      Breath sounds: Normal breath sounds.   Abdominal:      Palpations: Abdomen is soft.      Tenderness: There is no abdominal tenderness.   Musculoskeletal:         General: No tenderness.      Cervical back: Neck supple.   Lymphadenopathy:      Cervical: No cervical adenopathy.   Skin:     Findings: No rash.   Neurological:      Mental Status: He is alert.         Past Medical History[1]    Past Surgical History   Procedure Laterality Date    Anal fissurectomy  1986    Cardiac catheterization  11/21/2005    LM FOD. 70-80% proximal LAD.Mild disease CX, RCA and branches.     Cardiac catheterization  11/28/2005    LM FOD. Pristine LAD stent. CX FOD. RCA mild disease.    cataract surgery right eye Right 12/8/2020    Performed by Oscar Hale MD at  OR    Colonoscopy      Coronary angioplasty with stent placement  11/22/2005    LM FOD. Moderate, diffuse disease LAD. Irregular CX w/ PLVB distal to PDA 99%. Mild disease RCA. Stent to distal LAD.    Eye surgery Left Cataract    Foot surgery  2009    For osteomyelitis.    Gastrocutaneous fistula closure      Lumbar spine surgery  01/20/2022    Stage 1: L2-L4 PSF with instrumentation, ORIF L3 pathologic fracture Stage 2: L3 lateral corpectomy with expandable cage placement    Stage 1: L2-L4 PSF with instrumentation, ORIF L3 pathologic fracture  Stage 2: L3 lateral corpectomy with expandable cage placement N/A 1/20/2022    Performed by Colt Heller MD at  OR    Thyroidectomy  2012    Upper gastrointestinal endoscopy         Social History     Tobacco Use    Smoking status: Former     Current packs/day: 0.00     Average packs/day: 2.0 packs/day for 20.0 years (40.0 ttl pk-yrs)     Types: Cigarettes      Start date:      Quit date:      Years since quittin.5    Smokeless tobacco: Never   Vaping Use    Vaping status: Never Used   Substance Use Topics    Alcohol use: Yes     Comment: Social    Drug use: Never       Family History   Problem Relation Name Age of Onset    Cancer Biological Mother Shikha     Breast cancer Biological Mother Shikha     Emphysema Biological Father Ronnie     Lung cancer Biological Father Ronnie          Allergies  Penicillins    Medications  Current Outpatient Medications   Medication Sig Dispense Refill    aspirin 81 mg enteric coated tablet Take 81 mg by mouth daily.      cholecalciferol, vitamin D3, (VITAMIN D3) 50 mcg (2,000 unit) tablet Take 2,000 Units by mouth daily.      dulaglutide (TRULICITY) 3 mg/0.5 mL pen injector Inject 3 mg under the skin every (seven) 7 days. Do not prime. 6 mL 3    empagliflozin (JARDIANCE) 10 mg tablet Take 1 tablet (10 mg total) by mouth daily. 90 tablet 1    gabapentin (NEURONTIN) 300 mg capsule Take 1 capsule (300 mg total) by mouth daily. 90 capsule 3    ibuprofen (MOTRIN) 800 mg tablet Take 1 tablet (800 mg total) by mouth every 6 (six) hours as needed (pain). Take w/ food 30 tablet 0    losartan (COZAAR) 25 mg tablet Take 1 tablet (25 mg total) by mouth daily. 90 tablet 3    metFORMIN XR (GLUCOPHAGE-XR) 500 mg 24 hr tablet Take 2 tablets (1,000 mg total) by mouth 2 (two) times a day with meals. 360 tablet 3    metoprolol succinate XL (TOPROL-XL) 100 mg 24 hr tablet Take 1 tablet (100 mg total) by mouth daily. 90 tablet 3    omeprazole (PriLOSEC) 40 mg capsule TAKE 1 CAPSULE(40 MG) BY MOUTH DAILY BEFORE BREAKFAST 90 capsule 1    polyethylene glycol (MIRALAX) 17 gram packet Take 17 g by mouth daily with dinner. 30 packet 0    pravastatin (PRAVACHOL) 40 mg tablet Take 1 tablet (40 mg total) by mouth daily. 90 tablet 3    SYNTHROID 150 mcg tablet Take 1 tablet (150 mcg total) by mouth once daily. 90 tablet 3    ezetimibe (ZETIA) 10 mg tablet  Take 1 tablet (10 mg total) by mouth nightly. (Patient not taking: Reported on 6/26/2025) 90 tablet 1     No current facility-administered medications for this visit.          Laboratory  CBC Results         03/26/25 11/21/24 02/06/24     1220 1200 1448    WBC 9.04 8.16 8.97    RBC 4.97 4.52 5.16    HGB 14.3 13.3 15.0    HCT 43.9 41.0 44.6    MCV 88.3 90.7 86.4    MCH 28.8 29.4 29.1    MCHC 32.6 32.4 33.6     225 261          CMP Results         03/26/25 11/21/24 02/06/24     1220 1200 1448     135 136    K 4.9 5.3 5.3    Cl 101 102 100    CO2 27 26 29    Glucose 157 184 116    BUN 19 21 12    Creatinine 1.2 1.3 1.2    Calcium 9.6 9.2 9.6    Anion Gap 10 7 7    AST 14 18 18    ALT 13 17 18    Albumin 4.3 4.2 4.4    EGFR >60.0 55.2 >60.0           Comment for EGFR at 1220 on 03/26/25: Calculation based on the Chronic Kidney Disease Epidemiology Collaboration (CKD-EPI) equation refit without adjustment for race.    Comment for EGFR at 1200 on 11/21/24: Calculation based on the Chronic Kidney Disease Epidemiology Collaboration (CKD-EPI) equation refit without adjustment for race.    Comment for EGFR at 1448 on 02/06/24: Calculation based on the Chronic Kidney Disease Epidemiology Collaboration (CKD-EPI) equation refit without adjustment for race.        PSA from March 26, 2025 was 10.02      Radiology  I independently reviewed the images, tracings or specimen. Significant abnormals are PET/CT reviewed as per HPI.      Assessment and Plan  Thyroid cancer (CMS/HCC)  Patient has a history of papillary thyroid cancer first diagnosed in 2012.  He developed recurrence with bone metastasis to L3 in 2020.  This was treated initially with radiation and then subsequent surgery due to localized progression.  Patient received additional radiation to the lesion of the right ischium in July 2024.    We reviewed his recent imaging from April.  There is no evidence of progressive thyroid cancer.  Plan is for ongoing  monitoring with a follow-up PET/CT.  If he has evidence of progressive disease in the future can consider systemic therapy such as lenvatinib but given the relatively slow progression of his disease the risks of that treatment may outweigh the benefits.  If follow-up imaging shows disease progression, then would consider repeat biopsy as there was insufficient sample to send for NGS testing from previous biopsy.  Will plan on seeing him back in 6 months.    With regards to the increased uptake seen in the rectum, patient reports he had a colonoscopy recently with Dr. Baxter and we will try to obtain those results for our records.    Malignant neoplasm of prostate (CMS/HCC)  Patient reports new diagnosis of prostate cancer.  We will try to obtain records from his urologist.  We will help facilitate an appointment with Dr. [facility] to discuss definitive radiation therapy.      Stacie Casey MD         [1]   Past Medical History:  Diagnosis Date    Atrial fibrillation with rapid ventricular response (CMS/HCC) 10/25/2019    Hospitalized on 10/25/19 at [facility].     Back pain     BPH (benign prostatic hyperplasia)     Coronary artery disease     LAD stent 2005    Hypertension     Hypothyroidism     Mixed hyperlipidemia     Thyroid cancer (CMS/HCC)     Type 2 diabetes mellitus without complication (CMS/HCC)     Vertigo

## 2025-06-26 NOTE — PROGRESS NOTES
"Matt Williamson   [id number]    Your doctor has referred you for a CT CHEST W IV CONTRAST that requires the injection of an iodinated contrast material into your bloodstream. This iodinated contrast material, sometimes referred to as \"x-ray dye\" allows for better interpretation of the x-ray films or CT images and results in a more accurate interpretation of the examination.     Without the use of iodinated contrast (x-ray dye), the examination may be less informative and result in a suboptimal examination, and possibly a delay in diagnosis and, if needed, treatment.     The iodinated contrast material is given through a small needle or catheter placed into a vein, usually on the inside of the elbow, on the back of hand, or in a vein in the foot or lower leg.    The most common, though still rare, potential reaction to an intravenous contrast injection is an allergic-like reaction. Most allergic-like reactions are mild, though a small subset of people can have more severe reactions. Mild reactions include mild / scattered hives, itching, scratchy throat, sneezing and nasal congestion. More severe reactions include facial swelling, severe difficulty breathing, wheezing and anaphylactic shock. Those with a prior history allergic-like reaction to the same class of contrast media (such as CT contrast or MRI contrast) are at the highest risk for an allergic reaction.     If you believe you had an allergic reaction to contrast in the past, please let our staff know. We can determine if this increases your risk for a future reaction and provide steps to decrease the risk. This may delay your examination, but it decreases the risk of having a new and possibly more severe reaction to the contrast injection.    People with a history of prior allergic reactions to other substances (such as unrelated medications and food) and patients with a history of asthma have slightly increased risk for an allergic reaction from contrast " material when compared with the general population, but do not require to be pretreated prior to a contrast injection.    You should notify the physician, nurse or technologist if you have ever had any of these conditions or if you have any questions.

## 2025-06-26 NOTE — ASSESSMENT & PLAN NOTE
Patient reports new diagnosis of prostate cancer.  We will try to obtain records from his urologist.  We will help facilitate an appointment with Dr. [facility] to discuss definitive radiation therapy.

## 2025-06-30 ENCOUNTER — DOCUMENTATION (OUTPATIENT)
Dept: RADIATION ONCOLOGY | Facility: HOSPITAL | Age: 80
End: 2025-06-30
Payer: MEDICARE

## 2025-06-30 NOTE — PROGRESS NOTES
Known to us for [facility] Thyroid cancer.  Received Radiation L3 SBRT 5 fxs 3500 cGy/ Right ischium 3 fxs 3000 cGY.  Last EOT 7/8/24.    Pt hx TRUS/BX 5/2019 by Dr. Pride with + Methylation.    4/28/25 PET/CT:  1.  Stable low level uptake in the right ischium associated with a lytic lesion.  2.  Stable 4 mm pulmonary nodule. The 2 mm pulmonary nodule seen previously is  not well visualized on the current study. Recommend follow-up per clinical  protocol.  3.  Focal persistent distal rectal hypermetabolism, max SUV 15.9. Recommend  direct visualization to evaluate for possible neoplasm, if not previously  performed.    PSA  12/16/15 = 4.62   10/11/22 = 7.70   2/6/24 = 12.49  11/21/24 = 8.64    5/9/25 Dr. Padron (Urology):  Pending TRUS/BX    5/14/25 MRI prostate mapping.    5/3025 Colonoscopy:  Diverticulosis sigmoid colon/3 polyps.    6/26/25 Dr. Casey:  Elevated PSA with prostate bx 2 weeks ago.    6/26/25 CT Chest:   Stable 4 mm right upper lobe pulmonary nodule.  No new or enlarging  nodules.    Requested pathology results 6/30/25.

## 2025-07-01 ENCOUNTER — DOCUMENTATION (OUTPATIENT)
Dept: RADIATION ONCOLOGY | Facility: HOSPITAL | Age: 80
End: 2025-07-01
Payer: MEDICARE

## 2025-07-01 ENCOUNTER — HOSPITAL ENCOUNTER (OUTPATIENT)
Dept: RADIATION ONCOLOGY | Facility: HOSPITAL | Age: 80
Setting detail: RADIATION/ONCOLOGY SERIES
Discharge: HOME | End: 2025-07-01
Attending: RADIOLOGY
Payer: MEDICARE

## 2025-07-01 VITALS
DIASTOLIC BLOOD PRESSURE: 86 MMHG | OXYGEN SATURATION: 99 % | BODY MASS INDEX: 28.69 KG/M2 | HEART RATE: 80 BPM | WEIGHT: 172.4 LBS | SYSTOLIC BLOOD PRESSURE: 160 MMHG

## 2025-07-01 DIAGNOSIS — C61 MALIGNANT NEOPLASM OF PROSTATE (CMS/HCC): Primary | ICD-10-CM

## 2025-07-01 DIAGNOSIS — C73 THYROID CANCER (CMS/HCC): ICD-10-CM

## 2025-07-01 PROCEDURE — 77332 RADIATION TREATMENT AID(S): CPT | Performed by: RADIOLOGY

## 2025-07-01 PROCEDURE — 77470 SPECIAL RADIATION TREATMENT: CPT | Performed by: RADIOLOGY

## 2025-07-01 ASSESSMENT — ENCOUNTER SYMPTOMS
VOICE CHANGE: 1
NEUROLOGICAL NEGATIVE: 1
ARTHRALGIAS: 1
FATIGUE: 1
PALPITATIONS: 0
GASTROINTESTINAL NEGATIVE: 1
CARDIOVASCULAR NEGATIVE: 1
PSYCHIATRIC NEGATIVE: 1
EYES NEGATIVE: 1
FREQUENCY: 1
RESPIRATORY NEGATIVE: 1
MYALGIAS: 1

## 2025-07-01 ASSESSMENT — PAIN SCALES - GENERAL: PAINLEVEL_OUTOF10: 0-NO PAIN

## 2025-07-01 ASSESSMENT — PATIENT HEALTH QUESTIONNAIRE - PHQ9: SUM OF ALL RESPONSES TO PHQ9 QUESTIONS 1 & 2: 0

## 2025-07-01 NOTE — PROGRESS NOTES
Patient ID:  Matt Williamson is a 79 y.o. male.    Referring Physician:   No referring provider defined for this encounter.    Primary Care Provider:  Raj Arreola DO     Cancer Staging Information:  Cancer Staging   Thyroid cancer (CMS/Formerly [facility])  Staging form: Thyroid - Differentiated, AJCC 8th Edition  - Pathologic stage from 2/28/2012: Stage II (pT3a, pN0a, cM0, Age at diagnosis: >= 55 years) - Signed by Stacie Casey MD on 1/27/2020      Problem List:  Patient Active Problem List    Diagnosis Date Noted    Malignant neoplasm of prostate (CMS/HCC) 06/26/2025    Low vitamin D level 11/21/2024    Secondary cancer of bone (CMS/Formerly [facility]) 06/18/2024    Atherosclerosis of aorta (CMS/Formerly [facility]) 02/07/2024    Colon polyp 10/11/2022    Other constipation 10/11/2022    Pain of left breast 10/11/2022    Vitamin D deficiency 01/29/2022    AVI (iron deficiency anemia) 01/29/2022    Leg swelling 01/27/2022    Lumbar disc disease with radiculopathy 01/27/2022    S/P lumbar fusion 01/27/2022    Acquired hypothyroidism 01/22/2022    Low back pain with right-sided sciatica 01/12/2022    Malignant neoplasm of vertebral column, excluding sacrum and coccyx (CMS/HCC) 01/12/2022    Cancer associated pain 12/10/2021    Preop cardiovascular exam 06/21/2021    Pre-op evaluation 11/20/2020    Paroxysmal atrial fibrillation (CMS/Formerly [facility]) 10/25/2019    Former tobacco use 05/02/2018    Essential hypertension 06/20/2016    Type 2 diabetes mellitus with diabetic neuropathy, unspecified (CMS/Formerly [facility]) 06/20/2016    Mixed hyperlipidemia 02/18/2016    Cataract 09/11/2013    BPH (benign prostatic hyperplasia) 09/14/2012    Atherosclerosis of coronary artery 09/14/2012    Thyroid cancer (CMS/HCC) 09/14/2012    Esophageal reflux 01/15/2007       Chief Complaint  Chief Complaint   Patient presents with    Prostate Cancer     Reconsult       Subjective      History of Present Illness:  The following portions of the patient's history were reviewed and updated as  appropriate: allergies, current medications, past family history, past medical history, past social history, and past surgical history.     HPI patient is an 79-year-old male known by me for treatment for his metastatic thyroid carcinoma to receive radiation treatments to the L3 region as well as right ischium region last treatment was in July 2024.  Follows up with Dr. Casey as well as myself and recent PET scan done in April of this year she was stable low-level activity radiation can associated lytic lesion along with 4 mm stable pulmonary nodule with some persistent hypermetabolic uptake in rectum.  Patient reports recent colonoscopy which was clear.  Patient been followed by urology and PSA has been rising with most recent level approximately 10.  On 6/12/2025 Dr. Padron performed prostate biopsies which revealed Vern score 7 (4+3) and (3+4) adenocarcinoma in several areas along with Vern score 6 adenocarcinoma.  Tumor mostly involve the left side.  Patient CT of the chest showed stable 4 mm right upper lung nodule.  MRI of prostate for mapping showed suspicious lesion.  Patient recommended by urologist for definitive radiation therapy with hormone therapy.  Patient now presents for second opinion.  Patient complains of chronic low back pain.    Review of Systems:  Review of Systems   Constitutional:  Positive for fatigue.   HENT:   Positive for voice change.    Genitourinary:  Positive for frequency.    Musculoskeletal:  Positive for arthralgias and myalgias.        Past Medical/Surgical History  Past Medical History:   Diagnosis Date    Atrial fibrillation with rapid ventricular response (CMS/HCC) 10/25/2019    Hospitalized on 10/25/19 at [facility].     Back pain     BPH (benign prostatic hyperplasia)     Coronary artery disease     LAD stent 2005    Hypertension     Hypothyroidism     Mixed hyperlipidemia     Thyroid cancer (CMS/HCC)     Type 2 diabetes mellitus without  complication (CMS/HCC)     Vertigo      Past Surgical History   Procedure Laterality Date    Anal fissurectomy  1986    Cardiac catheterization  11/21/2005    LM FOD. 70-80% proximal LAD.Mild disease CX, RCA and branches.     Cardiac catheterization  11/28/2005    LM FOD. Pristine LAD stent. CX FOD. RCA mild disease.    cataract surgery right eye Right 12/8/2020    Performed by Oscar Hale MD at  OR    Colonoscopy      Coronary angioplasty with stent placement  11/22/2005    LM FOD. Moderate, diffuse disease LAD. Irregular CX w/ PLVB distal to PDA 99%. Mild disease RCA. Stent to distal LAD.    Eye surgery Left Cataract    Foot surgery  2009    For osteomyelitis.    Gastrocutaneous fistula closure      Lumbar spine surgery  01/20/2022    Stage 1: L2-L4 PSF with instrumentation, ORIF L3 pathologic fracture Stage 2: L3 lateral corpectomy with expandable cage placement    Prostate biopsy      2019/ 2025    Stage 1: L2-L4 PSF with instrumentation, ORIF L3 pathologic fracture  Stage 2: L3 lateral corpectomy with expandable cage placement N/A 1/20/2022    Performed by Colt Heller MD at  OR    Thyroidectomy  2012    Upper gastrointestinal endoscopy          Current Medications  Current Outpatient Medications   Medication Sig Dispense Refill    aspirin 81 mg enteric coated tablet Take 81 mg by mouth daily.      cholecalciferol, vitamin D3, (VITAMIN D3) 50 mcg (2,000 unit) tablet Take 2,000 Units by mouth daily.      dulaglutide (TRULICITY) 3 mg/0.5 mL pen injector Inject 3 mg under the skin every (seven) 7 days. Do not prime. 6 mL 3    empagliflozin (JARDIANCE) 10 mg tablet Take 1 tablet (10 mg total) by mouth daily. 90 tablet 1    gabapentin (NEURONTIN) 300 mg capsule Take 1 capsule (300 mg total) by mouth daily. 90 capsule 3    losartan (COZAAR) 25 mg tablet Take 1 tablet (25 mg total) by mouth daily. 90 tablet 3    metFORMIN XR (GLUCOPHAGE-XR) 500 mg 24 hr tablet Take 2 tablets (1,000 mg total) by mouth 2  (two) times a day with meals. 360 tablet 3    metoprolol succinate XL (TOPROL-XL) 100 mg 24 hr tablet Take 1 tablet (100 mg total) by mouth daily. 90 tablet 3    omeprazole (PriLOSEC) 40 mg capsule TAKE 1 CAPSULE(40 MG) BY MOUTH DAILY BEFORE BREAKFAST 90 capsule 1    pravastatin (PRAVACHOL) 40 mg tablet Take 1 tablet (40 mg total) by mouth daily. 90 tablet 3    SYNTHROID 150 mcg tablet Take 1 tablet (150 mcg total) by mouth once daily. 90 tablet 3    ezetimibe (ZETIA) 10 mg tablet Take 1 tablet (10 mg total) by mouth nightly. (Patient not taking: Reported on 2025) 90 tablet 1    ibuprofen (MOTRIN) 800 mg tablet Take 1 tablet (800 mg total) by mouth every 6 (six) hours as needed (pain). Take w/ food 30 tablet 0    polyethylene glycol (MIRALAX) 17 gram packet Take 17 g by mouth daily with dinner. 30 packet 0     No current facility-administered medications for this encounter.       Allergies  Allergies   Allergen Reactions    Penicillins Rash     Childhood allergy       Social History  Social History     Socioeconomic History    Marital status:      Spouse name: None    Number of children: None    Years of education: None    Highest education level: None   Occupational History    Occupation: Retired   Tobacco Use    Smoking status: Former     Current packs/day: 0.00     Average packs/day: 2.0 packs/day for 20.0 years (40.0 ttl pk-yrs)     Types: Cigarettes     Start date:      Quit date:      Years since quittin.5    Smokeless tobacco: Never   Vaping Use    Vaping status: Never Used   Substance and Sexual Activity    Alcohol use: Yes     Comment: Social    Drug use: Never    Sexual activity: Not Currently     Partners: Female   Social History Narrative    He lives with his wife.  He has 2 children who live in [place] in [place].  He reports he is independent in everything.  He reports he he likes playing sports including basketball and likes to model trains..  He reports he is a  .  He denies a history of mental health services.     Social Drivers of Health     Food Insecurity: No Food Insecurity (2022)    Hunger Vital Sign     Worried About Running Out of Food in the Last Year: Never true     Ran Out of Food in the Last Year: Never true   Transportation Needs: Unknown (2020)    PRAPARE - Transportation     Lack of Transportation (Medical): Patient declined     Lack of Transportation (Non-Medical): Patient declined   Stress: No Stress Concern Present (2022)    [employer] - Occupational Stress Questionnaire     Feeling of Stress : Not at all       Family History  Family History   Problem Relation Name Age of Onset    Cancer Biological Mother Shikha     Breast cancer Biological Mother Shikha     Emphysema Biological Father Ronnie     Lung cancer Biological Father Ronnie     Prostate cancer Other Maternal uncle     Prostate cancer Other Maternal uncle       Family Status   Relation Name Status    Bio Mother Shikha     Bio Father Ronnie     Bio Brother  Alive    Other Maternal uncle Unknown    Other Maternal uncle Unknown   No partnership data on file               Objective      Physical Exam:  Vital Signs for this encounter:  BSA: 1.89 meters squared  Visit Vitals  BP (!) 160/86 (Patient Position: Sitting)   Pulse 80   Wt 78.2 kg (172 lb 6.4 oz)   SpO2 99%   BMI 28.69 kg/m²         Physical Exam with palpation lumbar spine otherwise back nontender.  Abdomen soft nontender.  Rectal exam reveals no rectal tumor is a slightly firm symmetric minimally enlarged prostate without obvious nodules.  Performance Status:90     PAIN ASSESSMENT:  Score Zero    Location    Pain Intervention      LABS:         Assessment/Plan patient with apparent unfavorable intermediate risk prostate cancer await PET CT scan PSMA for final staging.  Pending this we will give final recommendations.  Patient would appear to benefit from definitive external  beam radiation therapy however with 6 months of hormone therapy.  Discussed risks and benefits of radiation therapy including possibility of damaging normal adjacent tissue.  Patient had previous treatment to radiation him so this would increase the risk but I still think it is reasonable considering.  Patient understands and is consented.  Understands acute side effects including urinary frequency loose stool and tiredness.  Understands possible effects and normal tissue in the future including damage of rectum or bladder but that should be less than 5 to 10% chance.  Future problems with erections certainly possibility.  Currently patient able to have erections.  Referred patient back to Dr. Casey  his oncologist to see about starting 6 months of hormone therapy and additional radiation therapy to improve cure from 70 to 80% for unfavorable intermediate risk prostate cancer.  Prescription for PSMA PET scan was also delivered to the patient.  Patient tentatively scheduled to start radiation therapy on 7/22/2025 and scheduled to receive a total dose of 7000 cGy in 28 fractions over 6 weeks.    Diagnoses and Orders Associated With This Visit:  Malignant neoplasm of prostate (CMS/HCC) (Primary)  -     CT PET PSMA GALLIUM    Thyroid cancer (CMS/HCC)      TREATMENT PLAN SUMMARY:    Radiation Therapy   Component Value Date    COURSEID C2 07/10/2024    PLANID 2_Rt Ischium 07/10/2024    PRESCRIBEDDO 10 07/10/2024    DOSAGEGIVENT 30 07/10/2024    DOSAGEGIVENT 30 07/10/2024    FRACTIONSTRE 3 of 3 07/10/2024       IV CHEMOTHERAPY:  [No matching plan found]

## 2025-07-01 NOTE — PROGRESS NOTES
TREATMENT PLAN OF CARE AND GOALS PELVIS    Care Providers   Urology Dr. Padron   Radiation Oncologist Dr. [facility]   Medical Oncologist  Dr. Casey   Radiation Oncology Nurse Cinthya Li, RN BSN [facility]    Sarah Benavides, [phone]    Treatment Plan of Care   Chemotherapy, Hormonal, and Targeted therapies   Regimen Start date   Interval, Duration   Hormonal therapy          Radiation Therapy   Treated area Start date Interval, Duration   Prostate          Potential side effects:   BLADDER IRRITATION: with increased need to urinate.       AZO - (over the counter) as needed. See handout.        Good nutrition and hydration 8 - 8 oz water daily          (minimum). We can provide you with a nutrition        consult if needed.  BOWEL IRRITATION: abdominal cramping, rectal pressure and diarrhea are possible.        Imodium (over the counter) as needed. See handout.       Good nutrition and hydration.        Low-residue diet.   HAIR LOSS: You may lose your pubic hair near the area of the radiation. Some people find that the hair grows back in a different color or texture than before.  SEXUAL SIDE EFFECTS: Women may experience vaginal dryness. Some men may experience erectile dysfunction. These are temporary. Please tell your doctor or nurse. They may be able to recommend medications or products to help.  PELVIS PREP: On the day of treatments try to empty your bowels prior to treatment. Drink 16-20 ounces of water 30 minutes prior to radiation treatment. Do not urinate right before treatment.      SKIN REDNESS: like a sunburn  Aquaphor as needed for redness (OTC).   1% Hydrocortisone (OTC) for itching as needed.   (Never use creams or lotions four hours prior to treatment).  Dove bar soap and warm water for bathing.  No hot tubs, heating pads or ice packs to treatment area.  Watch sun exposure - use sunscreen with zinc (Never use four hours prior to treatment).    FATIGUE: Exercise/Conserve energy    NO  MULTIVITAMINS OR ANTIOXIDANTS during your radiation treatment.    You will meet with the Nurse/ Doctor once a week for an on treatment visit. Please plan for a longer visit on this day.     You will speak with the radiation therapists for scheduling and treatment specific requirements.         Contact Numbers:    Triage Line  [phone]    To schedule or cancel your radiation treatment appointments  [phone]  Reviewed by:    Cinthya Li RN  BSN OCN             Date completed:07/01/25

## 2025-07-01 NOTE — LETTER
July 1, 2025                                                          Patient: Marco A Williamson   YOB: 1945   Date of Visit: 7/1/2025       Dear Dr. Arreola:    The patient is seen at the [facility] today. Attached is my assessment and plan of care.  Thank you for the opportunity to share in Marco A Williamson's care.       Sincerely,        Gregory J. [facility], MD      CC: No Recipients

## 2025-07-01 NOTE — PROGRESS NOTES
Review of Systems   Constitutional:  Positive for fatigue.   HENT:   Positive for voice change (Hoarseness).    Eyes: Negative.    Respiratory: Negative.     Cardiovascular: Negative.  Negative for palpitations (No pacemaker/no loop/no defib).   Gastrointestinal: Negative.    Genitourinary:  Positive for frequency (and urgency intermittent) and nocturia (x 1).    Musculoskeletal:  Positive for arthralgias and myalgias.   Skin: Negative.    Neurological: Negative.    Psychiatric/Behavioral: Negative.     Pt with hx Thyroid Cancer - now Prostate.  Family hx Prostate and breast.  Pt agreed to genetic testing.   Saw Dr. Padron today - will need hormonal therapy.

## 2025-07-02 ENCOUNTER — TELEPHONE (OUTPATIENT)
Dept: GENETICS | Facility: HOSPITAL | Age: 80
End: 2025-07-02
Payer: MEDICARE

## 2025-07-02 NOTE — TELEPHONE ENCOUNTER
7/2/25 I left a message for Matt Williamson regarding scheduling a cancer genetic counseling appointment per referral from Dr. [facility]. I provided information about the program as well as the office line.

## 2025-07-08 ENCOUNTER — TELEPHONE (OUTPATIENT)
Facility: HOSPITAL | Age: 80
End: 2025-07-08

## 2025-07-08 ENCOUNTER — OFFICE VISIT (OUTPATIENT)
Facility: HOSPITAL | Age: 80
End: 2025-07-08
Attending: INTERNAL MEDICINE
Payer: MEDICARE

## 2025-07-08 VITALS
TEMPERATURE: 96.6 F | SYSTOLIC BLOOD PRESSURE: 153 MMHG | HEART RATE: 82 BPM | DIASTOLIC BLOOD PRESSURE: 99 MMHG | BODY MASS INDEX: 28.69 KG/M2 | OXYGEN SATURATION: 99 % | HEIGHT: 65 IN | WEIGHT: 172.2 LBS

## 2025-07-08 DIAGNOSIS — C61 MALIGNANT NEOPLASM OF PROSTATE (CMS/HCC): Primary | ICD-10-CM

## 2025-07-08 DIAGNOSIS — C73 THYROID CANCER (CMS/HCC): ICD-10-CM

## 2025-07-08 PROCEDURE — G8755 DIAS BP > OR = 90: HCPCS | Performed by: INTERNAL MEDICINE

## 2025-07-08 PROCEDURE — G0463 HOSPITAL OUTPT CLINIC VISIT: HCPCS | Performed by: INTERNAL MEDICINE

## 2025-07-08 PROCEDURE — G8753 SYS BP > OR = 140: HCPCS | Performed by: INTERNAL MEDICINE

## 2025-07-08 PROCEDURE — 99214 OFFICE O/P EST MOD 30 MIN: CPT | Performed by: INTERNAL MEDICINE

## 2025-07-08 ASSESSMENT — ENCOUNTER SYMPTOMS
ARTHRALGIAS: 1
GASTROINTESTINAL NEGATIVE: 1
PSYCHIATRIC NEGATIVE: 1
CONSTITUTIONAL NEGATIVE: 1
HEMATOLOGIC/LYMPHATIC NEGATIVE: 1
CARDIOVASCULAR NEGATIVE: 1
NEUROLOGICAL NEGATIVE: 1
NECK PAIN: 1
BACK PAIN: 1
EYES NEGATIVE: 1
ENDOCRINE NEGATIVE: 1
RESPIRATORY NEGATIVE: 1

## 2025-07-08 ASSESSMENT — PAIN SCALES - GENERAL: PAINLEVEL_OUTOF10: 3

## 2025-07-08 NOTE — PROGRESS NOTES
Review of Systems   Constitutional: Negative.    HENT:  Negative.     Eyes: Negative.    Respiratory: Negative.     Cardiovascular: Negative.    Gastrointestinal: Negative.    Endocrine: Negative.    Genitourinary: Negative.     Musculoskeletal:  Positive for arthralgias, back pain and neck pain.   Skin: Negative.    Neurological: Negative.    Hematological: Negative.    Psychiatric/Behavioral: Negative.

## 2025-07-08 NOTE — ASSESSMENT & PLAN NOTE
He has a recent diagnosis of localized prostate cancer, unfavorable intermediate risk prostate cancer with grade group 3.    I did discussion with the patient.  Plan is for definitive radiation.  Given the unfavorable intermediate risk, recommend addition of androgen deprivation therapy for 4 to 6 months, starting at the time of radiation.  We discussed options for androgen deprivation therapy including injection such as Eligard or oral therapy with Orgovyx.  Patient would prefer oral therapy if it is affordable.  We will seek insurance authorization for Orgovyx and coordinate delivery from specialty pharmacy.  If treatment with Orgovyx is not feasible financially, Eligard for 6 months would be an alternative.  Plan to see patient back in the office towards the end of his radiation therapy to assess his tolerance of ADT.  He was asked to call with questions or concerns in the interval.

## 2025-07-08 NOTE — PROGRESS NOTES
Matt Williamson is a 79 y.o. male,   :  1945    Encounter Diagnoses   Name Primary?    Malignant neoplasm of prostate (CMS/HCC) Yes    Thyroid cancer (CMS/HCC)         Cancer Staging   Malignant neoplasm of prostate (CMS/HCC)  Staging form: Prostate, AJCC 8th Edition  - Clinical: Stage IIC (cT2c, cN0, cM0, PSA: 10, Grade Group: 3) - Unsigned    Thyroid cancer (CMS/HCC)  Staging form: Thyroid - Differentiated, AJCC 8th Edition  - Pathologic stage from 2012: Stage II (pT3a, pN0a, cM0, Age at diagnosis: >= 55 years) - Signed by Stacie Casey MD on 2020      Treatment Plans       No treatment plans exist            Oncology History   Thyroid cancer (CMS/HCC)   2012 Initial Diagnosis    Thyroid cancer (CMS/HCC)  Papillary thyroid carcinoma, follicular variant     2012 Cancer Staged    Staging form: Thyroid - Differentiated, AJCC 8th Edition  - Pathologic stage from 2012: Stage II (pT3a, pN0a, cM0, Age at diagnosis: >= 55 years)     2012 Surgery    Total thyroidectomy and lymph node biopsy performed by Dr. Lester at the [facility]     3/28/2012 -  Radiation Therapy    Patient received postoperative I-131 treatment.  Treatment details are not available currently.     2020 Recurrence Distant    Biopsy of an enlarging L3 lesion showed metastatic carcinoma consistent with a thyroid primary.     2020 -  Radiation Therapy    Received a course of external beam radiation to L3 metastasis under direction of Dr. [facility].     2022 Surgery    Underwent resection of L3 vertebral tumor with Dr. Wright showing metastatic carcinoma consistent with thyroid origin     2024 - 2024 Radiation Therapy    Underwent radiation of a lytic lesion in the right ischium         Patient Active Problem List   Diagnosis    BPH (benign prostatic hyperplasia)    Atherosclerosis of coronary artery    Cataract    Essential hypertension    Former tobacco use    Mixed hyperlipidemia     Thyroid cancer (CMS/HCC)    Type 2 diabetes mellitus with diabetic neuropathy, unspecified (CMS/HCC)    Esophageal reflux    Paroxysmal atrial fibrillation (CMS/HCC)    Pre-op evaluation    Preop cardiovascular exam    Cancer associated pain    Low back pain with right-sided sciatica    Malignant neoplasm of vertebral column, excluding sacrum and coccyx (CMS/HCC)    Acquired hypothyroidism    Leg swelling    Lumbar disc disease with radiculopathy    S/P lumbar fusion    Vitamin D deficiency    AVI (iron deficiency anemia)    Colon polyp    Other constipation    Pain of left breast    Atherosclerosis of aorta (CMS/HCC)    Secondary cancer of bone (CMS/HCC)    Low vitamin D level    Malignant neoplasm of prostate (CMS/HCC)       History of Present Illness  HPI  Matt Williamson is seen today in follow up.  Last week he saw Dr. [facility] for his new diagnosis of prostate cancer.  Pathology report was consistent with grade group 3 and unfavorable intermediate risk prostate cancer.  Because of this patient was recommended 6 months of concurrent androgen deprivation therapy along with radiation.  He return to see medical oncology discussed options for ADT.  He reports he is generally been feeling well.  He is planning to receive his radiation at [facility]. Patient been followed by urology and PSA has been rising with most recent level approximately 10.  On 6/12/2025 Dr. Padron performed prostate biopsies which revealed [place] score 7 (4+3) and (3+4) adenocarcinoma in several areas along with Vern score 6 adenocarcinoma. CT of the chest showed stable 4 mm right upper lung nodule.  MRI of prostate for mapping showed suspicious lesion.  Patient recommended by urologist for definitive radiation therapy with hormone therapy.     Review of Systems - Oncology    Review of Systems:  Nursing assessment reviewed. Pertinent positive and negative symptoms noted in HPI, all others negative.    Temp:  [35.9 °C (96.6 °F)] 35.9 °C  "(96.6 °F)  Heart Rate:  [82] 82  BP: (153)/(99) 153/99  Visit Vitals  BP (!) 153/99 (BP Location: Right upper arm, Patient Position: Sitting)   Pulse 82   Temp (!) 35.9 °C (96.6 °F) (Temporal)   Ht 1.651 m (5' 5\")   Wt 78.1 kg (172 lb 3.2 oz)   SpO2 99%   BMI 28.66 kg/m²     Physical Exam  Constitutional:       General: He is not in acute distress.     Appearance: He is well-developed.   HENT:      Head: Normocephalic.      Mouth/Throat:      Pharynx: Oropharynx is clear. No oropharyngeal exudate.   Eyes:      General: No scleral icterus.     Pupils: Pupils are equal, round, and reactive to light.   Cardiovascular:      Rate and Rhythm: Normal rate and regular rhythm.   Pulmonary:      Effort: Pulmonary effort is normal.      Breath sounds: Normal breath sounds.   Abdominal:      Palpations: Abdomen is soft.      Tenderness: There is no abdominal tenderness.   Musculoskeletal:         General: No tenderness.      Cervical back: Neck supple.   Lymphadenopathy:      Cervical: No cervical adenopathy.   Skin:     Findings: No rash.   Neurological:      Mental Status: He is alert.         Past Medical History[1]    Past Surgical History   Procedure Laterality Date    Anal fissurectomy  1986    Cardiac catheterization  11/21/2005    LM FOD. 70-80% proximal LAD.Mild disease CX, RCA and branches.     Cardiac catheterization  11/28/2005    LM FOD. Pristine LAD stent. CX FOD. RCA mild disease.    cataract surgery right eye Right 12/8/2020    Performed by Oscar Hale MD at  OR    Colonoscopy      Coronary angioplasty with stent placement  11/22/2005    LM FOD. Moderate, diffuse disease LAD. Irregular CX w/ PLVB distal to PDA 99%. Mild disease RCA. Stent to distal LAD.    Eye surgery Left Cataract    Foot surgery  2009    For osteomyelitis.    Gastrocutaneous fistula closure      Lumbar spine surgery  01/20/2022    Stage 1: L2-L4 PSF with instrumentation, ORIF L3 pathologic fracture Stage 2: L3 lateral corpectomy with " expandable cage placement    Prostate biopsy      2025    Stage 1: L2-L4 PSF with instrumentation, ORIF L3 pathologic fracture  Stage 2: L3 lateral corpectomy with expandable cage placement N/A 2022    Performed by Colt Heller MD at  OR    Thyroidectomy  2012    Upper gastrointestinal endoscopy         Social History     Tobacco Use    Smoking status: Former     Current packs/day: 0.00     Average packs/day: 2.0 packs/day for 20.0 years (40.0 ttl pk-yrs)     Types: Cigarettes     Start date:      Quit date:      Years since quittin.5    Smokeless tobacco: Never   Vaping Use    Vaping status: Never Used   Substance Use Topics    Alcohol use: Yes     Comment: Social    Drug use: Never       Family History   Problem Relation Name Age of Onset    Cancer Biological Mother Shikha     Breast cancer Biological Mother Shikha     Emphysema Biological Father Ronnie     Lung cancer Biological Father Ronnie     Prostate cancer Other Maternal uncle     Prostate cancer Other Maternal uncle          Allergies  Penicillins    Medications  Current Outpatient Medications   Medication Sig Dispense Refill    aspirin 81 mg enteric coated tablet Take 81 mg by mouth daily.      cholecalciferol, vitamin D3, (VITAMIN D3) 50 mcg (2,000 unit) tablet Take 2,000 Units by mouth daily.      dulaglutide (TRULICITY) 3 mg/0.5 mL pen injector Inject 3 mg under the skin every (seven) 7 days. Do not prime. 6 mL 3    empagliflozin (JARDIANCE) 10 mg tablet Take 1 tablet (10 mg total) by mouth daily. 90 tablet 1    gabapentin (NEURONTIN) 300 mg capsule Take 1 capsule (300 mg total) by mouth daily. 90 capsule 3    ibuprofen (MOTRIN) 800 mg tablet Take 1 tablet (800 mg total) by mouth every 6 (six) hours as needed (pain). Take w/ food 30 tablet 0    losartan (COZAAR) 25 mg tablet Take 1 tablet (25 mg total) by mouth daily. 90 tablet 3    metFORMIN XR (GLUCOPHAGE-XR) 500 mg 24 hr tablet Take 2 tablets (1,000 mg total) by mouth 2  (two) times a day with meals. 360 tablet 3    metoprolol succinate XL (TOPROL-XL) 100 mg 24 hr tablet Take 1 tablet (100 mg total) by mouth daily. 90 tablet 3    omeprazole (PriLOSEC) 40 mg capsule TAKE 1 CAPSULE(40 MG) BY MOUTH DAILY BEFORE BREAKFAST 90 capsule 1    polyethylene glycol (MIRALAX) 17 gram packet Take 17 g by mouth daily with dinner. 30 packet 0    pravastatin (PRAVACHOL) 40 mg tablet Take 1 tablet (40 mg total) by mouth daily. 90 tablet 3    relugolix (ORGOVYX) 120 mg tablet tablet Take 3 tablets (360 mg total) by mouth daily for 1 day, THEN 1 tablet (120 mg total) daily. 32 tablet 5    SYNTHROID 150 mcg tablet Take 1 tablet (150 mcg total) by mouth once daily. 90 tablet 3    ezetimibe (ZETIA) 10 mg tablet Take 1 tablet (10 mg total) by mouth nightly. (Patient not taking: Reported on 6/26/2025) 90 tablet 1     No current facility-administered medications for this visit.          Laboratory  PSA 10.02 on 3/26/25      Radiology  I independently reviewed the images, tracings or specimen.   CT CHEST WITH IV CONTRAST  Result Date: 6/28/2025  Narrative: CLINICAL HISTORY: Lung nodule COMMENT: Contrast-enhanced CT of the chest is performed. CT DOSE:  One or more dose reduction techniques (e.g. automated exposure control, adjustment of the mA and/or kV according to patient size, use of iterative reconstruction technique) utilized for this examination 100 cc of Isovue-370 intravenous contrast was administered. Comparison studies: Correlation is made with images acquired as part of a PET/CT exam dated 4/28/2025. FINDINGS: Lung fields: A 4 mm right upper lobe pulmonary nodule (image 75) is stable.  No new or enlarging nodules appreciated. Mediastinum and judy: No lymphadenopathy appreciated. Chest wall and pleura: Unremarkable, no pleural effusion. Axilla: No axillary adenopathy Heart and pericardium: The heart is normal in size.  No pericardial effusion. There are coronary arterial calcifications. Osseous  Structures: Mild degenerative changes of the spine     Impression: IMPRESSION: Stable 4 mm right upper lobe pulmonary nodule.  No new or enlarging nodules.       Assessment and Plan  Malignant neoplasm of prostate (CMS/HCC)  He has a recent diagnosis of localized prostate cancer, unfavorable intermediate risk prostate cancer with grade group 3.    I did discussion with the patient.  Plan is for definitive radiation.  Given the unfavorable intermediate risk, recommend addition of androgen deprivation therapy for 4 to 6 months, starting at the time of radiation.  We discussed options for androgen deprivation therapy including injection such as Eligard or oral therapy with Orgovyx.  Patient would prefer oral therapy if it is affordable.  We will seek insurance authorization for Orgovyx and coordinate delivery from specialty pharmacy.  If treatment with Orgovyx is not feasible financially, Eligard for 6 months would be an alternative.  Plan to see patient back in the office towards the end of his radiation therapy to assess his tolerance of ADT.  He was asked to call with questions or concerns in the interval.    Thyroid cancer (CMS/HCC)  Patient has a history of papillary thyroid cancer first diagnosed in 2012.  He developed recurrence with bone metastasis to L3 in 2020.  This was treated initially with radiation and then subsequent surgery due to localized progression.  Patient received additional radiation to the lesion of the right ischium in July 2024.    We reviewed his recent imaging from April.  There is no evidence of progressive thyroid cancer.  Plan is for ongoing monitoring with a follow-up PET/CT.  If he has evidence of progressive disease in the future can consider systemic therapy such as lenvatinib but given the relatively slow progression of his disease the risks of that treatment may outweigh the benefits.  If follow-up imaging shows disease progression, then would consider repeat biopsy as there was  insufficient sample to send for NGS testing from previous biopsy.  Will plan on seeing him back in 6 months.    With regards to the increased uptake seen in the rectum, patient reports he had a colonoscopy recently with Dr. Baxter and we will try to obtain those results for our records.      Stacie Casey MD         [1]   Past Medical History:  Diagnosis Date    Atrial fibrillation with rapid ventricular response (CMS/HCC) 10/25/2019    Hospitalized on 10/25/19 at [facility].     Back pain     BPH (benign prostatic hyperplasia)     Coronary artery disease     LAD stent 2005    Hypertension     Hypothyroidism     Mixed hyperlipidemia     Thyroid cancer (CMS/HCC)     Type 2 diabetes mellitus without complication (CMS/HCC)     Vertigo

## 2025-07-09 NOTE — TELEPHONE ENCOUNTER
RN received incoming fax from [facility] stating that they are not the filling pharmacy for this patient.

## 2025-07-14 NOTE — TELEPHONE ENCOUNTER
Appointment scheduled on 8/12 for annual with Lillian JACK   Pt called in to ask if he needs to get another scan or MRI post radiation. He has not had a follow up since ending treatment on 7/8/24. Will send to DR [facility] to get plan for pt and have him or I call the pt back regarding plan.

## 2025-07-14 NOTE — TELEPHONE ENCOUNTER
Medicine Refill Request    Last Office Visit: 3/26/2025   Last Consult Visit: Visit date not found  Last Telemedicine Visit: Visit date not found    Next Appointment: Visit date not found      Current Outpatient Medications:     aspirin 81 mg enteric coated tablet, Take 81 mg by mouth daily., Disp: , Rfl:     cholecalciferol, vitamin D3, (VITAMIN D3) 50 mcg (2,000 unit) tablet, Take 2,000 Units by mouth daily., Disp: , Rfl:     dulaglutide (TRULICITY) 3 mg/0.5 mL pen injector, Inject 3 mg under the skin every (seven) 7 days. Do not prime., Disp: 6 mL, Rfl: 3    empagliflozin (JARDIANCE) 10 mg tablet, Take 1 tablet (10 mg total) by mouth daily., Disp: 90 tablet, Rfl: 1    ezetimibe (ZETIA) 10 mg tablet, Take 1 tablet (10 mg total) by mouth nightly. (Patient not taking: Reported on 6/26/2025), Disp: 90 tablet, Rfl: 1    gabapentin (NEURONTIN) 300 mg capsule, Take 1 capsule (300 mg total) by mouth daily., Disp: 90 capsule, Rfl: 3    ibuprofen (MOTRIN) 800 mg tablet, Take 1 tablet (800 mg total) by mouth every 6 (six) hours as needed (pain). Take w/ food, Disp: 30 tablet, Rfl: 0    losartan (COZAAR) 25 mg tablet, Take 1 tablet (25 mg total) by mouth daily., Disp: 90 tablet, Rfl: 3    metFORMIN XR (GLUCOPHAGE-XR) 500 mg 24 hr tablet, Take 2 tablets (1,000 mg total) by mouth 2 (two) times a day with meals., Disp: 360 tablet, Rfl: 3    metoprolol succinate XL (TOPROL-XL) 100 mg 24 hr tablet, Take 1 tablet (100 mg total) by mouth daily., Disp: 90 tablet, Rfl: 3    omeprazole (PriLOSEC) 40 mg capsule, TAKE 1 CAPSULE(40 MG) BY MOUTH DAILY BEFORE BREAKFAST, Disp: 90 capsule, Rfl: 1    polyethylene glycol (MIRALAX) 17 gram packet, Take 17 g by mouth daily with dinner., Disp: 30 packet, Rfl: 0    pravastatin (PRAVACHOL) 40 mg tablet, Take 1 tablet (40 mg total) by mouth daily., Disp: 90 tablet, Rfl: 3    relugolix (ORGOVYX) 120 mg tablet tablet, Take 3 tablets (360 mg total) by mouth daily for 1 day, THEN 1 tablet (120 mg total)  daily., Disp: 32 tablet, Rfl: 5    SYNTHROID 150 mcg tablet, Take 1 tablet (150 mcg total) by mouth once daily., Disp: 90 tablet, Rfl: 3    BP Readings from Last 3 Encounters:   07/08/25 (!) 153/99   07/01/25 (!) 160/86   06/26/25 133/84       Recent Lab results:  Lab Results   Component Value Date    CHOL 153 03/26/2025   ,   Lab Results   Component Value Date    HDL 53 03/26/2025   ,   Lab Results   Component Value Date    LDLCALC 77 03/26/2025   ,   Lab Results   Component Value Date    TRIG 116 03/26/2025        Lab Results   Component Value Date    GLUCOSE 157 (H) 03/26/2025   ,   Lab Results   Component Value Date    HGBA1C 8.6 (H) 03/26/2025         Lab Results   Component Value Date    CREATININE 1.2 03/26/2025       Lab Results   Component Value Date    TSH 0.03 (L) 03/26/2025           Lab Results   Component Value Date    HGBA1C 8.6 (H) 03/26/2025

## 2025-07-15 RX ORDER — IBUPROFEN 800 MG/1
800 TABLET, FILM COATED ORAL EVERY 8 HOURS PRN
Qty: 90 TABLET | Refills: 0 | Status: SHIPPED | OUTPATIENT
Start: 2025-07-15 | End: 2025-08-14

## 2025-07-16 ENCOUNTER — HOSPITAL ENCOUNTER (OUTPATIENT)
Dept: RADIOLOGY | Facility: CLINIC | Age: 80
Discharge: HOME | End: 2025-07-16
Attending: RADIOLOGY
Payer: MEDICARE

## 2025-07-16 VITALS — BODY MASS INDEX: 28.32 KG/M2 | HEIGHT: 65 IN | WEIGHT: 170 LBS

## 2025-07-16 DIAGNOSIS — C61 MALIGNANT NEOPLASM OF PROSTATE (CMS/HCC): ICD-10-CM

## 2025-07-17 NOTE — TELEPHONE ENCOUNTER
RN spoke with patient. He states he received a VM from [facility]. He tried called back yesterday, no answer. He will try again early this afternoon. If he does not speak with them, he will notify our office. RN reinforced that he asked for financial assistance from the pharmacy if co-pay is too high.

## 2025-07-17 NOTE — TELEPHONE ENCOUNTER
RN reviewed the plan for blood work with patient. He is still awaiting a call back form pharmacy for delivery   rehabilitation facility

## 2025-07-17 NOTE — TELEPHONE ENCOUNTER
RN spoke with patient. Patient spoke with Specialty pharmacy. $0 co-pay. Medication is ready for patient. He needs to hear back from pharmacy about delivery. He is set up to start RAD on Tuesday.

## 2025-07-17 NOTE — TELEPHONE ENCOUNTER
Patient had an EKG with a normal QT interval in May.  Does not need a repeat EKG prior to starting Orgovyx.  Also does not need repeat labs prior to starting the medication.  Would be reasonable to recheck labs 2 to 4 weeks after starting.  Orders placed.

## 2025-07-18 ENCOUNTER — DOCUMENTATION (OUTPATIENT)
Dept: RADIATION ONCOLOGY | Facility: HOSPITAL | Age: 80
End: 2025-07-18
Payer: MEDICARE

## 2025-07-18 LAB
ARIA ZRC COURSE ID: NORMAL
ARIA ZRC COURSE ID: NORMAL
ARIA ZRC COURSE START DATE: NORMAL
ARIA ZRC COURSE START DATE: NORMAL
ARIA ZRC TREATMENT ELAPSED DAYS: NORMAL
ARIA ZRC TREATMENT ELAPSED DAYS: NORMAL
ARIA ZRP FRACTIONS TREATED TO DATE: NORMAL
ARIA ZRP PLAN ID: NORMAL
ARIA ZRP PLAN NAME: NORMAL
ARIA ZRP PRESCRIBED DOSE CGY: 3000
ARIA ZRP PRESCRIBED DOSE CGY: 3000
ARIA ZRP PRESCRIBED DOSE CGY: 3500
ARIA ZRP PRESCRIBED DOSE PER FRACTION: 10
ARIA ZRP PRESCRIBED DOSE PER FRACTION: 10
ARIA ZRP PRESCRIBED DOSE PER FRACTION: 7
ARIA ZRR DOSAGE GIVEN TO DATE: 0
ARIA ZRR DOSAGE GIVEN TO DATE: 30
ARIA ZRR DOSAGE GIVEN TO DATE: 30
ARIA ZRR REFERENCE POINT ID: NORMAL

## 2025-07-18 NOTE — PROGRESS NOTES
TREATMENT PLAN OF CARE AND GOALS PELVIS    Care Providers   Medical Oncologist    Radiation Oncologist Dr. [facility]   Surgeon    Radiation Oncology Nurse     Sarah Benavides, [phone]    Treatment Plan of Care   Chemotherapy, Hormonal, and Targeted therapies   Regimen Start date   Interval, Duration             Radiation Therapy   Treated area Start date Interval, Duration   prostate          Potential side effects:   BLADDER IRRITATION: with increased need to urinate.       AZO - (over the counter) as needed. See handout.        Good nutrition and hydration 8 - 8 oz water daily          (minimum). We can provide you with a nutrition        consult if needed.  BOWEL IRRITATION: abdominal cramping, rectal pressure and diarrhea are possible.        Imodium (over the counter) as needed. See handout.       Good nutrition and hydration.        Low-residue diet.   HAIR LOSS: You may lose your pubic hair near the area of the radiation. Some people find that the hair grows back in a different color or texture than before.  SEXUAL SIDE EFFECTS: Women may experience vaginal dryness. Some men may experience erectile dysfunction. These are temporary. Please tell your doctor or nurse. They may be able to recommend medications or products to help.  PELVIS PREP: On the day of treatments try to empty your bowels prior to treatment. Drink 16-20 ounces of water 30 minutes prior to radiation treatment. Do not urinate right before treatment.      SKIN REDNESS: like a sunburn  Aquaphor as needed for redness (OTC).   1% Hydrocortisone (OTC) for itching as needed.   (Never use creams or lotions four hours prior to treatment).  Dove bar soap and warm water for bathing.  No hot tubs, heating pads or ice packs to treatment area.  Watch sun exposure - use sunscreen with zinc (Never use four hours prior to treatment).    FATIGUE: Exercise/Conserve energy    NO MULTIVITAMINS OR ANTIOXIDANTS during your radiation  treatment.    You will meet with the Nurse/ Doctor once a week for an on treatment visit. Please plan for a longer visit on this day.     You will speak with the radiation therapists for scheduling and treatment specific requirements.         Contact Numbers:    Triage Line  [phone]    To schedule or cancel your radiation treatment appointments  [phone]  Reviewed by:    Farideh Hernandez RN                Date completed:07/18/25

## 2025-07-18 NOTE — TELEPHONE ENCOUNTER
RN spoke with patient. He received his medication today. Reviewed plan with patient: Will start medication when he starts RAD on Tuesday 7/22. Planf or repeat labs in 2-4 weeks. Plan for follow up OV with Dr. Casey on 8/26. He received education from pharmacy. He verbalized understanding of the plan and will call in if he needs anything.

## 2025-07-22 ENCOUNTER — HOSPITAL ENCOUNTER (OUTPATIENT)
Dept: RADIATION ONCOLOGY | Facility: HOSPITAL | Age: 80
Setting detail: RADIATION/ONCOLOGY SERIES
Discharge: HOME | End: 2025-07-22
Attending: RADIOLOGY
Payer: MEDICARE

## 2025-07-22 DIAGNOSIS — C61 MALIGNANT NEOPLASM OF PROSTATE (CMS/HCC): Primary | ICD-10-CM

## 2025-07-22 LAB
ARIA ZRC COURSE ID: NORMAL
ARIA ZRC COURSE START DATE: NORMAL
ARIA ZRC TREATMENT ELAPSED DAYS: NORMAL
ARIA ZRP FRACTIONS TREATED TO DATE: NORMAL
ARIA ZRP PLAN ID: NORMAL
ARIA ZRP PRESCRIBED DOSE CGY: 7000
ARIA ZRP PRESCRIBED DOSE PER FRACTION: 2.5
ARIA ZRR DOSAGE GIVEN TO DATE: 1.8
ARIA ZRR DOSAGE GIVEN TO DATE: 2.5
ARIA ZRR DOSAGE GIVEN TO DATE: 2.56
ARIA ZRR REFERENCE POINT ID: NORMAL
ARIA ZRR SESSION DOSAGE GIVEN: 1.8
ARIA ZRR SESSION DOSAGE GIVEN: 2.5
ARIA ZRR SESSION DOSAGE GIVEN: 2.56

## 2025-07-22 PROCEDURE — 77385 HC IMRT DELIVERY SIMPLE: CPT | Performed by: RADIOLOGY

## 2025-07-22 NOTE — PROGRESS NOTES
Patient presents for his first radiation treatment for his clinical stage IIc prostate cancer.  Recent PSMA PET scan shows uptake in the area of the prostate as well as 2 foci right scapula inferiorly associated lytic areas.  Interestingly back in 2020 patient had outside PET scan which picked up uptake in this region with lytic areas.  At the time no uptake in the prostate and he was not diagnosed with prostate cancer at that time.  Patient denies any symptoms in his region.  He does complain of chronic back pain and recently exacerbated by power washing.  He does have known metastatic thyroid cancer status post radiation therapy to L3 region and right iliac region.  Exam fails to elicit pain when palpating right scapula.  Unsure what to think about recent findings on PSMA PET scan whether indeed metastatic prostate cancer versus metastatic thyroid cancer or unrelated to cancer.  Follow-up with whether additional radiographic studies would be helpful and not sure necessary to biopsy given stable disease for over 5 years.  This area was not reported on future PET scans done at this institution.  Discussed with patient regardless of possibly representing metastatic disease of the prostate patient would likely benefit from prostate radiation therapy regardless.  At this time and given the benefit of the doubt stenting it is not metastatic prostate cancer but will continue to monitor closely after treatment.  Considering starting hormone therapy for a year but not sure its necessary or benefits outweigh the risks.  Patient in agreement.

## 2025-07-23 ENCOUNTER — HOSPITAL ENCOUNTER (OUTPATIENT)
Dept: RADIATION ONCOLOGY | Facility: HOSPITAL | Age: 80
Setting detail: RADIATION/ONCOLOGY SERIES
Discharge: HOME | End: 2025-07-23
Attending: RADIOLOGY
Payer: MEDICARE

## 2025-07-23 LAB
ARIA ZRC COURSE ID: NORMAL
ARIA ZRC COURSE START DATE: NORMAL
ARIA ZRC TREATMENT ELAPSED DAYS: NORMAL
ARIA ZRP FRACTIONS TREATED TO DATE: NORMAL
ARIA ZRP PLAN ID: NORMAL
ARIA ZRP PRESCRIBED DOSE CGY: 7000
ARIA ZRP PRESCRIBED DOSE PER FRACTION: 2.5
ARIA ZRR DOSAGE GIVEN TO DATE: 3.6
ARIA ZRR DOSAGE GIVEN TO DATE: 5
ARIA ZRR DOSAGE GIVEN TO DATE: 5.13
ARIA ZRR REFERENCE POINT ID: NORMAL
ARIA ZRR SESSION DOSAGE GIVEN: 1.8
ARIA ZRR SESSION DOSAGE GIVEN: 2.5
ARIA ZRR SESSION DOSAGE GIVEN: 2.56

## 2025-07-23 PROCEDURE — 77385 HC IMRT DELIVERY SIMPLE: CPT | Performed by: RADIOLOGY

## 2025-07-24 ENCOUNTER — HOSPITAL ENCOUNTER (OUTPATIENT)
Dept: RADIATION ONCOLOGY | Facility: HOSPITAL | Age: 80
Setting detail: RADIATION/ONCOLOGY SERIES
Discharge: HOME | End: 2025-07-24
Attending: RADIOLOGY
Payer: MEDICARE

## 2025-07-24 LAB
ARIA ZRC COURSE ID: NORMAL
ARIA ZRC COURSE START DATE: NORMAL
ARIA ZRC TREATMENT ELAPSED DAYS: NORMAL
ARIA ZRP FRACTIONS TREATED TO DATE: NORMAL
ARIA ZRP PLAN ID: NORMAL
ARIA ZRP PRESCRIBED DOSE CGY: 7000
ARIA ZRP PRESCRIBED DOSE PER FRACTION: 2.5
ARIA ZRR DOSAGE GIVEN TO DATE: 5.4
ARIA ZRR DOSAGE GIVEN TO DATE: 7.5
ARIA ZRR DOSAGE GIVEN TO DATE: 7.69
ARIA ZRR REFERENCE POINT ID: NORMAL
ARIA ZRR SESSION DOSAGE GIVEN: 1.8
ARIA ZRR SESSION DOSAGE GIVEN: 2.5
ARIA ZRR SESSION DOSAGE GIVEN: 2.56

## 2025-07-25 ENCOUNTER — HOSPITAL ENCOUNTER (OUTPATIENT)
Dept: RADIATION ONCOLOGY | Facility: HOSPITAL | Age: 80
Setting detail: RADIATION/ONCOLOGY SERIES
Discharge: HOME | End: 2025-07-25
Attending: RADIOLOGY
Payer: MEDICARE

## 2025-07-25 LAB
ARIA ZRC COURSE ID: NORMAL
ARIA ZRC COURSE START DATE: NORMAL
ARIA ZRC TREATMENT ELAPSED DAYS: NORMAL
ARIA ZRP FRACTIONS TREATED TO DATE: NORMAL
ARIA ZRP PLAN ID: NORMAL
ARIA ZRP PRESCRIBED DOSE CGY: 7000
ARIA ZRP PRESCRIBED DOSE PER FRACTION: 2.5
ARIA ZRR DOSAGE GIVEN TO DATE: 10
ARIA ZRR DOSAGE GIVEN TO DATE: 10.26
ARIA ZRR DOSAGE GIVEN TO DATE: 7.2
ARIA ZRR REFERENCE POINT ID: NORMAL
ARIA ZRR SESSION DOSAGE GIVEN: 1.8
ARIA ZRR SESSION DOSAGE GIVEN: 2.5
ARIA ZRR SESSION DOSAGE GIVEN: 2.56

## 2025-07-28 ENCOUNTER — RAD ONC OTV (OUTPATIENT)
Dept: RADIATION ONCOLOGY | Facility: HOSPITAL | Age: 80
End: 2025-07-28
Payer: MEDICARE

## 2025-07-28 ENCOUNTER — HOSPITAL ENCOUNTER (OUTPATIENT)
Dept: RADIATION ONCOLOGY | Facility: HOSPITAL | Age: 80
Setting detail: RADIATION/ONCOLOGY SERIES
Discharge: HOME | End: 2025-07-28
Attending: RADIOLOGY
Payer: MEDICARE

## 2025-07-28 ENCOUNTER — TELEPHONE (OUTPATIENT)
Facility: HOSPITAL | Age: 80
End: 2025-07-28
Payer: MEDICARE

## 2025-07-28 VITALS
HEART RATE: 79 BPM | BODY MASS INDEX: 28.42 KG/M2 | WEIGHT: 170.8 LBS | DIASTOLIC BLOOD PRESSURE: 62 MMHG | OXYGEN SATURATION: 99 % | SYSTOLIC BLOOD PRESSURE: 130 MMHG

## 2025-07-28 DIAGNOSIS — C61 MALIGNANT NEOPLASM OF PROSTATE (CMS/HCC): Primary | ICD-10-CM

## 2025-07-28 LAB
ARIA ZRC COURSE ID: NORMAL
ARIA ZRC COURSE START DATE: NORMAL
ARIA ZRC TREATMENT ELAPSED DAYS: NORMAL
ARIA ZRP FRACTIONS TREATED TO DATE: NORMAL
ARIA ZRP PLAN ID: NORMAL
ARIA ZRP PRESCRIBED DOSE CGY: 7000
ARIA ZRP PRESCRIBED DOSE PER FRACTION: 2.5
ARIA ZRR DOSAGE GIVEN TO DATE: 12.5
ARIA ZRR DOSAGE GIVEN TO DATE: 12.82
ARIA ZRR DOSAGE GIVEN TO DATE: 9
ARIA ZRR REFERENCE POINT ID: NORMAL
ARIA ZRR SESSION DOSAGE GIVEN: 1.8
ARIA ZRR SESSION DOSAGE GIVEN: 2.5
ARIA ZRR SESSION DOSAGE GIVEN: 2.56

## 2025-07-28 NOTE — TELEPHONE ENCOUNTER
RN spoke with patient. He reports feeling well since starting treatment. He is tired but it is manageable. He knows to call in if he needs anything.

## 2025-07-28 NOTE — RADIATION TREATMENT MANAGEMENT
Patient ID:   Marco A Williamson  1945  [id number]   Diagnosis Plan   1. Malignant neoplasm of prostate (CMS/HCC)           Referring Physician:   No referring provider defined for this encounter.  Primary Care Provider:  Raj Arreola DO     Problem List:  Patient Active Problem List    Diagnosis Date Noted    Malignant neoplasm of prostate (CMS/HCC) 06/26/2025    Low vitamin D level 11/21/2024    Secondary cancer of bone (CMS/HCC) 06/18/2024    Atherosclerosis of aorta (CMS/HCC) 02/07/2024    Colon polyp 10/11/2022    Other constipation 10/11/2022    Pain of left breast 10/11/2022    Vitamin D deficiency 01/29/2022    AVI (iron deficiency anemia) 01/29/2022    Leg swelling 01/27/2022    Lumbar disc disease with radiculopathy 01/27/2022    S/P lumbar fusion 01/27/2022    Acquired hypothyroidism 01/22/2022    Low back pain with right-sided sciatica 01/12/2022    Malignant neoplasm of vertebral column, excluding sacrum and coccyx (CMS/HCC) 01/12/2022    Cancer associated pain 12/10/2021    Preop cardiovascular exam 06/21/2021    Pre-op evaluation 11/20/2020    Paroxysmal atrial fibrillation (CMS/HCC) 10/25/2019    Former tobacco use 05/02/2018    Essential hypertension 06/20/2016    Type 2 diabetes mellitus with diabetic neuropathy, unspecified (CMS/HCC) 06/20/2016    Mixed hyperlipidemia 02/18/2016    Cataract 09/11/2013    BPH (benign prostatic hyperplasia) 09/14/2012    Atherosclerosis of coronary artery 09/14/2012    Thyroid cancer (CMS/HCC) 09/14/2012    Esophageal reflux 01/15/2007       Cancer Staging   Malignant neoplasm of prostate (CMS/[facility])  Staging form: Prostate, AJCC 8th Edition  - Clinical: Stage IIC (cT2c, cN0, cM0, PSA: 10, Grade Group: 3) - Unsigned    Thyroid cancer (CMS/[facility])  Staging form: Thyroid - Differentiated, AJCC 8th Edition  - Pathologic stage from 2/28/2012: Stage II (pT3a, pN0a, cM0, Age at diagnosis: >= 55 years) - Signed by Stacie Casey MD on 1/27/2020      TREATMENT PLAN  SUMMARY:  Radiation Treatments       Active   Plans   3_pros_LNs   Most recent treatment: Dose planned: 250 cGy (fraction 5 of 28 on 7/28/2025)   Total: Dose planned: 7,000 cGy   Elapsed Days: 6 days as of 2025-07-28 @ 10:3620      Reference Points   3_calc   Most recent treatment: Dose given: 256 cGy (on 7/28/2025)   Total: Dose given: 1,282 cGy   Elapsed Days: 6 days as of 2025-07-28 @ 10:3620      3a_pros   Most recent treatment: Dose given: 250 cGy (on 7/28/2025)   Total: Dose given: 1,250 cGy   Elapsed Days: 6 days as of 2025-07-28 @ 10:3620      3b_SV_LNs   Most recent treatment: Dose given: 180 cGy (on 7/28/2025)   Total: Dose given: 900 cGy   Elapsed Days: 6 days as of 2025-07-28 @ 10:3620           Historical   Plans   1_L3 SBRT   Most recent treatment: Dose planned: 700 cGy (fraction 5 of 5 on 5/7/2020)   Total: Dose planned: 3,500 cGy   Elapsed Days: 14 days as of [id number]      2_Rt Ischium   Most recent treatment: Dose planned: 1,000 cGy (fraction 3 of 3 on 7/8/2024)   Total: Dose planned: 3,000 cGy   Elapsed Days: 7 days as of 2024-07-08 @ 07:5849      1_L3 SBRT1   Most recent treatment: Dose planned: 700 cGy (fraction 0 of 5 on 7/18/2025)   Total: Dose planned: 3,500 cGy   Elapsed Days: : @ --      2_Rt Ischium   Most recent treatment: Dose planned: 1,000 cGy (fraction 0 of 3 on 7/18/2025)   Total: Dose planned: 3,000 cGy   Elapsed Days: : @ --      Reference Points   1_L3 SBRT   Most recent treatment: Dose given: -- (on 5/7/2020)   Total: Dose given: --   Elapsed Days: 14 days as of [id number]      CP L3 SBRT   Most recent treatment: Dose given: -- (on 5/7/2020)   Total: Dose given: --   Elapsed Days: 14 days as of [id number]      2_Rt Ischium   Most recent treatment: Dose given: 1,000 cGy (on 7/8/2024)   Total: Dose given: 3,000 cGy   Elapsed Days: 7 days as of 2024-07-08 @ 07:5849      2_calc   Most recent treatment: Dose given: 1,000 cGy (on 7/8/2024)   Total: Dose given: 3,000 cGy    Elapsed Days: 7 days as of 2024-07-08 @ 07:5849      1_L3 SBRT   Most recent treatment: Course completed on 7/18/2025   Total: Dose given: 0 cGy   Elapsed Days: : @ --      2_Rt Ischium   Most recent treatment: Course completed on 7/18/2025   Total: Dose given: 0 cGy   Elapsed Days: : @ --      2_calc   Most recent treatment: Course completed on 7/18/2025   Total: Dose given: 0 cGy   Elapsed Days: : @ --      CP L3 SBRT   Most recent treatment: Course completed on 7/18/2025   Total: Dose given: 0 cGy   Elapsed Days: : @ --                     Subjective complains of right leg sciatica longstanding associated low back pain bothers him when he is standing for too long.  Otherwise not a problem.  Denies right shoulder pain or scapular pain.  Interval Notes:    Vital Signs for this encounter:  BP: 130/62  Heart Rate: 79  SpO2: 99 %  Weight: 77.5 kg (170 lb 12.8 oz) (07/28 0956)  Performance Status:90    PAIN ASSESSMENT:  Score Zero    Location    Pain Intervention      TOXICITY ASSESSMENT:  General  Fatigue: Fatigue relieved by rest  Weight Loss: Absent or within normal limits (1 lb)  Anxiety: Absent or within normal limits  Depression: Absent or within normal limits  Gastrointestinal  Constipation: Absent or within normal limits  Diarrhea: Absent or within normal limits  Renal/Urinary/Genital  Hematuria: Absent or within normal limits  Urinary Frequency: Present (intermittent)  Urinary Tract Pain: Absent or within normal limits  Urinary Urgency: Absent or within normal limits  Skin and Subcutaneous Tissue  Dermatitis Radiation: Absent or within normal limits  Pain Assessment  Pain Score: 0-No pain          Objective      Physical Exam:  Physical Exam healthy-appearing male in no apparent distress.  Nontender palpation right scapula.  Neurologic grossly intact.  Abdomen soft nontender.  Performance status 90.    LABS:  CMP   Lab Results   Component Value Date    ALBUMIN 4.3 03/26/2025    CO2 27 03/26/2025    BUN 19  03/26/2025    CREATININE 1.2 03/26/2025    GLUCOSE 157 (H) 03/26/2025    AST 14 03/26/2025    ALT 13 03/26/2025    EGFR >60.0 03/26/2025     CBC   Lab Results   Component Value Date    WBC 9.04 03/26/2025    HGB 14.3 03/26/2025    HCT 43.9 03/26/2025    MCV 88.3 03/26/2025     03/26/2025     Tumor Marker   Lab Results   Component Value Date    PSA 10.02 (H) 03/26/2025            Assessment/Plan continue radiation therapy to the prostate for clinical stage IIc prostate cancer.  Continue hormonal therapy for the next 6 months.  Will continue to monitor right scapula lytic lesions present 5 years ago unsure of etiology.  Could be benign versus metastatic thyroid or metastatic prostate cancer.  I feel the latter is the least likely.  Will continue to monitor closely however.  If evidence of malignancy oligometastatic disease will give radiation therapy.    Diagnoses and Orders Associated With This Visit:  There are no diagnoses linked to this encounter.

## 2025-07-29 ENCOUNTER — HOSPITAL ENCOUNTER (OUTPATIENT)
Dept: RADIATION ONCOLOGY | Facility: HOSPITAL | Age: 80
Setting detail: RADIATION/ONCOLOGY SERIES
Discharge: HOME | End: 2025-07-29
Attending: RADIOLOGY
Payer: MEDICARE

## 2025-07-29 LAB
ARIA ZRC COURSE ID: NORMAL
ARIA ZRC COURSE START DATE: NORMAL
ARIA ZRC TREATMENT ELAPSED DAYS: NORMAL
ARIA ZRP FRACTIONS TREATED TO DATE: NORMAL
ARIA ZRP PLAN ID: NORMAL
ARIA ZRP PRESCRIBED DOSE CGY: 7000
ARIA ZRP PRESCRIBED DOSE PER FRACTION: 2.5
ARIA ZRR DOSAGE GIVEN TO DATE: 10.8
ARIA ZRR DOSAGE GIVEN TO DATE: 15
ARIA ZRR DOSAGE GIVEN TO DATE: 15.38
ARIA ZRR REFERENCE POINT ID: NORMAL
ARIA ZRR SESSION DOSAGE GIVEN: 1.8
ARIA ZRR SESSION DOSAGE GIVEN: 2.5
ARIA ZRR SESSION DOSAGE GIVEN: 2.56

## 2025-07-30 ENCOUNTER — HOSPITAL ENCOUNTER (OUTPATIENT)
Dept: RADIATION ONCOLOGY | Facility: HOSPITAL | Age: 80
Setting detail: RADIATION/ONCOLOGY SERIES
Discharge: HOME | End: 2025-07-30
Attending: RADIOLOGY
Payer: MEDICARE

## 2025-07-30 LAB
ARIA ZRC COURSE ID: NORMAL
ARIA ZRC COURSE START DATE: NORMAL
ARIA ZRC TREATMENT ELAPSED DAYS: NORMAL
ARIA ZRP FRACTIONS TREATED TO DATE: NORMAL
ARIA ZRP PLAN ID: NORMAL
ARIA ZRP PRESCRIBED DOSE CGY: 7000
ARIA ZRP PRESCRIBED DOSE PER FRACTION: 2.5
ARIA ZRR DOSAGE GIVEN TO DATE: 12.6
ARIA ZRR DOSAGE GIVEN TO DATE: 17.5
ARIA ZRR DOSAGE GIVEN TO DATE: 17.95
ARIA ZRR REFERENCE POINT ID: NORMAL
ARIA ZRR SESSION DOSAGE GIVEN: 1.8
ARIA ZRR SESSION DOSAGE GIVEN: 2.5
ARIA ZRR SESSION DOSAGE GIVEN: 2.56

## 2025-07-31 ENCOUNTER — HOSPITAL ENCOUNTER (OUTPATIENT)
Dept: RADIATION ONCOLOGY | Facility: HOSPITAL | Age: 80
Setting detail: RADIATION/ONCOLOGY SERIES
Discharge: HOME | End: 2025-07-31
Attending: RADIOLOGY
Payer: MEDICARE

## 2025-07-31 LAB
ARIA ZRC COURSE ID: NORMAL
ARIA ZRC COURSE START DATE: NORMAL
ARIA ZRC TREATMENT ELAPSED DAYS: NORMAL
ARIA ZRP FRACTIONS TREATED TO DATE: NORMAL
ARIA ZRP PLAN ID: NORMAL
ARIA ZRP PRESCRIBED DOSE CGY: 7000
ARIA ZRP PRESCRIBED DOSE PER FRACTION: 2.5
ARIA ZRR DOSAGE GIVEN TO DATE: 14.4
ARIA ZRR DOSAGE GIVEN TO DATE: 20
ARIA ZRR DOSAGE GIVEN TO DATE: 20.51
ARIA ZRR REFERENCE POINT ID: NORMAL
ARIA ZRR SESSION DOSAGE GIVEN: 1.8
ARIA ZRR SESSION DOSAGE GIVEN: 2.5
ARIA ZRR SESSION DOSAGE GIVEN: 2.56

## 2025-08-01 ENCOUNTER — HOSPITAL ENCOUNTER (OUTPATIENT)
Dept: RADIATION ONCOLOGY | Facility: HOSPITAL | Age: 80
Setting detail: RADIATION/ONCOLOGY SERIES
Discharge: HOME | End: 2025-08-01
Attending: RADIOLOGY
Payer: MEDICARE

## 2025-08-01 LAB
ARIA ZRC COURSE ID: NORMAL
ARIA ZRC COURSE START DATE: NORMAL
ARIA ZRC TREATMENT ELAPSED DAYS: NORMAL
ARIA ZRP FRACTIONS TREATED TO DATE: NORMAL
ARIA ZRP PLAN ID: NORMAL
ARIA ZRP PRESCRIBED DOSE CGY: 7000
ARIA ZRP PRESCRIBED DOSE PER FRACTION: 2.5
ARIA ZRR DOSAGE GIVEN TO DATE: 16.2
ARIA ZRR DOSAGE GIVEN TO DATE: 22.5
ARIA ZRR DOSAGE GIVEN TO DATE: 23.08
ARIA ZRR REFERENCE POINT ID: NORMAL
ARIA ZRR SESSION DOSAGE GIVEN: 1.8
ARIA ZRR SESSION DOSAGE GIVEN: 2.5
ARIA ZRR SESSION DOSAGE GIVEN: 2.56

## 2025-08-04 ENCOUNTER — HOSPITAL ENCOUNTER (OUTPATIENT)
Dept: RADIATION ONCOLOGY | Facility: HOSPITAL | Age: 80
Setting detail: RADIATION/ONCOLOGY SERIES
Discharge: HOME | End: 2025-08-04
Attending: RADIOLOGY
Payer: MEDICARE

## 2025-08-04 LAB
ARIA ZRC COURSE ID: NORMAL
ARIA ZRC COURSE START DATE: NORMAL
ARIA ZRC TREATMENT ELAPSED DAYS: NORMAL
ARIA ZRP FRACTIONS TREATED TO DATE: NORMAL
ARIA ZRP PLAN ID: NORMAL
ARIA ZRP PRESCRIBED DOSE CGY: 7000
ARIA ZRP PRESCRIBED DOSE PER FRACTION: 2.5
ARIA ZRR DOSAGE GIVEN TO DATE: 18
ARIA ZRR DOSAGE GIVEN TO DATE: 25
ARIA ZRR DOSAGE GIVEN TO DATE: 25.64
ARIA ZRR REFERENCE POINT ID: NORMAL
ARIA ZRR SESSION DOSAGE GIVEN: 1.8
ARIA ZRR SESSION DOSAGE GIVEN: 2.5
ARIA ZRR SESSION DOSAGE GIVEN: 2.56

## 2025-08-04 PROCEDURE — 77385 HC IMRT DELIVERY SIMPLE: CPT | Performed by: RADIOLOGY

## 2025-08-05 ENCOUNTER — TELEPHONE (OUTPATIENT)
Dept: HEMATOLOGY/ONCOLOGY | Facility: CLINIC | Age: 80
End: 2025-08-05
Payer: MEDICARE

## 2025-08-05 ENCOUNTER — HOSPITAL ENCOUNTER (OUTPATIENT)
Dept: RADIATION ONCOLOGY | Facility: HOSPITAL | Age: 80
Setting detail: RADIATION/ONCOLOGY SERIES
Discharge: HOME | End: 2025-08-05
Attending: RADIOLOGY
Payer: MEDICARE

## 2025-08-05 ENCOUNTER — RAD ONC OTV (OUTPATIENT)
Dept: RADIATION ONCOLOGY | Facility: HOSPITAL | Age: 80
End: 2025-08-05
Payer: MEDICARE

## 2025-08-05 VITALS
WEIGHT: 169.4 LBS | SYSTOLIC BLOOD PRESSURE: 168 MMHG | OXYGEN SATURATION: 100 % | HEART RATE: 75 BPM | DIASTOLIC BLOOD PRESSURE: 79 MMHG | BODY MASS INDEX: 28.19 KG/M2

## 2025-08-05 DIAGNOSIS — C61 MALIGNANT NEOPLASM OF PROSTATE (CMS/HCC): Primary | ICD-10-CM

## 2025-08-05 LAB
ARIA ZRC COURSE ID: NORMAL
ARIA ZRC COURSE START DATE: NORMAL
ARIA ZRC TREATMENT ELAPSED DAYS: NORMAL
ARIA ZRP FRACTIONS TREATED TO DATE: NORMAL
ARIA ZRP PLAN ID: NORMAL
ARIA ZRP PRESCRIBED DOSE CGY: 7000
ARIA ZRP PRESCRIBED DOSE PER FRACTION: 2.5
ARIA ZRR DOSAGE GIVEN TO DATE: 19.8
ARIA ZRR DOSAGE GIVEN TO DATE: 27.5
ARIA ZRR DOSAGE GIVEN TO DATE: 28.21
ARIA ZRR REFERENCE POINT ID: NORMAL
ARIA ZRR SESSION DOSAGE GIVEN: 1.8
ARIA ZRR SESSION DOSAGE GIVEN: 2.5
ARIA ZRR SESSION DOSAGE GIVEN: 2.56

## 2025-08-05 PROCEDURE — 77385 HC IMRT DELIVERY SIMPLE: CPT | Performed by: RADIOLOGY

## 2025-08-05 RX ORDER — TAMSULOSIN HYDROCHLORIDE 0.4 MG/1
0.4 CAPSULE ORAL NIGHTLY
Qty: 30 CAPSULE | Refills: 3 | Status: SHIPPED | OUTPATIENT
Start: 2025-08-05 | End: 2025-12-03

## 2025-08-05 ASSESSMENT — PAIN SCALES - GENERAL: PAINLEVEL_OUTOF10: 0-NO PAIN

## 2025-08-05 NOTE — RADIATION TREATMENT MANAGEMENT
Patient ID:   Marco A Williamson  1945  [id number]   Diagnosis Plan   1. Malignant neoplasm of prostate (CMS/HCC)           Referring Physician:   No referring provider defined for this encounter.  Primary Care Provider:  Raj Arreola DO     Problem List:  Patient Active Problem List    Diagnosis Date Noted    Malignant neoplasm of prostate (CMS/HCC) 06/26/2025    Low vitamin D level 11/21/2024    Secondary cancer of bone (CMS/HCC) 06/18/2024    Atherosclerosis of aorta (CMS/HCC) 02/07/2024    Colon polyp 10/11/2022    Other constipation 10/11/2022    Pain of left breast 10/11/2022    Vitamin D deficiency 01/29/2022    AVI (iron deficiency anemia) 01/29/2022    Leg swelling 01/27/2022    Lumbar disc disease with radiculopathy 01/27/2022    S/P lumbar fusion 01/27/2022    Acquired hypothyroidism 01/22/2022    Low back pain with right-sided sciatica 01/12/2022    Malignant neoplasm of vertebral column, excluding sacrum and coccyx (CMS/HCC) 01/12/2022    Cancer associated pain 12/10/2021    Preop cardiovascular exam 06/21/2021    Pre-op evaluation 11/20/2020    Paroxysmal atrial fibrillation (CMS/HCC) 10/25/2019    Former tobacco use 05/02/2018    Essential hypertension 06/20/2016    Type 2 diabetes mellitus with diabetic neuropathy, unspecified (CMS/HCC) 06/20/2016    Mixed hyperlipidemia 02/18/2016    Cataract 09/11/2013    BPH (benign prostatic hyperplasia) 09/14/2012    Atherosclerosis of coronary artery 09/14/2012    Thyroid cancer (CMS/HCC) 09/14/2012    Esophageal reflux 01/15/2007       Cancer Staging   Malignant neoplasm of prostate (CMS/[facility])  Staging form: Prostate, AJCC 8th Edition  - Clinical: Stage IIC (cT2c, cN0, cM0, PSA: 10, Grade Group: 3) - Unsigned    Thyroid cancer (CMS/[facility])  Staging form: Thyroid - Differentiated, AJCC 8th Edition  - Pathologic stage from 2/28/2012: Stage II (pT3a, pN0a, cM0, Age at diagnosis: >= 55 years) - Signed by Stacie Casey MD on 1/27/2020      TREATMENT PLAN  SUMMARY:  Radiation Treatments       Active   Plans   3_pros_LNs   Most recent treatment: Dose planned: 250 cGy (fraction 11 of 28 on 8/5/2025)   Total: Dose planned: 7,000 cGy   Elapsed Days: 14 days as of 2025-08-05 @ 10:1030      Reference Points   3_calc   Most recent treatment: Dose given: 256 cGy (on 8/5/2025)   Total: Dose given: 2,821 cGy   Elapsed Days: 14 days as of 2025-08-05 @ 10:1030      3a_pros   Most recent treatment: Dose given: 250 cGy (on 8/5/2025)   Total: Dose given: 2,750 cGy   Elapsed Days: 14 days as of 2025-08-05 @ 10:1030      3b_SV_LNs   Most recent treatment: Dose given: 180 cGy (on 8/5/2025)   Total: Dose given: 1,980 cGy   Elapsed Days: 14 days as of 2025-08-05 @ 10:1030           Historical   Plans   1_L3 SBRT   Most recent treatment: Dose planned: 700 cGy (fraction 5 of 5 on 5/7/2020)   Total: Dose planned: 3,500 cGy   Elapsed Days: 14 days as of [id number]      2_Rt Ischium   Most recent treatment: Dose planned: 1,000 cGy (fraction 3 of 3 on 7/8/2024)   Total: Dose planned: 3,000 cGy   Elapsed Days: 7 days as of 2024-07-08 @ 07:5849      1_L3 SBRT1   Most recent treatment: Dose planned: 700 cGy (fraction 0 of 5 on 7/18/2025)   Total: Dose planned: 3,500 cGy   Elapsed Days: : @ --      2_Rt Ischium   Most recent treatment: Dose planned: 1,000 cGy (fraction 0 of 3 on 7/18/2025)   Total: Dose planned: 3,000 cGy   Elapsed Days: : @ --      Reference Points   1_L3 SBRT   Most recent treatment: Dose given: -- (on 5/7/2020)   Total: Dose given: --   Elapsed Days: 14 days as of [id number]      CP L3 SBRT   Most recent treatment: Dose given: -- (on 5/7/2020)   Total: Dose given: --   Elapsed Days: 14 days as of [id number]      2_Rt Ischium   Most recent treatment: Dose given: 1,000 cGy (on 7/8/2024)   Total: Dose given: 3,000 cGy   Elapsed Days: 7 days as of 2024-07-08 @ 07:5849      2_calc   Most recent treatment: Dose given: 1,000 cGy (on 7/8/2024)   Total: Dose given: 3,000 cGy    Elapsed Days: 7 days as of 2024-07-08 @ 07:5849      1_L3 SBRT   Most recent treatment: Course completed on 7/18/2025   Total: Dose given: 0 cGy   Elapsed Days: : @ --      2_Rt Ischium   Most recent treatment: Course completed on 7/18/2025   Total: Dose given: 0 cGy   Elapsed Days: : @ --      2_calc   Most recent treatment: Course completed on 7/18/2025   Total: Dose given: 0 cGy   Elapsed Days: : @ --      CP L3 SBRT   Most recent treatment: Course completed on 7/18/2025   Total: Dose given: 0 cGy   Elapsed Days: : @ --                     Subjective the patient notes urinary burning and frequency especially at night getting up 3-4 times a night to urinate.  Narrow stream also noted.  Denies fever or chills.  Interval Notes:    Vital Signs for this encounter:     Performance Status:    PAIN ASSESSMENT:  Score    Location    Pain Intervention      TOXICITY ASSESSMENT:             Objective exams unremarkable.  Performance status 80.    Physical Exam:  Physical Exam    LABS:  CMP   Lab Results   Component Value Date    ALBUMIN 4.3 03/26/2025    CO2 27 03/26/2025    BUN 19 03/26/2025    CREATININE 1.2 03/26/2025    GLUCOSE 157 (H) 03/26/2025    AST 14 03/26/2025    ALT 13 03/26/2025    EGFR >60.0 03/26/2025     CBC   Lab Results   Component Value Date    WBC 9.04 03/26/2025    HGB 14.3 03/26/2025    HCT 43.9 03/26/2025    MCV 88.3 03/26/2025     03/26/2025     Tumor Marker   Lab Results   Component Value Date    PSA 10.02 (H) 03/26/2025            Assessment/Plan continue definitive radiation therapy for prostate cancer.  Urinary symptoms likely secondary to radiation therapy prescribed Flomax.  Patient alert emulate is frequently still.  Semiformed stools otherwise GI symptoms good.    Diagnoses and Orders Associated With This Visit:  There are no diagnoses linked to this encounter.

## 2025-08-05 NOTE — TELEPHONE ENCOUNTER
RN spoke w/ patient he is feeling well. No complaints  Has OV scheduled on 8/26. Will finish rad on 8/28. Will need lab work - he typically goes to [facility]. Will ask  to change orders.

## 2025-08-06 ENCOUNTER — HOSPITAL ENCOUNTER (OUTPATIENT)
Dept: RADIATION ONCOLOGY | Facility: HOSPITAL | Age: 80
Setting detail: RADIATION/ONCOLOGY SERIES
Discharge: HOME | End: 2025-08-06
Attending: RADIOLOGY
Payer: MEDICARE

## 2025-08-06 LAB
ARIA ZRC COURSE ID: NORMAL
ARIA ZRC COURSE START DATE: NORMAL
ARIA ZRC TREATMENT ELAPSED DAYS: NORMAL
ARIA ZRP FRACTIONS TREATED TO DATE: NORMAL
ARIA ZRP PLAN ID: NORMAL
ARIA ZRP PRESCRIBED DOSE CGY: 7000
ARIA ZRP PRESCRIBED DOSE PER FRACTION: 2.5
ARIA ZRR DOSAGE GIVEN TO DATE: 21.6
ARIA ZRR DOSAGE GIVEN TO DATE: 30
ARIA ZRR DOSAGE GIVEN TO DATE: 30.77
ARIA ZRR REFERENCE POINT ID: NORMAL
ARIA ZRR SESSION DOSAGE GIVEN: 1.8
ARIA ZRR SESSION DOSAGE GIVEN: 2.5
ARIA ZRR SESSION DOSAGE GIVEN: 2.56

## 2025-08-06 PROCEDURE — 77385 HC IMRT DELIVERY SIMPLE: CPT | Performed by: RADIOLOGY

## 2025-08-06 NOTE — TELEPHONE ENCOUNTER
He only needs labs before his next office visit. Does not need labs sooner. Orders placed for labs at [facility]

## 2025-08-06 NOTE — TELEPHONE ENCOUNTER
RN LVM for patient asking for return call about his updated POC     RN called twice - left two generic Vms aksing for a call back to number. Will send my chart message as well.

## 2025-08-07 ENCOUNTER — HOSPITAL ENCOUNTER (OUTPATIENT)
Dept: RADIATION ONCOLOGY | Facility: HOSPITAL | Age: 80
Setting detail: RADIATION/ONCOLOGY SERIES
Discharge: HOME | End: 2025-08-07
Attending: RADIOLOGY
Payer: MEDICARE

## 2025-08-07 LAB
ARIA ZRC COURSE ID: NORMAL
ARIA ZRC COURSE START DATE: NORMAL
ARIA ZRC TREATMENT ELAPSED DAYS: NORMAL
ARIA ZRP FRACTIONS TREATED TO DATE: NORMAL
ARIA ZRP PLAN ID: NORMAL
ARIA ZRP PRESCRIBED DOSE CGY: 7000
ARIA ZRP PRESCRIBED DOSE PER FRACTION: 2.5
ARIA ZRR DOSAGE GIVEN TO DATE: 23.4
ARIA ZRR DOSAGE GIVEN TO DATE: 32.5
ARIA ZRR DOSAGE GIVEN TO DATE: 33.33
ARIA ZRR REFERENCE POINT ID: NORMAL
ARIA ZRR SESSION DOSAGE GIVEN: 1.8
ARIA ZRR SESSION DOSAGE GIVEN: 2.5
ARIA ZRR SESSION DOSAGE GIVEN: 2.56

## 2025-08-07 PROCEDURE — 77336 RADIATION PHYSICS CONSULT: CPT | Performed by: RADIOLOGY

## 2025-08-07 PROCEDURE — 77385 HC IMRT DELIVERY SIMPLE: CPT | Performed by: RADIOLOGY

## 2025-08-08 ENCOUNTER — HOSPITAL ENCOUNTER (OUTPATIENT)
Dept: RADIATION ONCOLOGY | Facility: HOSPITAL | Age: 80
Setting detail: RADIATION/ONCOLOGY SERIES
Discharge: HOME | End: 2025-08-08
Attending: RADIOLOGY
Payer: MEDICARE

## 2025-08-08 LAB
ARIA ZRC COURSE ID: NORMAL
ARIA ZRC COURSE START DATE: NORMAL
ARIA ZRC TREATMENT ELAPSED DAYS: NORMAL
ARIA ZRP FRACTIONS TREATED TO DATE: NORMAL
ARIA ZRP PLAN ID: NORMAL
ARIA ZRP PRESCRIBED DOSE CGY: 7000
ARIA ZRP PRESCRIBED DOSE PER FRACTION: 2.5
ARIA ZRR DOSAGE GIVEN TO DATE: 25.2
ARIA ZRR DOSAGE GIVEN TO DATE: 35
ARIA ZRR DOSAGE GIVEN TO DATE: 35.9
ARIA ZRR REFERENCE POINT ID: NORMAL
ARIA ZRR SESSION DOSAGE GIVEN: 1.8
ARIA ZRR SESSION DOSAGE GIVEN: 2.5
ARIA ZRR SESSION DOSAGE GIVEN: 2.56

## 2025-08-08 PROCEDURE — 77385 HC IMRT DELIVERY SIMPLE: CPT | Performed by: RADIOLOGY

## 2025-08-11 ENCOUNTER — HOSPITAL ENCOUNTER (OUTPATIENT)
Dept: RADIATION ONCOLOGY | Facility: HOSPITAL | Age: 80
Setting detail: RADIATION/ONCOLOGY SERIES
Discharge: HOME | End: 2025-08-11
Attending: RADIOLOGY
Payer: MEDICARE

## 2025-08-11 LAB
ARIA ZRC COURSE ID: NORMAL
ARIA ZRC COURSE START DATE: NORMAL
ARIA ZRC TREATMENT ELAPSED DAYS: NORMAL
ARIA ZRP FRACTIONS TREATED TO DATE: NORMAL
ARIA ZRP PLAN ID: NORMAL
ARIA ZRP PRESCRIBED DOSE CGY: 7000
ARIA ZRP PRESCRIBED DOSE PER FRACTION: 2.5
ARIA ZRR DOSAGE GIVEN TO DATE: 27
ARIA ZRR DOSAGE GIVEN TO DATE: 37.5
ARIA ZRR DOSAGE GIVEN TO DATE: 38.46
ARIA ZRR REFERENCE POINT ID: NORMAL
ARIA ZRR SESSION DOSAGE GIVEN: 1.8
ARIA ZRR SESSION DOSAGE GIVEN: 2.5
ARIA ZRR SESSION DOSAGE GIVEN: 2.56

## 2025-08-11 PROCEDURE — 77385 HC IMRT DELIVERY SIMPLE: CPT | Performed by: RADIOLOGY

## 2025-08-12 ENCOUNTER — RAD ONC OTV (OUTPATIENT)
Dept: RADIATION ONCOLOGY | Facility: HOSPITAL | Age: 80
End: 2025-08-12
Payer: MEDICARE

## 2025-08-12 ENCOUNTER — HOSPITAL ENCOUNTER (OUTPATIENT)
Dept: RADIATION ONCOLOGY | Facility: HOSPITAL | Age: 80
Setting detail: RADIATION/ONCOLOGY SERIES
Discharge: HOME | End: 2025-08-12
Attending: RADIOLOGY
Payer: MEDICARE

## 2025-08-12 VITALS
SYSTOLIC BLOOD PRESSURE: 140 MMHG | WEIGHT: 171.2 LBS | HEART RATE: 81 BPM | DIASTOLIC BLOOD PRESSURE: 70 MMHG | BODY MASS INDEX: 28.49 KG/M2 | OXYGEN SATURATION: 99 %

## 2025-08-12 DIAGNOSIS — C61 MALIGNANT NEOPLASM OF PROSTATE (CMS/HCC): Primary | ICD-10-CM

## 2025-08-12 LAB
ARIA ZRC COURSE ID: NORMAL
ARIA ZRC COURSE START DATE: NORMAL
ARIA ZRC TREATMENT ELAPSED DAYS: NORMAL
ARIA ZRP FRACTIONS TREATED TO DATE: NORMAL
ARIA ZRP PLAN ID: NORMAL
ARIA ZRP PRESCRIBED DOSE CGY: 7000
ARIA ZRP PRESCRIBED DOSE PER FRACTION: 2.5
ARIA ZRR DOSAGE GIVEN TO DATE: 28.8
ARIA ZRR DOSAGE GIVEN TO DATE: 40
ARIA ZRR DOSAGE GIVEN TO DATE: 41.03
ARIA ZRR REFERENCE POINT ID: NORMAL
ARIA ZRR SESSION DOSAGE GIVEN: 1.8
ARIA ZRR SESSION DOSAGE GIVEN: 2.5
ARIA ZRR SESSION DOSAGE GIVEN: 2.56

## 2025-08-12 PROCEDURE — 77385 HC IMRT DELIVERY SIMPLE: CPT | Performed by: RADIOLOGY

## 2025-08-12 RX ORDER — RELUGOLIX 120 MG/1
TABLET, FILM COATED ORAL
COMMUNITY
Start: 2025-08-11

## 2025-08-12 RX ORDER — AMOXICILLIN 250 MG
1 CAPSULE ORAL DAILY
COMMUNITY

## 2025-08-12 ASSESSMENT — PAIN SCALES - GENERAL: PAINLEVEL_OUTOF10: 0-NO PAIN

## 2025-08-13 ENCOUNTER — HOSPITAL ENCOUNTER (OUTPATIENT)
Dept: RADIATION ONCOLOGY | Facility: HOSPITAL | Age: 80
Setting detail: RADIATION/ONCOLOGY SERIES
Discharge: HOME | End: 2025-08-13
Attending: RADIOLOGY
Payer: MEDICARE

## 2025-08-13 LAB
ARIA ZRC COURSE ID: NORMAL
ARIA ZRC COURSE START DATE: NORMAL
ARIA ZRC TREATMENT ELAPSED DAYS: NORMAL
ARIA ZRP FRACTIONS TREATED TO DATE: NORMAL
ARIA ZRP PLAN ID: NORMAL
ARIA ZRP PRESCRIBED DOSE CGY: 7000
ARIA ZRP PRESCRIBED DOSE PER FRACTION: 2.5
ARIA ZRR DOSAGE GIVEN TO DATE: 30.6
ARIA ZRR DOSAGE GIVEN TO DATE: 42.5
ARIA ZRR DOSAGE GIVEN TO DATE: 43.59
ARIA ZRR REFERENCE POINT ID: NORMAL
ARIA ZRR SESSION DOSAGE GIVEN: 1.8
ARIA ZRR SESSION DOSAGE GIVEN: 2.5
ARIA ZRR SESSION DOSAGE GIVEN: 2.56

## 2025-08-13 PROCEDURE — 77385 HC IMRT DELIVERY SIMPLE: CPT | Performed by: RADIOLOGY

## 2025-08-14 ENCOUNTER — HOSPITAL ENCOUNTER (OUTPATIENT)
Dept: RADIATION ONCOLOGY | Facility: HOSPITAL | Age: 80
Setting detail: RADIATION/ONCOLOGY SERIES
Discharge: HOME | End: 2025-08-14
Attending: RADIOLOGY
Payer: MEDICARE

## 2025-08-14 LAB
ARIA ZRC COURSE ID: NORMAL
ARIA ZRC COURSE START DATE: NORMAL
ARIA ZRC TREATMENT ELAPSED DAYS: NORMAL
ARIA ZRP FRACTIONS TREATED TO DATE: NORMAL
ARIA ZRP PLAN ID: NORMAL
ARIA ZRP PRESCRIBED DOSE CGY: 7000
ARIA ZRP PRESCRIBED DOSE PER FRACTION: 2.5
ARIA ZRR DOSAGE GIVEN TO DATE: 32.4
ARIA ZRR DOSAGE GIVEN TO DATE: 45
ARIA ZRR DOSAGE GIVEN TO DATE: 46.15
ARIA ZRR REFERENCE POINT ID: NORMAL
ARIA ZRR SESSION DOSAGE GIVEN: 1.8
ARIA ZRR SESSION DOSAGE GIVEN: 2.5
ARIA ZRR SESSION DOSAGE GIVEN: 2.56

## 2025-08-14 PROCEDURE — 77385 HC IMRT DELIVERY SIMPLE: CPT | Performed by: RADIOLOGY

## 2025-08-14 PROCEDURE — 77336 RADIATION PHYSICS CONSULT: CPT | Performed by: RADIOLOGY

## 2025-08-15 ENCOUNTER — HOSPITAL ENCOUNTER (OUTPATIENT)
Dept: RADIATION ONCOLOGY | Facility: HOSPITAL | Age: 80
Setting detail: RADIATION/ONCOLOGY SERIES
Discharge: HOME | End: 2025-08-15
Attending: RADIOLOGY
Payer: MEDICARE

## 2025-08-15 LAB
ARIA ZRC COURSE ID: NORMAL
ARIA ZRC COURSE START DATE: NORMAL
ARIA ZRC TREATMENT ELAPSED DAYS: NORMAL
ARIA ZRP FRACTIONS TREATED TO DATE: NORMAL
ARIA ZRP PLAN ID: NORMAL
ARIA ZRP PRESCRIBED DOSE CGY: 7000
ARIA ZRP PRESCRIBED DOSE PER FRACTION: 2.5
ARIA ZRR DOSAGE GIVEN TO DATE: 34.2
ARIA ZRR DOSAGE GIVEN TO DATE: 47.5
ARIA ZRR DOSAGE GIVEN TO DATE: 48.72
ARIA ZRR REFERENCE POINT ID: NORMAL
ARIA ZRR SESSION DOSAGE GIVEN: 1.8
ARIA ZRR SESSION DOSAGE GIVEN: 2.5
ARIA ZRR SESSION DOSAGE GIVEN: 2.56

## 2025-08-15 PROCEDURE — 77385 HC IMRT DELIVERY SIMPLE: CPT | Performed by: RADIOLOGY

## 2025-08-18 ENCOUNTER — HOSPITAL ENCOUNTER (OUTPATIENT)
Dept: RADIATION ONCOLOGY | Facility: HOSPITAL | Age: 80
Setting detail: RADIATION/ONCOLOGY SERIES
Discharge: HOME | End: 2025-08-18
Attending: RADIOLOGY
Payer: MEDICARE

## 2025-08-18 LAB
ARIA ZRC COURSE ID: NORMAL
ARIA ZRC COURSE START DATE: NORMAL
ARIA ZRC TREATMENT ELAPSED DAYS: NORMAL
ARIA ZRP FRACTIONS TREATED TO DATE: NORMAL
ARIA ZRP PLAN ID: NORMAL
ARIA ZRP PRESCRIBED DOSE CGY: 7000
ARIA ZRP PRESCRIBED DOSE PER FRACTION: 2.5
ARIA ZRR DOSAGE GIVEN TO DATE: 36
ARIA ZRR DOSAGE GIVEN TO DATE: 50
ARIA ZRR DOSAGE GIVEN TO DATE: 51.28
ARIA ZRR REFERENCE POINT ID: NORMAL
ARIA ZRR SESSION DOSAGE GIVEN: 1.8
ARIA ZRR SESSION DOSAGE GIVEN: 2.5
ARIA ZRR SESSION DOSAGE GIVEN: 2.56

## 2025-08-18 PROCEDURE — 77385 HC IMRT DELIVERY SIMPLE: CPT | Performed by: RADIOLOGY

## 2025-08-19 ENCOUNTER — HOSPITAL ENCOUNTER (OUTPATIENT)
Dept: RADIATION ONCOLOGY | Facility: HOSPITAL | Age: 80
Setting detail: RADIATION/ONCOLOGY SERIES
Discharge: HOME | End: 2025-08-19
Attending: RADIOLOGY
Payer: MEDICARE

## 2025-08-19 ENCOUNTER — RAD ONC OTV (OUTPATIENT)
Dept: RADIATION ONCOLOGY | Facility: HOSPITAL | Age: 80
End: 2025-08-19
Payer: MEDICARE

## 2025-08-19 VITALS
WEIGHT: 171.6 LBS | HEART RATE: 57 BPM | OXYGEN SATURATION: 98 % | SYSTOLIC BLOOD PRESSURE: 150 MMHG | BODY MASS INDEX: 28.56 KG/M2 | DIASTOLIC BLOOD PRESSURE: 83 MMHG

## 2025-08-19 DIAGNOSIS — C61 MALIGNANT NEOPLASM OF PROSTATE (CMS/HCC): Primary | ICD-10-CM

## 2025-08-19 LAB
ARIA ZRC COURSE ID: NORMAL
ARIA ZRC COURSE START DATE: NORMAL
ARIA ZRC TREATMENT ELAPSED DAYS: NORMAL
ARIA ZRP FRACTIONS TREATED TO DATE: NORMAL
ARIA ZRP PLAN ID: NORMAL
ARIA ZRP PRESCRIBED DOSE CGY: 7000
ARIA ZRP PRESCRIBED DOSE PER FRACTION: 2.5
ARIA ZRR DOSAGE GIVEN TO DATE: 37.8
ARIA ZRR DOSAGE GIVEN TO DATE: 52.5
ARIA ZRR DOSAGE GIVEN TO DATE: 53.85
ARIA ZRR REFERENCE POINT ID: NORMAL
ARIA ZRR SESSION DOSAGE GIVEN: 1.8
ARIA ZRR SESSION DOSAGE GIVEN: 2.5
ARIA ZRR SESSION DOSAGE GIVEN: 2.56

## 2025-08-19 PROCEDURE — 77385 HC IMRT DELIVERY SIMPLE: CPT | Performed by: RADIOLOGY

## 2025-08-19 ASSESSMENT — PAIN SCALES - GENERAL: PAINLEVEL_OUTOF10: 0-NO PAIN

## 2025-08-20 ENCOUNTER — HOSPITAL ENCOUNTER (OUTPATIENT)
Dept: RADIATION ONCOLOGY | Facility: HOSPITAL | Age: 80
Setting detail: RADIATION/ONCOLOGY SERIES
Discharge: HOME | End: 2025-08-20
Attending: RADIOLOGY
Payer: MEDICARE

## 2025-08-20 LAB
ARIA ZRC COURSE ID: NORMAL
ARIA ZRC COURSE START DATE: NORMAL
ARIA ZRC TREATMENT ELAPSED DAYS: NORMAL
ARIA ZRP FRACTIONS TREATED TO DATE: NORMAL
ARIA ZRP PLAN ID: NORMAL
ARIA ZRP PRESCRIBED DOSE CGY: 7000
ARIA ZRP PRESCRIBED DOSE PER FRACTION: 2.5
ARIA ZRR DOSAGE GIVEN TO DATE: 39.6
ARIA ZRR DOSAGE GIVEN TO DATE: 55
ARIA ZRR DOSAGE GIVEN TO DATE: 56.41
ARIA ZRR REFERENCE POINT ID: NORMAL
ARIA ZRR SESSION DOSAGE GIVEN: 1.8
ARIA ZRR SESSION DOSAGE GIVEN: 2.5
ARIA ZRR SESSION DOSAGE GIVEN: 2.56

## 2025-08-20 PROCEDURE — 77385 HC IMRT DELIVERY SIMPLE: CPT | Performed by: RADIOLOGY

## 2025-08-21 ENCOUNTER — HOSPITAL ENCOUNTER (OUTPATIENT)
Dept: RADIATION ONCOLOGY | Facility: HOSPITAL | Age: 80
Setting detail: RADIATION/ONCOLOGY SERIES
Discharge: HOME | End: 2025-08-21
Attending: RADIOLOGY
Payer: MEDICARE

## 2025-08-21 DIAGNOSIS — C61 MALIGNANT NEOPLASM OF PROSTATE (CMS/HCC): Primary | ICD-10-CM

## 2025-08-21 LAB
ARIA ZRC COURSE ID: NORMAL
ARIA ZRC COURSE START DATE: NORMAL
ARIA ZRC TREATMENT ELAPSED DAYS: NORMAL
ARIA ZRP FRACTIONS TREATED TO DATE: NORMAL
ARIA ZRP PLAN ID: NORMAL
ARIA ZRP PRESCRIBED DOSE CGY: 7000
ARIA ZRP PRESCRIBED DOSE PER FRACTION: 2.5
ARIA ZRR DOSAGE GIVEN TO DATE: 41.4
ARIA ZRR DOSAGE GIVEN TO DATE: 57.5
ARIA ZRR DOSAGE GIVEN TO DATE: 58.97
ARIA ZRR REFERENCE POINT ID: NORMAL
ARIA ZRR SESSION DOSAGE GIVEN: 1.8
ARIA ZRR SESSION DOSAGE GIVEN: 2.5
ARIA ZRR SESSION DOSAGE GIVEN: 2.56

## 2025-08-21 PROCEDURE — 77385 HC IMRT DELIVERY SIMPLE: CPT | Performed by: RADIOLOGY

## 2025-08-22 ENCOUNTER — HOSPITAL ENCOUNTER (OUTPATIENT)
Dept: RADIATION ONCOLOGY | Facility: HOSPITAL | Age: 80
Setting detail: RADIATION/ONCOLOGY SERIES
Discharge: HOME | End: 2025-08-22
Attending: RADIOLOGY
Payer: MEDICARE

## 2025-08-22 ENCOUNTER — APPOINTMENT (OUTPATIENT)
Dept: LAB | Facility: HOSPITAL | Age: 80
End: 2025-08-22
Attending: INTERNAL MEDICINE
Payer: MEDICARE

## 2025-08-22 LAB
ARIA ZRC COURSE ID: NORMAL
ARIA ZRC COURSE START DATE: NORMAL
ARIA ZRC TREATMENT ELAPSED DAYS: NORMAL
ARIA ZRP FRACTIONS TREATED TO DATE: NORMAL
ARIA ZRP PLAN ID: NORMAL
ARIA ZRP PRESCRIBED DOSE CGY: 7000
ARIA ZRP PRESCRIBED DOSE PER FRACTION: 2.5
ARIA ZRR DOSAGE GIVEN TO DATE: 43.2
ARIA ZRR DOSAGE GIVEN TO DATE: 60
ARIA ZRR DOSAGE GIVEN TO DATE: 61.54
ARIA ZRR REFERENCE POINT ID: NORMAL
ARIA ZRR SESSION DOSAGE GIVEN: 1.8
ARIA ZRR SESSION DOSAGE GIVEN: 2.5
ARIA ZRR SESSION DOSAGE GIVEN: 2.56

## 2025-08-25 ENCOUNTER — HOSPITAL ENCOUNTER (OUTPATIENT)
Dept: RADIATION ONCOLOGY | Facility: HOSPITAL | Age: 80
Setting detail: RADIATION/ONCOLOGY SERIES
Discharge: HOME | End: 2025-08-25
Attending: RADIOLOGY
Payer: MEDICARE

## 2025-08-25 LAB
ARIA ZRC COURSE ID: NORMAL
ARIA ZRC COURSE START DATE: NORMAL
ARIA ZRC TREATMENT ELAPSED DAYS: NORMAL
ARIA ZRP FRACTIONS TREATED TO DATE: NORMAL
ARIA ZRP PLAN ID: NORMAL
ARIA ZRP PRESCRIBED DOSE CGY: 7000
ARIA ZRP PRESCRIBED DOSE PER FRACTION: 2.5
ARIA ZRR DOSAGE GIVEN TO DATE: 45
ARIA ZRR DOSAGE GIVEN TO DATE: 62.5
ARIA ZRR DOSAGE GIVEN TO DATE: 64.1
ARIA ZRR REFERENCE POINT ID: NORMAL
ARIA ZRR SESSION DOSAGE GIVEN: 1.8
ARIA ZRR SESSION DOSAGE GIVEN: 2.5
ARIA ZRR SESSION DOSAGE GIVEN: 2.56

## 2025-08-25 PROCEDURE — 77385 HC IMRT DELIVERY SIMPLE: CPT | Performed by: RADIOLOGY

## 2025-08-26 ENCOUNTER — OFFICE VISIT (OUTPATIENT)
Facility: HOSPITAL | Age: 80
End: 2025-08-26
Attending: INTERNAL MEDICINE
Payer: MEDICARE

## 2025-08-26 ENCOUNTER — HOSPITAL ENCOUNTER (OUTPATIENT)
Dept: RADIATION ONCOLOGY | Facility: HOSPITAL | Age: 80
Setting detail: RADIATION/ONCOLOGY SERIES
Discharge: HOME | End: 2025-08-26
Attending: RADIOLOGY
Payer: MEDICARE

## 2025-08-26 VITALS
DIASTOLIC BLOOD PRESSURE: 75 MMHG | BODY MASS INDEX: 28.26 KG/M2 | SYSTOLIC BLOOD PRESSURE: 127 MMHG | WEIGHT: 169.8 LBS | HEART RATE: 82 BPM | TEMPERATURE: 96 F

## 2025-08-26 DIAGNOSIS — C73 THYROID CANCER (CMS/HCC): ICD-10-CM

## 2025-08-26 DIAGNOSIS — C61 MALIGNANT NEOPLASM OF PROSTATE (CMS/HCC): Primary | ICD-10-CM

## 2025-08-26 LAB
ARIA ZRC COURSE ID: NORMAL
ARIA ZRC COURSE START DATE: NORMAL
ARIA ZRC TREATMENT ELAPSED DAYS: NORMAL
ARIA ZRP FRACTIONS TREATED TO DATE: NORMAL
ARIA ZRP PLAN ID: NORMAL
ARIA ZRP PRESCRIBED DOSE CGY: 7000
ARIA ZRP PRESCRIBED DOSE PER FRACTION: 2.5
ARIA ZRR DOSAGE GIVEN TO DATE: 46.8
ARIA ZRR DOSAGE GIVEN TO DATE: 65
ARIA ZRR DOSAGE GIVEN TO DATE: 66.67
ARIA ZRR REFERENCE POINT ID: NORMAL
ARIA ZRR SESSION DOSAGE GIVEN: 1.8
ARIA ZRR SESSION DOSAGE GIVEN: 2.5
ARIA ZRR SESSION DOSAGE GIVEN: 2.56

## 2025-08-26 PROCEDURE — G0463 HOSPITAL OUTPT CLINIC VISIT: HCPCS | Performed by: INTERNAL MEDICINE

## 2025-08-26 PROCEDURE — G8752 SYS BP LESS 140: HCPCS | Performed by: INTERNAL MEDICINE

## 2025-08-26 PROCEDURE — G8754 DIAS BP LESS 90: HCPCS | Performed by: INTERNAL MEDICINE

## 2025-08-26 PROCEDURE — 77385 HC IMRT DELIVERY SIMPLE: CPT | Performed by: RADIOLOGY

## 2025-08-26 PROCEDURE — 99214 OFFICE O/P EST MOD 30 MIN: CPT | Performed by: INTERNAL MEDICINE

## 2025-08-26 ASSESSMENT — ENCOUNTER SYMPTOMS
ARTHRALGIAS: 0
NEUROLOGICAL NEGATIVE: 1
RESPIRATORY NEGATIVE: 1
FATIGUE: 1
CONSTIPATION: 1
DIARRHEA: 1
CHILLS: 0
CARDIOVASCULAR NEGATIVE: 1
BLOOD IN STOOL: 0
BACK PAIN: 1
FREQUENCY: 1

## 2025-08-27 ENCOUNTER — HOSPITAL ENCOUNTER (OUTPATIENT)
Dept: RADIATION ONCOLOGY | Facility: HOSPITAL | Age: 80
Setting detail: RADIATION/ONCOLOGY SERIES
Discharge: HOME | End: 2025-08-27
Attending: RADIOLOGY
Payer: MEDICARE

## 2025-08-27 LAB
ARIA ZRC COURSE ID: NORMAL
ARIA ZRC COURSE START DATE: NORMAL
ARIA ZRC TREATMENT ELAPSED DAYS: NORMAL
ARIA ZRP FRACTIONS TREATED TO DATE: NORMAL
ARIA ZRP PLAN ID: NORMAL
ARIA ZRP PRESCRIBED DOSE CGY: 7000
ARIA ZRP PRESCRIBED DOSE PER FRACTION: 2.5
ARIA ZRR DOSAGE GIVEN TO DATE: 48.6
ARIA ZRR DOSAGE GIVEN TO DATE: 67.5
ARIA ZRR DOSAGE GIVEN TO DATE: 69.23
ARIA ZRR REFERENCE POINT ID: NORMAL
ARIA ZRR SESSION DOSAGE GIVEN: 1.8
ARIA ZRR SESSION DOSAGE GIVEN: 2.5
ARIA ZRR SESSION DOSAGE GIVEN: 2.56

## 2025-08-27 PROCEDURE — 77385 HC IMRT DELIVERY SIMPLE: CPT | Performed by: RADIOLOGY

## 2025-08-28 ENCOUNTER — HOSPITAL ENCOUNTER (OUTPATIENT)
Dept: RADIATION ONCOLOGY | Facility: HOSPITAL | Age: 80
Setting detail: RADIATION/ONCOLOGY SERIES
Discharge: HOME | End: 2025-08-28
Attending: RADIOLOGY
Payer: MEDICARE

## 2025-08-28 ENCOUNTER — RAD ONC OTV (OUTPATIENT)
Dept: RADIATION ONCOLOGY | Facility: HOSPITAL | Age: 80
End: 2025-08-28
Payer: MEDICARE

## 2025-08-28 VITALS
WEIGHT: 169 LBS | SYSTOLIC BLOOD PRESSURE: 135 MMHG | DIASTOLIC BLOOD PRESSURE: 77 MMHG | OXYGEN SATURATION: 100 % | HEART RATE: 81 BPM | BODY MASS INDEX: 28.12 KG/M2

## 2025-08-28 DIAGNOSIS — C61 MALIGNANT NEOPLASM OF PROSTATE (CMS/HCC): Primary | ICD-10-CM

## 2025-08-28 LAB
ARIA ZRC COURSE ID: NORMAL
ARIA ZRC COURSE START DATE: NORMAL
ARIA ZRC TREATMENT ELAPSED DAYS: NORMAL
ARIA ZRP FRACTIONS TREATED TO DATE: NORMAL
ARIA ZRP PLAN ID: NORMAL
ARIA ZRP PRESCRIBED DOSE CGY: 7000
ARIA ZRP PRESCRIBED DOSE PER FRACTION: 2.5
ARIA ZRR DOSAGE GIVEN TO DATE: 50.4
ARIA ZRR DOSAGE GIVEN TO DATE: 70
ARIA ZRR DOSAGE GIVEN TO DATE: 71.79
ARIA ZRR REFERENCE POINT ID: NORMAL
ARIA ZRR SESSION DOSAGE GIVEN: 1.8
ARIA ZRR SESSION DOSAGE GIVEN: 2.5
ARIA ZRR SESSION DOSAGE GIVEN: 2.56

## 2025-08-28 PROCEDURE — 77385 HC IMRT DELIVERY SIMPLE: CPT | Performed by: RADIOLOGY

## 2025-08-28 ASSESSMENT — PAIN SCALES - GENERAL: PAINLEVEL_OUTOF10: 0-NO PAIN

## 2025-08-29 LAB
ARIA ZRC COURSE ID: NORMAL
ARIA ZRC COURSE START DATE: NORMAL
ARIA ZRC TREATMENT ELAPSED DAYS: NORMAL
ARIA ZRP FRACTIONS TREATED TO DATE: NORMAL
ARIA ZRP PLAN ID: NORMAL
ARIA ZRP PLAN NAME: NORMAL
ARIA ZRP PRESCRIBED DOSE CGY: 7000
ARIA ZRP PRESCRIBED DOSE PER FRACTION: 2.5
ARIA ZRR DOSAGE GIVEN TO DATE: 50.4
ARIA ZRR DOSAGE GIVEN TO DATE: 70
ARIA ZRR DOSAGE GIVEN TO DATE: 71.79
ARIA ZRR REFERENCE POINT ID: NORMAL

## (undated) DEVICE — DRAIN FLAT 10MM

## (undated) DEVICE — DRAPE C-ARM X-RAY EQUIPMENT IMAGE

## (undated) DEVICE — RESERVOIR DRAIN 100ML

## (undated) DEVICE — APPLICATOR CHLORAPREP 26ML ORANGE TINT

## (undated) DEVICE — Device

## (undated) DEVICE — GLOVE SZ 7.5 PROTEXIS CLASSIC LATEX

## (undated) DEVICE — SOLN IRRIG STERILE WATER 250ML

## (undated) DEVICE — PENEVAC1 NONSTICK SMOKE EVAC

## (undated) DEVICE — PACK OPHTHALMIC CUSTOM

## (undated) DEVICE — ADHESIVE SKIN DERMABOND ADVANCED 0.7ML

## (undated) DEVICE — SOLN IRRIG .9%SOD 1000ML

## (undated) DEVICE — PAD POSITIONING XL W/ARM PROTECTORS

## (undated) DEVICE — POUCH INSTRUMENT 7X11 3-4

## (undated) DEVICE — PACK RFID LAMINECTOMY

## (undated) DEVICE — STERISTRIP 1/2INX4IN

## (undated) DEVICE — MIS BLADE ILLUMINATOR

## (undated) DEVICE — DRAPE HALF STERILE

## (undated) DEVICE — MARS 3V DISPOSABLE KIT

## (undated) DEVICE — LABEL EYES KITS

## (undated) DEVICE — BLANKET LOWER BODY

## (undated) DEVICE — *T* 3.0MM MATCH HEAD PRECISION NEURO BUR

## (undated) DEVICE — ERASER WET-FIELD 18G STRAIGHT

## (undated) DEVICE — ***USE 57698*** SLEEVE FLOWTRON DVT CALF SINGLE USE

## (undated) DEVICE — SUTURE VICRYL 2-0  J762D CR CP-2 18IN

## (undated) DEVICE — AMVISC PLUS

## (undated) DEVICE — GELFOAM HEMOSTATIC SPONGE SZ 100

## (undated) DEVICE — FORCEP BIPOLAR NONSTICK DISP 8.5IN

## (undated) DEVICE — GLOVE 8 PROTEXIS PI MICRO

## (undated) DEVICE — DISSECTOR ENDO BLUNT TIP 5MM

## (undated) DEVICE — KIT EVAC DRAIN 10FR 400CC 1/8IN

## (undated) DEVICE — COVER BACK TABLE REINFORCED

## (undated) DEVICE — SEALANT SURGICAL FLOSEAL 10ML STERILE PREP

## (undated) DEVICE — SHIELD EYE UNIVERSAL

## (undated) DEVICE — CORD BIPOLAR

## (undated) DEVICE — ***USE 59083*** SUTURE ETHIBOND 0 CX21D

## (undated) DEVICE — DISSECTOR ENDO BLUNT TIP 10MM

## (undated) DEVICE — GLOVE SZ 7.5 LINER PROTEXIS PI BL

## (undated) DEVICE — ***USE 138702*** SUTURE SILK 2-0   685G

## (undated) DEVICE — PAD GROUND ELECTROSURGICAL W/CORD

## (undated) DEVICE — SOLN IRRIG STER WATER 1000ML

## (undated) DEVICE — DRESSING MEPILEX BORDER AG 4X8

## (undated) DEVICE — SUTURE VICRYL 4-0 J496H 18IN

## (undated) DEVICE — ***USE 138093*** COTTONOIDS 1X3

## (undated) DEVICE — CANNULA 27G IRRIGATION

## (undated) DEVICE — CANNULA SAUTER

## (undated) DEVICE — DRESSING SPONGE GAUZE 4X4 STER

## (undated) DEVICE — APPLICATOR COTTON TIP STER 6IN MEDC 2/PK

## (undated) DEVICE — ROD 65MM: Type: IMPLANTABLE DEVICE | Site: BACK | Status: NON-FUNCTIONAL

## (undated) DEVICE — BRUSH SCRUB WITH HIBICLENS

## (undated) DEVICE — PEDICLE ACCESS KIT

## (undated) DEVICE — TIP BOVIE NEEDLE COATED INSULATED

## (undated) DEVICE — MANIFOLD FOUR PORT NEPTUNE

## (undated) DEVICE — ***USE 138090*** COTTONOIDS 1/2 X 1